# Patient Record
Sex: MALE | Race: WHITE | Employment: FULL TIME | URBAN - METROPOLITAN AREA
[De-identification: names, ages, dates, MRNs, and addresses within clinical notes are randomized per-mention and may not be internally consistent; named-entity substitution may affect disease eponyms.]

---

## 2018-03-14 ENCOUNTER — OFFICE VISIT (OUTPATIENT)
Dept: CARDIOLOGY CLINIC | Age: 58
End: 2018-03-14
Payer: COMMERCIAL

## 2018-03-14 VITALS
BODY MASS INDEX: 36.55 KG/M2 | HEIGHT: 71 IN | HEART RATE: 71 BPM | SYSTOLIC BLOOD PRESSURE: 110 MMHG | WEIGHT: 261.1 LBS | RESPIRATION RATE: 16 BRPM | DIASTOLIC BLOOD PRESSURE: 80 MMHG

## 2018-03-14 DIAGNOSIS — I25.10 CORONARY ARTERY DISEASE INVOLVING NATIVE CORONARY ARTERY OF NATIVE HEART WITHOUT ANGINA PECTORIS: Primary | ICD-10-CM

## 2018-03-14 PROCEDURE — 93000 ELECTROCARDIOGRAM COMPLETE: CPT | Performed by: INTERNAL MEDICINE

## 2018-03-14 PROCEDURE — 99213 OFFICE O/P EST LOW 20 MIN: CPT | Performed by: INTERNAL MEDICINE

## 2018-03-15 NOTE — PROGRESS NOTES
Albuterol Sulfate (PROAIR HFA IN) Inhale 1 puff into the lungs as needed        No current facility-administered medications for this visit. Allergies   Allergen Reactions    Zocor [Simvastatin - High Dose] Other (See Comments)     Causes Rhabdomyolysis       Chief Complaint:  Pat May is here today for follow up and management/recomendations for CAD, CP, abnormal Stress test, HTN, RBBB, ODOT clearance. History of Present Illness: Pat May states that He does house work, yard work, goes up the stairs & goes shopping & collects trash. He denies any CP. He denies any  orthopnea/PND, or lower extremity edema. He denies any palpitations or presyncopal symptoms. He does have COPD and has chronic dyspnea on exertion but feels that he is at his normal baseline. He states that he recently carried 4 doors up stairs to replace them in his house. He denies any chest discomfort or dyspnea on exertion with this activity as well. REVIEW OF SYSTEMS:  As above. Patient does not complain of any fever, chills, nausea, vomiting or diarrhea. No focal, motor or neurological deficits. No changes in his/her vision, hearing, bowel or bladder habits. He is not known to have a history of thyroid problems. No recent nose bleeds. PHYSICAL EXAM:  Vitals:    03/14/18 0824   BP: 110/80   Pulse: 71   Resp: 16   Weight: 261 lb 1.6 oz (118.4 kg)   Height: 5' 11\" (1.803 m)       GENERAL:  He is alert and oriented x 3, communicates well, in no distress. NECK:  No masses, trachea is mid position. Supple, full ROM, no JVD or bruits. No palpable thyromegaly or lymphadenopathy. HEART:  Regular rate and rhythm. Normal S1 and S2. There is an S4 gallop and a I/VI systolic murmur. LUNGS:  Poor air movement. Slight left sided wheezing which improved with coughing. No use of accessory muscles. symmetrical excursion. ABDOMEN:  Soft, non-tender. Normal bowel sounds. EXTREMITIES:  Full ROM x 4.   No bilateral

## 2019-03-07 ENCOUNTER — OFFICE VISIT (OUTPATIENT)
Dept: CARDIOLOGY CLINIC | Age: 59
End: 2019-03-07
Payer: COMMERCIAL

## 2019-03-07 VITALS
HEART RATE: 94 BPM | BODY MASS INDEX: 31.78 KG/M2 | RESPIRATION RATE: 14 BRPM | HEIGHT: 71 IN | DIASTOLIC BLOOD PRESSURE: 82 MMHG | WEIGHT: 227 LBS | SYSTOLIC BLOOD PRESSURE: 118 MMHG

## 2019-03-07 DIAGNOSIS — Z02.89 ENCOUNTER FOR EXAMINATION REQUIRED BY DEPARTMENT OF TRANSPORTATION (DOT): ICD-10-CM

## 2019-03-07 DIAGNOSIS — I48.0 PAF (PAROXYSMAL ATRIAL FIBRILLATION) (HCC): ICD-10-CM

## 2019-03-07 DIAGNOSIS — I25.10 CORONARY ARTERY DISEASE INVOLVING NATIVE HEART WITHOUT ANGINA PECTORIS, UNSPECIFIED VESSEL OR LESION TYPE: Primary | ICD-10-CM

## 2019-03-07 PROCEDURE — 93000 ELECTROCARDIOGRAM COMPLETE: CPT | Performed by: INTERNAL MEDICINE

## 2019-03-07 PROCEDURE — 99215 OFFICE O/P EST HI 40 MIN: CPT | Performed by: INTERNAL MEDICINE

## 2019-03-07 RX ORDER — METOPROLOL SUCCINATE 100 MG/1
150 TABLET, EXTENDED RELEASE ORAL DAILY
Qty: 145 TABLET | Refills: 3 | Status: SHIPPED | OUTPATIENT
Start: 2019-03-07 | End: 2019-04-29

## 2019-03-07 RX ORDER — LISINOPRIL 10 MG/1
10 TABLET ORAL DAILY
Qty: 90 TABLET | Refills: 3 | Status: SHIPPED
Start: 2019-03-07 | End: 2020-03-06

## 2019-03-09 ENCOUNTER — HOSPITAL ENCOUNTER (OUTPATIENT)
Age: 59
Discharge: HOME | End: 2019-03-09
Payer: COMMERCIAL

## 2019-03-09 DIAGNOSIS — I48.0 PAF (PAROXYSMAL ATRIAL FIBRILLATION) (HCC): ICD-10-CM

## 2019-03-09 LAB
ANION GAP SERPL CALCULATED.3IONS-SCNC: 11 MMOL/L (ref 7–16)
BUN BLDV-MCNC: 26 MG/DL (ref 6–20)
CALCIUM SERPL-MCNC: 9.6 MG/DL (ref 8.6–10.2)
CHLORIDE BLD-SCNC: 102 MMOL/L (ref 98–107)
CO2: 27 MMOL/L (ref 22–29)
CREAT SERPL-MCNC: 1 MG/DL (ref 0.7–1.2)
GFR AFRICAN AMERICAN: >60
GFR NON-AFRICAN AMERICAN: >60 ML/MIN/1.73
GLUCOSE BLD-MCNC: 120 MG/DL (ref 74–99)
MAGNESIUM: 1.9 MG/DL (ref 1.6–2.6)
POTASSIUM SERPL-SCNC: 4.8 MMOL/L (ref 3.5–5)
SODIUM BLD-SCNC: 140 MMOL/L (ref 132–146)
TSH SERPL DL<=0.05 MIU/L-ACNC: 0.94 UIU/ML (ref 0.27–4.2)

## 2019-03-09 PROCEDURE — 83735 ASSAY OF MAGNESIUM: CPT

## 2019-03-09 PROCEDURE — 84443 ASSAY THYROID STIM HORMONE: CPT

## 2019-03-09 PROCEDURE — 80048 BASIC METABOLIC PNL TOTAL CA: CPT

## 2019-03-09 PROCEDURE — 36415 COLL VENOUS BLD VENIPUNCTURE: CPT

## 2019-03-11 ENCOUNTER — ANESTHESIA EVENT (OUTPATIENT)
Dept: CARDIAC CATH/INVASIVE PROCEDURES | Age: 59
End: 2019-03-11

## 2019-03-12 ENCOUNTER — TELEPHONE (OUTPATIENT)
Dept: CARDIOLOGY CLINIC | Age: 59
End: 2019-03-12

## 2019-03-12 ENCOUNTER — HOSPITAL ENCOUNTER (OUTPATIENT)
Dept: CARDIAC CATH/INVASIVE PROCEDURES | Age: 59
Setting detail: OUTPATIENT SURGERY
Discharge: HOME | End: 2019-03-12
Payer: COMMERCIAL

## 2019-03-12 ENCOUNTER — ANESTHESIA (OUTPATIENT)
Dept: CARDIAC CATH/INVASIVE PROCEDURES | Age: 59
End: 2019-03-12

## 2019-03-12 VITALS
BODY MASS INDEX: 31.5 KG/M2 | WEIGHT: 225 LBS | TEMPERATURE: 98.1 F | HEART RATE: 92 BPM | DIASTOLIC BLOOD PRESSURE: 47 MMHG | SYSTOLIC BLOOD PRESSURE: 92 MMHG | RESPIRATION RATE: 18 BRPM | HEIGHT: 71 IN | OXYGEN SATURATION: 95 %

## 2019-03-12 VITALS
OXYGEN SATURATION: 95 % | DIASTOLIC BLOOD PRESSURE: 74 MMHG | RESPIRATION RATE: 16 BRPM | SYSTOLIC BLOOD PRESSURE: 94 MMHG

## 2019-03-12 LAB
ANION GAP SERPL CALCULATED.3IONS-SCNC: 13 MMOL/L (ref 7–16)
APTT: 36.5 SEC (ref 24.5–35.1)
BASOPHILS ABSOLUTE: 0.09 E9/L (ref 0–0.2)
BASOPHILS RELATIVE PERCENT: 0.8 % (ref 0–2)
BUN BLDV-MCNC: 22 MG/DL (ref 6–20)
CALCIUM SERPL-MCNC: 9.1 MG/DL (ref 8.6–10.2)
CHLORIDE BLD-SCNC: 102 MMOL/L (ref 98–107)
CO2: 24 MMOL/L (ref 22–29)
CREAT SERPL-MCNC: 0.9 MG/DL (ref 0.7–1.2)
EOSINOPHILS ABSOLUTE: 0.41 E9/L (ref 0.05–0.5)
EOSINOPHILS RELATIVE PERCENT: 3.8 % (ref 0–6)
GFR AFRICAN AMERICAN: >60
GFR NON-AFRICAN AMERICAN: >60 ML/MIN/1.73
GLUCOSE BLD-MCNC: 121 MG/DL (ref 74–99)
HCT VFR BLD CALC: 41.7 % (ref 37–54)
HEMOGLOBIN: 13.4 G/DL (ref 12.5–16.5)
IMMATURE GRANULOCYTES #: 0.04 E9/L
IMMATURE GRANULOCYTES %: 0.4 % (ref 0–5)
INR BLD: 1.3
LV EF: 60 %
LVEF MODALITY: NORMAL
LYMPHOCYTES ABSOLUTE: 2.94 E9/L (ref 1.5–4)
LYMPHOCYTES RELATIVE PERCENT: 27.3 % (ref 20–42)
MAGNESIUM: 1.7 MG/DL (ref 1.6–2.6)
MCH RBC QN AUTO: 29.9 PG (ref 26–35)
MCHC RBC AUTO-ENTMCNC: 32.1 % (ref 32–34.5)
MCV RBC AUTO: 93.1 FL (ref 80–99.9)
MONOCYTES ABSOLUTE: 0.96 E9/L (ref 0.1–0.95)
MONOCYTES RELATIVE PERCENT: 8.9 % (ref 2–12)
NEUTROPHILS ABSOLUTE: 6.31 E9/L (ref 1.8–7.3)
NEUTROPHILS RELATIVE PERCENT: 58.8 % (ref 43–80)
PDW BLD-RTO: 13.2 FL (ref 11.5–15)
PLATELET # BLD: 256 E9/L (ref 130–450)
PMV BLD AUTO: 10.3 FL (ref 7–12)
POTASSIUM SERPL-SCNC: 4.6 MMOL/L (ref 3.5–5)
PROTHROMBIN TIME: 15.3 SEC (ref 9.3–12.4)
RBC # BLD: 4.48 E12/L (ref 3.8–5.8)
SODIUM BLD-SCNC: 139 MMOL/L (ref 132–146)
WBC # BLD: 10.8 E9/L (ref 4.5–11.5)

## 2019-03-12 PROCEDURE — 36415 COLL VENOUS BLD VENIPUNCTURE: CPT

## 2019-03-12 PROCEDURE — 2709999900 HC NON-CHARGEABLE SUPPLY

## 2019-03-12 PROCEDURE — 2580000003 HC RX 258: Performed by: INTERNAL MEDICINE

## 2019-03-12 PROCEDURE — 93312 ECHO TRANSESOPHAGEAL: CPT

## 2019-03-12 PROCEDURE — 93325 DOPPLER ECHO COLOR FLOW MAPG: CPT

## 2019-03-12 PROCEDURE — 2580000003 HC RX 258: Performed by: NURSE ANESTHETIST, CERTIFIED REGISTERED

## 2019-03-12 PROCEDURE — 85610 PROTHROMBIN TIME: CPT

## 2019-03-12 PROCEDURE — 6360000002 HC RX W HCPCS: Performed by: NURSE ANESTHETIST, CERTIFIED REGISTERED

## 2019-03-12 PROCEDURE — 83735 ASSAY OF MAGNESIUM: CPT

## 2019-03-12 PROCEDURE — 93321 DOPPLER ECHO F-UP/LMTD STD: CPT

## 2019-03-12 PROCEDURE — 3700000001 HC ADD 15 MINUTES (ANESTHESIA)

## 2019-03-12 PROCEDURE — 85025 COMPLETE CBC W/AUTO DIFF WBC: CPT

## 2019-03-12 PROCEDURE — 80048 BASIC METABOLIC PNL TOTAL CA: CPT

## 2019-03-12 PROCEDURE — 3700000000 HC ANESTHESIA ATTENDED CARE

## 2019-03-12 PROCEDURE — 85730 THROMBOPLASTIN TIME PARTIAL: CPT

## 2019-03-12 RX ORDER — SODIUM CHLORIDE 9 MG/ML
INJECTION, SOLUTION INTRAVENOUS CONTINUOUS PRN
Status: DISCONTINUED | OUTPATIENT
Start: 2019-03-12 | End: 2019-03-12 | Stop reason: SDUPTHER

## 2019-03-12 RX ORDER — SODIUM CHLORIDE 9 MG/ML
INJECTION, SOLUTION INTRAVENOUS ONCE
Status: COMPLETED | OUTPATIENT
Start: 2019-03-12 | End: 2019-03-12

## 2019-03-12 RX ORDER — PROPOFOL 10 MG/ML
INJECTION, EMULSION INTRAVENOUS PRN
Status: DISCONTINUED | OUTPATIENT
Start: 2019-03-12 | End: 2019-03-12 | Stop reason: SDUPTHER

## 2019-03-12 RX ADMIN — SODIUM CHLORIDE: 9 INJECTION, SOLUTION INTRAVENOUS at 10:13

## 2019-03-12 RX ADMIN — SODIUM CHLORIDE: 9 INJECTION, SOLUTION INTRAVENOUS at 09:44

## 2019-03-12 RX ADMIN — PROPOFOL 430 MG: 10 INJECTION, EMULSION INTRAVENOUS at 10:17

## 2019-03-13 ENCOUNTER — TELEPHONE (OUTPATIENT)
Dept: CARDIOLOGY CLINIC | Age: 59
End: 2019-03-13

## 2019-03-13 PROCEDURE — 93325 DOPPLER ECHO COLOR FLOW MAPG: CPT | Performed by: INTERNAL MEDICINE

## 2019-03-13 PROCEDURE — 93312 ECHO TRANSESOPHAGEAL: CPT | Performed by: INTERNAL MEDICINE

## 2019-03-16 LAB
EKG ATRIAL RATE: 79 BPM
EKG Q-T INTERVAL: 392 MS
EKG QRS DURATION: 110 MS
EKG QTC CALCULATION (BAZETT): 471 MS
EKG R AXIS: 32 DEGREES
EKG T AXIS: 36 DEGREES
EKG VENTRICULAR RATE: 87 BPM

## 2019-03-21 ENCOUNTER — HOSPITAL ENCOUNTER (OUTPATIENT)
Dept: CARDIOLOGY | Age: 59
Discharge: HOME | End: 2019-03-21
Payer: COMMERCIAL

## 2019-03-21 VITALS
SYSTOLIC BLOOD PRESSURE: 116 MMHG | DIASTOLIC BLOOD PRESSURE: 72 MMHG | HEART RATE: 106 BPM | WEIGHT: 225 LBS | HEIGHT: 71 IN | BODY MASS INDEX: 31.5 KG/M2

## 2019-03-21 DIAGNOSIS — I48.0 PAF (PAROXYSMAL ATRIAL FIBRILLATION) (HCC): ICD-10-CM

## 2019-03-21 DIAGNOSIS — I25.10 CORONARY ARTERY DISEASE INVOLVING NATIVE HEART WITHOUT ANGINA PECTORIS, UNSPECIFIED VESSEL OR LESION TYPE: ICD-10-CM

## 2019-03-21 DIAGNOSIS — I25.10 CORONARY ARTERY DISEASE INVOLVING NATIVE CORONARY ARTERY OF NATIVE HEART WITHOUT ANGINA PECTORIS: Primary | ICD-10-CM

## 2019-03-21 DIAGNOSIS — Z02.89 ENCOUNTER FOR EXAMINATION REQUIRED BY DEPARTMENT OF TRANSPORTATION (DOT): ICD-10-CM

## 2019-03-21 PROCEDURE — 2580000003 HC RX 258: Performed by: INTERNAL MEDICINE

## 2019-03-21 PROCEDURE — 3430000000 HC RX DIAGNOSTIC RADIOPHARMACEUTICAL: Performed by: INTERNAL MEDICINE

## 2019-03-21 PROCEDURE — 93017 CV STRESS TEST TRACING ONLY: CPT

## 2019-03-21 PROCEDURE — 6360000002 HC RX W HCPCS: Performed by: INTERNAL MEDICINE

## 2019-03-21 PROCEDURE — A9500 TC99M SESTAMIBI: HCPCS | Performed by: INTERNAL MEDICINE

## 2019-03-21 PROCEDURE — 78452 HT MUSCLE IMAGE SPECT MULT: CPT

## 2019-03-21 RX ORDER — SODIUM CHLORIDE 0.9 % (FLUSH) 0.9 %
10 SYRINGE (ML) INJECTION PRN
Status: DISCONTINUED | OUTPATIENT
Start: 2019-03-21 | End: 2019-03-22 | Stop reason: HOSPADM

## 2019-03-21 RX ADMIN — Medication 34.2 MILLICURIE: at 08:44

## 2019-03-21 RX ADMIN — Medication 10 ML: at 08:45

## 2019-03-21 RX ADMIN — Medication 10 ML: at 08:44

## 2019-03-21 RX ADMIN — Medication 10.1 MILLICURIE: at 07:31

## 2019-03-21 RX ADMIN — REGADENOSON 0.4 MG: 0.08 INJECTION, SOLUTION INTRAVENOUS at 08:44

## 2019-03-21 RX ADMIN — Medication 10 ML: at 07:31

## 2019-03-22 ENCOUNTER — TELEPHONE (OUTPATIENT)
Dept: CARDIOLOGY CLINIC | Age: 59
End: 2019-03-22

## 2019-04-02 ENCOUNTER — TELEPHONE (OUTPATIENT)
Dept: CARDIOLOGY CLINIC | Age: 59
End: 2019-04-02

## 2019-04-02 NOTE — TELEPHONE ENCOUNTER
Patient called stating his work is asking for a release to return to work without restrictions. Please advise.

## 2019-04-29 ENCOUNTER — OFFICE VISIT (OUTPATIENT)
Dept: CARDIOLOGY CLINIC | Age: 59
End: 2019-04-29
Payer: COMMERCIAL

## 2019-04-29 VITALS
RESPIRATION RATE: 16 BRPM | HEIGHT: 71 IN | WEIGHT: 250.4 LBS | HEART RATE: 100 BPM | BODY MASS INDEX: 35.06 KG/M2 | SYSTOLIC BLOOD PRESSURE: 136 MMHG | DIASTOLIC BLOOD PRESSURE: 84 MMHG

## 2019-04-29 DIAGNOSIS — I10 HYPERTENSION, UNSPECIFIED TYPE: Primary | ICD-10-CM

## 2019-04-29 PROCEDURE — 93000 ELECTROCARDIOGRAM COMPLETE: CPT | Performed by: INTERNAL MEDICINE

## 2019-04-29 PROCEDURE — 99214 OFFICE O/P EST MOD 30 MIN: CPT | Performed by: INTERNAL MEDICINE

## 2019-04-29 RX ORDER — METOPROLOL SUCCINATE 100 MG/1
100 TABLET, EXTENDED RELEASE ORAL 2 TIMES DAILY
Qty: 180 TABLET | Refills: 3 | Status: SHIPPED
Start: 2019-04-29 | End: 2020-05-05

## 2019-05-13 ENCOUNTER — NURSE ONLY (OUTPATIENT)
Dept: CARDIOLOGY CLINIC | Age: 59
End: 2019-05-13

## 2019-05-13 ENCOUNTER — TELEPHONE (OUTPATIENT)
Dept: CARDIOLOGY CLINIC | Age: 59
End: 2019-05-13

## 2019-05-13 VITALS — SYSTOLIC BLOOD PRESSURE: 150 MMHG | DIASTOLIC BLOOD PRESSURE: 100 MMHG | HEART RATE: 88 BPM

## 2019-05-15 RX ORDER — DILTIAZEM HYDROCHLORIDE 120 MG/1
120 CAPSULE, COATED, EXTENDED RELEASE ORAL DAILY
Qty: 90 CAPSULE | Refills: 3 | Status: SHIPPED
Start: 2019-05-15 | End: 2020-05-05

## 2019-09-05 RX ORDER — APIXABAN 5 MG/1
TABLET, FILM COATED ORAL
Qty: 60 TABLET | Refills: 11 | Status: SHIPPED
Start: 2019-09-05 | End: 2020-06-11 | Stop reason: SDUPTHER

## 2019-09-27 ENCOUNTER — APPOINTMENT (OUTPATIENT)
Dept: GENERAL RADIOLOGY | Age: 59
End: 2019-09-27
Payer: COMMERCIAL

## 2019-09-27 ENCOUNTER — HOSPITAL ENCOUNTER (OUTPATIENT)
Age: 59
Setting detail: OBSERVATION
Discharge: HOME | End: 2019-09-28
Attending: EMERGENCY MEDICINE | Admitting: FAMILY MEDICINE
Payer: COMMERCIAL

## 2019-09-27 DIAGNOSIS — R07.9 ACUTE CHEST PAIN: Primary | ICD-10-CM

## 2019-09-27 DIAGNOSIS — J41.0 SIMPLE CHRONIC BRONCHITIS (HCC): ICD-10-CM

## 2019-09-27 DIAGNOSIS — R06.2 WHEEZING: ICD-10-CM

## 2019-09-27 LAB
ALBUMIN SERPL-MCNC: 4.5 G/DL (ref 3.5–5.2)
ALBUMIN SERPL-MCNC: 4.5 G/DL (ref 3.5–5.2)
ALP BLD-CCNC: 66 U/L (ref 40–129)
ALP BLD-CCNC: 69 U/L (ref 40–129)
ALT SERPL-CCNC: 12 U/L (ref 0–40)
ALT SERPL-CCNC: 13 U/L (ref 0–40)
ANION GAP SERPL CALCULATED.3IONS-SCNC: 8 MMOL/L (ref 7–16)
AST SERPL-CCNC: 15 U/L (ref 0–39)
AST SERPL-CCNC: 23 U/L (ref 0–39)
BASOPHILS ABSOLUTE: 0.1 E9/L (ref 0–0.2)
BASOPHILS RELATIVE PERCENT: 0.9 % (ref 0–2)
BILIRUB SERPL-MCNC: 0.4 MG/DL (ref 0–1.2)
BILIRUB SERPL-MCNC: 0.6 MG/DL (ref 0–1.2)
BILIRUBIN DIRECT: 0.2 MG/DL (ref 0–0.3)
BILIRUBIN, INDIRECT: 0.4 MG/DL (ref 0–1)
BUN BLDV-MCNC: 24 MG/DL (ref 6–20)
CALCIUM SERPL-MCNC: 9.5 MG/DL (ref 8.6–10.2)
CHLORIDE BLD-SCNC: 103 MMOL/L (ref 98–107)
CHOLESTEROL, TOTAL: 175 MG/DL (ref 0–199)
CO2: 30 MMOL/L (ref 22–29)
CREAT SERPL-MCNC: 1.1 MG/DL (ref 0.7–1.2)
EKG ATRIAL RATE: 84 BPM
EKG Q-T INTERVAL: 402 MS
EKG QRS DURATION: 122 MS
EKG QTC CALCULATION (BAZETT): 460 MS
EKG R AXIS: 13 DEGREES
EKG T AXIS: 17 DEGREES
EKG VENTRICULAR RATE: 79 BPM
EOSINOPHILS ABSOLUTE: 0.46 E9/L (ref 0.05–0.5)
EOSINOPHILS RELATIVE PERCENT: 4.1 % (ref 0–6)
GFR AFRICAN AMERICAN: >60
GFR NON-AFRICAN AMERICAN: >60 ML/MIN/1.73
GLUCOSE BLD-MCNC: 147 MG/DL (ref 74–99)
HCT VFR BLD CALC: 46.9 % (ref 37–54)
HDLC SERPL-MCNC: 75 MG/DL
HEMOGLOBIN: 14.9 G/DL (ref 12.5–16.5)
IMMATURE GRANULOCYTES #: 0.08 E9/L
IMMATURE GRANULOCYTES %: 0.7 % (ref 0–5)
LDL CHOLESTEROL CALCULATED: 86 MG/DL (ref 0–99)
LYMPHOCYTES ABSOLUTE: 3.77 E9/L (ref 1.5–4)
LYMPHOCYTES RELATIVE PERCENT: 33.3 % (ref 20–42)
MAGNESIUM: 1.9 MG/DL (ref 1.6–2.6)
MCH RBC QN AUTO: 30.4 PG (ref 26–35)
MCHC RBC AUTO-ENTMCNC: 31.8 % (ref 32–34.5)
MCV RBC AUTO: 95.7 FL (ref 80–99.9)
MONOCYTES ABSOLUTE: 1.02 E9/L (ref 0.1–0.95)
MONOCYTES RELATIVE PERCENT: 9 % (ref 2–12)
NEUTROPHILS ABSOLUTE: 5.88 E9/L (ref 1.8–7.3)
NEUTROPHILS RELATIVE PERCENT: 52 % (ref 43–80)
PDW BLD-RTO: 14.4 FL (ref 11.5–15)
PLATELET # BLD: 239 E9/L (ref 130–450)
PMV BLD AUTO: 10.4 FL (ref 7–12)
POTASSIUM SERPL-SCNC: 5.3 MMOL/L (ref 3.5–5)
PRO-BNP: 1675 PG/ML (ref 0–125)
RBC # BLD: 4.9 E12/L (ref 3.8–5.8)
REASON FOR REJECTION: NORMAL
REJECTED TEST: NORMAL
SODIUM BLD-SCNC: 141 MMOL/L (ref 132–146)
TOTAL PROTEIN: 7.1 G/DL (ref 6.4–8.3)
TOTAL PROTEIN: 7.6 G/DL (ref 6.4–8.3)
TRIGL SERPL-MCNC: 71 MG/DL (ref 0–149)
TROPONIN: <0.01 NG/ML (ref 0–0.03)
VLDLC SERPL CALC-MCNC: 14 MG/DL
WBC # BLD: 11.3 E9/L (ref 4.5–11.5)

## 2019-09-27 PROCEDURE — APPSS45 APP SPLIT SHARED TIME 31-45 MINUTES: Performed by: NURSE PRACTITIONER

## 2019-09-27 PROCEDURE — 84484 ASSAY OF TROPONIN QUANT: CPT

## 2019-09-27 PROCEDURE — 93005 ELECTROCARDIOGRAM TRACING: CPT | Performed by: STUDENT IN AN ORGANIZED HEALTH CARE EDUCATION/TRAINING PROGRAM

## 2019-09-27 PROCEDURE — 94761 N-INVAS EAR/PLS OXIMETRY MLT: CPT

## 2019-09-27 PROCEDURE — 6360000002 HC RX W HCPCS: Performed by: FAMILY MEDICINE

## 2019-09-27 PROCEDURE — G0378 HOSPITAL OBSERVATION PER HR: HCPCS

## 2019-09-27 PROCEDURE — 94640 AIRWAY INHALATION TREATMENT: CPT

## 2019-09-27 PROCEDURE — 71045 X-RAY EXAM CHEST 1 VIEW: CPT

## 2019-09-27 PROCEDURE — 6370000000 HC RX 637 (ALT 250 FOR IP): Performed by: FAMILY MEDICINE

## 2019-09-27 PROCEDURE — 83880 ASSAY OF NATRIURETIC PEPTIDE: CPT

## 2019-09-27 PROCEDURE — 99285 EMERGENCY DEPT VISIT HI MDM: CPT

## 2019-09-27 PROCEDURE — 85025 COMPLETE CBC W/AUTO DIFF WBC: CPT

## 2019-09-27 PROCEDURE — 80076 HEPATIC FUNCTION PANEL: CPT

## 2019-09-27 PROCEDURE — 83735 ASSAY OF MAGNESIUM: CPT

## 2019-09-27 PROCEDURE — 6370000000 HC RX 637 (ALT 250 FOR IP): Performed by: STUDENT IN AN ORGANIZED HEALTH CARE EDUCATION/TRAINING PROGRAM

## 2019-09-27 PROCEDURE — 36415 COLL VENOUS BLD VENIPUNCTURE: CPT

## 2019-09-27 PROCEDURE — 80061 LIPID PANEL: CPT

## 2019-09-27 PROCEDURE — 99244 OFF/OP CNSLTJ NEW/EST MOD 40: CPT | Performed by: INTERNAL MEDICINE

## 2019-09-27 PROCEDURE — 96374 THER/PROPH/DIAG INJ IV PUSH: CPT

## 2019-09-27 PROCEDURE — 80053 COMPREHEN METABOLIC PANEL: CPT

## 2019-09-27 PROCEDURE — 94664 DEMO&/EVAL PT USE INHALER: CPT

## 2019-09-27 RX ORDER — ALBUTEROL SULFATE 2.5 MG/3ML
2.5 SOLUTION RESPIRATORY (INHALATION) EVERY 4 HOURS PRN
Status: DISCONTINUED | OUTPATIENT
Start: 2019-09-27 | End: 2019-09-28 | Stop reason: HOSPADM

## 2019-09-27 RX ORDER — FUROSEMIDE 10 MG/ML
40 INJECTION INTRAMUSCULAR; INTRAVENOUS ONCE
Status: COMPLETED | OUTPATIENT
Start: 2019-09-27 | End: 2019-09-27

## 2019-09-27 RX ORDER — ASPIRIN 81 MG/1
81 TABLET ORAL DAILY
Status: DISCONTINUED | OUTPATIENT
Start: 2019-09-28 | End: 2019-09-28 | Stop reason: HOSPADM

## 2019-09-27 RX ORDER — NITROGLYCERIN 0.4 MG/1
0.4 TABLET SUBLINGUAL EVERY 5 MIN PRN
Status: DISCONTINUED | OUTPATIENT
Start: 2019-09-27 | End: 2019-09-28 | Stop reason: HOSPADM

## 2019-09-27 RX ORDER — DILTIAZEM HYDROCHLORIDE 120 MG/1
120 CAPSULE, COATED, EXTENDED RELEASE ORAL DAILY
Status: DISCONTINUED | OUTPATIENT
Start: 2019-09-28 | End: 2019-09-28 | Stop reason: HOSPADM

## 2019-09-27 RX ORDER — IPRATROPIUM BROMIDE AND ALBUTEROL SULFATE 2.5; .5 MG/3ML; MG/3ML
1 SOLUTION RESPIRATORY (INHALATION)
Status: DISCONTINUED | OUTPATIENT
Start: 2019-09-27 | End: 2019-09-28 | Stop reason: HOSPADM

## 2019-09-27 RX ORDER — LISINOPRIL 10 MG/1
10 TABLET ORAL DAILY
Status: DISCONTINUED | OUTPATIENT
Start: 2019-09-28 | End: 2019-09-28 | Stop reason: HOSPADM

## 2019-09-27 RX ORDER — IPRATROPIUM BROMIDE AND ALBUTEROL SULFATE 2.5; .5 MG/3ML; MG/3ML
1 SOLUTION RESPIRATORY (INHALATION)
Status: DISCONTINUED | OUTPATIENT
Start: 2019-09-27 | End: 2019-09-27

## 2019-09-27 RX ORDER — METOPROLOL SUCCINATE 100 MG/1
100 TABLET, EXTENDED RELEASE ORAL 2 TIMES DAILY
Status: DISCONTINUED | OUTPATIENT
Start: 2019-09-27 | End: 2019-09-28 | Stop reason: HOSPADM

## 2019-09-27 RX ORDER — ERGOCALCIFEROL 1.25 MG/1
50000 CAPSULE ORAL WEEKLY
Status: DISCONTINUED | OUTPATIENT
Start: 2019-09-30 | End: 2019-09-28 | Stop reason: HOSPADM

## 2019-09-27 RX ORDER — ROSUVASTATIN CALCIUM 20 MG/1
40 TABLET, COATED ORAL NIGHTLY
Status: DISCONTINUED | OUTPATIENT
Start: 2019-09-27 | End: 2019-09-28 | Stop reason: HOSPADM

## 2019-09-27 RX ADMIN — TIOTROPIUM BROMIDE 18 MCG: 18 CAPSULE ORAL; RESPIRATORY (INHALATION) at 20:48

## 2019-09-27 RX ADMIN — IPRATROPIUM BROMIDE AND ALBUTEROL SULFATE 1 AMPULE: .5; 3 SOLUTION RESPIRATORY (INHALATION) at 20:54

## 2019-09-27 RX ADMIN — IPRATROPIUM BROMIDE AND ALBUTEROL SULFATE 1 AMPULE: .5; 3 SOLUTION RESPIRATORY (INHALATION) at 09:18

## 2019-09-27 RX ADMIN — FUROSEMIDE 40 MG: 10 INJECTION, SOLUTION INTRAMUSCULAR; INTRAVENOUS at 16:18

## 2019-09-27 RX ADMIN — IPRATROPIUM BROMIDE AND ALBUTEROL SULFATE 1 AMPULE: .5; 3 SOLUTION RESPIRATORY (INHALATION) at 15:38

## 2019-09-27 RX ADMIN — IPRATROPIUM BROMIDE AND ALBUTEROL SULFATE 1 AMPULE: .5; 3 SOLUTION RESPIRATORY (INHALATION) at 13:15

## 2019-09-27 RX ADMIN — METOPROLOL SUCCINATE 100 MG: 100 TABLET, EXTENDED RELEASE ORAL at 20:47

## 2019-09-27 RX ADMIN — ROSUVASTATIN CALCIUM 40 MG: 20 TABLET, FILM COATED ORAL at 20:47

## 2019-09-27 ASSESSMENT — PAIN SCALES - GENERAL
PAINLEVEL_OUTOF10: 2
PAINLEVEL_OUTOF10: 0

## 2019-09-27 ASSESSMENT — PAIN DESCRIPTION - PAIN TYPE: TYPE: ACUTE PAIN

## 2019-09-27 ASSESSMENT — PAIN DESCRIPTION - LOCATION: LOCATION: CHEST

## 2019-09-27 ASSESSMENT — PAIN DESCRIPTION - DESCRIPTORS: DESCRIPTORS: HEAVINESS

## 2019-09-27 ASSESSMENT — PAIN DESCRIPTION - ORIENTATION: ORIENTATION: MID

## 2019-09-28 VITALS
HEIGHT: 71 IN | SYSTOLIC BLOOD PRESSURE: 137 MMHG | HEART RATE: 75 BPM | TEMPERATURE: 97.1 F | OXYGEN SATURATION: 97 % | WEIGHT: 265.5 LBS | BODY MASS INDEX: 37.17 KG/M2 | RESPIRATION RATE: 18 BRPM | DIASTOLIC BLOOD PRESSURE: 89 MMHG

## 2019-09-28 LAB — TSH SERPL DL<=0.05 MIU/L-ACNC: 1.6 UIU/ML (ref 0.27–4.2)

## 2019-09-28 PROCEDURE — 6360000002 HC RX W HCPCS: Performed by: INTERNAL MEDICINE

## 2019-09-28 PROCEDURE — 99212 OFFICE O/P EST SF 10 MIN: CPT | Performed by: INTERNAL MEDICINE

## 2019-09-28 PROCEDURE — 36415 COLL VENOUS BLD VENIPUNCTURE: CPT

## 2019-09-28 PROCEDURE — G0378 HOSPITAL OBSERVATION PER HR: HCPCS

## 2019-09-28 PROCEDURE — 84443 ASSAY THYROID STIM HORMONE: CPT

## 2019-09-28 PROCEDURE — 6370000000 HC RX 637 (ALT 250 FOR IP): Performed by: FAMILY MEDICINE

## 2019-09-28 PROCEDURE — 96375 TX/PRO/DX INJ NEW DRUG ADDON: CPT

## 2019-09-28 PROCEDURE — 94640 AIRWAY INHALATION TREATMENT: CPT

## 2019-09-28 RX ORDER — PREDNISONE 20 MG/1
TABLET ORAL
Qty: 18 TABLET | Refills: 0 | Status: SHIPPED | OUTPATIENT
Start: 2019-09-28 | End: 2019-11-06

## 2019-09-28 RX ORDER — METHYLPREDNISOLONE SODIUM SUCCINATE 40 MG/ML
40 INJECTION, POWDER, LYOPHILIZED, FOR SOLUTION INTRAMUSCULAR; INTRAVENOUS ONCE
Status: COMPLETED | OUTPATIENT
Start: 2019-09-28 | End: 2019-09-28

## 2019-09-28 RX ORDER — IPRATROPIUM BROMIDE AND ALBUTEROL SULFATE 2.5; .5 MG/3ML; MG/3ML
1 SOLUTION RESPIRATORY (INHALATION) EVERY 4 HOURS
Qty: 360 ML | Refills: 0 | Status: SHIPPED | OUTPATIENT
Start: 2019-09-28

## 2019-09-28 RX ADMIN — METHYLPREDNISOLONE SODIUM SUCCINATE 40 MG: 40 INJECTION, POWDER, FOR SOLUTION INTRAMUSCULAR; INTRAVENOUS at 11:25

## 2019-09-28 RX ADMIN — LISINOPRIL 10 MG: 10 TABLET ORAL at 08:14

## 2019-09-28 RX ADMIN — IPRATROPIUM BROMIDE AND ALBUTEROL SULFATE 1 AMPULE: .5; 3 SOLUTION RESPIRATORY (INHALATION) at 05:53

## 2019-09-28 RX ADMIN — DILTIAZEM HYDROCHLORIDE 120 MG: 120 CAPSULE, COATED, EXTENDED RELEASE ORAL at 09:54

## 2019-09-28 RX ADMIN — IPRATROPIUM BROMIDE AND ALBUTEROL SULFATE 1 AMPULE: .5; 3 SOLUTION RESPIRATORY (INHALATION) at 10:05

## 2019-09-28 RX ADMIN — ASPIRIN 81 MG: 81 TABLET, COATED ORAL at 08:14

## 2019-09-28 RX ADMIN — TIOTROPIUM BROMIDE 18 MCG: 18 CAPSULE ORAL; RESPIRATORY (INHALATION) at 08:14

## 2019-09-28 RX ADMIN — METOPROLOL SUCCINATE 100 MG: 100 TABLET, EXTENDED RELEASE ORAL at 08:14

## 2019-09-28 ASSESSMENT — PAIN SCALES - GENERAL: PAINLEVEL_OUTOF10: 0

## 2019-10-29 ENCOUNTER — OFFICE VISIT (OUTPATIENT)
Dept: CARDIOLOGY CLINIC | Age: 59
End: 2019-10-29
Payer: COMMERCIAL

## 2019-10-29 VITALS
BODY MASS INDEX: 36.96 KG/M2 | HEART RATE: 94 BPM | DIASTOLIC BLOOD PRESSURE: 74 MMHG | WEIGHT: 264 LBS | RESPIRATION RATE: 16 BRPM | SYSTOLIC BLOOD PRESSURE: 118 MMHG | HEIGHT: 71 IN

## 2019-10-29 DIAGNOSIS — I25.10 CORONARY ARTERY DISEASE INVOLVING NATIVE CORONARY ARTERY OF NATIVE HEART WITHOUT ANGINA PECTORIS: Primary | ICD-10-CM

## 2019-10-29 PROCEDURE — 99215 OFFICE O/P EST HI 40 MIN: CPT | Performed by: INTERNAL MEDICINE

## 2019-10-29 PROCEDURE — 93000 ELECTROCARDIOGRAM COMPLETE: CPT | Performed by: INTERNAL MEDICINE

## 2019-11-05 ENCOUNTER — TELEPHONE (OUTPATIENT)
Dept: NON INVASIVE DIAGNOSTICS | Age: 59
End: 2019-11-05

## 2019-11-06 ENCOUNTER — ANESTHESIA EVENT (OUTPATIENT)
Dept: CARDIAC CATH/INVASIVE PROCEDURES | Age: 59
End: 2019-11-06

## 2019-11-06 ENCOUNTER — HOSPITAL ENCOUNTER (OUTPATIENT)
Dept: CARDIAC CATH/INVASIVE PROCEDURES | Age: 59
Discharge: HOME | End: 2019-11-06
Payer: COMMERCIAL

## 2019-11-06 ENCOUNTER — ANESTHESIA (OUTPATIENT)
Dept: CARDIAC CATH/INVASIVE PROCEDURES | Age: 59
End: 2019-11-06

## 2019-11-06 VITALS
BODY MASS INDEX: 36.4 KG/M2 | DIASTOLIC BLOOD PRESSURE: 74 MMHG | WEIGHT: 260 LBS | TEMPERATURE: 99.7 F | SYSTOLIC BLOOD PRESSURE: 110 MMHG | HEIGHT: 71 IN | RESPIRATION RATE: 20 BRPM | HEART RATE: 78 BPM

## 2019-11-06 VITALS
SYSTOLIC BLOOD PRESSURE: 99 MMHG | DIASTOLIC BLOOD PRESSURE: 66 MMHG | RESPIRATION RATE: 18 BRPM | OXYGEN SATURATION: 94 %

## 2019-11-06 LAB
ANION GAP SERPL CALCULATED.3IONS-SCNC: 16 MMOL/L (ref 7–16)
APTT: 36.3 SEC (ref 24.5–35.1)
BASOPHILS ABSOLUTE: 0.11 E9/L (ref 0–0.2)
BASOPHILS RELATIVE PERCENT: 0.9 % (ref 0–2)
BUN BLDV-MCNC: 21 MG/DL (ref 6–20)
CALCIUM SERPL-MCNC: 9.6 MG/DL (ref 8.6–10.2)
CHLORIDE BLD-SCNC: 99 MMOL/L (ref 98–107)
CO2: 25 MMOL/L (ref 22–29)
CREAT SERPL-MCNC: 1.1 MG/DL (ref 0.7–1.2)
EOSINOPHILS ABSOLUTE: 0.77 E9/L (ref 0.05–0.5)
EOSINOPHILS RELATIVE PERCENT: 6.6 % (ref 0–6)
GFR AFRICAN AMERICAN: >60
GFR NON-AFRICAN AMERICAN: >60 ML/MIN/1.73
GLUCOSE BLD-MCNC: 138 MG/DL (ref 74–99)
HCT VFR BLD CALC: 46.5 % (ref 37–54)
HEMOGLOBIN: 14.9 G/DL (ref 12.5–16.5)
IMMATURE GRANULOCYTES #: 0.08 E9/L
IMMATURE GRANULOCYTES %: 0.7 % (ref 0–5)
INR BLD: 1.4
LYMPHOCYTES ABSOLUTE: 2.38 E9/L (ref 1.5–4)
LYMPHOCYTES RELATIVE PERCENT: 20.3 % (ref 20–42)
MCH RBC QN AUTO: 30.9 PG (ref 26–35)
MCHC RBC AUTO-ENTMCNC: 32 % (ref 32–34.5)
MCV RBC AUTO: 96.5 FL (ref 80–99.9)
MONOCYTES ABSOLUTE: 0.89 E9/L (ref 0.1–0.95)
MONOCYTES RELATIVE PERCENT: 7.6 % (ref 2–12)
NEUTROPHILS ABSOLUTE: 7.49 E9/L (ref 1.8–7.3)
NEUTROPHILS RELATIVE PERCENT: 63.9 % (ref 43–80)
PDW BLD-RTO: 13.2 FL (ref 11.5–15)
PLATELET # BLD: 231 E9/L (ref 130–450)
PMV BLD AUTO: 10.2 FL (ref 7–12)
POTASSIUM REFLEX MAGNESIUM: 4.3 MMOL/L (ref 3.5–5)
PROTHROMBIN TIME: 15.7 SEC (ref 9.3–12.4)
RBC # BLD: 4.82 E12/L (ref 3.8–5.8)
SODIUM BLD-SCNC: 140 MMOL/L (ref 132–146)
WBC # BLD: 11.7 E9/L (ref 4.5–11.5)

## 2019-11-06 PROCEDURE — 2580000003 HC RX 258: Performed by: NURSE ANESTHETIST, CERTIFIED REGISTERED

## 2019-11-06 PROCEDURE — 6360000002 HC RX W HCPCS: Performed by: NURSE ANESTHETIST, CERTIFIED REGISTERED

## 2019-11-06 PROCEDURE — 6370000000 HC RX 637 (ALT 250 FOR IP): Performed by: INTERNAL MEDICINE

## 2019-11-06 PROCEDURE — 3700000001 HC ADD 15 MINUTES (ANESTHESIA)

## 2019-11-06 PROCEDURE — 36415 COLL VENOUS BLD VENIPUNCTURE: CPT

## 2019-11-06 PROCEDURE — 3700000000 HC ANESTHESIA ATTENDED CARE

## 2019-11-06 PROCEDURE — 2500000003 HC RX 250 WO HCPCS: Performed by: NURSE ANESTHETIST, CERTIFIED REGISTERED

## 2019-11-06 PROCEDURE — 85025 COMPLETE CBC W/AUTO DIFF WBC: CPT

## 2019-11-06 PROCEDURE — 85730 THROMBOPLASTIN TIME PARTIAL: CPT

## 2019-11-06 PROCEDURE — 80048 BASIC METABOLIC PNL TOTAL CA: CPT

## 2019-11-06 PROCEDURE — 85610 PROTHROMBIN TIME: CPT

## 2019-11-06 RX ORDER — IPRATROPIUM BROMIDE AND ALBUTEROL SULFATE 2.5; .5 MG/3ML; MG/3ML
1 SOLUTION RESPIRATORY (INHALATION) ONCE
Status: COMPLETED | OUTPATIENT
Start: 2019-11-06 | End: 2019-11-06

## 2019-11-06 RX ORDER — LIDOCAINE HYDROCHLORIDE 20 MG/ML
INJECTION, SOLUTION EPIDURAL; INFILTRATION; INTRACAUDAL; PERINEURAL PRN
Status: DISCONTINUED | OUTPATIENT
Start: 2019-11-06 | End: 2019-11-06 | Stop reason: SDUPTHER

## 2019-11-06 RX ORDER — PROPOFOL 10 MG/ML
INJECTION, EMULSION INTRAVENOUS PRN
Status: DISCONTINUED | OUTPATIENT
Start: 2019-11-06 | End: 2019-11-06 | Stop reason: SDUPTHER

## 2019-11-06 RX ORDER — SODIUM CHLORIDE 9 MG/ML
INJECTION, SOLUTION INTRAVENOUS CONTINUOUS PRN
Status: DISCONTINUED | OUTPATIENT
Start: 2019-11-06 | End: 2019-11-06 | Stop reason: SDUPTHER

## 2019-11-06 RX ORDER — MIDAZOLAM HYDROCHLORIDE 1 MG/ML
INJECTION INTRAMUSCULAR; INTRAVENOUS PRN
Status: DISCONTINUED | OUTPATIENT
Start: 2019-11-06 | End: 2019-11-06 | Stop reason: SDUPTHER

## 2019-11-06 RX ADMIN — SODIUM CHLORIDE: 9 INJECTION, SOLUTION INTRAVENOUS at 11:18

## 2019-11-06 RX ADMIN — PROPOFOL 150 MG: 10 INJECTION, EMULSION INTRAVENOUS at 11:35

## 2019-11-06 RX ADMIN — IPRATROPIUM BROMIDE AND ALBUTEROL SULFATE 1 AMPULE: .5; 3 SOLUTION RESPIRATORY (INHALATION) at 11:46

## 2019-11-06 RX ADMIN — LIDOCAINE HYDROCHLORIDE 100 MG: 20 INJECTION, SOLUTION EPIDURAL; INFILTRATION; INTRACAUDAL; PERINEURAL at 12:00

## 2019-11-06 RX ADMIN — MIDAZOLAM 1 MG: 1 INJECTION INTRAMUSCULAR; INTRAVENOUS at 12:00

## 2019-11-06 RX ADMIN — PROPOFOL 100 MG: 10 INJECTION, EMULSION INTRAVENOUS at 12:00

## 2019-11-13 ENCOUNTER — TELEPHONE (OUTPATIENT)
Dept: CARDIOLOGY CLINIC | Age: 59
End: 2019-11-13

## 2020-02-19 ENCOUNTER — TELEPHONE (OUTPATIENT)
Dept: CARDIOLOGY CLINIC | Age: 60
End: 2020-02-19

## 2020-02-19 ENCOUNTER — OFFICE VISIT (OUTPATIENT)
Dept: CARDIOLOGY CLINIC | Age: 60
End: 2020-02-19
Payer: COMMERCIAL

## 2020-02-19 VITALS
BODY MASS INDEX: 38.08 KG/M2 | DIASTOLIC BLOOD PRESSURE: 78 MMHG | HEIGHT: 71 IN | HEART RATE: 78 BPM | WEIGHT: 272 LBS | RESPIRATION RATE: 16 BRPM | SYSTOLIC BLOOD PRESSURE: 132 MMHG

## 2020-02-19 PROCEDURE — 99215 OFFICE O/P EST HI 40 MIN: CPT | Performed by: INTERNAL MEDICINE

## 2020-02-19 PROCEDURE — 93000 ELECTROCARDIOGRAM COMPLETE: CPT | Performed by: INTERNAL MEDICINE

## 2020-02-19 NOTE — PROGRESS NOTES
Intimate partner violence:     Fear of current or ex partner: None     Emotionally abused: None     Physically abused: None     Forced sexual activity: None   Other Topics Concern    None   Social History Narrative    None       Current Outpatient Medications   Medication Sig Dispense Refill    ipratropium-albuterol (DUONEB) 0.5-2.5 (3) MG/3ML SOLN nebulizer solution Inhale 3 mLs into the lungs every 4 hours 360 mL 0    Respiratory Therapy Supplies (FULL KIT NEBULIZER SET) MISC Use as directed with nebulized medication. 1 each 0    ELIQUIS 5 MG TABS tablet TAKE ONE TABLET BY MOUTH TWO TIMES A DAY 60 tablet 11    diltiazem (CARDIZEM CD) 120 MG extended release capsule Take 1 capsule by mouth daily 90 capsule 3    metoprolol succinate (TOPROL XL) 100 MG extended release tablet Take 1 tablet by mouth 2 times daily 180 tablet 3    lisinopril (PRINIVIL;ZESTRIL) 10 MG tablet Take 1 tablet by mouth daily 90 tablet 3    nitroGLYCERIN (NITROSTAT) 0.4 MG SL tablet Place 1 tablet under the tongue every 5 minutes as needed for Chest pain 25 tablet 0    CRESTOR 40 MG tablet 40 mg daily       vitamin D (ERGOCALCIFEROL) 06434 UNITS CAPS capsule Take 50,000 Units by mouth once a week.  aspirin 81 MG EC tablet Take 81 mg by mouth daily.  Albuterol Sulfate (PROAIR HFA IN) Inhale 1 puff into the lungs as needed       Tiotropium Bromide Monohydrate (SPIRIVA HANDIHALER IN) Inhale 1 puff into the lungs daily        No current facility-administered medications for this visit. Allergies   Allergen Reactions    Zocor [Simvastatin - High Dose] Other (See Comments)     Causes Rhabdomyolysis       Chief Complaint:  Onedia Dickson is here today for follow up and management/recomendations for CAD, CP, abnormal Stress test, HTN, RBBB, ODOT clearance. History of Present Illness: Oneida Dickson states that He has been diagnosed with sleep apnea and COPD. He has started treatments for both.   He states that out of his medication as described above. He is unable to refill this due to cost.  3. Sleep apnea. He has started using CPAP and states that he does feel much better. 4. RBBB, stable  5. Hypertension, well controled at this time. 6. Hypercholesterolemia  7. Atrial fibrillation. His heart rate is well controlled. Recent MAZIN demonstrated left atrial appendage thrombus. He remains on his Eliquis therapy. Recommendations:  1. He is following the cholesterol with Dr. Trenton Velazco. 2. I instructed him that he needs to treat his COPD. He needs to call for a substitute medication that he can afford. 3. MAZIN and cardioversion. 4. I explained the risks & benefits of a MAZIN to the patient. These include but are not limited to sedation, allergy, aspiration, respiratory distress/arrest, esophageal damage/perforation, and death. They stated that they understand and agree to proceed. CARDIOVERSION:  I discussed with them the risks & benefits of a cardioversion including but not limited to sedation, skin burns, and malignant dysrhythmias. Patient states that he/she understands and agrees to proceed. Thank you for allowing me to participate in your patient's care.       [address], [address] [facility]  Interventional Cardiology

## 2020-02-19 NOTE — TELEPHONE ENCOUNTER
Per Dr. Jovana Stark, patient needs alternative to Spiriva for his COPD. It is too expensive for him. He needs MAZIN/DCC and needs to be on alternative for at least a week prior to this. Dipika Richter in Dr. Lauren Power office notified. She will discuss with Dr. Danie Kearney and call patient.

## 2020-02-27 ENCOUNTER — TELEPHONE (OUTPATIENT)
Dept: NON INVASIVE DIAGNOSTICS | Age: 60
End: 2020-02-27

## 2020-02-28 ENCOUNTER — HOSPITAL ENCOUNTER (OUTPATIENT)
Dept: CARDIAC CATH/INVASIVE PROCEDURES | Age: 60
Discharge: HOME | End: 2020-02-28
Payer: COMMERCIAL

## 2020-02-28 ENCOUNTER — ANESTHESIA (OUTPATIENT)
Dept: CARDIAC CATH/INVASIVE PROCEDURES | Age: 60
End: 2020-02-28

## 2020-02-28 ENCOUNTER — ANESTHESIA EVENT (OUTPATIENT)
Dept: CARDIAC CATH/INVASIVE PROCEDURES | Age: 60
End: 2020-02-28

## 2020-02-28 VITALS
SYSTOLIC BLOOD PRESSURE: 106 MMHG | OXYGEN SATURATION: 96 % | RESPIRATION RATE: 28 BRPM | DIASTOLIC BLOOD PRESSURE: 77 MMHG

## 2020-02-28 VITALS
OXYGEN SATURATION: 95 % | RESPIRATION RATE: 18 BRPM | BODY MASS INDEX: 37.1 KG/M2 | HEIGHT: 71 IN | DIASTOLIC BLOOD PRESSURE: 109 MMHG | HEART RATE: 86 BPM | TEMPERATURE: 97.9 F | WEIGHT: 265 LBS | SYSTOLIC BLOOD PRESSURE: 152 MMHG

## 2020-02-28 LAB
ANION GAP SERPL CALCULATED.3IONS-SCNC: 13 MMOL/L (ref 7–16)
BASOPHILS ABSOLUTE: 0.09 E9/L (ref 0–0.2)
BASOPHILS RELATIVE PERCENT: 0.9 % (ref 0–2)
BUN BLDV-MCNC: 19 MG/DL (ref 6–20)
CALCIUM SERPL-MCNC: 9.8 MG/DL (ref 8.6–10.2)
CHLORIDE BLD-SCNC: 100 MMOL/L (ref 98–107)
CO2: 25 MMOL/L (ref 22–29)
CREAT SERPL-MCNC: 1 MG/DL (ref 0.7–1.2)
EKG ATRIAL RATE: 85 BPM
EKG Q-T INTERVAL: 426 MS
EKG QRS DURATION: 126 MS
EKG QTC CALCULATION (BAZETT): 452 MS
EKG R AXIS: 25 DEGREES
EKG T AXIS: 27 DEGREES
EKG VENTRICULAR RATE: 68 BPM
EOSINOPHILS ABSOLUTE: 0.43 E9/L (ref 0.05–0.5)
EOSINOPHILS RELATIVE PERCENT: 4.5 % (ref 0–6)
GFR AFRICAN AMERICAN: >60
GFR NON-AFRICAN AMERICAN: >60 ML/MIN/1.73
GLUCOSE BLD-MCNC: 127 MG/DL (ref 74–99)
HCT VFR BLD CALC: 49.3 % (ref 37–54)
HEMOGLOBIN: 15.8 G/DL (ref 12.5–16.5)
IMMATURE GRANULOCYTES #: 0.04 E9/L
IMMATURE GRANULOCYTES %: 0.4 % (ref 0–5)
INR BLD: 1.2
LV EF: 58 %
LVEF MODALITY: NORMAL
LYMPHOCYTES ABSOLUTE: 2.54 E9/L (ref 1.5–4)
LYMPHOCYTES RELATIVE PERCENT: 26.7 % (ref 20–42)
MCH RBC QN AUTO: 30.4 PG (ref 26–35)
MCHC RBC AUTO-ENTMCNC: 32 % (ref 32–34.5)
MCV RBC AUTO: 95 FL (ref 80–99.9)
MONOCYTES ABSOLUTE: 1.03 E9/L (ref 0.1–0.95)
MONOCYTES RELATIVE PERCENT: 10.8 % (ref 2–12)
NEUTROPHILS ABSOLUTE: 5.4 E9/L (ref 1.8–7.3)
NEUTROPHILS RELATIVE PERCENT: 56.7 % (ref 43–80)
PDW BLD-RTO: 13 FL (ref 11.5–15)
PLATELET # BLD: 242 E9/L (ref 130–450)
PMV BLD AUTO: 10.4 FL (ref 7–12)
POTASSIUM SERPL-SCNC: 4.8 MMOL/L (ref 3.5–5)
PROTHROMBIN TIME: 14.1 SEC (ref 9.3–12.4)
RBC # BLD: 5.19 E12/L (ref 3.8–5.8)
SODIUM BLD-SCNC: 138 MMOL/L (ref 132–146)
WBC # BLD: 9.5 E9/L (ref 4.5–11.5)

## 2020-02-28 PROCEDURE — 3700000000 HC ANESTHESIA ATTENDED CARE

## 2020-02-28 PROCEDURE — 3700000001 HC ADD 15 MINUTES (ANESTHESIA)

## 2020-02-28 PROCEDURE — 36415 COLL VENOUS BLD VENIPUNCTURE: CPT

## 2020-02-28 PROCEDURE — 85610 PROTHROMBIN TIME: CPT

## 2020-02-28 PROCEDURE — 2709999900 HC NON-CHARGEABLE SUPPLY

## 2020-02-28 PROCEDURE — 93010 ELECTROCARDIOGRAM REPORT: CPT | Performed by: INTERNAL MEDICINE

## 2020-02-28 PROCEDURE — 2580000003 HC RX 258

## 2020-02-28 PROCEDURE — 93005 ELECTROCARDIOGRAM TRACING: CPT | Performed by: INTERNAL MEDICINE

## 2020-02-28 PROCEDURE — 6360000002 HC RX W HCPCS

## 2020-02-28 PROCEDURE — 80048 BASIC METABOLIC PNL TOTAL CA: CPT

## 2020-02-28 PROCEDURE — 93321 DOPPLER ECHO F-UP/LMTD STD: CPT

## 2020-02-28 PROCEDURE — 85025 COMPLETE CBC W/AUTO DIFF WBC: CPT

## 2020-02-28 PROCEDURE — 93325 DOPPLER ECHO COLOR FLOW MAPG: CPT

## 2020-02-28 PROCEDURE — 93312 ECHO TRANSESOPHAGEAL: CPT

## 2020-02-28 RX ORDER — SODIUM CHLORIDE 9 MG/ML
INJECTION, SOLUTION INTRAVENOUS CONTINUOUS PRN
Status: DISCONTINUED | OUTPATIENT
Start: 2020-02-28 | End: 2020-02-28 | Stop reason: SDUPTHER

## 2020-02-28 RX ORDER — PROPOFOL 10 MG/ML
INJECTION, EMULSION INTRAVENOUS PRN
Status: DISCONTINUED | OUTPATIENT
Start: 2020-02-28 | End: 2020-02-28 | Stop reason: SDUPTHER

## 2020-02-28 RX ORDER — MIDAZOLAM HYDROCHLORIDE 1 MG/ML
INJECTION INTRAMUSCULAR; INTRAVENOUS PRN
Status: DISCONTINUED | OUTPATIENT
Start: 2020-02-28 | End: 2020-02-28 | Stop reason: SDUPTHER

## 2020-02-28 RX ADMIN — PROPOFOL 70 MG: 10 INJECTION, EMULSION INTRAVENOUS at 09:56

## 2020-02-28 RX ADMIN — PROPOFOL 30 MG: 10 INJECTION, EMULSION INTRAVENOUS at 10:06

## 2020-02-28 RX ADMIN — MIDAZOLAM 2 MG: 1 INJECTION INTRAMUSCULAR; INTRAVENOUS at 09:56

## 2020-02-28 RX ADMIN — SODIUM CHLORIDE: 9 INJECTION, SOLUTION INTRAVENOUS at 09:55

## 2020-02-28 RX ADMIN — PROPOFOL 30 MG: 10 INJECTION, EMULSION INTRAVENOUS at 10:00

## 2020-02-28 ASSESSMENT — LIFESTYLE VARIABLES: SMOKING_STATUS: 0

## 2020-02-28 NOTE — ANESTHESIA PRE PROCEDURE
Department of Anesthesiology  Preprocedure Note       Name:  Andrew Juan   Age:  63 y.o.  :  1960                                          MRN:  [id number]         Date:  2020      Surgeon: MD Lida Willard  Procedure: SEY MAZIN CARDIOVERSION W/ ANES    Medications prior to admission:   Prior to Admission medications    Medication Sig Start Date End Date Taking? Authorizing Provider   ipratropium-albuterol (DUONEB) 0.5-2.5 (3) MG/3ML SOLN nebulizer solution Inhale 3 mLs into the lungs every 4 hours 19  Yes Sneha Hwang MD   ELIQUIS 5 MG TABS tablet TAKE ONE TABLET BY MOUTH TWO TIMES A DAY 19  Yes Marcelina Sam DO   diltiazem (CARDIZEM CD) 120 MG extended release capsule Take 1 capsule by mouth daily 5/15/19  Yes Marcelina Sam DO   metoprolol succinate (TOPROL XL) 100 MG extended release tablet Take 1 tablet by mouth 2 times daily 19  Yes Marcelina Sam DO   lisinopril (PRINIVIL;ZESTRIL) 10 MG tablet Take 1 tablet by mouth daily 3/7/19  Yes Marcelina Sam DO   CRESTOR 40 MG tablet 40 mg daily  2/7/15  Yes Historical Provider, MD   vitamin D (ERGOCALCIFEROL) 86577 UNITS CAPS capsule Take 50,000 Units by mouth once a week. 3/6/13  Yes Historical Provider, MD   aspirin 81 MG EC tablet Take 81 mg by mouth daily. Yes Historical Provider, MD   Tiotropium Bromide Monohydrate (SPIRIVA HANDIHALER IN) Inhale 1 puff into the lungs daily    Yes Historical Provider, MD   Albuterol Sulfate (PROAIR HFA IN) Inhale 1 puff into the lungs as needed    Yes Historical Provider, MD   Respiratory Therapy Supplies (FULL KIT NEBULIZER SET) MISC Use as directed with nebulized medication.  19   Sneha Hwang MD   nitroGLYCERIN (NITROSTAT) 0.4 MG SL tablet Place 1 tablet under the tongue every 5 minutes as needed for Chest pain 8/6/15   Isac Tarango MD       Current medications:    Current Outpatient Medications   Medication Sig Dispense Refill    ipratropium-albuterol (DUONEB) 0.5-2.5 (3)
[facility]

## 2020-03-02 ENCOUNTER — TELEPHONE (OUTPATIENT)
Dept: CARDIOLOGY CLINIC | Age: 60
End: 2020-03-02

## 2020-03-02 NOTE — TELEPHONE ENCOUNTER
----- Message from Marcelina Sam DO sent at 3/1/2020  5:57 PM EST -----  Please schedule a cardiac CAT scan at [facility] regarding rule out left atrial appendage thrombus.  ----- Message -----  From: Dioni Vidal Incoming Cardiology Results From [facility]  Sent: 2/28/2020   5:12 PM EST  To: Marcelina Sam DO

## 2020-03-04 ENCOUNTER — TELEPHONE (OUTPATIENT)
Dept: CARDIOLOGY CLINIC | Age: 60
End: 2020-03-04

## 2020-03-04 NOTE — TELEPHONE ENCOUNTER
Per Dr. Karly Larson, patient needs Cardiac CT with contrast with special attention to left atrial appendage, to be done at [facility]. Left message for patient to call the office.

## 2020-03-06 ENCOUNTER — TELEPHONE (OUTPATIENT)
Dept: CARDIOLOGY CLINIC | Age: 60
End: 2020-03-06

## 2020-03-06 RX ORDER — LISINOPRIL 10 MG/1
TABLET ORAL
Qty: 90 TABLET | Refills: 3 | Status: SHIPPED
Start: 2020-03-06 | End: 2020-06-11 | Stop reason: SDUPTHER

## 2020-03-06 NOTE — TELEPHONE ENCOUNTER
Spoke with Janki Fu in [facility]. Cardiac CT scheduled for 3/16/20 at 8:00 a.m. Ольга Ramirez Patient to be notified to arrive at 7:45 a.m.; NPO (4) hours prior; comfortable clothing; can take meds in a.m. with water; bring list of meds/photo ID/ins cards. [facility]  [address]   [facility] [address], [address]    [id number]  Basement Level  Q elevator to basement     Left message for patient to call the office.

## 2020-03-23 ENCOUNTER — TELEPHONE (OUTPATIENT)
Dept: CARDIOLOGY CLINIC | Age: 60
End: 2020-03-23

## 2020-03-23 NOTE — TELEPHONE ENCOUNTER
[facility] will be scheduled accordingly. EKG, stress and DOT letter mailed to patient. Note and CT results faxed to Dr. Rahel Guzman.

## 2020-04-09 ENCOUNTER — HOSPITAL ENCOUNTER (OUTPATIENT)
Dept: PET IMAGING | Age: 60
Discharge: HOME | End: 2020-04-11
Payer: COMMERCIAL

## 2020-04-09 LAB — METER GLUCOSE: 110 MG/DL (ref 74–99)

## 2020-04-09 PROCEDURE — A9552 F18 FDG: HCPCS | Performed by: RADIOLOGY

## 2020-04-09 PROCEDURE — 3430000000 HC RX DIAGNOSTIC RADIOPHARMACEUTICAL: Performed by: RADIOLOGY

## 2020-04-09 PROCEDURE — 82962 GLUCOSE BLOOD TEST: CPT

## 2020-04-09 PROCEDURE — 78815 PET IMAGE W/CT SKULL-THIGH: CPT

## 2020-04-09 RX ORDER — FLUDEOXYGLUCOSE F 18 200 MCI/ML
15 INJECTION, SOLUTION INTRAVENOUS
Status: COMPLETED | OUTPATIENT
Start: 2020-04-09 | End: 2020-04-09

## 2020-04-09 RX ADMIN — FLUDEOXYGLUCOSE F 18 15 MILLICURIE: 200 INJECTION, SOLUTION INTRAVENOUS at 12:57

## 2020-04-10 ENCOUNTER — ANESTHESIA EVENT (OUTPATIENT)
Dept: CARDIAC CATH/INVASIVE PROCEDURES | Age: 60
End: 2020-04-10

## 2020-04-10 ENCOUNTER — HOSPITAL ENCOUNTER (OUTPATIENT)
Dept: CARDIAC CATH/INVASIVE PROCEDURES | Age: 60
Discharge: HOME | End: 2020-04-10
Payer: COMMERCIAL

## 2020-04-10 ENCOUNTER — ANESTHESIA (OUTPATIENT)
Dept: CARDIAC CATH/INVASIVE PROCEDURES | Age: 60
End: 2020-04-10

## 2020-04-10 VITALS
RESPIRATION RATE: 17 BRPM | OXYGEN SATURATION: 99 % | DIASTOLIC BLOOD PRESSURE: 92 MMHG | SYSTOLIC BLOOD PRESSURE: 148 MMHG

## 2020-04-10 VITALS
HEART RATE: 80 BPM | RESPIRATION RATE: 20 BRPM | TEMPERATURE: 98.8 F | DIASTOLIC BLOOD PRESSURE: 93 MMHG | SYSTOLIC BLOOD PRESSURE: 139 MMHG | WEIGHT: 270 LBS | BODY MASS INDEX: 37.8 KG/M2 | HEIGHT: 71 IN

## 2020-04-10 LAB
ANION GAP SERPL CALCULATED.3IONS-SCNC: 13 MMOL/L (ref 7–16)
BASOPHILS ABSOLUTE: 0.06 E9/L (ref 0–0.2)
BASOPHILS RELATIVE PERCENT: 0.6 % (ref 0–2)
BUN BLDV-MCNC: 18 MG/DL (ref 6–20)
CALCIUM SERPL-MCNC: 9.5 MG/DL (ref 8.6–10.2)
CHLORIDE BLD-SCNC: 102 MMOL/L (ref 98–107)
CO2: 24 MMOL/L (ref 22–29)
CREAT SERPL-MCNC: 0.9 MG/DL (ref 0.7–1.2)
EKG ATRIAL RATE: 117 BPM
EKG Q-T INTERVAL: 398 MS
EKG QRS DURATION: 108 MS
EKG QTC CALCULATION (BAZETT): 464 MS
EKG R AXIS: 28 DEGREES
EKG T AXIS: 35 DEGREES
EKG VENTRICULAR RATE: 82 BPM
EOSINOPHILS ABSOLUTE: 0.38 E9/L (ref 0.05–0.5)
EOSINOPHILS RELATIVE PERCENT: 3.9 % (ref 0–6)
GFR AFRICAN AMERICAN: >60
GFR NON-AFRICAN AMERICAN: >60 ML/MIN/1.73
GLUCOSE BLD-MCNC: 138 MG/DL (ref 74–99)
HCT VFR BLD CALC: 44.8 % (ref 37–54)
HEMOGLOBIN: 14.7 G/DL (ref 12.5–16.5)
IMMATURE GRANULOCYTES #: 0.07 E9/L
IMMATURE GRANULOCYTES %: 0.7 % (ref 0–5)
INR BLD: 1.3
LYMPHOCYTES ABSOLUTE: 2.39 E9/L (ref 1.5–4)
LYMPHOCYTES RELATIVE PERCENT: 24.8 % (ref 20–42)
MCH RBC QN AUTO: 30.4 PG (ref 26–35)
MCHC RBC AUTO-ENTMCNC: 32.8 % (ref 32–34.5)
MCV RBC AUTO: 92.8 FL (ref 80–99.9)
MONOCYTES ABSOLUTE: 0.91 E9/L (ref 0.1–0.95)
MONOCYTES RELATIVE PERCENT: 9.4 % (ref 2–12)
NEUTROPHILS ABSOLUTE: 5.83 E9/L (ref 1.8–7.3)
NEUTROPHILS RELATIVE PERCENT: 60.6 % (ref 43–80)
PDW BLD-RTO: 12.6 FL (ref 11.5–15)
PLATELET # BLD: 227 E9/L (ref 130–450)
PMV BLD AUTO: 10.3 FL (ref 7–12)
POTASSIUM REFLEX MAGNESIUM: 4.2 MMOL/L (ref 3.5–5)
PROTHROMBIN TIME: 14.5 SEC (ref 9.3–12.4)
RBC # BLD: 4.83 E12/L (ref 3.8–5.8)
SODIUM BLD-SCNC: 139 MMOL/L (ref 132–146)
WBC # BLD: 9.6 E9/L (ref 4.5–11.5)

## 2020-04-10 PROCEDURE — 6370000000 HC RX 637 (ALT 250 FOR IP): Performed by: INTERNAL MEDICINE

## 2020-04-10 PROCEDURE — 93005 ELECTROCARDIOGRAM TRACING: CPT | Performed by: INTERNAL MEDICINE

## 2020-04-10 PROCEDURE — 3700000001 HC ADD 15 MINUTES (ANESTHESIA)

## 2020-04-10 PROCEDURE — 3700000000 HC ANESTHESIA ATTENDED CARE

## 2020-04-10 PROCEDURE — 93010 ELECTROCARDIOGRAM REPORT: CPT | Performed by: INTERNAL MEDICINE

## 2020-04-10 PROCEDURE — 85025 COMPLETE CBC W/AUTO DIFF WBC: CPT

## 2020-04-10 PROCEDURE — 85610 PROTHROMBIN TIME: CPT

## 2020-04-10 PROCEDURE — 6360000002 HC RX W HCPCS: Performed by: NURSE ANESTHETIST, CERTIFIED REGISTERED

## 2020-04-10 PROCEDURE — APPSS180 APP SPLIT SHARED TIME > 60 MINUTES: Performed by: CLINICAL NURSE SPECIALIST

## 2020-04-10 PROCEDURE — 80048 BASIC METABOLIC PNL TOTAL CA: CPT

## 2020-04-10 PROCEDURE — 92960 CARDIOVERSION ELECTRIC EXT: CPT | Performed by: INTERNAL MEDICINE

## 2020-04-10 PROCEDURE — 2709999900 HC NON-CHARGEABLE SUPPLY

## 2020-04-10 PROCEDURE — 2580000003 HC RX 258: Performed by: NURSE ANESTHETIST, CERTIFIED REGISTERED

## 2020-04-10 PROCEDURE — 36415 COLL VENOUS BLD VENIPUNCTURE: CPT

## 2020-04-10 RX ORDER — SODIUM CHLORIDE 9 MG/ML
INJECTION, SOLUTION INTRAVENOUS CONTINUOUS PRN
Status: DISCONTINUED | OUTPATIENT
Start: 2020-04-10 | End: 2020-04-10 | Stop reason: SDUPTHER

## 2020-04-10 RX ORDER — BUDESONIDE AND FORMOTEROL FUMARATE DIHYDRATE 160; 4.5 UG/1; UG/1
2 AEROSOL RESPIRATORY (INHALATION) 2 TIMES DAILY
COMMUNITY
End: 2021-03-02

## 2020-04-10 RX ORDER — PROPOFOL 10 MG/ML
INJECTION, EMULSION INTRAVENOUS PRN
Status: DISCONTINUED | OUTPATIENT
Start: 2020-04-10 | End: 2020-04-10 | Stop reason: SDUPTHER

## 2020-04-10 RX ADMIN — DRONEDARONE 400 MG: 400 TABLET, FILM COATED ORAL at 09:18

## 2020-04-10 RX ADMIN — PROPOFOL 140 MG: 10 INJECTION, EMULSION INTRAVENOUS at 08:34

## 2020-04-10 RX ADMIN — SODIUM CHLORIDE: 9 INJECTION, SOLUTION INTRAVENOUS at 08:14

## 2020-04-10 ASSESSMENT — LIFESTYLE VARIABLES: SMOKING_STATUS: 1

## 2020-04-10 NOTE — H&P
capsule by mouth daily, Disp: 90 capsule, Rfl: 3    metoprolol succinate (TOPROL XL) 100 MG extended release tablet, Take 1 tablet by mouth 2 times daily, Disp: 180 tablet, Rfl: 3    CRESTOR 40 MG tablet, 40 mg daily , Disp: , Rfl:     vitamin D (ERGOCALCIFEROL) 65665 UNITS CAPS capsule, Take 50,000 Units by mouth once a week., Disp: , Rfl:     aspirin 81 MG EC tablet, Take 81 mg by mouth daily. , Disp: , Rfl:     lisinopril (PRINIVIL;ZESTRIL) 10 MG tablet, TAKE ONE TABLET BY MOUTH DAILY, Disp: 90 tablet, Rfl: 3    ipratropium-albuterol (DUONEB) 0.5-2.5 (3) MG/3ML SOLN nebulizer solution, Inhale 3 mLs into the lungs every 4 hours, Disp: 360 mL, Rfl: 0    nitroGLYCERIN (NITROSTAT) 0.4 MG SL tablet, Place 1 tablet under the tongue every 5 minutes as needed for Chest pain, Disp: 25 tablet, Rfl: 0    Allergies:  Zocor [simvastatin - high dose]    Social History:    Social History     Socioeconomic History    Marital status:      Spouse name: Not on file    Number of children: Not on file    Years of education: Not on file    Highest education level: Not on file   Occupational History    Not on file   Social Needs    Financial resource strain: Not on file    Food insecurity     Worry: Not on file     Inability: Not on file    Transportation needs     Medical: Not on file     Non-medical: Not on file   Tobacco Use    Smoking status: Former Smoker     Packs/day: 0.25     Years: 25.00     Pack years: 6.25     Types: Cigarettes     Last attempt to quit: 10/29/2010     Years since quittin.4    Smokeless tobacco: Never Used   Substance and Sexual Activity    Alcohol use:  Yes     Alcohol/week: 0.0 standard drinks     Types: 6 - 12 Cans of beer per week    Drug use: No     Types: Marijuana     Comment: in the past, not recently    Sexual activity: Not on file   Lifestyle    Physical activity     Days per week: Not on file     Minutes per session: Not on file    Stress: Not on file

## 2020-04-10 NOTE — ANESTHESIA PRE PROCEDURE
Department of Anesthesiology  Preprocedure Note       Name:  Gabby Chan   Age:  63 y.o.  :  1960                                          MRN:  [id number]         Date:  4/10/2020      Surgeon: Dilcia Barillas    Procedure: CARDIOVERSION WITH ANESTHESIA    Medications prior to admission:   Prior to Admission medications    Medication Sig Start Date End Date Taking? Authorizing Provider   budesonide-formoterol (SYMBICORT) 160-4.5 MCG/ACT AERO Inhale 2 puffs into the lungs 2 times daily   Yes Historical Provider, MD   Respiratory Therapy Supplies (FULL KIT NEBULIZER SET) MISC Use as directed with nebulized medication. 19  Yes Hunter Rose MD   ELIQUIS 5 MG TABS tablet TAKE ONE TABLET BY MOUTH TWO TIMES A DAY 19  Yes Marella Rather, DO   diltiazem (CARDIZEM CD) 120 MG extended release capsule Take 1 capsule by mouth daily 5/15/19  Yes Marella Rather, DO   metoprolol succinate (TOPROL XL) 100 MG extended release tablet Take 1 tablet by mouth 2 times daily 19  Yes Marella Rather, DO   CRESTOR 40 MG tablet 40 mg daily  2/7/15  Yes Historical Provider, MD   vitamin D (ERGOCALCIFEROL) 05087 UNITS CAPS capsule Take 50,000 Units by mouth once a week. 3/6/13  Yes Historical Provider, MD   aspirin 81 MG EC tablet Take 81 mg by mouth daily.      Yes Historical Provider, MD   lisinopril (PRINIVIL;ZESTRIL) 10 MG tablet TAKE ONE TABLET BY MOUTH DAILY 3/6/20   Marella Rather, DO   ipratropium-albuterol (DUONEB) 0.5-2.5 (3) MG/3ML SOLN nebulizer solution Inhale 3 mLs into the lungs every 4 hours 19   Hunter Rose MD   nitroGLYCERIN (NITROSTAT) 0.4 MG SL tablet Place 1 tablet under the tongue every 5 minutes as needed for Chest pain 8/6/15   Morena Cid MD       Current medications:    Current Outpatient Medications   Medication Sig Dispense Refill    budesonide-formoterol (SYMBICORT) 160-4.5 MCG/ACT AERO Inhale 2 puffs into the lungs 2 times daily      Respiratory Therapy Supplies (FULL KIT  TRANSESOPHAGEAL ECHOCARDIOGRAM  2020    julio césar       Social History:    Social History     Tobacco Use    Smoking status: Former Smoker     Packs/day: 0.25     Years: 25.00     Pack years: 6.25     Types: Cigarettes     Last attempt to quit: 10/29/2010     Years since quittin.4    Smokeless tobacco: Never Used   Substance Use Topics    Alcohol use: Yes     Alcohol/week: 0.0 standard drinks     Types: 6 - 12 Cans of beer per week                                Counseling given: Not Answered      Vital Signs (Current):   Vitals:    04/10/20 0657   BP: (!) 139/93   Pulse: 80   Resp: 20   Temp: 37.1 °C (98.8 °F)   Weight: 270 lb (122.5 kg)   Height: 5' 11\" (1.803 m)                                              BP Readings from Last 3 Encounters:   04/10/20 (!) 139/93   20 (!) 152/109   20 106/77       NPO Status:  12 hrs                                                                               BMI:   Wt Readings from Last 3 Encounters:   04/10/20 270 lb (122.5 kg)   20 265 lb (120.2 kg)   20 272 lb (123.4 kg)     Body mass index is 37.66 kg/m². CBC:   Lab Results   Component Value Date    WBC 9.6 04/10/2020    RBC 4.83 04/10/2020    HGB 14.7 04/10/2020    HCT 44.8 04/10/2020    MCV 92.8 04/10/2020    RDW 12.6 04/10/2020     04/10/2020       CMP:   Lab Results   Component Value Date     04/10/2020    K 4.2 04/10/2020     04/10/2020    CO2 24 04/10/2020    BUN 18 04/10/2020    CREATININE 0.9 04/10/2020    GFRAA >60 04/10/2020    LABGLOM >60 04/10/2020    GLUCOSE 138 04/10/2020    GLUCOSE 129 2011    PROT 7.1 2019    CALCIUM 9.5 04/10/2020    BILITOT 0.6 2019    ALKPHOS 69 2019    AST 15 2019    ALT 13 2019       POC Tests: No results for input(s): POCGLU, POCNA, POCK, POCCL, POCBUN, POCHEMO, POCHCT in the last 72 hours.     Coags:   Lab Results   Component Value Date    PROTIME 14.5 04/10/2020    INR 1.3 04/10/2020 APTT 36.3 11/06/2019       HCG (If Applicable): No results found for: PREGTESTUR, PREGSERUM, HCG, HCGQUANT     ABGs: No results found for: PHART, PO2ART, GEL2AOX, QWQ9DTQ, BEART, J9VSGTEE     Type & Screen (If Applicable):  No results found for: LABABO, [address]    Anesthesia Evaluation  Patient summary reviewed and Nursing notes reviewed no history of anesthetic complications:   Airway: Mallampati: III  TM distance: >3 FB   Neck ROM: full  Mouth opening: > = 3 FB Dental:          Pulmonary:normal exam  breath sounds clear to auscultation  (+) COPD (daily inhaler use):  current smoker (none for a few days)                           Cardiovascular:  Exercise tolerance: good (>4 METS),   (+) hypertension:, past MI: > 6 months, CAD:, CABG/stent (x 3 vessels 2007):, dysrhythmias: atrial fibrillation,       ECG reviewed  Rhythm: irregular  Rate: normal                   PE comment: AF 70-80's   Neuro/Psych:   Negative Neuro/Psych ROS              GI/Hepatic/Renal:   (+) GERD (controlled with diet): well controlled,           Endo/Other:    (+) blood dyscrasia (states may have missed a dose or two over the last month): anticoagulation therapy:., .          Pt had no PAT visit       Abdominal:           Vascular: negative vascular ROS. 1. ECG during the infusion did not change. 2. The myocardial perfusion imaging was normal with attenuation   artifact. 3. Overall left ventricular systolic function was abnormal   without regional wall motion abnormalities. 4. Low risk general pharmacologic stress test.    [facility] Electronically signed    Summary   Normal left ventricle size and systolic function. left atrial appendage thrombus vs artifact   Mild mitral regurgitation. Mild to moderate aortic regurgitation. Anesthesia Plan      MAC     ASA 3       Induction: intravenous.       Anesthetic plan and risks discussed with patient. Use of blood products discussed with patient whom. Plan discussed with attending. SIENNA Rubalcava CRNA   4/10/2020        Pt seen, examined, chart reviewed, plan discussed.   Nia Chambers  4/10/2020  8:19 AM

## 2020-04-10 NOTE — ANESTHESIA POSTPROCEDURE EVALUATION
Department of Anesthesiology  Postprocedure Note    Patient: Clover Regalado  MRN: [id number]  YOB: 1960  Date of evaluation: 4/10/2020  Time:  9:58 AM     Procedure Summary     Date:  04/10/20 Room / Location:  [facility] CATH LAB    Anesthesia Start:  1098 Anesthesia Stop:      Procedure:  CARDIOVERSION WITH ANESTHESIA Diagnosis:      Scheduled Providers:   Responsible Provider:  Elie Snowden MD    Anesthesia Type:  MAC ASA Status:  3          Anesthesia Type: MAC    Rossana Phase I:      Rossana Phase II:      Last vitals: Reviewed and per EMR flowsheets.        Anesthesia Post Evaluation    Patient location during evaluation: PACU  Patient participation: complete - patient participated  Level of consciousness: awake  Pain score: 3  Airway patency: patent  Nausea & Vomiting: no nausea and no vomiting  Complications: no  Cardiovascular status: blood pressure returned to baseline  Respiratory status: acceptable  Hydration status: euvolemic

## 2020-04-13 ENCOUNTER — TELEPHONE (OUTPATIENT)
Dept: CARDIOLOGY CLINIC | Age: 60
End: 2020-04-13

## 2020-04-16 ENCOUNTER — NURSE ONLY (OUTPATIENT)
Dept: CARDIOLOGY CLINIC | Age: 60
End: 2020-04-16
Payer: COMMERCIAL

## 2020-04-16 ENCOUNTER — TELEPHONE (OUTPATIENT)
Dept: CARDIOLOGY CLINIC | Age: 60
End: 2020-04-16

## 2020-04-16 VITALS
SYSTOLIC BLOOD PRESSURE: 138 MMHG | DIASTOLIC BLOOD PRESSURE: 78 MMHG | HEIGHT: 71 IN | BODY MASS INDEX: 37.8 KG/M2 | HEART RATE: 67 BPM | WEIGHT: 270 LBS

## 2020-04-16 PROCEDURE — 93000 ELECTROCARDIOGRAM COMPLETE: CPT | Performed by: INTERNAL MEDICINE

## 2020-05-05 RX ORDER — DILTIAZEM HYDROCHLORIDE 120 MG/1
CAPSULE, COATED, EXTENDED RELEASE ORAL
Qty: 90 CAPSULE | Refills: 3 | Status: SHIPPED
Start: 2020-05-05 | End: 2020-06-11 | Stop reason: SDUPTHER

## 2020-05-05 RX ORDER — METOPROLOL SUCCINATE 100 MG/1
TABLET, EXTENDED RELEASE ORAL
Qty: 180 TABLET | Refills: 3 | Status: SHIPPED
Start: 2020-05-05 | End: 2020-06-11 | Stop reason: SDUPTHER

## 2020-05-06 ENCOUNTER — HOSPITAL ENCOUNTER (OUTPATIENT)
Dept: CT IMAGING | Age: 60
Discharge: HOME | End: 2020-05-08
Payer: COMMERCIAL

## 2020-05-06 PROCEDURE — 6360000004 HC RX CONTRAST MEDICATION: Performed by: RADIOLOGY

## 2020-05-06 PROCEDURE — 70450 CT HEAD/BRAIN W/O DYE: CPT

## 2020-05-06 PROCEDURE — 72131 CT LUMBAR SPINE W/O DYE: CPT

## 2020-05-06 PROCEDURE — 2580000003 HC RX 258: Performed by: RADIOLOGY

## 2020-05-06 PROCEDURE — 74177 CT ABD & PELVIS W/CONTRAST: CPT

## 2020-05-06 RX ORDER — SODIUM CHLORIDE 0.9 % (FLUSH) 0.9 %
10 SYRINGE (ML) INJECTION PRN
Status: DISCONTINUED | OUTPATIENT
Start: 2020-05-06 | End: 2020-05-09 | Stop reason: HOSPADM

## 2020-05-06 RX ADMIN — IOPAMIDOL 100 ML: 755 INJECTION, SOLUTION INTRAVENOUS at 13:22

## 2020-05-06 RX ADMIN — IOHEXOL 50 ML: 240 INJECTION, SOLUTION INTRATHECAL; INTRAVASCULAR; INTRAVENOUS; ORAL at 13:22

## 2020-05-06 RX ADMIN — Medication 10 ML: at 13:22

## 2020-05-20 ENCOUNTER — TELEPHONE (OUTPATIENT)
Dept: CARDIOLOGY CLINIC | Age: 60
End: 2020-05-20

## 2020-05-20 NOTE — TELEPHONE ENCOUNTER
Patient needs cardiac clearance for navigational bronchoscopy with EBUS by Dr. Patricia Mcknight. Patient on Eliquis. Had cardioversion 4/10/20. Left message for patient to call the office to assess for any symptoms since cardioversion.

## 2020-05-29 NOTE — PROGRESS NOTES
David 37 PRE-ADMISSION TESTING GENERAL INSTRUCTIONS- [facility]-phone number:[phone]    GENERAL INSTRUCTIONS  [x] Antibacterial Soap shower Night before and/or AM of Surgery  [] Denis wipe instruction sheet and wipes given. [x] Nothing by mouth after midnight, including gum, candy, mints, or water. [x] You may brush your teeth, gargle, but do NOT swallow water. []Hibiclens shower  the night before and the morning of surgery. Do not use             Hibiclens on your face or head. [x]No smoking, chewing tobacco, illegal drugs, or alcohol within 24 hours of your surgery. [x] Jewelry, valuables or body piercing's should not be brought to the hospital. All body and/or tongue piercing's must be removed prior to arriving to hospital.  ALL hair pins must be removed. [x] Do not wear makeup, lotions, powders, deodorant. Nail polish as directed by the nurse. [x] Arrange transportation with a responsible adult  to and from the hospital. If you do not have a responsible adult  to transport you, you will need to make arrangements with a medical transportation company (i.e. [employer]. A Uber/taxi/bus is not appropriate unless you are accompanied by a responsible adult ). Arrange for someone to be with you for the remainder of the day and for 24 hours after your procedure due to having had anesthesia. Who will be your  for transportation?___sister_______________   Who will be staying with you for 24 hrs after your procedure?___sister_______________  [x] Bring insurance card and photo ID.  [] Transfusion Bracelet: Please bring with you to hospital, day of surgery  [] Bring urine specimen day of surgery. Any small container is acceptable. [] Use inhalers the morning of surgery and bring with you to hospital.  [] Bring copy of living will or healthcare power of  papers to be placed in your electronic record.   [] CPAP/BI-PAP: Please bring your machine if you are to spend the night in the hospital.     PARKING INSTRUCTIONS:   [x] Arrival Time:_[address] entrance____________  · [] Parking lot '\"I\"  is located on [facility] (the corner of [facility] and [facility]). To enter, press the button and the gate will lift. A free token will be provided to exit the lot. One car per patient is allowed to park in this lot. All other cars are to park on [address] [place] [address] Street either in the parking garage or the handicap lot. [] To reach the [facility] lobby from [address] [place] [address], upon entering the hospital, take elevator B to the 3rd floor. EDUCATION INSTRUCTIONS:      [] Knee or hip replacement booklet & exercise pamphlets given. [] [address] placed in chart. [] Pre-admission Testing educational folder given  [] Incentive Spirometry,coughing & deep breathing exercises reviewed. []Medication information sheet(s)   []Fluoroscopy-Xray used in surgery reviewed with patient. Educational pamphlet placed in chart. []Pain: Post-op pain is normal and to be expected. You will be asked to rate your pain from 0-10(a zero is not acceptable-education is needed). Your post-op pain goal is:  [] Ask your nurse for your pain medication. [] Joint camp offered. [] Joint replacement booklets given. [] Other:___________________________    MEDICATION INSTRUCTIONS:   [x]Bring a complete list of your medications, please write the last time you took the medicine, give this list to the nurse. [x] Take the following medications the morning of surgery with 1-2 ounces of water: refer to med sheet  [] Stop herbal supplements and vitamins 5 days before your surgery. [] DO NOT take any diabetic medicine the morning of surgery. Follow instructions for insulin the day before surgery. [] If you are diabetic and your blood sugar is low or you feel symptomatic, you may drink 1-2 ounces of apple juice or take a glucose tablet.   The morning of your

## 2020-05-29 NOTE — PROGRESS NOTES
covid test scheduled   [address]   05/30/2020 Instructed to self Quarintine until after surgery date 06/04/2020

## 2020-05-30 ENCOUNTER — HOSPITAL ENCOUNTER (OUTPATIENT)
Age: 60
Discharge: HOME | End: 2020-06-01
Payer: COMMERCIAL

## 2020-05-30 PROCEDURE — U0003 INFECTIOUS AGENT DETECTION BY NUCLEIC ACID (DNA OR RNA); SEVERE ACUTE RESPIRATORY SYNDROME CORONAVIRUS 2 (SARS-COV-2) (CORONAVIRUS DISEASE [COVID-19]), AMPLIFIED PROBE TECHNIQUE, MAKING USE OF HIGH THROUGHPUT TECHNOLOGIES AS DESCRIBED BY CMS-2020-01-R: HCPCS

## 2020-06-02 ENCOUNTER — HOSPITAL ENCOUNTER (OUTPATIENT)
Dept: CT IMAGING | Age: 60
Discharge: HOME | End: 2020-06-04
Payer: COMMERCIAL

## 2020-06-02 LAB
SARS-COV-2: NOT DETECTED
SOURCE: NORMAL

## 2020-06-02 PROCEDURE — 71250 CT THORAX DX C-: CPT

## 2020-06-03 ENCOUNTER — ANESTHESIA EVENT (OUTPATIENT)
Dept: ENDOSCOPY | Age: 60
End: 2020-06-03
Payer: COMMERCIAL

## 2020-06-04 ENCOUNTER — APPOINTMENT (OUTPATIENT)
Dept: GENERAL RADIOLOGY | Age: 60
End: 2020-06-04
Attending: INTERNAL MEDICINE
Payer: COMMERCIAL

## 2020-06-04 ENCOUNTER — ANESTHESIA (OUTPATIENT)
Dept: ENDOSCOPY | Age: 60
End: 2020-06-04
Payer: COMMERCIAL

## 2020-06-04 ENCOUNTER — HOSPITAL ENCOUNTER (OUTPATIENT)
Age: 60
Setting detail: OUTPATIENT SURGERY
Discharge: HOME | End: 2020-06-04
Attending: INTERNAL MEDICINE | Admitting: INTERNAL MEDICINE
Payer: COMMERCIAL

## 2020-06-04 VITALS
BODY MASS INDEX: 37.8 KG/M2 | WEIGHT: 270 LBS | OXYGEN SATURATION: 93 % | SYSTOLIC BLOOD PRESSURE: 111 MMHG | RESPIRATION RATE: 18 BRPM | TEMPERATURE: 97.2 F | HEART RATE: 57 BPM | DIASTOLIC BLOOD PRESSURE: 68 MMHG | HEIGHT: 71 IN

## 2020-06-04 VITALS — OXYGEN SATURATION: 99 % | TEMPERATURE: 65.7 F | DIASTOLIC BLOOD PRESSURE: 67 MMHG | SYSTOLIC BLOOD PRESSURE: 108 MMHG

## 2020-06-04 LAB
ANION GAP SERPL CALCULATED.3IONS-SCNC: 13 MMOL/L (ref 7–16)
BASOPHILS ABSOLUTE: 0.08 E9/L (ref 0–0.2)
BASOPHILS RELATIVE PERCENT: 0.8 % (ref 0–2)
BUN BLDV-MCNC: 32 MG/DL (ref 6–20)
CALCIUM SERPL-MCNC: 9.5 MG/DL (ref 8.6–10.2)
CHLORIDE BLD-SCNC: 104 MMOL/L (ref 98–107)
CO2: 23 MMOL/L (ref 22–29)
CREAT SERPL-MCNC: 1.3 MG/DL (ref 0.7–1.2)
EOSINOPHILS ABSOLUTE: 0.49 E9/L (ref 0.05–0.5)
EOSINOPHILS RELATIVE PERCENT: 4.9 % (ref 0–6)
GFR AFRICAN AMERICAN: >60
GFR NON-AFRICAN AMERICAN: 57 ML/MIN/1.73
GLUCOSE BLD-MCNC: 128 MG/DL (ref 74–99)
HCT VFR BLD CALC: 44.8 % (ref 37–54)
HEMOGLOBIN: 14.4 G/DL (ref 12.5–16.5)
IMMATURE GRANULOCYTES #: 0.07 E9/L
IMMATURE GRANULOCYTES %: 0.7 % (ref 0–5)
INR BLD: 1
LYMPHOCYTES ABSOLUTE: 2.04 E9/L (ref 1.5–4)
LYMPHOCYTES RELATIVE PERCENT: 20.3 % (ref 20–42)
MCH RBC QN AUTO: 30.2 PG (ref 26–35)
MCHC RBC AUTO-ENTMCNC: 32.1 % (ref 32–34.5)
MCV RBC AUTO: 93.9 FL (ref 80–99.9)
MONOCYTES ABSOLUTE: 0.88 E9/L (ref 0.1–0.95)
MONOCYTES RELATIVE PERCENT: 8.8 % (ref 2–12)
NEUTROPHILS ABSOLUTE: 6.49 E9/L (ref 1.8–7.3)
NEUTROPHILS RELATIVE PERCENT: 64.5 % (ref 43–80)
PDW BLD-RTO: 13.8 FL (ref 11.5–15)
PLATELET # BLD: 225 E9/L (ref 130–450)
PMV BLD AUTO: 10.2 FL (ref 7–12)
POTASSIUM REFLEX MAGNESIUM: 4.9 MMOL/L (ref 3.5–5)
PROTHROMBIN TIME: 11.4 SEC (ref 9.3–12.4)
RBC # BLD: 4.77 E12/L (ref 3.8–5.8)
SODIUM BLD-SCNC: 140 MMOL/L (ref 132–146)
WBC # BLD: 10.1 E9/L (ref 4.5–11.5)

## 2020-06-04 PROCEDURE — 7100000011 HC PHASE II RECOVERY - ADDTL 15 MIN: Performed by: INTERNAL MEDICINE

## 2020-06-04 PROCEDURE — 3609020000 HC BRONCHOSCOPY W/EBUS FNA: Performed by: INTERNAL MEDICINE

## 2020-06-04 PROCEDURE — 3700000000 HC ANESTHESIA ATTENDED CARE: Performed by: INTERNAL MEDICINE

## 2020-06-04 PROCEDURE — 88305 TISSUE EXAM BY PATHOLOGIST: CPT

## 2020-06-04 PROCEDURE — 3603165200 HC BRNCHSC EBUS GUIDED SAMPL 1/2 NODE STATION/STRUX: Performed by: INTERNAL MEDICINE

## 2020-06-04 PROCEDURE — C1887 CATHETER, GUIDING: HCPCS | Performed by: INTERNAL MEDICINE

## 2020-06-04 PROCEDURE — 36415 COLL VENOUS BLD VENIPUNCTURE: CPT

## 2020-06-04 PROCEDURE — 71045 X-RAY EXAM CHEST 1 VIEW: CPT

## 2020-06-04 PROCEDURE — 88112 CYTOPATH CELL ENHANCE TECH: CPT

## 2020-06-04 PROCEDURE — 80048 BASIC METABOLIC PNL TOTAL CA: CPT

## 2020-06-04 PROCEDURE — 3609011100 HC BRONCHOSCOPY BRUSHINGS: Performed by: INTERNAL MEDICINE

## 2020-06-04 PROCEDURE — 3609011200 HC BRONCHOSCOPY W/TRANSBRONCL NDL ASPIR BX EA ADDL LOBE: Performed by: INTERNAL MEDICINE

## 2020-06-04 PROCEDURE — 3209999900 FLUORO FOR SURGICAL PROCEDURES

## 2020-06-04 PROCEDURE — 3609011300 HC BRONCHOSCOPY BRONCHIAL/ENDOBRNCL BX 1+ SITES: Performed by: INTERNAL MEDICINE

## 2020-06-04 PROCEDURE — 7100000000 HC PACU RECOVERY - FIRST 15 MIN: Performed by: INTERNAL MEDICINE

## 2020-06-04 PROCEDURE — 85610 PROTHROMBIN TIME: CPT

## 2020-06-04 PROCEDURE — 2500000003 HC RX 250 WO HCPCS

## 2020-06-04 PROCEDURE — 94664 DEMO&/EVAL PT USE INHALER: CPT

## 2020-06-04 PROCEDURE — 85025 COMPLETE CBC W/AUTO DIFF WBC: CPT

## 2020-06-04 PROCEDURE — 7100000010 HC PHASE II RECOVERY - FIRST 15 MIN: Performed by: INTERNAL MEDICINE

## 2020-06-04 PROCEDURE — 6370000000 HC RX 637 (ALT 250 FOR IP): Performed by: INTERNAL MEDICINE

## 2020-06-04 PROCEDURE — 3609010800 HC BRONCHOSCOPY ALVEOLAR LAVAGE: Performed by: INTERNAL MEDICINE

## 2020-06-04 PROCEDURE — 88173 CYTOPATH EVAL FNA REPORT: CPT

## 2020-06-04 PROCEDURE — 2720000010 HC SURG SUPPLY STERILE: Performed by: INTERNAL MEDICINE

## 2020-06-04 PROCEDURE — 2709999900 HC NON-CHARGEABLE SUPPLY: Performed by: INTERNAL MEDICINE

## 2020-06-04 PROCEDURE — 2500000003 HC RX 250 WO HCPCS: Performed by: NURSE ANESTHETIST, CERTIFIED REGISTERED

## 2020-06-04 PROCEDURE — 6360000002 HC RX W HCPCS

## 2020-06-04 PROCEDURE — 3700000001 HC ADD 15 MINUTES (ANESTHESIA): Performed by: INTERNAL MEDICINE

## 2020-06-04 PROCEDURE — 3609155400 HC ADD ON COMPUTER ASSISTED NAVIGATION: Performed by: INTERNAL MEDICINE

## 2020-06-04 PROCEDURE — 3609011900 HC BRONCHOSCOPY NEEDLE BX TRACHEA MAIN STEM&/BRON: Performed by: INTERNAL MEDICINE

## 2020-06-04 PROCEDURE — 2580000003 HC RX 258: Performed by: INTERNAL MEDICINE

## 2020-06-04 PROCEDURE — 7100000001 HC PACU RECOVERY - ADDTL 15 MIN: Performed by: INTERNAL MEDICINE

## 2020-06-04 RX ORDER — ROCURONIUM BROMIDE 10 MG/ML
INJECTION, SOLUTION INTRAVENOUS PRN
Status: DISCONTINUED | OUTPATIENT
Start: 2020-06-04 | End: 2020-06-04 | Stop reason: SDUPTHER

## 2020-06-04 RX ORDER — HYDROCODONE BITARTRATE AND ACETAMINOPHEN 5; 325 MG/1; MG/1
1 TABLET ORAL PRN
Status: DISCONTINUED | OUTPATIENT
Start: 2020-06-04 | End: 2020-06-04 | Stop reason: HOSPADM

## 2020-06-04 RX ORDER — DEXAMETHASONE SODIUM PHOSPHATE 10 MG/ML
INJECTION INTRAMUSCULAR; INTRAVENOUS PRN
Status: DISCONTINUED | OUTPATIENT
Start: 2020-06-04 | End: 2020-06-04 | Stop reason: SDUPTHER

## 2020-06-04 RX ORDER — MORPHINE SULFATE 2 MG/ML
2 INJECTION, SOLUTION INTRAMUSCULAR; INTRAVENOUS EVERY 5 MIN PRN
Status: DISCONTINUED | OUTPATIENT
Start: 2020-06-04 | End: 2020-06-04 | Stop reason: HOSPADM

## 2020-06-04 RX ORDER — HYDROCODONE BITARTRATE AND ACETAMINOPHEN 5; 325 MG/1; MG/1
2 TABLET ORAL PRN
Status: DISCONTINUED | OUTPATIENT
Start: 2020-06-04 | End: 2020-06-04 | Stop reason: HOSPADM

## 2020-06-04 RX ORDER — IPRATROPIUM BROMIDE AND ALBUTEROL SULFATE 2.5; .5 MG/3ML; MG/3ML
1 SOLUTION RESPIRATORY (INHALATION)
Status: COMPLETED | OUTPATIENT
Start: 2020-06-04 | End: 2020-06-04

## 2020-06-04 RX ORDER — MORPHINE SULFATE 2 MG/ML
1 INJECTION, SOLUTION INTRAMUSCULAR; INTRAVENOUS EVERY 5 MIN PRN
Status: DISCONTINUED | OUTPATIENT
Start: 2020-06-04 | End: 2020-06-04 | Stop reason: HOSPADM

## 2020-06-04 RX ORDER — SODIUM CHLORIDE 0.9 % (FLUSH) 0.9 %
10 SYRINGE (ML) INJECTION EVERY 12 HOURS SCHEDULED
Status: DISCONTINUED | OUTPATIENT
Start: 2020-06-04 | End: 2020-06-04 | Stop reason: HOSPADM

## 2020-06-04 RX ORDER — MEPERIDINE HYDROCHLORIDE 25 MG/ML
12.5 INJECTION INTRAMUSCULAR; INTRAVENOUS; SUBCUTANEOUS EVERY 5 MIN PRN
Status: DISCONTINUED | OUTPATIENT
Start: 2020-06-04 | End: 2020-06-04 | Stop reason: HOSPADM

## 2020-06-04 RX ORDER — PROMETHAZINE HYDROCHLORIDE 25 MG/ML
6.25 INJECTION, SOLUTION INTRAMUSCULAR; INTRAVENOUS EVERY 10 MIN PRN
Status: DISCONTINUED | OUTPATIENT
Start: 2020-06-04 | End: 2020-06-04 | Stop reason: HOSPADM

## 2020-06-04 RX ORDER — FENTANYL CITRATE 50 UG/ML
INJECTION, SOLUTION INTRAMUSCULAR; INTRAVENOUS PRN
Status: DISCONTINUED | OUTPATIENT
Start: 2020-06-04 | End: 2020-06-04 | Stop reason: SDUPTHER

## 2020-06-04 RX ORDER — LIDOCAINE HYDROCHLORIDE 20 MG/ML
INJECTION, SOLUTION INTRAVENOUS PRN
Status: DISCONTINUED | OUTPATIENT
Start: 2020-06-04 | End: 2020-06-04 | Stop reason: SDUPTHER

## 2020-06-04 RX ORDER — PROPOFOL 10 MG/ML
INJECTION, EMULSION INTRAVENOUS PRN
Status: DISCONTINUED | OUTPATIENT
Start: 2020-06-04 | End: 2020-06-04 | Stop reason: SDUPTHER

## 2020-06-04 RX ORDER — SODIUM CHLORIDE 0.9 % (FLUSH) 0.9 %
10 SYRINGE (ML) INJECTION PRN
Status: CANCELLED | OUTPATIENT
Start: 2020-06-04

## 2020-06-04 RX ORDER — PROPOFOL 10 MG/ML
INJECTION, EMULSION INTRAVENOUS CONTINUOUS PRN
Status: DISCONTINUED | OUTPATIENT
Start: 2020-06-04 | End: 2020-06-04 | Stop reason: SDUPTHER

## 2020-06-04 RX ORDER — ONDANSETRON 2 MG/ML
INJECTION INTRAMUSCULAR; INTRAVENOUS PRN
Status: DISCONTINUED | OUTPATIENT
Start: 2020-06-04 | End: 2020-06-04 | Stop reason: SDUPTHER

## 2020-06-04 RX ORDER — MIDAZOLAM HYDROCHLORIDE 1 MG/ML
INJECTION INTRAMUSCULAR; INTRAVENOUS PRN
Status: DISCONTINUED | OUTPATIENT
Start: 2020-06-04 | End: 2020-06-04 | Stop reason: SDUPTHER

## 2020-06-04 RX ORDER — SODIUM CHLORIDE 0.9 % (FLUSH) 0.9 %
10 SYRINGE (ML) INJECTION EVERY 12 HOURS SCHEDULED
Status: CANCELLED | OUTPATIENT
Start: 2020-06-04

## 2020-06-04 RX ORDER — SODIUM CHLORIDE 0.9 % (FLUSH) 0.9 %
10 SYRINGE (ML) INJECTION PRN
Status: DISCONTINUED | OUTPATIENT
Start: 2020-06-04 | End: 2020-06-04 | Stop reason: HOSPADM

## 2020-06-04 RX ORDER — SODIUM CHLORIDE 9 MG/ML
INJECTION, SOLUTION INTRAVENOUS CONTINUOUS
Status: DISCONTINUED | OUTPATIENT
Start: 2020-06-04 | End: 2020-06-04 | Stop reason: HOSPADM

## 2020-06-04 RX ADMIN — Medication 40 MG: at 13:44

## 2020-06-04 RX ADMIN — SUGAMMADEX 700 MG: 100 INJECTION, SOLUTION INTRAVENOUS at 14:20

## 2020-06-04 RX ADMIN — Medication 50 MG: at 13:15

## 2020-06-04 RX ADMIN — PROPOFOL 200 MG: 10 INJECTION, EMULSION INTRAVENOUS at 13:15

## 2020-06-04 RX ADMIN — SODIUM CHLORIDE: 9 INJECTION, SOLUTION INTRAVENOUS at 12:01

## 2020-06-04 RX ADMIN — PROPOFOL 100 MCG/KG/MIN: 10 INJECTION, EMULSION INTRAVENOUS at 13:22

## 2020-06-04 RX ADMIN — ONDANSETRON HYDROCHLORIDE 4 MG: 2 INJECTION, SOLUTION INTRAMUSCULAR; INTRAVENOUS at 13:56

## 2020-06-04 RX ADMIN — MIDAZOLAM 2 MG: 1 INJECTION INTRAMUSCULAR; INTRAVENOUS at 13:13

## 2020-06-04 RX ADMIN — FENTANYL CITRATE 100 MCG: 50 INJECTION, SOLUTION INTRAMUSCULAR; INTRAVENOUS at 13:15

## 2020-06-04 RX ADMIN — LIDOCAINE HYDROCHLORIDE 100 MG: 20 INJECTION, SOLUTION INTRAVENOUS at 13:15

## 2020-06-04 RX ADMIN — IPRATROPIUM BROMIDE AND ALBUTEROL SULFATE 1 AMPULE: .5; 3 SOLUTION RESPIRATORY (INHALATION) at 15:08

## 2020-06-04 RX ADMIN — DEXAMETHASONE SODIUM PHOSPHATE 10 MG: 10 INJECTION INTRAMUSCULAR; INTRAVENOUS at 13:28

## 2020-06-04 ASSESSMENT — PULMONARY FUNCTION TESTS
PIF_VALUE: 1
PIF_VALUE: 32
PIF_VALUE: 38
PIF_VALUE: 23
PIF_VALUE: 29
PIF_VALUE: 35
PIF_VALUE: 0
PIF_VALUE: 38
PIF_VALUE: 2
PIF_VALUE: 31
PIF_VALUE: 0
PIF_VALUE: 32
PIF_VALUE: 1
PIF_VALUE: 1
PIF_VALUE: 34
PIF_VALUE: 32
PIF_VALUE: 32
PIF_VALUE: 27
PIF_VALUE: 36
PIF_VALUE: 38
PIF_VALUE: 1
PIF_VALUE: 32
PIF_VALUE: 27
PIF_VALUE: 2
PIF_VALUE: 32
PIF_VALUE: 29
PIF_VALUE: 0
PIF_VALUE: 38
PIF_VALUE: 38
PIF_VALUE: 29
PIF_VALUE: 30
PIF_VALUE: 0
PIF_VALUE: 33
PIF_VALUE: 32
PIF_VALUE: 0
PIF_VALUE: 19
PIF_VALUE: 41
PIF_VALUE: 25
PIF_VALUE: 29
PIF_VALUE: 22
PIF_VALUE: 2
PIF_VALUE: 32
PIF_VALUE: 28
PIF_VALUE: 29
PIF_VALUE: 0
PIF_VALUE: 1
PIF_VALUE: 32
PIF_VALUE: 0
PIF_VALUE: 38
PIF_VALUE: 25
PIF_VALUE: 27
PIF_VALUE: 33
PIF_VALUE: 32
PIF_VALUE: 25
PIF_VALUE: 27
PIF_VALUE: 29
PIF_VALUE: 31
PIF_VALUE: 0
PIF_VALUE: 32
PIF_VALUE: 27
PIF_VALUE: 0
PIF_VALUE: 25
PIF_VALUE: 31
PIF_VALUE: 32
PIF_VALUE: 32
PIF_VALUE: 29
PIF_VALUE: 1
PIF_VALUE: 33
PIF_VALUE: 32
PIF_VALUE: 32
PIF_VALUE: 38
PIF_VALUE: 1
PIF_VALUE: 32
PIF_VALUE: 38
PIF_VALUE: 38
PIF_VALUE: 32
PIF_VALUE: 38
PIF_VALUE: 31
PIF_VALUE: 1
PIF_VALUE: 32
PIF_VALUE: 32
PIF_VALUE: 4
PIF_VALUE: 1
PIF_VALUE: 27
PIF_VALUE: 1
PIF_VALUE: 37
PIF_VALUE: 32
PIF_VALUE: 38

## 2020-06-04 ASSESSMENT — ENCOUNTER SYMPTOMS
DYSPNEA ACTIVITY LEVEL: AFTER AMBULATING 1 FLIGHT OF STAIRS
SHORTNESS OF BREATH: 1
SHORTNESS OF BREATH: 1
VOMITING: 0
ABDOMINAL PAIN: 0
NAUSEA: 0
EYES NEGATIVE: 1
COUGH: 1
COLOR CHANGE: 0

## 2020-06-04 ASSESSMENT — PAIN SCALES - GENERAL
PAINLEVEL_OUTOF10: 0

## 2020-06-04 ASSESSMENT — PAIN - FUNCTIONAL ASSESSMENT: PAIN_FUNCTIONAL_ASSESSMENT: 0-10

## 2020-06-04 ASSESSMENT — LIFESTYLE VARIABLES: SMOKING_STATUS: 1

## 2020-06-04 NOTE — H&P
Non-medical: Not on file   Tobacco Use    Smoking status: Former Smoker     Packs/day: 0.25     Years: 40.00     Pack years: 10.00     Types: Cigarettes     Start date:      Last attempt to quit: 10/29/2010     Years since quittin.6    Smokeless tobacco: Never Used   Substance and Sexual Activity    Alcohol use: Yes     Alcohol/week: 0.0 standard drinks     Types: 6 - 12 Cans of beer per week    Drug use: No     Types: Marijuana     Comment: in the past, not recently    Sexual activity: Not on file   Lifestyle    Physical activity     Days per week: Not on file     Minutes per session: Not on file    Stress: Not on file   Relationships    Social connections     Talks on phone: Not on file     Gets together: Not on file     Attends [employer] service: Not on file     Active member of club or organization: Not on file     Attends meetings of clubs or organizations: Not on file     Relationship status: Not on file    Intimate partner violence     Fear of current or ex partner: Not on file     Emotionally abused: Not on file     Physically abused: Not on file     Forced sexual activity: Not on file   Other Topics Concern    Not on file   Social History Narrative    Not on file         Vaccines: There is no immunization history on file for this patient.      Home Meds: Medications Prior to Admission: dilTIAZem (CARDIZEM CD) 120 MG extended release capsule, TAKE ONE CAPSULE BY MOUTH DAILY  metoprolol succinate (TOPROL XL) 100 MG extended release tablet, TAKE ONE TABLET BY MOUTH TWO TIMES A DAY  budesonide-formoterol (SYMBICORT) 160-4.5 MCG/ACT AERO, Inhale 2 puffs into the lungs 2 times daily  dronedarone hcl (MULTAQ) 400 MG TABS, Take 1 tablet by mouth 2 times daily (with meals)  lisinopril (PRINIVIL;ZESTRIL) 10 MG tablet, TAKE ONE TABLET BY MOUTH DAILY  ipratropium-albuterol (DUONEB) 0.5-2.5 (3) MG/3ML SOLN nebulizer solution, Inhale 3 mLs into the lungs every 4 hours  aspirin 81 MG EC tablet, Take 81 mg by mouth daily. ELIQUIS 5 MG TABS tablet, TAKE ONE TABLET BY MOUTH TWO TIMES A DAY  nitroGLYCERIN (NITROSTAT) 0.4 MG SL tablet, Place 1 tablet under the tongue every 5 minutes as needed for Chest pain  CRESTOR 40 MG tablet, 40 mg daily   vitamin D (ERGOCALCIFEROL) 31505 UNITS CAPS capsule, Take 50,000 Units by mouth once a week. CURRENT MEDS :  Scheduled Meds:   sodium chloride flush  10 mL Intravenous 2 times per day       Continuous Infusions:   sodium chloride 50 mL/hr at 06/04/20 1201       Allergies   Allergen Reactions    Zocor [Simvastatin - High Dose] Other (See Comments)     Causes Rhabdomyolysis       REVIEW OF SYSTEMS:  REVIEW OF SYMPTOMS:  Review of Systems   Constitutional: Negative for chills, fatigue and fever. HENT: Negative. Eyes: Negative. Respiratory: Positive for cough and shortness of breath. Cardiovascular: Negative for chest pain and leg swelling. Gastrointestinal: Negative for abdominal pain, nausea and vomiting. Endocrine: Negative for cold intolerance and heat intolerance. Genitourinary: Negative for difficulty urinating and dysuria. Musculoskeletal: Negative. Skin: Negative for color change and rash. Allergic/Immunologic: Negative for environmental allergies and immunocompromised state. Neurological: Negative for dizziness and weakness. Psychiatric/Behavioral: Negative for agitation and confusion. OBJECTIVE:   BP (!) 156/68   Pulse 67   Temp 97.7 °F (36.5 °C) (Temporal)   Resp 18   Ht 5' 11\" (1.803 m)   Wt 270 lb (122.5 kg)   SpO2 93%   BMI 37.66 kg/m²   SpO2 Readings from Last 1 Encounters:   06/04/20 93%        I/O:  No intake or output data in the 24 hours ending 06/04/20 1252  Vent Information  SpO2: 93 %                PHYSICAL EXAM:  Physical Exam  Constitutional:       General: He is not in acute distress. HENT:      Head: Normocephalic and atraumatic. Mouth/Throat:      Pharynx: No oropharyngeal exudate.    Eyes:

## 2020-06-04 NOTE — PROCEDURES
Bronchoscopy Procedure Note  Procedure Date: 6/4/20   Procedure:  Flexible bronchoscopy with electromagnetic bronchoscopy and EBUS  Location:  [facility]  Attending: Dr. Rocío Moreland  Anesthesia: general see record    Indications: lung mass    Findings:  1. No endobronchial lesions, normal appearing mucosa  2. Enlarged station #7 lymph node    Procedures Performed:  1. Video bronchoscopy airway survey with electromagnetic vide bronchoscopy and EBUS survey  2. Transbronchial bx, TBNA, and cytology brush were done under fluoro of lesion in question  3. BAL with RLL  4. TBNA of station #7 lymph node via EBUS      Consent:  The risks, benefits, indications, potential complications and alternatives were explained to the patient and informed consent obtained on 6/4/20     Description of Procedure:    Patient had bronchoscopy performed for lung nodule concerning for malignancy. Patient intubated with 8.5 endotracheal tube and general anesthesia was given. See anesthesia record. All present agreed this was the proper patient and procedure. Endotracheal tube cut with Portex adapter attached. Patient was monitored continuously throughout in the usual fashion. Airway survey was performed and there were no endobronchial lesions, normal-appearing mucosa. Superdimention protocol CT chest obtained and preoperative planning was performed creating pathways to the right lower lobe lesion. Extended working channel [facility]) inserted through the bronchoscope and locatable guide (LG) was inserted through the [facility]. Automatic registration was performed. The LG was navigated following the created pathway to the lesion. The [facility] was then locked. Under fluoroscopy patient had transbronchial biopsies with ARC point needle, biopsy forceps and cytology brush. A mini BAL was also performed and sent for cytology. Standard bronchoscope was exchanged for EBUS scope and survey was performed.   There was only an enlarged station #7 lymph node that was normal in appearance. Transbronchial needle aspiration was performed of station #7 lymph node under EBUS. Final survey revealed no active bleeding. Bronchoscope removed from tracheobronchial tree and patient tolerated procedure well.           Fluoro time: 5 minutes  Complications: None    Plan:  Pending tissue assessment

## 2020-06-04 NOTE — ANESTHESIA PRE PROCEDURE
Department of Anesthesiology  Preprocedure Note       Name:  Cristiano Shields   Age:  63 y.o.  :  1960                                          MRN:  [id number]         Date:  2020      Surgeon: Cynthia Ferrara):  Amey Favre, DO    Procedure: Procedure(s):  BRONCHOSCOPY  NAVIGATIONAL    Medications prior to admission:   Prior to Admission medications    Medication Sig Start Date End Date Taking? Authorizing Provider   dilTIAZem (CARDIZEM CD) 120 MG extended release capsule TAKE ONE CAPSULE BY MOUTH DAILY 20  Yes Roque Lujan DO   metoprolol succinate (TOPROL XL) 100 MG extended release tablet TAKE ONE TABLET BY MOUTH TWO TIMES A DAY 20  Yes Roque Lujan DO   budesonide-formoterol (SYMBICORT) 160-4.5 MCG/ACT AERO Inhale 2 puffs into the lungs 2 times daily   Yes Historical Provider, MD   dronedarone hcl (MULTAQ) 400 MG TABS Take 1 tablet by mouth 2 times daily (with meals) 4/10/20  Yes Roque Lujan DO   lisinopril (PRINIVIL;ZESTRIL) 10 MG tablet TAKE ONE TABLET BY MOUTH DAILY 3/6/20  Yes Roque Lujan DO   ipratropium-albuterol (DUONEB) 0.5-2.5 (3) MG/3ML SOLN nebulizer solution Inhale 3 mLs into the lungs every 4 hours 19  Yes Carmelo Richardson MD   aspirin 81 MG EC tablet Take 81 mg by mouth daily. Yes Historical Provider, MD   ELIQUIS 5 MG TABS tablet TAKE ONE TABLET BY MOUTH TWO TIMES A DAY 19   Roque Lujan DO   nitroGLYCERIN (NITROSTAT) 0.4 MG SL tablet Place 1 tablet under the tongue every 5 minutes as needed for Chest pain 8/6/15   Roverto Donald MD   CRESTOR 40 MG tablet 40 mg daily  2/7/15   Historical Provider, MD   vitamin D (ERGOCALCIFEROL) 12416 UNITS CAPS capsule Take 50,000 Units by mouth once a week.  3/6/13   Historical Provider, MD       Current medications:    Current Facility-Administered Medications   Medication Dose Route Frequency Provider Last Rate Last Dose    sodium chloride flush 0.9 % injection 10 mL  10 mL Intravenous 2 times per day Temp:  36.5 °C (97.7 °F)   TempSrc:  Temporal   SpO2:  93%   Weight: 270 lb (122.5 kg) 270 lb (122.5 kg)   Height: 5' 11\" (1.803 m) 5' 11\" (1.803 m)                                              BP Readings from Last 3 Encounters:   06/04/20 (!) 156/68   04/16/20 138/78   04/10/20 (!) 139/93       NPO Status: Time of last liquid consumption: 0800                        Time of last solid consumption: 0800                        Date of last liquid consumption: 06/03/20                        Date of last solid food consumption: 06/03/20    BMI:   Wt Readings from Last 3 Encounters:   06/04/20 270 lb (122.5 kg)   05/15/20 270 lb (122.5 kg)   04/16/20 270 lb (122.5 kg)     Body mass index is 37.66 kg/m². CBC:   Lab Results   Component Value Date    WBC 10.1 06/04/2020    RBC 4.77 06/04/2020    HGB 14.4 06/04/2020    HCT 44.8 06/04/2020    MCV 93.9 06/04/2020    RDW 13.8 06/04/2020     06/04/2020       CMP:   Lab Results   Component Value Date     04/10/2020    K 4.2 04/10/2020     04/10/2020    CO2 24 04/10/2020    BUN 18 04/10/2020    CREATININE 0.9 04/10/2020    GFRAA >60 04/10/2020    LABGLOM >60 04/10/2020    GLUCOSE 138 04/10/2020    GLUCOSE 129 06/03/2011    PROT 7.1 09/27/2019    CALCIUM 9.5 04/10/2020    BILITOT 0.6 09/27/2019    ALKPHOS 69 09/27/2019    AST 15 09/27/2019    ALT 13 09/27/2019       POC Tests: No results for input(s): POCGLU, POCNA, POCK, POCCL, POCBUN, POCHEMO, POCHCT in the last 72 hours.     Coags:   Lab Results   Component Value Date    PROTIME 14.5 04/10/2020    INR 1.3 04/10/2020    APTT 36.3 11/06/2019       HCG (If Applicable): No results found for: PREGTESTUR, PREGSERUM, HCG, HCGQUANT     ABGs: No results found for: PHART, PO2ART, TYA1CPU, IKZ9YQZ, BEART, Y3PFYNFK     Type & Screen (If Applicable):  No results found for: LABABO, LABRH    Drug/Infectious Status (If Applicable):  No results found for: HIV, HEPCAB    COVID-19 Screening (If Applicable):   Lab Results

## 2020-06-09 ENCOUNTER — OFFICE VISIT (OUTPATIENT)
Dept: CARDIOLOGY CLINIC | Age: 60
End: 2020-06-09
Payer: COMMERCIAL

## 2020-06-09 VITALS
WEIGHT: 271.6 LBS | TEMPERATURE: 97.3 F | DIASTOLIC BLOOD PRESSURE: 78 MMHG | HEART RATE: 70 BPM | BODY MASS INDEX: 38.02 KG/M2 | HEIGHT: 71 IN | SYSTOLIC BLOOD PRESSURE: 132 MMHG | RESPIRATION RATE: 16 BRPM

## 2020-06-09 PROCEDURE — 99214 OFFICE O/P EST MOD 30 MIN: CPT | Performed by: INTERNAL MEDICINE

## 2020-06-09 PROCEDURE — 93000 ELECTROCARDIOGRAM COMPLETE: CPT | Performed by: INTERNAL MEDICINE

## 2020-06-09 NOTE — PROGRESS NOTES
Charissa Cohen  1960  Date of Service: 2020    Patient Active Problem List    Diagnosis Date Noted    [facility]ox[facility] fibrillation) ([facility] [address]     Chest pain 2019    CAD (coronary artery disease)      Overview Note:     A. Acute inferior MI (07). B. Cardiac catheterization (07):   LAD - proximal 85% bifurcating with first diagonal  D1 - ostial 50%  Cx - prox to mid 40%  OM1 - ostial 90%  RCA - prox diffuse 99% with heavy thrombus  C. Successful thrombectomy. D. Coronary artery bypass surgery by Dr. Mihir Guzmán (07)  LIMA to LAD  SVG to D1  SVG to PDA    Cath 3-14  70% Cx - 3.5 x 18 mm Resolute ELENA      Hypertension     GERD (gastroesophageal reflux disease)     [facility]        Social History     Socioeconomic History    Marital status:      Spouse name: None    Number of children: None    Years of education: None    Highest education level: None   Occupational History    None   Social Needs    Financial resource strain: None    Food insecurity     Worry: None     Inability: None    Transportation needs     Medical: None     Non-medical: None   Tobacco Use    Smoking status: Former Smoker     Packs/day: 0.25     Years: 40.00     Pack years: 10.00     Types: Cigarettes     Start date:      Last attempt to quit: 10/29/2010     Years since quittin.6    Smokeless tobacco: Never Used   Substance and Sexual Activity    Alcohol use:  Yes     Alcohol/week: 0.0 standard drinks     Types: 6 - 12 Cans of beer per week    Drug use: No     Types: Marijuana     Comment: in the past, not recently    Sexual activity: None   Lifestyle    Physical activity     Days per week: None     Minutes per session: None    Stress: None   Relationships    Social connections     Talks on phone: None     Gets together: None     Attends [employer] service: None     Active member of club or organization: None     Attends meetings of

## 2020-06-11 RX ORDER — METOPROLOL SUCCINATE 100 MG/1
TABLET, EXTENDED RELEASE ORAL
Qty: 180 TABLET | Refills: 3 | Status: SHIPPED
Start: 2020-06-11 | End: 2021-05-04

## 2020-06-11 RX ORDER — LISINOPRIL 10 MG/1
TABLET ORAL
Qty: 90 TABLET | Refills: 3 | Status: SHIPPED
Start: 2020-06-11 | End: 2021-03-26 | Stop reason: DRUGHIGH

## 2020-06-11 RX ORDER — DILTIAZEM HYDROCHLORIDE 120 MG/1
CAPSULE, COATED, EXTENDED RELEASE ORAL
Qty: 90 CAPSULE | Refills: 3 | Status: SHIPPED
Start: 2020-06-11 | End: 2021-05-04

## 2020-06-11 RX ORDER — ASPIRIN 81 MG/1
81 TABLET ORAL DAILY
Qty: 90 TABLET | Refills: 3 | Status: SHIPPED
Start: 2020-06-11 | End: 2021-05-04

## 2020-07-01 ENCOUNTER — HOSPITAL ENCOUNTER (OUTPATIENT)
Dept: PULMONOLOGY | Age: 60
Discharge: HOME | End: 2020-07-01
Payer: COMMERCIAL

## 2020-07-01 PROCEDURE — 94729 DIFFUSING CAPACITY: CPT

## 2020-07-01 PROCEDURE — 94060 EVALUATION OF WHEEZING: CPT

## 2020-07-01 PROCEDURE — 94726 PLETHYSMOGRAPHY LUNG VOLUMES: CPT

## 2020-07-07 ENCOUNTER — INITIAL CONSULT (OUTPATIENT)
Dept: CARDIOTHORACIC SURGERY | Age: 60
End: 2020-07-07
Payer: COMMERCIAL

## 2020-07-07 VITALS
HEIGHT: 71 IN | SYSTOLIC BLOOD PRESSURE: 152 MMHG | DIASTOLIC BLOOD PRESSURE: 78 MMHG | WEIGHT: 275 LBS | BODY MASS INDEX: 38.5 KG/M2 | HEART RATE: 58 BPM

## 2020-07-07 PROCEDURE — 99205 OFFICE O/P NEW HI 60 MIN: CPT | Performed by: THORACIC SURGERY (CARDIOTHORACIC VASCULAR SURGERY)

## 2020-07-07 ASSESSMENT — ENCOUNTER SYMPTOMS
ABDOMINAL PAIN: 0
COUGH: 1
CONSTIPATION: 0
SHORTNESS OF BREATH: 1

## 2020-07-07 NOTE — PATIENT INSTRUCTIONS
Patient Education        Patient Education        Patient Education        Learning About Benefits From Quitting Smoking  How does quitting smoking make you healthier? If you're thinking about quitting smoking, you may have a few reasons to be smoke-free. Your health may be one of them. · When you quit smoking, you lower your risks for cancer, lung disease, heart attack, stroke, blood vessel disease, and blindness from macular degeneration. · When you're smoke-free, you get sick less often, and you heal faster. You are less likely to get colds, flu, bronchitis, and pneumonia. · As a nonsmoker, you may find that your mood is better and you are less stressed. When and how will you feel healthier? Quitting has real health benefits that start from day 1 of being smoke-free. And the longer you stay smoke-free, the healthier you get and the better you feel. The first hours  · After just 20 minutes, your blood pressure and heart rate go down. That means there's less stress on your heart and blood vessels. · Within 12 hours, the level of carbon monoxide in your blood drops back to normal. That makes room for more oxygen. With more oxygen in your body, you may notice that you have more energy than when you smoked. After 2 weeks  · Your lungs start to work better. · Your risk of heart attack starts to drop. After 1 month  · When your lungs are clear, you cough less and breathe deeper, so it's easier to be active. · Your sense of taste and smell return. That means you can enjoy food more than you have since you started smoking. Over the years  · Over the years, your risks of heart disease, heart attack, and stroke are lower. · After 10 years, your risk of dying from lung cancer is cut by about half. And your risk for many other types of cancer is lower too. How would quitting help others in your life? When you quit smoking, you improve the health of everyone who now breathes in your smoke.   · Their heart, lung, and cancer risks drop, much like yours. · They are sick less. For babies and small children, living smoke-free means they're less likely to have ear infections, pneumonia, and bronchitis. · If you're a woman who is or will be pregnant someday, quitting smoking means a healthier . · Children who are close to you are less likely to become adult smokers. Where can you learn more? Go to https://uAfricaperikeb.Goodpatch. org and sign in to your [employer] account. Enter [phone] in the [employer] box to learn more about \"Learning About Benefits From Quitting Smoking. \"     If you do not have an account, please click on the \"Sign Up Now\" link. Current as of: 2020               Content Version: 12.5  © 1558-1915 Healthwise, Incorporated. Care instructions adapted under license by [facility] ([facility]). If you have questions about a medical condition or this instruction, always ask your healthcare professional. [place] [address] any warranty or liability for your use of this information.

## 2020-07-07 NOTE — PROGRESS NOTES
Subjective:      Patient ID: Raz Carpenter is a 63 y.o. male. Chief Complaint   Patient presents with   Shelbie Pardo Consultation     referral Dr Socorro Allison lung ca     He presents to discuss surgery. This is a 62 yo M referred by Dr Socorro Allison. He was found to have a RLL suspicious nodule on chest CT. PET was done and showed the aforementioned mass to be positive. EBUS and BAL done and revealed SCC. Unfortunately he has very poor lung function on PFTs. He is a current smoker. HPI    Review of Systems   Constitutional: Negative for chills and fever. Respiratory: Positive for cough and shortness of breath. Cardiovascular: Negative for chest pain and palpitations. Gastrointestinal: Negative for abdominal pain and constipation. Neurological: Negative for syncope and light-headedness. Objective:   Physical Exam  Cardiovascular:      Rate and Rhythm: Normal rate and regular rhythm. Pulmonary:      Effort: Pulmonary effort is normal. No respiratory distress. Abdominal:      Tenderness: There is no abdominal tenderness. There is no guarding. Skin:     General: Skin is warm and dry. Neurological:      Mental Status: He is alert and oriented to person, place, and time. Psychiatric:         Mood and Affect: Mood normal.         Behavior: Behavior normal.         Assessment:      RLL Lung CA      Plan:      FEV1 is 30% predicted (1.1L), but curiously the DLCO is 16 which is 49% predicted. Maybe more important the patient states he feels mostly ok, and he is able to climb 2-3 flights of stairs without a problem. Still, I would like to do a room air ABG and quantitative perfusion scan to further convince myself it is appropriate to offer him an operation. I will see him back after above.

## 2020-07-07 NOTE — LETTER
[address] [facility] [address]  [address] [address] [facility] [address] [facility] [address]  Phone: [phone]  Fax: [phone]    Ace Randall DO        July 7, 2020       Patient: Lester Swanson   MR Number: [id number]   YOB: 1960   Date of Visit: 7/7/2020       Dear Dr. Hiram Vazquez:     Thank you for the request for consultation for Herman Ramos     Past Medical History:   Diagnosis Date    A-fib [facility])     Blood transfusion reaction     CAD (coronary artery disease)     COPD (chronic obstructive pulmonary disease) ([facility] [address] [facility]     GERD (gastroesophageal reflux disease)     History of cardioversion 04/10/2020    Dr Anastasia Martinez    Hyperlipidemia     Hypertension     Sleep apnea        Past Surgical History:   Procedure Laterality Date    BRONCHOSCOPY N/A 6/4/2020    BRONCHOSCOPY  NAVIGATIONAL performed by Tiffanie Harrison DO at [address] [place] [address]  6/4/2020    BRONCHOSCOPY/TRANSBRONCHIAL NEEDLE BIOPSY performed by Tiffanie Harrison DO at [address] [place] [address]  6/4/2020    [address] [facility] performed by Tiffanie Harrison DO at [address] [place] [address]  6/4/2020    BRONCHOSCOPY BRUSHINGS performed by Tiffanie Harrison DO at [address] [place] [address]  6/4/2020    BRONCHOSCOPY W/EBUS FNA performed by Tiffanie Harrison DO at [address] [place] [address]  6/4/2020    [address] performed by Tiffanie Harrison DO at [place] [address]  3/20/14    3.5/18 Resolute to Mid-Cx    CORONARY ARTERY BYPASS GRAFT  4/18/07    DIAGNOSTIC CARDIAC CATH LAB PROCEDURE  4/17/07    ECHO COMPL W DOP COLOR FLOW  3/22/2012         TRANSESOPHAGEAL ECHOCARDIOGRAM  02/28/2020    angela       Family History   Problem Relation Age of Onset    Hypertension Mother     Hypertension Father     No Known Problems Sister     No Known Problems Sister     No Known Problems Sister        Allergies   Allergen Reactions  Zocor [Simvastatin - High Dose] Other (See Comments)     Causes Rhabdomyolysis         Current Outpatient Medications:     aspirin 81 MG EC tablet, Take 1 tablet by mouth daily, Disp: 90 tablet, Rfl: 3    dilTIAZem (CARDIZEM CD) 120 MG extended release capsule, TAKE ONE CAPSULE BY MOUTH DAILY, Disp: 90 capsule, Rfl: 3    lisinopril (PRINIVIL;ZESTRIL) 10 MG tablet, TAKE ONE TABLET BY MOUTH DAILY, Disp: 90 tablet, Rfl: 3    metoprolol succinate (TOPROL XL) 100 MG extended release tablet, TAKE ONE TABLET BY MOUTH TWO TIMES A DAY, Disp: 180 tablet, Rfl: 3    apixaban (ELIQUIS) 5 MG TABS tablet, TAKE ONE TABLET BY MOUTH TWO TIMES A DAY, Disp: 180 tablet, Rfl: 3    PROAIR  (90 Base) MCG/ACT inhaler, INHALE TWO PUFFS BY MOUTH EVERY 4 TO 6 HOURS AS NEEDED, Disp: , Rfl:     dronedarone hcl (MULTAQ) 400 MG TABS, Take 1 tablet by mouth 2 times daily (with meals), Disp: 14 tablet, Rfl: 0    budesonide-formoterol (SYMBICORT) 160-4.5 MCG/ACT AERO, Inhale 2 puffs into the lungs 2 times daily, Disp: , Rfl:     ipratropium-albuterol (DUONEB) 0.5-2.5 (3) MG/3ML SOLN nebulizer solution, Inhale 3 mLs into the lungs every 4 hours, Disp: 360 mL, Rfl: 0    nitroGLYCERIN (NITROSTAT) 0.4 MG SL tablet, Place 1 tablet under the tongue every 5 minutes as needed for Chest pain, Disp: 25 tablet, Rfl: 0    CRESTOR 40 MG tablet, 40 mg daily , Disp: , Rfl:     vitamin D (ERGOCALCIFEROL) 70590 UNITS CAPS capsule, Take 50,000 Units by mouth once a week., Disp: , Rfl:     Social History     Tobacco Use    Smoking status: Current Every Day Smoker     Packs/day: 0.25     Years: 40.00     Pack years: 10.00     Types: Cigarettes     Start date: 5    Smokeless tobacco: Never Used    Tobacco comment: 3-4 cigs a day   Substance Use Topics    Alcohol use:  Yes     Alcohol/week: 0.0 standard drinks     Types: 6 - 12 Cans of beer per week       Vitals:    07/07/20 1101 07/07/20 1105   BP: (!) 156/80 (!) 152/78 Site: Left Upper Arm    Position: Sitting    Pulse: 58    Weight: 275 lb (124.7 kg)    Height: 5' 11\" (1.803 m)        Subjective:      Patient ID: Krista Alba is a 63 y.o. male. Chief Complaint   Patient presents with   Jen Gong Consultation     referral Dr Chuck Solomon lung ca     He presents to discuss surgery. This is a 64 yo M referred by Dr Chuck Solomon. He was found to have a RLL suspicious nodule on chest CT. PET was done and showed the aforementioned mass to be positive. EBUS and BAL done and revealed SCC. Unfortunately he has very poor lung function on PFTs. He is a current smoker. HPI    Review of Systems   Constitutional: Negative for chills and fever. Respiratory: Positive for cough and shortness of breath. Cardiovascular: Negative for chest pain and palpitations. Gastrointestinal: Negative for abdominal pain and constipation. Neurological: Negative for syncope and light-headedness. Objective:   Physical Exam  Cardiovascular:      Rate and Rhythm: Normal rate and regular rhythm. Pulmonary:      Effort: Pulmonary effort is normal. No respiratory distress. Abdominal:      Tenderness: There is no abdominal tenderness. There is no guarding. Skin:     General: Skin is warm and dry. Neurological:      Mental Status: He is alert and oriented to person, place, and time. Psychiatric:         Mood and Affect: Mood normal.         Behavior: Behavior normal.         Assessment:      RLL Lung CA      Plan:      FEV1 is 30% predicted (1.1L), but curiously the DLCO is 16 which is 49% predicted. Maybe more important the patient states he feels mostly ok, and he is able to climb 2-3 flights of stairs without a problem. Still, I would like to do a room air ABG and quantitative perfusion scan to further convince myself it is appropriate to offer him an operation. I will see him back after above. If you have questions, please do not hesitate to call me.  I look forward to following Doc Judge along with you.     Sincerely,        Yanci Shultz MD    CC providers:  Dayana Jorge,   [address] [facility] [place] [place] [address] [facility] [address] [facility] [address]  VIA In Basket

## 2020-07-08 ENCOUNTER — TELEPHONE (OUTPATIENT)
Dept: CARDIOTHORACIC SURGERY | Age: 60
End: 2020-07-08

## 2020-07-08 NOTE — TELEPHONE ENCOUNTER
Pt informed of lung perfusion testing @ main 7/14 arrive 9am. Bring list of meds. Stop @ lab for bld wk.   Ov 7/21 @ 10:45

## 2020-07-09 NOTE — PROCEDURES
[address]                  [address] [facility],  [facility]                               PULMONARY FUNCTION    PATIENT NAME: Angelique Bateman                  :        1960  MED REC NO:   [id number]                            ROOM:  ACCOUNT NO:   [de-identified]                           ADMIT DATE: 2020  PROVIDER:     Mannie Hendricks DO    DATE OF PROCEDURE:  2020    Pulmonary function test completed, patient with good effort and  cooperation. He was given aerosol 0.5 mL albuterol for bronchodilator. SPIROMETRY:  Forced vital capacity is 2.12 liters, which is 45% of  predicted. FEV1 is 1.10 liters, which is 30% of predicted. FEV1/FVC is  52. FEF 25-75% is 0.36 liters, which is 11%. Expiratory flow rates are  decreased consistent with severe obstruction. Following aerosol  bronchodilator, there is no significant response in FVC or FEV1. LUNG VOLUMES:  Total lung capacity is 5.42 liters and 77% and is mildly  decreased. The residual volume/TLC is increased at 51% indicating air  trapping. There is a decreased ERV at 35%. Decreased lung volumes may  in part be due to body habitus. DIFFUSION:  DLCO uncorrected is severely increased at 49, when corrected  for ventilation is 77. Flow volume loop consistent with obstructive lung defect. PFT INTERPRETATION:  1. Severe obstructive lung defect without significant response to  aerosol bronchodilator. 2.  Mildly decreased lung volumes and air trapping may be in part due to  body habitus. 3.  Moderately decreased diffusion at 49%. CONCLUSION:  Severe obstructive lung physiology with moderately  decreased diffusion consistent with emphysema. Clinical correlation is  advised.         Paulina Sharma DO    D: 2020 15:03:10       T: 2020 18:11:09     RT/[email or web address]  Job#: [id number]     Doc#: [id number]

## 2020-07-14 ENCOUNTER — HOSPITAL ENCOUNTER (OUTPATIENT)
Age: 60
Discharge: HOME | End: 2020-07-14
Payer: COMMERCIAL

## 2020-07-14 ENCOUNTER — HOSPITAL ENCOUNTER (OUTPATIENT)
Dept: NUCLEAR MEDICINE | Age: 60
Discharge: HOME | End: 2020-07-16
Payer: COMMERCIAL

## 2020-07-14 LAB
B.E.: -0.2 MMOL/L (ref -3–3)
COHB: 2.7 % (ref 0–1.5)
CRITICAL: ABNORMAL
DATE ANALYZED: ABNORMAL
DATE OF COLLECTION: ABNORMAL
HCO3: 24.8 MMOL/L (ref 22–26)
HHB: 5.5 % (ref 0–5)
LAB: ABNORMAL
Lab: ABNORMAL
METHB: 0.1 % (ref 0–1.5)
MODE: ABNORMAL
O2 CONTENT: 18.2 ML/DL
O2 SATURATION: 94.3 % (ref 92–98.5)
O2HB: 91.7 % (ref 94–97)
OPERATOR ID: 467
PATIENT TEMP: 37 C
PCO2: 41.7 MMHG (ref 35–45)
PH BLOOD GAS: 7.39 (ref 7.35–7.45)
PO2: 74 MMHG (ref 75–100)
SOURCE, BLOOD GAS: ABNORMAL
THB: 14.1 G/DL (ref 11.5–16.5)
TIME ANALYZED: 1118

## 2020-07-14 PROCEDURE — A9540 TC99M MAA: HCPCS | Performed by: RADIOLOGY

## 2020-07-14 PROCEDURE — 78597 LUNG PERFUSION DIFFERENTIAL: CPT

## 2020-07-14 PROCEDURE — 82805 BLOOD GASES W/O2 SATURATION: CPT

## 2020-07-14 PROCEDURE — 36600 WITHDRAWAL OF ARTERIAL BLOOD: CPT

## 2020-07-14 PROCEDURE — 3430000000 HC RX DIAGNOSTIC RADIOPHARMACEUTICAL: Performed by: RADIOLOGY

## 2020-07-14 RX ADMIN — Medication 5 MILLICURIE: at 09:54

## 2020-08-04 ENCOUNTER — OFFICE VISIT (OUTPATIENT)
Dept: CARDIOTHORACIC SURGERY | Age: 60
End: 2020-08-04
Payer: COMMERCIAL

## 2020-08-04 VITALS
DIASTOLIC BLOOD PRESSURE: 82 MMHG | WEIGHT: 275 LBS | HEIGHT: 71 IN | HEART RATE: 94 BPM | BODY MASS INDEX: 38.5 KG/M2 | SYSTOLIC BLOOD PRESSURE: 120 MMHG

## 2020-08-04 PROCEDURE — 99214 OFFICE O/P EST MOD 30 MIN: CPT | Performed by: THORACIC SURGERY (CARDIOTHORACIC VASCULAR SURGERY)

## 2020-08-04 ASSESSMENT — ENCOUNTER SYMPTOMS
CHEST TIGHTNESS: 0
SHORTNESS OF BREATH: 1

## 2020-08-07 ENCOUNTER — TELEPHONE (OUTPATIENT)
Dept: RADIATION ONCOLOGY | Age: 60
End: 2020-08-07

## 2020-08-10 ENCOUNTER — HOSPITAL ENCOUNTER (OUTPATIENT)
Dept: RADIATION ONCOLOGY | Age: 60
Discharge: HOME | End: 2020-08-10
Payer: COMMERCIAL

## 2020-08-10 PROCEDURE — 99205 OFFICE O/P NEW HI 60 MIN: CPT

## 2020-08-10 PROCEDURE — 99205 OFFICE O/P NEW HI 60 MIN: CPT | Performed by: RADIOLOGY

## 2020-08-10 NOTE — PROGRESS NOTES
Radiation Oncology      [facility]. Ollie Sherman  [address]. Tamika Byrd   [phone]               Referring Physician: Dr. Elaine Phillips. Krishna Ortega MD   Primary Oncologist: Dr. Jovon Falcon      Diagnosis: cT1c cNo cMo RLL NSCLC      Service:  Radiation Oncology consultation performed on 8/10/20        HPI:        Joseluis Rodriguez is a pleasant 63 year ld with medically unresectable RLL lung cancer. The patient presents today to discuss fractionated external beam radiation therapy as a component of multidisciplinary, definitive/SBRT management (see PULM records for PFTs). We reviewed the available medical records including the complete medical history of this pt today prior to consultation. Epic -CE and available scanned documents per the Epic Media tab were reviewed PRN. A complete ROS was also performed today and is noted below. During consultation today I personally discussed the pts workup to date; including but not limited to applicable imaging studies, Pathology reports, and interventions. The NCCN guidelines, as pertaining to the above diagnosis were also recapped for the pt today in brief. Today, Bart Allen  notes Sx that include SOB and cough.    KPS 80-9.0.      -----  Pathology reviewed:    PATH 6/4/20:  DIAGNOSIS   POSITIVE FOR MALIGNANT CELLS     Rare dysplastic keratinizing squamous cells present, suggestive of   squamous cell carcinoma.     -----    Past Medical History:   Diagnosis Date    A-fib ([facility] [address]     Blood transfusion reaction     CAD (coronary artery disease)     COPD (chronic obstructive pulmonary disease) (HCC)     GERD (gastroesophageal reflux disease)     History of cardioversion 04/10/2020    Dr Analia Rich    Hyperlipidemia     Hypertension     Sleep apnea        Past Surgical History:   Procedure Laterality Date    BRONCHOSCOPY N/A 6/4/2020    BRONCHOSCOPY  NAVIGATIONAL performed by Sourav Eric DO at [address]  6/4/2020    BRONCHOSCOPY/TRANSBRONCHIAL NEEDLE BIOPSY performed by Sourav Eric DO at [address]  6/4/2020    BRONCHOSCOPY BIOPSY BRONCHUS performed by Sourav Eric DO at [address]  6/4/2020    BRONCHOSCOPY BRUSHINGS performed by Sourav Eric DO at [address]  6/4/2020    BRONCHOSCOPY W/EBUS FNA performed by Sourav Eric DO at [address]  6/4/2020    BRONCHOSCOPY ALVEOLAR LAVAGE performed by Sourav Eric DO at [facility]  3/20/14    3.5/18 Resolute to Mid-Cx    CORONARY ARTERY BYPASS GRAFT  4/18/07    DIAGNOSTIC CARDIAC CATH LAB PROCEDURE  4/17/07    ECHO COMPL W DOP COLOR FLOW  3/22/2012         TRANSESOPHAGEAL ECHOCARDIOGRAM  02/28/2020    angela       Family History   Problem Relation Age of Onset    Hypertension Mother     Hypertension Father     No Known Problems Sister     No Known Problems Sister     No Known Problems Sister     Cancer Maternal Aunt         cancer       Current Outpatient Medications   Medication Sig Dispense Refill    aspirin 81 MG EC tablet Take 1 tablet by mouth daily 90 tablet 3    dilTIAZem (CARDIZEM CD) 120 MG extended release capsule TAKE ONE CAPSULE BY MOUTH DAILY 90 capsule 3    lisinopril (PRINIVIL;ZESTRIL) 10 MG tablet TAKE ONE TABLET BY MOUTH DAILY 90 tablet 3    metoprolol succinate (TOPROL XL) 100 MG extended release tablet TAKE ONE TABLET BY MOUTH TWO TIMES A  tablet 3    apixaban (ELIQUIS) 5 MG TABS tablet TAKE ONE TABLET BY MOUTH TWO TIMES A  tablet 3    PROAIR  (90 Base) MCG/ACT inhaler INHALE TWO PUFFS BY MOUTH EVERY 4 TO 6 HOURS AS NEEDED      dronedarone hcl (MULTAQ) 400 MG TABS Take 1 tablet by mouth 2 times daily (with meals) 14 tablet 0    budesonide-formoterol (SYMBICORT) 160-4.5 MCG/ACT AERO Inhale 2 puffs into the lungs 2 times daily      ipratropium-albuterol (DUONEB) 0.5-2.5 (3) MG/3ML SOLN nebulizer solution Inhale 3 mLs into the lungs every 4 hours 360 mL 0    nitroGLYCERIN (NITROSTAT) 0.4 MG SL tablet Place 1 tablet under the tongue every 5 minutes as needed for Chest pain (Patient not taking: Reported on 2020) 25 tablet 0    CRESTOR 40 MG tablet 40 mg daily       vitamin D (ERGOCALCIFEROL) 89671 UNITS CAPS capsule Take 50,000 Units by mouth once a week. No current facility-administered medications for this encounter. Allergies   Allergen Reactions    Zocor [Simvastatin - High Dose] Other (See Comments)     Causes Rhabdomyolysis         Social History     Socioeconomic History    Marital status:      Spouse name: None    Number of children: None    Years of education: None    Highest education level: None   Occupational History    Occupation:    Social Needs    Financial resource strain: None    Food insecurity     Worry: None     Inability: None    Transportation needs     Medical: None     Non-medical: None   Tobacco Use    Smoking status: Former Smoker     Packs/day: 0.25     Years: 40.00     Pack years: 10.00     Types: Cigarettes     Start date:      Last attempt to quit: 2020     Years since quittin.1    Smokeless tobacco: Never Used    Tobacco comment: 3-4 cigs a day   Substance and Sexual Activity    Alcohol use:  Yes     Alcohol/week: 0.0 standard drinks     Types: 6 - 12 Cans of beer per week     Comment: \"weekend use now & then\"    Drug use: No     Types: Marijuana     Comment: in the past, not recently    Sexual activity: None   Lifestyle    Physical activity     Days per week: None     Minutes per session: None    Stress: None   Relationships    Social connections     Talks on phone: None     Gets together: None     Attends Restorationism service: None     Active member of club or organization: None     Attends meetings of clubs or organizations: None     Relationship status: None    Intimate partner violence     Fear of current or ex partner: None     Emotionally abused: None     Physically abused: None     Forced sexual activity: None   Other Topics Concern    None   Social History Narrative    None         Review of Systems - General ROS: negative  Psychological ROS: negative  Ophthalmic ROS: negative  ENT ROS: negative  Allergy and Immunology ROS: negative  Hematological and Lymphatic ROS: negative  Endocrine ROS: negative  Breast ROS: negative for breast lumps  Respiratory ROS: + SOB and periodic cough  Cardiovascular ROS: no chest pain or dyspnea on exertion  Gastrointestinal ROS: no abdominal pain, change in bowel habits, or black or bloody stools  Genito-Urinary ROS: no dysuria, trouble voiding, or hematuria  Musculoskeletal ROS: negative  Neurological ROS: no TIA or stroke symptoms  Dermatological ROS: negative      Physical Exam  HENT:      Head: Normocephalic and atraumatic. Right Ear: External ear normal.      Left Ear: External ear normal.      Nose: Nose normal.      Mouth/Throat:      Mouth: Mucous membranes are moist.   Eyes:      Pupils: Pupils are equal, round, and reactive to light. Neck:      Musculoskeletal: Normal range of motion. Cardiovascular:      Rate and Rhythm: Normal rate and regular rhythm. Pulses: Normal pulses. Pulmonary:      Effort: Pulmonary effort is normal.   Abdominal:      General: Abdomen is flat. Palpations: Abdomen is soft. Musculoskeletal: Normal range of motion. Skin:     General: Skin is warm and dry. Neurological:      General: No focal deficit present. Mental Status: He is alert and oriented to person, place, and time. Psychiatric:         Mood and Affect: Mood normal.         Behavior: Behavior normal.         Thought Content: Thought content normal.         Judgment: Judgment normal.           Imaging reviewed:      CT chest 6/2/20:  Impression         1.  Re demonstration of suspicious nodule located in the medial right    lower lobe which has increased in size slightly compared to prior    PET/CT from April 9, 2020.         2. No new pulmonary nodule or mass.         3. Re demonstration of a few prominent mediastinal lymph nodes notable    in right paratracheal location which measure up to 20 x 10 mm.         4. Ascending thoracic aortic aneurysm measures up to 4.3 cm. Continued    follow-up recommended as clinically indicated. Radiation Safety and Treatment Support:  -previous Radiation history: Yes  -history of connective tissue disease: No  -history of autoimmune disease: No  -pregnant: not applicable  -fertility conservation and /or contraception discussed: no  -nutrition consult prior to [address]: Yes  -PEG: No  -Dental evaluation prior to treatment:Yes  -Social Work requested: Yes  -Oncology Nurse Navigator requested: Yes  -pre + post treatment PT / Rehab / PM+R evaluation considered: Yes  -ICD: Yes   -ICD brand: -  -[facility] patient navigator: Keron Cleveland  -Nurse Practitioners for Radiation Oncology:    ---Rosey Rivera, MSN, RN, FNP-C   ---Debi Garibay, CARLOS, RN, FNP-BC        Assessment and Plan: Jaclyn Gao is a pleasant and cooperative 63year old with a recent diagnosis of AJCC stage group IA3 SCC of the RLL. We recommend definitive intent fractionated external beam radiation therapy with a 3-5 fraction SBRT approach [NCCN NSCLC 4.2019 NSCLC-C 7/10, 3/10 ].   The risks, benefits, alternatives, process and logistics of stereotactic body radiation therapy (SBRT / SABR) were reviewed and specifically discussed in great detail - (risks discussed today include but are not limited to radiation pneumonitis, worsening PULM function, hemoptysis, lack of response, rib fracture, chest wall pain, oxygen dependence, esophagitis, esophageal structure, perforation, radiation induced neuropathies, vascular injury, bleeding and death, paralysis, second malignancy). SBRT is typically considered safe in terms of acute and chronic pulmonary toxicity, even for patients with severe PULM comobidities [Marnie et al. Diana Ryan. 2012 Mar; 7(3): 373-783 / Int J Radiat Oncol Biol Phys. 2018 Feb 1;100(2):462-469 ]. A second opinion was offered to this pt and was declined today at consult. Guidelines applicable to this pt reviewed and applied to RAZA and medical decision making as available and applicable [Pract Radiat Oncol. 2017 Sep - Oct;7(5):295-301]. We answered all of the patient's questions to the best of our ability. The patient verbalized understanding and seemed satisfied, desiring definitive intent SBRT. Radiation planning will commence within 7-14 days; the next step in management being the simulation scan, with external beam radiation to commence in a timely fashion thereafter. Jose Elias Mendoza declines OBS and a/another biopsy attempt [Lung Cancer. 2014 Sep;85(3):390-4] although the standard of care was discussed as noted above per NCCN and PRO. Please reference PFTs per the Media tab / PULM documentation. It was a pleasure meeting Jose Elias Mendoza today and we appreciate the referral and opportunity to be involved in his care. We had an extensive discussion today regarding the course to date (including a focused review of theapplicable radiographic and laboratory information), multidisciplinary approach to cancer care, and indications for external beam radiation therapy as a component therein. A literature review and multidisciplinary discussion was performed after seeing this patient due to the complexity of the medical decision making in this case. I personally spent greater than 70 minutes with this patient and performed the complete history and physical as above at today's visit, at least 45 minutes was in direct discussion and  regarding disease management. -SBRT      Shaka Perez.  Elva Cervantes MD [address]  Radiation Oncology  Cell: [phone]:  [address]: [phone]  [facility]:  [phone]   FAX:    [phone]  [address] [place] [address] Road:  [phone]   FAX:  [phone]        NOTE: This report was transcribed using voice recognition software. Every effort was made to ensure accuracy; however, inadvertent computerized transcription errors may be present.

## 2020-08-10 NOTE — PROGRESS NOTES
Vira Castrorashid  1960 63 y.o. Referring Physician: Dr Vernetta Angelucci    PCP: Romero Payton MD     There were no vitals filed for this visit. Wt Readings from Last 3 Encounters:   08/04/20 275 lb (124.7 kg)   07/07/20 275 lb (124.7 kg)   06/11/20 271 lb (122.9 kg)        There is no height or weight on file to calculate BMI. Chief Complaint: No chief complaint on file. Cancer Staging  No matching staging information was found for the patient. Prior Radiation Therapy? NO    Concurrent Chemo/radiation? NO    Prior Chemotherapy? NO    Prior Hormonal Therapy? NO    Head and Neck Cancer? No, patient does NOT have HN cancer.               Current Outpatient Medications   Medication Sig Dispense Refill    aspirin 81 MG EC tablet Take 1 tablet by mouth daily 90 tablet 3    dilTIAZem (CARDIZEM CD) 120 MG extended release capsule TAKE ONE CAPSULE BY MOUTH DAILY 90 capsule 3    lisinopril (PRINIVIL;ZESTRIL) 10 MG tablet TAKE ONE TABLET BY MOUTH DAILY 90 tablet 3    metoprolol succinate (TOPROL XL) 100 MG extended release tablet TAKE ONE TABLET BY MOUTH TWO TIMES A  tablet 3    apixaban (ELIQUIS) 5 MG TABS tablet TAKE ONE TABLET BY MOUTH TWO TIMES A  tablet 3    PROAIR  (90 Base) MCG/ACT inhaler INHALE TWO PUFFS BY MOUTH EVERY 4 TO 6 HOURS AS NEEDED      dronedarone hcl (MULTAQ) 400 MG TABS Take 1 tablet by mouth 2 times daily (with meals) 14 tablet 0    budesonide-formoterol (SYMBICORT) 160-4.5 MCG/ACT AERO Inhale 2 puffs into the lungs 2 times daily      ipratropium-albuterol (DUONEB) 0.5-2.5 (3) MG/3ML SOLN nebulizer solution Inhale 3 mLs into the lungs every 4 hours 360 mL 0    nitroGLYCERIN (NITROSTAT) 0.4 MG SL tablet Place 1 tablet under the tongue every 5 minutes as needed for Chest pain (Patient not taking: Reported on 8/4/2020) 25 tablet 0    CRESTOR 40 MG tablet 40 mg daily       vitamin D (ERGOCALCIFEROL) 58172 UNITS CAPS capsule Take 50,000 Units by mouth once a week. No current facility-administered medications for this encounter.         Past Medical History:   Diagnosis Date    [facility]-fib [facility])     Blood transfusion reaction     CAD (coronary artery disease)     COPD (chronic obstructive pulmonary disease) (HCC)     GERD (gastroesophageal reflux disease)     History of cardioversion 04/10/2020    Dr Theresa Laughlin    Hyperlipidemia     Hypertension     Sleep apnea        Past Surgical History:   Procedure Laterality Date    BRONCHOSCOPY N/A 6/4/2020    BRONCHOSCOPY  NAVIGATIONAL performed by Shelly Huitron DO at [address] [place] [address]  6/4/2020    BRONCHOSCOPY/TRANSBRONCHIAL NEEDLE BIOPSY performed by Shelly Huitron DO at [address] [place] [address]  6/4/2020    BRONCHOSCOPY BIOPSY BRONCHUS performed by Shelly Huitron DO at [address] [place] [address]  6/4/2020    BRONCHOSCOPY BRUSHINGS performed by Shelly Huitron DO at [address] [place] [address]  6/4/2020    BRONCHOSCOPY W/EBUS FNA performed by Shelly Huitron DO at [address] [place] [address]  6/4/2020    BRONCHOSCOPY ALVEOLAR LAVAGE performed by Shelly Huitron DO at [facility]  3/20/14    3.5/18 Resolute to Mid-Cx    CORONARY ARTERY BYPASS GRAFT  4/18/07    DIAGNOSTIC CARDIAC CATH LAB PROCEDURE  4/17/07    ECHO COMPL W DOP COLOR FLOW  3/22/2012         TRANSESOPHAGEAL ECHOCARDIOGRAM  02/28/2020    angela       Family History   Problem Relation Age of Onset    Hypertension Mother     Hypertension Father     No Known Problems Sister     No Known Problems Sister     No Known Problems Sister     Cancer Maternal Aunt         cancer       Social History     Socioeconomic History    Marital status:      Spouse name: Not on file    Number of children: Not on file    Years of education: Not on file    Highest education level: Not on file   Occupational History    Occupation:    Social Needs    Financial resource strain: Not on file    Food insecurity     Worry: Not on file     Inability: Not on file    Transportation needs     Medical: Not on file     Non-medical: Not on file   Tobacco Use    Smoking status: Former Smoker     Packs/day: 0.25     Years: 40.00     Pack years: 10.00     Types: Cigarettes     Start date:      Last attempt to quit: 2020     Years since quittin.1    Smokeless tobacco: Never Used    Tobacco comment: 3-4 cigs a day   Substance and Sexual Activity    Alcohol use: Yes     Alcohol/week: 0.0 standard drinks     Types: 6 - 12 Cans of beer per week     Comment: \"weekend use now & then\"    Drug use: No     Types: Marijuana     Comment: in the past, not recently    Sexual activity: Not on file   Lifestyle    Physical activity     Days per week: Not on file     Minutes per session: Not on file    Stress: Not on file   Relationships    Social connections     Talks on phone: Not on file     Gets together: Not on file     Attends Bahai service: Not on file     Active member of club or organization: Not on file     Attends meetings of clubs or organizations: Not on file     Relationship status: Not on file    Intimate partner violence     Fear of current or ex partner: Not on file     Emotionally abused: Not on file     Physically abused: Not on file     Forced sexual activity: Not on file   Other Topics Concern    Not on file   Social History Narrative    Not on file           Occupation: garbe   Retired:  NO        REVIEW OF SYSTEMS: <<For Level 5, 10 or more systems>> Approximately 20 minutes was spent with patient, utilizing slides and handouts, related to receiving radiation therapy to the right lower lobe keratinizing Squamous Cell Carcinoma as noted from biopsy on 2020. Patient underwent CT of the Chest on 2020 that showed a slight increase to the right lower lobe nodule that was noted on Pet Scan completed on 2020. prevention  6.  Reassess in 12 weeks or with any noted change in patient condition which places them at a risk for a fall   4-6 Moderate Risk 1. Provide assistance as indicated for ambulation activities  2. Reorient confused/cognitively impaired patient  3. Call-light/bell within patient's reach  4. Chair/bed in low position, stretcher/bed with siderails up except when performing patient care activities  5. Educate patient/family/caregiver on falls prevention  6. Falls risk precaution (Yellow sticker Level II) placed on patient chart   7 or   Higher High Risk 1. Place patient in easily observable treatment room  2. Patient attended at all times by family member or staff  3. Provide assistance as indicated for ambulation activities  4. Reorient confused/cognitively impaired patient  5. Call-light/bell within patient's reach  6. Chair/bed in low position, stretcher/bed with siderails up except when performing patient care activities  7. Educate patient/family/caregiver on falls prevention  8. Falls risk precaution (Yellow sticker Level III) placed on patient chart           MALNUTRITION RISK SCREENING ASSESSMENT    Instructions:  Assess the patient and enter the appropriate indicators that are present for nutrition risk identification. Total the numbers entered and assign a risk score. Follow the appropriate action for total score listed below. Assessment   Date  8/10/2020     1. Have you lost weight without trying? 0- No     2. Have you been eating poorly because of a decreased appetite? 0- No   3. Do you have a diagnosis of head and neck cancer?       0- No                                                                                    TOTAL 0          Score of 0-1: No action  Score 2 or greater:  · For Non-Diabetic Patient: Recommend adding Ensure Complete 2 x daily and provide patient with Ensure wellness bag with coupons  · For Diabetic Patient: Recommend adding Glucerna Shake 2 x on SBRT to RLL. The patient expresses understanding and acceptance of instructions.  Shani Jorge 8/10/2020 12:41 PM           Shani Jorge

## 2020-08-10 NOTE — PATIENT INSTRUCTIONS
SALLY Galan. Lester Allen MD [place] [address] Oncology  Cell: [phone]    [address]:  [phone]   FAX: [phone]  [facility]:  [address] [place] [address] Avenue:    [phone]  [address] [place] [address] Road:  [phone]   FAX:  [phone]  Email: [email or web address]

## 2020-08-17 ENCOUNTER — APPOINTMENT (OUTPATIENT)
Dept: RADIATION ONCOLOGY | Age: 60
End: 2020-08-17
Attending: RADIOLOGY
Payer: COMMERCIAL

## 2020-08-19 ENCOUNTER — HOSPITAL ENCOUNTER (OUTPATIENT)
Dept: RADIATION ONCOLOGY | Age: 60
Discharge: HOME | End: 2020-08-19
Attending: RADIOLOGY
Payer: COMMERCIAL

## 2020-08-19 PROCEDURE — 77263 THER RADIOLOGY TX PLNG CPLX: CPT | Performed by: RADIOLOGY

## 2020-08-19 PROCEDURE — 77334 RADIATION TREATMENT AID(S): CPT | Performed by: RADIOLOGY

## 2020-09-23 ENCOUNTER — HOSPITAL ENCOUNTER (OUTPATIENT)
Dept: RADIATION ONCOLOGY | Age: 60
Discharge: HOME | End: 2020-09-23
Attending: RADIOLOGY
Payer: COMMERCIAL

## 2020-09-23 PROCEDURE — 77301 RADIOTHERAPY DOSE PLAN IMRT: CPT | Performed by: RADIOLOGY

## 2020-09-23 PROCEDURE — 77300 RADIATION THERAPY DOSE PLAN: CPT | Performed by: RADIOLOGY

## 2020-09-23 PROCEDURE — 77338 DESIGN MLC DEVICE FOR IMRT: CPT | Performed by: RADIOLOGY

## 2020-09-28 ENCOUNTER — HOSPITAL ENCOUNTER (OUTPATIENT)
Dept: RADIATION ONCOLOGY | Age: 60
End: 2020-09-28
Attending: RADIOLOGY
Payer: COMMERCIAL

## 2020-09-29 ENCOUNTER — HOSPITAL ENCOUNTER (OUTPATIENT)
Dept: RADIATION ONCOLOGY | Age: 60
Discharge: HOME | End: 2020-09-29
Attending: RADIOLOGY
Payer: COMMERCIAL

## 2020-09-29 PROCEDURE — 77386 HC NTSTY MODUL RAD TX DLVR CPLX: CPT | Performed by: RADIOLOGY

## 2020-09-29 PROCEDURE — 77014 PR CT GUIDANCE PLACEMENT RAD THERAPY FIELDS: CPT | Performed by: RADIOLOGY

## 2020-09-30 ENCOUNTER — HOSPITAL ENCOUNTER (OUTPATIENT)
Dept: RADIATION ONCOLOGY | Age: 60
Discharge: HOME | End: 2020-09-30
Attending: RADIOLOGY
Payer: COMMERCIAL

## 2020-09-30 VITALS — WEIGHT: 286.4 LBS | BODY MASS INDEX: 40.51 KG/M2

## 2020-09-30 PROCEDURE — 77386 HC NTSTY MODUL RAD TX DLVR CPLX: CPT | Performed by: RADIOLOGY

## 2020-09-30 PROCEDURE — 77014 PR CT GUIDANCE PLACEMENT RAD THERAPY FIELDS: CPT | Performed by: RADIOLOGY

## 2020-09-30 NOTE — PROGRESS NOTES
Sherryle Mako  9/30/2020  Wt Readings from Last 3 Encounters:   09/30/20 286 lb 6.4 oz (129.9 kg)   08/04/20 275 lb (124.7 kg)   07/07/20 275 lb (124.7 kg)     Body mass index is 40.51 kg/m². Treatment Area: ITV RLL SBRT     Patient was seen today for weekly visit. Comfort Alteration  KPS: 100%  Fatigue: None      Nutritional Alteration  Anorexia: No   Nausea: No   Vomiting: No       Elimination Alterations  Constipation: no  Diarrhea:  no    Skin Alteration   Sensation:NA    Radiation Dermatitis:  NA     Emotional  Coping: effective    Sexuality Alteration  NA    Injury, potential bleeding or infection: NA       Lab Results   Component Value Date    WBC 10.1 06/04/2020     06/04/2020       Wt 286 lb 6.4 oz (129.9 kg)   BMI 40.51 kg/m²   BP within normal range? yes      Assessment/Plan: Pt has completed 2/10 fractions; tolerating very well.        [facility]

## 2020-10-01 ENCOUNTER — HOSPITAL ENCOUNTER (OUTPATIENT)
Dept: RADIATION ONCOLOGY | Age: 60
Discharge: HOME | End: 2020-10-01
Attending: RADIOLOGY
Payer: COMMERCIAL

## 2020-10-01 PROCEDURE — 77386 HC NTSTY MODUL RAD TX DLVR CPLX: CPT | Performed by: RADIOLOGY

## 2020-10-01 PROCEDURE — 77014 PR CT GUIDANCE PLACEMENT RAD THERAPY FIELDS: CPT | Performed by: RADIOLOGY

## 2020-10-02 ENCOUNTER — HOSPITAL ENCOUNTER (OUTPATIENT)
Dept: RADIATION ONCOLOGY | Age: 60
Discharge: HOME | End: 2020-10-02
Attending: RADIOLOGY
Payer: COMMERCIAL

## 2020-10-02 PROCEDURE — 77427 RADIATION TX MANAGEMENT X5: CPT | Performed by: RADIOLOGY

## 2020-10-02 PROCEDURE — 77014 PR CT GUIDANCE PLACEMENT RAD THERAPY FIELDS: CPT | Performed by: RADIOLOGY

## 2020-10-02 PROCEDURE — 77386 HC NTSTY MODUL RAD TX DLVR CPLX: CPT | Performed by: RADIOLOGY

## 2020-10-05 ENCOUNTER — HOSPITAL ENCOUNTER (OUTPATIENT)
Dept: RADIATION ONCOLOGY | Age: 60
Discharge: HOME | End: 2020-10-05
Attending: RADIOLOGY
Payer: COMMERCIAL

## 2020-10-05 PROCEDURE — 77014 PR CT GUIDANCE PLACEMENT RAD THERAPY FIELDS: CPT | Performed by: RADIOLOGY

## 2020-10-05 PROCEDURE — 77386 HC NTSTY MODUL RAD TX DLVR CPLX: CPT | Performed by: RADIOLOGY

## 2020-10-05 PROCEDURE — 77336 RADIATION PHYSICS CONSULT: CPT | Performed by: RADIOLOGY

## 2020-10-06 ENCOUNTER — HOSPITAL ENCOUNTER (OUTPATIENT)
Dept: RADIATION ONCOLOGY | Age: 60
Discharge: HOME | End: 2020-10-06
Attending: RADIOLOGY
Payer: COMMERCIAL

## 2020-10-06 PROCEDURE — 77386 HC NTSTY MODUL RAD TX DLVR CPLX: CPT | Performed by: RADIOLOGY

## 2020-10-06 PROCEDURE — 77014 PR CT GUIDANCE PLACEMENT RAD THERAPY FIELDS: CPT | Performed by: RADIOLOGY

## 2020-10-07 ENCOUNTER — HOSPITAL ENCOUNTER (OUTPATIENT)
Dept: RADIATION ONCOLOGY | Age: 60
Discharge: HOME | End: 2020-10-07
Attending: RADIOLOGY
Payer: COMMERCIAL

## 2020-10-07 VITALS
WEIGHT: 287.13 LBS | TEMPERATURE: 97.8 F | SYSTOLIC BLOOD PRESSURE: 130 MMHG | RESPIRATION RATE: 18 BRPM | BODY MASS INDEX: 40.62 KG/M2 | OXYGEN SATURATION: 92 % | HEART RATE: 76 BPM | DIASTOLIC BLOOD PRESSURE: 76 MMHG

## 2020-10-07 PROCEDURE — 77386 HC NTSTY MODUL RAD TX DLVR CPLX: CPT | Performed by: RADIOLOGY

## 2020-10-07 PROCEDURE — 99999 PR OFFICE/OUTPT VISIT,PROCEDURE ONLY: CPT | Performed by: RADIOLOGY

## 2020-10-07 PROCEDURE — 77014 PR CT GUIDANCE PLACEMENT RAD THERAPY FIELDS: CPT | Performed by: RADIOLOGY

## 2020-10-07 NOTE — PROGRESS NOTES
Kristine Bumpers  10/7/2020  Wt Readings from Last 3 Encounters:   10/07/20 287 lb 2 oz (130.2 kg)   09/30/20 286 lb 6.4 oz (129.9 kg)   08/04/20 275 lb (124.7 kg)     Body mass index is 40.62 kg/m². Treatment Area:[facility]    Patient was seen today for weekly visit. Comfort Alteration  KPS:90%  Fatigue: Mild    Ventilation Alterations  Cough: No  Hemoptysis: No  Mucus Color: na  Dyspnea: Yes/ every now and then  O2 Sat: 92%    Nutritional Alteration  Anorexia: No  Nausea: No   Vomiting: No     Skin Alteration   Sensation:na    Radiation Dermatitis:  Na,instructed to moisturize skin at  least daily    Mucous Membrane Alteration  Voice Changes/ Stridor/Larynx: no  Pharynx & Esophagus: na    Elimination Alterations  Constipation: no  Diarrhea:  no      Emotional  Coping: effective      Injury, potential bleeding or infection: na    Other:na    Lab Results   Component Value Date    WBC 10.1 06/04/2020     06/04/2020         /76   Pulse 76   Temp 97.8 °F (36.6 °C) (Temporal)   Resp 18   Wt 287 lb 2 oz (130.2 kg)   SpO2 92%   BMI 40.62 kg/m²   BP within normal range? yes     Assessment/Plan:  Completed 7/10 fractions, 3500/5000 cGy. No other complaints.     Aleah Figueroa

## 2020-10-07 NOTE — PROGRESS NOTES
DEPARTMENT OF RADIATION ONCOLOGY ON TREATMENT VISIT         9/30/20      NAME:  Azul Patel    YOB: 1960    Diagnosis: lung ca    SUBJECTIVE:   Azul Patel has now received fractionated external beam radiation therapy - ongoing. Past medical, surgical, social and family histories reviewed and updated as indicated. Pain: controlled    ALLERGIES:  Zocor [simvastatin - high dose]         Current Outpatient Medications   Medication Sig Dispense Refill    aspirin 81 MG EC tablet Take 1 tablet by mouth daily 90 tablet 3    dilTIAZem (CARDIZEM CD) 120 MG extended release capsule TAKE ONE CAPSULE BY MOUTH DAILY 90 capsule 3    lisinopril (PRINIVIL;ZESTRIL) 10 MG tablet TAKE ONE TABLET BY MOUTH DAILY 90 tablet 3    metoprolol succinate (TOPROL XL) 100 MG extended release tablet TAKE ONE TABLET BY MOUTH TWO TIMES A  tablet 3    apixaban (ELIQUIS) 5 MG TABS tablet TAKE ONE TABLET BY MOUTH TWO TIMES A  tablet 3    PROAIR  (90 Base) MCG/ACT inhaler INHALE TWO PUFFS BY MOUTH EVERY 4 TO 6 HOURS AS NEEDED      dronedarone hcl (MULTAQ) 400 MG TABS Take 1 tablet by mouth 2 times daily (with meals) 14 tablet 0    budesonide-formoterol (SYMBICORT) 160-4.5 MCG/ACT AERO Inhale 2 puffs into the lungs 2 times daily      ipratropium-albuterol (DUONEB) 0.5-2.5 (3) MG/3ML SOLN nebulizer solution Inhale 3 mLs into the lungs every 4 hours 360 mL 0    nitroGLYCERIN (NITROSTAT) 0.4 MG SL tablet Place 1 tablet under the tongue every 5 minutes as needed for Chest pain (Patient not taking: Reported on 8/4/2020) 25 tablet 0    CRESTOR 40 MG tablet 40 mg daily       vitamin D (ERGOCALCIFEROL) 61884 UNITS CAPS capsule Take 50,000 Units by mouth once a week. No current facility-administered medications for this encounter. OBJECTIVE:  Alert and fully ambulatory. Pleasant and conversant.       Physical Examination: General appearance - alert, well appearing, and in no distress. Wt Readings from Last 3 Encounters:   09/30/20 286 lb 6.4 oz (129.9 kg)   08/04/20 275 lb (124.7 kg)   07/07/20 275 lb (124.7 kg)         ASSESSMENT/PLAN:     Patient is tolerating treatments well with expected toxicities. RBA were reviewed prior to first fraction and PRN. Current and planned dose reviewed. Goals of treatment and potential side effects were reviewed with the patient PRN. Treatment imaging has been personally reviewed for accuracy and precision. Questions answered to apparent satisfaction. Treatments will continue as planned. Mario Thompson.  Radha Dumont MD MS DABR  Radiation Oncologist        [facility]): [phone] /// FAX: [phone]  [facility]): [phone] /// FAX: [phone]  [address] [place] [address]): [phone] /// FAX: [phone]

## 2020-10-08 ENCOUNTER — HOSPITAL ENCOUNTER (OUTPATIENT)
Dept: RADIATION ONCOLOGY | Age: 60
Discharge: HOME | End: 2020-10-08
Attending: RADIOLOGY
Payer: COMMERCIAL

## 2020-10-08 PROCEDURE — 77386 HC NTSTY MODUL RAD TX DLVR CPLX: CPT | Performed by: RADIOLOGY

## 2020-10-08 PROCEDURE — 77014 PR CT GUIDANCE PLACEMENT RAD THERAPY FIELDS: CPT | Performed by: RADIOLOGY

## 2020-10-09 ENCOUNTER — HOSPITAL ENCOUNTER (OUTPATIENT)
Dept: RADIATION ONCOLOGY | Age: 60
Discharge: HOME | End: 2020-10-09
Attending: RADIOLOGY
Payer: COMMERCIAL

## 2020-10-09 PROCEDURE — 77386 HC NTSTY MODUL RAD TX DLVR CPLX: CPT | Performed by: RADIOLOGY

## 2020-10-09 PROCEDURE — 77014 PR CT GUIDANCE PLACEMENT RAD THERAPY FIELDS: CPT | Performed by: RADIOLOGY

## 2020-10-09 NOTE — PROGRESS NOTES
DEPARTMENT OF RADIATION ONCOLOGY ON TREATMENT VISIT         10/9/2020      NAME:  Celina Gill    YOB: 1960    Diagnosis: lung cancer    SUBJECTIVE:   Celina Gill has now received fractionated external beam radiation therapy - ongoing. Past medical, surgical, social and family histories reviewed and updated as indicated. Pain: controlled    ALLERGIES:  Zocor [simvastatin - high dose]         Current Outpatient Medications   Medication Sig Dispense Refill    aspirin 81 MG EC tablet Take 1 tablet by mouth daily 90 tablet 3    dilTIAZem (CARDIZEM CD) 120 MG extended release capsule TAKE ONE CAPSULE BY MOUTH DAILY 90 capsule 3    lisinopril (PRINIVIL;ZESTRIL) 10 MG tablet TAKE ONE TABLET BY MOUTH DAILY 90 tablet 3    metoprolol succinate (TOPROL XL) 100 MG extended release tablet TAKE ONE TABLET BY MOUTH TWO TIMES A  tablet 3    apixaban (ELIQUIS) 5 MG TABS tablet TAKE ONE TABLET BY MOUTH TWO TIMES A  tablet 3    PROAIR  (90 Base) MCG/ACT inhaler INHALE TWO PUFFS BY MOUTH EVERY 4 TO 6 HOURS AS NEEDED      dronedarone hcl (MULTAQ) 400 MG TABS Take 1 tablet by mouth 2 times daily (with meals) 14 tablet 0    budesonide-formoterol (SYMBICORT) 160-4.5 MCG/ACT AERO Inhale 2 puffs into the lungs 2 times daily      ipratropium-albuterol (DUONEB) 0.5-2.5 (3) MG/3ML SOLN nebulizer solution Inhale 3 mLs into the lungs every 4 hours 360 mL 0    nitroGLYCERIN (NITROSTAT) 0.4 MG SL tablet Place 1 tablet under the tongue every 5 minutes as needed for Chest pain (Patient not taking: Reported on 8/4/2020) 25 tablet 0    CRESTOR 40 MG tablet 40 mg daily       vitamin D (ERGOCALCIFEROL) 73514 UNITS CAPS capsule Take 50,000 Units by mouth once a week. No current facility-administered medications for this encounter. OBJECTIVE:  Alert and fully ambulatory. Pleasant and conversant.       Physical Examination: General appearance - alert, well appearing, and in no distress. Wt Readings from Last 3 Encounters:   10/07/20 287 lb 2 oz (130.2 kg)   09/30/20 286 lb 6.4 oz (129.9 kg)   08/04/20 275 lb (124.7 kg)         ASSESSMENT/PLAN:     Patient is tolerating treatments well with expected toxicities. RBA were reviewed prior to first fraction and PRN. Current and planned dose reviewed. Goals of treatment and potential side effects were reviewed with the patient PRN. Treatment imaging has been personally reviewed for accuracy and precision. Questions answered to apparent satisfaction. Treatments will continue as planned. Griffin Uribe.  Iris Montes MD MS DABR  Radiation Oncologist        [facility] ([address] [place] [address]): [phone] /// FAX: [phone]  [facility]): [phone] /// FAX: [phone]  [facility]): [phone] /// FAX: [phone]

## 2020-10-12 ENCOUNTER — HOSPITAL ENCOUNTER (OUTPATIENT)
Dept: RADIATION ONCOLOGY | Age: 60
Discharge: HOME | End: 2020-10-12
Attending: RADIOLOGY
Payer: COMMERCIAL

## 2020-10-12 PROCEDURE — 77386 HC NTSTY MODUL RAD TX DLVR CPLX: CPT | Performed by: RADIOLOGY

## 2020-10-12 PROCEDURE — 77336 RADIATION PHYSICS CONSULT: CPT | Performed by: RADIOLOGY

## 2020-10-12 PROCEDURE — 77427 RADIATION TX MANAGEMENT X5: CPT | Performed by: RADIOLOGY

## 2020-10-12 PROCEDURE — 77014 PR CT GUIDANCE PLACEMENT RAD THERAPY FIELDS: CPT | Performed by: RADIOLOGY

## 2020-10-12 NOTE — PROGRESS NOTES
Cameron Bejarano  10/12/2020  6:44 AM          Current Outpatient Medications   Medication Sig Dispense Refill    aspirin 81 MG EC tablet Take 1 tablet by mouth daily 90 tablet 3    dilTIAZem (CARDIZEM CD) 120 MG extended release capsule TAKE ONE CAPSULE BY MOUTH DAILY 90 capsule 3    lisinopril (PRINIVIL;ZESTRIL) 10 MG tablet TAKE ONE TABLET BY MOUTH DAILY 90 tablet 3    metoprolol succinate (TOPROL XL) 100 MG extended release tablet TAKE ONE TABLET BY MOUTH TWO TIMES A  tablet 3    apixaban (ELIQUIS) 5 MG TABS tablet TAKE ONE TABLET BY MOUTH TWO TIMES A  tablet 3    PROAIR  (90 Base) MCG/ACT inhaler INHALE TWO PUFFS BY MOUTH EVERY 4 TO 6 HOURS AS NEEDED      dronedarone hcl (MULTAQ) 400 MG TABS Take 1 tablet by mouth 2 times daily (with meals) 14 tablet 0    budesonide-formoterol (SYMBICORT) 160-4.5 MCG/ACT AERO Inhale 2 puffs into the lungs 2 times daily      ipratropium-albuterol (DUONEB) 0.5-2.5 (3) MG/3ML SOLN nebulizer solution Inhale 3 mLs into the lungs every 4 hours 360 mL 0    nitroGLYCERIN (NITROSTAT) 0.4 MG SL tablet Place 1 tablet under the tongue every 5 minutes as needed for Chest pain (Patient not taking: Reported on 8/4/2020) 25 tablet 0    CRESTOR 40 MG tablet 40 mg daily       vitamin D (ERGOCALCIFEROL) 20308 UNITS CAPS capsule Take 50,000 Units by mouth once a week. No current facility-administered medications for this encounter. This is an up-to-date medication list.    Please take this list to your next care provider, and discard any previous medication lists.

## 2020-10-22 ENCOUNTER — TELEPHONE (OUTPATIENT)
Dept: RADIATION ONCOLOGY | Age: 60
End: 2020-10-22

## 2020-10-26 ENCOUNTER — APPOINTMENT (OUTPATIENT)
Dept: RADIATION ONCOLOGY | Age: 60
End: 2020-10-26
Attending: RADIOLOGY
Payer: COMMERCIAL

## 2020-10-26 ENCOUNTER — HOSPITAL ENCOUNTER (OUTPATIENT)
Dept: RADIATION ONCOLOGY | Age: 60
Discharge: HOME | End: 2020-10-26
Attending: RADIOLOGY
Payer: COMMERCIAL

## 2020-10-26 PROCEDURE — 99999 PR OFFICE/OUTPT VISIT,PROCEDURE ONLY: CPT | Performed by: NURSE PRACTITIONER

## 2020-10-26 NOTE — PROGRESS NOTES
RADIATION ONCOLOGY  2 week follow up         10/27/2020      NAME:  Ashley Headings OF BIRTH:  1960    CC: Pt returns today for re-assessment of radiation treatment area. Diagnosis:  NSCLC    Subjective: Romy Guthrie completed SBRT to RLL lung, on 10/12/2020. Total dose 5000 cGy/ 10 fractions. Patient seen today in 2 week post SBRT completion symptom management visit. Patient denies skin complaints or pain complaints to treatment site. Patient reports dyspnea with moderate exertion as ongoing, unchanged. Recovers with rest. No pleuritic pain, productive coughing or hemoptysis. No fevers, chills, nausea/ emesis or decreased appetite. No esophageal reflux or dysphagia. Patient is following with:    Medical Oncology- Dr. Kam Guido. Patient reports scheduled for CT chest December 2020 and follow-up OV with Dr. ALEX HURD & ANAHI ROMERO [facility] beginning or January 2021. Pulmonary- Dr. Rosio Castillo. Pain: Denies pain complaints. Past medical, surgical, social and family histories reviewed and updated as indicated. Social History update:   Tobacco cigarette smoker 1.5 PPD x 40 years (60 pack years). Quit 07/01/2020. Presently drinking 4-6 cans of beer 1 day every weekend. Prior history of moderately-heavy alcohol consumption. Denies any current or any history of illicit drug use/ abuse.      Patient continue to work full time as , starts work in early morning 2 AM.       ALLERGIES:  Zocor [simvastatin - high dose]         Current Outpatient Medications   Medication Sig Dispense Refill    aspirin 81 MG EC tablet Take 1 tablet by mouth daily 90 tablet 3    dilTIAZem (CARDIZEM CD) 120 MG extended release capsule TAKE ONE CAPSULE BY MOUTH DAILY 90 capsule 3    lisinopril (PRINIVIL;ZESTRIL) 10 MG tablet TAKE ONE TABLET BY MOUTH DAILY 90 tablet 3    metoprolol succinate (TOPROL XL) 100 MG extended release tablet TAKE ONE TABLET BY MOUTH TWO TIMES A  tablet 3    apixaban (ELIQUIS) 5 MG TABS tablet TAKE ONE TABLET BY MOUTH TWO TIMES A  tablet 3    PROAIR  (90 Base) MCG/ACT inhaler INHALE TWO PUFFS BY MOUTH EVERY 4 TO 6 HOURS AS NEEDED      dronedarone hcl (MULTAQ) 400 MG TABS Take 1 tablet by mouth 2 times daily (with meals) 14 tablet 0    budesonide-formoterol (SYMBICORT) 160-4.5 MCG/ACT AERO Inhale 2 puffs into the lungs 2 times daily      ipratropium-albuterol (DUONEB) 0.5-2.5 (3) MG/3ML SOLN nebulizer solution Inhale 3 mLs into the lungs every 4 hours 360 mL 0    nitroGLYCERIN (NITROSTAT) 0.4 MG SL tablet Place 1 tablet under the tongue every 5 minutes as needed for Chest pain 25 tablet 0    CRESTOR 40 MG tablet 40 mg daily       vitamin D (ERGOCALCIFEROL) 76492 UNITS CAPS capsule Take 50,000 Units by mouth once a week. No current facility-administered medications for this encounter. Physical Examination:   Vitals:    10/26/20 1430   Pulse: 68   Resp: 20   Temp: 97.6 °F (36.4 °C)       Wt Readings from Last 3 Encounters:   10/26/20 293 lb 3 oz (133 kg)   10/07/20 287 lb 2 oz (130.2 kg)   09/30/20 286 lb 6.4 oz (129.9 kg)       Alert and fully ambulatory. Pleasant and conversant. Irradiated skin shows no erythema. Few scattered wheezes noted to bilateral anterior lung fields. Heart regular rate and rhythm. Abdomen, soft, rounded and obese. BS x 4. BLE with no edema noted. ASSESSMENT/PLAN:     T1b, N0, M0 RLL NSCLC. Post SBRT completion, 10/12/2020. Skin care discussed. Surveillance imaging/ CT chest already ordered by Medical Oncology    I discussed follow up plans with Cameron Bejarano. Radiation Oncology follow-up visit 3 months and as needed symptom management checks. Cameron Bejarano is to follow up with other physicians/ APPs involved in his care as directed (including but not limited to Medical Oncology, Primary Care, Pulmonary, and Surgery).      The patient was given our contact number in the event that if at any time they change their mind and would like to return to the clinic to see either myself or one of the Radiation Oncologists, they can simply call us and we would be happy to see them sooner. Thank you for involving us in the management of this extremely pleasant patient. More than 15 min was in direct contact with pt coordinating/giving care. >50% of the visit was spent in counseling the pt on the following: Follow up care    Questions answered to apparent satisfaction. Today visit included in Radiation Oncology global charge period.         Abbey Mar, MSN, APRN-CNP  Certified Nurse Practitioner for [address] [place] [address]   Phone: [phone]/ Fax: [phone]

## 2020-10-27 VITALS
RESPIRATION RATE: 20 BRPM | TEMPERATURE: 97.6 F | WEIGHT: 293.19 LBS | HEIGHT: 71 IN | BODY MASS INDEX: 41.05 KG/M2 | HEART RATE: 68 BPM

## 2020-12-14 ENCOUNTER — HOSPITAL ENCOUNTER (OUTPATIENT)
Dept: CT IMAGING | Age: 60
Discharge: HOME | End: 2020-12-16
Payer: COMMERCIAL

## 2020-12-14 ENCOUNTER — HOSPITAL ENCOUNTER (OUTPATIENT)
Age: 60
Discharge: HOME | End: 2020-12-14
Payer: COMMERCIAL

## 2020-12-14 LAB
BUN BLDV-MCNC: 16 MG/DL (ref 6–20)
CREAT SERPL-MCNC: 1.3 MG/DL (ref 0.7–1.2)
GFR AFRICAN AMERICAN: >60
GFR NON-AFRICAN AMERICAN: 56 ML/MIN/1.73

## 2020-12-14 PROCEDURE — 84520 ASSAY OF UREA NITROGEN: CPT

## 2020-12-14 PROCEDURE — 82565 ASSAY OF CREATININE: CPT

## 2020-12-14 PROCEDURE — 36415 COLL VENOUS BLD VENIPUNCTURE: CPT

## 2020-12-14 PROCEDURE — 71260 CT THORAX DX C+: CPT

## 2020-12-14 PROCEDURE — 6360000004 HC RX CONTRAST MEDICATION: Performed by: RADIOLOGY

## 2020-12-14 RX ORDER — SODIUM CHLORIDE 0.9 % (FLUSH) 0.9 %
10 SYRINGE (ML) INJECTION PRN
Status: DISCONTINUED | OUTPATIENT
Start: 2020-12-14 | End: 2020-12-17 | Stop reason: HOSPADM

## 2020-12-14 RX ADMIN — IOPAMIDOL 75 ML: 755 INJECTION, SOLUTION INTRAVENOUS at 15:43

## 2021-01-15 ENCOUNTER — HOSPITAL ENCOUNTER (INPATIENT)
Age: 61
LOS: 5 days | Discharge: HOME | DRG: 291 | End: 2021-01-20
Attending: EMERGENCY MEDICINE | Admitting: INTERNAL MEDICINE
Payer: COMMERCIAL

## 2021-01-15 ENCOUNTER — APPOINTMENT (OUTPATIENT)
Dept: GENERAL RADIOLOGY | Age: 61
DRG: 291 | End: 2021-01-15
Payer: COMMERCIAL

## 2021-01-15 DIAGNOSIS — J90 PLEURAL EFFUSION: ICD-10-CM

## 2021-01-15 DIAGNOSIS — J96.01 ACUTE RESPIRATORY FAILURE WITH HYPOXIA (HCC): Primary | ICD-10-CM

## 2021-01-15 DIAGNOSIS — J18.9 PNEUMONIA DUE TO ORGANISM: ICD-10-CM

## 2021-01-15 LAB
ALBUMIN SERPL-MCNC: 4.2 G/DL (ref 3.5–5.2)
ALP BLD-CCNC: 77 U/L (ref 40–129)
ALT SERPL-CCNC: 10 U/L (ref 0–40)
ANION GAP SERPL CALCULATED.3IONS-SCNC: 11 MMOL/L (ref 7–16)
AST SERPL-CCNC: 15 U/L (ref 0–39)
BASOPHILS ABSOLUTE: 0.05 E9/L (ref 0–0.2)
BASOPHILS RELATIVE PERCENT: 0.5 % (ref 0–2)
BILIRUB SERPL-MCNC: 0.7 MG/DL (ref 0–1.2)
BUN BLDV-MCNC: 16 MG/DL (ref 6–20)
CALCIUM SERPL-MCNC: 9.1 MG/DL (ref 8.6–10.2)
CHLORIDE BLD-SCNC: 103 MMOL/L (ref 98–107)
CO2: 32 MMOL/L (ref 22–29)
CREAT SERPL-MCNC: 1.1 MG/DL (ref 0.7–1.2)
EKG ATRIAL RATE: 39 BPM
EKG Q-T INTERVAL: 462 MS
EKG QRS DURATION: 128 MS
EKG QTC CALCULATION (BAZETT): 505 MS
EKG R AXIS: 64 DEGREES
EKG T AXIS: 52 DEGREES
EKG VENTRICULAR RATE: 72 BPM
EOSINOPHILS ABSOLUTE: 0.08 E9/L (ref 0.05–0.5)
EOSINOPHILS RELATIVE PERCENT: 0.9 % (ref 0–6)
GFR AFRICAN AMERICAN: >60
GFR NON-AFRICAN AMERICAN: >60 ML/MIN/1.73
GLUCOSE BLD-MCNC: 122 MG/DL (ref 74–99)
HCT VFR BLD CALC: 48.4 % (ref 37–54)
HEMOGLOBIN: 15.3 G/DL (ref 12.5–16.5)
IMMATURE GRANULOCYTES #: 0.03 E9/L
IMMATURE GRANULOCYTES %: 0.3 % (ref 0–5)
LACTIC ACID: 1.3 MMOL/L (ref 0.5–2.2)
LYMPHOCYTES ABSOLUTE: 1.32 E9/L (ref 1.5–4)
LYMPHOCYTES RELATIVE PERCENT: 14 % (ref 20–42)
MCH RBC QN AUTO: 29.7 PG (ref 26–35)
MCHC RBC AUTO-ENTMCNC: 31.6 % (ref 32–34.5)
MCV RBC AUTO: 94 FL (ref 80–99.9)
MONOCYTES ABSOLUTE: 0.82 E9/L (ref 0.1–0.95)
MONOCYTES RELATIVE PERCENT: 8.7 % (ref 2–12)
NEUTROPHILS ABSOLUTE: 7.11 E9/L (ref 1.8–7.3)
NEUTROPHILS RELATIVE PERCENT: 75.6 % (ref 43–80)
PDW BLD-RTO: 13.2 FL (ref 11.5–15)
PLATELET # BLD: 247 E9/L (ref 130–450)
PMV BLD AUTO: 10.7 FL (ref 7–12)
POTASSIUM REFLEX MAGNESIUM: 3.7 MMOL/L (ref 3.5–5)
PRO-BNP: 3589 PG/ML (ref 0–125)
PROCALCITONIN: 0.05 NG/ML (ref 0–0.08)
RBC # BLD: 5.15 E12/L (ref 3.8–5.8)
SARS-COV-2, NAAT: NOT DETECTED
SODIUM BLD-SCNC: 146 MMOL/L (ref 132–146)
TOTAL PROTEIN: 6.9 G/DL (ref 6.4–8.3)
TROPONIN: <0.01 NG/ML (ref 0–0.03)
TSH SERPL DL<=0.05 MIU/L-ACNC: 1.29 UIU/ML (ref 0.27–4.2)
WBC # BLD: 9.4 E9/L (ref 4.5–11.5)

## 2021-01-15 PROCEDURE — 2500000003 HC RX 250 WO HCPCS: Performed by: EMERGENCY MEDICINE

## 2021-01-15 PROCEDURE — 6360000002 HC RX W HCPCS: Performed by: INTERNAL MEDICINE

## 2021-01-15 PROCEDURE — 2580000003 HC RX 258: Performed by: INTERNAL MEDICINE

## 2021-01-15 PROCEDURE — 96365 THER/PROPH/DIAG IV INF INIT: CPT

## 2021-01-15 PROCEDURE — 94640 AIRWAY INHALATION TREATMENT: CPT

## 2021-01-15 PROCEDURE — 84443 ASSAY THYROID STIM HORMONE: CPT

## 2021-01-15 PROCEDURE — 2700000000 HC OXYGEN THERAPY PER DAY

## 2021-01-15 PROCEDURE — 99284 EMERGENCY DEPT VISIT MOD MDM: CPT

## 2021-01-15 PROCEDURE — 85025 COMPLETE CBC W/AUTO DIFF WBC: CPT

## 2021-01-15 PROCEDURE — 93005 ELECTROCARDIOGRAM TRACING: CPT | Performed by: PHYSICIAN ASSISTANT

## 2021-01-15 PROCEDURE — 83880 ASSAY OF NATRIURETIC PEPTIDE: CPT

## 2021-01-15 PROCEDURE — 80053 COMPREHEN METABOLIC PANEL: CPT

## 2021-01-15 PROCEDURE — 2140000000 HC CCU INTERMEDIATE R&B

## 2021-01-15 PROCEDURE — 94664 DEMO&/EVAL PT USE INHALER: CPT

## 2021-01-15 PROCEDURE — 84484 ASSAY OF TROPONIN QUANT: CPT

## 2021-01-15 PROCEDURE — 83605 ASSAY OF LACTIC ACID: CPT

## 2021-01-15 PROCEDURE — 6370000000 HC RX 637 (ALT 250 FOR IP): Performed by: INTERNAL MEDICINE

## 2021-01-15 PROCEDURE — 84145 PROCALCITONIN (PCT): CPT

## 2021-01-15 PROCEDURE — 71045 X-RAY EXAM CHEST 1 VIEW: CPT

## 2021-01-15 PROCEDURE — U0002 COVID-19 LAB TEST NON-CDC: HCPCS

## 2021-01-15 PROCEDURE — 2580000003 HC RX 258: Performed by: EMERGENCY MEDICINE

## 2021-01-15 PROCEDURE — 6360000002 HC RX W HCPCS: Performed by: EMERGENCY MEDICINE

## 2021-01-15 PROCEDURE — 36415 COLL VENOUS BLD VENIPUNCTURE: CPT

## 2021-01-15 PROCEDURE — 96375 TX/PRO/DX INJ NEW DRUG ADDON: CPT

## 2021-01-15 RX ORDER — BUDESONIDE AND FORMOTEROL FUMARATE DIHYDRATE 160; 4.5 UG/1; UG/1
2 AEROSOL RESPIRATORY (INHALATION) 2 TIMES DAILY
Status: DISCONTINUED | OUTPATIENT
Start: 2021-01-15 | End: 2021-01-15 | Stop reason: CLARIF

## 2021-01-15 RX ORDER — LISINOPRIL 10 MG/1
10 TABLET ORAL DAILY
Status: DISCONTINUED | OUTPATIENT
Start: 2021-01-15 | End: 2021-01-20 | Stop reason: HOSPADM

## 2021-01-15 RX ORDER — SODIUM CHLORIDE 0.9 % (FLUSH) 0.9 %
10 SYRINGE (ML) INJECTION EVERY 12 HOURS SCHEDULED
Status: DISCONTINUED | OUTPATIENT
Start: 2021-01-15 | End: 2021-01-20 | Stop reason: HOSPADM

## 2021-01-15 RX ORDER — SODIUM CHLORIDE 0.9 % (FLUSH) 0.9 %
10 SYRINGE (ML) INJECTION PRN
Status: DISCONTINUED | OUTPATIENT
Start: 2021-01-15 | End: 2021-01-20 | Stop reason: HOSPADM

## 2021-01-15 RX ORDER — ERGOCALCIFEROL 1.25 MG/1
50000 CAPSULE ORAL
Status: DISCONTINUED | OUTPATIENT
Start: 2021-01-18 | End: 2021-01-20 | Stop reason: HOSPADM

## 2021-01-15 RX ORDER — ASPIRIN 81 MG/1
81 TABLET ORAL DAILY
Status: DISCONTINUED | OUTPATIENT
Start: 2021-01-15 | End: 2021-01-20 | Stop reason: HOSPADM

## 2021-01-15 RX ORDER — BUDESONIDE 0.5 MG/2ML
0.5 INHALANT ORAL 2 TIMES DAILY
Status: DISCONTINUED | OUTPATIENT
Start: 2021-01-15 | End: 2021-01-15 | Stop reason: CLARIF

## 2021-01-15 RX ORDER — DILTIAZEM HYDROCHLORIDE 120 MG/1
120 CAPSULE, COATED, EXTENDED RELEASE ORAL DAILY
Status: DISCONTINUED | OUTPATIENT
Start: 2021-01-15 | End: 2021-01-20 | Stop reason: HOSPADM

## 2021-01-15 RX ORDER — ARFORMOTEROL TARTRATE 15 UG/2ML
15 SOLUTION RESPIRATORY (INHALATION) 2 TIMES DAILY
Status: DISCONTINUED | OUTPATIENT
Start: 2021-01-15 | End: 2021-01-20 | Stop reason: HOSPADM

## 2021-01-15 RX ORDER — ROSUVASTATIN CALCIUM 20 MG/1
40 TABLET, COATED ORAL DAILY
Status: DISCONTINUED | OUTPATIENT
Start: 2021-01-15 | End: 2021-01-20 | Stop reason: HOSPADM

## 2021-01-15 RX ORDER — ACETAMINOPHEN 325 MG/1
650 TABLET ORAL EVERY 6 HOURS PRN
Status: DISCONTINUED | OUTPATIENT
Start: 2021-01-15 | End: 2021-01-20 | Stop reason: HOSPADM

## 2021-01-15 RX ORDER — FUROSEMIDE 10 MG/ML
20 INJECTION INTRAMUSCULAR; INTRAVENOUS 2 TIMES DAILY
Status: DISCONTINUED | OUTPATIENT
Start: 2021-01-15 | End: 2021-01-20 | Stop reason: HOSPADM

## 2021-01-15 RX ORDER — METOPROLOL SUCCINATE 100 MG/1
100 TABLET, EXTENDED RELEASE ORAL 2 TIMES DAILY
Status: DISCONTINUED | OUTPATIENT
Start: 2021-01-15 | End: 2021-01-20 | Stop reason: HOSPADM

## 2021-01-15 RX ORDER — ACETAMINOPHEN 650 MG/1
650 SUPPOSITORY RECTAL EVERY 6 HOURS PRN
Status: DISCONTINUED | OUTPATIENT
Start: 2021-01-15 | End: 2021-01-20 | Stop reason: HOSPADM

## 2021-01-15 RX ORDER — BUDESONIDE 0.5 MG/2ML
0.5 INHALANT ORAL 2 TIMES DAILY
Status: DISCONTINUED | OUTPATIENT
Start: 2021-01-15 | End: 2021-01-20 | Stop reason: HOSPADM

## 2021-01-15 RX ORDER — IPRATROPIUM BROMIDE AND ALBUTEROL SULFATE 2.5; .5 MG/3ML; MG/3ML
1 SOLUTION RESPIRATORY (INHALATION) EVERY 4 HOURS
Status: DISCONTINUED | OUTPATIENT
Start: 2021-01-15 | End: 2021-01-20 | Stop reason: HOSPADM

## 2021-01-15 RX ORDER — PROCHLORPERAZINE EDISYLATE 5 MG/ML
5 INJECTION INTRAMUSCULAR; INTRAVENOUS EVERY 4 HOURS PRN
Status: DISCONTINUED | OUTPATIENT
Start: 2021-01-15 | End: 2021-01-20 | Stop reason: HOSPADM

## 2021-01-15 RX ORDER — FUROSEMIDE 10 MG/ML
20 INJECTION INTRAMUSCULAR; INTRAVENOUS ONCE
Status: COMPLETED | OUTPATIENT
Start: 2021-01-15 | End: 2021-01-15

## 2021-01-15 RX ADMIN — FUROSEMIDE 20 MG: 10 INJECTION, SOLUTION INTRAMUSCULAR; INTRAVENOUS at 17:33

## 2021-01-15 RX ADMIN — METOPROLOL SUCCINATE 100 MG: 100 TABLET, EXTENDED RELEASE ORAL at 19:58

## 2021-01-15 RX ADMIN — Medication 10 ML: at 21:34

## 2021-01-15 RX ADMIN — APIXABAN 5 MG: 5 TABLET, FILM COATED ORAL at 19:54

## 2021-01-15 RX ADMIN — ARFORMOTEROL TARTRATE 15 MCG: 15 SOLUTION RESPIRATORY (INHALATION) at 21:40

## 2021-01-15 RX ADMIN — DOXYCYCLINE 100 MG: 100 INJECTION, POWDER, LYOPHILIZED, FOR SOLUTION INTRAVENOUS at 16:43

## 2021-01-15 RX ADMIN — IPRATROPIUM BROMIDE AND ALBUTEROL SULFATE 3 ML: .5; 3 SOLUTION RESPIRATORY (INHALATION) at 21:40

## 2021-01-15 RX ADMIN — BUDESONIDE 500 MCG: 0.5 SUSPENSION RESPIRATORY (INHALATION) at 21:40

## 2021-01-15 RX ADMIN — CEFTRIAXONE SODIUM 1 G: 1 INJECTION, POWDER, FOR SOLUTION INTRAMUSCULAR; INTRAVENOUS at 15:52

## 2021-01-15 RX ADMIN — DRONEDARONE 400 MG: 400 TABLET, FILM COATED ORAL at 21:34

## 2021-01-15 ASSESSMENT — ENCOUNTER SYMPTOMS
BACK PAIN: 0
SORE THROAT: 0
SHORTNESS OF BREATH: 1
COUGH: 1
WHEEZING: 0
VOMITING: 0
ABDOMINAL PAIN: 0
DIARRHEA: 0
CHEST TIGHTNESS: 1
NAUSEA: 0

## 2021-01-15 ASSESSMENT — PAIN SCALES - GENERAL
PAINLEVEL_OUTOF10: 0
PAINLEVEL_OUTOF10: 0

## 2021-01-15 NOTE — LETTER
[address]  Phone: [phone]    No name on file. January 20, 2021     Patient: Analilia Screen   YOB: 1960   Date of Visit: 1/15/2021       To Whom It May Concern: It is my medical opinion that Lissett Felton may return to full duty immediately with no restrictions on 1/25/21. If you have any questions or concerns, please don't hesitate to call. Sincerely,    [facility]  [phone]      No name on file.

## 2021-01-15 NOTE — ED PROVIDER NOTES
Patient is a 52-year-old male with past medical history of CAD, hypertension, GERD, COPD, atrial fibrillation, lung cancer presenting to emergency room for shortness of breath. Symptoms moderate to severe in severity and constant since onset. Patient states he has been short of breath for the last few weeks but worsening over the last week. He states is worse on exertion and better when he is at rest.  He does not wear oxygen at home. He went and followed up with his oncologist on Wednesday and he was told that he had \"fluid around his lungs\". He is post to get an appointment with his pulmonologist today but they instructed him to come to the ER for evaluation. Patient finished all of his chemo and radiation treatments in October. He has not anything since then. He does have history of pulmonary embolism and states he is currently on Eliquis. He denies missing any doses of Eliquis. He denies any recent sick contacts. Denies any fevers, chills, abdominal pain, diarrhea, constipation. He has no nausea or vomiting. He does state that he has bilateral chest tightness throughout his chest.'s been ongoing for approximately 1 week. It is worse when he coughs. He does have a nonproductive cough. Tadeo any new leg pain or leg swelling. Review of Systems   Constitutional: Negative for chills and fever. HENT: Negative for congestion and sore throat. Respiratory: Positive for cough, chest tightness and shortness of breath. Negative for wheezing. Cardiovascular: Negative for chest pain and leg swelling. Gastrointestinal: Negative for abdominal pain, diarrhea, nausea and vomiting. Genitourinary: Negative for dysuria and frequency. Musculoskeletal: Negative for arthralgias and back pain. Skin: Negative for rash and wound. Neurological: Negative for weakness and headaches. All other systems reviewed and are negative. Physical Exam  Vitals signs and nursing note reviewed. Constitutional:       General: He is not in acute distress. Appearance: He is well-developed. He is ill-appearing. HENT:      Head: Normocephalic and atraumatic. Eyes:      Pupils: Pupils are equal, round, and reactive to light. Neck:      Musculoskeletal: Normal range of motion and neck supple. Cardiovascular:      Rate and Rhythm: Normal rate and regular rhythm. Heart sounds: Normal heart sounds. No murmur. Pulmonary:      Effort: Pulmonary effort is normal. No respiratory distress. Breath sounds: Decreased breath sounds and wheezing present. No rales. Abdominal:      Palpations: Abdomen is soft. Tenderness: There is no abdominal tenderness. There is no guarding or rebound. Musculoskeletal:      Right lower leg: Edema present. Left lower leg: Edema present. Comments: Mild +1 bilateral pitting edema   Skin:     General: Skin is warm and dry. Capillary Refill: Capillary refill takes less than 2 seconds. Neurological:      General: No focal deficit present. Mental Status: He is alert and oriented to person, place, and time. Cranial Nerves: No cranial nerve deficit. Coordination: Coordination normal.          Procedures     MDM  Number of Diagnoses or Management Options     Amount and/or Complexity of Data Reviewed  Clinical lab tests: reviewed  Tests in the radiology section of CPT®: reviewed  Tests in the medicine section of CPT®: reviewed    Patient presents emergency room for shortness of breath. On initial evaluation he is hypoxic, but in no obvious respiratory distress. Cardiac and pulmonary work-up initiated. Lab work demonstrating a mild BNP elevation at 3500. This is up from baseline. EKG with no acute ischemic changes and troponin is less than 0.01. History and physical exam less concerning for ACS. Chest x-ray demonstrating interstitial edema or pneumonia as well as pleural effusions.   Patient with no overt evidence of fluid overload clinically on exam, only mild bilateral leg edema. patient was treated for CAP. Rapid Covid test performed negative. With patient's current hypoxia and oxygen requirement, he would benefit from inpatient evaluation and care. Consult placed on-call hospitalist accepted patient for admission. Educated patient about symptoms, diagnosis and need to stay in the hospital for further evaluation and care. He verbalized understanding is agreeable plan. Patient was admitted. ED Course as of Benedict 15 1943   Fri Benedict 15, 2021   1714 Spoke with Dr. Aggie Osei, will admit    []      ED Course User Index  [] Mikal Carolina, DO        ----------------------------------------------- PAST HISTORY --------------------------------------------  Past Medical History:  has a past medical history of A-fib ([facility] [address] Blood transfusion reaction, CAD (coronary artery disease), COPD (chronic obstructive pulmonary disease) ([facility] [address] GERD (gastroesophageal reflux disease), History of cardioversion, Hyperlipidemia, Hypertension, Malignant neoplasm of lung ([facility] [address] and Sleep apnea. Past Surgical History:  has a past surgical history that includes Diagnostic Cardiac Cath Lab Procedure (4/17/07); Coronary artery bypass graft (4/18/07); ECHO Compl W Dop Color Flow (3/22/2012); Coronary angioplasty with stent (3/20/14); transesophageal echocardiogram (02/28/2020); bronchoscopy (N/A, 6/4/2020); bronchoscopy (6/4/2020); bronchoscopy (6/4/2020); bronchoscopy (6/4/2020); bronchoscopy (6/4/2020); and bronchoscopy (6/4/2020). Social History:  reports that he quit smoking about 6 months ago. His smoking use included cigarettes. He started smoking about 51 years ago. He has a 60.00 pack-year smoking history. He has never used smokeless tobacco. He reports current alcohol use of about 4.0 - 6.0 standard drinks of alcohol per week. He reports that he does not use drugs. Family History: family history includes Cancer in his maternal aunt;  Hypertension in his father and mother; No Known Problems in his sister, sister, and sister. The patients home medications have been reviewed.     Allergies: Zocor [simvastatin - high dose]    ------------------------------------------------ RESULTS ---------------------------------------------------    LABS:  Results for orders placed or performed during the hospital encounter of 01/15/21   CBC auto differential   Result Value Ref Range    WBC 9.4 4.5 - 11.5 E9/L    RBC 5.15 3.80 - 5.80 E12/L    Hemoglobin 15.3 12.5 - 16.5 g/dL    Hematocrit 48.4 37.0 - 54.0 %    MCV 94.0 80.0 - 99.9 fL    MCH 29.7 26.0 - 35.0 pg    MCHC 31.6 (L) 32.0 - 34.5 %    RDW 13.2 11.5 - 15.0 fL    Platelets 679 952 - 400 E9/L    MPV 10.7 7.0 - 12.0 fL    Neutrophils % 75.6 43.0 - 80.0 %    Immature Granulocytes % 0.3 0.0 - 5.0 %    Lymphocytes % 14.0 (L) 20.0 - 42.0 %    Monocytes % 8.7 2.0 - 12.0 %    Eosinophils % 0.9 0.0 - 6.0 %    Basophils % 0.5 0.0 - 2.0 %    Neutrophils Absolute 7.11 1.80 - 7.30 E9/L    Immature Granulocytes # 0.03 E9/L    Lymphocytes Absolute 1.32 (L) 1.50 - 4.00 E9/L    Monocytes Absolute 0.82 0.10 - 0.95 E9/L    Eosinophils Absolute 0.08 0.05 - 0.50 E9/L    Basophils Absolute 0.05 0.00 - 0.20 E9/L   Comprehensive Metabolic Panel w/ Reflex to MG   Result Value Ref Range    Sodium 146 132 - 146 mmol/L    Potassium reflex Magnesium 3.7 3.5 - 5.0 mmol/L    Chloride 103 98 - 107 mmol/L    CO2 32 (H) 22 - 29 mmol/L    Anion Gap 11 7 - 16 mmol/L    Glucose 122 (H) 74 - 99 mg/dL    BUN 16 6 - 20 mg/dL    CREATININE 1.1 0.7 - 1.2 mg/dL    GFR Non-African American >60 >=60 mL/min/1.73    GFR African American >60     Calcium 9.1 8.6 - 10.2 mg/dL    Total Protein 6.9 6.4 - 8.3 g/dL    Alb 4.2 3.5 - 5.2 g/dL    Total Bilirubin 0.7 0.0 - 1.2 mg/dL    Alkaline Phosphatase 77 40 - 129 U/L    ALT 10 0 - 40 U/L    AST 15 0 - 39 U/L   Lactic Acid, Plasma   Result Value Ref Range    Lactic Acid 1.3 0.5 - 2.2 mmol/L   Brain Natriuretic Peptide Result Value Ref Range    Pro-BNP 3,589 (H) 0 - 125 pg/mL   Troponin   Result Value Ref Range    Troponin <0.01 0.00 - 0.03 ng/mL   COVID-19   Result Value Ref Range    SARS-CoV-2, NAAT Not Detected Not Detected   EKG 12 Lead   Result Value Ref Range    Ventricular Rate 72 BPM    Atrial Rate 39 BPM    QRS Duration 128 ms    Q-T Interval 462 ms    QTc Calculation (Bazett) 505 ms    R Axis 64 degrees    T Axis 52 degrees       RADIOLOGY:    All Radiology results interpreted by Radiologist unless otherwise noted. XR CHEST 1 VIEW   Final Result   Interstitial pulmonary edema or pneumonia notable in lung bases with probable   bilateral pleural effusions. EKG: This EKG is signed and interpreted by ED Physician. Time:  1328   Rate: 72  Rhythm: Atrial fibrillation. Interpretation: atrial fibrillation (chronic). Hypermetabolic. QTc 505. No acute ST segment changes. Q waves in lead III. Comparison: stable as compared to patient's most recent EKG.    ---------------------------- NURSING NOTES AND VITALS REVIEWED -------------------------   The nursing notes within the ED encounter and vital signs as below have been reviewed.    BP (!) 177/91   Pulse 76   Temp 98.9 °F (37.2 °C) (Temporal)   Resp 26   Ht 5' 11\" (1.803 m)   Wt 299 lb 6.4 oz (135.8 kg)   SpO2 94%   BMI 41.76 kg/m²   Oxygen Saturation Interpretation: Improved after treatment      ------------------------------------------PROGRESS NOTES -------------------------------------------    ED COURSE MEDICATIONS:                Medications   apixaban (ELIQUIS) tablet 5 mg (has no administration in time range)   aspirin EC tablet 81 mg (81 mg Oral Not Given 1/15/21 1933)   rosuvastatin (CRESTOR) tablet 40 mg (40 mg Oral Not Given 1/15/21 1934)   dilTIAZem (CARDIZEM CD) extended release capsule 120 mg (120 mg Oral Not Given 1/15/21 1934)   dronedarone hcl (MULTAQ) tablet 400 mg (has no administration in time range)   ipratropium-albuterol (DUONEB) nebulizer solution 3 mL (has no administration in time range)   lisinopril (PRINIVIL;ZESTRIL) tablet 10 mg (10 mg Oral Not Given 1/15/21 1934)   metoprolol succinate (TOPROL XL) extended release tablet 100 mg (has no administration in time range)   vitamin D (ERGOCALCIFEROL) capsule 50,000 Units (has no administration in time range)   sodium chloride flush 0.9 % injection 10 mL (has no administration in time range)   sodium chloride flush 0.9 % injection 10 mL (has no administration in time range)   magnesium hydroxide (MILK OF MAGNESIA) 400 MG/5ML suspension 30 mL (has no administration in time range)   acetaminophen (TYLENOL) tablet 650 mg (has no administration in time range)     Or   acetaminophen (TYLENOL) suppository 650 mg (has no administration in time range)   perflutren lipid microspheres (DEFINITY) injection 1.65 mg (has no administration in time range)   furosemide (LASIX) injection 20 mg (20 mg Intravenous Not Given 1/15/21 1933)   prochlorperazine (COMPAZINE) injection 5 mg (has no administration in time range)   budesonide (PULMICORT) nebulizer suspension 500 mcg (has no administration in time range)     And   Arformoterol Tartrate (BROVANA) nebulizer solution 15 mcg (has no administration in time range)   cefTRIAXone (ROCEPHIN) 1 g in sterile water 10 mL IV syringe (0 g Intravenous Stopped 1/15/21 1557)   doxycycline (VIBRAMYCIN) 100 mg in dextrose 5 % 100 mL IVPB (0 mg Intravenous Stopped 1/15/21 1751)   furosemide (LASIX) injection 20 mg (20 mg Intravenous Given 1/15/21 1733)       CONSULTATIONS:            Consultation:  I Spoke with Dr. Aggie Osei (Medicine). Discussed case. They will admit this patient. Marty Olson PROCEDURES:            none.       COUNSELING:   I have spoken with the patient and discussed todays results, in addition to providing specific details for the plan of care and counseling regarding the diagnosis and prognosis.     --------------------------------------- IMPRESSION & DISPOSITION --------------------------------     IMPRESSION(s):  1. Acute respiratory failure with hypoxia ([address]    2. Pneumonia due to organism    3. Pleural effusion        This patient's ED course included: a personal history and physicial examination, re-evaluation prior to disposition, cardiac monitoring and continuous pulse oximetry    This patient has been closely monitored during their ED course. DISPOSITION:  Disposition: Admit to telemetry. Patient condition is serious. END OF PROVIDER NOTE.           Corinne Pippins, DO  Resident  01/15/21 1943

## 2021-01-16 LAB
ANION GAP SERPL CALCULATED.3IONS-SCNC: 12 MMOL/L (ref 7–16)
BUN BLDV-MCNC: 14 MG/DL (ref 6–20)
CALCIUM SERPL-MCNC: 8.7 MG/DL (ref 8.6–10.2)
CHLORIDE BLD-SCNC: 103 MMOL/L (ref 98–107)
CHOLESTEROL, TOTAL: 101 MG/DL (ref 0–199)
CO2: 31 MMOL/L (ref 22–29)
CREAT SERPL-MCNC: 1.1 MG/DL (ref 0.7–1.2)
GFR AFRICAN AMERICAN: >60
GFR NON-AFRICAN AMERICAN: >60 ML/MIN/1.73
GLUCOSE BLD-MCNC: 100 MG/DL (ref 74–99)
HDLC SERPL-MCNC: 29 MG/DL
LDL CHOLESTEROL CALCULATED: 59 MG/DL (ref 0–99)
LV EF: 60 %
LVEF MODALITY: NORMAL
MAGNESIUM: 1.7 MG/DL (ref 1.6–2.6)
POTASSIUM SERPL-SCNC: 3.4 MMOL/L (ref 3.5–5)
PROCALCITONIN: 0.05 NG/ML (ref 0–0.08)
SODIUM BLD-SCNC: 146 MMOL/L (ref 132–146)
TRIGL SERPL-MCNC: 67 MG/DL (ref 0–149)
VLDLC SERPL CALC-MCNC: 13 MG/DL

## 2021-01-16 PROCEDURE — 6360000002 HC RX W HCPCS: Performed by: INTERNAL MEDICINE

## 2021-01-16 PROCEDURE — 80061 LIPID PANEL: CPT

## 2021-01-16 PROCEDURE — 6370000000 HC RX 637 (ALT 250 FOR IP): Performed by: INTERNAL MEDICINE

## 2021-01-16 PROCEDURE — 36415 COLL VENOUS BLD VENIPUNCTURE: CPT

## 2021-01-16 PROCEDURE — 84145 PROCALCITONIN (PCT): CPT

## 2021-01-16 PROCEDURE — 83735 ASSAY OF MAGNESIUM: CPT

## 2021-01-16 PROCEDURE — 2580000003 HC RX 258: Performed by: INTERNAL MEDICINE

## 2021-01-16 PROCEDURE — 2140000000 HC CCU INTERMEDIATE R&B

## 2021-01-16 PROCEDURE — 6360000004 HC RX CONTRAST MEDICATION: Performed by: INTERNAL MEDICINE

## 2021-01-16 PROCEDURE — 2700000000 HC OXYGEN THERAPY PER DAY

## 2021-01-16 PROCEDURE — 94640 AIRWAY INHALATION TREATMENT: CPT

## 2021-01-16 PROCEDURE — 80048 BASIC METABOLIC PNL TOTAL CA: CPT

## 2021-01-16 PROCEDURE — 93306 TTE W/DOPPLER COMPLETE: CPT

## 2021-01-16 RX ORDER — METHYLPREDNISOLONE SODIUM SUCCINATE 125 MG/2ML
40 INJECTION, POWDER, LYOPHILIZED, FOR SOLUTION INTRAMUSCULAR; INTRAVENOUS EVERY 8 HOURS
Status: DISCONTINUED | OUTPATIENT
Start: 2021-01-16 | End: 2021-01-17

## 2021-01-16 RX ORDER — GUAIFENESIN 400 MG/1
400 TABLET ORAL 3 TIMES DAILY
Status: DISCONTINUED | OUTPATIENT
Start: 2021-01-16 | End: 2021-01-20 | Stop reason: HOSPADM

## 2021-01-16 RX ADMIN — GUAIFENESIN 400 MG: 400 TABLET ORAL at 14:55

## 2021-01-16 RX ADMIN — SODIUM CHLORIDE, PRESERVATIVE FREE 10 ML: 5 INJECTION INTRAVENOUS at 18:44

## 2021-01-16 RX ADMIN — ARFORMOTEROL TARTRATE 15 MCG: 15 SOLUTION RESPIRATORY (INHALATION) at 21:46

## 2021-01-16 RX ADMIN — METHYLPREDNISOLONE SODIUM SUCCINATE 40 MG: 125 INJECTION, POWDER, FOR SOLUTION INTRAMUSCULAR; INTRAVENOUS at 20:31

## 2021-01-16 RX ADMIN — ROSUVASTATIN 40 MG: 20 TABLET, FILM COATED ORAL at 08:05

## 2021-01-16 RX ADMIN — DILTIAZEM HYDROCHLORIDE 120 MG: 120 CAPSULE, COATED, EXTENDED RELEASE ORAL at 08:05

## 2021-01-16 RX ADMIN — Medication 10 ML: at 08:06

## 2021-01-16 RX ADMIN — ASPIRIN 81 MG: 81 TABLET, COATED ORAL at 08:05

## 2021-01-16 RX ADMIN — IPRATROPIUM BROMIDE AND ALBUTEROL SULFATE 3 ML: .5; 3 SOLUTION RESPIRATORY (INHALATION) at 10:10

## 2021-01-16 RX ADMIN — BUDESONIDE 500 MCG: 0.5 SUSPENSION RESPIRATORY (INHALATION) at 21:46

## 2021-01-16 RX ADMIN — PERFLUTREN 1.65 MG: 6.52 INJECTION, SUSPENSION INTRAVENOUS at 10:39

## 2021-01-16 RX ADMIN — IPRATROPIUM BROMIDE AND ALBUTEROL SULFATE 3 ML: .5; 3 SOLUTION RESPIRATORY (INHALATION) at 01:05

## 2021-01-16 RX ADMIN — METOPROLOL SUCCINATE 100 MG: 100 TABLET, EXTENDED RELEASE ORAL at 20:32

## 2021-01-16 RX ADMIN — GUAIFENESIN 400 MG: 400 TABLET ORAL at 20:35

## 2021-01-16 RX ADMIN — FUROSEMIDE 20 MG: 10 INJECTION, SOLUTION INTRAMUSCULAR; INTRAVENOUS at 18:44

## 2021-01-16 RX ADMIN — FUROSEMIDE 20 MG: 10 INJECTION, SOLUTION INTRAMUSCULAR; INTRAVENOUS at 08:05

## 2021-01-16 RX ADMIN — IPRATROPIUM BROMIDE AND ALBUTEROL SULFATE 3 ML: .5; 3 SOLUTION RESPIRATORY (INHALATION) at 05:47

## 2021-01-16 RX ADMIN — BUDESONIDE 500 MCG: 0.5 SUSPENSION RESPIRATORY (INHALATION) at 10:10

## 2021-01-16 RX ADMIN — LISINOPRIL 10 MG: 10 TABLET ORAL at 08:05

## 2021-01-16 RX ADMIN — IPRATROPIUM BROMIDE AND ALBUTEROL SULFATE 3 ML: .5; 3 SOLUTION RESPIRATORY (INHALATION) at 17:36

## 2021-01-16 RX ADMIN — IPRATROPIUM BROMIDE AND ALBUTEROL SULFATE 3 ML: .5; 3 SOLUTION RESPIRATORY (INHALATION) at 13:32

## 2021-01-16 RX ADMIN — DRONEDARONE 400 MG: 400 TABLET, FILM COATED ORAL at 17:01

## 2021-01-16 RX ADMIN — IPRATROPIUM BROMIDE AND ALBUTEROL SULFATE 3 ML: .5; 3 SOLUTION RESPIRATORY (INHALATION) at 21:45

## 2021-01-16 RX ADMIN — ARFORMOTEROL TARTRATE 15 MCG: 15 SOLUTION RESPIRATORY (INHALATION) at 10:10

## 2021-01-16 RX ADMIN — DRONEDARONE 400 MG: 400 TABLET, FILM COATED ORAL at 08:06

## 2021-01-16 RX ADMIN — Medication 10 ML: at 20:32

## 2021-01-16 RX ADMIN — METHYLPREDNISOLONE SODIUM SUCCINATE 40 MG: 125 INJECTION, POWDER, FOR SOLUTION INTRAMUSCULAR; INTRAVENOUS at 14:26

## 2021-01-16 RX ADMIN — METOPROLOL SUCCINATE 100 MG: 100 TABLET, EXTENDED RELEASE ORAL at 08:06

## 2021-01-16 ASSESSMENT — ENCOUNTER SYMPTOMS
EYES NEGATIVE: 1
SHORTNESS OF BREATH: 1
ABDOMINAL PAIN: 0
COLOR CHANGE: 0
WHEEZING: 1
VOMITING: 0
NAUSEA: 0

## 2021-01-16 ASSESSMENT — PAIN SCALES - GENERAL
PAINLEVEL_OUTOF10: 0
PAINLEVEL_OUTOF10: 0

## 2021-01-16 NOTE — PATIENT CARE CONFERENCE
P Quality Flow/Interdisciplinary Rounds Progress Note        Quality Flow Rounds held on January 16, 2021    Disciplines Attending:  Bedside Nurse and Nursing Unit Leadership    Isaac Desir was admitted on 1/15/2021  1:20 PM    Anticipated Discharge Date:  Expected Discharge Date: 01/18/21    Disposition:    Michael Score:  Michael Scale Score: 21    Readmission Risk              Risk of Unplanned Readmission:        14           Discussed patient goal for the day, patient clinical progression, and barriers to discharge.   The following Goal(s) of the Day/Commitment(s) have been identified:  have no active  bleeding      Court Hurtado  January 16, 2021

## 2021-01-16 NOTE — CONSULTS
Anaid Anderson M.D.,[facility]  Silvio Romero D.O., F.A.C.O.I., [place] M.D. Madalyn Lipscomb M.D., Pearson Favre, M.D. Didier Sherman D.O. Patient:  Sohan Vieyra 62 y.o. male MRN: [id number]     Date of Service: 1/16/2021      PULMONARY CONSULTATION    Reason for Consultation: Shortness of breath, pleural effusions  Referring Physician: John Costa MD    Chief Complaint   Patient presents with    Shortness of Breath     recent DX of lung CA, finished chemo treatments in OCT. 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 fib on Eliquis, GERD that presents with worsening dyspnea. Patient known to me from office. Saw Dr. Kristine Luna for regular follow-up. Had CT of his chest done 12/14/2020 which showed bilateral pleural effusions right greater than left. Given his significant progressive dyspnea recommendation was for thoracentesis. Office try to arrange outpatient thoracentesis, however due to Covid and timing to get him in would have taken several days. Patient's breathing could not wait as he felt very dyspneic so he came in to the emergency department. Patient reports progressive dyspnea over the last several days. Has also noticed some chest tightness and wheezing. Denies any cough or congestion. Has noticed that he has gained significant weight since the pandemic of over 30 pounds. Reports not being nearly as active. He is also noticed lower extremity swelling. Since being admitted to the emergency department he has been on 5 L nasal cannula. Has been given Lasix for diuretic and already feeling better.         Past Medical History:   Diagnosis Date    A-fib [facility])     Blood transfusion reaction     CAD (coronary artery disease)     COPD (chronic obstructive pulmonary disease) (HCC)     GERD (gastroesophageal reflux disease)     History of cardioversion 04/10/2020    Dr Johnathan Staples    Hyperlipidemia     Hypertension     Malignant neoplasm of lung ([facility] [address]     Sleep apnea      Past Surgical History:   Procedure Laterality Date    BRONCHOSCOPY N/A 6/4/2020    BRONCHOSCOPY  NAVIGATIONAL performed by Ebony Cano, DO at [address]  6/4/2020    BRONCHOSCOPY/TRANSBRONCHIAL NEEDLE BIOPSY performed by Lonne Nap, DO at [address]  6/4/2020    BRONCHOSCOPY BIOPSY BRONCHUS performed by Lonne Nap, DO at [address]  6/4/2020    BRONCHOSCOPY BRUSHINGS performed by Lonne Nap, DO at [address]  6/4/2020    BRONCHOSCOPY W/EBUS FNA performed by Lonne Nap, DO at [address]  6/4/2020    BRONCHOSCOPY ALVEOLAR LAVAGE performed by Ebony Cano, DO at [place] [id number]  3/20/14    3.5/18 [facility]    CORONARY ARTERY BYPASS GRAFT  4/18/07    DIAGNOSTIC CARDIAC CATH LAB PROCEDURE  4/17/07    ECHO COMPL W DOP COLOR FLOW  3/22/2012         TRANSESOPHAGEAL ECHOCARDIOGRAM  02/28/2020    julio césar     Family History   Problem Relation Age of Onset    Hypertension Mother     Hypertension Father     No Known Problems Sister     No Known Problems Sister     No Known Problems Sister     Cancer Maternal Aunt         cancer         Social History:   Social History     Socioeconomic History    Marital status:      Spouse name: Not on file    Number of children: Not on file    Years of education: Not on file    Highest education level: Not on file   Occupational History    Occupation:    Social Needs    Financial resource strain: Not on file    Food insecurity     Worry: Not on file     Inability: Not on file   [place] needs Medical: Not on file     Non-medical: Not on file   Tobacco Use    Smoking status: Former Smoker     Packs/day: 1.50     Years: 40.00     Pack years: 60.00     Types: Cigarettes     Start date: 5     Quit date: 2020     Years since quittin.5    Smokeless tobacco: Never Used   Substance and Sexual Activity    Alcohol use: Yes     Alcohol/week: 4.0 - 6.0 standard drinks     Types: 4 - 6 Cans of beer per week     Comment: on weekend    Drug use: No    Sexual activity: Not Currently   Lifestyle    Physical activity     Days per week: Not on file     Minutes per session: Not on file    Stress: Not on file   Relationships    Social connections     Talks on phone: Not on file     Gets together: Not on file     Attends Alevism service: Not on file     Active member of club or organization: Not on file     Attends meetings of clubs or organizations: Not on file     Relationship status: Not on file    Intimate partner violence     Fear of current or ex partner: Not on file     Emotionally abused: Not on file     Physically abused: Not on file     Forced sexual activity: Not on file   Other Topics Concern    Not on file   Social History Narrative    Not on file       Social Hx: Works in [employer]. Vaccines: There is no immunization history on file for this patient.      Home Meds: Medications Prior to Admission: aspirin 81 MG EC tablet, Take 1 tablet by mouth daily  dilTIAZem (CARDIZEM CD) 120 MG extended release capsule, TAKE ONE CAPSULE BY MOUTH DAILY  lisinopril (PRINIVIL;ZESTRIL) 10 MG tablet, TAKE ONE TABLET BY MOUTH DAILY  metoprolol succinate (TOPROL XL) 100 MG extended release tablet, TAKE ONE TABLET BY MOUTH TWO TIMES A DAY  apixaban (ELIQUIS) 5 MG TABS tablet, TAKE ONE TABLET BY MOUTH TWO TIMES A DAY  PROAIR  (90 Base) MCG/ACT inhaler, INHALE TWO PUFFS BY MOUTH EVERY 4 TO 6 HOURS AS NEEDED  dronedarone hcl (MULTAQ) 400 MG TABS, Take 1 tablet by mouth 2 times daily (with meals)  budesonide-formoterol (SYMBICORT) 160-4.5 MCG/ACT AERO, Inhale 2 puffs into the lungs 2 times daily  ipratropium-albuterol (DUONEB) 0.5-2.5 (3) MG/3ML SOLN nebulizer solution, Inhale 3 mLs into the lungs every 4 hours  nitroGLYCERIN (NITROSTAT) 0.4 MG SL tablet, Place 1 tablet under the tongue every 5 minutes as needed for Chest pain  CRESTOR 40 MG tablet, 40 mg daily   vitamin D (ERGOCALCIFEROL) 03187 UNITS CAPS capsule, Take 50,000 Units by mouth once a week. CURRENT MEDS :  Scheduled Meds:   [Held by provider] apixaban  5 mg Oral BID    aspirin  81 mg Oral Daily    rosuvastatin  40 mg Oral Daily    dilTIAZem  120 mg Oral Daily    dronedarone hcl  400 mg Oral BID WC    ipratropium-albuterol  1 vial Inhalation Q4H    lisinopril  10 mg Oral Daily    metoprolol succinate  100 mg Oral BID    [START ON 1/18/2021] vitamin D  50,000 Units Oral Once per day on Mon    sodium chloride flush  10 mL Intravenous 2 times per day    furosemide  20 mg Intravenous BID    budesonide  0.5 mg Nebulization BID    And    Arformoterol Tartrate  15 mcg Nebulization BID       Continuous Infusions: Allergies   Allergen Reactions    Zocor [Simvastatin - High Dose] Other (See Comments)     Causes Rhabdomyolysis       REVIEW OF SYSTEMS:  REVIEW OF SYMPTOMS:  Review of Systems   Constitutional: Negative for chills, fatigue and fever. +weight gain of 30 lbs   HENT: Negative. Eyes: Negative. Respiratory: Positive for shortness of breath and wheezing. Cardiovascular: Positive for leg swelling. Negative for chest pain. Gastrointestinal: Negative for abdominal pain, nausea and vomiting. Endocrine: Negative for cold intolerance and heat intolerance. Genitourinary: Negative for difficulty urinating and dysuria. Musculoskeletal: Negative. Skin: Negative for color change and rash. Allergic/Immunologic: Negative for environmental allergies and immunocompromised state.    Neurological: Negative for dizziness and weakness. Psychiatric/Behavioral: Negative for agitation and confusion. OBJECTIVE:   /74   Pulse 83   Temp 98.1 °F (36.7 °C) (Temporal)   Resp 20   Ht 5' 11\" (1.803 m)   Wt 299 lb 6.4 oz (135.8 kg)   SpO2 94%   BMI 41.76 kg/m²   SpO2 Readings from Last 1 Encounters:   01/16/21 94%        I/O:    Intake/Output Summary (Last 24 hours) at 1/16/2021 1236  Last data filed at 1/16/2021 0917  Gross per 24 hour   Intake 240 ml   Output 1450 ml   Net -1210 ml     Vent Information  SpO2: 94 %                PHYSICAL EXAM:  Physical Exam  Constitutional:       Appearance: He is obese. HENT:      Head: Normocephalic and atraumatic. Eyes:      Conjunctiva/sclera: Conjunctivae normal.   Neck:      Musculoskeletal: Neck supple. Trachea: No tracheal deviation. Cardiovascular:      Rate and Rhythm: Normal rate. Rhythm irregular. Heart sounds: Normal heart sounds. Pulmonary:      Breath sounds: Examination of the right-lower field reveals rales. Examination of the left-lower field reveals rales. Decreased breath sounds, wheezing and rales present. Abdominal:      General: Bowel sounds are normal.      Palpations: Abdomen is soft. Tenderness: There is no abdominal tenderness. Musculoskeletal:         General: Swelling present. Lymphadenopathy:      Cervical: No cervical adenopathy. Skin:     General: Skin is warm and dry. Findings: No rash. Neurological:      General: No focal deficit present. Mental Status: He is alert. Psychiatric:         Behavior: Behavior normal.         Pulmonary Function Testing personally reviewed and interpreted. PERTINENT LAB RESULTS: Labs reviewed. DIAGNOSTICS: Pertinent imaging reviewed. ASSESSMENT:     1. Acute hypoxic respiratory failure secondary to pulmonary edema  2. Acute decompensated CHF, EF to be determined  3. Bilateral pleural effusions right greater than left  4.   Acute exacerbation of COPD, patient with severe COPD  5. NSCLC -SCC of medial right lower lobe DDx 6/2020, not surgical candidate s/p SBRT  6. ANTONIO on CPAP  7. Morbid obesity, BMI 41  8. A. fib on Eliquis  9. GERD      PLAN:      Wean FiO2 for SPO2 88-92%   2D echo completed -report pending   Continue IV diuresis   Plan for diagnostic and therapeutic thoracentesis given history of malignancy. We will plan for 1/17. Continue to hold Eliquis   CPAP nightly   IV steroids 40 mg every 8, taper as tolerated   Aerosol bronchodilators Pulmicort/Perforomist, DuoNeb every 4 while awake   EZPAP, mucinex, IS   GI/DVT        Thank you for allowing me to participate in the care of Ivonne Mayes. Please feel free to call with questions.        Electronically signed by Marion José DO on 1/16/2021 at 12:36 PM

## 2021-01-16 NOTE — H&P
Emily Calderon M.D. History and Physical      CHIEF COMPLAINT: Shortness of breath    Reason for Admission: Volume overload/pleural effusion,     History Obtained From: Patient/EMR    HISTORY OF PRESENT ILLNESS:      The patient is a 62 y.o. male of Antolin Ray MD with significant past medical history of fibrillation on oral anticoagulation with Eliquis, recent diagnosis of lung cancer status post chemotherapy which has been completed, currently undergoing radiation therapy who presents with worsening shortness of breath. He went to follow-up with his oncologist yesterday who determined he had a pleural effusion. Patient was scheduled for thoracentesis on 1/18 as an outpatient. However secondary to worsening shortness of breath patient was advised to come into the ER for evaluation and treatment as inpatient. On my evaluation he is on 5 L of oxygen (does not wear oxygen at home) resting comfortably feels well and improved since admission.   BP (!) 167/82   Pulse 77   Temp 97.7 °F (36.5 °C) (Temporal)   Resp 20   Ht 5' 11\" (1.803 m)   Wt 299 lb 6.4 oz (135.8 kg)   SpO2 95%   BMI 41.76 kg/m²     All labs personally reviewed-unremarkable, potassium 3.4, BNP 3600  All imaging personally reviewed-interstitial pulmonary edema with bilateral pleural effusions on chest x-ray    Past Medical History:        Diagnosis Date    [facility]fib ([facility] [address]     Blood transfusion reaction     CAD (coronary artery disease)     COPD (chronic obstructive pulmonary disease) (HCC)     GERD (gastroesophageal reflux disease)     History of cardioversion 04/10/2020    Dr Vince Rodriguez    Hyperlipidemia     Hypertension     Malignant neoplasm of lung ([facility] [address]     Sleep apnea      Past Surgical History:        Procedure Laterality Date    BRONCHOSCOPY N/A 6/4/2020    BRONCHOSCOPY  NAVIGATIONAL performed by Yanelis Rojas DO at [address]  6/4/2020    BRONCHOSCOPY/TRANSBRONCHIAL NEEDLE BIOPSY performed by Alejandra Medina DO at [address] [facility]  6/4/2020    BRONCHOSCOPY BIOPSY BRONCHUS performed by Alejandra Medina DO at [id number] [place] [address]  6/4/2020    BRONCHOSCOPY BRUSHINGS performed by Alejandra Medina DO at [id number] [place] [address]  6/4/2020    BRONCHOSCOPY W/EBUS FNA performed by Alejandra Medina DO at [address] [place] [address]  6/4/2020    BRONCHOSCOPY ALVEOLAR LAVAGE performed by Alejandra Medina DO at [place] [address]  3/20/14    3.5/18 [facility]    CORONARY ARTERY BYPASS GRAFT  4/18/07    DIAGNOSTIC CARDIAC CATH LAB PROCEDURE  4/17/07    ECHO COMPL W DOP COLOR FLOW  3/22/2012         TRANSESOPHAGEAL ECHOCARDIOGRAM  02/28/2020    angela         Medications Prior to Admission:    Medications Prior to Admission: aspirin 81 MG EC tablet, Take 1 tablet by mouth daily  dilTIAZem (CARDIZEM CD) 120 MG extended release capsule, TAKE ONE CAPSULE BY MOUTH DAILY  lisinopril (PRINIVIL;ZESTRIL) 10 MG tablet, TAKE ONE TABLET BY MOUTH DAILY  metoprolol succinate (TOPROL XL) 100 MG extended release tablet, TAKE ONE TABLET BY MOUTH TWO TIMES A DAY  apixaban (ELIQUIS) 5 MG TABS tablet, TAKE ONE TABLET BY MOUTH TWO TIMES A DAY  PROAIR  (90 Base) MCG/ACT inhaler, INHALE TWO PUFFS BY MOUTH EVERY 4 TO 6 HOURS AS NEEDED  dronedarone hcl (MULTAQ) 400 MG TABS, Take 1 tablet by mouth 2 times daily (with meals)  budesonide-formoterol (SYMBICORT) 160-4.5 MCG/ACT AERO, Inhale 2 puffs into the lungs 2 times daily  ipratropium-albuterol (DUONEB) 0.5-2.5 (3) MG/3ML SOLN nebulizer solution, Inhale 3 mLs into the lungs every 4 hours  nitroGLYCERIN (NITROSTAT) 0.4 MG SL tablet, Place 1 tablet under the tongue every 5 minutes as needed for Chest pain  CRESTOR 40 MG tablet, 40 mg daily   vitamin D (ERGOCALCIFEROL) 10970 UNITS CAPS capsule, Take 50,000 Units by mouth once a week.     Allergies:  Zocor [simvastatin - high dose]    Social History:   TOBACCO:   reports that he quit smoking about 6 months ago. His smoking use included cigarettes. He started smoking about 51 years ago. He has a 60.00 pack-year smoking history. He has never used smokeless tobacco.  ETOH:   reports current alcohol use of about 4.0 - 6.0 standard drinks of alcohol per week. MARITAL STATUS:    OCCUPATION:      Family History:       Problem Relation Age of Onset    Hypertension Mother     Hypertension Father     No Known Problems Sister     No Known Problems Sister     No Known Problems Sister     Cancer Maternal Aunt         cancer       REVIEW OF SYSTEMS:    General ROS: Progressive shortness of breath  Hematological and Lymphatic ROS: negative  Endocrine ROS: negative  Respiratory ROS: no cough, shortness of breath, or wheezing  Cardiovascular ROS: no chest pain or dyspnea on exertion  Gastrointestinal ROS: no abdominal pain, change in bowel habits, or black or bloody stools  Genito-Urinary ROS: no dysuria, trouble voiding, or hematuria  Neurological ROS: no TIA or stroke symptoms  negative    Vitals:  BP (!) 167/82   Pulse 77   Temp 97.7 °F (36.5 °C) (Temporal)   Resp 20   Ht 5' 11\" (1.803 m)   Wt 299 lb 6.4 oz (135.8 kg)   SpO2 95%   BMI 41.76 kg/m²     PHYSICAL EXAM:  General:  Awake, alert, oriented X 3. Well developed, well nourished, well groomed. No apparent distress. HEENT:  Normocephalic, atraumatic. Pupils equal, round, reactive to light. No scleral icterus. No conjunctival injection. Normal lips, teeth, and gums. No nasal discharge. Neck:  Supple, FROM  Heart:  RRR, no murmurs, gallops, rubs, carotid upstroke normal, no carotid bruits  Lungs: Rales noted bilateral lung fields scattered   Abdomen:   Bowel sounds present, soft, nontender, no masses, no organomegaly, no peritoneal signs  Extremities:  No clubbing, cyanosis, or edema  Skin:  Warm and dry, no open lesions or rash  Neuro:  Cranial nerves 2-12 intact, no focal deficits  Vascular: Radial and pedal Pulses 2+  Breast: deferred  Rectal: deferred  Genitalia:  deferred      DATA:     Recent Labs     01/15/21  1337   WBC 9.4   HGB 15.3        Recent Labs     01/15/21  1337 01/16/21  0543    146   K 3.7 3.4*   BUN 16 14   CREATININE 1.1 1.1     Recent Labs     01/15/21  1337   PROT 6.9     Recent Labs     01/15/21  1337   AST 15   ALT 10   ALKPHOS 77   BILITOT 0.7     No results for input(s): BNP in the last 72 hours. Recent Labs     01/15/21  1337   TROPONINI <0.01       ASSESSMENT:      Principal Problem:    Acute respiratory failure with hypoxia (HCC)  Active Problems:    CAD (coronary artery disease)    Hypertension    COPD (chronic obstructive pulmonary disease) (HCC)    PAF (paroxysmal atrial fibrillation) (HCC)    Malignant neoplasm of lung (HCC)  Resolved Problems:    * No resolved hospital problems.  *        PLAN:    Admit to telemetry for evaluation of shortness of breath and patient with known history of lung cancer status post completion of chemotherapy, currently undergoing radiation  IV diuresis, wean down oxygen-currently on 5 L-improved shortness of breath  Repeat chest x-ray after diuresing, if persistent pleural effusions, good idea to tap him while he is here  Pulmonology evaluation pending  Resume Eliquis if thoracentesis not planned  Resume home medication including Multaq and diltiazem for rhythm/rate        DVT prophylaxis  PT OT  Discharge plan-once diuresed adequately    Nadira Dillon MD  1/16/2021  11:12 AM

## 2021-01-17 ENCOUNTER — APPOINTMENT (OUTPATIENT)
Dept: ULTRASOUND IMAGING | Age: 61
DRG: 291 | End: 2021-01-17
Payer: COMMERCIAL

## 2021-01-17 ENCOUNTER — APPOINTMENT (OUTPATIENT)
Dept: GENERAL RADIOLOGY | Age: 61
DRG: 291 | End: 2021-01-17
Payer: COMMERCIAL

## 2021-01-17 LAB
ANION GAP SERPL CALCULATED.3IONS-SCNC: 14 MMOL/L (ref 7–16)
APPEARANCE FLUID: NORMAL
BUN BLDV-MCNC: 16 MG/DL (ref 6–20)
CALCIUM SERPL-MCNC: 8.9 MG/DL (ref 8.6–10.2)
CELL COUNT FLUID TYPE: NORMAL
CHLORIDE BLD-SCNC: 96 MMOL/L (ref 98–107)
CO2: 30 MMOL/L (ref 22–29)
COLOR FLUID: NORMAL
CREAT SERPL-MCNC: 1.1 MG/DL (ref 0.7–1.2)
CRITICAL: NORMAL
DATE ANALYZED: NORMAL
DATE OF COLLECTION: NORMAL
FLUID TYPE: NORMAL
FLUID TYPE: NORMAL
GFR AFRICAN AMERICAN: >60
GFR NON-AFRICAN AMERICAN: >60 ML/MIN/1.73
GLUCOSE BLD-MCNC: 197 MG/DL (ref 74–99)
GLUCOSE, FLUID: 271 MG/DL
HEMATOCRIT FLUID: <2 %
LAB: NORMAL
LD, FLUID: 90 U/L
Lab: NORMAL
MAGNESIUM: 1.7 MG/DL (ref 1.6–2.6)
MONOCYTE, FLUID: 95 %
NEUTROPHIL, FLUID: 5 %
NUCLEATED CELLS FLUID: 1261 /UL
OPERATOR ID: 1868
PH FLUID: 7.48
POTASSIUM SERPL-SCNC: 3.9 MMOL/L (ref 3.5–5)
PRO-BNP: 1877 PG/ML (ref 0–125)
PROTEIN FLUID: 2.6 G/DL
RBC FLUID: NORMAL /UL
SODIUM BLD-SCNC: 140 MMOL/L (ref 132–146)
SOURCE, BLOOD GAS: NORMAL
TIME ANALYZED: 1335
TRIGLYCERIDES FLUID: 16 MG/DL

## 2021-01-17 PROCEDURE — 2700000000 HC OXYGEN THERAPY PER DAY

## 2021-01-17 PROCEDURE — 83880 ASSAY OF NATRIURETIC PEPTIDE: CPT

## 2021-01-17 PROCEDURE — 36415 COLL VENOUS BLD VENIPUNCTURE: CPT

## 2021-01-17 PROCEDURE — 0W993ZZ DRAINAGE OF RIGHT PLEURAL CAVITY, PERCUTANEOUS APPROACH: ICD-10-PCS | Performed by: RADIOLOGY

## 2021-01-17 PROCEDURE — 83735 ASSAY OF MAGNESIUM: CPT

## 2021-01-17 PROCEDURE — 88112 CYTOPATH CELL ENHANCE TECH: CPT

## 2021-01-17 PROCEDURE — 71045 X-RAY EXAM CHEST 1 VIEW: CPT

## 2021-01-17 PROCEDURE — 94640 AIRWAY INHALATION TREATMENT: CPT

## 2021-01-17 PROCEDURE — 83615 LACTATE (LD) (LDH) ENZYME: CPT

## 2021-01-17 PROCEDURE — 2140000000 HC CCU INTERMEDIATE R&B

## 2021-01-17 PROCEDURE — 85013 SPUN MICROHEMATOCRIT: CPT

## 2021-01-17 PROCEDURE — 32555 ASPIRATE PLEURA W/ IMAGING: CPT

## 2021-01-17 PROCEDURE — 82947 ASSAY GLUCOSE BLOOD QUANT: CPT

## 2021-01-17 PROCEDURE — 6360000002 HC RX W HCPCS: Performed by: INTERNAL MEDICINE

## 2021-01-17 PROCEDURE — 6370000000 HC RX 637 (ALT 250 FOR IP): Performed by: INTERNAL MEDICINE

## 2021-01-17 PROCEDURE — 89051 BODY FLUID CELL COUNT: CPT

## 2021-01-17 PROCEDURE — 80048 BASIC METABOLIC PNL TOTAL CA: CPT

## 2021-01-17 PROCEDURE — 87205 SMEAR GRAM STAIN: CPT

## 2021-01-17 PROCEDURE — 87070 CULTURE OTHR SPECIMN AEROBIC: CPT

## 2021-01-17 PROCEDURE — 88305 TISSUE EXAM BY PATHOLOGIST: CPT

## 2021-01-17 PROCEDURE — 94660 CPAP INITIATION&MGMT: CPT

## 2021-01-17 PROCEDURE — 83986 ASSAY PH BODY FLUID NOS: CPT

## 2021-01-17 PROCEDURE — 84478 ASSAY OF TRIGLYCERIDES: CPT

## 2021-01-17 PROCEDURE — 2580000003 HC RX 258: Performed by: INTERNAL MEDICINE

## 2021-01-17 PROCEDURE — 84157 ASSAY OF PROTEIN OTHER: CPT

## 2021-01-17 RX ORDER — METHYLPREDNISOLONE SODIUM SUCCINATE 40 MG/ML
40 INJECTION, POWDER, LYOPHILIZED, FOR SOLUTION INTRAMUSCULAR; INTRAVENOUS EVERY 12 HOURS
Status: COMPLETED | OUTPATIENT
Start: 2021-01-18 | End: 2021-01-19

## 2021-01-17 RX ADMIN — IPRATROPIUM BROMIDE AND ALBUTEROL SULFATE 3 ML: .5; 3 SOLUTION RESPIRATORY (INHALATION) at 13:30

## 2021-01-17 RX ADMIN — IPRATROPIUM BROMIDE AND ALBUTEROL SULFATE 3 ML: .5; 3 SOLUTION RESPIRATORY (INHALATION) at 21:22

## 2021-01-17 RX ADMIN — BUDESONIDE 500 MCG: 0.5 SUSPENSION RESPIRATORY (INHALATION) at 09:59

## 2021-01-17 RX ADMIN — IPRATROPIUM BROMIDE AND ALBUTEROL SULFATE 3 ML: .5; 3 SOLUTION RESPIRATORY (INHALATION) at 06:00

## 2021-01-17 RX ADMIN — ROSUVASTATIN 40 MG: 20 TABLET, FILM COATED ORAL at 08:17

## 2021-01-17 RX ADMIN — METHYLPREDNISOLONE SODIUM SUCCINATE 40 MG: 125 INJECTION, POWDER, FOR SOLUTION INTRAMUSCULAR; INTRAVENOUS at 12:32

## 2021-01-17 RX ADMIN — GUAIFENESIN 400 MG: 400 TABLET ORAL at 13:45

## 2021-01-17 RX ADMIN — ARFORMOTEROL TARTRATE 15 MCG: 15 SOLUTION RESPIRATORY (INHALATION) at 09:59

## 2021-01-17 RX ADMIN — LISINOPRIL 10 MG: 10 TABLET ORAL at 08:18

## 2021-01-17 RX ADMIN — Medication 10 ML: at 21:12

## 2021-01-17 RX ADMIN — DRONEDARONE 400 MG: 400 TABLET, FILM COATED ORAL at 08:17

## 2021-01-17 RX ADMIN — METHYLPREDNISOLONE SODIUM SUCCINATE 40 MG: 125 INJECTION, POWDER, FOR SOLUTION INTRAMUSCULAR; INTRAVENOUS at 04:59

## 2021-01-17 RX ADMIN — ARFORMOTEROL TARTRATE 15 MCG: 15 SOLUTION RESPIRATORY (INHALATION) at 21:22

## 2021-01-17 RX ADMIN — GUAIFENESIN 400 MG: 400 TABLET ORAL at 08:18

## 2021-01-17 RX ADMIN — IPRATROPIUM BROMIDE AND ALBUTEROL SULFATE 3 ML: .5; 3 SOLUTION RESPIRATORY (INHALATION) at 09:59

## 2021-01-17 RX ADMIN — DRONEDARONE 400 MG: 400 TABLET, FILM COATED ORAL at 16:55

## 2021-01-17 RX ADMIN — ACETAMINOPHEN 650 MG: 325 TABLET ORAL at 05:01

## 2021-01-17 RX ADMIN — FUROSEMIDE 20 MG: 10 INJECTION, SOLUTION INTRAMUSCULAR; INTRAVENOUS at 18:12

## 2021-01-17 RX ADMIN — IPRATROPIUM BROMIDE AND ALBUTEROL SULFATE 3 ML: .5; 3 SOLUTION RESPIRATORY (INHALATION) at 16:44

## 2021-01-17 RX ADMIN — BUDESONIDE 500 MCG: 0.5 SUSPENSION RESPIRATORY (INHALATION) at 21:21

## 2021-01-17 RX ADMIN — SODIUM CHLORIDE, PRESERVATIVE FREE 10 ML: 5 INJECTION INTRAVENOUS at 12:32

## 2021-01-17 RX ADMIN — ASPIRIN 81 MG: 81 TABLET, COATED ORAL at 08:18

## 2021-01-17 RX ADMIN — METOPROLOL SUCCINATE 100 MG: 100 TABLET, EXTENDED RELEASE ORAL at 21:12

## 2021-01-17 RX ADMIN — METOPROLOL SUCCINATE 100 MG: 100 TABLET, EXTENDED RELEASE ORAL at 08:17

## 2021-01-17 RX ADMIN — GUAIFENESIN 400 MG: 400 TABLET ORAL at 21:12

## 2021-01-17 RX ADMIN — Medication 10 ML: at 08:18

## 2021-01-17 RX ADMIN — SODIUM CHLORIDE, PRESERVATIVE FREE 10 ML: 5 INJECTION INTRAVENOUS at 18:12

## 2021-01-17 RX ADMIN — DILTIAZEM HYDROCHLORIDE 120 MG: 120 CAPSULE, COATED, EXTENDED RELEASE ORAL at 08:17

## 2021-01-17 RX ADMIN — SODIUM CHLORIDE, PRESERVATIVE FREE 10 ML: 5 INJECTION INTRAVENOUS at 04:59

## 2021-01-17 RX ADMIN — FUROSEMIDE 20 MG: 10 INJECTION, SOLUTION INTRAMUSCULAR; INTRAVENOUS at 08:18

## 2021-01-17 ASSESSMENT — PAIN SCALES - GENERAL
PAINLEVEL_OUTOF10: 0

## 2021-01-17 ASSESSMENT — PAIN DESCRIPTION - PAIN TYPE: TYPE: ACUTE PAIN

## 2021-01-17 ASSESSMENT — PAIN DESCRIPTION - PROGRESSION: CLINICAL_PROGRESSION: NOT CHANGED

## 2021-01-17 NOTE — PROCEDURES
THORACENTESIS PROCEDURE NOTE    Date: 1/17/2021    Procedure:  diagnostic and therapeutic thoracentesis    Attending:  Dory Smith    Indication: pleural effusion    Findings:  1. Moderate free-flowing right effusion, small on left  2. 850 cc ofred-tinged fluid removed    Site: right    Consent: Obtained    Medications: 5 cc of 1% lidocaine    Description:  The procedure, including risks and benefits, was discussed. Timeout performed. Ultrasound used: Yes. Using ultrasound, largest pocket of pleural fluid identified. Area prepped and draped in sterile fashion. Anesthesia with 5 ml 1% lidocaine. An 18 gauge needle with 8 Citizen of [place] catheter was advanced into the pleural space, catheter advanced over the needle and needle withdrawn. 850 mL of fluid was collected for diagnostic and therapeutic purposes. Catheter removed. Dressing placed over the procedure site.     Complications: None    Est blood loss: minimal    ELECTRONICALLY SIGNED:  Dory Smith DO  1/17/2021  1:26 PM

## 2021-01-17 NOTE — PROGRESS NOTES
Subjective: The patient is awake and alert. Feeling much better  Still on 5 L of oxygen but indicates he did not let nursing decrease oxygen although he is saturating at about 96%    Objective:    /75   Pulse 74   Temp 97.5 °F (36.4 °C) (Temporal)   Resp 19   Ht 5' 11\" (1.803 m)   Wt 298 lb 12.8 oz (135.5 kg)   SpO2 96%   BMI 41.67 kg/m²     In: 1080 [P.O.:1080]  Out: 3050   In: 1080   Out: 3050 [Urine:3050]    General appearance: NAD, conversant  HEENT: AT/NC, MMM  Neck: FROM, supple  Lungs: Clear to auscultation  CV: RRR, no MRGs  Vasc: Radial pulses 2+  Abdomen: Soft, non-tender; no masses or HSM  Extremities: No peripheral edema or digital cyanosis  Skin: no rash, lesions or ulcers  Psych: Alert and oriented to person, place and time  Neuro: Alert and interactive     Recent Labs     01/15/21  1337   WBC 9.4   HGB 15.3   HCT 48.4          Recent Labs     01/15/21  1337 01/16/21  0543    146   K 3.7 3.4*    103   CO2 32* 31*   BUN 16 14   CREATININE 1.1 1.1   CALCIUM 9.1 8.7       Assessment:    Principal Problem:    Acute respiratory failure with hypoxia (HCC)  Active Problems:    CAD (coronary artery disease)    Hypertension    COPD (chronic obstructive pulmonary disease) (HCC)    PAF (paroxysmal atrial fibrillation) (HCC)    Malignant neoplasm of lung (HCC)  Resolved Problems:    * No resolved hospital problems. *      Plan:  Admit to telemetry for evaluation of shortness of breath and patient with known history of lung cancer status post completion of chemotherapy, currently undergoing radiation  IV diuresis- lasix 20 bid , wean down oxygen-currently on 5 L-improved shortness of breath, sats 94 to 96%  Repeat chest x-ray after diuresing, if persistent pleural effusions, good idea to tap him while he is here  Pulmonology evaluation appreciated - on steroids , nbs   Echo w ef 60%  ?   Thoracentesis today-if not, resume Eliquis  Resume home medication including Multaq and diltiazem for rhythm/rate        DVT Prophylaxis   PT/OT  Discharge planning           Trish Holstein, MD  6:27 AM  1/17/2021

## 2021-01-17 NOTE — PATIENT CARE CONFERENCE
P Quality Flow/Interdisciplinary Rounds Progress Note        Quality Flow Rounds held on January 17, 2021    Disciplines Attending:  Bedside Nurse and Nursing Unit Leadership    Ruthann Harmon was admitted on 1/15/2021  1:20 PM    Anticipated Discharge Date:  Expected Discharge Date: 01/18/21    Disposition:    Michael Score:  Michael Scale Score: 23    Readmission Risk              Risk of Unplanned Readmission:        15           Discussed patient goal for the day, patient clinical progression, and barriers to discharge. The following Goal(s) of the Day/Commitment(s) have been identified:   To keep saturation greater than 92%      Lex Galaviz  January 17, 2021

## 2021-01-18 LAB
ANION GAP SERPL CALCULATED.3IONS-SCNC: 9 MMOL/L (ref 7–16)
BUN BLDV-MCNC: 19 MG/DL (ref 6–20)
CALCIUM SERPL-MCNC: 9.3 MG/DL (ref 8.6–10.2)
CHLORIDE BLD-SCNC: 96 MMOL/L (ref 98–107)
CO2: 33 MMOL/L (ref 22–29)
CREAT SERPL-MCNC: 1 MG/DL (ref 0.7–1.2)
GFR AFRICAN AMERICAN: >60
GFR NON-AFRICAN AMERICAN: >60 ML/MIN/1.73
GLUCOSE BLD-MCNC: 256 MG/DL (ref 74–99)
GRAM STAIN ORDERABLE: NORMAL
MAGNESIUM: 2.1 MG/DL (ref 1.6–2.6)
POTASSIUM SERPL-SCNC: 4.2 MMOL/L (ref 3.5–5)
SODIUM BLD-SCNC: 138 MMOL/L (ref 132–146)

## 2021-01-18 PROCEDURE — 6370000000 HC RX 637 (ALT 250 FOR IP): Performed by: INTERNAL MEDICINE

## 2021-01-18 PROCEDURE — 83735 ASSAY OF MAGNESIUM: CPT

## 2021-01-18 PROCEDURE — 6360000002 HC RX W HCPCS: Performed by: INTERNAL MEDICINE

## 2021-01-18 PROCEDURE — 2140000000 HC CCU INTERMEDIATE R&B

## 2021-01-18 PROCEDURE — 80048 BASIC METABOLIC PNL TOTAL CA: CPT

## 2021-01-18 PROCEDURE — 94640 AIRWAY INHALATION TREATMENT: CPT

## 2021-01-18 PROCEDURE — 2580000003 HC RX 258: Performed by: INTERNAL MEDICINE

## 2021-01-18 PROCEDURE — 36415 COLL VENOUS BLD VENIPUNCTURE: CPT

## 2021-01-18 PROCEDURE — 2700000000 HC OXYGEN THERAPY PER DAY

## 2021-01-18 RX ORDER — PREDNISONE 20 MG/1
40 TABLET ORAL DAILY
Status: DISCONTINUED | OUTPATIENT
Start: 2021-01-19 | End: 2021-01-20 | Stop reason: HOSPADM

## 2021-01-18 RX ADMIN — GUAIFENESIN 400 MG: 400 TABLET ORAL at 08:01

## 2021-01-18 RX ADMIN — IPRATROPIUM BROMIDE AND ALBUTEROL SULFATE 3 ML: .5; 3 SOLUTION RESPIRATORY (INHALATION) at 16:04

## 2021-01-18 RX ADMIN — Medication 10 ML: at 08:02

## 2021-01-18 RX ADMIN — ERGOCALCIFEROL 50000 UNITS: 1.25 CAPSULE ORAL at 08:01

## 2021-01-18 RX ADMIN — IPRATROPIUM BROMIDE AND ALBUTEROL SULFATE 3 ML: .5; 3 SOLUTION RESPIRATORY (INHALATION) at 05:16

## 2021-01-18 RX ADMIN — DILTIAZEM HYDROCHLORIDE 120 MG: 120 CAPSULE, COATED, EXTENDED RELEASE ORAL at 08:01

## 2021-01-18 RX ADMIN — BUDESONIDE 500 MCG: 0.5 SUSPENSION RESPIRATORY (INHALATION) at 08:47

## 2021-01-18 RX ADMIN — Medication 10 ML: at 21:05

## 2021-01-18 RX ADMIN — ARFORMOTEROL TARTRATE 15 MCG: 15 SOLUTION RESPIRATORY (INHALATION) at 08:47

## 2021-01-18 RX ADMIN — IPRATROPIUM BROMIDE AND ALBUTEROL SULFATE 3 ML: .5; 3 SOLUTION RESPIRATORY (INHALATION) at 08:47

## 2021-01-18 RX ADMIN — IPRATROPIUM BROMIDE AND ALBUTEROL SULFATE 3 ML: .5; 3 SOLUTION RESPIRATORY (INHALATION) at 20:47

## 2021-01-18 RX ADMIN — METHYLPREDNISOLONE SODIUM SUCCINATE 40 MG: 40 INJECTION, POWDER, FOR SOLUTION INTRAMUSCULAR; INTRAVENOUS at 02:30

## 2021-01-18 RX ADMIN — ASPIRIN 81 MG: 81 TABLET, COATED ORAL at 08:01

## 2021-01-18 RX ADMIN — FUROSEMIDE 20 MG: 10 INJECTION, SOLUTION INTRAMUSCULAR; INTRAVENOUS at 08:01

## 2021-01-18 RX ADMIN — METOPROLOL SUCCINATE 100 MG: 100 TABLET, EXTENDED RELEASE ORAL at 08:01

## 2021-01-18 RX ADMIN — IPRATROPIUM BROMIDE AND ALBUTEROL SULFATE 3 ML: .5; 3 SOLUTION RESPIRATORY (INHALATION) at 12:30

## 2021-01-18 RX ADMIN — METOPROLOL SUCCINATE 100 MG: 100 TABLET, EXTENDED RELEASE ORAL at 21:05

## 2021-01-18 RX ADMIN — LISINOPRIL 10 MG: 10 TABLET ORAL at 08:01

## 2021-01-18 RX ADMIN — DRONEDARONE 400 MG: 400 TABLET, FILM COATED ORAL at 08:01

## 2021-01-18 RX ADMIN — ROSUVASTATIN 40 MG: 20 TABLET, FILM COATED ORAL at 08:01

## 2021-01-18 RX ADMIN — BUDESONIDE 500 MCG: 0.5 SUSPENSION RESPIRATORY (INHALATION) at 20:46

## 2021-01-18 RX ADMIN — GUAIFENESIN 400 MG: 400 TABLET ORAL at 13:08

## 2021-01-18 RX ADMIN — ACETAMINOPHEN 650 MG: 325 TABLET ORAL at 00:49

## 2021-01-18 RX ADMIN — GUAIFENESIN 400 MG: 400 TABLET ORAL at 21:05

## 2021-01-18 RX ADMIN — METHYLPREDNISOLONE SODIUM SUCCINATE 40 MG: 40 INJECTION, POWDER, FOR SOLUTION INTRAMUSCULAR; INTRAVENOUS at 13:08

## 2021-01-18 RX ADMIN — ARFORMOTEROL TARTRATE 15 MCG: 15 SOLUTION RESPIRATORY (INHALATION) at 20:46

## 2021-01-18 RX ADMIN — DRONEDARONE 400 MG: 400 TABLET, FILM COATED ORAL at 16:49

## 2021-01-18 RX ADMIN — APIXABAN 5 MG: 5 TABLET, FILM COATED ORAL at 21:05

## 2021-01-18 RX ADMIN — FUROSEMIDE 20 MG: 10 INJECTION, SOLUTION INTRAMUSCULAR; INTRAVENOUS at 16:49

## 2021-01-18 RX ADMIN — APIXABAN 5 MG: 5 TABLET, FILM COATED ORAL at 08:01

## 2021-01-18 ASSESSMENT — PAIN SCALES - GENERAL
PAINLEVEL_OUTOF10: 0
PAINLEVEL_OUTOF10: 4

## 2021-01-18 ASSESSMENT — PAIN DESCRIPTION - ORIENTATION: ORIENTATION: MID

## 2021-01-18 ASSESSMENT — PAIN DESCRIPTION - DESCRIPTORS: DESCRIPTORS: DISCOMFORT;THROBBING

## 2021-01-18 NOTE — PROGRESS NOTES
PULSE OX ON ROOM AIR SITTING 92%   PULSE OX ON ROOM AIR AMBULATING 85%  PULSE OX ON 3 LITERS AMBULATING RECOVERY 90%   PULSE OX ON 3 LITERS SITTING RECOVERY 96%       Vicenta Lombardi RN

## 2021-01-18 NOTE — PATIENT CARE CONFERENCE
P Quality Flow/Interdisciplinary Rounds Progress Note        Quality Flow Rounds held on January 18, 2021    Disciplines Attending:  Bedside Nurse,  and Nursing Unit Leadership    Angelica Campbell was admitted on 1/15/2021  1:20 PM    Anticipated Discharge Date:  Expected Discharge Date: 01/18/21    Disposition:    Michael Score:  Michael Scale Score: 20    Readmission Risk              Risk of Unplanned Readmission:        15           Discussed patient goal for the day, patient clinical progression, and barriers to discharge.   The following Goal(s) of the Day/Commitment(s) have been identified:  attempt to wean oxygen      Veto Juniper  January 18, 2021

## 2021-01-18 NOTE — CARE COORDINATION
Transition of care: Met with pt in room. Pt lives alone in a 2 story home. His bedroom is on the 2nd floor. Independent with ADLs and drives. He is a , but does not use [facility] services. DME- nebulizer and  CPAP. Pt does not have home o2. Discharged plan is to return home when medically ready and voiced no needs for home at present. His sister will provide transportation at [place]. PCP is Dr. Dixon Leo and pharmacy is [facility] on [facility] [address].  Sw/cm will follow

## 2021-01-18 NOTE — PROGRESS NOTES
Ambulating pulse ox messaged to Pulm NP. Also notified that patient is  and that he is unable to work when on oxygen. Concern of being off for prolonged period of time expressed.  Will relay to Dr. Kwame Oliver as well      Zan Hannon RN

## 2021-01-18 NOTE — PROGRESS NOTES
Danya Jarquin M.D.,[facility]  Edwin Zelaya D.O., F.A.C.O.I., Víctor Gibson M.D. Teena Trinidad M.D., Trinh Culver M.D. Nohemi Montero D.O. Daily Pulmonary Progress Note    Patient:  Rob Mcfarland 62 y.o. male MRN: [id number]     Date of Service: 1/18/2021        Subjective      Patient was seen and examined. Reports breathing easier since being on diuretic. Oxygen weaned to 3 L nasal cannula. Able to ambulate better. Thoracentesis on right side tolerated well yesterday with 850 cc of blood-tinged fluid removed. Fluid appears transudate. Not tolerating hospital CPAP. Tolerating diuresis with IV Lasix 1.9 L urine output in the last 24 hours. Oxygen testing  PULSE OX ON ROOM AIR SITTING 92%   PULSE OX ON ROOM AIR AMBULATING 85%  PULSE OX ON 3 LITERS AMBULATING RECOVERY 90%   PULSE OX ON 3 LITERS SITTING RECOVERY 96%       Patient was evaluated today for the diagnosis of COPD CHF. I entered a DME order for home oxygen because the diagnosis and testing requires the patient to have supplemental oxygen. Condition will improve or be benefited by oxygen use. The patient is not able to perform good mobility in a home setting and therefore does require the use of a portable oxygen system. The need for this equipment was discussed with the patient and he understands and is in agreement.       Objective   Vitals: BP (!) 147/78   Pulse 77   Temp 97.3 °F (36.3 °C) (Temporal)   Resp 16   Ht 5' 11\" (1.803 m)   Wt 296 lb 9.6 oz (134.5 kg)   SpO2 98%   BMI 41.37 kg/m²     I/O:    Intake/Output Summary (Last 24 hours) at 1/18/2021 1409  Last data filed at 1/18/2021 0538  Gross per 24 hour   Intake --   Output 1150 ml   Net -1150 ml       CURRENT MEDS :  Scheduled Meds:   [START ON 1/19/2021] predniSONE  40 mg Oral Daily    apixaban  5 mg Oral BID    methylPREDNISolone  40 mg Intravenous Q12H    guaiFENesin  400 mg Oral TID    aspirin  81 mg Oral Daily    rosuvastatin 40 mg Oral Daily    dilTIAZem  120 mg Oral Daily    dronedarone hcl  400 mg Oral BID WC    ipratropium-albuterol  1 vial Inhalation Q4H    lisinopril  10 mg Oral Daily    metoprolol succinate  100 mg Oral BID    vitamin D  50,000 Units Oral Once per day on Mon    sodium chloride flush  10 mL Intravenous 2 times per day    furosemide  20 mg Intravenous BID    budesonide  0.5 mg Nebulization BID    And    Arformoterol Tartrate  15 mcg Nebulization BID       Continuous Infusions:  None    PRN Meds:  sodium chloride flush, magnesium hydroxide, acetaminophen **OR** acetaminophen, prochlorperazine      Physical Exam:  Physical Exam  Constitutional:       Appearance: He is obese. HENT:      Head: Normocephalic and atraumatic. Eyes:      Conjunctiva/sclera: Conjunctivae normal.   Neck:      Musculoskeletal: Neck supple. Trachea: No tracheal deviation. Cardiovascular:      Rate and Rhythm: Normal rate. Rhythm irregular. Heart sounds: Normal heart sounds. Pulmonary:      Breath sounds: No decreased breath sounds, wheezing or rales. Abdominal:      General: Bowel sounds are normal.      Palpations: Abdomen is soft. Tenderness: There is no abdominal tenderness. Musculoskeletal:         General: Swelling present. Lymphadenopathy:      Cervical: No cervical adenopathy. Skin:     General: Skin is warm and dry. Findings: No rash. Neurological:      General: No focal deficit present. Mental Status: He is alert. Psychiatric:         Behavior: Behavior normal.         Pertinent/ New Labs and Imaging Studies     Pulmonary Function Testing personally reviewed and interpreted. PERTINENT LAB RESULTS: Labs reviewed. DIAGNOSTICS: Pertinent imaging reviewed. Repeat chest x-ray post thoracentesis 1/17/2021  Impression   1.  Redemonstration of interstitial pulmonary edema or pneumonia with   bilateral pleural effusions.    2.  Slight improved aeration in perihilar locations and convert to oral prednisone in a.m. · Aerosol bronchodilators Pulmicort/Perforomist, DuoNeb every 4 while awake via EZPAP  · mucinex TID, encourage use of IS  · GI/DVT prophylaxis      This plan of care was reviewed in collaboration with Dr Justice Ferreira. Electronically signed by SIENNA Long CNP on 1/18/2021 at 2:09 PM     I personally saw, examined, and cared for the patient. Labs, medications, radiographs reviewed. I agree with history exam and plans detailed in NP note.   Edna Steve MD

## 2021-01-19 LAB
ANION GAP SERPL CALCULATED.3IONS-SCNC: 10 MMOL/L (ref 7–16)
BUN BLDV-MCNC: 18 MG/DL (ref 6–20)
CALCIUM SERPL-MCNC: 9.2 MG/DL (ref 8.6–10.2)
CHLORIDE BLD-SCNC: 94 MMOL/L (ref 98–107)
CO2: 35 MMOL/L (ref 22–29)
CREAT SERPL-MCNC: 1 MG/DL (ref 0.7–1.2)
GFR AFRICAN AMERICAN: >60
GFR NON-AFRICAN AMERICAN: >60 ML/MIN/1.73
GLUCOSE BLD-MCNC: 197 MG/DL (ref 74–99)
MAGNESIUM: 2.2 MG/DL (ref 1.6–2.6)
POTASSIUM SERPL-SCNC: 4.1 MMOL/L (ref 3.5–5)
PRO-BNP: 1756 PG/ML (ref 0–125)
SODIUM BLD-SCNC: 139 MMOL/L (ref 132–146)

## 2021-01-19 PROCEDURE — 6370000000 HC RX 637 (ALT 250 FOR IP): Performed by: INTERNAL MEDICINE

## 2021-01-19 PROCEDURE — 94640 AIRWAY INHALATION TREATMENT: CPT

## 2021-01-19 PROCEDURE — 94660 CPAP INITIATION&MGMT: CPT

## 2021-01-19 PROCEDURE — 83880 ASSAY OF NATRIURETIC PEPTIDE: CPT

## 2021-01-19 PROCEDURE — 6360000002 HC RX W HCPCS: Performed by: INTERNAL MEDICINE

## 2021-01-19 PROCEDURE — 2580000003 HC RX 258: Performed by: INTERNAL MEDICINE

## 2021-01-19 PROCEDURE — 83735 ASSAY OF MAGNESIUM: CPT

## 2021-01-19 PROCEDURE — 36415 COLL VENOUS BLD VENIPUNCTURE: CPT

## 2021-01-19 PROCEDURE — 2140000000 HC CCU INTERMEDIATE R&B

## 2021-01-19 PROCEDURE — 80048 BASIC METABOLIC PNL TOTAL CA: CPT

## 2021-01-19 PROCEDURE — 6360000002 HC RX W HCPCS: Performed by: NURSE PRACTITIONER

## 2021-01-19 PROCEDURE — 6370000000 HC RX 637 (ALT 250 FOR IP): Performed by: NURSE PRACTITIONER

## 2021-01-19 PROCEDURE — 2700000000 HC OXYGEN THERAPY PER DAY

## 2021-01-19 RX ADMIN — METHYLPREDNISOLONE SODIUM SUCCINATE 40 MG: 40 INJECTION, POWDER, FOR SOLUTION INTRAMUSCULAR; INTRAVENOUS at 01:48

## 2021-01-19 RX ADMIN — ROSUVASTATIN 40 MG: 20 TABLET, FILM COATED ORAL at 10:41

## 2021-01-19 RX ADMIN — PREDNISONE 40 MG: 20 TABLET ORAL at 10:41

## 2021-01-19 RX ADMIN — FUROSEMIDE 20 MG: 10 INJECTION, SOLUTION INTRAMUSCULAR; INTRAVENOUS at 16:35

## 2021-01-19 RX ADMIN — GUAIFENESIN 400 MG: 400 TABLET ORAL at 14:35

## 2021-01-19 RX ADMIN — IPRATROPIUM BROMIDE AND ALBUTEROL SULFATE 3 ML: .5; 3 SOLUTION RESPIRATORY (INHALATION) at 05:40

## 2021-01-19 RX ADMIN — ARFORMOTEROL TARTRATE 15 MCG: 15 SOLUTION RESPIRATORY (INHALATION) at 19:28

## 2021-01-19 RX ADMIN — Medication 10 ML: at 21:21

## 2021-01-19 RX ADMIN — IPRATROPIUM BROMIDE AND ALBUTEROL SULFATE 3 ML: .5; 3 SOLUTION RESPIRATORY (INHALATION) at 12:54

## 2021-01-19 RX ADMIN — DRONEDARONE 400 MG: 400 TABLET, FILM COATED ORAL at 10:40

## 2021-01-19 RX ADMIN — DILTIAZEM HYDROCHLORIDE 120 MG: 120 CAPSULE, COATED, EXTENDED RELEASE ORAL at 13:08

## 2021-01-19 RX ADMIN — GUAIFENESIN 400 MG: 400 TABLET ORAL at 10:39

## 2021-01-19 RX ADMIN — IPRATROPIUM BROMIDE AND ALBUTEROL SULFATE 3 ML: .5; 3 SOLUTION RESPIRATORY (INHALATION) at 16:12

## 2021-01-19 RX ADMIN — DRONEDARONE 400 MG: 400 TABLET, FILM COATED ORAL at 16:35

## 2021-01-19 RX ADMIN — BUDESONIDE 500 MCG: 0.5 SUSPENSION RESPIRATORY (INHALATION) at 19:28

## 2021-01-19 RX ADMIN — ASPIRIN 81 MG: 81 TABLET, COATED ORAL at 10:41

## 2021-01-19 RX ADMIN — LISINOPRIL 10 MG: 10 TABLET ORAL at 10:41

## 2021-01-19 RX ADMIN — BUDESONIDE 500 MCG: 0.5 SUSPENSION RESPIRATORY (INHALATION) at 09:37

## 2021-01-19 RX ADMIN — METOPROLOL SUCCINATE 100 MG: 100 TABLET, EXTENDED RELEASE ORAL at 10:40

## 2021-01-19 RX ADMIN — Medication 10 ML: at 10:49

## 2021-01-19 RX ADMIN — APIXABAN 5 MG: 5 TABLET, FILM COATED ORAL at 21:21

## 2021-01-19 RX ADMIN — APIXABAN 5 MG: 5 TABLET, FILM COATED ORAL at 10:39

## 2021-01-19 RX ADMIN — ARFORMOTEROL TARTRATE 15 MCG: 15 SOLUTION RESPIRATORY (INHALATION) at 09:37

## 2021-01-19 RX ADMIN — IPRATROPIUM BROMIDE AND ALBUTEROL SULFATE 3 ML: .5; 3 SOLUTION RESPIRATORY (INHALATION) at 19:28

## 2021-01-19 RX ADMIN — GUAIFENESIN 400 MG: 400 TABLET ORAL at 21:21

## 2021-01-19 RX ADMIN — FUROSEMIDE 20 MG: 10 INJECTION, SOLUTION INTRAMUSCULAR; INTRAVENOUS at 10:29

## 2021-01-19 RX ADMIN — IPRATROPIUM BROMIDE AND ALBUTEROL SULFATE 3 ML: .5; 3 SOLUTION RESPIRATORY (INHALATION) at 09:38

## 2021-01-19 RX ADMIN — METOPROLOL SUCCINATE 100 MG: 100 TABLET, EXTENDED RELEASE ORAL at 21:21

## 2021-01-19 ASSESSMENT — PAIN DESCRIPTION - PROGRESSION: CLINICAL_PROGRESSION: NOT CHANGED

## 2021-01-19 ASSESSMENT — PAIN SCALES - GENERAL
PAINLEVEL_OUTOF10: 0
PAINLEVEL_OUTOF10: 0

## 2021-01-19 NOTE — PROGRESS NOTES
Subjective: The patient is awake and alert. Feels great , no acute complaints       Objective:    BP (!) 150/86 Comment: manual  Pulse 81   Temp 98 °F (36.7 °C) (Temporal)   Resp 16   Ht 5' 11\" (1.803 m)   Wt 294 lb (133.4 kg)   SpO2 90%   BMI 41.00 kg/m²     In: 1180 [P.O.:1180]  Out: 2825   In: 1180   Out: 2825 [Urine:2825]    General appearance: NAD, conversant  HEENT: AT/NC, MMM  Neck: FROM, supple  Lungs:wheeze resolved   CV: RRR, no MRGs  Vasc: Radial pulses 2+  Abdomen: Soft, non-tender; no masses or HSM  Extremities: No peripheral edema or digital cyanosis  Skin: no rash, lesions or ulcers  Psych: Alert and oriented to person, place and time  Neuro: Alert and interactive     No results for input(s): WBC, HGB, HCT, PLT in the last 72 hours. Recent Labs     01/17/21  0546 01/18/21  0527 01/19/21  0448    138 139   K 3.9 4.2 4.1   CL 96* 96* 94*   CO2 30* 33* 35*   BUN 16 19 18   CREATININE 1.1 1.0 1.0   CALCIUM 8.9 9.3 9.2       Assessment:    Principal Problem:    Acute respiratory failure with hypoxia (HCC)  Active Problems:    CAD (coronary artery disease)    Hypertension    COPD (chronic obstructive pulmonary disease) (HCC)    PAF (paroxysmal atrial fibrillation) (HCC)    Malignant neoplasm of lung (HCC)  Resolved Problems:    * No resolved hospital problems.  *      Plan:  Admit to telemetry for evaluation of shortness of breath and patient with known history of lung cancer status post completion of chemotherapy, currently undergoing radiation  IV diuresis- lasix 20 bid , wean down oxygen-currently on 3 L  -improved shortness of breath, but ambulating sats in 80's to lo 90's  Cytology from pl fluid neg for malignant cells   S/p 800 cc removed via thoracentesis on right   Pulmonology evaluation appreciated - on po steroids taper  , nebs   Echo w ef 60%    Resume home medication including Multaq and diltiazem for rhythm/rate   dc plan once o2 sats better  - ? 24 hrs      DVT Prophylaxis PT/OT  Discharge planning           Nadira Dillon MD  6:25 PM  1/19/2021

## 2021-01-19 NOTE — PROGRESS NOTES
Anaid Anderson M.D.,[facility]  Silvio Romero D.O., F.A.C.O.I., Romana Anders M.D. Madalyn Lipscomb M.D., Pearson Favre, M.D. Didier Sherman D.O. Daily Pulmonary Progress Note    Patient:  Sohan Vieyra 62 y.o. male MRN: [id number]     Date of Service: 1/19/2021        Subjective      Patient was seen and examined. Reports breathing easier since being on diuretic. Oxygen weaned to 3 L nasal cannula. Able to ambulate better. Thoracentesis on right side tolerated well 1/17 with 850 cc of blood-tinged fluid removed. Fluid appears transudate. Cytology negative for malignant cells. Not tolerating hospital CPAP. Tolerating diuresis with IV Lasix  2.7 L urine output in the last 24 hours. Oxygen testing  PULSE OX ON ROOM AIR SITTING 92%   PULSE OX ON ROOM AIR AMBULATING 85%  PULSE OX ON 3 LITERS AMBULATING RECOVERY 90%   PULSE OX ON 3 LITERS SITTING RECOVERY 96%       Patient was evaluated today for the diagnosis of COPD CHF. I entered a DME order for home oxygen because the diagnosis and testing requires the patient to have supplemental oxygen. Condition will improve or be benefited by oxygen use. The patient is not able to perform good mobility in a home setting and therefore does require the use of a portable oxygen system. The need for this equipment was discussed with the patient and he understands and is in agreement.       Objective   Vitals: BP (!) 150/86 Comment: manual  Pulse 81   Temp 98 °F (36.7 °C) (Temporal)   Resp 16   Ht 5' 11\" (1.803 m)   Wt 294 lb (133.4 kg)   SpO2 93%   BMI 41.00 kg/m²     I/O:    Intake/Output Summary (Last 24 hours) at 1/19/2021 1446  Last data filed at 1/19/2021 1444  Gross per 24 hour   Intake 1540 ml   Output 2700 ml   Net -1160 ml       CURRENT MEDS :  Scheduled Meds:   predniSONE  40 mg Oral Daily    apixaban  5 mg Oral BID    guaiFENesin  400 mg Oral TID    aspirin  81 mg Oral Daily    rosuvastatin  40 mg Oral Daily    dilTIAZem  120 mg Oral Daily    dronedarone hcl  400 mg Oral BID WC    ipratropium-albuterol  1 vial Inhalation Q4H    lisinopril  10 mg Oral Daily    metoprolol succinate  100 mg Oral BID    vitamin D  50,000 Units Oral Once per day on Mon    sodium chloride flush  10 mL Intravenous 2 times per day    furosemide  20 mg Intravenous BID    budesonide  0.5 mg Nebulization BID    And    Arformoterol Tartrate  15 mcg Nebulization BID       Continuous Infusions:  None    PRN Meds:  sodium chloride flush, magnesium hydroxide, acetaminophen **OR** acetaminophen, prochlorperazine      Physical Exam:  Physical Exam  Constitutional:       Appearance: He is obese. HENT:      Head: Normocephalic and atraumatic. Eyes:      Conjunctiva/sclera: Conjunctivae normal.   Neck:      Musculoskeletal: Neck supple. Trachea: No tracheal deviation. Cardiovascular:      Rate and Rhythm: Normal rate. Rhythm irregular. Heart sounds: Normal heart sounds. Pulmonary:      Breath sounds: No decreased breath sounds, wheezing or rales. Abdominal:      General: Bowel sounds are normal.      Palpations: Abdomen is soft. Tenderness: There is no abdominal tenderness. Musculoskeletal:         General: Swelling present. Lymphadenopathy:      Cervical: No cervical adenopathy. Skin:     General: Skin is warm and dry. Findings: No rash. Neurological:      General: No focal deficit present. Mental Status: He is alert. Psychiatric:         Behavior: Behavior normal.         Pertinent/ New Labs and Imaging Studies     Pulmonary Function Testing personally reviewed and interpreted. PERTINENT LAB RESULTS: Labs reviewed. DIAGNOSTICS: Pertinent imaging reviewed. Repeat chest x-ray post thoracentesis 1/17/2021  Impression   1.  Redemonstration of interstitial pulmonary edema or pneumonia with   bilateral pleural effusions. 2.  Slight improved aeration in perihilar locations and lung bases. 1/16/2020 one 2D echo   Summary   Technically suboptimal and limited study. Left ventricular internal dimensions were normal in diastole and systole. Mild left ventricular concentric hypertrophy noted. Normal left ventricular ejection fraction. Septal motion consistent with post cardiac surgery. The left atrium is borderline dilated. Right ventricle global systolic function is reduced . Mildly enlarged right atrium size. Mild thickening of the mitral valve leaflets. Moderate mitral annular calcification potentially consistent with repair. Mild to moderate mitral regurgitation is present. The aortic valve appears mildly sclerotic. Mild aortic regurgitation is noted. Mild tricuspid regurgitation. Mildly dilated aortic root. Dilation of the ascending aorta. There is a trivial circumferential pericardial effusion noted. Pleural fluid studies 1/17/2021  LDH 90  pH 7.47  Protein 2.6  Glucose 271  Monocyte 95  Neutrophil 5  Triglycerides 16       Assessment:      1. Acute hypoxic respiratory failure secondary to pulmonary edema  2. Acute decompensated CHF, EF to be determined  3. Bilateral pleural effusions right greater than left -status post right thoracentesis 1/17 with 850 mL of blood-tinged fluid removed  4. Acute exacerbation of COPD, patient with severe COPD  5. NSCLC -SCC of medial right lower lobe DDx 6/2020, not surgical candidate s/p SBRT  6. ANTONIO on CPAP  7. Morbid obesity, BMI 41  8. A. fib on Eliquis  9. GERD      Plan:     · Wean FiO2 for SPO2 88-92% Currently on 3 liters. · Recheck ambulatory pulse ox prior to DC  · 2D echo completed -EF 60%, indeterminate diastolic function, right ventricle systolic function reduced TAPSE 1.4 cm, mild to moderate MR  · Continue IV diuresis Lasix 20 IV twice daily. Strict intake output recorded. · Pleural fluid studies-transudate, cytology negative. Cultures no growth so far.   · Resume Eliquis 1/18  · CPAP nightly  · Oral prednisone today  · Aerosol bronchodilators Pulmicort/Perforomist, DuoNeb every 4 while awake via EZPAP  · mucinex TID, encourage use of IS  · GI/DVT prophylaxis      This plan of care was reviewed in collaboration with Dr Justice Ferreira. Electronically signed by SIENNA Long CNP on 1/19/2021 at 2:46 PM      Addendum:    Pleural fluid cytology negative for malignancy. I personally saw, examined, and cared for the patient. Labs, medications, radiographs reviewed. I agree with history exam and plans detailed in NP note.   Edna Steve MD

## 2021-01-19 NOTE — PATIENT CARE CONFERENCE
P Quality Flow/Interdisciplinary Rounds Progress Note        Quality Flow Rounds held on January 19, 2021    Disciplines Attending:  Bedside Nurse, ,  and Nursing Unit [facility] Chauncey Hsu was admitted on 1/15/2021  1:20 PM    Anticipated Discharge Date:  Expected Discharge Date: 01/21/21    Disposition:    Michael Score:  Michael Scale Score: 19    Readmission Risk              Risk of Unplanned Readmission:        16           Discussed patient goal for the day, patient clinical progression, and barriers to discharge. The following Goal(s) of the Day/Commitment(s) have been identified: Replace K, start fluid.        [address] [facility]  January 19, 2021

## 2021-01-20 VITALS
DIASTOLIC BLOOD PRESSURE: 84 MMHG | WEIGHT: 290.6 LBS | RESPIRATION RATE: 12 BRPM | HEART RATE: 76 BPM | SYSTOLIC BLOOD PRESSURE: 133 MMHG | TEMPERATURE: 97.2 F | BODY MASS INDEX: 40.68 KG/M2 | HEIGHT: 71 IN | OXYGEN SATURATION: 98 %

## 2021-01-20 LAB
ANION GAP SERPL CALCULATED.3IONS-SCNC: 8 MMOL/L (ref 7–16)
BODY FLUID CULTURE, STERILE: NORMAL
BUN BLDV-MCNC: 22 MG/DL (ref 6–20)
CALCIUM SERPL-MCNC: 9 MG/DL (ref 8.6–10.2)
CHLORIDE BLD-SCNC: 94 MMOL/L (ref 98–107)
CO2: 36 MMOL/L (ref 22–29)
CREAT SERPL-MCNC: 1.1 MG/DL (ref 0.7–1.2)
GFR AFRICAN AMERICAN: >60
GFR NON-AFRICAN AMERICAN: >60 ML/MIN/1.73
GLUCOSE BLD-MCNC: 232 MG/DL (ref 74–99)
GRAM STAIN RESULT: NORMAL
MAGNESIUM: 2.4 MG/DL (ref 1.6–2.6)
POTASSIUM SERPL-SCNC: 3.8 MMOL/L (ref 3.5–5)
REPORT: NORMAL
SODIUM BLD-SCNC: 138 MMOL/L (ref 132–146)
THIS TEST SENT TO: NORMAL

## 2021-01-20 PROCEDURE — 6370000000 HC RX 637 (ALT 250 FOR IP): Performed by: INTERNAL MEDICINE

## 2021-01-20 PROCEDURE — 83735 ASSAY OF MAGNESIUM: CPT

## 2021-01-20 PROCEDURE — 94640 AIRWAY INHALATION TREATMENT: CPT

## 2021-01-20 PROCEDURE — 36415 COLL VENOUS BLD VENIPUNCTURE: CPT

## 2021-01-20 PROCEDURE — 6370000000 HC RX 637 (ALT 250 FOR IP): Performed by: NURSE PRACTITIONER

## 2021-01-20 PROCEDURE — 6360000002 HC RX W HCPCS: Performed by: INTERNAL MEDICINE

## 2021-01-20 PROCEDURE — 80048 BASIC METABOLIC PNL TOTAL CA: CPT

## 2021-01-20 PROCEDURE — 2580000003 HC RX 258: Performed by: INTERNAL MEDICINE

## 2021-01-20 RX ORDER — PREDNISONE 20 MG/1
40 TABLET ORAL DAILY
Qty: 10 TABLET | Refills: 0 | Status: SHIPPED | OUTPATIENT
Start: 2021-01-21 | End: 2021-01-26

## 2021-01-20 RX ORDER — GUAIFENESIN 400 MG/1
400 TABLET ORAL 3 TIMES DAILY
Qty: 56 TABLET | Refills: 0 | Status: SHIPPED | OUTPATIENT
Start: 2021-01-20 | End: 2021-03-02

## 2021-01-20 RX ORDER — FUROSEMIDE 20 MG/1
20 TABLET ORAL 2 TIMES DAILY
Qty: 60 TABLET | Refills: 3 | Status: SHIPPED | OUTPATIENT
Start: 2021-01-20 | End: 2021-03-02

## 2021-01-20 RX ADMIN — METOPROLOL SUCCINATE 100 MG: 100 TABLET, EXTENDED RELEASE ORAL at 07:33

## 2021-01-20 RX ADMIN — BUDESONIDE 500 MCG: 0.5 SUSPENSION RESPIRATORY (INHALATION) at 09:27

## 2021-01-20 RX ADMIN — ARFORMOTEROL TARTRATE 15 MCG: 15 SOLUTION RESPIRATORY (INHALATION) at 09:27

## 2021-01-20 RX ADMIN — FUROSEMIDE 20 MG: 10 INJECTION, SOLUTION INTRAMUSCULAR; INTRAVENOUS at 07:36

## 2021-01-20 RX ADMIN — ROSUVASTATIN 40 MG: 20 TABLET, FILM COATED ORAL at 07:36

## 2021-01-20 RX ADMIN — LISINOPRIL 10 MG: 10 TABLET ORAL at 10:41

## 2021-01-20 RX ADMIN — DILTIAZEM HYDROCHLORIDE 120 MG: 120 CAPSULE, COATED, EXTENDED RELEASE ORAL at 10:41

## 2021-01-20 RX ADMIN — Medication 10 ML: at 10:43

## 2021-01-20 RX ADMIN — DRONEDARONE 400 MG: 400 TABLET, FILM COATED ORAL at 07:36

## 2021-01-20 RX ADMIN — GUAIFENESIN 400 MG: 400 TABLET ORAL at 07:32

## 2021-01-20 RX ADMIN — IPRATROPIUM BROMIDE AND ALBUTEROL SULFATE 3 ML: .5; 3 SOLUTION RESPIRATORY (INHALATION) at 13:09

## 2021-01-20 RX ADMIN — APIXABAN 5 MG: 5 TABLET, FILM COATED ORAL at 07:33

## 2021-01-20 RX ADMIN — IPRATROPIUM BROMIDE AND ALBUTEROL SULFATE 3 ML: .5; 3 SOLUTION RESPIRATORY (INHALATION) at 04:26

## 2021-01-20 RX ADMIN — PREDNISONE 40 MG: 20 TABLET ORAL at 07:33

## 2021-01-20 RX ADMIN — ASPIRIN 81 MG: 81 TABLET, COATED ORAL at 07:33

## 2021-01-20 RX ADMIN — IPRATROPIUM BROMIDE AND ALBUTEROL SULFATE 3 ML: .5; 3 SOLUTION RESPIRATORY (INHALATION) at 09:26

## 2021-01-20 ASSESSMENT — PAIN SCALES - GENERAL: PAINLEVEL_OUTOF10: 0

## 2021-01-20 NOTE — CARE COORDINATION
SOCIAL WORK/CASEMANAGEMENT TRANSITION OF CARE [id number] Madina Agee, [address], Cristina Mishra, [phone]): pt up and about in the room independently. rn checking for home o2 needs for discharge today. No other needs anticipated. Sw/cm to follow.  Tiara Harrison  1/20/2021

## 2021-01-20 NOTE — PROGRESS NOTES
Crystal Bush M.D.,[facility]  Richard Dickson D.O., F.JAZMIN.C.O.I., Leonard Kaye M.D. Sabas Mcdonough M.D., Ale Medrano M.D. Enrico No D.O. Daily Pulmonary Progress Note    Patient:  Nathalia Perez 62 y.o. male MRN: [id number]     Date of Service: 1/20/2021        Subjective      Patient was seen and examined. Reports breathing easier since being on diuretic. Oxygen weaned off. Thoracentesis on right side tolerated well 1/17 with 850 cc of blood-tinged fluid removed. Fluid appears transudate. Cytology negative for malignant cells. Not tolerating hospital CPAP. Tolerating diuresis  4.3 L urine output in the last 24 hours. He states he feels much improved, wants to go home. Original Oxygen testing 1/18  PULSE OX ON ROOM AIR SITTING 92%   PULSE OX ON ROOM AIR AMBULATING 85%  PULSE OX ON 3 LITERS AMBULATING RECOVERY 90%   PULSE OX ON 3 LITERS SITTING RECOVERY 96%        Oxygen test repeated today 1/20 no need for O2 upon dc  PULSE OX ON ROOM AIR SITTING 92%. PULSE OX ON ROOM AIR AMBULATING 500ft 92%.     Objective   Vitals: /84   Pulse 76   Temp 97.2 °F (36.2 °C) (Temporal)   Resp 12   Ht 5' 11\" (1.803 m)   Wt 290 lb 9.6 oz (131.8 kg)   SpO2 98%   BMI 40.53 kg/m²     I/O:    Intake/Output Summary (Last 24 hours) at 1/20/2021 1220  Last data filed at 1/20/2021 1041  Gross per 24 hour   Intake 1180 ml   Output 3525 ml   Net -2345 ml       CURRENT MEDS :  Scheduled Meds:   predniSONE  40 mg Oral Daily    apixaban  5 mg Oral BID    guaiFENesin  400 mg Oral TID    aspirin  81 mg Oral Daily    rosuvastatin  40 mg Oral Daily    dilTIAZem  120 mg Oral Daily    dronedarone hcl  400 mg Oral BID     ipratropium-albuterol  1 vial Inhalation Q4H    lisinopril  10 mg Oral Daily    metoprolol succinate  100 mg Oral BID    vitamin D  50,000 Units Oral Once per day on Mon    sodium chloride flush  10 mL Intravenous 2 times per day    furosemide  20 mg APRN - CNP on 1/20/2021 at 12:20 PM      I personally saw, examined, and cared for the patient. Labs, medications, radiographs reviewed. I agree with history exam and plans detailed in NP note.   Alla Thurston MD

## 2021-01-20 NOTE — PROGRESS NOTES
Pulmonary notified patient does not meet criteria for home O2. Also notified Dr. James Banerjee would like pulmonary to write for steroids/nebulizers/inhalers scripts for discharge.   Sharon Hudson RN

## 2021-01-20 NOTE — PROGRESS NOTES
CLINICAL PHARMACY NOTE: MEDS TO [address] [facility] Patient?: No  Total # of Prescriptions Filled: 3   The following medications were delivered to the patient:  · Lasix 20mg  · guaifenesin 400mg  · Prednisone 20mg  Total # of Interventions Completed: 2  Time Spent (min): 30    Additional Documentation:    Delivered to patient

## 2021-01-21 NOTE — ADT AUTH CERT
Heart Failure - Care Day 5 (1/19/2021) by Alesia Cruz RN       Review Status Review Entered   Completed 1/20/2021 16:41      Criteria Review      Care Day: 5 Care Date: 1/19/2021 Level of Care:    Guideline Day 3    Clinical Status    (X) * Hemodynamic stability    1/20/2021 4:41 PM EST by Cecilio Veliz      stable    (X) * Tachypnea absent    1/20/2021 4:41 PM EST by Cecilio Veliz      16    ( ) * Oxygenation at baseline or acceptable for next level of care    ( ) * Dyspnea at baseline or acceptable for next level of care    (X) * Cardiac rate and rhythm acceptable    1/20/2021 4:41 PM EST by Cecilio Veliz      CV: RRR, no MRGs    ( ) * Pulmonary edema absent or acceptable for next level of care    ( ) * Peripheral or sacral edema absent or acceptable for next level of care    (X) * Mental status at baseline    1/20/2021 4:41 PM EST by Cecilio Veliz      Alert and interactive    ( ) * Volume status acceptable on oral medication    ( ) * Renal function at baseline or acceptable for next level of care    ( ) * Electrolyte abnormalities absent or acceptable for next level of care    ( ) * Precipitating factors absent or controlled    ( ) * Discharge plans and education understood    Activity    (X) * Ambulatory or acceptable for next level of care    1/20/2021 4:41 PM EST by Cecilio Veliz      Able to ambulate better    Routes    ( ) * Oral hydration, medications, and diet    Interventions    ( ) * Oxygen absent    * Milestone   Additional Notes   1/19        The patient is awake and alert.     Feels great , no acute complaints         BP (!) 150/86 Comment: manual  Pulse 81   Temp 98 °F (36.7 °C) (Temporal)   Resp 16         General appearance: NAD, conversant   HEENT: AT/NC, MMM   Neck: FROM, supple   Lungs:wheeze resolved    CV: RRR, no MRGs   Vasc: Radial pulses 2+   Abdomen: Soft, non-tender; no masses or HSM   Extremities: No peripheral edema or digital cyanosis   Skin: no rash, lesions or ulcers   Psych: Alert and oriented to person, place and time   Neuro: Alert and interactive       Scheduled Medications   · predniSONE 40 mg Oral Daily   · apixaban 5 mg Oral BID   · guaiFENesin 400 mg Oral TID   · aspirin 81 mg Oral Daily   · rosuvastatin 40 mg Oral Daily   · dilTIAZem 120 mg Oral Daily   · dronedarone hcl 400 mg Oral BID WC   · ipratropium-albuterol 1 vial Inhalation Q4H   · lisinopril 10 mg Oral Daily   · metoprolol succinate 100 mg Oral BID   · vitamin D 50,000 Units Oral Once per day on Mon   · sodium chloride flush 10 mL Intravenous 2 times per day   · furosemide 20 mg Intravenous BID   · budesonide 0.5 mg Nebulization BID     And   · Arformoterol Tartrate 15 mcg Nebulization BID      Plan:   Admit to telemetry for evaluation of shortness of breath and patient with known history of lung cancer status post completion of chemotherapy, currently undergoing radiation   IV diuresis- lasix 20 bid , wean down oxygen-currently on 3 L   -improved shortness of breath, but ambulating sats in 80's to lo 90's   Cytology from pl fluid neg for malignant cells    S/p 800 cc removed via thoracentesis on right    Pulmonology evaluation appreciated - on po steroids taper  , nebs    Echo w ef 60%       Resume home medication including Multaq and diltiazem for rhythm/rate    dc plan once o2 sats better  - ? 24 hrs       Pulmonology   Reports breathing easier since being on diuretic.  Oxygen weaned to 3 L nasal cannula.  Able to ambulate better.  Thoracentesis on right side tolerated well 1/17 with 850 cc of blood-tinged fluid removed.  Fluid appears transudate. Cytology negative for malignant cells. Not tolerating hospital CPAP.  Tolerating diuresis with IV Lasix  2.7 L urine output in the last 24 hours.       Plan:       · Wean FiO2 for SPO2 88-92% Currently on 3 liters.     · Recheck ambulatory pulse ox prior to DC   · 2D echo completed -EF 60%, indeterminate diastolic function, right ventricle systolic function reduced TAPSE 1.4 cm, mild to moderate MR   · Continue IV diuresis Lasix 20 IV twice daily.  Strict intake output recorded. · Pleural fluid studies-transudate, cytology negative. Cultures no growth so far.    · Resume Eliquis 1/18   · CPAP nightly   · Oral prednisone today   · Aerosol bronchodilators Pulmicort/Perforomist, DuoNeb every 4 while awake via EZPAP   · mucinex TID, encourage use of IS   · GI/DVT prophylaxis         Pro-BNP 1,756 pg/mL       Sodium 139 mmol/L      Potassium 4.1 mmol/L      Chloride 94 mmol/L      CO2 35 mmol/L      Anion Gap 10 mmol/L      Glucose 197 mg/dL      BUN 18 mg/dL      CREATININE 1.0 mg/dL      GFR Non-African American >60 mL/min/1.73      GFR African American >60     Calcium 9.2 mg/dL                Heart Failure - Care Day 3 (1/17/2021) by Genaro Kapoor RN       Review Status Review Entered   Completed 1/20/2021 16:41      Criteria Review      Care Day: 3 Care Date: 1/17/2021 Level of Care:    Guideline Day 3    Clinical Status    ( ) * Hemodynamic stability    ( ) * Tachypnea absent    ( ) * Oxygenation at baseline or acceptable for next level of care    ( ) * Dyspnea at baseline or acceptable for next level of care    (X) * Cardiac rate and rhythm acceptable    1/20/2021 4:33 PM EST by Myrna Hoff      CV: RRR, no MRGs    ( ) * Pulmonary edema absent or acceptable for next level of care    ( ) * Peripheral or sacral edema absent or acceptable for next level of care    (X) * Mental status at baseline    1/20/2021 4:33 PM EST by Myrna Hoff      Alert and interactive    ( ) * Volume status acceptable on oral medication    ( ) * Renal function at baseline or acceptable for next level of care    ( ) * Electrolyte abnormalities absent or acceptable for next level of care    ( ) * Precipitating factors absent or controlled    ( ) * Discharge plans and education understood    Activity    ( ) * Ambulatory or acceptable for next level of care    Routes    ( ) * Oral resume Eliquis   Resume home medication including Multaq and diltiazem for rhythm/rate       Pulmonology    Assessment:        1.  Acute hypoxic respiratory failure secondary to pulmonary edema   2.  Acute decompensated CHF, EF to be determined   3.  Bilateral pleural effusions right greater than left -status post right thoracentesis 1/17 with 850 mL of blood-tinged fluid removed   4.  Acute exacerbation of COPD, patient with severe COPD   5. NSCLC -SCC of medial right lower lobe DDx 6/2020, not surgical candidate s/p SBRT   6.  ANTONIO on CPAP   7.  Morbid obesity, BMI 41   8.  A. fib on Eliquis   9.  GERD           Plan:       · Wean FiO2 for SPO2 88-92%, check ambulatory pulse ox prior to DC   · 2D echo completed -report pending   · Continue IV diuresis   · Await pleural fluid studies, cytology   · Resume Eliquis tomorrow   · CPAP nightly   · Decrease IV steroids 40 mg every 12 hours, taper as tolerated   · Aerosol bronchodilators Pulmicort/Perforomist, DuoNeb every 4 while awake   · EZPAP, mucinex, IS   · GI/DVT       Date: 1/17/2021       Procedure:  diagnostic and therapeutic thoracentesis       Attending: Lisa Jay       Indication: pleural effusion       Findings:   1.  Moderate free-flowing right effusion, small on left   2. 850 cc ofred-tinged fluid removed

## 2021-01-27 ENCOUNTER — HOSPITAL ENCOUNTER (OUTPATIENT)
Dept: RADIATION ONCOLOGY | Age: 61
Discharge: HOME | End: 2021-01-27
Attending: RADIOLOGY
Payer: COMMERCIAL

## 2021-01-27 VITALS
DIASTOLIC BLOOD PRESSURE: 80 MMHG | BODY MASS INDEX: 40.98 KG/M2 | TEMPERATURE: 98.1 F | RESPIRATION RATE: 18 BRPM | HEART RATE: 75 BPM | SYSTOLIC BLOOD PRESSURE: 138 MMHG | WEIGHT: 293.8 LBS | OXYGEN SATURATION: 90 %

## 2021-01-27 DIAGNOSIS — C34.31 MALIGNANT NEOPLASM OF LOWER LOBE OF RIGHT LUNG (HCC): Primary | ICD-10-CM

## 2021-01-27 DIAGNOSIS — Z92.3 S/P RADIATION THERAPY > 12 WKS AGO: ICD-10-CM

## 2021-01-27 PROCEDURE — 99212 OFFICE O/P EST SF 10 MIN: CPT

## 2021-01-27 PROCEDURE — 99213 OFFICE O/P EST LOW 20 MIN: CPT | Performed by: NURSE PRACTITIONER

## 2021-01-27 NOTE — PROGRESS NOTES
Radiation Oncology   3 Month Follow Up Note    01/27/2021    Mar Moody  Vitals:    01/27/21 1438   BP: 138/80   Pulse: 75   Resp: 18   Temp: 98.1 °F (36.7 °C)   SpO2: 90%     Wt Readings from Last 1 Encounters:   01/27/21 293 lb 12.8 oz (133.3 kg)     Amber Adam MD,      CC: Patient is here for 3 month follow up for post-radiation completion. HPI:  Mar Moody is a pleasant 62 y.o. 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 Intraoperative RLL bronchoscopy completed 06/04/2020, path: confirmed keratinizing squamous carcinoma. Medically unresectable and referred to Radiation Oncology for SBRT. The patient completed SBRT to RLL on 10/12/2020, dose 5000 cGy/ 10 fx. Post treatment completion CT chest revealed bilateral pleural effusions R>L. RLL with ground glass opacity, no hilar or mediastinal LAD. Patient seen in Medical Oncology office visit on 01/13/2021, referred back to pulmonary for diagnostic or therapeutic thoracentesis. Patient called per Pulmonary office to schedule noted to be \"very shortness of breath\" (SOB) on telephone call, patient there referred to ER for evaluation, treatment and admission for ongoing management. Patient presented to ER, diagnosed acute hypoxia respiratory failure and admitted to telemetry unit. The patient received IV diuretic Lasix, oxygen therapy (N/C), nebulizer, steroids and also underwent right thoracentesis 01/17/2021 (850 ml fluid removed). The patient was discharged to home on 01/20/2021 with new prescriptions for Lasix, Guaifenesin and Prednisone. Today the patient presents to clinic for a 3 month Radiation Oncology follow-up visit. The patient denies skin complaints or pain complaints. Reports breathing at ease since recent thoracentesis. No sputum productive coughing, no dry nagging cough, no hemoptysis, no wheezing, no pleuritic pain.  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 He reports no activity intolerance or dyspnea on exertion when working the labor portion of his job. No fevers or chills. No change in taste or smell. No esophogeal refluxing, odynophagia or dysphagia. Patient is following with    Medical Oncology- Dr. Giuliana Wilson. Last OV 01/13/2021. To be scheduled for 3 month follow-up visit. Pulmonary- Dr. Vivek Mcfarland. To be scheduled for 2 week post hospitalization follow-up visit. Primary care- Dr Jose Hurst. Appt later today.        Past Medical History:   Diagnosis Date    A-fib [facility])     Blood transfusion reaction     CAD (coronary artery disease)     COPD (chronic obstructive pulmonary disease) (HCC)     GERD (gastroesophageal reflux disease)     History of cardioversion 04/10/2020    Dr Annamaria Moody    Hyperlipidemia     Hypertension     Malignant neoplasm of lung ([address]     Sleep apnea      Past Surgical History:   Procedure Laterality Date    BRONCHOSCOPY N/A 6/4/2020    BRONCHOSCOPY  NAVIGATIONAL performed by Zonia Feliciano DO at [address] [place] [address]  6/4/2020    BRONCHOSCOPY/TRANSBRONCHIAL NEEDLE BIOPSY performed by Zonia Feliciano DO at [address] [place] [address]  6/4/2020    BRONCHOSCOPY BIOPSY BRONCHUS performed by Zonia Feliciano DO at [address] [place] [address]  6/4/2020    BRONCHOSCOPY BRUSHINGS performed by Zonia Feliciano DO at [address] [place] [address]  6/4/2020    BRONCHOSCOPY W/EBUS FNA performed by Zonia Feliciano DO at [address] [place] [address]  6/4/2020    [address] performed by Zonia Feliciano DO at [facility]  3/20/14    3.5/18 [facility]    CORONARY ARTERY BYPASS GRAFT  4/18/07    DIAGNOSTIC CARDIAC CATH LAB PROCEDURE  4/17/07    ECHO COMPL W DOP COLOR FLOW  3/22/2012         TRANSESOPHAGEAL ECHOCARDIOGRAM  02/28/2020    angela         Social History Socioeconomic History    Marital status:      Spouse name: Not on file    Number of children: Not on file    Years of education: Not on file    Highest education level: Not on file   Occupational History    Occupation:    Social Needs    Financial resource strain: Not on file    Food insecurity     Worry: Not on file     Inability: Not on file   [employer] needs     Medical: Not on file     Non-medical: Not on file   Tobacco Use    Smoking status: Former Smoker     Packs/day: 1.50     Years: 40.00     Pack years: 60.00     Types: Cigarettes     Start date:      Quit date: 2020     Years since quittin.5    Smokeless tobacco: Never Used   Substance and Sexual Activity    Alcohol use: Yes     Alcohol/week: 4.0 - 6.0 standard drinks     Types: 4 - 6 Cans of beer per week     Comment: on weekend    Drug use: No    Sexual activity: Not Currently   Lifestyle    Physical activity     Days per week: Not on file     Minutes per session: Not on file    Stress: Not on file   Relationships    Social connections     Talks on phone: Not on file     Gets together: Not on file     Attends Protestant service: Not on file     Active member of club or organization: Not on file     Attends meetings of clubs or organizations: Not on file     Relationship status: Not on file    Intimate partner violence     Fear of current or ex partner: Not on file     Emotionally abused: Not on file     Physically abused: Not on file     Forced sexual activity: Not on file   Other Topics Concern    Not on file   Social History Narrative    Social History Narrative: Tobacco cigarette smoker 1.5 PPD x 40 years (60 pack years). Quit 2020. Drinks 4-6 cans of beer 1 day every weekend. Prior history of moderately-heavy alcohol consumption.  Denies any current or any history of illicit drug use/ abuse.          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               Family History   Problem Relation Age of Onset    Hypertension Mother     Hypertension Father     No Known Problems Sister     No Known Problems Sister     No Known Problems Sister     Cancer Maternal Aunt         cancer       Allergies:   Zocor [simvastatin - high dose]      Current Outpatient Medications   Medication Sig Dispense Refill    guaiFENesin 400 MG tablet Take 1 tablet by mouth 3 times daily 56 tablet 0    furosemide (LASIX) 20 MG tablet Take 1 tablet by mouth 2 times daily 60 tablet 3    aspirin 81 MG EC tablet Take 1 tablet by mouth daily 90 tablet 3    dilTIAZem (CARDIZEM CD) 120 MG extended release capsule TAKE ONE CAPSULE BY MOUTH DAILY 90 capsule 3    lisinopril (PRINIVIL;ZESTRIL) 10 MG tablet TAKE ONE TABLET BY MOUTH DAILY 90 tablet 3    metoprolol succinate (TOPROL XL) 100 MG extended release tablet TAKE ONE TABLET BY MOUTH TWO TIMES A  tablet 3    apixaban (ELIQUIS) 5 MG TABS tablet TAKE ONE TABLET BY MOUTH TWO TIMES A  tablet 3    PROAIR  (90 Base) MCG/ACT inhaler INHALE TWO PUFFS BY MOUTH EVERY 4 TO 6 HOURS AS NEEDED      dronedarone hcl (MULTAQ) 400 MG TABS Take 1 tablet by mouth 2 times daily (with meals) 14 tablet 0    budesonide-formoterol (SYMBICORT) 160-4.5 MCG/ACT AERO Inhale 2 puffs into the lungs 2 times daily      ipratropium-albuterol (DUONEB) 0.5-2.5 (3) MG/3ML SOLN nebulizer solution Inhale 3 mLs into the lungs every 4 hours 360 mL 0    nitroGLYCERIN (NITROSTAT) 0.4 MG SL tablet Place 1 tablet under the tongue every 5 minutes as needed for Chest pain 25 tablet 0    CRESTOR 40 MG tablet 40 mg daily       vitamin D (ERGOCALCIFEROL) 33830 UNITS CAPS capsule Take 50,000 Units by mouth once a week. No current facility-administered medications for this encounter. REVIEW OF SYSTEMS:    Constitutional:  No fever chills or rigors.    Eyes: No changes in vision. No discharge or pain  ENT: No headaches, hearing loss or vertigo. No mouth sores or sore throat. No change in taste or smell. Cardiovascular: No chest discomfort, dyspnea on exertion, palpitations or loss of consciousness. Respiratory: No productive cough or dry nagging cough. No hemoptysis. No wheezing or pleuritic pain. Gastrointestinal: No abdominal pain, appetite loss, blood in stools. No change in bowel habits. No hematemesis   Genitourinary: No dysuria, trouble voiding, or hematuria. No increased frequency. Musculoskeletal: No gait disturbance, weakness or joint complaints. Integumentary: No rash or pruritis. Neurological: No headache, diplopia, change in muscle strength, numbness or tingling. No change in gait, balance, coordination, mood, affect, memory, mentation, behavior. Psychiatric: No anxiety, or depression. Endocrine: No temperature intolerance. No excessive thirst, fluid intake, or urination. No tremor. Hematologic/Lymphatic: No abnormal bruising or bleeding, blood clots or swollen lymph nodes. Allergic/Immunologic: No nasal congestion or hives. PHYSICAL EXAMINATION:   Vitals:    01/27/21 1438   BP: 138/80   Site: Right Upper Arm   Position: Sitting   Cuff Size: Medium Adult   Pulse: 75   Resp: 18   Temp: 98.1 °F (36.7 °C)   TempSrc: Temporal   SpO2: 90%   Weight: 293 lb 12.8 oz (133.3 kg)       Body mass index is 40.98 kg/m². Appearance: Well-developed, well-nourished 62year old male. Skin: Irradiated skin intact, no erythema. Head: Normocephalic, atraumatic  Eyes: EOMI, no conjunctival erythema  ENMT: No pharyngeal erythema, MMM, no rhinorrhea. Neck: Supple, no elevated JVP  Lungs: Clear to auscultation bilaterally. No wheezes, rales or rhonchi. Cardiac: Regular rate and rhythm, +S1S2, no murmurs apparent  Abdomen: Soft, round and non-tender. Bowel sounds present x 4.   Extremities: Moves all extremities x 4, no lower extremity edema  Neurologic: Alert and oriented x 3. No focal motor deficits apparent         IMAGING:    Old report/ ordering physician Dr. Bucio Goodness:    12/14/2020 CT chest w contrast:   Impression:   Bilateral pleural effusion, greater on the right. Ground-glass opacity in the posterior and medial segments of right lower lobe. Left thyroid nodule.  Further evaluation with dedicated thyroid sonogram   recommended. ASSESSMENT/PLAN:    SCC of the lower lobe of the right lung. Post SBRT completion, 10/12/2020. Surveillance imaging per Medical Oncology. I discussed follow up plans with Glendy Hernandez. Radiation Oncology follow-up visit 3 months. Instructed to follow up with other physicians involved in his care as directed (including but not limited to Medical Oncology, Primary Care, Pulmonary, and Surgery). The patient was given our contact number in the event that if at any time they change their mind and would like to return to the clinic to see either myself or one of the Radiation Oncologists, they can simply call us and we would be happy to see them. Thank you for involving us in the management of this extremely pleasant patient. More than 25 min was in direct contact with pt coordinating/giving care. >50% of the visit was spent in counseling the pt on the following: Follow up care    The nurses notes were reviewed and incorporated into this assessment and plan.       Treasure Martin, MSN, APRN-CNP  Certified Nurse Practitioner for [address] [place] [address]   Phone: [phone]/ Fax: [phone]

## 2021-01-27 NOTE — PROGRESS NOTES
Myron Senthil  1/27/2021  2:42 PM      Vitals:    01/27/21 1438   BP: 138/80   Pulse: 75   Resp: 18   SpO2: 90%    :     Wt Readings from Last 3 Encounters:   01/27/21 293 lb 12.8 oz (133.3 kg)   01/20/21 290 lb 9.6 oz (131.8 kg)   10/26/20 293 lb 3 oz (133 kg)                Current Outpatient Medications:     guaiFENesin 400 MG tablet, Take 1 tablet by mouth 3 times daily, Disp: 56 tablet, Rfl: 0    furosemide (LASIX) 20 MG tablet, Take 1 tablet by mouth 2 times daily, Disp: 60 tablet, Rfl: 3    aspirin 81 MG EC tablet, Take 1 tablet by mouth daily, Disp: 90 tablet, Rfl: 3    dilTIAZem (CARDIZEM CD) 120 MG extended release capsule, TAKE ONE CAPSULE BY MOUTH DAILY, Disp: 90 capsule, Rfl: 3    lisinopril (PRINIVIL;ZESTRIL) 10 MG tablet, TAKE ONE TABLET BY MOUTH DAILY, Disp: 90 tablet, Rfl: 3    metoprolol succinate (TOPROL XL) 100 MG extended release tablet, TAKE ONE TABLET BY MOUTH TWO TIMES A DAY, Disp: 180 tablet, Rfl: 3    apixaban (ELIQUIS) 5 MG TABS tablet, TAKE ONE TABLET BY MOUTH TWO TIMES A DAY, Disp: 180 tablet, Rfl: 3    PROAIR  (90 Base) MCG/ACT inhaler, INHALE TWO PUFFS BY MOUTH EVERY 4 TO 6 HOURS AS NEEDED, Disp: , Rfl:     dronedarone hcl (MULTAQ) 400 MG TABS, Take 1 tablet by mouth 2 times daily (with meals), Disp: 14 tablet, Rfl: 0    budesonide-formoterol (SYMBICORT) 160-4.5 MCG/ACT AERO, Inhale 2 puffs into the lungs 2 times daily, Disp: , Rfl:     ipratropium-albuterol (DUONEB) 0.5-2.5 (3) MG/3ML SOLN nebulizer solution, Inhale 3 mLs into the lungs every 4 hours, Disp: 360 mL, Rfl: 0    nitroGLYCERIN (NITROSTAT) 0.4 MG SL tablet, Place 1 tablet under the tongue every 5 minutes as needed for Chest pain, Disp: 25 tablet, Rfl: 0    CRESTOR 40 MG tablet, 40 mg daily , Disp: , Rfl:     vitamin D (ERGOCALCIFEROL) 03516 UNITS CAPS capsule, Take 50,000 Units by mouth once a week., Disp: , Rfl:       Patient is seen today in follow up, 3 month follow up, having completed RLL SBRT, 5000 cGy in 5 fractions from 9/29/2020 through 10/12/2020. FALLS RISK SCREENING ASSESSMENT    Instructions:  Assess the patient and enter the appropriate indicators that are present for fall risk identification. Total the numbers entered and assign a fall risk score from Table 2.  Reassess patient at a minimum every 12 weeks or with status change. Assessment   Date  1/27/2021     1. Mental Ability: confusion/cognitively impaired No - 0       2. Elimination Issues: incontinence, frequency No - 0       3. Ambulatory: use of assistive devices (walker, cane, off-loading devices), attached to equipment (IV pole, oxygen) No - 0     4. Sensory Limitations: dizziness, vertigo, impaired vision No - 0       5. Age Less than 65 years - 0       6. Medication: diuretics, strong analgesics, hypnotics, sedatives, antihypertensive agents   Yes - 3   7. Falls:  recent history of falls within the last 3 months (not to include slipping or tripping)   No - 0   TOTAL 3    If score of 4 or greater was education given? No       TABLE 2   Risk Score Risk Level Plan of Care   0-3 Little or  No Risk 1. Provide assistance as indicated for ambulation activities  2. Reorient confused/cognitively impaired patient  3. Call-light/bell within patient's reach  4. Chair/bed in low position, stretcher/bed with siderails up except when performing patient care activities  5. Educate patient/family/caregiver on falls prevention  6.  Reassess in 12 weeks or with any noted change in patient condition which places them at a risk for a fall   4-6 Moderate Risk 1. Provide assistance as indicated for ambulation activities  2. Reorient confused/cognitively impaired patient  3. Call-light/bell within patient's reach  4. Chair/bed in low position, stretcher/bed with siderails up except when performing patient care activities  5. Educate patient/family/caregiver on falls prevention  6.   Falls risk precaution (Yellow sticker Level II) placed on patient chart   7 or   Higher High Risk 1. Place patient in easily observable treatment room  2. Patient attended at all times by family member or staff  3. Provide assistance as indicated for ambulation activities  4. Reorient confused/cognitively impaired patient  5. Call-light/bell within patient's reach  6. Chair/bed in low position, stretcher/bed with siderails up except when performing patient care activities  7. Educate patient/family/caregiver on falls prevention  8. Falls risk precaution (Yellow sticker Level III) placed on patient chart           MALNUTRITION RISK SCREENING ASSESSMENT    1/27/2021   Patient:  Glendy Hernandez  Sex:  male    Instructions:  Assess the patient and enter the appropriate indicators that are present for nutrition risk identification. Total the numbers entered and assign a risk score. Follow the appropriate action for total score listed below. Assessment   Date  1/27/2021     1. Have you lost weight without trying? 0- No     2. Have you been eating poorly because of a decreased appetite? 0- No   3. Do you have a diagnosis of head and neck cancer?       0- No                                                                                    TOTAL 0          Score of 0-1: No action  Score 2 or greater:  · For Non-Diabetic Patient: Recommend adding Ensure Complete 2 x daily and provide patient with Ensure wellness bag with coupons  · For Diabetic Patient: Recommend adding Glucerna Shake 2 x daily and provide patient with Glucerna Wellness bag with coupons  · Route to the dietitian via Mel[address]

## 2021-01-31 NOTE — DISCHARGE SUMMARY
Physician Discharge Summary     Patient ID:  Liz Desai  [id number]  62 y.o.  1960    Admit date: 1/15/2021    Discharge date and time: 1/20   Admission Diagnoses:   Patient Active Problem List   Diagnosis    CAD (coronary artery disease)    Hypertension    GERD (gastroesophageal reflux disease)    COPD (chronic obstructive pulmonary disease) (HCC)    Chest pain    [facility]    [facility]    Acute respiratory failure with hypoxia ([facility]       Discharge Diagnoses: chf / copd exacerbation post lung ca tx , acute resp failure acute on chronic   Consults: pulm     Procedures:none     Hospital Course: Admit to telemetry for evaluation of shortness of breath and patient with known history of lung cancer status post completion of chemotherapy, currently undergoing radiation  IV diuresis- lasix 20 bid , wean down oxygen-currently on 3 L  -improved shortness of breath, but ambulating sats in 80's to lo 90's  Cytology from pl fluid neg for malignant cells   S/p 800 cc removed via thoracentesis on right   Pulmonology evaluation appreciated - on po steroids taper  , nebs   Echo w ef 60%     Resume home medication including Multaq and diltiazem for rhythm/rate   dc plan once o2 sats better  -okay for discharge today from medicine     No results for input(s): WBC, HGB, HCT, PLT in the last 72 hours. No results for input(s): NA, K, CL, CO2, BUN, CREATININE, CALCIUM in the last 72 hours. Invalid input(s): GLU    No results found. Discharge Exam:    HEENT: NCAT,  PERRLA, No JVD  Heart:  RRR, no murmurs, gallops, or rubs.   Lungs:  CTA bilaterally, no wheeze, rales or rhonchi  Abd: bowel sounds present, nontender, nondistended, no masses  Extrem:  No clubbing, cyanosis, or edema    Disposition: home     Patient Condition at Discharge: stable     Patient Instructions:      Medication List      START taking these medications    furosemide 20 MG tablet  Commonly known as: Lasix  Take 1 tablet by mouth 2 times daily     guaiFENesin 400 MG tablet  Take 1 tablet by mouth 3 times daily        CONTINUE taking these medications    apixaban 5 MG Tabs tablet  Commonly known as: Eliquis  TAKE ONE TABLET BY MOUTH TWO TIMES A DAY     aspirin 81 MG EC tablet  Take 1 tablet by mouth daily     Crestor 40 MG tablet  Generic drug: rosuvastatin     dilTIAZem 120 MG extended release capsule  Commonly known as: CARDIZEM CD  TAKE ONE CAPSULE BY MOUTH DAILY     dronedarone hcl 400 MG Tabs  Commonly known as: MULTAQ  Take 1 tablet by mouth 2 times daily (with meals)     ipratropium-albuterol 0.5-2.5 (3) MG/3ML Soln nebulizer solution  Commonly known as: DUONEB  Inhale 3 mLs into the lungs every 4 hours     lisinopril 10 MG tablet  Commonly known as: PRINIVIL;ZESTRIL  TAKE ONE TABLET BY MOUTH DAILY     metoprolol succinate 100 MG extended release tablet  Commonly known as: TOPROL XL  TAKE ONE TABLET BY MOUTH TWO TIMES A DAY     nitroGLYCERIN 0.4 MG SL tablet  Commonly known as: Nitrostat  Place 1 tablet under the tongue every 5 minutes as needed for Chest pain     ProAir  (90 Base) MCG/ACT inhaler  Generic drug: albuterol sulfate HFA     Symbicort 160-4.5 MCG/ACT Aero  Generic drug: budesonide-formoterol     vitamin D 1.25 MG (21830 UT) Caps capsule  Commonly known as: ERGOCALCIFEROL        ASK your doctor about these medications    predniSONE 20 MG tablet  Commonly known as: DELTASONE  Take 2 tablets by mouth daily for 5 days  Ask about: Should I take this medication?            Where to Get Your Medications      These medications were sent to [facility], [address] [address]    Phone: [phone]   · furosemide 20 MG tablet  · guaiFENesin 400 MG tablet  · predniSONE 20 MG tablet       Activity: activity as tolerated  Diet: regular diet    Pt has been advised to:    Follow-up with Pauly Spence MD in 1 week.   Follow-up with consultants as recommended by them    Note that over 30 minutes was spent in preparing discharge papers, discussing discharge with patient, medication review, etc.    Signed:  Sky Hagen MD  1/31/2021  12:01 PM

## 2021-02-08 ENCOUNTER — HOSPITAL ENCOUNTER (OUTPATIENT)
Age: 61
Discharge: HOME | End: 2021-02-10
Payer: COMMERCIAL

## 2021-02-08 ENCOUNTER — HOSPITAL ENCOUNTER (OUTPATIENT)
Dept: GENERAL RADIOLOGY | Age: 61
Discharge: HOME | End: 2021-02-10
Payer: COMMERCIAL

## 2021-02-08 DIAGNOSIS — J90 PLEURAL EFFUSION: ICD-10-CM

## 2021-02-08 PROCEDURE — 71046 X-RAY EXAM CHEST 2 VIEWS: CPT

## 2021-02-12 PROBLEM — I48.91 UNSPECIFIED ATRIAL FIBRILLATION (HCC): Status: ACTIVE | Noted: 2020-07-07

## 2021-02-12 PROBLEM — K21.9 GASTRO-ESOPHAGEAL REFLUX DISEASE WITHOUT ESOPHAGITIS: Status: ACTIVE | Noted: 2020-07-07

## 2021-02-12 PROBLEM — E78.5 HYPERLIPIDEMIA, UNSPECIFIED: Status: ACTIVE | Noted: 2020-07-07

## 2021-02-12 PROBLEM — I10 ESSENTIAL (PRIMARY) HYPERTENSION: Status: ACTIVE | Noted: 2020-07-07

## 2021-03-02 ENCOUNTER — OFFICE VISIT (OUTPATIENT)
Dept: CARDIOLOGY CLINIC | Age: 61
End: 2021-03-02
Payer: COMMERCIAL

## 2021-03-02 VITALS
BODY MASS INDEX: 40.31 KG/M2 | SYSTOLIC BLOOD PRESSURE: 120 MMHG | WEIGHT: 287.9 LBS | DIASTOLIC BLOOD PRESSURE: 78 MMHG | HEIGHT: 71 IN | HEART RATE: 76 BPM | RESPIRATION RATE: 18 BRPM

## 2021-03-02 DIAGNOSIS — Z02.89 ENCOUNTER FOR EXAMINATION REQUIRED BY DEPARTMENT OF TRANSPORTATION (DOT): ICD-10-CM

## 2021-03-02 DIAGNOSIS — I48.0 PAF (PAROXYSMAL ATRIAL FIBRILLATION) (HCC): ICD-10-CM

## 2021-03-02 DIAGNOSIS — I25.10 CORONARY ARTERY DISEASE INVOLVING NATIVE CORONARY ARTERY OF NATIVE HEART WITHOUT ANGINA PECTORIS: Primary | ICD-10-CM

## 2021-03-02 PROCEDURE — 99215 OFFICE O/P EST HI 40 MIN: CPT | Performed by: INTERNAL MEDICINE

## 2021-03-02 PROCEDURE — 93000 ELECTROCARDIOGRAM COMPLETE: CPT | Performed by: INTERNAL MEDICINE

## 2021-03-02 NOTE — PROGRESS NOTES
Yaakov Rodríguez  1960  Date of Service: 3/2/2021    Patient Active Problem List    Diagnosis Date Noted    Acute respiratory failure with hypoxia ([facility] 01/15/2021    Malignant neoplasm of lung ([facility]     Essential (primary) hypertension 2020    Gastro-esophageal reflux disease without esophagitis 2020    Unspecified atrial fibrillation ([facility] 2020    Hyperlipidemia, unspecified 2020    Chest pain 2019    CAD (coronary artery disease)      Overview Note:     A. Acute inferior MI (07). B. Cardiac catheterization (07):   LAD - proximal 85% bifurcating with first diagonal  D1 - ostial 50%  Cx - prox to mid 40%  OM1  ostial 90%  RCA - prox diffuse 99% with heavy thrombus  C. Successful thrombectomy. D. Coronary artery bypass surgery by Dr. Sina Abdi (07)  LIMA to LAD  SVG to D1  SVG to PDA    Cath 3-14  70% Cx - 3.5 x 18 mm Resolute ELENA      COPD (chronic obstructive pulmonary disease) ([facility]        Social History     Socioeconomic History    Marital status:      Spouse name: None    Number of children: None    Years of education: None    Highest education level: None   Occupational History    Occupation:    Social Needs    Financial resource strain: None    Food insecurity     Worry: None     Inability: None    Transportation needs     Medical: None     Non-medical: None   Tobacco Use    Smoking status: Former Smoker     Packs/day: 1.50     Years: 40.00     Pack years: 60.00     Types: Cigarettes     Start date:      Quit date: 2020     Years since quittin.6    Smokeless tobacco: Never Used   Substance and Sexual Activity    Alcohol use:  Yes     Alcohol/week: 4.0 - 6.0 standard drinks     Types: 4 - 6 Cans of beer per week     Comment: on weekend    Drug use: No    Sexual activity: Not Currently   Lifestyle    Physical activity     Days per week: None     Minutes per session: None    Stress: None Relationships    Social connections     Talks on phone: None     Gets together: None     Attends Zoroastrian service: None     Active member of club or organization: None     Attends meetings of clubs or organizations: None     Relationship status: None    Intimate partner violence     Fear of current or ex partner: None     Emotionally abused: None     Physically abused: None     Forced sexual activity: None   Other Topics Concern    None   Social History Narrative    Social History Narrative: Tobacco cigarette smoker 1.5 PPD x 40 years (60 pack years). Quit 07/01/2020. Drinks 4-6 cans of beer 1 day every weekend. Prior history of moderately-heavy alcohol consumption. Denies any current or any history of illicit drug use/ abuse.          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             Current Outpatient Medications   Medication Sig Dispense Refill    fluticasone (FLONASE) 50 MCG/ACT nasal spray 50 sprays by Each Nostril route daily      albuterol (PROVENTIL) (2.5 MG/3ML) 0.083% nebulizer solution Take 0.083 mLs by nebulization 4 times daily as needed      fluticasone-umeclidin-vilant (TRELEGY ELLIPTA) 100-62.5-25 MCG/INH AEPB Inhale 1 puff into the lungs daily 1 each 5    aspirin 81 MG EC tablet Take 1 tablet by mouth daily 90 tablet 3    dilTIAZem (CARDIZEM CD) 120 MG extended release capsule TAKE ONE CAPSULE BY MOUTH DAILY 90 capsule 3    lisinopril (PRINIVIL;ZESTRIL) 10 MG tablet TAKE ONE TABLET BY MOUTH DAILY 90 tablet 3    metoprolol succinate (TOPROL XL) 100 MG extended release tablet TAKE ONE TABLET BY MOUTH TWO TIMES A  tablet 3    apixaban (ELIQUIS) 5 MG TABS tablet TAKE ONE TABLET BY MOUTH TWO TIMES A  tablet 3    PROAIR  (90 Base) MCG/ACT inhaler INHALE TWO PUFFS BY MOUTH EVERY 4 TO 6 HOURS AS NEEDED      ipratropium-albuterol (DUONEB) 0.5-2.5 (3) MG/3ML SOLN nebulizer solution Inhale 3 mLs into the lungs every 4 hours 360 mL 0    nitroGLYCERIN (NITROSTAT) 0.4 MG SL tablet Place 1 tablet under the tongue every 5 minutes as needed for Chest pain 25 tablet 0    CRESTOR 40 MG tablet 40 mg daily       vitamin D (ERGOCALCIFEROL) 25876 UNITS CAPS capsule Take 50,000 Units by mouth once a week. No current facility-administered medications for this visit. 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 History of Present Illness: Shari Swartz states that He underwent radiation therapy for his right lung cancer. This resulted in a right greater than left pleural effusion requiring thoracentesis. This took place in January. Since then he states that he has baseline dyspnea with exertion from his COPD. However, he states that his breathing is as good or better than it has ever been. He denies any orthopnea/PND or lower extremity edema. He denies any chest discomfort. He denies any palpitations or presyncopal symptoms. REVIEW OF SYSTEMS:  As above. Patient does not complain of any fever, chills, nausea, vomiting or diarrhea. No focal, motor or neurological deficits. No changes in his/her vision, hearing, bowel or bladder habits. He is not known to have a history of thyroid problems. No recent nose bleeds. PHYSICAL EXAM:  Vitals:    03/02/21 1438   BP: 120/78   Pulse: 76   Resp: 18   Weight: 287 lb 14.4 oz (130.6 kg)   Height: 5' 11\" (1.803 m)       GENERAL:  He is alert and oriented x 3, communicates well, in no distress. NECK:  No masses, trachea is mid position. Supple, full ROM, no JVD or bruits. No palpable thyromegaly or lymphadenopathy. HEART: Distant to auscultation. Irregular rate and rhythm. Normal S1 and S2. There is an S4 gallop and a I/VI systolic murmur. LUNGS:  Poor air movement. Diffuse bilateral diffuse wheezing. No use of accessory muscles. symmetrical excursion. ABDOMEN:  Soft, non-tender. Normal bowel sounds. Very obese. EXTREMITIES:  Full ROM x 4. No bilateral lower extremity edema on exam.  Good distal pulses. EYES:  Extraocular muscles intact. PERRL. Normal lids & conjunctiva. ENT:  Nares are clear & not bleeding. Moist mucosa. Normal lips formation. No external masses   NEURO: no tremors, full ROM x 4, EOMI. SKIN:  Warm, dry and intact. Normal turgor. EKG: Atrial fibrillation. 76 bpm, nl axis, nonspecific ST - T wave changes, RBBB. Assessment:   1. Coronary artery disease as outlined above. No recurring ischemic symptoms at this time. 2. Severe COPD. 3. Sleep apnea. 4. RBBB, stable  5. Hypertension, well controled at this time. 6. Hypercholesterolemia  7. Paroxysmal atrial fibrillation. Back in atrial fibrillation. 8. Mild aortic regurgitation  9. Mild to moderate mitral regurgitation. Recommendations:  1. He is following the cholesterol with Dr. Kassie Sadler. 2. Continue the Eliquis  3. Continue the Multaq  4. Cardioversion. He states that he has not missed any doses of Eliquis for over a month since his thoracentesis in January. Thank you for allowing me to participate in your patient's care.       [address], [facility]  Interventional Cardiology

## 2021-03-03 ENCOUNTER — TELEPHONE (OUTPATIENT)
Dept: CARDIOLOGY CLINIC | Age: 61
End: 2021-03-03

## 2021-03-05 ENCOUNTER — HOSPITAL ENCOUNTER (OUTPATIENT)
Dept: CARDIOLOGY | Age: 61
Discharge: HOME | End: 2021-03-05
Payer: COMMERCIAL

## 2021-03-05 ENCOUNTER — TELEPHONE (OUTPATIENT)
Dept: CARDIOLOGY CLINIC | Age: 61
End: 2021-03-05

## 2021-03-05 VITALS
HEIGHT: 71 IN | DIASTOLIC BLOOD PRESSURE: 74 MMHG | BODY MASS INDEX: 40.18 KG/M2 | HEART RATE: 68 BPM | RESPIRATION RATE: 16 BRPM | SYSTOLIC BLOOD PRESSURE: 122 MMHG | WEIGHT: 287 LBS | TEMPERATURE: 97.5 F

## 2021-03-05 DIAGNOSIS — Z02.89 ENCOUNTER FOR EXAMINATION REQUIRED BY DEPARTMENT OF TRANSPORTATION (DOT): ICD-10-CM

## 2021-03-05 DIAGNOSIS — I25.10 CORONARY ARTERY DISEASE INVOLVING NATIVE CORONARY ARTERY OF NATIVE HEART WITHOUT ANGINA PECTORIS: ICD-10-CM

## 2021-03-05 DIAGNOSIS — I25.10 CORONARY ARTERY DISEASE INVOLVING NATIVE CORONARY ARTERY OF NATIVE HEART WITHOUT ANGINA PECTORIS: Primary | ICD-10-CM

## 2021-03-05 DIAGNOSIS — I48.0 PAF (PAROXYSMAL ATRIAL FIBRILLATION) (HCC): ICD-10-CM

## 2021-03-05 PROCEDURE — 93017 CV STRESS TEST TRACING ONLY: CPT

## 2021-03-05 PROCEDURE — 6360000002 HC RX W HCPCS: Performed by: INTERNAL MEDICINE

## 2021-03-05 PROCEDURE — A9500 TC99M SESTAMIBI: HCPCS | Performed by: INTERNAL MEDICINE

## 2021-03-05 PROCEDURE — 78452 HT MUSCLE IMAGE SPECT MULT: CPT

## 2021-03-05 PROCEDURE — 3430000000 HC RX DIAGNOSTIC RADIOPHARMACEUTICAL: Performed by: INTERNAL MEDICINE

## 2021-03-05 PROCEDURE — 2580000003 HC RX 258: Performed by: INTERNAL MEDICINE

## 2021-03-05 RX ORDER — SODIUM CHLORIDE 0.9 % (FLUSH) 0.9 %
10 SYRINGE (ML) INJECTION PRN
Status: DISCONTINUED | OUTPATIENT
Start: 2021-03-05 | End: 2021-03-06 | Stop reason: HOSPADM

## 2021-03-05 RX ADMIN — Medication 9.8 MILLICURIE: at 08:10

## 2021-03-05 RX ADMIN — SODIUM CHLORIDE, PRESERVATIVE FREE 10 ML: 5 INJECTION INTRAVENOUS at 10:57

## 2021-03-05 RX ADMIN — REGADENOSON 0.4 MG: 0.08 INJECTION, SOLUTION INTRAVENOUS at 10:56

## 2021-03-05 RX ADMIN — SODIUM CHLORIDE, PRESERVATIVE FREE 10 ML: 5 INJECTION INTRAVENOUS at 08:10

## 2021-03-05 RX ADMIN — Medication 28.4 MILLICURIE: at 10:56

## 2021-03-05 RX ADMIN — SODIUM CHLORIDE, PRESERVATIVE FREE 10 ML: 5 INJECTION INTRAVENOUS at 10:56

## 2021-03-05 NOTE — PROCEDURES
[address] [facility] [address] [place] [address]  [phone]                Pharmacologic Stress Nuclear Gated SPECT Study    Name: Sandrita Dailey Account Number: [de-identified]    :  1960          Sex: male         Date of Study:  3/5/2021    Height: 5' 11\" (180.3 cm)         Weight: 287 lb (130.2 kg)     Ordering Provider: Jhoan Clements DO          PCP: Mary Workman MD      [facility],             Interpreting DO Rafi   _________________________________________________________________________________    Indication:   General Risk Stratification    Clinical History:   Patient has prior history of coronary artery disease. Resting ECG:    Atrial fibrillation, 72 bpm, normal axis. right bundle branch block. Nonspecific ST-T waves. Procedure:   Pharmacologic stress testing was performed with regadenoson 0.4 mg for 15 seconds. The heart rate was 72 at baseline and shikha to 80 beats during the infusion. This corresponds to 50% of maximum predicted heart rate. The blood pressure at baseline was 122/74 and blood pressure at the end of infusion was 128/70. Blood pressure response was normal during the stress procedure. The patient experienced mild shortness of breath and chest tightness during the infusion. ECG during the infusion demonstrated no changes. IMAGING: Myocardial perfusion imaging was performed at rest 30-35 minutes following the intravenous injection of 9.8 mCi of (Tc-Sestamibi) followed by 10 ml of Normal Saline. As per infusion protocol, the patient was injected intravenously with 28.4 mCi of (Tc-Sestamibi) followed by 10 ml of Normal Saline. Gated post-stress tomographic imaging was performed 20-25 minutes after stress. FINDINGS: The overall quality of the study was good.      Left ventricular cavity size was noted to be normal.    Rotational analog analysis demonstrated soft tissue diaphragmatic attenuation. The gated SPECT stress imaging in the short, vertical long, and horizontal long axis demonstrated    A moderate defect was present in the basal inferior, mid inferior, apical inferior and apex wall(s) that was  large sized by quantification. The resting images show no change. Gated SPECT left ventricular ejection fraction was calculated to be 54%, with normal myocardial thickening and wall motion. Impression:    1. ECG during the infusion did not change. 2. The myocardial perfusion imaging was normal with attenuation artifact. 3. Overall left ventricular systolic function was normal without regional wall motion abnormalities. 4. Low risk general pharmacologic stress test.    Thank you for sending your patient to this [employer] [facility].      Electronically signed by Cornell Herrera DO on 3/5/21 at 2:16 PM EST

## 2021-03-08 ENCOUNTER — TELEPHONE (OUTPATIENT)
Dept: CARDIOLOGY CLINIC | Age: 61
End: 2021-03-08

## 2021-03-08 DIAGNOSIS — Z01.818 PREOPERATIVE TESTING: Primary | ICD-10-CM

## 2021-03-18 DIAGNOSIS — Z01.818 PREOPERATIVE TESTING: ICD-10-CM

## 2021-03-18 DIAGNOSIS — U07.1 COVID-19: ICD-10-CM

## 2021-03-19 LAB
SARS-COV-2: NOT DETECTED
SOURCE: NORMAL

## 2021-03-23 ENCOUNTER — ANESTHESIA EVENT (OUTPATIENT)
Dept: CARDIAC CATH/INVASIVE PROCEDURES | Age: 61
End: 2021-03-23

## 2021-03-23 ENCOUNTER — HOSPITAL ENCOUNTER (OUTPATIENT)
Dept: CARDIAC CATH/INVASIVE PROCEDURES | Age: 61
Discharge: HOME | End: 2021-03-23
Payer: COMMERCIAL

## 2021-03-23 ENCOUNTER — ANESTHESIA (OUTPATIENT)
Dept: CARDIAC CATH/INVASIVE PROCEDURES | Age: 61
End: 2021-03-23

## 2021-03-23 VITALS
SYSTOLIC BLOOD PRESSURE: 129 MMHG | OXYGEN SATURATION: 95 % | DIASTOLIC BLOOD PRESSURE: 78 MMHG | RESPIRATION RATE: 16 BRPM

## 2021-03-23 VITALS
RESPIRATION RATE: 18 BRPM | DIASTOLIC BLOOD PRESSURE: 75 MMHG | WEIGHT: 290 LBS | TEMPERATURE: 98.2 F | SYSTOLIC BLOOD PRESSURE: 119 MMHG | HEIGHT: 71 IN | OXYGEN SATURATION: 95 % | HEART RATE: 49 BPM | BODY MASS INDEX: 40.6 KG/M2

## 2021-03-23 LAB
ANION GAP SERPL CALCULATED.3IONS-SCNC: 9 MMOL/L (ref 7–16)
APTT: 43.5 SEC (ref 24.5–35.1)
BASOPHILS ABSOLUTE: 0.05 E9/L (ref 0–0.2)
BASOPHILS RELATIVE PERCENT: 0.7 % (ref 0–2)
BUN BLDV-MCNC: 28 MG/DL (ref 6–20)
CALCIUM SERPL-MCNC: 9.9 MG/DL (ref 8.6–10.2)
CHLORIDE BLD-SCNC: 104 MMOL/L (ref 98–107)
CO2: 28 MMOL/L (ref 22–29)
CREAT SERPL-MCNC: 1.4 MG/DL (ref 0.7–1.2)
EOSINOPHILS ABSOLUTE: 0.23 E9/L (ref 0.05–0.5)
EOSINOPHILS RELATIVE PERCENT: 3.2 % (ref 0–6)
GFR AFRICAN AMERICAN: >60
GFR NON-AFRICAN AMERICAN: 52 ML/MIN/1.73
GLUCOSE BLD-MCNC: 118 MG/DL (ref 74–99)
HCT VFR BLD CALC: 42.9 % (ref 37–54)
HEMOGLOBIN: 13.7 G/DL (ref 12.5–16.5)
IMMATURE GRANULOCYTES #: 0.04 E9/L
IMMATURE GRANULOCYTES %: 0.6 % (ref 0–5)
INR BLD: 1.7
LYMPHOCYTES ABSOLUTE: 1.16 E9/L (ref 1.5–4)
LYMPHOCYTES RELATIVE PERCENT: 16.1 % (ref 20–42)
MAGNESIUM: 2 MG/DL (ref 1.6–2.6)
MCH RBC QN AUTO: 28.6 PG (ref 26–35)
MCHC RBC AUTO-ENTMCNC: 31.9 % (ref 32–34.5)
MCV RBC AUTO: 89.6 FL (ref 80–99.9)
MONOCYTES ABSOLUTE: 0.86 E9/L (ref 0.1–0.95)
MONOCYTES RELATIVE PERCENT: 11.9 % (ref 2–12)
NEUTROPHILS ABSOLUTE: 4.86 E9/L (ref 1.8–7.3)
NEUTROPHILS RELATIVE PERCENT: 67.5 % (ref 43–80)
PDW BLD-RTO: 14.3 FL (ref 11.5–15)
PLATELET # BLD: 231 E9/L (ref 130–450)
PMV BLD AUTO: 9.8 FL (ref 7–12)
POTASSIUM SERPL-SCNC: 5.2 MMOL/L (ref 3.5–5)
PROTHROMBIN TIME: 19.1 SEC (ref 9.3–12.4)
RBC # BLD: 4.79 E12/L (ref 3.8–5.8)
SODIUM BLD-SCNC: 141 MMOL/L (ref 132–146)
WBC # BLD: 7.2 E9/L (ref 4.5–11.5)

## 2021-03-23 PROCEDURE — 93005 ELECTROCARDIOGRAM TRACING: CPT | Performed by: INTERNAL MEDICINE

## 2021-03-23 PROCEDURE — 80048 BASIC METABOLIC PNL TOTAL CA: CPT

## 2021-03-23 PROCEDURE — 83735 ASSAY OF MAGNESIUM: CPT

## 2021-03-23 PROCEDURE — 92960 CARDIOVERSION ELECTRIC EXT: CPT | Performed by: INTERNAL MEDICINE

## 2021-03-23 PROCEDURE — 3700000000 HC ANESTHESIA ATTENDED CARE

## 2021-03-23 PROCEDURE — 2709999900 HC NON-CHARGEABLE SUPPLY

## 2021-03-23 PROCEDURE — 85730 THROMBOPLASTIN TIME PARTIAL: CPT

## 2021-03-23 PROCEDURE — 2580000003 HC RX 258: Performed by: INTERNAL MEDICINE

## 2021-03-23 PROCEDURE — 36415 COLL VENOUS BLD VENIPUNCTURE: CPT

## 2021-03-23 PROCEDURE — 85025 COMPLETE CBC W/AUTO DIFF WBC: CPT

## 2021-03-23 PROCEDURE — 85610 PROTHROMBIN TIME: CPT

## 2021-03-23 PROCEDURE — 6360000002 HC RX W HCPCS: Performed by: NURSE ANESTHETIST, CERTIFIED REGISTERED

## 2021-03-23 RX ORDER — SODIUM CHLORIDE 9 MG/ML
INJECTION, SOLUTION INTRAVENOUS CONTINUOUS
Status: DISCONTINUED | OUTPATIENT
Start: 2021-03-23 | End: 2021-03-24 | Stop reason: HOSPADM

## 2021-03-23 RX ORDER — PROPOFOL 10 MG/ML
INJECTION, EMULSION INTRAVENOUS PRN
Status: DISCONTINUED | OUTPATIENT
Start: 2021-03-23 | End: 2021-03-23 | Stop reason: SDUPTHER

## 2021-03-23 RX ADMIN — PROPOFOL 110 MG: 10 INJECTION, EMULSION INTRAVENOUS at 12:13

## 2021-03-23 RX ADMIN — SODIUM CHLORIDE: 9 INJECTION, SOLUTION INTRAVENOUS at 12:12

## 2021-03-23 NOTE — ANESTHESIA PRE PROCEDURE
Department of Anesthesiology  Preprocedure Note       Name:  Camille Holm   Age:  62 y.o.  :  1960                                          MRN:  [id number]         Date:  3/23/2021      Surgeon: * Surgery not found *    Procedure:     Medications prior to admission:   Prior to Admission medications    Medication Sig Start Date End Date Taking?  Authorizing Provider   dronedarone hcl (MULTAQ) 400 MG TABS Take 400 mg by mouth 2 times daily (with meals)    Historical Provider, MD   fluticasone (FLONASE) 50 MCG/ACT nasal spray 50 sprays by Each Nostril route daily 20   Historical Provider, MD   albuterol (PROVENTIL) (2.5 MG/3ML) 0.083% nebulizer solution Take 0.083 mLs by nebulization 4 times daily as needed 21   Historical Provider, MD   fluticasone-umeclidin-vilant (TRELEGY ELLIPTA) 739-68.8-38 MCG/INH AEPB Inhale 1 puff into the lungs daily 21   SIENNA Rojas - CNP   aspirin 81 MG EC tablet Take 1 tablet by mouth daily 20   Arigo Geraldo, DO   dilTIAZem (CARDIZEM CD) 120 MG extended release capsule TAKE ONE CAPSULE BY MOUTH DAILY 20   YAMAPy Geraldo, DO   lisinopril (PRINIVIL;ZESTRIL) 10 MG tablet TAKE ONE TABLET BY MOUTH DAILY 20   YAMAPy Geraldo, DO   metoprolol succinate (TOPROL XL) 100 MG extended release tablet TAKE ONE TABLET BY MOUTH TWO TIMES A DAY 20   YAMAPy Ruffin, DO   apixaban (ELIQUIS) 5 MG TABS tablet TAKE ONE TABLET BY MOUTH TWO TIMES A DAY 20   YAMAPy Geraldo, DO   PROAIR  (90 Base) MCG/ACT inhaler INHALE TWO PUFFS BY MOUTH EVERY 4 TO 6 HOURS AS NEEDED 20   Historical Provider, MD   ipratropium-albuterol (DUONEB) 0.5-2.5 (3) MG/3ML SOLN nebulizer solution Inhale 3 mLs into the lungs every 4 hours 19   Navi Oliver MD   nitroGLYCERIN (NITROSTAT) 0.4 MG SL tablet Place 1 tablet under the tongue every 5 minutes as needed for Chest pain 8/6/15   Angy Thomas MD   CRESTOR 40 MG tablet 40 mg daily  2/7/15 Historical Provider, MD   vitamin D (ERGOCALCIFEROL) 12670 UNITS CAPS capsule Take 50,000 Units by mouth once a week. 3/6/13   Historical Provider, MD       Current medications:    Current Outpatient Medications   Medication Sig Dispense Refill    dronedarone hcl (MULTAQ) 400 MG TABS Take 400 mg by mouth 2 times daily (with meals)      fluticasone (FLONASE) 50 MCG/ACT nasal spray 50 sprays by Each Nostril route daily      albuterol (PROVENTIL) (2.5 MG/3ML) 0.083% nebulizer solution Take 0.083 mLs by nebulization 4 times daily as needed      fluticasone-umeclidin-vilant (TRELEGY ELLIPTA) 100-62.5-25 MCG/INH AEPB Inhale 1 puff into the lungs daily 1 each 5    aspirin 81 MG EC tablet Take 1 tablet by mouth daily 90 tablet 3    dilTIAZem (CARDIZEM CD) 120 MG extended release capsule TAKE ONE CAPSULE BY MOUTH DAILY 90 capsule 3    lisinopril (PRINIVIL;ZESTRIL) 10 MG tablet TAKE ONE TABLET BY MOUTH DAILY 90 tablet 3    metoprolol succinate (TOPROL XL) 100 MG extended release tablet TAKE ONE TABLET BY MOUTH TWO TIMES A  tablet 3    apixaban (ELIQUIS) 5 MG TABS tablet TAKE ONE TABLET BY MOUTH TWO TIMES A  tablet 3    PROAIR  (90 Base) MCG/ACT inhaler INHALE TWO PUFFS BY MOUTH EVERY 4 TO 6 HOURS AS NEEDED      ipratropium-albuterol (DUONEB) 0.5-2.5 (3) MG/3ML SOLN nebulizer solution Inhale 3 mLs into the lungs every 4 hours 360 mL 0    nitroGLYCERIN (NITROSTAT) 0.4 MG SL tablet Place 1 tablet under the tongue every 5 minutes as needed for Chest pain 25 tablet 0    CRESTOR 40 MG tablet 40 mg daily       vitamin D (ERGOCALCIFEROL) 80192 UNITS CAPS capsule Take 50,000 Units by mouth once a week. Current Facility-Administered Medications   Medication Dose Route Frequency Provider Last Rate Last Admin    0.9 % sodium chloride infusion   Intravenous Continuous Huntington Bread, DO           Allergies:     Allergies   Allergen Reactions    Zocor [Simvastatin - High Dose] Other (See Comments)     Causes Rhabdomyolysis       Problem List:    Patient Active Problem List   Diagnosis Code    CAD (coronary artery disease) I25.10    Essential (primary) hypertension I10    Gastro-esophageal reflux disease without esophagitis K21.9    COPD (chronic obstructive pulmonary disease) (HCC) J44.9    Chest pain R07.9    Unspecified atrial fibrillation (HCC) I48.91    Malignant neoplasm of lung (HCC) C34.90    Acute respiratory failure with hypoxia (HCC) J96.01    Hyperlipidemia, unspecified E78.5       Past Medical History:        Diagnosis Date    [facility]     Blood transfusion reaction     CAD (coronary artery disease)     COPD (chronic obstructive pulmonary disease) ([facility]     GERD (gastroesophageal reflux disease)     History of cardioversion 04/10/2020    Dr Katlyn Andrade    Hyperlipidemia     Hypertension     Malignant neoplasm of lung ([facility]     Sleep apnea        Past Surgical History:        Procedure Laterality Date    BRONCHOSCOPY N/A 6/4/2020    BRONCHOSCOPY  NAVIGATIONAL performed by Kimberly Santana DO at [facility]    BRONCHOSCOPY  6/4/2020    BRONCHOSCOPY/TRANSBRONCHIAL NEEDLE BIOPSY performed by Kimberly Santana DO at [address]  6/4/2020    BRONCHOSCOPY BIOPSY BRONCHUS performed by Kimberly Santana DO at [address]  6/4/2020    BRONCHOSCOPY BRUSHINGS performed by Kimberly Santana DO at [address]  6/4/2020    BRONCHOSCOPY W/EBUS FNA performed by Kimberly Santana DO at [address]  6/4/2020    [address] performed by Kimberly Santana DO at [address] [facility]  3/20/14    3.5/18 Resolute to Mid-Cx    CORONARY ARTERY BYPASS GRAFT  4/18/07    DIAGNOSTIC CARDIAC CATH LAB PROCEDURE  4/17/07    ECHO COMPL W DOP COLOR FLOW  3/22/2012         TRANSESOPHAGEAL ECHOCARDIOGRAM  02/28/2020    angela       Social History:    Social History Tobacco Use    Smoking status: Former Smoker     Packs/day: 1.50     Years: 40.00     Pack years: 60.00     Types: Cigarettes     Start date: 5     Quit date: 2020     Years since quittin.7    Smokeless tobacco: Never Used   Substance Use Topics    Alcohol use: Yes     Alcohol/week: 4.0 - 6.0 standard drinks     Types: 4 - 6 Cans of beer per week     Comment: on weekend                                Counseling given: Not Answered      Vital Signs (Current): There were no vitals filed for this visit. BP Readings from Last 3 Encounters:   21 122/74   21 120/78   21 126/75       NPO Status:                                                                                 BMI:   Wt Readings from Last 3 Encounters:   21 287 lb (130.2 kg)   21 287 lb 14.4 oz (130.6 kg)   21 287 lb 3.2 oz (130.3 kg)     There is no height or weight on file to calculate BMI.    CBC:   Lab Results   Component Value Date    WBC 9.4 01/15/2021    RBC 5.15 01/15/2021    HGB 15.3 01/15/2021    HCT 48.4 01/15/2021    MCV 94.0 01/15/2021    RDW 13.2 01/15/2021     01/15/2021       CMP:   Lab Results   Component Value Date     2021    K 3.8 2021    K 3.7 01/15/2021    CL 94 2021    CO2 36 2021    BUN 22 2021    CREATININE 1.1 2021    GFRAA >60 2021    LABGLOM >60 2021    GLUCOSE 232 2021    GLUCOSE 129 2011    PROT 6.9 01/15/2021    CALCIUM 9.0 2021    BILITOT 0.7 01/15/2021    ALKPHOS 77 01/15/2021    AST 15 01/15/2021    ALT 10 01/15/2021       POC Tests: No results for input(s): POCGLU, POCNA, POCK, POCCL, POCBUN, POCHEMO, POCHCT in the last 72 hours.     Coags:   Lab Results   Component Value Date    PROTIME 11.4 2020    INR 1.0 2020    APTT 36.3 2019       HCG (If Applicable): No results found for: PREGTESTUR, PREGSERUM, HCG, HCGQUANT     ABGs: No results found for: PHART, PO2ART, IQW3IYM, HEA3PHD, BEART, U6VGWZHX     Type & Screen (If Applicable):  No results found for: LABABO, LABRH    Drug/Infectious Status (If Applicable):  No results found for: HIV, HEPCAB    COVID-19 Screening (If Applicable):   Lab Results   Component Value Date    COVID19 Not Detected 03/18/2021           Anesthesia Evaluation    Airway: Mallampati: III  TM distance: >3 FB   Neck ROM: full  Mouth opening: > = 3 FB Dental: normal exam         Pulmonary: breath sounds clear to auscultation  (+) COPD:  sleep apnea:                             Cardiovascular:    (+) hypertension:, CAD:,         Rhythm: irregular                      Neuro/Psych:               GI/Hepatic/Renal:   (+) GERD:,           Endo/Other:                     Abdominal:           Vascular:                                        Anesthesia Plan      MAC     ASA 3       Induction: intravenous. Anesthetic plan and risks discussed with patient. Plan discussed with CRNA.                   Marshall Murray MD   3/23/2021

## 2021-03-23 NOTE — ANESTHESIA POSTPROCEDURE EVALUATION
Department of Anesthesiology  Postprocedure Note    Patient: Tere Score  MRN: [id number]  YOB: 1960  Date of evaluation: 3/23/2021  Time:  12:49 PM     Procedure Summary     Date: 03/23/21 Room / Location: [facility] CATH LAB    Anesthesia Start: 1088 Anesthesia Stop: 6760    Procedure: CARDIOVERSION WITH ANESTHESIA Diagnosis: Atherosclerotic heart disease of native coronary artery without angina pectoris    Scheduled Providers:  Responsible Provider: Buzzy Meckel, MD    Anesthesia Type: MAC ASA Status: 3          Anesthesia Type: MAC    Rossana Phase I:      Rossana Phase II:      Last vitals: Reviewed and per EMR flowsheets.        Anesthesia Post Evaluation    Patient location during evaluation: PACU  Patient participation: complete - patient participated  Level of consciousness: awake  Pain score: 0  Airway patency: patent  Nausea & Vomiting: no nausea  Complications: no  Cardiovascular status: hemodynamically stable  Respiratory status: acceptable  Hydration status: stable

## 2021-03-24 ENCOUNTER — TELEPHONE (OUTPATIENT)
Dept: CARDIOLOGY CLINIC | Age: 61
End: 2021-03-24

## 2021-03-24 LAB
EKG ATRIAL RATE: 50 BPM
EKG ATRIAL RATE: 72 BPM
EKG P AXIS: 50 DEGREES
EKG P-R INTERVAL: 244 MS
EKG Q-T INTERVAL: 468 MS
EKG Q-T INTERVAL: 524 MS
EKG QRS DURATION: 124 MS
EKG QRS DURATION: 126 MS
EKG QTC CALCULATION (BAZETT): 477 MS
EKG QTC CALCULATION (BAZETT): 482 MS
EKG R AXIS: 23 DEGREES
EKG R AXIS: 26 DEGREES
EKG T AXIS: 36 DEGREES
EKG T AXIS: 69 DEGREES
EKG VENTRICULAR RATE: 50 BPM
EKG VENTRICULAR RATE: 64 BPM

## 2021-03-24 PROCEDURE — 93010 ELECTROCARDIOGRAM REPORT: CPT | Performed by: INTERNAL MEDICINE

## 2021-03-24 NOTE — TELEPHONE ENCOUNTER
Patient needs DOT clearance. Also will need a copy of his most recent stress test. S/P [facility]. Please advise.

## 2021-03-25 NOTE — TELEPHONE ENCOUNTER
(-) stress 3-5-21. 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 Ok to give him his stress report.

## 2021-03-26 ENCOUNTER — TELEPHONE (OUTPATIENT)
Dept: CARDIOLOGY CLINIC | Age: 61
End: 2021-03-26

## 2021-03-26 DIAGNOSIS — K21.9 GASTRO-ESOPHAGEAL REFLUX DISEASE WITHOUT ESOPHAGITIS: Primary | ICD-10-CM

## 2021-03-26 DIAGNOSIS — I10 ESSENTIAL (PRIMARY) HYPERTENSION: ICD-10-CM

## 2021-03-26 RX ORDER — LISINOPRIL 5 MG/1
5 TABLET ORAL DAILY
COMMUNITY
End: 2022-04-04

## 2021-03-26 NOTE — TELEPHONE ENCOUNTER
Patient notified of lab results and Dr. Rodriguez Blunt recommendation. Chart amended to reflect dose adjustment. BP/P/BMP scheduled for 4/9/21 at 1:00 p.m.

## 2021-03-26 NOTE — TELEPHONE ENCOUNTER
----- Message from Claudy Nicolas DO sent at 3/25/2021  7:00 PM EDT -----  Decrease the lisinopril to 5 mg daily. Repeat BMP and blood pressure/heart rate check in 2 weeks.

## 2021-04-09 ENCOUNTER — TELEPHONE (OUTPATIENT)
Dept: CARDIOLOGY CLINIC | Age: 61
End: 2021-04-09

## 2021-04-09 ENCOUNTER — NURSE ONLY (OUTPATIENT)
Dept: CARDIOLOGY CLINIC | Age: 61
End: 2021-04-09
Payer: COMMERCIAL

## 2021-04-09 DIAGNOSIS — I48.91 ATRIAL FIBRILLATION, UNSPECIFIED TYPE (HCC): Primary | ICD-10-CM

## 2021-04-09 DIAGNOSIS — I48.0 PAF (PAROXYSMAL ATRIAL FIBRILLATION) (HCC): Primary | ICD-10-CM

## 2021-04-09 DIAGNOSIS — I10 ESSENTIAL (PRIMARY) HYPERTENSION: ICD-10-CM

## 2021-04-09 LAB
ANION GAP SERPL CALCULATED.3IONS-SCNC: 11 MMOL/L (ref 7–16)
BUN BLDV-MCNC: 29 MG/DL (ref 6–20)
CALCIUM SERPL-MCNC: 10.1 MG/DL (ref 8.6–10.2)
CHLORIDE BLD-SCNC: 102 MMOL/L (ref 98–107)
CO2: 24 MMOL/L (ref 22–29)
CREAT SERPL-MCNC: 1.4 MG/DL (ref 0.7–1.2)
GFR AFRICAN AMERICAN: >60
GFR NON-AFRICAN AMERICAN: 52 ML/MIN/1.73
GLUCOSE BLD-MCNC: 105 MG/DL (ref 74–99)
POTASSIUM SERPL-SCNC: 4.8 MMOL/L (ref 3.5–5)
SODIUM BLD-SCNC: 137 MMOL/L (ref 132–146)

## 2021-04-09 PROCEDURE — 36415 COLL VENOUS BLD VENIPUNCTURE: CPT | Performed by: INTERNAL MEDICINE

## 2021-04-09 PROCEDURE — 93000 ELECTROCARDIOGRAM COMPLETE: CPT | Performed by: INTERNAL MEDICINE

## 2021-04-09 NOTE — TELEPHONE ENCOUNTER
DO Poonam Perez             He is back in atrial fibrillation again.  His heart rate is controlled.  That he see electrophysiology for other options to try to maintain normal rhythm.

## 2021-04-09 NOTE — PROGRESS NOTES
Patient was seen in office today for an EKG and BP check per . Standing BP: 104/76  Standing P: 76  Sitting BP:112/62  Sitting P:71  Patient was seen in office today for a lab draw per . [facility]. Patient tolerated it well. Lori Euceda MA.

## 2021-04-09 NOTE — TELEPHONE ENCOUNTER
Patient was seen in office today for an EKG and BP check per . Standing BP: 104/76  Standing P: 76  Sitting BP:112/62  Sitting P:71  Patient was seen in office today for a lab draw per . [facility]. Patient tolerated it well. Patsy Mckinley MA. Lisinopril was decreased to 5 mg daily on 3/26/21. Please review and advise.

## 2021-04-20 ENCOUNTER — OFFICE VISIT (OUTPATIENT)
Dept: NON INVASIVE DIAGNOSTICS | Age: 61
End: 2021-04-20
Payer: COMMERCIAL

## 2021-04-20 VITALS
SYSTOLIC BLOOD PRESSURE: 132 MMHG | HEART RATE: 65 BPM | DIASTOLIC BLOOD PRESSURE: 86 MMHG | RESPIRATION RATE: 16 BRPM | BODY MASS INDEX: 41.1 KG/M2 | OXYGEN SATURATION: 97 % | WEIGHT: 293.6 LBS | HEIGHT: 71 IN

## 2021-04-20 DIAGNOSIS — C34.31 MALIGNANT NEOPLASM OF LOWER LOBE OF RIGHT LUNG (HCC): ICD-10-CM

## 2021-04-20 DIAGNOSIS — J96.01 ACUTE RESPIRATORY FAILURE WITH HYPOXIA (HCC): ICD-10-CM

## 2021-04-20 DIAGNOSIS — I48.19 PERSISTENT ATRIAL FIBRILLATION (HCC): Primary | ICD-10-CM

## 2021-04-20 DIAGNOSIS — I10 ESSENTIAL (PRIMARY) HYPERTENSION: ICD-10-CM

## 2021-04-20 DIAGNOSIS — Z95.1 HX OF CABG: ICD-10-CM

## 2021-04-20 PROCEDURE — 99204 OFFICE O/P NEW MOD 45 MIN: CPT | Performed by: INTERNAL MEDICINE

## 2021-04-20 PROCEDURE — 93000 ELECTROCARDIOGRAM COMPLETE: CPT | Performed by: INTERNAL MEDICINE

## 2021-04-20 ASSESSMENT — ENCOUNTER SYMPTOMS
ABDOMINAL PAIN: 0
CHEST TIGHTNESS: 0
NAUSEA: 0
SHORTNESS OF BREATH: 1
WHEEZING: 0
COUGH: 0
EYE REDNESS: 0
ABDOMINAL DISTENTION: 0
COLOR CHANGE: 0
DIARRHEA: 0
SINUS PRESSURE: 0

## 2021-04-20 NOTE — PROGRESS NOTES
Cardiac Electrophysiology Outpatient Progress Note    Bell Bear  1960  Date of Service: 4/20/2021  Referring Provider/PCP: Valdez Bernabe MD  Chief Complaint:   Chief Complaint   Patient presents with    Atrial Fibrillation     New Patient with AF, SOB,          HISTORY OF PRESENT ILLNESS    Bell Bear presents to the office today for the management of these Electrophysiology conditions: Persistent atrial fibrillation    62year old male with history of acute hypoxic respiratory failure secondary to pulmonary, acute decompensated CHF, Morbid obesity BMI 40, NSCLC -SCC of medial right lower lobe DDx 6/2020, ANTONIO on CPAP, bilateral pleural effusions right greater than left -status post right thoracentesis s/p 1/17 with 850 mL of blood-tinged fluid removed, Acute inferior MI (4/17/07) with CABG 2007 -LIMA to LAD, SVG to D1, SVG to PDA    He has persistent atrial fibrillation with left atrial appendage thrombus on MAZIN in 2019 and is s/p DCCV 4/2020, 2/23/21, EKG 4/9/21 showed he was back in Afib. He has tried [place] and is currently still on it.    4/20/2021: Mr. Emery Lobo reports when in AF he complains of SOB/SOBOE, which he is also related to lung CA. Her reports feeling improved when he was in NSR after his cardioversion on 4/9/2021. We discussed about repeat cardioversion with he addition of AAD therapy. We discussed about the need for hospitalization when starting either Tikosyn or Sotalol. Today he presents in AF CVR. The patient denies any chest pain, dyspnea, palpitations, dizziness, syncope, orthopnea or paroxysmal nocturnal dyspnea. He lives alone. Stopped smoking after cancer diagnosis.  Still drinks on weekeneds- few beers each day   Patient Active Problem List    Diagnosis Date Noted    Acute respiratory failure with hypoxia ([address] 01/15/2021    Malignant neoplasm of lung ([address]     Essential (primary) hypertension 07/07/2020    Gastro-esophageal reflux disease without esophagitis 2020    Unspecified atrial fibrillation [facility] 2020    Hyperlipidemia, unspecified 2020    Chest pain 2019    CAD (coronary artery disease)      Overview Note:     A. Acute inferior MI (07). B. Cardiac catheterization (07):   LAD - proximal 85% bifurcating with first diagonal  D1 - ostial 50%  Cx - prox to mid 40%  OM1  ostial 90%  RCA - prox diffuse 99% with heavy thrombus  C. Successful thrombectomy. D. Coronary artery bypass surgery by Dr. Carin Grissom (07)  LIMA to LAD  SVG to D1  SVG to PDA    Cath 3-14  70% Cx - 3.5 x 18 mm Resolute ELENA      COPD (chronic obstructive pulmonary disease) ([email or web address]        Family History   Problem Relation Age of Onset    Hypertension Mother     Hypertension Father     No Known Problems Sister     No Known Problems Sister     No Known Problems Sister     Cancer Maternal Aunt         cancer       SOCIAL HISTORY   Social History     Socioeconomic History    Marital status:      Spouse name: Not on file    Number of children: Not on file    Years of education: Not on file    Highest education level: Not on file   Occupational History    Occupation:    Social Needs    Financial resource strain: Not on file    Food insecurity     Worry: Not on file     Inability: Not on file   WorkProducts needs     Medical: Not on file     Non-medical: Not on file   Tobacco Use    Smoking status: Former Smoker     Packs/day: 1.50     Years: 40.00     Pack years: 60.00     Types: Cigarettes     Start date:      Quit date: 2020     Years since quittin.8    Smokeless tobacco: Never Used   Substance and Sexual Activity    Alcohol use:  Yes     Alcohol/week: 4.0 - 6.0 standard drinks     Types: 4 - 6 Cans of beer per week     Comment: on weekend    Drug use: No    Sexual activity: Not Currently   Lifestyle    Physical activity     Days per week: Not on file     Minutes per session: Not on file     TRANSESOPHAGEAL ECHOCARDIOGRAM  02/28/2020    julio césar       Current Outpatient Medications   Medication Sig Dispense Refill    lisinopril (PRINIVIL;ZESTRIL) 5 MG tablet Take 5 mg by mouth daily      albuterol (PROVENTIL) (2.5 MG/3ML) 0.083% nebulizer solution Take 0.083 mLs by nebulization 4 times daily as needed      fluticasone-umeclidin-vilant (TRELEGY ELLIPTA) 100-62.5-25 MCG/INH AEPB Inhale 1 puff into the lungs daily 1 each 5    aspirin 81 MG EC tablet Take 1 tablet by mouth daily 90 tablet 3    dilTIAZem (CARDIZEM CD) 120 MG extended release capsule TAKE ONE CAPSULE BY MOUTH DAILY 90 capsule 3    metoprolol succinate (TOPROL XL) 100 MG extended release tablet TAKE ONE TABLET BY MOUTH TWO TIMES A  tablet 3    apixaban (ELIQUIS) 5 MG TABS tablet TAKE ONE TABLET BY MOUTH TWO TIMES A  tablet 3    PROAIR  (90 Base) MCG/ACT inhaler INHALE TWO PUFFS BY MOUTH EVERY 4 TO 6 HOURS AS NEEDED      ipratropium-albuterol (DUONEB) 0.5-2.5 (3) MG/3ML SOLN nebulizer solution Inhale 3 mLs into the lungs every 4 hours 360 mL 0    nitroGLYCERIN (NITROSTAT) 0.4 MG SL tablet Place 1 tablet under the tongue every 5 minutes as needed for Chest pain 25 tablet 0    CRESTOR 40 MG tablet 40 mg daily       vitamin D (ERGOCALCIFEROL) 81720 UNITS CAPS capsule Take 50,000 Units by mouth once a week. No current facility-administered medications for this visit. Allergies   Allergen Reactions    Zocor [Simvastatin - High Dose] Other (See Comments)     Causes Rhabdomyolysis           ROS:   Review of Systems   Constitutional: Negative for fatigue and fever. HENT: Negative for congestion, nosebleeds and sinus pressure. Eyes: Negative for redness and visual disturbance. Respiratory: Positive for shortness of breath. Negative for cough, chest tightness and wheezing. Cardiovascular: Positive for palpitations. Negative for chest pain and leg swelling.    Gastrointestinal: Negative for abdominal distention, abdominal pain, diarrhea and nausea. Endocrine: Negative for cold intolerance, heat intolerance, polydipsia and polyphagia. Genitourinary: Negative for difficulty urinating, frequency and urgency. Musculoskeletal: Negative for arthralgias, joint swelling and myalgias. Skin: Negative for color change and wound. Neurological: Negative for dizziness, syncope, weakness and numbness. Psychiatric/Behavioral: Negative for agitation, behavioral problems, confusion, decreased concentration, hallucinations and suicidal ideas. The patient is not nervous/anxious. PHYSICAL EXAM:  Vitals:    04/20/21 1410   BP: 132/86   Site: Left Upper Arm   Position: Sitting   Cuff Size: Medium Adult   Pulse: 65   Resp: 16   SpO2: 97%   Weight: 293 lb 9.6 oz (133.2 kg)   Height: 5' 11\" (1.803 m)     Physical Exam  Vitals signs reviewed. Constitutional:       Appearance: Normal appearance. HENT:      Head: Normocephalic. Mouth/Throat:      Mouth: Mucous membranes are moist.      Pharynx: Oropharynx is clear. Eyes:      Conjunctiva/sclera: Conjunctivae normal.   Neck:      Musculoskeletal: Normal range of motion and neck supple. Vascular: No carotid bruit. Cardiovascular:      Rate and Rhythm: Normal rate. Rhythm irregular. Pulses: Normal pulses. Heart sounds: Normal heart sounds. Pulmonary:      Effort: Pulmonary effort is normal.      Breath sounds: Normal breath sounds. No rales. Chest:      Chest wall: No tenderness. Abdominal:      General: Bowel sounds are normal.      Palpations: Abdomen is soft. Musculoskeletal: Normal range of motion. Skin:     General: Skin is warm. Neurological:      General: No focal deficit present. Mental Status: He is alert and oriented to person, place, and time. Psychiatric:         Mood and Affect: Mood normal.         Behavior: Behavior normal.         Thought Content:  Thought content normal.          Pertinent Labs: CBC:   WBC (E9/L)   Date Value   03/23/2021 7.2   01/15/2021 9.4   06/04/2020 10.1     Hemoglobin (g/dL)   Date Value   03/23/2021 13.7   01/15/2021 15.3   06/04/2020 14.4     Hematocrit (%)   Date Value   03/23/2021 42.9   01/15/2021 48.4   06/04/2020 44.8     Platelets (V7/Q)   Date Value   03/23/2021 231   01/15/2021 247   06/04/2020 225      BMP:   Sodium (mmol/L)   Date Value   04/09/2021 137   03/23/2021 141   01/20/2021 138     Potassium (mmol/L)   Date Value   04/09/2021 4.8   03/23/2021 5.2 (H)   01/20/2021 3.8     Potassium reflex Magnesium (mmol/L)   Date Value   01/15/2021 3.7   06/04/2020 4.9   04/10/2020 4.2     Magnesium (mg/dL)   Date Value   03/23/2021 2.0   01/20/2021 2.4   01/19/2021 2.2     Chloride (mmol/L)   Date Value   04/09/2021 102   03/23/2021 104   01/20/2021 94 (L)     CO2 (mmol/L)   Date Value   04/09/2021 24   03/23/2021 28   01/20/2021 36 (H)     BUN (mg/dL)   Date Value   04/09/2021 29 (H)   03/23/2021 28 (H)   01/20/2021 22 (H)     CREATININE (mg/dL)   Date Value   04/09/2021 1.4 (H)   03/23/2021 1.4 (H)   01/20/2021 1.1     Glucose (mg/dL)   Date Value   04/09/2021 105 (H)   03/23/2021 118 (H)   01/20/2021 232 (H)   06/03/2011 129 (H)     Calcium (mg/dL)   Date Value   04/09/2021 10.1   03/23/2021 9.9   01/20/2021 9.0      INR:   INR (no units)   Date Value   03/23/2021 1.7   06/04/2020 1.0   04/10/2020 1.3      BNP: No results found for: BNP   TSH:   TSH (uIU/mL)   Date Value   01/15/2021 1.290   09/28/2019 1.600   03/09/2019 0.940      Cardiac Injury Profile:    Total CK (U/L)   Date Value   08/06/2015 77     CK-MB (ng/mL)   Date Value   08/06/2015 1.3     Troponin (ng/mL)   Date Value   01/15/2021 <0.01     Lipid Profile:   Triglycerides (mg/dL)   Date Value   01/16/2021 67     HDL (mg/dL)   Date Value   01/16/2021 29     LDL Calculated (mg/dL)   Date Value   01/16/2021 59     Cholesterol, Total (mg/dL)   Date Value   01/16/2021 101      Hemoglobin A1C: No results found for: LABA1C        Pertinent Cardiac Testing:   3/5/21: Gated SPECT left ventricular ejection fraction was calculated to be 54%, with normal myocardial thickening and wall motion.     Impression:    1. ECG during the infusion did not change. 2. The myocardial perfusion imaging was normal with attenuation artifact.       3. Overall left ventricular systolic function was normal without regional wall motion abnormalities. 4. Low risk general pharmacologic stress test.     1/15/21 TTE   Summary   Technically suboptimal and limited study. Left ventricular internal dimensions were normal in diastole and systole. Mild left ventricular concentric hypertrophy noted. Normal left ventricular ejection fraction. Septal motion consistent with post cardiac surgery. The left atrium is borderline dilated. Right ventricle global systolic function is reduced . Mildly enlarged right atrium size. Mild thickening of the mitral valve leaflets. Moderate mitral annular calcification potentially consistent with repair. Mild to moderate mitral regurgitation is present. The aortic valve appears mildly sclerotic. Mild aortic regurgitation is noted. Mild tricuspid regurgitation. Mildly dilated aortic root. Dilation of the ascending aorta. There is a trivial circumferential pericardial effusion noted. ECG 4/20/2021: atrial fibrillation, rate RBBB, rate 65 bpm    I have independently reviewed all of the ECGs and rhythm strips per above    I have personally reviewed the laboratory, cardiac diagnostic and radiographic testing as outlined above: We have requested previous records. 1. Persistent atrial fibrillation ([address]    2. Hx of CABG    3. Essential (primary) hypertension    4. Malignant neoplasm of lower lobe of right lung ([address]    5. Acute respiratory failure with hypoxia (HCC)         ASSESSMENT & PLAN    1.  Persistent Atrial Fibrillation  - He has persistent atrial fibrillation with left atrial appendage thrombus on MAZIN in 2019   - S/p DCCV 4/2020, 2/23/21, EKG 4/9/21 showed he was back in Afib. He has tried multaq and is currently still on it. - Recommend stopping Multaq as it is not working and he also had recent CHF which is a contraindication to its use. - We discussed trying Class III AAD such as Tikosyn, sotalol and he is agreeable. I will check pricing for these medications  - He will need 3 day hospital stay arranged for initiation    2. CAD  - Acute inferior MI (4/17/07)   - CABG 2007 -LIMA to LAD, SVG to D1, SVG to PDA    3. NSCLC -SCC of medial right lower lobe  -  DDx 6/2020  - S/p Radiation   -  850 mL of blood-tinged fluid removed by thoracentesis    4. Acute CHF  - Resolved  - LVEF preserved    5. RBBB  - Chronic and complete since 2020    6. Alcohol Use  - cessation recommended        Plan  1. Will stop the Multaq.   2. Will check the cost of Tikosyn and Sotalol. 3. Will plan for a hospital admission for AAD initiation and possible cardioversion. 4. Urged ETOH and tobacco cessation. 5. Follow up after AAD initiation. Encouraged the patient to call the office for any questions or concerns. Thank you for allowing me to participate in your patient's care.     Judah Ferrara MD  Cardiac Electrophysiology  [address] [place] [address]

## 2021-04-26 ENCOUNTER — TELEPHONE (OUTPATIENT)
Dept: NON INVASIVE DIAGNOSTICS | Age: 61
End: 2021-04-26

## 2021-04-26 NOTE — TELEPHONE ENCOUNTER
I spoke to patient and he will discuss with his employer about taking time off for 5/5/21. He will call back with his decision.

## 2021-04-26 NOTE — TELEPHONE ENCOUNTER
----- Message from Hans Covarrubias sent at 4/23/2021  4:08 PM EDT -----  Regarding: Pricing for Medication  [facility] [phone]    Dofetilide (not covered with the patient insurance)    Sotalol   30 day supply $8.40  90 day $23.78    (patient can only get (2) fills of 30 day supply at local pharmacy, then the patient will have to use 90 day mail order or 90 day smart pharmacy which is a [facility] pharmacy)    ----- Message -----  From: Sasha Oquendo  Sent: 4/23/2021   3:47 PM EDT  To: Hans Covarrubias      ----- Message -----  From: Nohemy Hernandez MD  Sent: 4/20/2021   2:35 PM EDT  To: Sasha Oquendo    Please check the cost of Tikosyn and Sotalol. Will plan for a hospital admission for AAD initiation and possible cardioversion. Thanks.

## 2021-04-30 DIAGNOSIS — Z01.818 PRE-OP TESTING: Primary | ICD-10-CM

## 2021-05-04 RX ORDER — LISINOPRIL 5 MG/1
5 TABLET ORAL DAILY
Qty: 90 TABLET | Refills: 3 | Status: ON HOLD
Start: 2021-05-04 | End: 2021-05-15 | Stop reason: HOSPADM

## 2021-05-04 RX ORDER — METOPROLOL SUCCINATE 100 MG/1
TABLET, EXTENDED RELEASE ORAL
Qty: 180 TABLET | Refills: 3 | Status: ON HOLD
Start: 2021-05-04 | End: 2021-05-15 | Stop reason: SDUPTHER

## 2021-05-04 RX ORDER — DILTIAZEM HYDROCHLORIDE 120 MG/1
CAPSULE, COATED, EXTENDED RELEASE ORAL
Qty: 90 CAPSULE | Refills: 3 | Status: ON HOLD
Start: 2021-05-04 | End: 2021-05-15 | Stop reason: HOSPADM

## 2021-05-04 RX ORDER — ASPIRIN 81 MG/1
TABLET, COATED ORAL
Qty: 90 TABLET | Refills: 3 | Status: SHIPPED
Start: 2021-05-04 | End: 2022-04-29

## 2021-05-06 ENCOUNTER — HOSPITAL ENCOUNTER (OUTPATIENT)
Age: 61
Discharge: HOME | End: 2021-05-08
Payer: COMMERCIAL

## 2021-05-06 DIAGNOSIS — Z01.818 PRE-OP TESTING: ICD-10-CM

## 2021-05-06 PROCEDURE — U0005 INFEC AGEN DETEC AMPLI PROBE: HCPCS

## 2021-05-06 PROCEDURE — U0003 INFECTIOUS AGENT DETECTION BY NUCLEIC ACID (DNA OR RNA); SEVERE ACUTE RESPIRATORY SYNDROME CORONAVIRUS 2 (SARS-COV-2) (CORONAVIRUS DISEASE [COVID-19]), AMPLIFIED PROBE TECHNIQUE, MAKING USE OF HIGH THROUGHPUT TECHNOLOGIES AS DESCRIBED BY CMS-2020-01-R: HCPCS

## 2021-05-08 LAB
SARS-COV-2: NOT DETECTED
SOURCE: NORMAL

## 2021-05-12 ENCOUNTER — HOSPITAL ENCOUNTER (INPATIENT)
Age: 61
LOS: 3 days | Discharge: HOME | DRG: 309 | End: 2021-05-15
Attending: STUDENT IN AN ORGANIZED HEALTH CARE EDUCATION/TRAINING PROGRAM | Admitting: STUDENT IN AN ORGANIZED HEALTH CARE EDUCATION/TRAINING PROGRAM
Payer: COMMERCIAL

## 2021-05-12 PROBLEM — I48.91 AF (ATRIAL FIBRILLATION) (HCC): Status: ACTIVE | Noted: 2021-05-12

## 2021-05-12 LAB
ALBUMIN SERPL-MCNC: 4.1 G/DL (ref 3.5–5.2)
ALP BLD-CCNC: 80 U/L (ref 40–129)
ALT SERPL-CCNC: 11 U/L (ref 0–40)
ANION GAP SERPL CALCULATED.3IONS-SCNC: 12 MMOL/L (ref 7–16)
AST SERPL-CCNC: 15 U/L (ref 0–39)
BILIRUB SERPL-MCNC: 0.3 MG/DL (ref 0–1.2)
BUN BLDV-MCNC: 17 MG/DL (ref 6–20)
CALCIUM SERPL-MCNC: 9.3 MG/DL (ref 8.6–10.2)
CHLORIDE BLD-SCNC: 103 MMOL/L (ref 98–107)
CO2: 25 MMOL/L (ref 22–29)
CREAT SERPL-MCNC: 0.9 MG/DL (ref 0.7–1.2)
GFR AFRICAN AMERICAN: >60
GFR NON-AFRICAN AMERICAN: >60 ML/MIN/1.73
GLUCOSE BLD-MCNC: 97 MG/DL (ref 74–99)
HCT VFR BLD CALC: 40.9 % (ref 37–54)
HEMOGLOBIN: 13.1 G/DL (ref 12.5–16.5)
MCH RBC QN AUTO: 28.7 PG (ref 26–35)
MCHC RBC AUTO-ENTMCNC: 32 % (ref 32–34.5)
MCV RBC AUTO: 89.7 FL (ref 80–99.9)
PDW BLD-RTO: 14.2 FL (ref 11.5–15)
PLATELET # BLD: 243 E9/L (ref 130–450)
PMV BLD AUTO: 10 FL (ref 7–12)
POTASSIUM REFLEX MAGNESIUM: 4.5 MMOL/L (ref 3.5–5)
RBC # BLD: 4.56 E12/L (ref 3.8–5.8)
SODIUM BLD-SCNC: 140 MMOL/L (ref 132–146)
TOTAL PROTEIN: 7.1 G/DL (ref 6.4–8.3)
WBC # BLD: 7.5 E9/L (ref 4.5–11.5)

## 2021-05-12 PROCEDURE — 80053 COMPREHEN METABOLIC PANEL: CPT

## 2021-05-12 PROCEDURE — 85027 COMPLETE CBC AUTOMATED: CPT

## 2021-05-12 PROCEDURE — 2580000003 HC RX 258: Performed by: STUDENT IN AN ORGANIZED HEALTH CARE EDUCATION/TRAINING PROGRAM

## 2021-05-12 PROCEDURE — 36415 COLL VENOUS BLD VENIPUNCTURE: CPT

## 2021-05-12 PROCEDURE — 2140000000 HC CCU INTERMEDIATE R&B

## 2021-05-12 PROCEDURE — 93005 ELECTROCARDIOGRAM TRACING: CPT | Performed by: STUDENT IN AN ORGANIZED HEALTH CARE EDUCATION/TRAINING PROGRAM

## 2021-05-12 PROCEDURE — 6370000000 HC RX 637 (ALT 250 FOR IP): Performed by: STUDENT IN AN ORGANIZED HEALTH CARE EDUCATION/TRAINING PROGRAM

## 2021-05-12 RX ORDER — ALBUTEROL SULFATE 90 UG/1
2 AEROSOL, METERED RESPIRATORY (INHALATION) EVERY 6 HOURS PRN
Status: DISCONTINUED | OUTPATIENT
Start: 2021-05-12 | End: 2021-05-15 | Stop reason: HOSPADM

## 2021-05-12 RX ORDER — SODIUM CHLORIDE 0.9 % (FLUSH) 0.9 %
5-40 SYRINGE (ML) INJECTION PRN
Status: DISCONTINUED | OUTPATIENT
Start: 2021-05-12 | End: 2021-05-15 | Stop reason: HOSPADM

## 2021-05-12 RX ORDER — POLYETHYLENE GLYCOL 3350 17 G/17G
17 POWDER, FOR SOLUTION ORAL DAILY PRN
Status: DISCONTINUED | OUTPATIENT
Start: 2021-05-12 | End: 2021-05-15 | Stop reason: HOSPADM

## 2021-05-12 RX ORDER — SODIUM CHLORIDE 9 MG/ML
25 INJECTION, SOLUTION INTRAVENOUS PRN
Status: DISCONTINUED | OUTPATIENT
Start: 2021-05-12 | End: 2021-05-15 | Stop reason: HOSPADM

## 2021-05-12 RX ORDER — IPRATROPIUM BROMIDE AND ALBUTEROL SULFATE 2.5; .5 MG/3ML; MG/3ML
1 SOLUTION RESPIRATORY (INHALATION)
Status: DISCONTINUED | OUTPATIENT
Start: 2021-05-12 | End: 2021-05-15 | Stop reason: HOSPADM

## 2021-05-12 RX ORDER — LISINOPRIL 10 MG/1
5 TABLET ORAL DAILY
Status: DISCONTINUED | OUTPATIENT
Start: 2021-05-12 | End: 2021-05-15 | Stop reason: HOSPADM

## 2021-05-12 RX ORDER — BUDESONIDE AND FORMOTEROL FUMARATE DIHYDRATE 80; 4.5 UG/1; UG/1
2 AEROSOL RESPIRATORY (INHALATION) 2 TIMES DAILY
Status: DISCONTINUED | OUTPATIENT
Start: 2021-05-12 | End: 2021-05-12 | Stop reason: ALTCHOICE

## 2021-05-12 RX ORDER — ACETAMINOPHEN 650 MG/1
650 SUPPOSITORY RECTAL EVERY 6 HOURS PRN
Status: DISCONTINUED | OUTPATIENT
Start: 2021-05-12 | End: 2021-05-15 | Stop reason: HOSPADM

## 2021-05-12 RX ORDER — ROSUVASTATIN CALCIUM 5 MG/1
5 TABLET, COATED ORAL NIGHTLY
Status: DISCONTINUED | OUTPATIENT
Start: 2021-05-12 | End: 2021-05-15 | Stop reason: HOSPADM

## 2021-05-12 RX ORDER — NITROGLYCERIN 0.4 MG/1
0.4 TABLET SUBLINGUAL EVERY 5 MIN PRN
Status: DISCONTINUED | OUTPATIENT
Start: 2021-05-12 | End: 2021-05-15 | Stop reason: HOSPADM

## 2021-05-12 RX ORDER — ALBUTEROL SULFATE 2.5 MG/3ML
2.5 SOLUTION RESPIRATORY (INHALATION) EVERY 6 HOURS PRN
Status: DISCONTINUED | OUTPATIENT
Start: 2021-05-12 | End: 2021-05-15 | Stop reason: HOSPADM

## 2021-05-12 RX ORDER — ASPIRIN 81 MG/1
81 TABLET, CHEWABLE ORAL DAILY
Status: DISCONTINUED | OUTPATIENT
Start: 2021-05-12 | End: 2021-05-15 | Stop reason: HOSPADM

## 2021-05-12 RX ORDER — SOTALOL HYDROCHLORIDE 80 MG/1
80 TABLET ORAL 2 TIMES DAILY
Status: DISCONTINUED | OUTPATIENT
Start: 2021-05-12 | End: 2021-05-15 | Stop reason: HOSPADM

## 2021-05-12 RX ORDER — SODIUM CHLORIDE 0.9 % (FLUSH) 0.9 %
5-40 SYRINGE (ML) INJECTION EVERY 12 HOURS SCHEDULED
Status: DISCONTINUED | OUTPATIENT
Start: 2021-05-12 | End: 2021-05-15 | Stop reason: HOSPADM

## 2021-05-12 RX ORDER — ACETAMINOPHEN 325 MG/1
650 TABLET ORAL EVERY 6 HOURS PRN
Status: DISCONTINUED | OUTPATIENT
Start: 2021-05-12 | End: 2021-05-15 | Stop reason: HOSPADM

## 2021-05-12 RX ORDER — METOPROLOL SUCCINATE 100 MG/1
100 TABLET, EXTENDED RELEASE ORAL DAILY
Status: DISCONTINUED | OUTPATIENT
Start: 2021-05-12 | End: 2021-05-13

## 2021-05-12 RX ORDER — DILTIAZEM HYDROCHLORIDE 120 MG/1
120 CAPSULE, COATED, EXTENDED RELEASE ORAL DAILY
Status: DISCONTINUED | OUTPATIENT
Start: 2021-05-12 | End: 2021-05-14

## 2021-05-12 RX ADMIN — Medication 10 ML: at 22:11

## 2021-05-12 RX ADMIN — SOTALOL HYDROCHLORIDE 80 MG: 80 TABLET ORAL at 21:03

## 2021-05-12 ASSESSMENT — PAIN SCALES - GENERAL: PAINLEVEL_OUTOF10: 0

## 2021-05-12 NOTE — PLAN OF CARE
Problem: Cardiac:  Goal: Ability to maintain an adequate cardiac output will improve  Description: Ability to maintain an adequate cardiac output will improve  Outcome: Met This Shift

## 2021-05-13 LAB
ANION GAP SERPL CALCULATED.3IONS-SCNC: 11 MMOL/L (ref 7–16)
BUN BLDV-MCNC: 17 MG/DL (ref 6–20)
CALCIUM SERPL-MCNC: 8.8 MG/DL (ref 8.6–10.2)
CHLORIDE BLD-SCNC: 103 MMOL/L (ref 98–107)
CO2: 25 MMOL/L (ref 22–29)
CREAT SERPL-MCNC: 1 MG/DL (ref 0.7–1.2)
EKG ATRIAL RATE: 76 BPM
EKG Q-T INTERVAL: 442 MS
EKG QRS DURATION: 124 MS
EKG QTC CALCULATION (BAZETT): 500 MS
EKG R AXIS: -4 DEGREES
EKG T AXIS: 52 DEGREES
EKG VENTRICULAR RATE: 77 BPM
GFR AFRICAN AMERICAN: >60
GFR NON-AFRICAN AMERICAN: >60 ML/MIN/1.73
GLUCOSE BLD-MCNC: 110 MG/DL (ref 74–99)
INR BLD: 1.3
MAGNESIUM: 1.9 MG/DL (ref 1.6–2.6)
POTASSIUM SERPL-SCNC: 4.5 MMOL/L (ref 3.5–5)
PROTHROMBIN TIME: 14.1 SEC (ref 9.3–12.4)
SODIUM BLD-SCNC: 139 MMOL/L (ref 132–146)

## 2021-05-13 PROCEDURE — 6370000000 HC RX 637 (ALT 250 FOR IP): Performed by: STUDENT IN AN ORGANIZED HEALTH CARE EDUCATION/TRAINING PROGRAM

## 2021-05-13 PROCEDURE — 83735 ASSAY OF MAGNESIUM: CPT

## 2021-05-13 PROCEDURE — 85610 PROTHROMBIN TIME: CPT

## 2021-05-13 PROCEDURE — 93005 ELECTROCARDIOGRAM TRACING: CPT | Performed by: STUDENT IN AN ORGANIZED HEALTH CARE EDUCATION/TRAINING PROGRAM

## 2021-05-13 PROCEDURE — 94640 AIRWAY INHALATION TREATMENT: CPT

## 2021-05-13 PROCEDURE — 36415 COLL VENOUS BLD VENIPUNCTURE: CPT

## 2021-05-13 PROCEDURE — 2140000000 HC CCU INTERMEDIATE R&B

## 2021-05-13 PROCEDURE — 2580000003 HC RX 258: Performed by: STUDENT IN AN ORGANIZED HEALTH CARE EDUCATION/TRAINING PROGRAM

## 2021-05-13 PROCEDURE — 99222 1ST HOSP IP/OBS MODERATE 55: CPT | Performed by: STUDENT IN AN ORGANIZED HEALTH CARE EDUCATION/TRAINING PROGRAM

## 2021-05-13 PROCEDURE — 80048 BASIC METABOLIC PNL TOTAL CA: CPT

## 2021-05-13 PROCEDURE — 93010 ELECTROCARDIOGRAM REPORT: CPT | Performed by: INTERNAL MEDICINE

## 2021-05-13 RX ORDER — METOPROLOL SUCCINATE 100 MG/1
100 TABLET, EXTENDED RELEASE ORAL 2 TIMES DAILY
Status: DISCONTINUED | OUTPATIENT
Start: 2021-05-13 | End: 2021-05-15 | Stop reason: HOSPADM

## 2021-05-13 RX ADMIN — Medication 10 ML: at 08:52

## 2021-05-13 RX ADMIN — IPRATROPIUM BROMIDE AND ALBUTEROL SULFATE 1 AMPULE: .5; 3 SOLUTION RESPIRATORY (INHALATION) at 12:45

## 2021-05-13 RX ADMIN — IPRATROPIUM BROMIDE AND ALBUTEROL SULFATE 1 AMPULE: .5; 3 SOLUTION RESPIRATORY (INHALATION) at 20:34

## 2021-05-13 RX ADMIN — SOTALOL HYDROCHLORIDE 80 MG: 80 TABLET ORAL at 21:29

## 2021-05-13 RX ADMIN — SOTALOL HYDROCHLORIDE 80 MG: 80 TABLET ORAL at 08:51

## 2021-05-13 RX ADMIN — DILTIAZEM HYDROCHLORIDE 120 MG: 120 CAPSULE, COATED, EXTENDED RELEASE ORAL at 08:51

## 2021-05-13 RX ADMIN — IPRATROPIUM BROMIDE AND ALBUTEROL SULFATE 1 AMPULE: .5; 3 SOLUTION RESPIRATORY (INHALATION) at 09:16

## 2021-05-13 RX ADMIN — METOPROLOL SUCCINATE 100 MG: 100 TABLET, EXTENDED RELEASE ORAL at 22:04

## 2021-05-13 RX ADMIN — APIXABAN 5 MG: 5 TABLET, FILM COATED ORAL at 08:52

## 2021-05-13 RX ADMIN — ASPIRIN 81 MG CHEWABLE TABLET 81 MG: 81 TABLET CHEWABLE at 08:52

## 2021-05-13 RX ADMIN — LISINOPRIL 5 MG: 10 TABLET ORAL at 08:51

## 2021-05-13 RX ADMIN — METOPROLOL SUCCINATE 100 MG: 100 TABLET, EXTENDED RELEASE ORAL at 08:52

## 2021-05-13 RX ADMIN — Medication 10 ML: at 21:56

## 2021-05-13 RX ADMIN — APIXABAN 5 MG: 5 TABLET, FILM COATED ORAL at 21:55

## 2021-05-13 RX ADMIN — IPRATROPIUM BROMIDE AND ALBUTEROL SULFATE 1 AMPULE: .5; 3 SOLUTION RESPIRATORY (INHALATION) at 16:03

## 2021-05-13 ASSESSMENT — PAIN SCALES - GENERAL
PAINLEVEL_OUTOF10: 0

## 2021-05-13 ASSESSMENT — ENCOUNTER SYMPTOMS
COLOR CHANGE: 0
NAUSEA: 0
SINUS PRESSURE: 0
DIARRHEA: 0
ABDOMINAL DISTENTION: 0
SHORTNESS OF BREATH: 1
EYE REDNESS: 0
ABDOMINAL PAIN: 0
WHEEZING: 0
COUGH: 0
CHEST TIGHTNESS: 0

## 2021-05-13 NOTE — PLAN OF CARE
Problem: Cardiac:  Goal: Ability to maintain an adequate cardiac output will improve  Description: Ability to maintain an adequate cardiac output will improve  5/13/2021 0045 by Harsh Huynh RN  Outcome: Ongoing  Problem: Fluid Volume:  Goal: Ability to achieve and maintain adequate urine output will improve  Description: Ability to achieve and maintain adequate urine output will improve  Outcome: Met This Shift     Goal: Hemodynamic stability will improve  Description: Hemodynamic stability will improve  Outcome: Ongoing

## 2021-05-13 NOTE — PATIENT CARE CONFERENCE
P Quality Flow/Interdisciplinary Rounds Progress Note        Quality Flow Rounds held on May 13, 2021    Disciplines Attending:  Bedside Nurse, ,  and Nursing Unit Leadership    Amy Elliott was admitted on 5/12/2021  6:33 PM    Anticipated Discharge Date:  Expected Discharge Date: 05/15/21    Disposition:    Michael Score:  Michael Scale Score: 22    Readmission Risk              Risk of Unplanned Readmission:        13           Discussed patient goal for the day, patient clinical progression, and barriers to discharge.   The following Goal(s) of the Day/Commitment(s) have been identified:  Labs - Report Results      Neal Guerrero  May 13, 2021

## 2021-05-13 NOTE — CARE COORDINATION
Care Coordination -   the patient was admitted due to having afib. He is on apixaban 5 mg po q12. The patient was started on sotolol 80 mg bid. Plan to dc in am after his 4th dose. The patient is npo after after mn for an Electrical Cardioversion. The patient lives in a 2 story home alone. He has no dme at home. His PCP is Dr Rahel Cunningham and his pharmacy is [facility], his plan is home once he is medically stable and his sister [facility] will be the one to pick him up. I will follow.

## 2021-05-13 NOTE — H&P
Cardiac Electrophysiology   H&P    Toribio Eller  1960  Date of Service: 5/13/2021  Referring Provider/PCP: Micaela Rose MD  Established Electrophysiologist: Nicole Ravi MD  Attending Electrophysiologist: Agapito Best DO  Chief Complaint: atrial fibrillation    HPI: Patient is a 62 y.o. male with a history of acute hypoxic respiratory failure secondary to pulmonary, acute decompensated CHF, Morbid obesity BMI 36, NSCLC -SCC of medial right lower lobe DDx 6/2020, ANTONIO on CPAP, bilateral pleural effusions right greater than left -status post right thoracentesis s/p 1/17 with 850 mL of blood-tinged fluid removed, Acute inferior MI (4/17/07) with CABG 2007 -LIMA to LAD, SVG to D1, SVG to PDA. He has persistent atrial fibrillation with left atrial appendage thrombus on MAZIN in 2019 and is s/p DCCV 4/2020, 2/23/21, EKG 4/9/21 showed he was back in Afib. AF was refractory to multaq, so discontinued and presents today for sotalol load. He reports AF symptoms are dyspnea. He denies any other complaints at this time. Patient Active Problem List    Diagnosis Date Noted    AF (atrial fibrillation) ([facility] 05/12/2021    Acute respiratory failure with hypoxia ([facility] 01/15/2021    Malignant neoplasm of lung ([facility]     Essential (primary) hypertension 07/07/2020    Gastro-esophageal reflux disease without esophagitis 07/07/2020    Unspecified atrial fibrillation ([address] 07/07/2020    Hyperlipidemia, unspecified 07/07/2020    Chest pain 09/27/2019    CAD (coronary artery disease)      Overview Note:     A. Acute inferior MI (4/17/07). B. Cardiac catheterization (4/17/07):   LAD - proximal 85% bifurcating with first diagonal  D1 - ostial 50%  Cx - prox to mid 40%  OM1 - ostial 90%  RCA - prox diffuse 99% with heavy thrombus  C. Successful thrombectomy.   D. Coronary artery bypass surgery by Dr. Samantha Costa (4/18/07)  LIMA to LAD  SVG to D1  SVG to PDA    Cath 3-14  70% Cx - 3.5 x 18 mm Resolute ELENA  COPD (chronic obstructive pulmonary disease) ([facility])        Family History   Problem Relation Age of Onset    Hypertension Mother     Hypertension Father     No Known Problems Sister     No Known Problems Sister     No Known Problems Sister     Cancer Maternal Aunt         cancer       SOCIAL HISTORY   Social History     Socioeconomic History    Marital status:      Spouse name: Not on file    Number of children: Not on file    Years of education: Not on file    Highest education level: Not on file   Occupational History    Occupation:    Social Needs    Financial resource strain: Not on file    Food insecurity     Worry: Not on file     Inability: Not on file   [employer] needs     Medical: Not on file     Non-medical: Not on file   Tobacco Use    Smoking status: Former Smoker     Packs/day: 1.50     Years: 40.00     Pack years: 60.00     Types: Cigarettes     Start date:      Quit date: 2020     Years since quittin.8    Smokeless tobacco: Never Used   Substance and Sexual Activity    Alcohol use:  Yes     Alcohol/week: 4.0 - 6.0 standard drinks     Types: 4 - 6 Cans of beer per week     Comment: on weekend    Drug use: No    Sexual activity: Not Currently   Lifestyle    Physical activity     Days per week: Not on file     Minutes per session: Not on file    Stress: Not on file   Relationships    Social connections     Talks on phone: Not on file     Gets together: Not on file     Attends Sikhism service: Not on file     Active member of club or organization: Not on file     Attends meetings of clubs or organizations: Not on file     Relationship status: Not on file    Intimate partner violence     Fear of current or ex partner: Not on file     Emotionally abused: Not on file     Physically abused: Not on file     Forced sexual activity: Not on file   Other Topics Concern    Not on file   Social History Narrative    Social History Narrative: Tobacco cigarette smoker 1.5 PPD x 40 years (60 pack years). Quit 07/01/2020. Drinks 4-6 cans of beer 1 day every weekend. Prior history of moderately-heavy alcohol consumption. Denies any current or any history of illicit drug use/ abuse.          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               Past Surgical History:   Procedure Laterality Date    BRONCHOSCOPY N/A 6/4/2020    BRONCHOSCOPY  NAVIGATIONAL performed by Issa Dupont, DO at [address] [place] [address]  6/4/2020    BRONCHOSCOPY/TRANSBRONCHIAL NEEDLE BIOPSY performed by Issa Dupont, DO at [address] [place] [address]  6/4/2020    BRONCHOSCOPY BIOPSY BRONCHUS performed by Issa Dupont, DO at [address] [place] [address]  6/4/2020    BRONCHOSCOPY BRUSHINGS performed by Issa Dupont, DO at [address] [place] [address]  6/4/2020    BRONCHOSCOPY W/EBUS FNA performed by Issa Dupont, DO at [address] [place] [address]  6/4/2020    BRONCHOSCOPY ALVEOLAR LAVAGE performed by Issa Dupont DO at [place] [address]  3/20/14    3.5/18 Resolute to Mid-Cx    CORONARY ARTERY BYPASS GRAFT  4/18/07    DIAGNOSTIC CARDIAC CATH LAB PROCEDURE  4/17/07    ECHO COMPL W DOP COLOR FLOW  3/22/2012         TRANSESOPHAGEAL ECHOCARDIOGRAM  02/28/2020    julio césar       Current Facility-Administered Medications   Medication Dose Route Frequency Provider Last Rate Last Admin    sodium chloride flush 0.9 % injection 5-40 mL  5-40 mL Intravenous 2 times per day Cheli Vila DO   10 mL at 05/13/21 0852    sodium chloride flush 0.9 % injection 5-40 mL  5-40 mL Intravenous PRN Cheli Vila DO        0.9 % sodium chloride infusion  25 mL Intravenous PRN Cheli Vila DO        polyethylene glycol (GLYCOLAX) packet 17 g  17 g Oral Daily PRN Cheli Vila DO        acetaminophen (TYLENOL) tablet 650 mg  650 mg Oral Q6H PRN Agapito Finland, DO        Or    acetaminophen (TYLENOL) suppository 650 mg  650 mg Rectal Q6H PRN Agapito Finland, DO        apixaban (ELIQUIS) tablet 5 mg  5 mg Oral BID Johnie Finland, DO   5 mg at 05/13/21 0852    albuterol (PROVENTIL) nebulizer solution 2.5 mg  2.5 mg Nebulization Q6H PRN Agapito Finland, DO        aspirin chewable tablet 81 mg  81 mg Oral Daily Agapito Finland, DO   81 mg at 05/13/21 2958    rosuvastatin (CRESTOR) tablet 5 mg  5 mg Oral Nightly Johnie Marlin, DO        dilTIAZem (CARDIZEM CD) extended release capsule 120 mg  120 mg Oral Daily Johnie Marlin, DO   120 mg at 05/13/21 0851    ipratropium-albuterol (DUONEB) nebulizer solution 1 ampule  1 ampule Inhalation Q4H WA Johnie Marlin, DO   1 ampule at 05/13/21 1245    lisinopril (PRINIVIL;ZESTRIL) tablet 5 mg  5 mg Oral Daily Agapito Finland, DO   5 mg at 05/13/21 0851    metoprolol succinate (TOPROL XL) extended release tablet 100 mg  100 mg Oral Daily Johnie Marlin, DO   100 mg at 05/13/21 0852    nitroGLYCERIN (NITROSTAT) SL tablet 0.4 mg  0.4 mg Sublingual Q5 Min PRN Agapito Finland, DO        albuterol sulfate  (90 Base) MCG/ACT inhaler 2 puff  2 puff Inhalation Q6H PRN Agapito Finland, DO        sotalol (BETAPACE) tablet 80 mg  80 mg Oral BID Agapito Finland, DO   80 mg at 05/13/21 1278    fluticasone-umeclidin-vilant (TRELEGY ELLIPTA) 100-62.5-25 MCG/INH inhaler 1 puff  1 puff Inhalation Daily Agapito Finland, DO   1 puff at 05/13/21 0641        Allergies   Allergen Reactions    Zocor [Simvastatin - High Dose] Other (See Comments)     Causes Rhabdomyolysis           ROS:   Review of Systems   Constitutional: Negative for fatigue and fever. HENT: Negative for congestion, nosebleeds and sinus pressure. Eyes: Negative for redness and visual disturbance. Respiratory: Positive for shortness of breath.  Negative for cough, chest tightness and wheezing. Cardiovascular: Positive for palpitations. Negative for chest pain and leg swelling. Gastrointestinal: Negative for abdominal distention, abdominal pain, diarrhea and nausea. Endocrine: Negative for cold intolerance, heat intolerance, polydipsia and polyphagia. Genitourinary: Negative for difficulty urinating, frequency and urgency. Musculoskeletal: Negative for arthralgias, joint swelling and myalgias. Skin: Negative for color change and wound. Neurological: Negative for dizziness, syncope, weakness and numbness. Psychiatric/Behavioral: Negative for agitation, behavioral problems, confusion, decreased concentration, hallucinations and suicidal ideas. The patient is not nervous/anxious. PHYSICAL EXAM:  Vitals:    05/12/21 2318 05/13/21 0347 05/13/21 0640 05/13/21 1130   BP: 119/72 113/72 129/81 (!) 159/87   Pulse: 80 68 78 81   Resp: 18 17 17 18   Temp: 98.1 °F (36.7 °C) 96.9 °F (36.1 °C) 97.6 °F (36.4 °C) 98.8 °F (37.1 °C)   TempSrc: Axillary Temporal Oral Oral   SpO2: 92% 91%     Weight:       Height:       Limited exam due to COVID-19 pandemic. Constitutional: NAD, obese  Head: Normocephalic and atraumatic.    Neck: supple and no JVD present  Cardiovascular: IRIR  Pulmonary/Chest:  No respiratory distress, no audible wheeze  Abdominal: nondistended   Musculoskeletal: Normal range of motion of all extremities  Neurological: Alert and oriented x 3, grossly intact   Skin: Skin is warm and dry, CIED incision C/D/I without erythema, edema, or drainage  Extremity: no edema   Psychiatric: Normal mood and affect, A&O x3     Pertinent Labs:  CBC:   WBC (E9/L)   Date Value   05/12/2021 7.5   03/23/2021 7.2   01/15/2021 9.4     Hemoglobin (g/dL)   Date Value   05/12/2021 13.1   03/23/2021 13.7   01/15/2021 15.3     Hematocrit (%)   Date Value   05/12/2021 40.9   03/23/2021 42.9   01/15/2021 48.4     Platelets (U0/X)   Date Value   05/12/2021 243   03/23/2021 231   01/15/2021 247      BMP: Sodium (mmol/L)   Date Value   05/13/2021 139   05/12/2021 140   04/09/2021 137     Potassium (mmol/L)   Date Value   05/13/2021 4.5   04/09/2021 4.8   03/23/2021 5.2 (H)     Potassium reflex Magnesium (mmol/L)   Date Value   05/12/2021 4.5   01/15/2021 3.7   06/04/2020 4.9     Magnesium (mg/dL)   Date Value   05/13/2021 1.9   03/23/2021 2.0   01/20/2021 2.4     Chloride (mmol/L)   Date Value   05/13/2021 103   05/12/2021 103   04/09/2021 102     CO2 (mmol/L)   Date Value   05/13/2021 25   05/12/2021 25   04/09/2021 24     BUN (mg/dL)   Date Value   05/13/2021 17   05/12/2021 17   04/09/2021 29 (H)     CREATININE (mg/dL)   Date Value   05/13/2021 1.0   05/12/2021 0.9   04/09/2021 1.4 (H)     Glucose (mg/dL)   Date Value   05/13/2021 110 (H)   05/12/2021 97   04/09/2021 105 (H)   06/03/2011 129 (H)     Calcium (mg/dL)   Date Value   05/13/2021 8.8   05/12/2021 9.3   04/09/2021 10.1      INR:   INR (no units)   Date Value   05/13/2021 1.3   03/23/2021 1.7   06/04/2020 1.0      BNP: No results found for: BNP   TSH:   TSH (uIU/mL)   Date Value   01/15/2021 1.290   09/28/2019 1.600   03/09/2019 0.940      Cardiac Injury Profile: Total CK (U/L)   Date Value   08/06/2015 77     CK-MB (ng/mL)   Date Value   08/06/2015 1.3     Troponin (ng/mL)   Date Value   01/15/2021 <0.01     Lipid Profile:   Triglycerides (mg/dL)   Date Value   01/16/2021 67     HDL (mg/dL)   Date Value   01/16/2021 29     LDL Calculated (mg/dL)   Date Value   01/16/2021 59     Cholesterol, Total (mg/dL)   Date Value   01/16/2021 101      Hemoglobin A1C: No results found for: LABA1C        Pertinent Cardiac Testing:   3/5/21: Gated SPECT left ventricular ejection fraction was calculated to be 54%, with normal myocardial thickening and wall motion.     Impression:    1. ECG during the infusion did not change. 2. The myocardial perfusion imaging was normal with attenuation artifact.       3.  Overall left ventricular systolic function was normal without regional wall motion abnormalities. 4. Low risk general pharmacologic stress test.     1/15/21 TTE   Summary   Technically suboptimal and limited study. Left ventricular internal dimensions were normal in diastole and systole. Mild left ventricular concentric hypertrophy noted. Normal left ventricular ejection fraction. Septal motion consistent with post cardiac surgery. The left atrium is borderline dilated. Right ventricle global systolic function is reduced . Mildly enlarged right atrium size. Mild thickening of the mitral valve leaflets. Moderate mitral annular calcification potentially consistent with repair. Mild to moderate mitral regurgitation is present. The aortic valve appears mildly sclerotic. Mild aortic regurgitation is noted. Mild tricuspid regurgitation. Mildly dilated aortic root. Dilation of the ascending aorta. There is a trivial circumferential pericardial effusion noted. ECG 5/13/2021: atrial fibrillation, rate RBBB, rate 65 bpm    I have independently reviewed all of the ECGs and rhythm strips per above    I have personally reviewed the laboratory, cardiac diagnostic and radiographic testing as outlined above: We have requested previous records. No diagnosis found. ASSESSMENT & PLAN  1. Nonvalvular Persistent Atrial Fibrillation  -CHADSVASC = 2 (HF, CAD). Continue apixaban 5 mg every 12 hours. - He has persistent atrial fibrillation with left atrial appendage thrombus on MAZIN in 2019   - S/p DCCV 4/2020, 2/23/21, EKG 4/9/21 showed he was back in Afib. - AF refractory to multaq, so admitted for change to sotalol. Start sotalol 80 mg every 12 hours with continuous telemetry monitoring and ECG 2-3 hours after each dose. DCCV tomorrow after 4th dose. NPO at midnight. Anticipate discharge 5/15/21. Thank you for allowing me to participate in your patient's care.     Silvia Padron, [id number] [facility] [address]

## 2021-05-14 ENCOUNTER — ANESTHESIA EVENT (OUTPATIENT)
Dept: CARDIAC CATH/INVASIVE PROCEDURES | Age: 61
DRG: 309 | End: 2021-05-14
Payer: COMMERCIAL

## 2021-05-14 ENCOUNTER — ANESTHESIA (OUTPATIENT)
Dept: CARDIAC CATH/INVASIVE PROCEDURES | Age: 61
DRG: 309 | End: 2021-05-14
Payer: COMMERCIAL

## 2021-05-14 VITALS
SYSTOLIC BLOOD PRESSURE: 118 MMHG | RESPIRATION RATE: 26 BRPM | DIASTOLIC BLOOD PRESSURE: 72 MMHG | OXYGEN SATURATION: 95 %

## 2021-05-14 LAB
ANION GAP SERPL CALCULATED.3IONS-SCNC: 13 MMOL/L (ref 7–16)
BUN BLDV-MCNC: 17 MG/DL (ref 6–20)
CALCIUM SERPL-MCNC: 9.2 MG/DL (ref 8.6–10.2)
CHLORIDE BLD-SCNC: 98 MMOL/L (ref 98–107)
CO2: 25 MMOL/L (ref 22–29)
CREAT SERPL-MCNC: 1.1 MG/DL (ref 0.7–1.2)
EKG ATRIAL RATE: 375 BPM
EKG ATRIAL RATE: 69 BPM
EKG ATRIAL RATE: 85 BPM
EKG ATRIAL RATE: 89 BPM
EKG Q-T INTERVAL: 436 MS
EKG Q-T INTERVAL: 442 MS
EKG Q-T INTERVAL: 452 MS
EKG Q-T INTERVAL: 476 MS
EKG QRS DURATION: 124 MS
EKG QRS DURATION: 126 MS
EKG QRS DURATION: 130 MS
EKG QRS DURATION: 134 MS
EKG QTC CALCULATION (BAZETT): 477 MS
EKG QTC CALCULATION (BAZETT): 483 MS
EKG QTC CALCULATION (BAZETT): 495 MS
EKG QTC CALCULATION (BAZETT): 504 MS
EKG R AXIS: -12 DEGREES
EKG R AXIS: -31 DEGREES
EKG R AXIS: 7 DEGREES
EKG T AXIS: 32 DEGREES
EKG T AXIS: 40 DEGREES
EKG T AXIS: 42 DEGREES
EKG T AXIS: 44 DEGREES
EKG VENTRICULAR RATE: 65 BPM
EKG VENTRICULAR RATE: 70 BPM
EKG VENTRICULAR RATE: 74 BPM
EKG VENTRICULAR RATE: 75 BPM
GFR AFRICAN AMERICAN: >60
GFR NON-AFRICAN AMERICAN: >60 ML/MIN/1.73
GLUCOSE BLD-MCNC: 117 MG/DL (ref 74–99)
MAGNESIUM: 2.1 MG/DL (ref 1.6–2.6)
POTASSIUM SERPL-SCNC: 4.4 MMOL/L (ref 3.5–5)
SODIUM BLD-SCNC: 136 MMOL/L (ref 132–146)

## 2021-05-14 PROCEDURE — 2580000003 HC RX 258: Performed by: NURSE ANESTHETIST, CERTIFIED REGISTERED

## 2021-05-14 PROCEDURE — 2709999900 HC NON-CHARGEABLE SUPPLY

## 2021-05-14 PROCEDURE — 2580000003 HC RX 258: Performed by: STUDENT IN AN ORGANIZED HEALTH CARE EDUCATION/TRAINING PROGRAM

## 2021-05-14 PROCEDURE — 36415 COLL VENOUS BLD VENIPUNCTURE: CPT

## 2021-05-14 PROCEDURE — 6370000000 HC RX 637 (ALT 250 FOR IP): Performed by: INTERNAL MEDICINE

## 2021-05-14 PROCEDURE — 80048 BASIC METABOLIC PNL TOTAL CA: CPT

## 2021-05-14 PROCEDURE — 93010 ELECTROCARDIOGRAM REPORT: CPT | Performed by: INTERNAL MEDICINE

## 2021-05-14 PROCEDURE — 6370000000 HC RX 637 (ALT 250 FOR IP): Performed by: STUDENT IN AN ORGANIZED HEALTH CARE EDUCATION/TRAINING PROGRAM

## 2021-05-14 PROCEDURE — 83735 ASSAY OF MAGNESIUM: CPT

## 2021-05-14 PROCEDURE — 99233 SBSQ HOSP IP/OBS HIGH 50: CPT | Performed by: NURSE PRACTITIONER

## 2021-05-14 PROCEDURE — 2140000000 HC CCU INTERMEDIATE R&B

## 2021-05-14 PROCEDURE — 6360000002 HC RX W HCPCS: Performed by: NURSE ANESTHETIST, CERTIFIED REGISTERED

## 2021-05-14 PROCEDURE — 2580000003 HC RX 258: Performed by: INTERNAL MEDICINE

## 2021-05-14 PROCEDURE — 92960 CARDIOVERSION ELECTRIC EXT: CPT | Performed by: INTERNAL MEDICINE

## 2021-05-14 PROCEDURE — 93005 ELECTROCARDIOGRAM TRACING: CPT | Performed by: INTERNAL MEDICINE

## 2021-05-14 PROCEDURE — 93005 ELECTROCARDIOGRAM TRACING: CPT | Performed by: STUDENT IN AN ORGANIZED HEALTH CARE EDUCATION/TRAINING PROGRAM

## 2021-05-14 PROCEDURE — 5A2204Z RESTORATION OF CARDIAC RHYTHM, SINGLE: ICD-10-PCS | Performed by: INTERNAL MEDICINE

## 2021-05-14 PROCEDURE — 94640 AIRWAY INHALATION TREATMENT: CPT

## 2021-05-14 RX ORDER — SODIUM CHLORIDE 9 MG/ML
INJECTION, SOLUTION INTRAVENOUS CONTINUOUS PRN
Status: DISCONTINUED | OUTPATIENT
Start: 2021-05-14 | End: 2021-05-14 | Stop reason: SDUPTHER

## 2021-05-14 RX ORDER — PROPOFOL 10 MG/ML
INJECTION, EMULSION INTRAVENOUS PRN
Status: DISCONTINUED | OUTPATIENT
Start: 2021-05-14 | End: 2021-05-14 | Stop reason: SDUPTHER

## 2021-05-14 RX ORDER — SODIUM CHLORIDE 0.9 % (FLUSH) 0.9 %
5-40 SYRINGE (ML) INJECTION PRN
Status: DISCONTINUED | OUTPATIENT
Start: 2021-05-14 | End: 2021-05-15 | Stop reason: HOSPADM

## 2021-05-14 RX ORDER — SODIUM CHLORIDE 9 MG/ML
25 INJECTION, SOLUTION INTRAVENOUS PRN
Status: DISCONTINUED | OUTPATIENT
Start: 2021-05-14 | End: 2021-05-15 | Stop reason: HOSPADM

## 2021-05-14 RX ORDER — SODIUM CHLORIDE 0.9 % (FLUSH) 0.9 %
5-40 SYRINGE (ML) INJECTION EVERY 12 HOURS SCHEDULED
Status: DISCONTINUED | OUTPATIENT
Start: 2021-05-14 | End: 2021-05-15 | Stop reason: HOSPADM

## 2021-05-14 RX ADMIN — ROSUVASTATIN CALCIUM 5 MG: 5 TABLET, FILM COATED ORAL at 21:02

## 2021-05-14 RX ADMIN — SODIUM CHLORIDE, PRESERVATIVE FREE 10 ML: 5 INJECTION INTRAVENOUS at 21:02

## 2021-05-14 RX ADMIN — PROPOFOL 30 MG: 10 INJECTION, EMULSION INTRAVENOUS at 16:33

## 2021-05-14 RX ADMIN — METOPROLOL SUCCINATE 100 MG: 100 TABLET, EXTENDED RELEASE ORAL at 09:22

## 2021-05-14 RX ADMIN — APIXABAN 5 MG: 5 TABLET, FILM COATED ORAL at 09:19

## 2021-05-14 RX ADMIN — DILTIAZEM HYDROCHLORIDE 120 MG: 120 CAPSULE, COATED, EXTENDED RELEASE ORAL at 09:19

## 2021-05-14 RX ADMIN — LISINOPRIL 5 MG: 10 TABLET ORAL at 09:19

## 2021-05-14 RX ADMIN — IPRATROPIUM BROMIDE AND ALBUTEROL SULFATE 1 AMPULE: .5; 3 SOLUTION RESPIRATORY (INHALATION) at 08:16

## 2021-05-14 RX ADMIN — SOTALOL HYDROCHLORIDE 80 MG: 80 TABLET ORAL at 21:02

## 2021-05-14 RX ADMIN — Medication 10 ML: at 09:21

## 2021-05-14 RX ADMIN — METOPROLOL SUCCINATE 100 MG: 100 TABLET, EXTENDED RELEASE ORAL at 22:04

## 2021-05-14 RX ADMIN — PROPOFOL 70 MG: 10 INJECTION, EMULSION INTRAVENOUS at 16:32

## 2021-05-14 RX ADMIN — APIXABAN 5 MG: 5 TABLET, FILM COATED ORAL at 21:02

## 2021-05-14 RX ADMIN — IPRATROPIUM BROMIDE AND ALBUTEROL SULFATE 1 AMPULE: .5; 3 SOLUTION RESPIRATORY (INHALATION) at 13:03

## 2021-05-14 RX ADMIN — IPRATROPIUM BROMIDE AND ALBUTEROL SULFATE 1 AMPULE: .5; 3 SOLUTION RESPIRATORY (INHALATION) at 20:00

## 2021-05-14 RX ADMIN — SODIUM CHLORIDE: 9 INJECTION, SOLUTION INTRAVENOUS at 16:25

## 2021-05-14 RX ADMIN — SOTALOL HYDROCHLORIDE 80 MG: 80 TABLET ORAL at 09:19

## 2021-05-14 RX ADMIN — ASPIRIN 81 MG CHEWABLE TABLET 81 MG: 81 TABLET CHEWABLE at 09:19

## 2021-05-14 ASSESSMENT — PAIN SCALES - GENERAL
PAINLEVEL_OUTOF10: 0

## 2021-05-14 ASSESSMENT — ENCOUNTER SYMPTOMS: RESPIRATORY NEGATIVE: 1

## 2021-05-14 NOTE — CARE COORDINATION
Care Coordination-   the patient is post op cardioversion today , the plan is for the patient to be discharged home in the am. May need to decrease his beta blocker once back on sotolol. his plan is home once he is medically stable and his sister Deneise Essex will be the one to pick him up.  I will follow

## 2021-05-14 NOTE — PLAN OF CARE
Problem: Cardiac:  Goal: Ability to maintain an adequate cardiac output will improve  Description: Ability to maintain an adequate cardiac output will improve  Outcome: Met This Shift     Problem: Cardiac:  Goal: Hemodynamic stability will improve  Description: Hemodynamic stability will improve  Outcome: Met This Shift     Problem: Fluid Volume:  Goal: Ability to achieve and maintain adequate urine output will improve  Description: Ability to achieve and maintain adequate urine output will improve  5/14/2021 1006 by Glenna Guzman RN  Outcome: Met This Shift     Problem: Respiratory:  Goal: Respiratory status will improve  Description: Respiratory status will improve  5/14/2021 1006 by Glenna Guzman RN  Outcome: Met This Shift

## 2021-05-14 NOTE — PATIENT CARE CONFERENCE
P Quality Flow/Interdisciplinary Rounds Progress Note        Quality Flow Rounds held on May 14, 2021    Disciplines Attending:  Bedside Nurse, ,  and Nursing Unit Leadership    Bell Bear was admitted on 5/12/2021  6:33 PM    Anticipated Discharge Date:  Expected Discharge Date: 05/15/21    Disposition:    Michael Score:  Michael Scale Score: 23    Readmission Risk              Risk of Unplanned Readmission:        13           Discussed patient goal for the day, patient clinical progression, and barriers to discharge.   The following Goal(s) of the Day/Commitment(s) have been identified:  Discharge - [address]  May 14, 2021

## 2021-05-14 NOTE — PLAN OF CARE
Problem: Cardiac:  Goal: Ability to maintain an adequate cardiac output will improve  Description: Ability to maintain an adequate cardiac output will improve  Outcome: Met This Shift     Problem: Cardiac:  Goal: Hemodynamic stability will improve  Description: Hemodynamic stability will improve  5/14/2021 1828 by Ortiz Liu RN  Outcome: Met This Shift     Problem: Respiratory:  Goal: Respiratory status will improve  Description: Respiratory status will improve  5/14/2021 1828 by Ortiz Liu RN  Outcome: Met This Shift     Problem: Fluid Volume:  Goal: Ability to achieve and maintain adequate urine output will improve  Description: Ability to achieve and maintain adequate urine output will improve  5/14/2021 1828 by Ortiz Liu RN  Outcome: Met This Shift     Problem: Respiratory:  Goal: Respiratory status will improve  Description: Respiratory status will improve  5/14/2021 1828 by Ortiz Liu RN  Outcome: Met This Shift

## 2021-05-14 NOTE — PLAN OF CARE
Problem: Fluid Volume:  Goal: Ability to achieve and maintain adequate urine output will improve  Description: Ability to achieve and maintain adequate urine output will improve  Outcome: Met This Shift     Problem: Respiratory:  Goal: Respiratory status will improve  Description: Respiratory status will improve  5/14/2021 0203 by Aung Drew RN  Outcome: Met This Shift  5/13/2021 1611 by Aram Ricketts RN  Outcome: Met This Shift

## 2021-05-14 NOTE — PROCEDURES
[address] [place] [facility]  Procedure Report  PATIENT: Joshua Arthur  MEDICAL RECORD NUMBER: [id number]  DATE OF PROCEDURE:  5/14/2021  ATTENDING ELECTROPHYSIOLOGIST:  Karis Phipps MD  REFERRING PHYSICIAN: Dr. Johanne Laboy:    1. Direct Current Electrical Cardioversion    INDICATION: Persistent atrial fibrillation    PROCEDURE PERFORMED By: Karis Phipps MD    PROCEDURE TIME: 15 minutes    COMPICATIONS: None immediately apparent    ANESTHESIA: LMAC    DESCRIPTION OF PROCEDURE: The risks, benefits, and alternatives to the procedure were discussed with the patient, and informed consent was obtained. The patient was brought to the cardiovascular lab and sedated under the guidance of anesthesia. Once anesthesia was deemed adequate, a 200 J biphasic synchronous shock was applied and successfully restored normal sinus rhythm. The patient was then allowed to wake in the usual fashion. Comment: The patient was therapeutically anticoagulated for a minimum of 3 consecutive weeks prior to cardioversion. SUMMARY:  1. Successful cardioversion of persistent atrial fibrillation to sinus rhythm. RECOMMENDATIONS:  1. Continue Sotalol 80 mg every 12 hours. 2. Continue Toprol  mg bid and consider reduce the dose if develops bradycardia. 3. Discontinue Cardizem. 4. Continue Eliquis 5 mg bid.     Karis Phipps MD  Cardiac Electrophysiology  [facility]  [facility] [place]: [facility]  4:41 PM  5/14/2021

## 2021-05-14 NOTE — PROGRESS NOTES
Cardiac Electrophysiology Inpatient Progress Note    Will Comer  1960  Date of Service: 5/14/2021  Referring Provider/PCP: Antonio Contreras MD  Established Electrophysiologist: Juana Iniguez MD  Attending Electrophysiologist: Angelina Pinon DO  Chief Complaint: atrial fibrillation    HPI: Patient is a 62 y.o. male with a history of acute hypoxic respiratory failure secondary to pulmonary, acute decompensated CHF, Morbid obesity BMI 36, NSCLC -SCC of medial right lower lobe DDx 6/2020, ANTONIO on CPAP, bilateral pleural effusions right greater than left -status post right thoracentesis s/p 1/17 with 850 mL of blood-tinged fluid removed, Acute inferior MI (4/17/07) with CABG 2007 -LIMA to LAD, SVG to D1, SVG to PDA. He has persistent atrial fibrillation with left atrial appendage thrombus on MAZIN in 2019 and is s/p DCCV 4/2020, 2/23/21, EKG 4/9/21 showed he was back in Afib. AF was refractory to multaq, so discontinued and presents today for sotalol load. He reports AF symptoms are dyspnea. He denies any other complaints at this time. 05/14/2021 Will Comer is seen in hospital follow-up. He remains in atrial fibrillation with planned CV today. He has received his 4th Sotalol 80 mg dose this am. Lab work and ECG have been stable. AM ECG pending. He offers no complaints this am. He currently denies angina, dyspnea, syncope, orthopnea, and PND. Patient Active Problem List    Diagnosis Date Noted    AF (atrial fibrillation) ([address] 05/12/2021    Acute respiratory failure with hypoxia ([address] 01/15/2021    Malignant neoplasm of lung ([address]     Essential (primary) hypertension 07/07/2020    Gastro-esophageal reflux disease without esophagitis 07/07/2020    Unspecified atrial fibrillation ([address] 07/07/2020    Hyperlipidemia, unspecified 07/07/2020    Chest pain 09/27/2019    CAD (coronary artery disease)      Overview Note:     A. Acute inferior MI (4/17/07).   B. Cardiac catheterization (07):   LAD - proximal 85% bifurcating with first diagonal  D1 - ostial 50%  Cx - prox to mid 40%  OM1 - ostial 90%  RCA - prox diffuse 99% with heavy thrombus  C. Successful thrombectomy. D. Coronary artery bypass surgery by Dr. De Jesus Servant (07)  LIMA to LAD  SVG to D1  SVG to PDA    Cath 3-14  70% Cx - 3.5 x 18 mm Resolute ELENA      COPD (chronic obstructive pulmonary disease) ([facility]        Family History   Problem Relation Age of Onset    Hypertension Mother     Hypertension Father     No Known Problems Sister     No Known Problems Sister     No Known Problems Sister     Cancer Maternal Aunt         cancer       SOCIAL HISTORY   Social History     Socioeconomic History    Marital status:      Spouse name: Not on file    Number of children: Not on file    Years of education: Not on file    Highest education level: Not on file   Occupational History    Occupation:    Social Needs    Financial resource strain: Not on file    Food insecurity     Worry: Not on file     Inability: Not on file   Romansh PPDai needs     Medical: Not on file     Non-medical: Not on file   Tobacco Use    Smoking status: Former Smoker     Packs/day: 1.50     Years: 40.00     Pack years: 60.00     Types: Cigarettes     Start date:      Quit date: 2020     Years since quittin.8    Smokeless tobacco: Never Used   Substance and Sexual Activity    Alcohol use:  Yes     Alcohol/week: 4.0 - 6.0 standard drinks     Types: 4 - 6 Cans of beer per week     Comment: on weekend    Drug use: No    Sexual activity: Not Currently   Lifestyle    Physical activity     Days per week: Not on file     Minutes per session: Not on file    Stress: Not on file   Relationships    Social connections     Talks on phone: Not on file     Gets together: Not on file     Attends Gnosticist service: Not on file     Active member of club or organization: Not on file     Attends meetings of clubs or organizations: Not on file     Relationship status: Not on file    Intimate partner violence     Fear of current or ex partner: Not on file     Emotionally abused: Not on file     Physically abused: Not on file     Forced sexual activity: Not on file   Other Topics Concern    Not on file   Social History Narrative    Social History Narrative: Tobacco cigarette smoker 1.5 PPD x 40 years (60 pack years). Quit 07/01/2020. Drinks 4-6 cans of beer 1 day every weekend. Prior history of moderately-heavy alcohol consumption. Denies any current or any history of illicit drug use/ abuse.          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               Past Surgical History:   Procedure Laterality Date    BRONCHOSCOPY N/A 6/4/2020    BRONCHOSCOPY  NAVIGATIONAL performed by Kenny Weller DO at [address] [facility]  6/4/2020    BRONCHOSCOPY/TRANSBRONCHIAL NEEDLE BIOPSY performed by Kenny Weller DO at [facility]  6/4/2020    BRONCHOSCOPY BIOPSY BRONCHUS performed by Kenny Weller DO at [address]  6/4/2020    BRONCHOSCOPY BRUSHINGS performed by Kenny Weller DO at [facility]  6/4/2020    BRONCHOSCOPY W/EBUS FNA performed by Kenny Weller DO at [facility]  6/4/2020    BRONCHOSCOPY ALVEOLAR LAVAGE performed by Kenny Weller DO at [facility]  3/20/14    3.5/18 Resolute to Mid-Cx    CORONARY ARTERY BYPASS GRAFT  4/18/07    DIAGNOSTIC CARDIAC CATH LAB PROCEDURE  4/17/07    ECHO COMPL W DOP COLOR FLOW  3/22/2012         TRANSESOPHAGEAL ECHOCARDIOGRAM  02/28/2020    julio césar       Current Facility-Administered Medications   Medication Dose Route Frequency Provider Last Rate Last Admin    metoprolol succinate (TOPROL XL) extended release tablet 100 mg  100 mg 05/14/2021 4.4   05/13/2021 4.5   04/09/2021 4.8     Potassium reflex Magnesium (mmol/L)   Date Value   05/12/2021 4.5   01/15/2021 3.7   06/04/2020 4.9     Magnesium (mg/dL)   Date Value   05/14/2021 2.1   05/13/2021 1.9   03/23/2021 2.0     Chloride (mmol/L)   Date Value   05/14/2021 98   05/13/2021 103   05/12/2021 103     CO2 (mmol/L)   Date Value   05/14/2021 25   05/13/2021 25   05/12/2021 25     BUN (mg/dL)   Date Value   05/14/2021 17   05/13/2021 17   05/12/2021 17     CREATININE (mg/dL)   Date Value   05/14/2021 1.1   05/13/2021 1.0   05/12/2021 0.9     Glucose (mg/dL)   Date Value   05/14/2021 117 (H)   05/13/2021 110 (H)   05/12/2021 97   06/03/2011 129 (H)     Calcium (mg/dL)   Date Value   05/14/2021 9.2   05/13/2021 8.8   05/12/2021 9.3      INR:   INR (no units)   Date Value   05/13/2021 1.3   03/23/2021 1.7   06/04/2020 1.0      BNP: No results found for: BNP   TSH:   TSH (uIU/mL)   Date Value   01/15/2021 1.290   09/28/2019 1.600   03/09/2019 0.940      Cardiac Injury Profile: Total CK (U/L)   Date Value   08/06/2015 77     CK-MB (ng/mL)   Date Value   08/06/2015 1.3     Troponin (ng/mL)   Date Value   01/15/2021 <0.01     Lipid Profile:   Triglycerides (mg/dL)   Date Value   01/16/2021 67     HDL (mg/dL)   Date Value   01/16/2021 29     LDL Calculated (mg/dL)   Date Value   01/16/2021 59     Cholesterol, Total (mg/dL)   Date Value   01/16/2021 101      Hemoglobin A1C: No results found for: LABA1C        Pertinent Cardiac Testing:   3/5/21: Gated SPECT left ventricular ejection fraction was calculated to be 54%, with normal myocardial thickening and wall motion.     Impression:    1. ECG during the infusion did not change. 2. The myocardial perfusion imaging was normal with attenuation artifact.       3. Overall left ventricular systolic function was normal without regional wall motion abnormalities. 4.  Low risk general pharmacologic stress test.     1/15/21 TTE   Summary   Technically suboptimal and limited study. Left ventricular internal dimensions were normal in diastole and systole. Mild left ventricular concentric hypertrophy noted. Normal left ventricular ejection fraction. Septal motion consistent with post cardiac surgery. The left atrium is borderline dilated. Right ventricle global systolic function is reduced . Mildly enlarged right atrium size. Mild thickening of the mitral valve leaflets. Moderate mitral annular calcification potentially consistent with repair. Mild to moderate mitral regurgitation is present. The aortic valve appears mildly sclerotic. Mild aortic regurgitation is noted. Mild tricuspid regurgitation. Mildly dilated aortic root. Dilation of the ascending aorta. There is a trivial circumferential pericardial effusion noted. ECG 5/13/2021: atrial fibrillation, rate RBBB, rate 65 bpm  5/14/2021 ECG: pending    I have independently reviewed all of the ECGs and rhythm strips per above    I have personally reviewed the laboratory, cardiac diagnostic and radiographic testing as outlined above: We have requested previous records. No diagnosis found. ASSESSMENT & PLAN  1. Nonvalvular Persistent Atrial Fibrillation  -CHADSVASC = 2 (HF, CAD). Continue apixaban 5 mg every 12 hours. - He has persistent atrial fibrillation with left atrial appendage thrombus on MAZIN in 2019   - S/p DCCV 4/2020, 2/23/21, EKG 4/9/21 showed he was back in Afib. - AF refractory to multaq, so admitted for change to sotalol. Start sotalol 80 mg every 12 hours with continuous telemetry monitoring and ECG 2-3 hours after each dose. DCCV tomorrow after 4th dose. NPO at midnight. Anticipate discharge 5/15/21.  -Plan for CV today. Maintain NPO,  -Plan for discharge tomorrow after 6th dose/ ECG if he remains clinically stable  -May have to decrease BB dose once back in SR on Sotalol. Thank you for allowing me to participate in your patient's care.       Magan Jamison

## 2021-05-14 NOTE — ANESTHESIA PRE PROCEDURE
Department of Anesthesiology  Preprocedure Note       Name:  Lina Garsia   Age:  62 y.o.  :  1960                                          MRN:  [id number]         Date:  2021      Surgeon: Ganga Luevano    Procedure: DCCV    Medications prior to admission:   Prior to Admission medications    Medication Sig Start Date End Date Taking?  Authorizing Provider   metoprolol succinate (TOPROL XL) 100 MG extended release tablet TAKE 1 TABLET TWICE A DAY 21  Yes Matthieu Christie, DO   lisinopril (PRINIVIL;ZESTRIL) 5 MG tablet Take 1 tablet by mouth daily 21  Yes Matthieu Christie, DO   apixaban (ELIQUIS) 5 MG TABS tablet TAKE 1 TABLET TWICE A DAY 21  Yes Matthieu Christie, DO   ASPIRIN LOW DOSE 81 MG EC tablet TAKE 1 TABLET DAILY 21  Yes Matthieu Christie, DO   dilTIAZem (CARDIZEM CD) 120 MG extended release capsule TAKE 1 CAPSULE DAILY 21  Yes Matthieu Christie, DO   fluticasone-umeclidin-vilant (TRELEGY ELLIPTA) 100-62.5-25 MCG/INH AEPB Inhale 1 puff into the lungs daily 21  Yes SIENNA Keita - CNP   CRESTOR 40 MG tablet 40 mg daily  2/7/15  Yes Historical Provider, MD   vitamin D (ERGOCALCIFEROL) 03223 UNITS CAPS capsule Take 50,000 Units by mouth once a week mondays 3/6/13  Yes Historical Provider, MD   lisinopril (PRINIVIL;ZESTRIL) 5 MG tablet Take 5 mg by mouth daily    Historical Provider, MD   albuterol (PROVENTIL) (2.5 MG/3ML) 0.083% nebulizer solution Take 0.083 mLs by nebulization 4 times daily as needed 21   Historical Provider, MD   PROAIR  (90 Base) MCG/ACT inhaler INHALE TWO PUFFS BY MOUTH EVERY 4 TO 6 HOURS AS NEEDED 20   Historical Provider, MD   ipratropium-albuterol (DUONEB) 0.5-2.5 (3) MG/3ML SOLN nebulizer solution Inhale 3 mLs into the lungs every 4 hours 19   Guillermo Bradshaw MD   nitroGLYCERIN (NITROSTAT) 0.4 MG SL tablet Place 1 tablet under the tongue every 5 minutes as needed for Chest pain 8/6/15   Alistair Pathak MD       Current medications:    Current Facility-Administered Medications   Medication Dose Route Frequency Provider Last Rate Last Admin    metoprolol succinate (TOPROL XL) extended release tablet 100 mg  100 mg Oral BID Giselle Peeling, DO   100 mg at 05/14/21 0088    sodium chloride flush 0.9 % injection 5-40 mL  5-40 mL Intravenous 2 times per day Giselle Peeling, DO   10 mL at 05/14/21 4691    sodium chloride flush 0.9 % injection 5-40 mL  5-40 mL Intravenous PRN Giselle Peeling, DO        0.9 % sodium chloride infusion  25 mL Intravenous PRN Giselle Peeling, DO        polyethylene glycol (GLYCOLAX) packet 17 g  17 g Oral Daily PRN Giselle Peeling, DO        acetaminophen (TYLENOL) tablet 650 mg  650 mg Oral Q6H PRN Giselle Peeling, DO        Or    acetaminophen (TYLENOL) suppository 650 mg  650 mg Rectal Q6H PRN Giselle Peeling, DO        apixaban (ELIQUIS) tablet 5 mg  5 mg Oral BID Giselle Peeling, DO   5 mg at 05/14/21 0919    albuterol (PROVENTIL) nebulizer solution 2.5 mg  2.5 mg Nebulization Q6H PRN Giselle Peeling, DO        aspirin chewable tablet 81 mg  81 mg Oral Daily Giselle Peeling, DO   81 mg at 05/14/21 0919    rosuvastatin (CRESTOR) tablet 5 mg  5 mg Oral Nightly Giselle Peeling, DO        dilTIAZem (CARDIZEM CD) extended release capsule 120 mg  120 mg Oral Daily Giselle Peeling, DO   120 mg at 05/14/21 0919    ipratropium-albuterol (DUONEB) nebulizer solution 1 ampule  1 ampule Inhalation Q4H WA Giselle Peeling, DO   1 ampule at 05/14/21 1303    lisinopril (PRINIVIL;ZESTRIL) tablet 5 mg  5 mg Oral Daily Giselle Peeling, DO   5 mg at 05/14/21 0919    nitroGLYCERIN (NITROSTAT) SL tablet 0.4 mg  0.4 mg Sublingual Q5 Min PRN Giselle Peeling, DO        albuterol sulfate  (90 Base) MCG/ACT inhaler 2 puff  2 puff Inhalation Q6H PRN Giselle Peeling, DO        sotalol (BETAPACE) tablet 80 mg  80 mg Oral BID Giselle Peeling, DO   80 mg at 05/14/21 0919    CORONARY ANGIOPLASTY WITH STENT PLACEMENT  3/20/14    3.5/18 Resolute to Mid-Cx    CORONARY ARTERY BYPASS GRAFT  07    DIAGNOSTIC CARDIAC CATH LAB PROCEDURE  07    ECHO COMPL W DOP COLOR FLOW  3/22/2012         TRANSESOPHAGEAL ECHOCARDIOGRAM  2020    angela       Social History:    Social History     Tobacco Use    Smoking status: Former Smoker     Packs/day: 1.50     Years: 40.00     Pack years: 60.00     Types: Cigarettes     Start date:      Quit date: 2020     Years since quittin.8    Smokeless tobacco: Never Used   Substance Use Topics    Alcohol use: Yes     Alcohol/week: 4.0 - 6.0 standard drinks     Types: 4 - 6 Cans of beer per week     Comment: on weekend                                Counseling given: Not Answered      Vital Signs (Current):   Vitals:    21 0701 21 0817 21 0919 21 1304   BP: 94/64  125/65    Pulse: 63      Resp: 16      Temp: 36.7 °C (98 °F)      TempSrc: Temporal      SpO2: 95% 98%  97%   Weight:       Height:                                                  BP Readings from Last 3 Encounters:   21 125/65   21 132/86   21 119/75       NPO Status:                                                                                 BMI:   Wt Readings from Last 3 Encounters:   21 295 lb (133.8 kg)   21 293 lb 9.6 oz (133.2 kg)   21 290 lb (131.5 kg)     Body mass index is 41.14 kg/m².     CBC:   Lab Results   Component Value Date    WBC 7.5 2021    RBC 4.56 2021    HGB 13.1 2021    HCT 40.9 2021    MCV 89.7 2021    RDW 14.2 2021     2021       CMP:   Lab Results   Component Value Date     2021    K 4.4 2021    K 4.5 2021    CL 98 2021    CO2 25 2021    BUN 17 2021    CREATININE 1.1 2021    GFRAA >60 2021    LABGLOM >60 2021    GLUCOSE 117 2021    GLUCOSE 129 2011    PROT 7.1 05/12/2021    CALCIUM 9.2 05/14/2021    BILITOT 0.3 05/12/2021    ALKPHOS 80 05/12/2021    AST 15 05/12/2021    ALT 11 05/12/2021       POC Tests: No results for input(s): POCGLU, POCNA, POCK, POCCL, POCBUN, POCHEMO, POCHCT in the last 72 hours. Coags:   Lab Results   Component Value Date    PROTIME 14.1 05/13/2021    INR 1.3 05/13/2021    APTT 43.5 03/23/2021       HCG (If Applicable): No results found for: PREGTESTUR, PREGSERUM, HCG, HCGQUANT     ABGs: No results found for: PHART, PO2ART, STO0PKF, WBS6TGT, BEART, R8JHFEYA     Type & Screen (If Applicable):  No results found for: LABABO, LABRH    Drug/Infectious Status (If Applicable):  No results found for: HIV, HEPCAB    COVID-19 Screening (If Applicable):   Lab Results   Component Value Date    COVID19 Not Detected 05/06/2021     ECG 5/14/21:  Atrial fibrillation  Right bundle branch block  Inferior infarct (cited on or before 12-MAY-2021)      Echo 1/16/21:   Left Ventricle   Left ventricular internal dimensions were normal in diastole and systole. Mild left ventricular concentric hypertrophy noted. Micro-bubble contrast injected to enhance left ventricular visualization. Septal motion consistent with post cardiac surgery. Normal left ventricular ejection fraction. Ejection fraction is visually estimated at 60%. Indeterminate diastolic function. Right Ventricle   Normal right ventricular size. Right ventricle global systolic function is reduced(TAPSE 1.4cm) . Left Atrium   The left atrium is borderline dilated. Interatrial septum appears intact. Right Atrium   Mildly enlarged right atrium size. Mitral Valve   Mild thickening of the mitral valve leaflets. Moderate mitral annular calcification potentially consistent with repair. Mild to moderate mitral regurgitation is present. Tricuspid Valve   The tricuspid valve was not well visualized. Mild tricuspid regurgitation.       Aortic Valve   The aortic valve leaflets were not well visualized. The aortic valve appears mildly sclerotic. Mild aortic regurgitation is noted. Pulmonic Valve   The pulmonic valve was not well visualized. Pericardial Effusion   There is a trivial circumferential pericardial effusion noted. Aorta   Mildly dilated aortic root(3.9cm). Dilation of the ascending aorta(3.9cm). Summary   Technically suboptimal and limited study. Left ventricular internal dimensions were normal in diastole and systole. Mild left ventricular concentric hypertrophy noted. Normal left ventricular ejection fraction. Septal motion consistent with post cardiac surgery. The left atrium is borderline dilated. Right ventricle global systolic function is reduced . Mildly enlarged right atrium size. Mild thickening of the mitral valve leaflets. Moderate mitral annular calcification potentially consistent with repair. Mild to moderate mitral regurgitation is present. The aortic valve appears mildly sclerotic. Mild aortic regurgitation is noted. Mild tricuspid regurgitation. Mildly dilated aortic root. Dilation of the ascending aorta. There is a trivial circumferential pericardial effusion noted. CT 12/14/2020:  Bilateral pleural effusion, greater on the right. Ground-glass opacity in the posterior and medial segments of right lower lobe. Left thyroid nodule.  Further evaluation with dedicated thyroid sonogram   recommended.             Anesthesia Evaluation  Patient summary reviewed and Nursing notes reviewed no history of anesthetic complications:   Airway: Mallampati: III  TM distance: >3 FB   Neck ROM: full  Mouth opening: > = 3 FB Dental: normal exam         Pulmonary: breath sounds clear to auscultation  (+) COPD:  sleep apnea:                            ROS comment: plerural effusions  Lung CA   Cardiovascular:    (+) hypertension:, CAD:, dysrhythmias: atrial fibrillation,       ECG reviewed  Rhythm: irregular  Rate: normal  Echocardiogram reviewed                  Neuro/Psych:               GI/Hepatic/Renal:   (+) GERD:, morbid obesity          Endo/Other:                      ROS comment: Thyroid nodule Abdominal:           Vascular:                                      Anesthesia Plan      MAC     ASA 4       Induction: intravenous. Anesthetic plan and risks discussed with patient. Plan discussed with attending and CRNA. SIENNA Fajardo - CRNA   5/14/2021      Patient seen and examined, chart reviewed, agree with above findings. Anesthetic plan, risks, benefits, alternatives, and personnel involved discussed with patient. Patient verbalized an understanding and agreed to proceed. NPO status confirmed. Anesthetic plan discussed with care team members and agreed upon.     Denisha Loaiza DO   5/14/2021  4:30 PM

## 2021-05-15 VITALS
WEIGHT: 295 LBS | HEART RATE: 53 BPM | SYSTOLIC BLOOD PRESSURE: 120 MMHG | DIASTOLIC BLOOD PRESSURE: 58 MMHG | TEMPERATURE: 98.8 F | OXYGEN SATURATION: 98 % | HEIGHT: 71 IN | RESPIRATION RATE: 18 BRPM | BODY MASS INDEX: 41.3 KG/M2

## 2021-05-15 LAB
ANION GAP SERPL CALCULATED.3IONS-SCNC: 10 MMOL/L (ref 7–16)
BUN BLDV-MCNC: 21 MG/DL (ref 6–20)
CALCIUM SERPL-MCNC: 9.1 MG/DL (ref 8.6–10.2)
CHLORIDE BLD-SCNC: 99 MMOL/L (ref 98–107)
CO2: 25 MMOL/L (ref 22–29)
CREAT SERPL-MCNC: 1.1 MG/DL (ref 0.7–1.2)
EKG ATRIAL RATE: 54 BPM
EKG ATRIAL RATE: 55 BPM
EKG ATRIAL RATE: 59 BPM
EKG P AXIS: 17 DEGREES
EKG P AXIS: 60 DEGREES
EKG P AXIS: 89 DEGREES
EKG P-R INTERVAL: 214 MS
EKG P-R INTERVAL: 238 MS
EKG P-R INTERVAL: 248 MS
EKG Q-T INTERVAL: 482 MS
EKG Q-T INTERVAL: 514 MS
EKG Q-T INTERVAL: 520 MS
EKG QRS DURATION: 122 MS
EKG QRS DURATION: 126 MS
EKG QRS DURATION: 128 MS
EKG QTC CALCULATION (BAZETT): 461 MS
EKG QTC CALCULATION (BAZETT): 493 MS
EKG QTC CALCULATION (BAZETT): 508 MS
EKG R AXIS: 5 DEGREES
EKG R AXIS: 9 DEGREES
EKG T AXIS: 40 DEGREES
EKG T AXIS: 42 DEGREES
EKG T AXIS: 8 DEGREES
EKG VENTRICULAR RATE: 54 BPM
EKG VENTRICULAR RATE: 55 BPM
EKG VENTRICULAR RATE: 59 BPM
GFR AFRICAN AMERICAN: >60
GFR NON-AFRICAN AMERICAN: >60 ML/MIN/1.73
GLUCOSE BLD-MCNC: 122 MG/DL (ref 74–99)
MAGNESIUM: 2.2 MG/DL (ref 1.6–2.6)
POTASSIUM SERPL-SCNC: 4.8 MMOL/L (ref 3.5–5)
SODIUM BLD-SCNC: 134 MMOL/L (ref 132–146)

## 2021-05-15 PROCEDURE — 2580000003 HC RX 258: Performed by: INTERNAL MEDICINE

## 2021-05-15 PROCEDURE — 6370000000 HC RX 637 (ALT 250 FOR IP): Performed by: INTERNAL MEDICINE

## 2021-05-15 PROCEDURE — 93010 ELECTROCARDIOGRAM REPORT: CPT | Performed by: INTERNAL MEDICINE

## 2021-05-15 PROCEDURE — 36415 COLL VENOUS BLD VENIPUNCTURE: CPT

## 2021-05-15 PROCEDURE — 94640 AIRWAY INHALATION TREATMENT: CPT

## 2021-05-15 PROCEDURE — 83735 ASSAY OF MAGNESIUM: CPT

## 2021-05-15 PROCEDURE — 80048 BASIC METABOLIC PNL TOTAL CA: CPT

## 2021-05-15 PROCEDURE — 93005 ELECTROCARDIOGRAM TRACING: CPT | Performed by: INTERNAL MEDICINE

## 2021-05-15 RX ORDER — METOPROLOL SUCCINATE 100 MG/1
TABLET, EXTENDED RELEASE ORAL
Qty: 180 TABLET | Refills: 3 | Status: SHIPPED | OUTPATIENT
Start: 2021-05-15 | End: 2021-12-20 | Stop reason: SDUPTHER

## 2021-05-15 RX ORDER — SOTALOL HYDROCHLORIDE 80 MG/1
80 TABLET ORAL 2 TIMES DAILY
Qty: 60 TABLET | Refills: 3 | Status: SHIPPED | OUTPATIENT
Start: 2021-05-15 | End: 2021-08-30

## 2021-05-15 RX ADMIN — SOTALOL HYDROCHLORIDE 80 MG: 80 TABLET ORAL at 09:01

## 2021-05-15 RX ADMIN — ASPIRIN 81 MG CHEWABLE TABLET 81 MG: 81 TABLET CHEWABLE at 09:01

## 2021-05-15 RX ADMIN — APIXABAN 5 MG: 5 TABLET, FILM COATED ORAL at 09:01

## 2021-05-15 RX ADMIN — IPRATROPIUM BROMIDE AND ALBUTEROL SULFATE 1 AMPULE: .5; 3 SOLUTION RESPIRATORY (INHALATION) at 08:48

## 2021-05-15 RX ADMIN — Medication 10 ML: at 09:09

## 2021-05-15 RX ADMIN — IPRATROPIUM BROMIDE AND ALBUTEROL SULFATE 1 AMPULE: .5; 3 SOLUTION RESPIRATORY (INHALATION) at 13:25

## 2021-05-15 RX ADMIN — SODIUM CHLORIDE, PRESERVATIVE FREE 10 ML: 5 INJECTION INTRAVENOUS at 09:02

## 2021-05-15 RX ADMIN — LISINOPRIL 5 MG: 10 TABLET ORAL at 09:01

## 2021-05-15 ASSESSMENT — PAIN SCALES - GENERAL: PAINLEVEL_OUTOF10: 0

## 2021-05-15 NOTE — PLAN OF CARE
Problem: Cardiac:  Goal: Ability to maintain an adequate cardiac output will improve  Description: Ability to maintain an adequate cardiac output will improve  Outcome: Met This Shift  Goal: Hemodynamic stability will improve  Description: Hemodynamic stability will improve  5/15/2021 0432 by Masha Hernandez RN  Outcome: Met This Shift  5/14/2021 1828 by Glenna Guzman RN  Outcome: Met This Shift     Problem: Fluid Volume:  Goal: Ability to achieve and maintain adequate urine output will improve  Description: Ability to achieve and maintain adequate urine output will improve  5/15/2021 0432 by Masha Hernandez RN  Outcome: Met This Shift  5/14/2021 1828 by Glenna Guzman RN  Outcome: Met This Shift     Problem: Respiratory:  Goal: Respiratory status will improve  Description: Respiratory status will improve  5/15/2021 0432 by Masha Hernandez RN  Outcome: Met This Shift  5/14/2021 1828 by Glenna Guzman RN  Outcome: Met This Shift

## 2021-05-15 NOTE — DISCHARGE SUMMARY
HOSPITAL DISCHARGE SUMMARY      Patient: Lynette Jerome  Medical Record Number: [id number]  Operating Electrophysiologist: Jackelin English MD  Admission Date:5/12/2021        Discharge Date:       SUBJECTIVE No complaint. OBJECTIVE  Physical examination: OK  Relevant laboratory data: OK  Relevant imaging data: NA  Relevant telemetry/EKG data:   Telemetry: sinus rhythm with acceptable rate since cardioversion. EKG: sinus bradycardia, RBBB, QTc 460.        [facility] Medication Instructions CFE:[id number]    Printed on:05/15/21 1205     Medication Information                        albuterol (PROVENTIL) (2.5 MG/3ML) 0.083% nebulizer solution  Take 0.083 mLs by nebulization 4 times daily as needed             apixaban (ELIQUIS) 5 MG TABS tablet  TAKE 1 TABLET TWICE A DAY             ASPIRIN LOW DOSE 81 MG EC tablet  TAKE 1 TABLET DAILY             CRESTOR 40 MG tablet  40 mg daily              dilTIAZem (CARDIZEM CD) 120 MG extended release capsule  TAKE 1 CAPSULE DAILY             fluticasone-umeclidin-vilant (TRELEGY ELLIPTA) 100-62.5-25 MCG/INH AEPB  Inhale 1 puff into the lungs daily             ipratropium-albuterol (DUONEB) 0.5-2.5 (3) MG/3ML SOLN nebulizer solution  Inhale 3 mLs into the lungs every 4 hours             lisinopril (PRINIVIL;ZESTRIL) 5 MG tablet  Take 5 mg by mouth daily             lisinopril (PRINIVIL;ZESTRIL) 5 MG tablet  Take 1 tablet by mouth daily             metoprolol succinate (TOPROL XL) 100 MG extended release tablet  TAKE 1 TABLET TWICE A DAY             nitroGLYCERIN (NITROSTAT) 0.4 MG SL tablet  Place 1 tablet under the tongue every 5 minutes as needed for Chest pain             PROAIR  (90 Base) MCG/ACT inhaler  INHALE TWO PUFFS BY MOUTH EVERY 4 TO 6 HOURS AS NEEDED             vitamin D (ERGOCALCIFEROL) 49863 UNITS CAPS capsule  Take 50,000 Units by mouth once a week mondays NOTES ON PROCEDURES  Cardioversion 5/14. NOTES ON HOSPITAL COURSE  Admitted 5/13 electively for introduction of sotalol therapy. Cardioversion 5/14. Sinus sustained since cardioversion. Symptomatically tolerant of sotalol addition. QT interval acceptable. CONDITION AT DISCHARGE  Stable    DISPOSITION  1. Home on sotalol 80 BID, in addition to previous medicines with the exception of diltiazem, which I have discontinued as to prevent problematic bradycardia in the setting of sotalol and beta blocker. 2. Will arrange appropriate office FU.      Kaylene Alexandra MD, [facility]  Cardiac Electrophysiology  12:08 PM  5/15/2021

## 2021-05-15 NOTE — PROGRESS NOTES
Messaged EP if wanting metoprolol given this morning HR 50-60. Waiting for response.      Advised to hold this morning

## 2021-05-15 NOTE — PLAN OF CARE
Problem: Cardiac:  Goal: Ability to maintain an adequate cardiac output will improve  Description: Ability to maintain an adequate cardiac output will improve  5/15/2021 0808 by Renetta Hernandez RN  Outcome: Met This Shift     Problem: Cardiac:  Goal: Hemodynamic stability will improve  Description: Hemodynamic stability will improve  5/15/2021 0808 by Renetta Hernandez RN  Outcome: Met This Shift     Problem: Fluid Volume:  Goal: Ability to achieve and maintain adequate urine output will improve  Description: Ability to achieve and maintain adequate urine output will improve  5/15/2021 0808 by Renetta Hernandez RN  Outcome: Met This Shift     Problem: Respiratory:  Goal: Respiratory status will improve  Description: Respiratory status will improve  5/15/2021 0808 by Renetta Hernandez RN  Outcome: Met This Shift

## 2021-05-16 NOTE — ANESTHESIA POSTPROCEDURE EVALUATION
Department of Anesthesiology  Postprocedure Note    Patient: Dante Ibarra  MRN: [id number]  YOB: 1960  Date of evaluation: 5/16/2021  Time:  7:53 PM     Procedure Summary     Date: 05/14/21 [place] [facility] [place]    Anesthesia Start: 4801 Anesthesia Stop: 1638    Procedure: CARDIOVERSION WITH ANESTHESIA Diagnosis:     Scheduled Providers:  Responsible Provider: Wilner Vines,     Anesthesia Type: MAC ASA Status: 4          Anesthesia Type: MAC    Rossana Phase I:      Rossana Phase II:      Last vitals: Reviewed and per EMR flowsheets.        Anesthesia Post Evaluation    Patient location during evaluation: PACU  Patient participation: complete - patient participated  Level of consciousness: awake and alert  Pain score: 1  Airway patency: patent  Nausea & Vomiting: no nausea and no vomiting  Complications: no  Cardiovascular status: hemodynamically stable  Respiratory status: acceptable  Hydration status: euvolemic

## 2021-05-21 ENCOUNTER — NURSE ONLY (OUTPATIENT)
Dept: NON INVASIVE DIAGNOSTICS | Age: 61
End: 2021-05-21
Payer: COMMERCIAL

## 2021-05-21 DIAGNOSIS — Z79.899 ENCOUNTER FOR MONITORING SOTALOL THERAPY: Primary | ICD-10-CM

## 2021-05-21 DIAGNOSIS — I48.19 PERSISTENT ATRIAL FIBRILLATION (HCC): ICD-10-CM

## 2021-05-21 DIAGNOSIS — Z51.81 ENCOUNTER FOR MONITORING SOTALOL THERAPY: Primary | ICD-10-CM

## 2021-05-21 PROCEDURE — 93000 ELECTROCARDIOGRAM COMPLETE: CPT | Performed by: INTERNAL MEDICINE

## 2021-06-15 ENCOUNTER — OFFICE VISIT (OUTPATIENT)
Dept: NON INVASIVE DIAGNOSTICS | Age: 61
End: 2021-06-15
Payer: COMMERCIAL

## 2021-06-15 VITALS
WEIGHT: 291 LBS | HEIGHT: 71 IN | RESPIRATION RATE: 16 BRPM | SYSTOLIC BLOOD PRESSURE: 112 MMHG | HEART RATE: 92 BPM | DIASTOLIC BLOOD PRESSURE: 78 MMHG | BODY MASS INDEX: 40.74 KG/M2

## 2021-06-15 DIAGNOSIS — R94.31 ABNORMAL EKG: ICD-10-CM

## 2021-06-15 DIAGNOSIS — I45.10 RBBB: ICD-10-CM

## 2021-06-15 DIAGNOSIS — I48.19 PERSISTENT ATRIAL FIBRILLATION (HCC): Primary | ICD-10-CM

## 2021-06-15 DIAGNOSIS — E66.01 MORBID OBESITY (HCC): ICD-10-CM

## 2021-06-15 DIAGNOSIS — R00.2 PALPITATIONS: ICD-10-CM

## 2021-06-15 DIAGNOSIS — I10 ESSENTIAL (PRIMARY) HYPERTENSION: ICD-10-CM

## 2021-06-15 PROCEDURE — 99215 OFFICE O/P EST HI 40 MIN: CPT | Performed by: INTERNAL MEDICINE

## 2021-06-15 PROCEDURE — 93000 ELECTROCARDIOGRAM COMPLETE: CPT | Performed by: INTERNAL MEDICINE

## 2021-06-15 ASSESSMENT — ENCOUNTER SYMPTOMS
CHEST TIGHTNESS: 0
WHEEZING: 0
COUGH: 0
SHORTNESS OF BREATH: 0
SINUS PRESSURE: 0
COLOR CHANGE: 0
ABDOMINAL DISTENTION: 0
NAUSEA: 0
EYE REDNESS: 0
ABDOMINAL PAIN: 0
DIARRHEA: 0

## 2021-06-15 NOTE — PROGRESS NOTES
that he is out of rhythm      Patient Active Problem List    Diagnosis Date Noted    AF (atrial fibrillation) ([facility] 2021    Acute respiratory failure with hypoxia ([facility] 01/15/2021    Malignant neoplasm of lung ([facility]     Essential (primary) hypertension 2020    Gastro-esophageal reflux disease without esophagitis 2020    Unspecified atrial fibrillation ([facility] 2020    Hyperlipidemia, unspecified 2020    Chest pain 2019    CAD (coronary artery disease)      Overview Note:     A. Acute inferior MI (07). B. Cardiac catheterization (07):   LAD - proximal 85% bifurcating with first diagonal  D1 - ostial 50%  Cx - prox to mid 40%  OM1  ostial 90%  RCA - prox diffuse 99% with heavy thrombus  C. Successful thrombectomy. D. Coronary artery bypass surgery by Dr. Deandra Iraheta (07)  LIMA to LAD  SVG to D1  SVG to PDA    Cath 3-14  70% Cx - 3.5 x 18 mm Resolute ELENA      COPD (chronic obstructive pulmonary disease) ([facility]        Family History   Problem Relation Age of Onset    Hypertension Mother     Hypertension Father     No Known Problems Sister     No Known Problems Sister     No Known Problems Sister     Cancer Maternal Aunt         cancer       SOCIAL HISTORY   Social History     Socioeconomic History    Marital status:      Spouse name: Not on file    Number of children: Not on file    Years of education: Not on file    Highest education level: Not on file   Occupational History    Occupation:    Tobacco Use    Smoking status: Former Smoker     Packs/day: 1.50     Years: 40.00     Pack years: 60.00     Types: Cigarettes     Start date:      Quit date: 2020     Years since quittin.9    Smokeless tobacco: Never Used   Vaping Use    Vaping Use: Never used   Substance and Sexual Activity    Alcohol use:  Yes     Alcohol/week: 4.0 - 6.0 standard drinks     Types: 4 - 6 Cans of beer per week     Comment: on weekend   Brijesh Hernandez Drug use: No    Sexual activity: Not Currently   Other Topics Concern    Not on file   Social History Narrative    Social History Narrative: Tobacco cigarette smoker 1.5 PPD x 40 years (60 pack years). Quit 07/01/2020. Drinks 4-6 cans of beer 1 day every weekend. Prior history of moderately-heavy alcohol consumption. Denies any current or any history of illicit drug use/ abuse.          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           Social Determinants of Health     Financial Resource Strain:     Difficulty of Paying Living Expenses:    Food Insecurity:     Worried About Running Out of Food in the Last Year:     [address] in the Last Year:    Transportation Needs:     Lack of Transportation (Medical):      Lack of Transportation (Non-Medical):    Physical Activity:     Days of Exercise per Week:     Minutes of Exercise per Session:    Stress:     Feeling of Stress :    Social Connections:     Frequency of Communication with Friends and Family:     Frequency of Social Gatherings with Friends and Family:     Attends Confucianist Services:     Active Member of Clubs or Organizations:     Attends Club or Organization Meetings:     Marital Status:    Intimate Partner Violence:     Fear of Current or Ex-Partner:     Emotionally Abused:     Physically Abused:     Sexually Abused:          Past Surgical History:   Procedure Laterality Date    BRONCHOSCOPY N/A 06/04/2020    BRONCHOSCOPY  NAVIGATIONAL performed by [employer] DO at [address] [place] [address]  06/04/2020    BRONCHOSCOPY/TRANSBRONCHIAL NEEDLE BIOPSY performed by [employer] DO at [address] [place] [address]  06/04/2020    BRONCHOSCOPY BIOPSY BRONCHUS performed by [employer] DO at [address] [place] [address]  06/04/2020    BRONCHOSCOPY BRUSHINGS performed by [employer] DO at [address] [place] [address]  BRONCHOSCOPY  06/04/2020    BRONCHOSCOPY W/EBUS FNA performed by Rosalin Siemens, DO at [address] [facility]  06/04/2020    BRONCHOSCOPY ALVEOLAR LAVAGE performed by Rosalin Siemens, DO at [facility]  05/14/2021    successful   Dr Danie Grijalva  03/20/2014    3.5/18 Resolute to Mid-Cx    CORONARY ARTERY BYPASS GRAFT  04/18/2007    DIAGNOSTIC CARDIAC CATH LAB PROCEDURE  04/17/2007    ECHO COMPL W DOP COLOR FLOW  03/22/2012         TRANSESOPHAGEAL ECHOCARDIOGRAM  02/28/2020    julio césar       Current Outpatient Medications   Medication Sig Dispense Refill    metoprolol succinate (TOPROL XL) 100 MG extended release tablet TAKE 1 Daily 180 tablet 3    sotalol (BETAPACE) 80 MG tablet Take 1 tablet by mouth 2 times daily 60 tablet 3    apixaban (ELIQUIS) 5 MG TABS tablet TAKE 1 TABLET TWICE A  tablet 3    ASPIRIN LOW DOSE 81 MG EC tablet TAKE 1 TABLET DAILY 90 tablet 3    lisinopril (PRINIVIL;ZESTRIL) 5 MG tablet Take 5 mg by mouth daily      albuterol (PROVENTIL) (2.5 MG/3ML) 0.083% nebulizer solution Take 0.083 mLs by nebulization 4 times daily as needed      fluticasone-umeclidin-vilant (TRELEGY ELLIPTA) 100-62.5-25 MCG/INH AEPB Inhale 1 puff into the lungs daily 1 each 5    PROAIR  (90 Base) MCG/ACT inhaler INHALE TWO PUFFS BY MOUTH EVERY 4 TO 6 HOURS AS NEEDED      ipratropium-albuterol (DUONEB) 0.5-2.5 (3) MG/3ML SOLN nebulizer solution Inhale 3 mLs into the lungs every 4 hours 360 mL 0    nitroGLYCERIN (NITROSTAT) 0.4 MG SL tablet Place 1 tablet under the tongue every 5 minutes as needed for Chest pain 25 tablet 0    CRESTOR 40 MG tablet 40 mg daily       vitamin D (ERGOCALCIFEROL) 66926 UNITS CAPS capsule Take 50,000 Units by mouth once a week mondays       No current facility-administered medications for this visit.         Allergies   Allergen Reactions    Zocor [Simvastatin - High Dose] Other (See Comments) Causes Rhabdomyolysis           ROS:   Review of Systems   Constitutional: Negative for fatigue and fever. HENT: Negative for congestion, nosebleeds and sinus pressure. Eyes: Negative for redness and visual disturbance. Respiratory: Negative for cough, chest tightness, shortness of breath and wheezing. Cardiovascular: Negative for chest pain, palpitations and leg swelling. Gastrointestinal: Negative for abdominal distention, abdominal pain, diarrhea and nausea. Endocrine: Negative for cold intolerance, heat intolerance, polydipsia and polyphagia. Genitourinary: Negative for difficulty urinating, frequency and urgency. Musculoskeletal: Negative for arthralgias, joint swelling and myalgias. Skin: Negative for color change and wound. Neurological: Negative for dizziness, syncope, weakness and numbness. Psychiatric/Behavioral: Negative for agitation, behavioral problems, confusion, decreased concentration, hallucinations and suicidal ideas. The patient is not nervous/anxious. PHYSICAL EXAM:  Vitals:    06/15/21 1354   BP: 112/78   Pulse: 92   Resp: 16   Weight: 291 lb (132 kg)   Height: 5' 11\" (1.803 m)     Physical Exam  Vitals reviewed. Constitutional:       Appearance: Normal appearance. HENT:      Head: Normocephalic. Mouth/Throat:      Mouth: Mucous membranes are moist.      Pharynx: Oropharynx is clear. Eyes:      Conjunctiva/sclera: Conjunctivae normal.   Neck:      Vascular: No carotid bruit. Cardiovascular:      Rate and Rhythm: Normal rate. Rhythm irregular. Pulses: Normal pulses. Heart sounds: Normal heart sounds. Pulmonary:      Effort: Pulmonary effort is normal.      Breath sounds: Normal breath sounds. No rales. Chest:      Chest wall: No tenderness. Abdominal:      General: Bowel sounds are normal.      Palpations: Abdomen is soft. Musculoskeletal:         General: Normal range of motion.       Cervical back: Normal range of motion and neck supple. Skin:     General: Skin is warm. Neurological:      General: No focal deficit present. Mental Status: He is alert and oriented to person, place, and time. Psychiatric:         Mood and Affect: Mood normal.         Behavior: Behavior normal.         Thought Content: Thought content normal.          Pertinent Labs:  CBC:   WBC (E9/L)   Date Value   05/12/2021 7.5   03/23/2021 7.2   01/15/2021 9.4     Hemoglobin (g/dL)   Date Value   05/12/2021 13.1   03/23/2021 13.7   01/15/2021 15.3     Hematocrit (%)   Date Value   05/12/2021 40.9   03/23/2021 42.9   01/15/2021 48.4     Platelets (S6/F)   Date Value   05/12/2021 243   03/23/2021 231   01/15/2021 247      BMP:   Sodium (mmol/L)   Date Value   05/15/2021 134   05/14/2021 136   05/13/2021 139     Potassium (mmol/L)   Date Value   05/15/2021 4.8   05/14/2021 4.4   05/13/2021 4.5     Potassium reflex Magnesium (mmol/L)   Date Value   05/12/2021 4.5   01/15/2021 3.7   06/04/2020 4.9     Magnesium (mg/dL)   Date Value   05/15/2021 2.2   05/14/2021 2.1   05/13/2021 1.9     Chloride (mmol/L)   Date Value   05/15/2021 99   05/14/2021 98   05/13/2021 103     CO2 (mmol/L)   Date Value   05/15/2021 25   05/14/2021 25   05/13/2021 25     BUN (mg/dL)   Date Value   05/15/2021 21 (H)   05/14/2021 17   05/13/2021 17     CREATININE (mg/dL)   Date Value   05/15/2021 1.1   05/14/2021 1.1   05/13/2021 1.0     Glucose (mg/dL)   Date Value   05/15/2021 122 (H)   05/14/2021 117 (H)   05/13/2021 110 (H)   06/03/2011 129 (H)     Calcium (mg/dL)   Date Value   05/15/2021 9.1   05/14/2021 9.2   05/13/2021 8.8      INR:   INR (no units)   Date Value   05/13/2021 1.3   03/23/2021 1.7   06/04/2020 1.0      BNP: No results found for: BNP   TSH:   TSH (uIU/mL)   Date Value   01/15/2021 1.290   09/28/2019 1.600   03/09/2019 0.940      Cardiac Injury Profile:    Total CK (U/L)   Date Value   08/06/2015 77     CK-MB (ng/mL)   Date Value   08/06/2015 1.3     Troponin (ng/mL) Date Value   01/15/2021 <0.01     Lipid Profile:   Triglycerides (mg/dL)   Date Value   01/16/2021 67     HDL (mg/dL)   Date Value   01/16/2021 29     LDL Calculated (mg/dL)   Date Value   01/16/2021 59     Cholesterol, Total (mg/dL)   Date Value   01/16/2021 101      Hemoglobin A1C: No results found for: LABA1C        Pertinent Cardiac Testing:   3/5/21: Gated SPECT left ventricular ejection fraction was calculated to be 54%, with normal myocardial thickening and wall motion.     Impression:    1. ECG during the infusion did not change. 2. The myocardial perfusion imaging was normal with attenuation artifact.       3. Overall left ventricular systolic function was normal without regional wall motion abnormalities. 4. Low risk general pharmacologic stress test.     1/15/21 TTE   Summary   Technically suboptimal and limited study. Left ventricular internal dimensions were normal in diastole and systole. Mild left ventricular concentric hypertrophy noted. Normal left ventricular ejection fraction. Septal motion consistent with post cardiac surgery. The left atrium is borderline dilated. Right ventricle global systolic function is reduced . Mildly enlarged right atrium size. Mild thickening of the mitral valve leaflets. Moderate mitral annular calcification potentially consistent with repair. Mild to moderate mitral regurgitation is present. The aortic valve appears mildly sclerotic. Mild aortic regurgitation is noted. Mild tricuspid regurgitation. Mildly dilated aortic root. Dilation of the ascending aorta. There is a trivial circumferential pericardial effusion noted. ECG 6/15/2021: atrial fibrillation, rate RBBB, rate 92 bpm    I have independently reviewed all of the ECGs and rhythm strips per above    I have personally reviewed the laboratory, cardiac diagnostic and radiographic testing as outlined above: We have requested previous records.       1. Persistent atrial Electrophysiology  [address] [place] [address]

## 2021-07-02 ENCOUNTER — HOSPITAL ENCOUNTER (OUTPATIENT)
Dept: CT IMAGING | Age: 61
Discharge: HOME | End: 2021-07-04
Payer: COMMERCIAL

## 2021-07-02 DIAGNOSIS — C34.31 CANCER OF LOWER LOBE OF RIGHT LUNG (HCC): ICD-10-CM

## 2021-07-02 PROCEDURE — 71260 CT THORAX DX C+: CPT

## 2021-07-02 PROCEDURE — 6360000004 HC RX CONTRAST MEDICATION: Performed by: RADIOLOGY

## 2021-07-02 RX ADMIN — IOPAMIDOL 75 ML: 755 INJECTION, SOLUTION INTRAVENOUS at 15:22

## 2021-07-12 ENCOUNTER — HOSPITAL ENCOUNTER (OUTPATIENT)
Dept: MRI IMAGING | Age: 61
Discharge: HOME | End: 2021-07-14
Payer: COMMERCIAL

## 2021-07-12 ENCOUNTER — HOSPITAL ENCOUNTER (OUTPATIENT)
Dept: NUCLEAR MEDICINE | Age: 61
Discharge: HOME | End: 2021-07-12
Payer: COMMERCIAL

## 2021-07-12 DIAGNOSIS — C34.31 CANCER OF LOWER LOBE OF RIGHT LUNG (HCC): ICD-10-CM

## 2021-07-12 PROCEDURE — 78815 PET IMAGE W/CT SKULL-THIGH: CPT

## 2021-07-12 PROCEDURE — A9552 F18 FDG: HCPCS | Performed by: RADIOLOGY

## 2021-07-12 PROCEDURE — 3430000000 HC RX DIAGNOSTIC RADIOPHARMACEUTICAL: Performed by: RADIOLOGY

## 2021-07-12 PROCEDURE — A9577 INJ MULTIHANCE: HCPCS | Performed by: RADIOLOGY

## 2021-07-12 PROCEDURE — 6360000004 HC RX CONTRAST MEDICATION: Performed by: RADIOLOGY

## 2021-07-12 PROCEDURE — 70553 MRI BRAIN STEM W/O & W/DYE: CPT

## 2021-07-12 RX ORDER — FLUDEOXYGLUCOSE F 18 200 MCI/ML
15 INJECTION, SOLUTION INTRAVENOUS
Status: COMPLETED | OUTPATIENT
Start: 2021-07-12 | End: 2021-07-12

## 2021-07-12 RX ADMIN — FLUDEOXYGLUCOSE F 18 15 MILLICURIE: 200 INJECTION, SOLUTION INTRAVENOUS at 14:33

## 2021-07-12 RX ADMIN — GADOBENATE DIMEGLUMINE 20 ML: 529 INJECTION, SOLUTION INTRAVENOUS at 14:55

## 2021-07-26 ENCOUNTER — TELEPHONE (OUTPATIENT)
Dept: NON INVASIVE DIAGNOSTICS | Age: 61
End: 2021-07-26

## 2021-07-26 DIAGNOSIS — R00.2 PALPITATIONS: ICD-10-CM

## 2021-07-26 DIAGNOSIS — I10 ESSENTIAL (PRIMARY) HYPERTENSION: ICD-10-CM

## 2021-07-26 DIAGNOSIS — I48.19 PERSISTENT ATRIAL FIBRILLATION (HCC): ICD-10-CM

## 2021-07-26 DIAGNOSIS — I45.10 RBBB: ICD-10-CM

## 2021-07-26 DIAGNOSIS — E66.01 MORBID OBESITY (HCC): ICD-10-CM

## 2021-07-26 DIAGNOSIS — R94.31 ABNORMAL EKG: ICD-10-CM

## 2021-07-26 NOTE — TELEPHONE ENCOUNTER
----- Message from Anabel Sheridan sent at 7/26/2021 11:00 AM EDT -----    ----- Message -----  From: Beni Snider MD  Sent: 7/26/2021  10:42 AM EDT  To: Anabel Sheridan    He was in AF most of the time.  Pls arrange for follow up to discuss options

## 2021-07-26 NOTE — TELEPHONE ENCOUNTER
Spoke to the patient and informed him of his monitor results. Scheduled the patient 08/05/2021 @ 2:15 PM with the provider to discuss AF options.

## 2021-08-11 ENCOUNTER — HOSPITAL ENCOUNTER (INPATIENT)
Age: 61
LOS: 4 days | Discharge: HOME | DRG: 189 | End: 2021-08-15
Attending: EMERGENCY MEDICINE | Admitting: INTERNAL MEDICINE
Payer: COMMERCIAL

## 2021-08-11 ENCOUNTER — APPOINTMENT (OUTPATIENT)
Dept: GENERAL RADIOLOGY | Age: 61
DRG: 189 | End: 2021-08-11
Payer: COMMERCIAL

## 2021-08-11 DIAGNOSIS — J96.01 ACUTE RESPIRATORY FAILURE WITH HYPOXIA (HCC): Primary | ICD-10-CM

## 2021-08-11 DIAGNOSIS — J90 RECURRENT RIGHT PLEURAL EFFUSION: ICD-10-CM

## 2021-08-11 PROBLEM — Z87.891 FORMER SMOKER: Status: ACTIVE | Noted: 2021-08-11

## 2021-08-11 PROBLEM — R77.8 ELEVATED TROPONIN: Status: ACTIVE | Noted: 2021-08-11

## 2021-08-11 PROBLEM — G47.33 OSA (OBSTRUCTIVE SLEEP APNEA): Status: ACTIVE | Noted: 2021-08-11

## 2021-08-11 PROBLEM — R79.89 ELEVATED TROPONIN: Status: ACTIVE | Noted: 2021-08-11

## 2021-08-11 LAB
ANION GAP SERPL CALCULATED.3IONS-SCNC: 11 MMOL/L (ref 7–16)
BASOPHILS ABSOLUTE: 0.05 E9/L (ref 0–0.2)
BASOPHILS RELATIVE PERCENT: 0.6 % (ref 0–2)
BUN BLDV-MCNC: 18 MG/DL (ref 6–20)
CALCIUM SERPL-MCNC: 8.8 MG/DL (ref 8.6–10.2)
CHLORIDE BLD-SCNC: 101 MMOL/L (ref 98–107)
CO2: 29 MMOL/L (ref 22–29)
CREAT SERPL-MCNC: 1 MG/DL (ref 0.7–1.2)
EKG ATRIAL RATE: 120 BPM
EKG Q-T INTERVAL: 308 MS
EKG QRS DURATION: 50 MS
EKG QTC CALCULATION (BAZETT): 447 MS
EKG R AXIS: -12 DEGREES
EKG T AXIS: 56 DEGREES
EKG VENTRICULAR RATE: 127 BPM
EOSINOPHILS ABSOLUTE: 0.45 E9/L (ref 0.05–0.5)
EOSINOPHILS RELATIVE PERCENT: 5.4 % (ref 0–6)
GFR AFRICAN AMERICAN: >60
GFR NON-AFRICAN AMERICAN: >60 ML/MIN/1.73
GLUCOSE BLD-MCNC: 120 MG/DL (ref 74–99)
HCT VFR BLD CALC: 41.3 % (ref 37–54)
HEMOGLOBIN: 13.3 G/DL (ref 12.5–16.5)
IMMATURE GRANULOCYTES #: 0.21 E9/L
IMMATURE GRANULOCYTES %: 2.5 % (ref 0–5)
LACTIC ACID, SEPSIS: 1 MMOL/L (ref 0.5–1.9)
LYMPHOCYTES ABSOLUTE: 0.74 E9/L (ref 1.5–4)
LYMPHOCYTES RELATIVE PERCENT: 8.9 % (ref 20–42)
MCH RBC QN AUTO: 28.7 PG (ref 26–35)
MCHC RBC AUTO-ENTMCNC: 32.2 % (ref 32–34.5)
MCV RBC AUTO: 89.2 FL (ref 80–99.9)
MONOCYTES ABSOLUTE: 0.82 E9/L (ref 0.1–0.95)
MONOCYTES RELATIVE PERCENT: 9.8 % (ref 2–12)
NEUTROPHILS ABSOLUTE: 6.06 E9/L (ref 1.8–7.3)
NEUTROPHILS RELATIVE PERCENT: 72.8 % (ref 43–80)
PDW BLD-RTO: 13.6 FL (ref 11.5–15)
PLATELET # BLD: 250 E9/L (ref 130–450)
PMV BLD AUTO: 10.8 FL (ref 7–12)
POTASSIUM REFLEX MAGNESIUM: 4.1 MMOL/L (ref 3.5–5)
PRO-BNP: 2688 PG/ML (ref 0–125)
RBC # BLD: 4.63 E12/L (ref 3.8–5.8)
SARS-COV-2, NAAT: NOT DETECTED
SODIUM BLD-SCNC: 141 MMOL/L (ref 132–146)
TROPONIN, HIGH SENSITIVITY: 18 NG/L (ref 0–11)
WBC # BLD: 8.3 E9/L (ref 4.5–11.5)

## 2021-08-11 PROCEDURE — 93010 ELECTROCARDIOGRAM REPORT: CPT | Performed by: INTERNAL MEDICINE

## 2021-08-11 PROCEDURE — 99284 EMERGENCY DEPT VISIT MOD MDM: CPT

## 2021-08-11 PROCEDURE — 87040 BLOOD CULTURE FOR BACTERIA: CPT

## 2021-08-11 PROCEDURE — 93005 ELECTROCARDIOGRAM TRACING: CPT | Performed by: STUDENT IN AN ORGANIZED HEALTH CARE EDUCATION/TRAINING PROGRAM

## 2021-08-11 PROCEDURE — 94640 AIRWAY INHALATION TREATMENT: CPT

## 2021-08-11 PROCEDURE — 71045 X-RAY EXAM CHEST 1 VIEW: CPT

## 2021-08-11 PROCEDURE — 87635 SARS-COV-2 COVID-19 AMP PRB: CPT

## 2021-08-11 PROCEDURE — 6370000000 HC RX 637 (ALT 250 FOR IP): Performed by: NURSE PRACTITIONER

## 2021-08-11 PROCEDURE — 84484 ASSAY OF TROPONIN QUANT: CPT

## 2021-08-11 PROCEDURE — 94660 CPAP INITIATION&MGMT: CPT

## 2021-08-11 PROCEDURE — 6370000000 HC RX 637 (ALT 250 FOR IP): Performed by: STUDENT IN AN ORGANIZED HEALTH CARE EDUCATION/TRAINING PROGRAM

## 2021-08-11 PROCEDURE — 2060000000 HC ICU INTERMEDIATE R&B

## 2021-08-11 PROCEDURE — 80048 BASIC METABOLIC PNL TOTAL CA: CPT

## 2021-08-11 PROCEDURE — 85025 COMPLETE CBC W/AUTO DIFF WBC: CPT

## 2021-08-11 PROCEDURE — 83605 ASSAY OF LACTIC ACID: CPT

## 2021-08-11 PROCEDURE — 83880 ASSAY OF NATRIURETIC PEPTIDE: CPT

## 2021-08-11 PROCEDURE — 94664 DEMO&/EVAL PT USE INHALER: CPT

## 2021-08-11 RX ORDER — SODIUM CHLORIDE 9 MG/ML
25 INJECTION, SOLUTION INTRAVENOUS PRN
Status: DISCONTINUED | OUTPATIENT
Start: 2021-08-11 | End: 2021-08-15 | Stop reason: HOSPADM

## 2021-08-11 RX ORDER — ONDANSETRON 2 MG/ML
4 INJECTION INTRAMUSCULAR; INTRAVENOUS EVERY 6 HOURS PRN
Status: CANCELLED | OUTPATIENT
Start: 2021-08-11

## 2021-08-11 RX ORDER — ALBUTEROL SULFATE 90 UG/1
2 AEROSOL, METERED RESPIRATORY (INHALATION) EVERY 4 HOURS PRN
Status: DISCONTINUED | OUTPATIENT
Start: 2021-08-11 | End: 2021-08-11 | Stop reason: CLARIF

## 2021-08-11 RX ORDER — ONDANSETRON 4 MG/1
4 TABLET, ORALLY DISINTEGRATING ORAL EVERY 8 HOURS PRN
Status: CANCELLED | OUTPATIENT
Start: 2021-08-11

## 2021-08-11 RX ORDER — BUDESONIDE 0.5 MG/2ML
0.5 INHALANT ORAL 2 TIMES DAILY
Status: DISCONTINUED | OUTPATIENT
Start: 2021-08-11 | End: 2021-08-15 | Stop reason: HOSPADM

## 2021-08-11 RX ORDER — SODIUM CHLORIDE 0.9 % (FLUSH) 0.9 %
5-40 SYRINGE (ML) INJECTION PRN
Status: DISCONTINUED | OUTPATIENT
Start: 2021-08-11 | End: 2021-08-15 | Stop reason: HOSPADM

## 2021-08-11 RX ORDER — METOPROLOL SUCCINATE 100 MG/1
100 TABLET, EXTENDED RELEASE ORAL DAILY
Status: DISCONTINUED | OUTPATIENT
Start: 2021-08-12 | End: 2021-08-15 | Stop reason: HOSPADM

## 2021-08-11 RX ORDER — ASPIRIN 81 MG/1
81 TABLET ORAL DAILY
Status: DISCONTINUED | OUTPATIENT
Start: 2021-08-12 | End: 2021-08-15 | Stop reason: HOSPADM

## 2021-08-11 RX ORDER — IPRATROPIUM BROMIDE AND ALBUTEROL SULFATE 2.5; .5 MG/3ML; MG/3ML
2 SOLUTION RESPIRATORY (INHALATION) ONCE
Status: COMPLETED | OUTPATIENT
Start: 2021-08-11 | End: 2021-08-11

## 2021-08-11 RX ORDER — SODIUM CHLORIDE 0.9 % (FLUSH) 0.9 %
5-40 SYRINGE (ML) INJECTION EVERY 12 HOURS SCHEDULED
Status: DISCONTINUED | OUTPATIENT
Start: 2021-08-11 | End: 2021-08-15 | Stop reason: HOSPADM

## 2021-08-11 RX ORDER — IPRATROPIUM BROMIDE AND ALBUTEROL SULFATE 2.5; .5 MG/3ML; MG/3ML
1 SOLUTION RESPIRATORY (INHALATION) EVERY 4 HOURS
Status: DISCONTINUED | OUTPATIENT
Start: 2021-08-11 | End: 2021-08-15 | Stop reason: HOSPADM

## 2021-08-11 RX ORDER — ARFORMOTEROL TARTRATE 15 UG/2ML
15 SOLUTION RESPIRATORY (INHALATION) 2 TIMES DAILY
Status: DISCONTINUED | OUTPATIENT
Start: 2021-08-11 | End: 2021-08-15 | Stop reason: HOSPADM

## 2021-08-11 RX ORDER — LISINOPRIL 5 MG/1
5 TABLET ORAL DAILY
Status: DISCONTINUED | OUTPATIENT
Start: 2021-08-12 | End: 2021-08-15 | Stop reason: HOSPADM

## 2021-08-11 RX ORDER — POLYETHYLENE GLYCOL 3350 17 G/17G
17 POWDER, FOR SOLUTION ORAL DAILY PRN
Status: DISCONTINUED | OUTPATIENT
Start: 2021-08-11 | End: 2021-08-15 | Stop reason: HOSPADM

## 2021-08-11 RX ORDER — ROSUVASTATIN CALCIUM 20 MG/1
40 TABLET, COATED ORAL NIGHTLY
Status: DISCONTINUED | OUTPATIENT
Start: 2021-08-11 | End: 2021-08-15 | Stop reason: HOSPADM

## 2021-08-11 RX ORDER — ACETAMINOPHEN 650 MG/1
650 SUPPOSITORY RECTAL EVERY 6 HOURS PRN
Status: DISCONTINUED | OUTPATIENT
Start: 2021-08-11 | End: 2021-08-15 | Stop reason: HOSPADM

## 2021-08-11 RX ORDER — ALBUTEROL SULFATE 2.5 MG/3ML
2.5 SOLUTION RESPIRATORY (INHALATION) EVERY 4 HOURS PRN
Status: DISCONTINUED | OUTPATIENT
Start: 2021-08-11 | End: 2021-08-15 | Stop reason: HOSPADM

## 2021-08-11 RX ORDER — NITROGLYCERIN 0.4 MG/1
0.4 TABLET SUBLINGUAL EVERY 5 MIN PRN
Status: DISCONTINUED | OUTPATIENT
Start: 2021-08-11 | End: 2021-08-15 | Stop reason: HOSPADM

## 2021-08-11 RX ORDER — IPRATROPIUM BROMIDE AND ALBUTEROL SULFATE 2.5; .5 MG/3ML; MG/3ML
1 SOLUTION RESPIRATORY (INHALATION)
Status: CANCELLED | OUTPATIENT
Start: 2021-08-11

## 2021-08-11 RX ORDER — SOTALOL HYDROCHLORIDE 80 MG/1
80 TABLET ORAL 2 TIMES DAILY
Status: DISCONTINUED | OUTPATIENT
Start: 2021-08-11 | End: 2021-08-15 | Stop reason: HOSPADM

## 2021-08-11 RX ORDER — ACETAMINOPHEN 325 MG/1
650 TABLET ORAL EVERY 6 HOURS PRN
Status: DISCONTINUED | OUTPATIENT
Start: 2021-08-11 | End: 2021-08-15 | Stop reason: HOSPADM

## 2021-08-11 RX ORDER — ERGOCALCIFEROL 1.25 MG/1
50000 CAPSULE ORAL WEEKLY
Status: DISCONTINUED | OUTPATIENT
Start: 2021-08-16 | End: 2021-08-15 | Stop reason: HOSPADM

## 2021-08-11 RX ADMIN — APIXABAN 5 MG: 5 TABLET, FILM COATED ORAL at 23:04

## 2021-08-11 RX ADMIN — IPRATROPIUM BROMIDE AND ALBUTEROL SULFATE 3 ML: .5; 3 SOLUTION RESPIRATORY (INHALATION) at 23:45

## 2021-08-11 RX ADMIN — ROSUVASTATIN CALCIUM 40 MG: 20 TABLET, FILM COATED ORAL at 23:05

## 2021-08-11 RX ADMIN — IPRATROPIUM BROMIDE AND ALBUTEROL SULFATE 2 AMPULE: .5; 3 SOLUTION RESPIRATORY (INHALATION) at 16:35

## 2021-08-11 ASSESSMENT — ENCOUNTER SYMPTOMS
EYE PAIN: 0
SINUS PRESSURE: 0
BACK PAIN: 0
COUGH: 1
ABDOMINAL PAIN: 0
NAUSEA: 0
WHEEZING: 0
EYE REDNESS: 0
VOMITING: 0
SHORTNESS OF BREATH: 1
DIARRHEA: 0
EYE DISCHARGE: 0
SORE THROAT: 0

## 2021-08-11 NOTE — ED PROVIDER NOTES
59-year-old male past medical history of lung cancer, on current chemotherapy as well as COPD presents emerged part complaint shortness of breath. States he has been having worsening shortness of breath since yesterday. Got specifically worse after receiving his antibiotic treatment today and came into the ER was saturating 74% on room air. He is not on any oxygen at home. States it is worse with walking better with rest.  Does have a history of pleural effusion approximately 4 months ago which was drained for an amount of 800 mL. He otherwise denies any chest pain abdominal pain change in bowel bladder habits, fevers chills. Do not see any other complaints. The history is provided by the patient and medical records. The patient presents with sob that has been going on for 1.5 days. These symptoms are moderate in severity. Symptoms are made better by rest. Symptoms are made worse by exertion. Associated symptoms include cough. Review of Systems   Constitutional: Negative for chills and fever. HENT: Negative for ear pain, sinus pressure and sore throat. Eyes: Negative for pain, discharge and redness. Respiratory: Positive for cough and shortness of breath. Negative for wheezing. Cardiovascular: Negative for chest pain. Gastrointestinal: Negative for abdominal pain, diarrhea, nausea and vomiting. Genitourinary: Negative for dysuria and frequency. Musculoskeletal: Negative for arthralgias and back pain. Skin: Negative for rash and wound. Neurological: Negative for weakness and headaches. Hematological: Negative for adenopathy. All other systems reviewed and are negative. Physical Exam  Vitals and nursing note reviewed. Constitutional:       Appearance: He is well-developed. HENT:      Head: Normocephalic and atraumatic. Eyes:      Conjunctiva/sclera: Conjunctivae normal.   Cardiovascular:      Rate and Rhythm: Normal rate and regular rhythm.       Heart sounds: breathing with DuoNeb treatments and is only requiring 6 L oxygen at this time. --------------------------------------------- PAST HISTORY ---------------------------------------------  Past Medical History:  has a past medical history of A-fib ([facility] Blood transfusion reaction, CAD (coronary artery disease), COPD (chronic obstructive pulmonary disease) ([facility] GERD (gastroesophageal reflux disease), History of blood transfusion, History of cardioversion, Hyperlipidemia, Hypertension, Malignant neoplasm of lung ([facility] and Sleep apnea. Past Surgical History:  has a past surgical history that includes Diagnostic Cardiac Cath Lab Procedure (04/17/2007); Coronary artery bypass graft (04/18/2007); ECHO Compl W Dop Color Flow (03/22/2012); Coronary angioplasty with stent (03/20/2014); transesophageal echocardiogram (02/28/2020); bronchoscopy (N/A, 06/04/2020); bronchoscopy (06/04/2020); bronchoscopy (06/04/2020); bronchoscopy (06/04/2020); bronchoscopy (06/04/2020); bronchoscopy (06/04/2020); and Cardioversion (05/14/2021). Social History:  reports that he quit smoking about 13 months ago. His smoking use included cigarettes. He started smoking about 51 years ago. He has a 60.00 pack-year smoking history. He has never used smokeless tobacco. He reports current alcohol use of about 4.0 - 6.0 standard drinks of alcohol per week. He reports that he does not use drugs. Family History: family history includes Cancer in his maternal aunt; Hypertension in his father and mother; No Known Problems in his sister, sister, and sister. The patients home medications have been reviewed.     Allergies: Zocor [simvastatin - high dose]    -------------------------------------------------- RESULTS -------------------------------------------------    LABS:  Results for orders placed or performed during the hospital encounter of 08/11/21   COVID-19, Rapid    Specimen: Nasopharyngeal Swab   Result Value Ref Range SARS-CoV-2, NAAT Not Detected Not Detected   CBC Auto Differential   Result Value Ref Range    WBC 8.3 4.5 - 11.5 E9/L    RBC 4.63 3.80 - 5.80 E12/L    Hemoglobin 13.3 12.5 - 16.5 g/dL    Hematocrit 41.3 37.0 - 54.0 %    MCV 89.2 80.0 - 99.9 fL    MCH 28.7 26.0 - 35.0 pg    MCHC 32.2 32.0 - 34.5 %    RDW 13.6 11.5 - 15.0 fL    Platelets 846 306 - 752 E9/L    MPV 10.8 7.0 - 12.0 fL    Neutrophils % 72.8 43.0 - 80.0 %    Immature Granulocytes % 2.5 0.0 - 5.0 %    Lymphocytes % 8.9 (L) 20.0 - 42.0 %    Monocytes % 9.8 2.0 - 12.0 %    Eosinophils % 5.4 0.0 - 6.0 %    Basophils % 0.6 0.0 - 2.0 %    Neutrophils Absolute 6.06 1.80 - 7.30 E9/L    Immature Granulocytes # 0.21 E9/L    Lymphocytes Absolute 0.74 (L) 1.50 - 4.00 E9/L    Monocytes Absolute 0.82 0.10 - 0.95 E9/L    Eosinophils Absolute 0.45 0.05 - 0.50 E9/L    Basophils Absolute 0.05 0.00 - 0.20 B4/R   Basic Metabolic Panel w/ Reflex to MG   Result Value Ref Range    Sodium 141 132 - 146 mmol/L    Potassium reflex Magnesium 4.1 3.5 - 5.0 mmol/L    Chloride 101 98 - 107 mmol/L    CO2 29 22 - 29 mmol/L    Anion Gap 11 7 - 16 mmol/L    Glucose 120 (H) 74 - 99 mg/dL    BUN 18 6 - 20 mg/dL    CREATININE 1.0 0.7 - 1.2 mg/dL    GFR Non-African American >60 >=60 mL/min/1.73    GFR African American >60     Calcium 8.8 8.6 - 10.2 mg/dL   Troponin   Result Value Ref Range    Troponin, High Sensitivity 18 (H) 0 - 11 ng/L   Brain Natriuretic Peptide   Result Value Ref Range    Pro-BNP 2,688 (H) 0 - 125 pg/mL   Lactate, Sepsis   Result Value Ref Range    Lactic Acid, Sepsis 1.0 0.5 - 1.9 mmol/L   EKG 12 Lead   Result Value Ref Range    Ventricular Rate 127 BPM    Atrial Rate 120 BPM    QRS Duration 50 ms    Q-T Interval 308 ms    QTc Calculation (Bazett) 447 ms    R Axis -12 degrees    T Axis 56 degrees       RADIOLOGY:  XR CHEST PORTABLE   Final Result   1.   Development of a moderately large right pleural effusion and significant   compressive atelectasis at the right lung Elizabeth Leone MD  Resident  08/11/21 4526

## 2021-08-11 NOTE — ED NOTES
Bed: 07  Expected date: 8/11/21  Expected time:   Means of arrival:   Comments:  Triage       Óscar Valerio, MARCELLUS  08/11/21 5326

## 2021-08-12 LAB
ANION GAP SERPL CALCULATED.3IONS-SCNC: 7 MMOL/L (ref 7–16)
BUN BLDV-MCNC: 17 MG/DL (ref 6–23)
CALCIUM SERPL-MCNC: 9 MG/DL (ref 8.6–10.2)
CHLORIDE BLD-SCNC: 99 MMOL/L (ref 98–107)
CO2: 34 MMOL/L (ref 22–29)
CREAT SERPL-MCNC: 1 MG/DL (ref 0.7–1.2)
GFR AFRICAN AMERICAN: >60
GFR NON-AFRICAN AMERICAN: >60 ML/MIN/1.73
GLUCOSE BLD-MCNC: 144 MG/DL (ref 74–99)
HCT VFR BLD CALC: 44 % (ref 37–54)
HEMOGLOBIN: 13.4 G/DL (ref 12.5–16.5)
MCH RBC QN AUTO: 28.2 PG (ref 26–35)
MCHC RBC AUTO-ENTMCNC: 30.5 % (ref 32–34.5)
MCV RBC AUTO: 92.6 FL (ref 80–99.9)
PDW BLD-RTO: 13.6 FL (ref 11.5–15)
PLATELET # BLD: 230 E9/L (ref 130–450)
PMV BLD AUTO: 11.1 FL (ref 7–12)
POTASSIUM REFLEX MAGNESIUM: 4.5 MMOL/L (ref 3.5–5)
RBC # BLD: 4.75 E12/L (ref 3.8–5.8)
SODIUM BLD-SCNC: 140 MMOL/L (ref 132–146)
TROPONIN, HIGH SENSITIVITY: 22 NG/L (ref 0–11)
WBC # BLD: 6.3 E9/L (ref 4.5–11.5)

## 2021-08-12 PROCEDURE — 94640 AIRWAY INHALATION TREATMENT: CPT

## 2021-08-12 PROCEDURE — 6360000002 HC RX W HCPCS: Performed by: NURSE PRACTITIONER

## 2021-08-12 PROCEDURE — 36415 COLL VENOUS BLD VENIPUNCTURE: CPT

## 2021-08-12 PROCEDURE — 2580000003 HC RX 258: Performed by: NURSE PRACTITIONER

## 2021-08-12 PROCEDURE — 2700000000 HC OXYGEN THERAPY PER DAY

## 2021-08-12 PROCEDURE — 94660 CPAP INITIATION&MGMT: CPT

## 2021-08-12 PROCEDURE — 6370000000 HC RX 637 (ALT 250 FOR IP): Performed by: NURSE PRACTITIONER

## 2021-08-12 PROCEDURE — 85027 COMPLETE CBC AUTOMATED: CPT

## 2021-08-12 PROCEDURE — 84484 ASSAY OF TROPONIN QUANT: CPT

## 2021-08-12 PROCEDURE — 2060000000 HC ICU INTERMEDIATE R&B

## 2021-08-12 PROCEDURE — 80048 BASIC METABOLIC PNL TOTAL CA: CPT

## 2021-08-12 RX ORDER — METHYLPREDNISOLONE SODIUM SUCCINATE 40 MG/ML
40 INJECTION, POWDER, LYOPHILIZED, FOR SOLUTION INTRAMUSCULAR; INTRAVENOUS EVERY 12 HOURS
Status: DISCONTINUED | OUTPATIENT
Start: 2021-08-12 | End: 2021-08-14

## 2021-08-12 RX ADMIN — SOTALOL HYDROCHLORIDE 80 MG: 80 TABLET ORAL at 08:42

## 2021-08-12 RX ADMIN — BUDESONIDE 500 MCG: 0.5 SUSPENSION RESPIRATORY (INHALATION) at 20:34

## 2021-08-12 RX ADMIN — BUDESONIDE 500 MCG: 0.5 SUSPENSION RESPIRATORY (INHALATION) at 08:49

## 2021-08-12 RX ADMIN — Medication 10 ML: at 08:42

## 2021-08-12 RX ADMIN — APIXABAN 5 MG: 5 TABLET, FILM COATED ORAL at 08:41

## 2021-08-12 RX ADMIN — IPRATROPIUM BROMIDE AND ALBUTEROL SULFATE 3 ML: .5; 3 SOLUTION RESPIRATORY (INHALATION) at 20:35

## 2021-08-12 RX ADMIN — METHYLPREDNISOLONE SODIUM SUCCINATE 40 MG: 40 INJECTION, POWDER, LYOPHILIZED, FOR SOLUTION INTRAMUSCULAR; INTRAVENOUS at 17:23

## 2021-08-12 RX ADMIN — METOPROLOL SUCCINATE 100 MG: 100 TABLET, EXTENDED RELEASE ORAL at 08:42

## 2021-08-12 RX ADMIN — IPRATROPIUM BROMIDE 0.5 MG: 0.5 SOLUTION RESPIRATORY (INHALATION) at 16:54

## 2021-08-12 RX ADMIN — ASPIRIN 81 MG: 81 TABLET, COATED ORAL at 08:42

## 2021-08-12 RX ADMIN — ARFORMOTEROL TARTRATE 15 MCG: 15 SOLUTION RESPIRATORY (INHALATION) at 08:49

## 2021-08-12 RX ADMIN — SOTALOL HYDROCHLORIDE 80 MG: 80 TABLET ORAL at 19:59

## 2021-08-12 RX ADMIN — IPRATROPIUM BROMIDE AND ALBUTEROL SULFATE 3 ML: .5; 3 SOLUTION RESPIRATORY (INHALATION) at 16:54

## 2021-08-12 RX ADMIN — IPRATROPIUM BROMIDE AND ALBUTEROL SULFATE 3 ML: .5; 3 SOLUTION RESPIRATORY (INHALATION) at 08:49

## 2021-08-12 RX ADMIN — IPRATROPIUM BROMIDE AND ALBUTEROL SULFATE 3 ML: .5; 3 SOLUTION RESPIRATORY (INHALATION) at 12:14

## 2021-08-12 RX ADMIN — Medication 10 ML: at 04:28

## 2021-08-12 RX ADMIN — ROSUVASTATIN CALCIUM 40 MG: 20 TABLET, FILM COATED ORAL at 19:59

## 2021-08-12 RX ADMIN — LISINOPRIL 5 MG: 5 TABLET ORAL at 08:42

## 2021-08-12 RX ADMIN — IPRATROPIUM BROMIDE AND ALBUTEROL SULFATE 3 ML: .5; 3 SOLUTION RESPIRATORY (INHALATION) at 03:55

## 2021-08-12 RX ADMIN — ARFORMOTEROL TARTRATE 15 MCG: 15 SOLUTION RESPIRATORY (INHALATION) at 20:34

## 2021-08-12 ASSESSMENT — PAIN SCALES - GENERAL
PAINLEVEL_OUTOF10: 0
PAINLEVEL_OUTOF10: 0

## 2021-08-12 NOTE — ED NOTES
[facility] faxed, confirmation received. Called [facility] and confirmed with Alistair Garcia was received.      Bhavna Sifuentes RN  08/11/21 7256

## 2021-08-12 NOTE — PROGRESS NOTES
Physical Therapy    Facility/Department: [facility] INTERNAL MEDICINE 2      NAME: Azul Patel  : 1960  MRN: [id number]    Date of Service: 2021    Attempted to see pt for PT evaluation. Pt reports he has been up independently in room. Pt would like to work with PT to improve endurance and walk further since he is independent and works, however he is too short of breath at this time.    Daphnie PT [phone]

## 2021-08-12 NOTE — CARE COORDINATION
8/12/2021  Social Work Discharge Planning: This worker met with Pt to discuss  role and transition of care/discharge planning. Pt resides alone in a 2 story home. Bed is on the second floor. Pt states independence at home with no DME. Pt has been active with chemo treatments [facility]. Pt is on 6l o2 here and uses none at home. Wean o2 as tolerated. Pt has no DME preference if o2 is needed at discharge. Pt does have a nebulizer. Pt declines any need for HHC. Pharmacy is [facility] and PCP is Dr.G. Javan Tineo.  Electronically signed by CHRISTIE Mak on 8/12/2021 at 11:58 AM

## 2021-08-12 NOTE — PROGRESS NOTES
Date: 8/12/2021    Time: 0000    Patient Placed On BIPAP/CPAP/ Non-Invasive Ventilation? Yes    If no must comment. Facial area red/color change? No           If YES are Blister/Lesion present? No   If yes must notify nursing staff  BIPAP/CPAP skin barrier?   Yes    Skin barrier type:mepilexlite       Comments:  Red singh Andre RCP

## 2021-08-12 NOTE — CONSULTS
Raisa Mendosa M.D.,[facility]  Nadine Menard D.O., F.A.C.O.I., Kimberly Bowen M.D. Rachel Romero M.D. Ruth Conde D.O. Patient:  Kala Ramirez 62 y.o. male MRN: [id number]     Date of Service: 8/12/2021      PULMONARY CONSULTATION    Reason for Consultation: lung cancer, pleural effusion, acute respiratory failure with hypoxia    Referring Physician: SIENNA Vanegas    Communication with the referring physician will be sent via the electronic medical record. Chief Complaint: shortness of breath    CODE STATUS: full code    SUBJECTIVE:  HPI:  Kala Ramirez is a 62 y.o. with a past medical history listed below. He is known in our practice to Dr. Terry Andrews for stage 3 COPD and ANTONIO treated with bipap. He was last seen in office this past February after a hospital stay where we saw him for a pleural effusion. He is currently undergoing chemo at the [facility] and also uses immunotherapy. He presented to the ED yesterday with complaint of shortness of breath at the [facility]. He was found to be hypoxic on presentation at 72%. CT chest showed a moderated sized right pleural effusion. He does not use oxygen at home, but required 15L in ED. Today, he was seen and examined on 6L oxygen and appeared to be short of breath on exertion. He has an occasional cough.      Past Medical History:   Diagnosis Date    A-fib [facility])     Blood transfusion reaction     CAD (coronary artery disease)     COPD (chronic obstructive pulmonary disease) (HCC)     GERD (gastroesophageal reflux disease)     History of blood transfusion     History of cardioversion 04/10/2020    Dr Alysha Solorio    Hyperlipidemia     Hypertension     Malignant neoplasm of lung ([facility] [address]     Sleep apnea        Past Surgical History:   Procedure Laterality Date    BRONCHOSCOPY N/A 06/04/2020    BRONCHOSCOPY  NAVIGATIONAL performed by Shellie Lima DO at [address] [place] [address]  06/04/2020 BRONCHOSCOPY/TRANSBRONCHIAL NEEDLE BIOPSY performed by Rima Wylie DO at [id number] [place] [address]  2020    BRONCHOSCOPY BIOPSY BRONCHUS performed by Rima Wylie DO at [address] [place] [address]  2020    BRONCHOSCOPY BRUSHINGS performed by Rima Wylie DO at [address] [place] [address]  2020    BRONCHOSCOPY W/EBUS FNA performed by Rima Wylie DO at [address] [place] [address]  2020    BRONCHOSCOPY ALVEOLAR LAVAGE performed by Rima Wylie DO at [facility] [address]  2021    successful   Dr Lakeisha Engle  2014    3.5/18 Resolute to Mid-Cx    CORONARY ARTERY BYPASS GRAFT  2007    DIAGNOSTIC CARDIAC CATH LAB PROCEDURE  2007    ECHO COMPL W DOP COLOR FLOW  2012         TRANSESOPHAGEAL ECHOCARDIOGRAM  2020    angela       Family History   Problem Relation Age of Onset    Hypertension Mother     Hypertension Father     No Known Problems Sister     No Known Problems Sister     No Known Problems Sister     Cancer Maternal Aunt         cancer       Social History:   Social History     Socioeconomic History    Marital status:      Spouse name: Not on file    Number of children: Not on file    Years of education: Not on file    Highest education level: Not on file   Occupational History    Occupation:    Tobacco Use    Smoking status: Former Smoker     Packs/day: 1.50     Years: 40.00     Pack years: 60.00     Types: Cigarettes     Start date:      Quit date: 2020     Years since quittin.1    Smokeless tobacco: Never Used   Vaping Use    Vaping Use: Never used   Substance and Sexual Activity    Alcohol use:  Yes     Alcohol/week: 4.0 - 6.0 standard drinks     Types: 4 - 6 Cans of beer per week     Comment: on weekend    Drug use: No    Sexual activity: Not Currently   Other Topics Concern    Not on file   Social History Narrative    Social History Narrative: Tobacco cigarette smoker 1.5 PPD x 40 years (60 pack years). Quit 07/01/2020. Drinks 4-6 cans of beer 1 day every weekend. Prior history of moderately-heavy alcohol consumption. Denies any current or any history of illicit drug use/ abuse.          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           Social Determinants of Health     Financial Resource Strain:     Difficulty of Paying Living Expenses:    Food Insecurity:     Worried About Running Out of Food in the Last Year:     [address] in the Last Year:    Transportation Needs:     Lack of Transportation (Medical):  Lack of Transportation (Non-Medical):    Physical Activity:     Days of Exercise per Week:     Minutes of Exercise per Session:    Stress:     Feeling of Stress :    Social Connections:     Frequency of Communication with Friends and Family:     Frequency of Social Gatherings with Friends and Family:     Attends Gnosticism Services:     Active Member of Clubs or Organizations:     Attends Club or Organization Meetings:     Marital Status:    Intimate Partner Violence:     Fear of Current or Ex-Partner:     Emotionally Abused:     Physically Abused:     Sexually Abused:      Smoking history: The patient is a Past smoker of 1.5 packs per day for 40 years. Pack year equal 60. ETOH:   reports current alcohol use of about 4.0 - 6.0 standard drinks of alcohol per week.       Vaccines:    Influenza:  Not indicated  Pneumococcal Polysaccharide:  Up-to-date; approximate date: 2015  Immunization History   Administered Date(s) Administered    COVID-19, Moderna, PF, 100mcg/0.5mL 03/10/2021, 04/07/2021    Pneumococcal Polysaccharide ([id number]) 12/20/2006, 05/13/2015    Tdap (Boostrix, Adacel) 07/31/2012        Home Meds: Medications Prior to Admission: metoprolol sore throat, and hoarseness. Cardiovascular: Denies palpitations, chest pain, and chest pressure. Respiratory: Denies productive cough, +dyspnea at rest, -hemoptysis, apnea, and choking. Gastrointestinal: Denies nausea, vomiting, poor appetite, diarrhea, heartburn or reflux  Genitourinary: Denies dysuria, frequency, urgency or hematuria  Musculoskeletal: Denies myalgias, muscle weakness, and bone pain  Neurological: Denies dizziness, vertigo, headache, and focal weakness  Psychological: Denies anxiety and depression  Endocrine: Denies heat intolerance and cold intolerance  Hematopoietic/Lymphatic: Denies bleeding problems and blood transfusions    OBJECTIVE:   /89   Pulse 89   Temp 97.8 °F (36.6 °C) (Infrared)   Resp 22   Ht 5' 11\" (1.803 m)   Wt 293 lb (132.9 kg)   SpO2 97%   BMI 40.87 kg/m²   SpO2 Readings from Last 1 Encounters:   08/12/21 97%        I/O:  No intake or output data in the 24 hours ending 08/12/21 1049  Vent Information  FiO2 : 40 %  SpO2: 97 %  SpO2/FiO2 ratio: 235  Mask Type: Full face mask  Mask Size: Large       IPAP: 12 cmH20  CPAP/EPAP: 6 cmH2O     Physical Exam:  General: The patient is sitting on the bed comfortably without any distress. Breathing is not labored  HEENT: Pupils are equal round and reactive to light, there are no oral lesions and no post-nasal drip   Neck: supple without adenopathy  Cardiovascular: irregularly irregular rate and rhythm without murmur or gallop  Respiratory: wheezing.   Air entry is symmetric  Abdomen: soft, non-tender, non-distended, normal bowel sounds  Extremities: warm, no edema, no clubbing  Skin: no rash or lesion  Neurologic: CN II-XII grossly intact, no focal deficits    Pulmonary Function Testing personally reviewed and interpreted  None on file    Imaging personally reviewed:  8/11/2021 cxr  1.  Development of a moderately large right pleural effusion and significant   compressive atelectasis at the right lung base.       2. Jasmina Paget lung cancer medial right lung base obscured by the effusion.       3.  Right paratracheal adenopathy.       4.  Old CABG. Echo:  1/6/2021    Labs:  Lab Results   Component Value Date    WBC 6.3 08/12/2021    HGB 13.4 08/12/2021    HCT 44.0 08/12/2021    MCV 92.6 08/12/2021    MCH 28.2 08/12/2021    MCHC 30.5 08/12/2021    RDW 13.6 08/12/2021     08/12/2021    MPV 11.1 08/12/2021     Lab Results   Component Value Date     08/12/2021    K 4.5 08/12/2021    CL 99 08/12/2021    CO2 34 08/12/2021    BUN 17 08/12/2021    CREATININE 1.0 08/12/2021    LABALBU 4.1 05/12/2021    LABALBU 4.7 06/03/2011    CALCIUM 9.0 08/12/2021    GFRAA >60 08/12/2021    LABGLOM >60 08/12/2021     Lab Results   Component Value Date    PROTIME 14.1 05/13/2021    INR 1.3 05/13/2021     Recent Labs     08/11/21  1655   PROBNP 2,688*     Assessment:  1. Acute respiratory failure with hypoxia  2. COPD with exacerbation  3. ANTONIO treated with BiPAP  4. Permanent atrial fibrillation  5. Pleural effusion on right  6. Coronary artery disease  7. Essential hypertension  8. Malignant neoplasm of lung  9. Hyperlipidemia  10. Former smoker    Plan:  1. Continue oxygen therapy as needed, wean as tolerated  2. BiPAP therapy at night and as needed  3. Check respiratory culture, bacterial antigens  4. Solu-Medrol 40 mg every 12 hours  5. Brovana/budesonide via nebulizer every 12 hours, may resume Trelegy on discharge  6. Albuterol as needed  7. Eliquis for anticoagulation, hold for thoracentesis      Thank you for allowing me to participate in the care of Rich Ramos. Please feel free to call with questions. This plan of care was reviewed in collaboration with Dr. Alicia Quintana  Electronically signed by Roxane Officer, APRN - CNP on 8/12/2021 at 10:49 AM      Note: This report was completed utilizing computer voice recognition software.  Every effort has been made to ensure accuracy, however; inadvertent computerized transcription errors may

## 2021-08-12 NOTE — H&P
[address] [facility]  History and Physical      CHIEF COMPLAINT:    Chief Complaint   Patient presents with    Shortness of Breath     72% in triage on room air, [facility]        Patient of Adilia Pham MD presents with:  Acute respiratory failure with hypoxia ([facility] [address]    History of Present Illness:   Patient is a 62year old male with a past medical history of afib,  CAD, COPD, HTN, HLD and Lung CA active chemo. Patient presents to the ED for shortness of breath following his chemo infusion. Patient was 74% on room air when he first arrived to the ER. Patient states this has not happened before when he initially began chemo. Patient admits to having pleural effusions approximately 4 months ago. Patient is currently on 6 ;iters. Patient is alert and oriented sitting in a chair at bedside without any complaints. Patient is comfortable on the 6 liters of O2. Patient denies chest pain, fever, chills, headache, and abdominal pain. ER work up reveals pleural effusion. Patient is admitted for further testing and treatment. REVIEW OF SYSTEMS:  Pertinent negatives are above in HPI. 10 point ROS otherwise negative.       Past Medical History:   Diagnosis Date    A-fib [facility])     Blood transfusion reaction     CAD (coronary artery disease)     COPD (chronic obstructive pulmonary disease) (HCC)     GERD (gastroesophageal reflux disease)     History of blood transfusion     History of cardioversion 04/10/2020    Dr Webster Overall    Hyperlipidemia     Hypertension     Malignant neoplasm of lung ([facility] [address]     Sleep apnea          Past Surgical History:   Procedure Laterality Date    BRONCHOSCOPY N/A 06/04/2020    BRONCHOSCOPY  NAVIGATIONAL performed by Adry Peguero DO at [address] [place] [address]  06/04/2020    BRONCHOSCOPY/TRANSBRONCHIAL NEEDLE BIOPSY performed by Adry Peguero DO at [address] [place] [address]  06/04/2020    BRONCHOSCOPY BIOPSY BRONCHUS performed by Jaime Falcon DO at [address] [place] [address]  06/04/2020    BRONCHOSCOPY BRUSHINGS performed by Jaime Falcon DO at [address] [place] [address]  06/04/2020    BRONCHOSCOPY W/EBUS FNA performed by Jaime Falcon DO at [address] [place] [address]  06/04/2020    BRONCHOSCOPY ALVEOLAR LAVAGE performed by Jaime Falcon DO at [facility] [address]  05/14/2021    successful   Dr Suzanna Rooney  03/20/2014    3.5/18 Resolute to Mid-Cx    CORONARY ARTERY BYPASS GRAFT  04/18/2007    DIAGNOSTIC CARDIAC CATH LAB PROCEDURE  04/17/2007    ECHO COMPL W DOP COLOR FLOW  03/22/2012         TRANSESOPHAGEAL ECHOCARDIOGRAM  02/28/2020    angela       Medications Prior to Admission:    Medications Prior to Admission: metoprolol succinate (TOPROL XL) 100 MG extended release tablet, TAKE 1 Daily  sotalol (BETAPACE) 80 MG tablet, Take 1 tablet by mouth 2 times daily  apixaban (ELIQUIS) 5 MG TABS tablet, TAKE 1 TABLET TWICE A DAY  ASPIRIN LOW DOSE 81 MG EC tablet, TAKE 1 TABLET DAILY  lisinopril (PRINIVIL;ZESTRIL) 5 MG tablet, Take 5 mg by mouth daily  albuterol (PROVENTIL) (2.5 MG/3ML) 0.083% nebulizer solution, Take 0.083 mLs by nebulization 4 times daily as needed  fluticasone-umeclidin-vilant (TRELEGY ELLIPTA) 100-62.5-25 MCG/INH AEPB, Inhale 1 puff into the lungs daily  PROAIR  (90 Base) MCG/ACT inhaler, INHALE TWO PUFFS BY MOUTH EVERY 4 TO 6 HOURS AS NEEDED  ipratropium-albuterol (DUONEB) 0.5-2.5 (3) MG/3ML SOLN nebulizer solution, Inhale 3 mLs into the lungs every 4 hours  nitroGLYCERIN (NITROSTAT) 0.4 MG SL tablet, Place 1 tablet under the tongue every 5 minutes as needed for Chest pain  CRESTOR 40 MG tablet, 40 mg daily   vitamin D (ERGOCALCIFEROL) 60879 UNITS CAPS capsule, Take 50,000 Units by mouth once a week mondays    Note that the patient's home medications were reviewed and the above list is accurate to the best of my knowledge at the time of the exam.    Allergies:    Zocor [simvastatin - high dose]    Social History:    reports that he quit smoking about 13 months ago. His smoking use included cigarettes. He started smoking about 51 years ago. He has a 60.00 pack-year smoking history. He has never used smokeless tobacco. He reports current alcohol use of about 4.0 - 6.0 standard drinks of alcohol per week. He reports that he does not use drugs. Family History:   family history includes Cancer in his maternal aunt; Hypertension in his father and mother; No Known Problems in his sister, sister, and sister. PHYSICAL EXAM:    Vitals:  /62   Pulse 81   Temp 98.1 °F (36.7 °C) (Oral)   Resp 22   Ht 5' 11\" (1.803 m)   Wt 293 lb (132.9 kg)   SpO2 93%   BMI 40.87 kg/m²       General appearance: NAD, conversant  Eyes: Sclerae anicteric, PERRLA  HEENT: AT/NC, MMM  Neck: FROM, supple, no thyromegaly  Lymph: No cervical / supraclavicular lymphadenopathy  Lungs: Clear to auscultation, WOB normal  CV: RRR, no MRGs, no lower extremity edema  Abdomen: Soft, non-tender; no masses or HSM, +BS  Extremities: FROM without synovitis. No clubbing or cyanosis of the hands. Skin: no rash, induration, lesions, or ulcers  Psych: Calm and cooperative. Normal judgement and insight. Normal mood and affect. Neuro: Alert and interactive, face symmetric, speech fluent. LABS:  All labs reviewed.   Of note:  CBC with Differential:    Lab Results   Component Value Date    WBC 6.3 08/12/2021    RBC 4.75 08/12/2021    HGB 13.4 08/12/2021    HCT 44.0 08/12/2021     08/12/2021    MCV 92.6 08/12/2021    MCH 28.2 08/12/2021    MCHC 30.5 08/12/2021    RDW 13.6 08/12/2021    SEGSPCT 70 03/19/2014    LYMPHOPCT 8.9 08/11/2021    MONOPCT 9.8 08/11/2021    BASOPCT 0.6 08/11/2021    MONOSABS 0.82 08/11/2021    LYMPHSABS 0.74 08/11/2021    EOSABS 0.45 08/11/2021    BASOSABS 0.05 08/11/2021     CMP:    Lab Results   Component Value Date    NA 140 08/12/2021    K 4.5 08/12/2021    CL 99 08/12/2021    CO2 34 08/12/2021    BUN 17 08/12/2021    CREATININE 1.0 08/12/2021    GFRAA >60 08/12/2021    LABGLOM >60 08/12/2021    GLUCOSE 144 08/12/2021    GLUCOSE 129 06/03/2011    PROT 7.1 05/12/2021    LABALBU 4.1 05/12/2021    LABALBU 4.7 06/03/2011    CALCIUM 9.0 08/12/2021    BILITOT 0.3 05/12/2021    ALKPHOS 80 05/12/2021    AST 15 05/12/2021    ALT 11 05/12/2021       Imaging:  CXR: Development of a moderately large right pleural effusion and significant compressive atelectasis at the right lung base. None lung cancer medial right lung base obscured by the effusion. Right paratracheal adenopathy. EKG:  I've personally reviewed the patient's EKG:  A. fib RVR    Telemetry:  I've personally reviewed the patient's telemetry:      ASSESSMENT/PLAN:  Principal Problem:    Acute respiratory failure with hypoxia (HCC)  Active Problems:    CAD (coronary artery disease)    Essential (primary) hypertension    COPD (chronic obstructive pulmonary disease) (HCC)    Malignant neoplasm of lung (HCC)    Hyperlipidemia, unspecified    AF (atrial fibrillation) (HCC)    Pleural effusion on right    Elevated troponin    ANTONIO (obstructive sleep apnea)    Former smoker  Resolved Problems:    * No resolved hospital problems. *    71-year-old male with a recent history of lung CA admitted to telemetry unit with    Acute respiratory failure with hypoxia  -Supplement O2 demands keeping oxygen saturation greater than 92%. -Pulmonary following. Patient to have thoracentesis on Saturday hold Eliquis  -Monitor labs hemoglobin drops below seven transfuse as never necessary. Monitor electrolytes place as necessary    A. fib RVR  -Rate control  -Telemetry monitoring  -Eliquis on hold for procedure. Medication for other comorbidities continue as appropriate dose adjustment as necessary.     DVT prophylaxis  PT OT  Discharge planning  Case discussed with attending and agreed upon plan of care.    Code status: Full  Requires inpatient level of care  Alexei MartinezSIENNA - CNP    7:28 AM  8/12/2021     Feels well  Await lung tap - likely malignant effusion   Hold oac   Rate adequately controlled   Wean o2 as able - not on home o2       I personally saw, examined and provided care for the patient. Radiographs, labs and medication list were reviewed by me independently. The case was discussed in detail and plans for care were established. Review of [address] [place] [address], documentation was conducted and revisions were made as appropriate directly by me. I agree with the above documented exam, problem list, and plan of care.      Camille Gomes MD  7:54 PM  8/12/2021

## 2021-08-12 NOTE — PROGRESS NOTES
Occupational Therapy  Date:8/12/2021  Patient Name: Louise Navarro  Room: 2371/4940-B     Occupational Therapy (OT) order received, patient's medical record reviewed, and OT evaluation attempted this afternoon; patient requested that OT evaluation be re-attempted at later date. OT evaluation to be re-attempted at later date, as able/appropriate. Sarah Mcwilliams, OTR/L  License Number: [id number]

## 2021-08-12 NOTE — PROGRESS NOTES
Date: 8/12/2021    Time: 12:17 PM    Patient Placed On BIPAP/CPAP/ Non-Invasive Ventilation? Yes    If no must comment. Facial area red/color change? No           If YES are Blister/Lesion present? No   If yes must notify nursing staff  BIPAP/CPAP skin barrier?   Yes    Skin barrier type:mepilexlite       Comments:        Mary Fuentes RCP

## 2021-08-13 LAB
ANION GAP SERPL CALCULATED.3IONS-SCNC: 2 MMOL/L (ref 7–16)
BUN BLDV-MCNC: 18 MG/DL (ref 6–23)
CALCIUM SERPL-MCNC: 9.3 MG/DL (ref 8.6–10.2)
CHLORIDE BLD-SCNC: 99 MMOL/L (ref 98–107)
CO2: 35 MMOL/L (ref 22–29)
CREAT SERPL-MCNC: 0.9 MG/DL (ref 0.7–1.2)
GFR AFRICAN AMERICAN: >60
GFR NON-AFRICAN AMERICAN: >60 ML/MIN/1.73
GLUCOSE BLD-MCNC: 200 MG/DL (ref 74–99)
HCT VFR BLD CALC: 42.2 % (ref 37–54)
HEMOGLOBIN: 12.9 G/DL (ref 12.5–16.5)
MCH RBC QN AUTO: 28.1 PG (ref 26–35)
MCHC RBC AUTO-ENTMCNC: 30.6 % (ref 32–34.5)
MCV RBC AUTO: 91.9 FL (ref 80–99.9)
PDW BLD-RTO: 13 FL (ref 11.5–15)
PLATELET # BLD: 241 E9/L (ref 130–450)
PMV BLD AUTO: 10.7 FL (ref 7–12)
POTASSIUM SERPL-SCNC: 5.1 MMOL/L (ref 3.5–5)
RBC # BLD: 4.59 E12/L (ref 3.8–5.8)
SODIUM BLD-SCNC: 136 MMOL/L (ref 132–146)
WBC # BLD: 6.6 E9/L (ref 4.5–11.5)

## 2021-08-13 PROCEDURE — 6360000002 HC RX W HCPCS: Performed by: NURSE PRACTITIONER

## 2021-08-13 PROCEDURE — 94640 AIRWAY INHALATION TREATMENT: CPT

## 2021-08-13 PROCEDURE — 6370000000 HC RX 637 (ALT 250 FOR IP): Performed by: NURSE PRACTITIONER

## 2021-08-13 PROCEDURE — 36415 COLL VENOUS BLD VENIPUNCTURE: CPT

## 2021-08-13 PROCEDURE — 87449 NOS EACH ORGANISM AG IA: CPT

## 2021-08-13 PROCEDURE — 89051 BODY FLUID CELL COUNT: CPT

## 2021-08-13 PROCEDURE — 85027 COMPLETE CBC AUTOMATED: CPT

## 2021-08-13 PROCEDURE — 87206 SMEAR FLUORESCENT/ACID STAI: CPT

## 2021-08-13 PROCEDURE — 2580000003 HC RX 258: Performed by: NURSE PRACTITIONER

## 2021-08-13 PROCEDURE — 2700000000 HC OXYGEN THERAPY PER DAY

## 2021-08-13 PROCEDURE — 94660 CPAP INITIATION&MGMT: CPT

## 2021-08-13 PROCEDURE — 2060000000 HC ICU INTERMEDIATE R&B

## 2021-08-13 PROCEDURE — 87070 CULTURE OTHR SPECIMN AEROBIC: CPT

## 2021-08-13 PROCEDURE — 80048 BASIC METABOLIC PNL TOTAL CA: CPT

## 2021-08-13 RX ADMIN — ARFORMOTEROL TARTRATE 15 MCG: 15 SOLUTION RESPIRATORY (INHALATION) at 08:05

## 2021-08-13 RX ADMIN — METHYLPREDNISOLONE SODIUM SUCCINATE 40 MG: 40 INJECTION, POWDER, LYOPHILIZED, FOR SOLUTION INTRAMUSCULAR; INTRAVENOUS at 16:48

## 2021-08-13 RX ADMIN — IPRATROPIUM BROMIDE AND ALBUTEROL SULFATE 3 ML: .5; 3 SOLUTION RESPIRATORY (INHALATION) at 17:16

## 2021-08-13 RX ADMIN — METOPROLOL SUCCINATE 100 MG: 100 TABLET, EXTENDED RELEASE ORAL at 09:50

## 2021-08-13 RX ADMIN — IPRATROPIUM BROMIDE AND ALBUTEROL SULFATE 3 ML: .5; 3 SOLUTION RESPIRATORY (INHALATION) at 04:27

## 2021-08-13 RX ADMIN — SOTALOL HYDROCHLORIDE 80 MG: 80 TABLET ORAL at 09:50

## 2021-08-13 RX ADMIN — ASPIRIN 81 MG: 81 TABLET, COATED ORAL at 09:50

## 2021-08-13 RX ADMIN — ROSUVASTATIN CALCIUM 40 MG: 20 TABLET, FILM COATED ORAL at 21:01

## 2021-08-13 RX ADMIN — METHYLPREDNISOLONE SODIUM SUCCINATE 40 MG: 40 INJECTION, POWDER, LYOPHILIZED, FOR SOLUTION INTRAMUSCULAR; INTRAVENOUS at 04:45

## 2021-08-13 RX ADMIN — BUDESONIDE 500 MCG: 0.5 SUSPENSION RESPIRATORY (INHALATION) at 08:05

## 2021-08-13 RX ADMIN — SOTALOL HYDROCHLORIDE 80 MG: 80 TABLET ORAL at 21:01

## 2021-08-13 RX ADMIN — IPRATROPIUM BROMIDE AND ALBUTEROL SULFATE 3 ML: .5; 3 SOLUTION RESPIRATORY (INHALATION) at 12:19

## 2021-08-13 RX ADMIN — IPRATROPIUM BROMIDE AND ALBUTEROL SULFATE 3 ML: .5; 3 SOLUTION RESPIRATORY (INHALATION) at 08:05

## 2021-08-13 RX ADMIN — Medication 10 ML: at 21:02

## 2021-08-13 RX ADMIN — IPRATROPIUM BROMIDE AND ALBUTEROL SULFATE 3 ML: .5; 3 SOLUTION RESPIRATORY (INHALATION) at 19:58

## 2021-08-13 RX ADMIN — LISINOPRIL 5 MG: 5 TABLET ORAL at 09:50

## 2021-08-13 RX ADMIN — IPRATROPIUM BROMIDE AND ALBUTEROL SULFATE 3 ML: .5; 3 SOLUTION RESPIRATORY (INHALATION) at 00:24

## 2021-08-13 RX ADMIN — SODIUM CHLORIDE, PRESERVATIVE FREE 10 ML: 5 INJECTION INTRAVENOUS at 16:48

## 2021-08-13 RX ADMIN — ARFORMOTEROL TARTRATE 15 MCG: 15 SOLUTION RESPIRATORY (INHALATION) at 19:58

## 2021-08-13 RX ADMIN — BUDESONIDE 500 MCG: 0.5 SUSPENSION RESPIRATORY (INHALATION) at 19:58

## 2021-08-13 RX ADMIN — Medication 5 ML: at 09:27

## 2021-08-13 ASSESSMENT — PAIN SCALES - GENERAL
PAINLEVEL_OUTOF10: 0
PAINLEVEL_OUTOF10: 0

## 2021-08-13 NOTE — PROGRESS NOTES
Patient was only achieving a tidal volume between 250-290ml on the settings 12/6.  Settings were adjusted to 14/6 and tidal volumes increased into the 400's while sleeping

## 2021-08-13 NOTE — CARE COORDINATION
8/13/2021  Social Work Discharge Planning:Pt has Lung CA. [facility]. Pt is on 6l o2 here and uses none at home. Wean o2 as tolerated. . SW made a tentative referral to  [facility]. Pt has a nebulizer. Declining HHC.  Awaiting Pt eval. Electronically signed by CHRISTIE Banks on 8/13/2021 at 9:22 AM

## 2021-08-13 NOTE — PLAN OF CARE
Problem: Falls - Risk of:  Goal: Will remain free from falls  Description: Will remain free from falls  Outcome: Ongoing  Goal: Absence of physical injury  Description: Absence of physical injury  Outcome: Ongoing     Problem: Gas Exchange - Impaired:  Goal: Levels of oxygenation will improve  Description: Levels of oxygenation will improve  Outcome: Ongoing

## 2021-08-13 NOTE — PROGRESS NOTES
Subjective: The patient is awake and alert. Sitting in chair at bedside  No acute events overnight. Denies chest pain, angina, SOB continues 6 liters O2    Objective:    BP (!) 141/89   Pulse 91   Temp 98.7 °F (37.1 °C) (Oral)   Resp 20   Ht 5' 11\" (1.803 m)   Wt 293 lb (132.9 kg)   SpO2 97%   BMI 40.87 kg/m²     No intake/output data recorded. No intake/output data recorded. General appearance: NAD, conversant  HEENT: AT/NC, MMM  Neck: FROM, supple  Lungs: Diminished throughout  CV: irregular, no MRGs  Vasc: Radial pulses 2+  Abdomen: Soft, non-tender; no masses or HSM  Extremities: No peripheral edema or digital cyanosis  Skin: no rash, lesions or ulcers  Psych: Alert and oriented to person, place and time  Neuro: Alert and interactive     Recent Labs     08/11/21  1655 08/12/21  0419 08/13/21  0410   WBC 8.3 6.3 6.6   HGB 13.3 13.4 12.9   HCT 41.3 44.0 42.2    230 241       Recent Labs     08/11/21  1655 08/12/21  0419 08/13/21  0410    140 136   K 4.1 4.5 5.1*    99 99   CO2 29 34* 35*   BUN 18 17 18   CREATININE 1.0 1.0 0.9   CALCIUM 8.8 9.0 9.3       Assessment:    Principal Problem:    Acute respiratory failure with hypoxia (HCC)  Active Problems:    CAD (coronary artery disease)    Essential (primary) hypertension    COPD (chronic obstructive pulmonary disease) (HCC)    Malignant neoplasm of lung (HCC)    Hyperlipidemia, unspecified    AF (atrial fibrillation) (HCC)    Pleural effusion on right    Elevated troponin    ANTONIO (obstructive sleep apnea)    Former smoker  Resolved Problems:    * No resolved hospital problems. *      Plan:  71-year-old male with a recent history of lung CA admitted to telemetry unit with     Acute respiratory failure with hypoxia  -Supplement O2 demands keeping oxygen saturation greater than 92%. -Pulmonary following. Patient to have thoracentesis on Saturday hold Eliquis  -Monitor labs hemoglobin drops below seven transfuse as never necessary. Monitor electrolytes place as necessary     A. fib RVR  -Rate control  -Telemetry monitoring  -Eliquis on hold for procedure. Resume once OK with pulm.     Medication for other comorbidities continue as appropriate dose adjustment as necessary. DVT Prophylaxis   PT/OT  Discharge planning     Pily Ambrocioellen Vasquez, APRN - CNP  12:30 PM  8/13/2021     Awaiting thoracentesis tomorrow  No acute issues otherwise  Resume Eliquis hopefully tomorrow after thoracentesis complete  Monitor rhythm/rate on telemetry  A.m. labs  Insulin sliding scale    I personally saw, examined and provided care for the patient. Radiographs, labs and medication list were reviewed by me independently. The case was discussed in detail and plans for care were established. Review of [address] [place] [address], documentation was conducted and revisions were made as appropriate directly by me. I agree with the above documented exam, problem list, and plan of care.      Melvina Fam MD  8:30 PM  8/13/2021

## 2021-08-13 NOTE — PROGRESS NOTES
Physical Therapy    Facility/Department: [address] INTERNAL MEDICINE 2      NAME: Romy Guthrie  : 1960  MRN: [id number]    Date of Service: 2021    Attempted to see pt for PT evaluation. Pt declined secondary to shortness of breath.    Rae Howard PT [id number]

## 2021-08-13 NOTE — PLAN OF CARE
Problem: Gas Exchange - Impaired:  Goal: Levels of oxygenation will improve  Description: Levels of oxygenation will improve  8/13/2021 1213 by Kelvin Mosher RN  Outcome: Ongoing  8/12/2021 2317 by Mello Harrison RN  Outcome: Ongoing

## 2021-08-13 NOTE — PROGRESS NOTES
Juliana Edge M.D.,[facility] DANA Gerardo., F.SONIAOJAMAR., Joanie Farias M.D. Ansuh Mendoza M.D. Samuel Pérez D.O. Daily Pulmonary Progress Note    Patient:  Shawn Median 62 y.o. male MRN: [id number]     Date of Service: 8/13/2021      Synopsis     We are following patient for  lung cancer, pleural effusion, acute respiratory failure with hypoxia    \"CC\" shortness of breath    Code status: Full      Subjective      Patient was seen and examined. Lying in bed in no acute distress. Remains on oxygen 6 L nasal cannula. No chest pain. Occasional cough with scant mucus production. Mild chest tightness and expiratory wheezing. Using BiPAP overnight and as needed during daytime with naps setting 14/6. Chest rash resolving currently on corticosteroids. Review of Systems:  Constitutional: Denies fever, weight loss, night sweats, and fatigue  Skin: Denies pigmentation, dark lesions chest rash improving  HEENT: Denies hearing loss, tinnitus, ear drainage, epistaxis, sore throat, and hoarseness. Cardiovascular: Denies palpitations, chest pain, and chest pressure.   Respiratory: Mild dyspnea occasional cough scant mucus, mild chest tightness and wheeze  Gastrointestinal: Denies nausea, vomiting, poor appetite, diarrhea, heartburn or reflux  Genitourinary: Denies dysuria, frequency, urgency or hematuria  Musculoskeletal: Denies myalgias, muscle weakness, and bone pain  Neurological: Denies dizziness, vertigo, headache, and focal weakness  Psychological: Denies anxiety and depression  Endocrine: Denies heat intolerance and cold intolerance  Hematopoietic/Lymphatic: Denies bleeding problems and blood transfusions    24-hour events:  None    Objective   Vitals: /75   Pulse 85   Temp 97.5 °F (36.4 °C) (Oral)   Resp 18   Ht 5' 11\" (1.803 m)   Wt 293 lb (132.9 kg)   SpO2 97%   BMI 40.87 kg/m²     I/O:  No intake or output data in the 24 hours ending 08/13/21 1541    Vent Information  Skin Assessment: Clean, dry, & intact  FiO2 : 40 %  SpO2: 97 %  SpO2/FiO2 ratio: 245  I Time/ I Time %: 0.8 s  Mask Type: Full face mask  Mask Size: Large       IPAP: (S) 14 cmH20  CPAP/EPAP: 6 cmH2O     CURRENT MEDS :  Scheduled Meds:   methylPREDNISolone  40 mg Intravenous Q12H    sodium chloride flush  5-40 mL Intravenous 2 times per day    [Held by provider] apixaban  5 mg Oral BID    aspirin  81 mg Oral Daily    rosuvastatin  40 mg Oral Nightly    ipratropium-albuterol  1 vial Inhalation Q4H    lisinopril  5 mg Oral Daily    metoprolol succinate  100 mg Oral Daily    [START ON 8/16/2021] vitamin D  50,000 Units Oral Weekly    sotalol  80 mg Oral BID    budesonide  0.5 mg Nebulization BID    ipratropium  0.5 mg Nebulization 4x daily    Arformoterol Tartrate  15 mcg Nebulization BID       Physical Exam:  General Appearance: appears comfortable in no acute distress. HEENT: Normocephalic atraumatic without obvious abnormality   Neck: Lips, mucosa, and tongue normal.  Supple, symmetrical, trachea midline, no adenopathy;thyroid:  no enlargement/tenderness/nodules or JVD. Lung: Breath sounds bilateral expiratory wheeze. Respirations   unlabored. Symmetrical expansion. Heart: RRR, normal S1, S2. No MRG  Abdomen: Soft, NT, ND. BS present x 4 quadrants. No bruit or organomegaly. Extremities: Pedal pulses 2+ symmetric b/l. Extremities normal, no cyanosis, clubbing, or edema. Musculokeletal: No joint swelling, no muscle tenderness. ROM normal in all joints of extremities. Neurologic: Mental status: Alert and Oriented X3 . Pertinent/ New Labs and Imaging Studies     Imaging Personally Reviewed:    Imaging personally reviewed:  8/11/2021 cxr  1.  Development of a moderately large right pleural effusion and significant   compressive atelectasis at the right lung base.       2.  Known lung cancer medial right lung base obscured by the effusion.       3.  Right paratracheal adenopathy. records. Micro:  No results for input(s): CULTRESP in the last 72 hours. No results for input(s): LABGRAM in the last 72 hours. No results for input(s): LEGUR in the last 72 hours. No results for input(s): STREPNEUMAGU in the last 72 hours. No results for input(s): LP1UAG in the last 72 hours. Pathology 6/4/2020  Diagnosis:   Right lower lobe mass, transbronchial biopsy: Keratinizing squamous   carcinoma. Comment:    Intradepartmental consultation is obtained. Constantino Blue is   submitted for PDL 1 testing per institutional protocol, which will be   separately reported. Assessment:    Acute on chronic respiratory failure with hypoxia  COPD with acute exacerbation  Obstructive sleep apnea on home BiPAP  Recurrent right pleural effusion history of thoracentesis February 2021 cytology negative  Squamous cell cancer right lower lung. Status post SBRT. PET scan 7/12/2021 with progression of disease within the chest increased size of nodule in the medial right lower lobe and scattered lymphadenopathy. Poor surgical candidate. 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 Fib  Hyperlipidemia  Morbid obesity BMI 40.8      Plan:   Oxygen therapy 6 L nasal cannula wean to keep greater than 92%  BiPAP 14/6 at at bedtime and as needed daytime with naps  Await final cultures respiratory culture specimen sent, bacterial urine antigens-pending  Scheduled bronchodilators-Brovana and Pulmicort twice daily, duo nebs every 4 hours  Solu-Medrol 40 mg every 12 hours  Eliquis on hold 48 hrs prior to right thoracentesis planned 8/14/21. send pleural fluid for analysis culture and cytology DVT, GI prophylaxis    This plan of care was reviewed in collaboration with Dr. Juan A Maldonado  Electronically signed by SIENNA Person CNP on 8/13/2021 at 3:41 PM        I personally saw, examined, and cared for the patient. Labs, medications, radiographs reviewed. I agree with history exam and plans detailed in NP note. Plan for thoracentesis in a.juaquin.     Bia Frank, DO

## 2021-08-14 ENCOUNTER — APPOINTMENT (OUTPATIENT)
Dept: ULTRASOUND IMAGING | Age: 61
DRG: 189 | End: 2021-08-14
Payer: COMMERCIAL

## 2021-08-14 ENCOUNTER — APPOINTMENT (OUTPATIENT)
Dept: GENERAL RADIOLOGY | Age: 61
DRG: 189 | End: 2021-08-14
Payer: COMMERCIAL

## 2021-08-14 LAB
AMYLASE FLUID: 71 U/L
ANION GAP SERPL CALCULATED.3IONS-SCNC: 3 MMOL/L (ref 7–16)
BASOPHILS ABSOLUTE: 0.02 E9/L (ref 0–0.2)
BASOPHILS RELATIVE PERCENT: 0.2 % (ref 0–2)
BUN BLDV-MCNC: 16 MG/DL (ref 6–23)
CALCIUM SERPL-MCNC: 9.2 MG/DL (ref 8.6–10.2)
CEA,FLUID: 1.7 NG/ML
CHLORIDE BLD-SCNC: 99 MMOL/L (ref 98–107)
CHOLESTEROL FLUID: 61 MG/DL
CO2: 34 MMOL/L (ref 22–29)
CREAT SERPL-MCNC: 0.8 MG/DL (ref 0.7–1.2)
EOSINOPHILS ABSOLUTE: 0.01 E9/L (ref 0.05–0.5)
EOSINOPHILS RELATIVE PERCENT: 0.1 % (ref 0–6)
FLUID TYPE: NORMAL
FLUID TYPE: NORMAL
GFR AFRICAN AMERICAN: >60
GFR NON-AFRICAN AMERICAN: >60 ML/MIN/1.73
GLUCOSE BLD-MCNC: 302 MG/DL (ref 74–99)
GLUCOSE, FLUID: 265 MG/DL
HCT VFR BLD CALC: 38 % (ref 37–54)
HEMOGLOBIN: 11.8 G/DL (ref 12.5–16.5)
IMMATURE GRANULOCYTES #: 0.06 E9/L
IMMATURE GRANULOCYTES %: 0.5 % (ref 0–5)
L. PNEUMOPHILA SEROGP 1 UR AG: NORMAL
LD, FLUID: 336 U/L
LYMPHOCYTES ABSOLUTE: 0.83 E9/L (ref 1.5–4)
LYMPHOCYTES RELATIVE PERCENT: 7.3 % (ref 20–42)
MCH RBC QN AUTO: 28.2 PG (ref 26–35)
MCHC RBC AUTO-ENTMCNC: 31.1 % (ref 32–34.5)
MCV RBC AUTO: 90.7 FL (ref 80–99.9)
MONOCYTES ABSOLUTE: 0.66 E9/L (ref 0.1–0.95)
MONOCYTES RELATIVE PERCENT: 5.8 % (ref 2–12)
NEUTROPHILS ABSOLUTE: 9.77 E9/L (ref 1.8–7.3)
NEUTROPHILS RELATIVE PERCENT: 86.1 % (ref 43–80)
PDW BLD-RTO: 13.1 FL (ref 11.5–15)
PLATELET # BLD: 222 E9/L (ref 130–450)
PMV BLD AUTO: 11 FL (ref 7–12)
POTASSIUM SERPL-SCNC: 4.8 MMOL/L (ref 3.5–5)
PROTEIN FLUID: 3.6 G/DL
RBC # BLD: 4.19 E12/L (ref 3.8–5.8)
SODIUM BLD-SCNC: 136 MMOL/L (ref 132–146)
STREP PNEUMONIAE ANTIGEN, URINE: NORMAL
WBC # BLD: 11.4 E9/L (ref 4.5–11.5)

## 2021-08-14 PROCEDURE — 6370000000 HC RX 637 (ALT 250 FOR IP): Performed by: INTERNAL MEDICINE

## 2021-08-14 PROCEDURE — 82150 ASSAY OF AMYLASE: CPT

## 2021-08-14 PROCEDURE — 2060000000 HC ICU INTERMEDIATE R&B

## 2021-08-14 PROCEDURE — 6360000002 HC RX W HCPCS: Performed by: NURSE PRACTITIONER

## 2021-08-14 PROCEDURE — 0W993ZZ DRAINAGE OF RIGHT PLEURAL CAVITY, PERCUTANEOUS APPROACH: ICD-10-PCS | Performed by: INTERNAL MEDICINE

## 2021-08-14 PROCEDURE — 6370000000 HC RX 637 (ALT 250 FOR IP): Performed by: NURSE PRACTITIONER

## 2021-08-14 PROCEDURE — 80048 BASIC METABOLIC PNL TOTAL CA: CPT

## 2021-08-14 PROCEDURE — 83986 ASSAY PH BODY FLUID NOS: CPT

## 2021-08-14 PROCEDURE — 36415 COLL VENOUS BLD VENIPUNCTURE: CPT

## 2021-08-14 PROCEDURE — 94640 AIRWAY INHALATION TREATMENT: CPT

## 2021-08-14 PROCEDURE — 32555 ASPIRATE PLEURA W/ IMAGING: CPT

## 2021-08-14 PROCEDURE — 84999 UNLISTED CHEMISTRY PROCEDURE: CPT

## 2021-08-14 PROCEDURE — 2580000003 HC RX 258: Performed by: NURSE PRACTITIONER

## 2021-08-14 PROCEDURE — 88112 CYTOPATH CELL ENHANCE TECH: CPT

## 2021-08-14 PROCEDURE — 85025 COMPLETE CBC W/AUTO DIFF WBC: CPT

## 2021-08-14 PROCEDURE — 94660 CPAP INITIATION&MGMT: CPT

## 2021-08-14 PROCEDURE — 88305 TISSUE EXAM BY PATHOLOGIST: CPT

## 2021-08-14 PROCEDURE — 71045 X-RAY EXAM CHEST 1 VIEW: CPT

## 2021-08-14 PROCEDURE — 84157 ASSAY OF PROTEIN OTHER: CPT

## 2021-08-14 PROCEDURE — 2700000000 HC OXYGEN THERAPY PER DAY

## 2021-08-14 PROCEDURE — 97165 OT EVAL LOW COMPLEX 30 MIN: CPT

## 2021-08-14 PROCEDURE — 87070 CULTURE OTHR SPECIMN AEROBIC: CPT

## 2021-08-14 PROCEDURE — 82378 CARCINOEMBRYONIC ANTIGEN: CPT

## 2021-08-14 PROCEDURE — 82947 ASSAY GLUCOSE BLOOD QUANT: CPT

## 2021-08-14 PROCEDURE — 87205 SMEAR GRAM STAIN: CPT

## 2021-08-14 PROCEDURE — 83615 LACTATE (LD) (LDH) ENZYME: CPT

## 2021-08-14 RX ORDER — PREDNISONE 20 MG/1
40 TABLET ORAL DAILY
Status: DISCONTINUED | OUTPATIENT
Start: 2021-08-14 | End: 2021-08-15 | Stop reason: HOSPADM

## 2021-08-14 RX ADMIN — ACETAMINOPHEN 650 MG: 325 TABLET ORAL at 20:27

## 2021-08-14 RX ADMIN — PREDNISONE 40 MG: 20 TABLET ORAL at 10:18

## 2021-08-14 RX ADMIN — SOTALOL HYDROCHLORIDE 80 MG: 80 TABLET ORAL at 10:18

## 2021-08-14 RX ADMIN — IPRATROPIUM BROMIDE AND ALBUTEROL SULFATE 3 ML: .5; 3 SOLUTION RESPIRATORY (INHALATION) at 16:13

## 2021-08-14 RX ADMIN — IPRATROPIUM BROMIDE AND ALBUTEROL SULFATE 3 ML: .5; 3 SOLUTION RESPIRATORY (INHALATION) at 13:01

## 2021-08-14 RX ADMIN — ROSUVASTATIN CALCIUM 40 MG: 20 TABLET, FILM COATED ORAL at 20:27

## 2021-08-14 RX ADMIN — SOTALOL HYDROCHLORIDE 80 MG: 80 TABLET ORAL at 20:27

## 2021-08-14 RX ADMIN — ASPIRIN 81 MG: 81 TABLET, COATED ORAL at 10:18

## 2021-08-14 RX ADMIN — IPRATROPIUM BROMIDE AND ALBUTEROL SULFATE 3 ML: .5; 3 SOLUTION RESPIRATORY (INHALATION) at 09:57

## 2021-08-14 RX ADMIN — METOPROLOL SUCCINATE 100 MG: 100 TABLET, EXTENDED RELEASE ORAL at 10:18

## 2021-08-14 RX ADMIN — Medication 5 ML: at 10:19

## 2021-08-14 RX ADMIN — BUDESONIDE 500 MCG: 0.5 SUSPENSION RESPIRATORY (INHALATION) at 09:57

## 2021-08-14 RX ADMIN — ARFORMOTEROL TARTRATE 15 MCG: 15 SOLUTION RESPIRATORY (INHALATION) at 19:55

## 2021-08-14 RX ADMIN — IPRATROPIUM BROMIDE AND ALBUTEROL SULFATE 3 ML: .5; 3 SOLUTION RESPIRATORY (INHALATION) at 23:27

## 2021-08-14 RX ADMIN — LISINOPRIL 5 MG: 5 TABLET ORAL at 10:18

## 2021-08-14 RX ADMIN — METHYLPREDNISOLONE SODIUM SUCCINATE 40 MG: 40 INJECTION, POWDER, LYOPHILIZED, FOR SOLUTION INTRAMUSCULAR; INTRAVENOUS at 05:18

## 2021-08-14 RX ADMIN — IPRATROPIUM BROMIDE AND ALBUTEROL SULFATE 3 ML: .5; 3 SOLUTION RESPIRATORY (INHALATION) at 03:56

## 2021-08-14 RX ADMIN — ARFORMOTEROL TARTRATE 15 MCG: 15 SOLUTION RESPIRATORY (INHALATION) at 09:57

## 2021-08-14 RX ADMIN — IPRATROPIUM BROMIDE AND ALBUTEROL SULFATE 3 ML: .5; 3 SOLUTION RESPIRATORY (INHALATION) at 19:55

## 2021-08-14 RX ADMIN — IPRATROPIUM BROMIDE AND ALBUTEROL SULFATE 3 ML: .5; 3 SOLUTION RESPIRATORY (INHALATION) at 00:31

## 2021-08-14 RX ADMIN — BUDESONIDE 500 MCG: 0.5 SUSPENSION RESPIRATORY (INHALATION) at 19:55

## 2021-08-14 RX ADMIN — Medication 10 ML: at 20:31

## 2021-08-14 ASSESSMENT — PAIN SCALES - GENERAL
PAINLEVEL_OUTOF10: 0
PAINLEVEL_OUTOF10: 4

## 2021-08-14 ASSESSMENT — PAIN DESCRIPTION - PAIN TYPE: TYPE: ACUTE PAIN

## 2021-08-14 ASSESSMENT — PAIN DESCRIPTION - LOCATION: LOCATION: HEAD

## 2021-08-14 NOTE — PROGRESS NOTES
Dr Maurizio Hu in and performed thoracentesis on pt's right side removing 1500 ml dark red fluid. Pt tolerated well. No complaints.  Band-aid placed on site is dry and intact

## 2021-08-14 NOTE — PROGRESS NOTES
Vision: [facility]   Sensation:  No c/o numbness or tingling                             Pt educated on purpose of OT services with verbalized understanding. Pt reports no need for further OT interventions at this time. OT evaluation only no indicated need for further skilled OT services. Please reconsult if any new need arises. Eval Complexity: Low    Upon arrival, patient seated EOB. At end of session, patient seated EOB with call light and phone within reach, all lines and tubes intact. Low Evaluation     Time In: 1115  Time Out: 1130    Evaluation time includes thorough review of current medical information, gathering information on past medical history/social history and prior level of function, completion of standardized testing/informal observation of tasks and assessment of data.     Lawyer Reyes OTR/L  [id number]

## 2021-08-14 NOTE — PROCEDURES
THORACENTESIS PROCEDURE NOTE    Date: 8/14/2021    Procedure:  diagnostic and therapeutic thoracentesis    Attending:  Dougie Carrasco DO    Indication: pleural effusion    Findings:  1. Moderate to large right pleural effusion  2. 1500 cc of bloody fluid removed    Site: right    Consent: Obtained    Medications: 10 cc of 1% lidocaine    Description:  The procedure, including risks and benefits, was discussed. Timeout performed. Ultrasound used: Yes. Using ultrasound, largest pocket of pleural fluid identified. Area prepped and draped in sterile fashion. Anesthesia with 10 ml 1% lidocaine. An 18 gauge needle with 8 Togolese catheter was advanced into the pleural space, catheter advanced over the needle and needle withdrawn. 1500 mL of fluid was collected for diagnostic and therapeutic purposes. Catheter removed. Dressing placed over the procedure site.     CXR ordered for post-thoracentesis    Est blood loss: minimal    ELECTRONICALLY SIGNED:  Dougie Carrasco DO  8/14/2021  9:02 AM

## 2021-08-14 NOTE — PROGRESS NOTES
Nicki Jasso M.D.,[facility]  Antonio Vargas D.O., F.A.C.O.I., Alicia Bullock M.D. Amanda Lal M.D. Rosio Castillo D.O. Daily Pulmonary Progress Note    Patient:  Romy Guthrie 62 y.o. male MRN: [id number]     Date of Service: 8/14/2021      Synopsis     We are following patient for  lung cancer, pleural effusion, acute respiratory failure with hypoxia    \"CC\" shortness of breath    Code status: Full      Subjective      Patient was seen and examined. Right thoracentesis done today with 1500 cc of bloody fluid removed. Review of Systems:  Constitutional: Denies fever, weight loss, night sweats, and fatigue  Skin: Denies pigmentation, dark lesions chest rash improving  HEENT: Denies hearing loss, tinnitus, ear drainage, epistaxis, sore throat, and hoarseness. Cardiovascular: Denies palpitations, chest pain, and chest pressure.   Respiratory: Mild dyspnea occasional cough scant mucus, mild chest tightness and wheeze  Gastrointestinal: Denies nausea, vomiting, poor appetite, diarrhea, heartburn or reflux  Genitourinary: Denies dysuria, frequency, urgency or hematuria  Musculoskeletal: Denies myalgias, muscle weakness, and bone pain  Neurological: Denies dizziness, vertigo, headache, and focal weakness  Psychological: Denies anxiety and depression  Endocrine: Denies heat intolerance and cold intolerance  Hematopoietic/Lymphatic: Denies bleeding problems and blood transfusions    24-hour events:  None    Objective   Vitals: /76   Pulse 99   Temp 97.8 °F (36.6 °C) (Oral)   Resp 20   Ht 5' 11\" (1.803 m)   Wt 293 lb (132.9 kg)   SpO2 96%   BMI 40.87 kg/m²     I/O:  No intake or output data in the 24 hours ending 08/14/21 0904    Vent Information  Skin Assessment: Clean, dry, & intact  FiO2 : 40 %  SpO2: 96 %  SpO2/FiO2 ratio: 242.5  I Time/ I Time %: 0.8 s  Mask Type: Full face mask  Mask Size: Large       IPAP: 14 cmH20  CPAP/EPAP: 6 cmH2O     CURRENT MEDS :  Scheduled Meds:   methylPREDNISolone  40 mg Intravenous Q12H    sodium chloride flush  5-40 mL Intravenous 2 times per day    [Held by provider] apixaban  5 mg Oral BID    aspirin  81 mg Oral Daily    rosuvastatin  40 mg Oral Nightly    ipratropium-albuterol  1 vial Inhalation Q4H    lisinopril  5 mg Oral Daily    metoprolol succinate  100 mg Oral Daily    [START ON 8/16/2021] vitamin D  50,000 Units Oral Weekly    sotalol  80 mg Oral BID    budesonide  0.5 mg Nebulization BID    Arformoterol Tartrate  15 mcg Nebulization BID       Physical Exam:  General Appearance: appears comfortable in no acute distress. HEENT: Normocephalic atraumatic without obvious abnormality   Neck: Lips, mucosa, and tongue normal.  Supple, symmetrical, trachea midline, no adenopathy;thyroid:  no enlargement/tenderness/nodules or JVD. Lung: Breath sounds bilateral expiratory wheeze. Respirations   unlabored. Symmetrical expansion. Heart: RRR, normal S1, S2. No MRG  Abdomen: Soft, NT, ND. BS present x 4 quadrants. No bruit or organomegaly. Extremities: Pedal pulses 2+ symmetric b/l. Extremities normal, no cyanosis, clubbing, or edema. Musculokeletal: No joint swelling, no muscle tenderness. ROM normal in all joints of extremities. Neurologic: Mental status: Alert and Oriented X3 . Pertinent/ New Labs and Imaging Studies     Imaging Personally Reviewed:    Imaging personally reviewed:  8/11/2021 cxr  1.  Development of a moderately large right pleural effusion and significant   compressive atelectasis at the right lung base.       2.  Known lung cancer medial right lung base obscured by the effusion.       3.  Right paratracheal adenopathy.       4.  Old CABG.    Echo:  1/16/2021      Summary   Technically suboptimal and limited study. Left ventricular internal dimensions were normal in diastole and systole. Mild left ventricular concentric hypertrophy noted. Normal left ventricular ejection fraction.    Septal motion consistent with post cardiac surgery. The left atrium is borderline dilated. Right ventricle global systolic function is reduced . Mildly enlarged right atrium size. Mild thickening of the mitral valve leaflets. Moderate mitral annular calcification potentially consistent with repair. Mild to moderate mitral regurgitation is present. The aortic valve appears mildly sclerotic. Mild aortic regurgitation is noted. Mild tricuspid regurgitation. Mildly dilated aortic root. Dilation of the ascending aorta. There is a trivial circumferential pericardial effusion noted. PET scan 7/12/2021  Impression   1. Progression of disease within the chest with increased size of the nodule   in the medial right lower lobe and scattered lymphadenopathy. 2. No evidence of distant metastatic disease.                 Labs:  Lab Results   Component Value Date    WBC 11.4 08/14/2021    HGB 11.8 08/14/2021    HCT 38.0 08/14/2021    MCV 90.7 08/14/2021    MCH 28.2 08/14/2021    MCHC 31.1 08/14/2021    RDW 13.1 08/14/2021     08/14/2021    MPV 11.0 08/14/2021     Lab Results   Component Value Date     08/14/2021    K 4.8 08/14/2021    K 4.5 08/12/2021    CL 99 08/14/2021    CO2 34 08/14/2021    BUN 16 08/14/2021    CREATININE 0.8 08/14/2021    LABALBU 4.1 05/12/2021    LABALBU 4.7 06/03/2011    CALCIUM 9.2 08/14/2021    GFRAA >60 08/14/2021    LABGLOM >60 08/14/2021     Lab Results   Component Value Date    PROTIME 14.1 05/13/2021    INR 1.3 05/13/2021     Recent Labs     08/11/21  1655   PROBNP 2,688*     No results for input(s): PROCAL in the last 72 hours. This SmartLink has not been configured with any valid records. Micro:  No results for input(s): CULTRESP in the last 72 hours. No results for input(s): LABGRAM in the last 72 hours. No results for input(s): LEGUR in the last 72 hours. No results for input(s): STREPNEUMAGU in the last 72 hours.   No results for input(s): LP1UAG in the last 72 hours. Pathology 6/4/2020  Diagnosis:   Right lower lobe mass, transbronchial biopsy: Keratinizing squamous   carcinoma. Comment:    Intradepartmental consultation is obtained. Therese Green is   submitted for PDL 1 testing per institutional protocol, which will be   separately reported. Assessment:    Acute on chronic respiratory failure with hypoxia  COPD with acute exacerbation  Obstructive sleep apnea on home BiPAP  Recurrent right pleural effusion history of thoracentesis February 2021 cytology negative. Thoracentesis 8/14 with 1500 cc of bloody fluid removed -path pending  Squamous cell cancer right lower lung. Status post SBRT. PET scan 7/12/2021 with progression of disease within the chest increased size of nodule in the medial right lower lobe and scattered lymphadenopathy. Poor surgical candidate. 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 Fib  Hyperlipidemia  Morbid obesity BMI 40.8      Plan:   Oxygen therapy via nasal cannula wean to keep greater than 92%, check amatory pulse ox prior to discharge  BiPAP 14/6 at at bedtime and as needed daytime with naps  Await final cultures respiratory culture specimen sent, bacterial urine antigens-pending  Pleural fluid studies sent  Scheduled bronchodilators-Brovana and Pulmicort twice daily, duo nebs every 4 hours  Transition steroids to prednisone 40 mg  Continue to hold Eliquis for another day    Patient can likely be discharged in next 1-2 days.     Albina Salazar DO

## 2021-08-15 VITALS
RESPIRATION RATE: 20 BRPM | OXYGEN SATURATION: 92 % | BODY MASS INDEX: 41.09 KG/M2 | WEIGHT: 293.5 LBS | TEMPERATURE: 98 F | DIASTOLIC BLOOD PRESSURE: 76 MMHG | HEART RATE: 96 BPM | SYSTOLIC BLOOD PRESSURE: 140 MMHG | HEIGHT: 71 IN

## 2021-08-15 LAB
CULTURE, RESPIRATORY: NORMAL
SMEAR, RESPIRATORY: NORMAL

## 2021-08-15 PROCEDURE — 6360000002 HC RX W HCPCS: Performed by: NURSE PRACTITIONER

## 2021-08-15 PROCEDURE — 6370000000 HC RX 637 (ALT 250 FOR IP): Performed by: INTERNAL MEDICINE

## 2021-08-15 PROCEDURE — 94660 CPAP INITIATION&MGMT: CPT

## 2021-08-15 PROCEDURE — 94640 AIRWAY INHALATION TREATMENT: CPT

## 2021-08-15 PROCEDURE — 2580000003 HC RX 258: Performed by: NURSE PRACTITIONER

## 2021-08-15 PROCEDURE — 97161 PT EVAL LOW COMPLEX 20 MIN: CPT

## 2021-08-15 PROCEDURE — 6370000000 HC RX 637 (ALT 250 FOR IP): Performed by: NURSE PRACTITIONER

## 2021-08-15 RX ADMIN — IPRATROPIUM BROMIDE AND ALBUTEROL SULFATE 3 ML: .5; 3 SOLUTION RESPIRATORY (INHALATION) at 07:48

## 2021-08-15 RX ADMIN — SOTALOL HYDROCHLORIDE 80 MG: 80 TABLET ORAL at 09:44

## 2021-08-15 RX ADMIN — BUDESONIDE 500 MCG: 0.5 SUSPENSION RESPIRATORY (INHALATION) at 07:48

## 2021-08-15 RX ADMIN — PREDNISONE 40 MG: 20 TABLET ORAL at 09:44

## 2021-08-15 RX ADMIN — LISINOPRIL 5 MG: 5 TABLET ORAL at 09:44

## 2021-08-15 RX ADMIN — ARFORMOTEROL TARTRATE 15 MCG: 15 SOLUTION RESPIRATORY (INHALATION) at 07:48

## 2021-08-15 RX ADMIN — IPRATROPIUM BROMIDE AND ALBUTEROL SULFATE 3 ML: .5; 3 SOLUTION RESPIRATORY (INHALATION) at 03:58

## 2021-08-15 RX ADMIN — APIXABAN 5 MG: 5 TABLET, FILM COATED ORAL at 09:44

## 2021-08-15 RX ADMIN — IPRATROPIUM BROMIDE AND ALBUTEROL SULFATE 3 ML: .5; 3 SOLUTION RESPIRATORY (INHALATION) at 12:09

## 2021-08-15 RX ADMIN — ASPIRIN 81 MG: 81 TABLET, COATED ORAL at 09:44

## 2021-08-15 RX ADMIN — Medication 10 ML: at 09:45

## 2021-08-15 RX ADMIN — METOPROLOL SUCCINATE 100 MG: 100 TABLET, EXTENDED RELEASE ORAL at 09:44

## 2021-08-15 ASSESSMENT — PAIN SCALES - GENERAL: PAINLEVEL_OUTOF10: 0

## 2021-08-15 NOTE — PROGRESS NOTES
Message left for Dr. Bertha Gale regarding okay for discharge from pulmonary standpoint.        Cameron Drummond RN

## 2021-08-15 NOTE — PROGRESS NOTES
PULSE OX ON ROOM AIR SITTING 94%   PULSE OX ON ROOM AIR AMBULATING 91%   Patient not placed on any oxygen, he did not drop below 90% while ambulating.          Sophie Melgar RN

## 2021-08-15 NOTE — PROGRESS NOTES
Date: 8/14/2021    Time: 10:25 PM    Patient Placed On BIPAP/CPAP/ Non-Invasive Ventilation? No     If no must comment. Facial area red/color change? No           If YES are Blister/Lesion present? No   If yes must notify nursing staff  BIPAP/CPAP skin barrier?   Yes    Skin barrier type:mepilexlite       Comments: Patient already placed on bipap        Harley Gold RCP

## 2021-08-15 NOTE — PROGRESS NOTES
Subjective: The patient is awake and alert. Had thoracentesis - feels well   No acute events overnight. Denies chest pain, angina, no further sob     Objective:    BP (!) 147/83   Pulse 92   Temp 98.2 °F (36.8 °C) (Oral)   Resp 18   Ht 5' 11\" (1.803 m)   Wt 293 lb (132.9 kg)   SpO2 92%   BMI 40.87 kg/m²     In: 240 [P.O.:240]  Out: -   In: 240   Out: -     General appearance: NAD, conversant  HEENT: AT/NC, MMM  Neck: FROM, supple  Lungs: improved breath sounds   CV: irregular, no MRGs  Vasc: Radial pulses 2+  Abdomen: Soft, non-tender; no masses or HSM  Extremities: No peripheral edema or digital cyanosis  Skin: no rash, lesions or ulcers  Psych: Alert and oriented to person, place and time  Neuro: Alert and interactive     Recent Labs     08/12/21  0419 08/13/21  0410 08/14/21  0301   WBC 6.3 6.6 11.4   HGB 13.4 12.9 11.8*   HCT 44.0 42.2 38.0    241 222       Recent Labs     08/12/21  0419 08/13/21  0410 08/14/21  0301    136 136   K 4.5 5.1* 4.8   CL 99 99 99   CO2 34* 35* 34*   BUN 17 18 16   CREATININE 1.0 0.9 0.8   CALCIUM 9.0 9.3 9.2       Assessment:    Principal Problem:    Acute respiratory failure with hypoxia (HCC)  Active Problems:    CAD (coronary artery disease)    Essential (primary) hypertension    COPD (chronic obstructive pulmonary disease) (HCC)    Malignant neoplasm of lung (HCC)    Hyperlipidemia, unspecified    AF (atrial fibrillation) (HCC)    Pleural effusion on right    Elevated troponin    ANTONIO (obstructive sleep apnea)    Former smoker  Resolved Problems:    * No resolved hospital problems. *      Plan:  81-year-old male with a recent history of lung CA admitted to telemetry unit with     Acute respiratory failure with hypoxia  -Supplement O2 demands keeping oxygen saturation greater than 92%.   -Pulmonary following.    - thoracentesis - about 1.6 l removed - 8/14- clinically feels mch better   -Monitor labs hemoglobin drops below seven transfuse as never necessary.   Monitor electrolytes place as necessary     A. fib RVR  -Rate control- controlled   Resume eliquis in am   -Telemetry monitoring  -Eliquis on hold for procedure.     Ambulate   iss   Dc plan in am       Dinora Britton MD  9:54 PM  8/14/2021

## 2021-08-15 NOTE — DISCHARGE SUMMARY
HFA     * albuterol (2.5 MG/3ML) 0.083% nebulizer solution  Commonly known as: PROVENTIL     sotalol 80 MG tablet  Commonly known as: BETAPACE  Take 1 tablet by mouth 2 times daily     Trelegy Ellipta 100-62.5-25 MCG/INH Aepb  Generic drug: fluticasone-umeclidin-vilant  Inhale 1 puff into the lungs daily     vitamin D 1.25 MG (18147 UT) Caps capsule  Commonly known as: ERGOCALCIFEROL         * This list has 2 medication(s) that are the same as other medications prescribed for you. Read the directions carefully, and ask your doctor or other care provider to review them with you. Activity: activity as tolerated  Diet: diabetic diet    Pt has been advised to: Follow-up with Abhijit López MD in 1 week.   Follow-up with consultants as recommended by them    Note that over 30 minutes was spent in preparing discharge papers, discussing discharge with patient, medication review, etc.    Signed:  Marc Vergara MD  8/15/2021  11:52 AM

## 2021-08-15 NOTE — PROGRESS NOTES
Physical Therapy    Facility/Department: [address] INTERNAL MEDICINE 2  Initial Assessment    NAME: Angela Urrutia  : 1960  MRN: [id number]    Date of Service: 8/15/2021      Attending Provider:  Jordan Guillaume MD    Evaluating PT:  Hallie Velasquez. Javi Madsen P.T. Room #:  7633/9403-T  Diagnosis:  Acute respiratory failure with hypoxia (HCC) [J96.01]  Recurrent right pleural effusion [J90]   Procedure: Thoracentesis 21  Precautions:  O2 sat drops with activity    SUBJECTIVE:    Pt lives alone in a split level home with no stairs to enter. There are 5 steps and 1 rail to 2nd floor bed and bath. Pt ambulated with no AD PTA. OBJECTIVE:   Initial Evaluation  Date: 21   Was pt agreeable to Eval/treatment? yes   Does pt have pain? No c/o pain   Bed Mobility  NA   Transfers Sit to stand: Independent  Stand to sit: Independent  Stand pivot: Independent   Ambulation   250 feet with no AD Independent    Stair negotiation: ascended and descended 4 steps with 1 rail Independent    AM-PAC 6 Clicks      BLE ROM is WFL. BLE strength is grossly WFL. Sensation:  Pt denies numbness and tingling to extremities  Edema:  None noted  Balance: sitting is Independent and standing with no AD is Independent  Endurance: fair+    Vitals:  Pt was on RA and after standing up from chair O2 sat on RA prior to amb was 93%. He walked in the dubose and performed stairs safely and had no LOB and only had mild SOB. After amb O2 sat was 83% and after 3 min came up to 90%. Patient education  Pt educated on amb in dubose 3-5x a day    Patient response to education:   Pt verbalized understanding Pt demonstrated skill Pt requires further education in this area   yes TBA yes     ASSESSMENT:    Comments:  Pt had drop in O2 sat, but is Independent with functional mobility at this time and has no skilled PT needs. I educated pt on amb in the dubose 3-5x a day and he verbalized understanding.      Pt was left sitting up in chair as found with call light left by patient. Pt's/ family goals   1. To go home. Patient and or family understand(s) diagnosis, prognosis, and plan of care. PHYSICAL THERAPY PLAN OF CARE:     Referring provider/PT Order:  PT eval and treat. Diagnosis:  Acute respiratory failure with hypoxia (HCC) [J96.01]  Recurrent right pleural effusion [J90]    Current Treatment Recommendations:   PT will discharge pt from our service at this time. Time in  07:55  Time out  08:10    Evaluation Time includes thorough review of current medical information, gathering information on past medical history/social history and prior level of function, completion of standardized testing/informal observation of tasks, assessment of data and education on plan of care and goals. CPT codes:  [x] Low Complexity PT evaluation 05771  [] Moderate Complexity PT evaluation 13094  [] High Complexity PT evaluation 37198  [] PT Re-evaluation 94142  [] Gait training 60280 ** minutes  [] Manual therapy 73182 ** minutes  [] Therapeutic activities 53856 ** minutes  [] Therapeutic exercises 64395 ** minutes  [] Neuromuscular reeducation 36889 ** minutes     Kemal Melendez., P.T.   License Number: [id number]

## 2021-08-16 LAB
APPEARANCE FLUID: NORMAL
BLOOD CULTURE, ROUTINE: NORMAL
BODY FLUID CULTURE, STERILE: NORMAL
CELL COUNT FLUID TYPE: NORMAL
COLOR FLUID: NORMAL
CULTURE, BLOOD 2: NORMAL
GRAM STAIN RESULT: NORMAL
MONOCYTE, FLUID: 67 %
NEUTROPHIL, FLUID: 33 %
NUCLEATED CELLS FLUID: 1980 /UL
RBC FLUID: NORMAL /UL

## 2021-08-17 LAB
REPORT: NORMAL
THIS TEST SENT TO: NORMAL

## 2021-08-19 NOTE — ADT AUTH CERT
Patient Demographics    Name Patient ID SSN Gender Identity Birth Date   Shelley Schwarz [id number]  Male 62 (61 yrs)   Address Phone Email Employer    [address] [employer] [place] [id number] (Y)   [phone] Skye Beltran) -- [place] Occupation Emp Status    Edilberto James (non-) -- Full Time    Reg Status PCP Date Last Verified Next Review Date    Verified Vern Jauregui MD  [phone] 08/11/21 09/10/21    Admission Date Discharge Date Admitting Provider     08/11/21 08/15/21 Shelia Davis MD     Marital Status [employer]oration[employer]       [address]      Emergency Contact 1   [facility] [phone] Ryan [place] [address]   [facility]   [phone] Margarita Pinzon)   [id number] [place] [address] 759 Account [de-identified]  CV[employer] Subscriber Name/Sex/Relation Subscriber  Subscriber Address/Phone Subscriber Emp/Emp Phone   1. BCBS   [id number] YSABEL POWELL A - Male   (Self) 1960 [address], [place]  [address] [phone](D) OTHER    Utilization Reviews       [facility] - Care Day 4 (2021) by Kerrie Thorne RN       Review Status Review Entered   Completed 2021 12:20      Criteria Review      Care Day: 4 Care Date: 2021 Level of Care: Intermediate Care    Guideline Day 2    Clinical Status    (X) * Hemodynamic stability    2021 12:20 PM EDT by Noah Villegas      BP (!) 147/83   Pulse 92    (X) * Respiratory distress absent    2021 12:20 PM EDT by oNah Villegas      no further sob   O2 Sats 92% on room air    Activity    (X) Activity as tolerated    Routes    (X) Oral hydration    (X) Oral medications    (X) Usual diet    Interventions    (X) Drainage or ablation procedure    2021 12:20 PM EDT by Noah Villegas      Therapeutic thoracentesis  1. Moderate to large right pleural effusion  2.1500 cc of bloody fluid removed    * Milestone   Additional Notes    - CD 4      IM   The patient is awake and alert. Had thoracentesis - feels well    No acute events overnight.     Denies chest pain, angina, no further sob        Objective:       BP (!) 147/83   Pulse 92   Temp 98.2 °F (36.8 °C) (Oral)   Resp 18   Ht 5' 11\" (1.803 m)   Wt 293 lb (132.9 kg)   SpO2 92%   BMI 40.87 kg/m²        General appearance: NAD, conversant   HEENT: AT/NC, MMM   Neck: FROM, supple   Lungs: improved breath sounds    CV: irregular, no MRGs   Vasc: Radial pulses 2+   Abdomen: Soft, non-tender; no masses or HSM   Extremities: No peripheral edema or digital cyanosis   Skin: no rash, lesions or ulcers   Psych: Alert and oriented to person, place and time   Neuro: Alert and interactive         Plan:   71-year-old male with a recent history of lung CA admitted to telemetry unit with       Acute respiratory failure with hypoxia   -Supplement O2 demands keeping oxygen saturation greater than 92%. -Pulmonary following.     - thoracentesis - about 1.6 l removed - 8/14- clinically feels mch better    -Monitor labs hemoglobin drops below seven transfuse as never necessary.  Monitor electrolytes place as necessary       A. fib RVR   -Rate control- controlled    Resume eliquis in am    -Telemetry monitoring   -Eliquis on hold for procedure.       Ambulate    iss    Dc plan in am                Pulmonology    Assessment:    1. Acute on chronic respiratory failure with hypoxia   2. COPD with acute exacerbation   3. Obstructive sleep apnea on home BiPAP   4. Recurrent right pleural effusion history of thoracentesis February 2021 cytology negative.  Thoracentesis 8/14 with 1500 cc of bloody fluid removed -path pending   5. Squamous cell cancer right lower lung.  Status post SBRT.  PET scan 7/12/2021 with progression of disease within the chest increased size of nodule in the medial right lower lobe and scattered lymphadenopathy.  Poor surgical candidate.  Started on Opdivo/Yervoy treatment   6. CAD   7. Hypertension   8.  Mild to moderate MR, preserved EF   9. 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 Permanent A. Fib   11. Hyperlipidemia   12. Morbid obesity BMI 40.8           Plan:   1. Oxygen therapy via nasal cannula wean to keep greater than 92%, check amatory pulse ox prior to discharge   2. BiPAP 14/6 at at bedtime and as needed daytime with naps   3. Await final cultures respiratory culture specimen sent, bacterial urine antigens-pending   4. Pleural fluid studies sent   5. Scheduled bronchodilators-Brovana and Pulmicort twice daily, duo nebs every 4 hours   6. Transition steroids to prednisone 40 mg   7. Continue to hold Eliquis for another day               Labs   Results for Canelo Smith (MRN [id number]) as of 8/19/2021 12:14      8/14/2021 03:01   Sodium: 136   Potassium: 4.8   Chloride: 99   CO2: 34 (H)   BUN: 16   Creatinine: 0.8   Anion Gap: 3 (L)   GFR Non-: >60   GFR African American: >60   Glucose: 302 (H)   Calcium: 9.2   WBC: 11.4   RBC: 4.19   Hemoglobin Quant: 11.8 (L)   Hematocrit: 38.0   MCV: 90.7   MCH: 28.2   MCHC: 31.1 (L)   MPV: 11.0   RDW: 13.1   Platelet Count: 107   Neutrophils %: 86.1 (H)   Immature Granulocytes %: 0.5   Lymphocyte %: 7.3 (L)   Monocytes %: 5.8   Eosinophils %: 0.1   Basophils %: 0.2   Neutrophils Absolute: 9.77 (H)   Immature Granulocytes #: 0.06   Lymphocytes Absolute: 0.83 (L)   Monocytes Absolute: 0.66   Eosinophils Absolute: 0.01 (L)   Basophils Absolute: 0.02      8/14/2021 08:50   CULTURE, BODY FLUID: Rpt   Gram Stain Result: Gram stain perfor. ..    Body Fluid Culture, Sterile: Growth not present   Source + CELL CT/DIFF-BF: Thoracentesis   Appearance, Fluid: Hazy   Color, Fluid: Red   Fluid Type: Thoracentesis   Glucose, Fluid: 265   LD, Fluid: 336   RBC, Fluid: 49,000   Monocyte Count, Fluid: 67   Neutrophil Count, Fluid: 33   Nucl Cell, Fluid: 1,980   Protein, Fluid: 3.6   Amylase, Fluid: 71   Cholesterol, Fluid: 61               Scheduled Medications   · methylPREDNISolone 40 mg Intravenous Q12H   · [Held by provider] apixaban 5 mg Oral BID   · aspirin 81 mg Oral Daily   · rosuvastatin 40 mg Oral Nightly   · ipratropium-albuterol 1 vial Inhalation Q4H   · lisinopril 5 mg Oral Daily   · metoprolol succinate 100 mg Oral Daily   · [START ON 8/16/2021] vitamin D 50,000 Units Oral Weekly   · sotalol 80 mg Oral BID   · budesonide 0.5 mg Nebulization BID   · Arformoterol Tartrate 15 mcg Nebulization BID      PRN   acetaminophen (TYLENOL) tablet 650 mg    Dose: 650 mg   Freq: EVERY 6 HOURS PRN Route: PO   PRN Reasons: Pain Mild (1-3),Fever   PRN Comment: For temp greater than 100.4 F (38 C)   X1                  OT   AM-PAC Daily Activity Raw Score: 24/24       Recommended Adaptive Equipment: none         Diagnosis: acute respiratory failure with hypoxia. Pt presents to ED from home with SOB. Procedure: Thoracentesis 8/14/21    Pertinent Medical History: CAD, COPD, HTN, malignant neoplasm of lung, afib    Precautions:  O2       Home Living: Pt lives alone in a 2 story home with B/B 2nd floor, no 1/2 bath 1st.    Bathroom setup: walk in shower with grab bar, standard commode        Prior Level of Function: Independent with ADLs, Independent with IADLs; completed functional mobility with no AD. No home O2. Driving:  Yes          Ple[facility] - Care Day 3 (8/13/2021) by Praveen Ryan RN       Review Status Review Entered   Completed 8/19/2021 12:06      Criteria Review      Care Day: 3 Care Date: 8/13/2021 Level of Care: Intermediate Care    Guideline Day 2    Clinical Status    (X) * Hemodynamic stability    8/19/2021 12:06 PM EDT by Jerry Carr      BP (!) 141/89   Pulse 91    ( ) * Respiratory distress absent    8/19/2021 12:06 PM EDT by Jerry Carr      SOB continues 6 liters O2    Activity    (X) Activity as tolerated    Routes    (X) Oral hydration    (X) Oral medications    (X) Usual diet    Interventions    (X) Drainage or ablation procedure    8/19/2021 12:06 PM EDT by Andrea De León Mitch Ivy      Patient to have thoracentesis on Saturday hold Eliquis    * Milestone   Additional Notes   8/13 - CD 3      The patient is awake and alert.  Sitting in chair at bedside   No acute events overnight.     Denies chest pain, angina, SOB continues 6 liters O2       Objective:       BP (!) 141/89   Pulse 91   Temp 98.7 °F (37.1 °C) (Oral)   Resp 20   Ht 5' 11\" (1.803 m)   Wt 293 lb (132.9 kg)   SpO2 97%   BMI 40.87 kg/m²       General appearance: NAD, conversant   HEENT: AT/NC, MMM   Neck: FROM, supple   Lungs: Diminished throughout   CV: irregular, no MRGs   Vasc: Radial pulses 2+   Abdomen: Soft, non-tender; no masses or HSM   Extremities: No peripheral edema or digital cyanosis   Skin: no rash, lesions or ulcers   Psych: Alert and oriented to person, place and time   Neuro: Alert and interactive          A/P   Plan:   81-year-old male with a recent history of lung CA admitted to telemetry unit with       Acute respiratory failure with hypoxia   -Supplement O2 demands keeping oxygen saturation greater than 92%. -Pulmonary following.  Patient to have thoracentesis on Saturday hold Eliquis   -Monitor labs hemoglobin drops below seven transfuse as never necessary.  Monitor electrolytes place as necessary       A. fib RVR   -Rate control   -Telemetry monitoring   -Eliquis on hold for procedure. Resume once OK with pulm.       DVT Prophylaxis    PT/OT            Results for Brand Mixer (MRN [id number]) as of 8/19/2021 12:14      8/13/2021 04:10   Sodium: 136   Potassium: 5.1 (H)   Chloride: 99   CO2: 35 (H)   BUN: 18   Creatinine: 0.9   Anion Gap: 2 (L)   GFR Non-: >60   GFR African American: >60   Glucose: 200 (H)   Calcium: 9.3   WBC: 6.6   RBC: 4.59   Hemoglobin Quant: 12.9   Hematocrit: 42.2   MCV: 91.9   MCH: 28.1   MCHC: 30.6 (L)   MPV: 10.7   RDW: 13.0   Platelet Count: 826            Scheduled Medications   · methylPREDNISolone 40 mg Intravenous Q12H   · [Held by provider] apixaban 5 mg Oral BID   · aspirin 81 mg Oral Daily   · rosuvastatin 40 mg Oral Nightly   · ipratropium-albuterol 1 vial Inhalation Q4H   · lisinopril 5 mg Oral Daily   · metoprolol succinate 100 mg Oral Daily   · [START ON 8/16/2021] vitamin D 50,000 Units Oral Weekly   · sotalol 80 mg Oral BID   · budesonide 0.5 mg Nebulization BID   · ipratropium 0.5 mg Nebulization 4x daily   · Arformoterol Tartrate 15 mcg Nebulization BID

## 2021-08-30 RX ORDER — SOTALOL HYDROCHLORIDE 80 MG/1
80 TABLET ORAL 2 TIMES DAILY
Qty: 180 TABLET | Refills: 1 | Status: SHIPPED
Start: 2021-08-30 | End: 2021-09-16

## 2021-08-30 NOTE — TELEPHONE ENCOUNTER
Requesting Sotalol refill - CrCl calculated off the following information:    Sotalol dosage: 80 mg bid    Age: 61  Ht: 1.803 m  Wt: 133/1 kg  Cr: 0.8 mg/dl (based off labs on 8/14/21)  CrCl: 104.53 mL/min    Last QTc: 447 msec (based on last EKG on 8/2021)  Ok to refill

## 2021-09-10 PROBLEM — R79.89 ELEVATED TROPONIN: Status: RESOLVED | Noted: 2021-08-11 | Resolved: 2021-09-10

## 2021-09-10 PROBLEM — R77.8 ELEVATED TROPONIN: Status: RESOLVED | Noted: 2021-08-11 | Resolved: 2021-09-10

## 2021-09-16 ENCOUNTER — OFFICE VISIT (OUTPATIENT)
Dept: NON INVASIVE DIAGNOSTICS | Age: 61
End: 2021-09-16
Payer: COMMERCIAL

## 2021-09-16 VITALS
OXYGEN SATURATION: 92 % | BODY MASS INDEX: 38.86 KG/M2 | SYSTOLIC BLOOD PRESSURE: 122 MMHG | HEART RATE: 90 BPM | RESPIRATION RATE: 16 BRPM | WEIGHT: 277.6 LBS | HEIGHT: 71 IN | DIASTOLIC BLOOD PRESSURE: 86 MMHG

## 2021-09-16 DIAGNOSIS — J90 PLEURAL EFFUSION ON RIGHT: ICD-10-CM

## 2021-09-16 DIAGNOSIS — I48.19 PERSISTENT ATRIAL FIBRILLATION (HCC): Primary | ICD-10-CM

## 2021-09-16 DIAGNOSIS — R94.31 ABNORMAL EKG: ICD-10-CM

## 2021-09-16 DIAGNOSIS — C34.31 MALIGNANT NEOPLASM OF LOWER LOBE OF RIGHT LUNG (HCC): ICD-10-CM

## 2021-09-16 DIAGNOSIS — Z87.891 FORMER SMOKER: ICD-10-CM

## 2021-09-16 DIAGNOSIS — I10 ESSENTIAL (PRIMARY) HYPERTENSION: ICD-10-CM

## 2021-09-16 DIAGNOSIS — Z51.81 THERAPEUTIC DRUG MONITORING: ICD-10-CM

## 2021-09-16 DIAGNOSIS — G47.33 OSA (OBSTRUCTIVE SLEEP APNEA): ICD-10-CM

## 2021-09-16 PROCEDURE — 93000 ELECTROCARDIOGRAM COMPLETE: CPT | Performed by: INTERNAL MEDICINE

## 2021-09-16 PROCEDURE — 99214 OFFICE O/P EST MOD 30 MIN: CPT | Performed by: INTERNAL MEDICINE

## 2021-09-16 ASSESSMENT — ENCOUNTER SYMPTOMS
ABDOMINAL PAIN: 0
DIARRHEA: 0
ABDOMINAL DISTENTION: 0
EYE REDNESS: 0
COLOR CHANGE: 0
NAUSEA: 0
SINUS PRESSURE: 0
WHEEZING: 0
SHORTNESS OF BREATH: 0
CHEST TIGHTNESS: 0
COUGH: 0

## 2021-09-16 NOTE — PROGRESS NOTES
Cardiac Electrophysiology Outpatient Progress Note    Judy Tapia  1960  Date of Service: 9/16/2021  Referring Provider/PCP: Paulino Barajas MD  Chief Complaint:   Chief Complaint   Patient presents with    Atrial Fibrillation     discuss AF options, SOB,          HISTORY OF PRESENT ILLNESS    Judy Tapia presents to the office today for the management of these Electrophysiology conditions: Persistent atrial fibrillation    62 y.o. male with history of acute hypoxic respiratory failure secondary to pulmonary, acute decompensated CHF, Morbid obesity BMI 40, NSCLC -SCC of medial right lower lobe DDx 6/2020, ANTONIO on CPAP, bilateral pleural effusions right greater than left -status post right thoracentesis s/p 1/17 with 850 mL of blood-tinged fluid removed, Acute inferior MI (4/17/07) with CABG 2007 -LIMA to LAD, SVG to D1, SVG to PDA    He has persistent atrial fibrillation with left atrial appendage thrombus on MAZIN in 2019 and is s/p DCCV 4/2020, 2/23/21, EKG 4/9/21 showed he was back in Afib. He has tried [place] and is currently still on it.    4/20/2021: Mr. Priyanka Hess reports when in AF he complains of SOB/SOBOE, which he is also related to lung CA. Her reports feeling improved when he was in NSR after his cardioversion on 4/9/2021. We discussed about repeat cardioversion with he addition of AAD therapy. We discussed about the need for hospitalization when starting either Tikosyn or Sotalol. Today he presents in AF CVR. The patient denies any chest pain, dyspnea, palpitations, dizziness, syncope, orthopnea or paroxysmal nocturnal dyspnea. He lives alone. Stopped smoking after cancer diagnosis. Still drinks on weekeneds- few beers each day     5/13/21 : Here for Sotalol load. DCCV done 5/14/21. On Metoprolol 100 mg bid XL, Sotalol 80 mg bid. Cardizem stopped. 6/15/21 : Today, he presents in Atrial fibrillation. He feels better, he is able to do more and his breathing is better.  He is on file    Number of children: Not on file    Years of education: Not on file    Highest education level: Not on file   Occupational History    Occupation:    Tobacco Use    Smoking status: Former Smoker     Packs/day: 1.50     Years: 40.00     Pack years: 60.00     Types: Cigarettes     Start date:      Quit date: 2020     Years since quittin.2    Smokeless tobacco: Never Used   Vaping Use    Vaping Use: Never used   Substance and Sexual Activity    Alcohol use: Yes     Alcohol/week: 4.0 - 6.0 standard drinks     Types: 4 - 6 Cans of beer per week     Comment: on weekend    Drug use: No    Sexual activity: Not Currently   Other Topics Concern    Not on file   Social History Narrative    Social History Narrative: Tobacco cigarette smoker 1.5 PPD x 40 years (60 pack years). Quit 2020. Drinks 4-6 cans of beer 1 day every weekend. Prior history of moderately-heavy alcohol consumption. Denies any current or any history of illicit drug use/ abuse.          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           Social Determinants of Health     Financial Resource Strain:     Difficulty of Paying Living Expenses:    Food Insecurity:     Worried About Running Out of Food in the Last Year:     [address] in the Last Year:    Transportation Needs:     Lack of Transportation (Medical):      Lack of Transportation (Non-Medical):    Physical Activity:     Days of Exercise per Week:     Minutes of Exercise per Session:    Stress:     Feeling of Stress :    Social Connections:     Frequency of Communication with Friends and Family:     Frequency of Social Gatherings with Friends and Family:     Attends [employer] Services:     Active Member of Clubs or Organizations:     Attends Club or Organization Meetings:     Marital Status:    Intimate Partner Violence:     Fear of Current or Ex-Partner:     Emotionally Abused:     Physically Abused:     Sexually Abused:          Past Surgical History:   Procedure Laterality Date    BRONCHOSCOPY N/A 06/04/2020    BRONCHOSCOPY  NAVIGATIONAL performed by Ronda Goodwin DO at [address] [place] [address]  06/04/2020    BRONCHOSCOPY/TRANSBRONCHIAL NEEDLE BIOPSY performed by Ronda Goodwin DO at [address] [place] [address]  06/04/2020    BRONCHOSCOPY BIOPSY BRONCHUS performed by Ronda Goodwin DO at [address] [place] [address]  06/04/2020    BRONCHOSCOPY BRUSHINGS performed by Ronda Goodwin DO at [address] [place] [address]  06/04/2020    BRONCHOSCOPY W/EBUS FNA performed by Ronda Goodwin DO at [address] [place] [address]  06/04/2020    BRONCHOSCOPY ALVEOLAR LAVAGE performed by Ronda Goodwin DO at [facility]  05/14/2021    successful   Dr Jj Prescott  03/20/2014    3.5/18 Resolute to Mid-Cx    CORONARY ARTERY BYPASS GRAFT  04/18/2007    DIAGNOSTIC CARDIAC CATH LAB PROCEDURE  04/17/2007    ECHO COMPL W DOP COLOR FLOW  03/22/2012         TRANSESOPHAGEAL ECHOCARDIOGRAM  02/28/2020    julio césar       Current Outpatient Medications   Medication Sig Dispense Refill    metoprolol succinate (TOPROL XL) 100 MG extended release tablet TAKE 1 Daily 180 tablet 3    apixaban (ELIQUIS) 5 MG TABS tablet TAKE 1 TABLET TWICE A  tablet 3    ASPIRIN LOW DOSE 81 MG EC tablet TAKE 1 TABLET DAILY 90 tablet 3    lisinopril (PRINIVIL;ZESTRIL) 5 MG tablet Take 5 mg by mouth daily      albuterol (PROVENTIL) (2.5 MG/3ML) 0.083% nebulizer solution Take 0.083 mLs by nebulization 4 times daily as needed      fluticasone-umeclidin-vilant (TRELEGY ELLIPTA) 100-62.5-25 MCG/INH AEPB Inhale 1 puff into the lungs daily 1 each 5    PROAIR  (90 Base) MCG/ACT inhaler INHALE TWO PUFFS BY MOUTH EVERY 4 TO 6 HOURS AS NEEDED      ipratropium-albuterol (DUONEB) 0.5-2.5 (3) MG/3ML SOLN nebulizer solution Inhale 3 mLs into the lungs every 4 hours 360 mL 0    nitroGLYCERIN (NITROSTAT) 0.4 MG SL tablet Place 1 tablet under the tongue every 5 minutes as needed for Chest pain 25 tablet 0    CRESTOR 40 MG tablet 40 mg daily       vitamin D (ERGOCALCIFEROL) 52035 UNITS CAPS capsule Take 50,000 Units by mouth once a week mondays       No current facility-administered medications for this visit. Allergies   Allergen Reactions    Zocor [Simvastatin - High Dose] Other (See Comments)     Causes Rhabdomyolysis           ROS:   Review of Systems   Constitutional: Negative for fatigue and fever. HENT: Negative for congestion, nosebleeds and sinus pressure. Eyes: Negative for redness and visual disturbance. Respiratory: Negative for cough, chest tightness, shortness of breath and wheezing. Cardiovascular: Negative for chest pain, palpitations and leg swelling. Gastrointestinal: Negative for abdominal distention, abdominal pain, diarrhea and nausea. Endocrine: Negative for cold intolerance, heat intolerance, polydipsia and polyphagia. Genitourinary: Negative for difficulty urinating, frequency and urgency. Musculoskeletal: Negative for arthralgias, joint swelling and myalgias. Skin: Negative for color change and wound. Neurological: Negative for dizziness, syncope, weakness and numbness. Psychiatric/Behavioral: Negative for agitation, behavioral problems, confusion, decreased concentration, hallucinations and suicidal ideas. The patient is not nervous/anxious. PHYSICAL EXAM:  Vitals:    09/16/21 1348   BP: 122/86   Site: Left Upper Arm   Position: Sitting   Cuff Size: Medium Adult   Pulse: 90   Resp: 16   SpO2: 92%   Weight: 277 lb 9.6 oz (125.9 kg)   Height: 5' 11\" (1.803 m)     Physical Exam  Vitals reviewed. Constitutional:       Appearance: Normal appearance. HENT:      Head: Normocephalic. Mouth/Throat:      Mouth: Mucous membranes are moist.      Pharynx: Oropharynx is clear. Eyes:      Conjunctiva/sclera: Conjunctivae normal.   Neck:      Vascular: No carotid bruit. Cardiovascular:      Rate and Rhythm: Normal rate. Rhythm irregular. Pulses: Normal pulses. Heart sounds: Normal heart sounds. Pulmonary:      Effort: Pulmonary effort is normal.      Breath sounds: Normal breath sounds. No rales. Chest:      Chest wall: No tenderness. Abdominal:      General: Bowel sounds are normal.      Palpations: Abdomen is soft. Musculoskeletal:         General: Normal range of motion. Cervical back: Normal range of motion and neck supple. Skin:     General: Skin is warm. Neurological:      General: No focal deficit present. Mental Status: He is alert and oriented to person, place, and time. Psychiatric:         Mood and Affect: Mood normal.         Behavior: Behavior normal.         Thought Content:  Thought content normal.          Pertinent Labs:  CBC:   WBC (E9/L)   Date Value   08/14/2021 11.4   08/13/2021 6.6   08/12/2021 6.3     Hemoglobin (g/dL)   Date Value   08/14/2021 11.8 (L)   08/13/2021 12.9   08/12/2021 13.4     Hematocrit (%)   Date Value   08/14/2021 38.0   08/13/2021 42.2   08/12/2021 44.0     Platelets (S4/T)   Date Value   08/14/2021 222   08/13/2021 241   08/12/2021 230      BMP:   Sodium (mmol/L)   Date Value   08/14/2021 136   08/13/2021 136   08/12/2021 140     Potassium (mmol/L)   Date Value   08/14/2021 4.8   08/13/2021 5.1 (H)   05/15/2021 4.8     Potassium reflex Magnesium (mmol/L)   Date Value   08/12/2021 4.5   08/11/2021 4.1   05/12/2021 4.5     Magnesium (mg/dL)   Date Value   05/15/2021 2.2   05/14/2021 2.1   05/13/2021 1.9     Chloride (mmol/L)   Date Value   08/14/2021 99   08/13/2021 99   08/12/2021 99     CO2 (mmol/L)   Date Value   08/14/2021 34 (H)   08/13/2021 35 (H)   08/12/2021 34 (H)     BUN (mg/dL)   Date Value   08/14/2021 16 08/13/2021 18   08/12/2021 17     CREATININE (mg/dL)   Date Value   08/14/2021 0.8   08/13/2021 0.9   08/12/2021 1.0     Glucose (mg/dL)   Date Value   08/14/2021 302 (H)   08/13/2021 200 (H)   08/12/2021 144 (H)   06/03/2011 129 (H)     Calcium (mg/dL)   Date Value   08/14/2021 9.2   08/13/2021 9.3   08/12/2021 9.0      INR:   INR (no units)   Date Value   05/13/2021 1.3   03/23/2021 1.7   06/04/2020 1.0      BNP: No results found for: BNP   TSH:   TSH (uIU/mL)   Date Value   01/15/2021 1.290   09/28/2019 1.600   03/09/2019 0.940      Cardiac Injury Profile: Total CK (U/L)   Date Value   08/06/2015 77     CK-MB (ng/mL)   Date Value   08/06/2015 1.3     Troponin (ng/mL)   Date Value   01/15/2021 <0.01     Lipid Profile:   Triglycerides (mg/dL)   Date Value   01/16/2021 67     HDL (mg/dL)   Date Value   01/16/2021 29     LDL Calculated (mg/dL)   Date Value   01/16/2021 59     Cholesterol, Total (mg/dL)   Date Value   01/16/2021 101      Hemoglobin A1C: No results found for: LABA1C        Pertinent Cardiac Testing:   3/5/21: Gated SPECT left ventricular ejection fraction was calculated to be 54%, with normal myocardial thickening and wall motion.     Impression:    1. ECG during the infusion did not change. 2. The myocardial perfusion imaging was normal with attenuation artifact.       3. Overall left ventricular systolic function was normal without regional wall motion abnormalities. 4. Low risk general pharmacologic stress test.     1/15/21 TTE   Summary   Technically suboptimal and limited study. Left ventricular internal dimensions were normal in diastole and systole. Mild left ventricular concentric hypertrophy noted. Normal left ventricular ejection fraction. Septal motion consistent with post cardiac surgery. The left atrium is borderline dilated. Right ventricle global systolic function is reduced . Mildly enlarged right atrium size. Mild thickening of the mitral valve leaflets. Moderate mitral annular calcification potentially consistent with repair. Mild to moderate mitral regurgitation is present. The aortic valve appears mildly sclerotic. Mild aortic regurgitation is noted. Mild tricuspid regurgitation. Mildly dilated aortic root. Dilation of the ascending aorta. There is a trivial circumferential pericardial effusion noted. ECG 9/16/2021: atrial fibrillation, rate RBBB, rate 90bpm    I have independently reviewed all of the ECGs and rhythm strips per above    I have personally reviewed the laboratory, cardiac diagnostic and radiographic testing as outlined above: We have requested previous records. 1. Persistent atrial fibrillation ([address]    2. Therapeutic drug monitoring    3. Abnormal EKG    4. Malignant neoplasm of lower lobe of right lung ([address]    5. Pleural effusion on right    6. ANTONIO (obstructive sleep apnea)    7. Former smoker    8. Essential (primary) hypertension         ASSESSMENT & PLAN    1. Persistent Atrial Fibrillation  - He has persistent atrial fibrillation with left atrial appendage thrombus on MAZIN in 2019   - S/p DCCV 4/2020, 2/23/21, EKG 4/9/21 showed he was back in Afib. - Recommend stopping Multaq as it is not working and he also had recent CHF which is a contraindication to its use. - We discussed trying Class III AAD such as Tikosyn, sotalol and he is agreeable. -DCCV done 5/14/21. On Metoprolol 100 mg bid XL, Sotalol 80 mg bid initiated. Cardizem stopped  -  6/15/21 in AF with CVR  -  Prior visit, discussed consideration of repeat DCCV with escalation of sotalol dose VS repeating DCCV VS considering AF ablation.   - Presents 9/16/2021: AF CVR  - long discussion and mutual decision reached to continue rate control and stop pursuing NSR as he is asymptomatic and has multiple other competing comorbidities  - Stop Sotalol   - 24 hr holter next week to see if rate control needs to be optimized    2.  CAD  - Acute inferior MI (4/17/07) - CABG 2007 -LIMA to LAD, SVG to D1, SVG to PDA    3. NSCLC -SCC of medial right lower lobe  -  DDx 6/2020  - S/p Radiation   -  850 mL of blood-tinged fluid removed by thoracentesis  - poor surgical candidate  - PET scan 7/12/2021 with progression of disease within the chest increased size of nodule in the medial right lower lobe and scattered lymphadenopathy    4. Acute CHF  - Resolved  - LVEF preserved    5. RBBB  - Chronic and complete since 2020    6. Alcohol Use  - cessation recommended        Plan  1. Stop Sotalol. 2. Will pursue rate control strategy. 3. In one week will obtain a 24 hour HM to see if rate control needs to be optmized  4. Follow up in 6 months. Encouraged the patient to call the office for any questions or concerns. Thank you for allowing me to participate in your patient's care. I have spent a total of 25 minutes with the patient and his/her family reviewing the above stated recommendations. A total of >50% of that time involved face-to-face time providing counseling and or coordination of care with the other providers.     Sherif Agosto MD  Cardiac Electrophysiology  [address] [place] [address]

## 2021-09-24 ENCOUNTER — NURSE ONLY (OUTPATIENT)
Dept: NON INVASIVE DIAGNOSTICS | Age: 61
End: 2021-09-24

## 2021-09-24 DIAGNOSIS — I48.19 PERSISTENT ATRIAL FIBRILLATION (HCC): Primary | ICD-10-CM

## 2021-09-24 DIAGNOSIS — Z51.81 THERAPEUTIC DRUG MONITORING: ICD-10-CM

## 2021-09-24 NOTE — PROGRESS NOTES
Patient was seen in Office today to have 24 hour holter monitor put on per Dr. Bryan Bruce. Pt tolerated placement and understood use and return of device number [id number].     Electronically signed by Juan Sandoval MA on 9/24/2021 at 1:41 PM

## 2021-09-29 DIAGNOSIS — I48.19 PERSISTENT ATRIAL FIBRILLATION (HCC): ICD-10-CM

## 2021-09-29 DIAGNOSIS — Z51.81 THERAPEUTIC DRUG MONITORING: ICD-10-CM

## 2021-09-29 NOTE — ADT AUTH CERT
Subscriber Details  Hospital Account [de-identified]  CVG Subscriber Name/Sex/Relation Subscriber  Subscriber Address/Phone Subscriber Emp/Emp Phone   1. [facility]   [id number] YSABEL POWELL - Male   (Self) 1960 [address] [place], [place]  [address]   [phone](E) OTHER    Utilization Reviews       Pleural Effusion - Care Day 4 (2021) by Kassidy Swartz RN       Review Entered Review Status   2021 12:20 Completed      Criteria Review      Care Day: 4 Care Date: 2021 Level of Care: Intermediate Care    Guideline Day 2    Clinical Status    (X) * Hemodynamic stability    2021 12:20 PM EDT by Barber Elena      BP (!) 147/83   Pulse 92    (X) * Respiratory distress absent    2021 12:20 PM EDT by Barber Elena      no further sob   O2 Sats 92% on room air    Activity    (X) Activity as tolerated    Routes    (X) Oral hydration    (X) Oral medications    (X) Usual diet    Interventions    (X) Drainage or ablation procedure    2021 12:20 PM EDT by Barber Elena      Therapeutic thoracentesis  1. Moderate to large right pleural effusion  2.1500 cc of bloody fluid removed    * Milestone   Additional Notes    - CD 4      IM   The patient is awake and alert.     Had thoracentesis - feels well    No acute events overnight.     Denies chest pain, angina, no further sob        Objective:       BP (!) 147/83   Pulse 92   Temp 98.2 °F (36.8 °C) (Oral)   Resp 18   Ht 5' 11\" (1.803 m)   Wt 293 lb (132.9 kg)   SpO2 92%   BMI 40.87 kg/m²        General appearance: NAD, conversant   HEENT: AT/NC, MMM   Neck: FROM, supple   Lungs: improved breath sounds    CV: irregular, no MRGs   Vasc: Radial pulses 2+   Abdomen: Soft, non-tender; no masses or HSM   Extremities: No peripheral edema or digital cyanosis   Skin: no rash, lesions or ulcers   Psych: Alert and oriented to person, place and time   Neuro: Alert and interactive         Plan:   71-year-old male with a recent history of lung CA admitted to telemetry unit with       Acute respiratory failure with hypoxia   -Supplement O2 demands keeping oxygen saturation greater than 92%. -Pulmonary following.     - thoracentesis - about 1.6 l removed - 8/14- clinically feels mch better    -Monitor labs hemoglobin drops below seven transfuse as never necessary.  Monitor electrolytes place as necessary       A. fib RVR   -Rate control- controlled    Resume eliquis in am    -Telemetry monitoring   -Eliquis on hold for procedure.       Ambulate    iss    Dc plan in am                Pulmonology    Assessment:    1. Acute on chronic respiratory failure with hypoxia   2. COPD with acute exacerbation   3. Obstructive sleep apnea on home BiPAP   4. Recurrent right pleural effusion history of thoracentesis February 2021 cytology negative.  Thoracentesis 8/14 with 1500 cc of bloody fluid removed -path pending   5. Squamous cell cancer right lower lung.  Status post SBRT.  PET scan 7/12/2021 with progression of disease within the chest increased size of nodule in the medial right lower lobe and scattered lymphadenopathy.  Poor surgical candidate.  Started on Opdivo/Yervoy treatment   6. CAD   7. Hypertension   8. Mild to moderate MR, preserved EF   9. 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 Permanent A. Fib   11. Hyperlipidemia   12. Morbid obesity BMI 40.8           Plan:   1. Oxygen therapy via nasal cannula wean to keep greater than 92%, check amatory pulse ox prior to discharge   2. BiPAP 14/6 at at bedtime and as needed daytime with naps   3. Await final cultures respiratory culture specimen sent, bacterial urine antigens-pending   4. Pleural fluid studies sent   5. Scheduled bronchodilators-Brovana and Pulmicort twice daily, duo nebs every 4 hours   6. Transition steroids to prednisone 40 mg   7.  Continue to hold Eliquis for another day               Labs   Results for Keo Joyak (MRN [id number]) as of 8/19/2021 12:14      8/14/2021 03:01 Sodium: 136   Potassium: 4.8   Chloride: 99   CO2: 34 (H)   BUN: 16   Creatinine: 0.8   Anion Gap: 3 (L)   GFR Non-: >60   GFR African American: >60   Glucose: 302 (H)   Calcium: 9.2   WBC: 11.4   RBC: 4.19   Hemoglobin Quant: 11.8 (L)   Hematocrit: 38.0   MCV: 90.7   MCH: 28.2   MCHC: 31.1 (L)   MPV: 11.0   RDW: 13.1   Platelet Count: 883   Neutrophils %: 86.1 (H)   Immature Granulocytes %: 0.5   Lymphocyte %: 7.3 (L)   Monocytes %: 5.8   Eosinophils %: 0.1   Basophils %: 0.2   Neutrophils Absolute: 9.77 (H)   Immature Granulocytes #: 0.06   Lymphocytes Absolute: 0.83 (L)   Monocytes Absolute: 0.66   Eosinophils Absolute: 0.01 (L)   Basophils Absolute: 0.02      8/14/2021 08:50   CULTURE, BODY FLUID: Rpt   Gram Stain Result: Gram stain perfor. .. Body Fluid Culture, Sterile: Growth not present   Source + CELL CT/DIFF-BF: Thoracentesis   Appearance, Fluid: Hazy   Color, Fluid: Red   Fluid Type: Thoracentesis   Glucose, Fluid: 265   LD, Fluid: 336   RBC, Fluid: 49,000   Monocyte Count, Fluid: 67   Neutrophil Count, Fluid: 33   Nucl Cell, Fluid: 1,980   Protein, Fluid: 3.6   Amylase, Fluid: 71   Cholesterol, Fluid: 61               Scheduled Medications   · methylPREDNISolone 40 mg Intravenous Q12H   · [Held by provider] apixaban 5 mg Oral BID   · aspirin 81 mg Oral Daily   · rosuvastatin 40 mg Oral Nightly   · ipratropium-albuterol 1 vial Inhalation Q4H   · lisinopril 5 mg Oral Daily   · metoprolol succinate 100 mg Oral Daily   · [START ON 8/16/2021] vitamin D 50,000 Units Oral Weekly   · sotalol 80 mg Oral BID   · budesonide 0.5 mg Nebulization BID   · Arformoterol Tartrate 15 mcg Nebulization BID      PRN   acetaminophen (TYLENOL) tablet 650 mg    Dose: 650 mg   Freq: EVERY 6 HOURS PRN Route: PO   PRN Reasons: Pain Mild (1-3),Fever   PRN Comment:  For temp greater than 100.4 F (38 C)   X1                  OT   AM-PAC Daily Activity Raw Score: 24/24       Recommended Adaptive Equipment: none       Diagnosis: acute respiratory failure with hypoxia. Pt presents to ED from home with SOB. Procedure: Thoracentesis 8/14/21    Pertinent Medical History: CAD, COPD, HTN, malignant neoplasm of lung, afib    Precautions:  O2       Home Living: Pt lives alone in a 2 story home with B/B 2nd floor, no 1/2 bath 1st.    Bathroom setup: walk in shower with grab bar, standard commode        Prior Level of Function: Independent with ADLs, Independent with IADLs; completed functional mobility with no AD. No home O2. Driving:  Yes          Pleural Effusion - Care Day 3 (8/13/2021) by Vinny Gordon RN       Review Entered Review Status   8/19/2021 12:06 Completed      Criteria Review      Care Day: 3 Care Date: 8/13/2021 Level of Care: Intermediate Care    Guideline Day 2    Clinical Status    (X) * Hemodynamic stability    8/19/2021 12:06 PM EDT by Fabi Bradshaw      BP (!) 141/89   Pulse 91    ( ) * Respiratory distress absent    8/19/2021 12:06 PM EDT by Fabi Bradshaw      SOB continues 6 liters O2    Activity    (X) Activity as tolerated    Routes    (X) Oral hydration    (X) Oral medications    (X) Usual diet    Interventions    (X) Drainage or ablation procedure    8/19/2021 12:06 PM EDT by Fabi Bradshaw      Patient to have thoracentesis on Saturday hold Eliquis    * Milestone   Additional Notes   8/13 - CD 3      The patient is awake and alert.  Sitting in chair at bedside   No acute events overnight.     Denies chest pain, angina, SOB continues 6 liters O2       Objective:       BP (!) 141/89   Pulse 91   Temp 98.7 °F (37.1 °C) (Oral)   Resp 20   Ht 5' 11\" (1.803 m)   Wt 293 lb (132.9 kg)   SpO2 97%   BMI 40.87 kg/m²       General appearance: NAD, conversant   HEENT: AT/NC, MMM   Neck: FROM, supple   Lungs: Diminished throughout   CV: irregular, no MRGs   Vasc: Radial pulses 2+   Abdomen: Soft, non-tender; no masses or HSM   Extremities: No peripheral edema or digital cyanosis   Skin: no rash, lesions or ulcers   Psych: Alert and oriented to person, place and time   Neuro: Alert and interactive          A/P   Plan:   71-year-old male with a recent history of lung CA admitted to telemetry unit with       Acute respiratory failure with hypoxia   -Supplement O2 demands keeping oxygen saturation greater than 92%. -Pulmonary following.  Patient to have thoracentesis on Saturday hold Eliquis   -Monitor labs hemoglobin drops below seven transfuse as never necessary.  Monitor electrolytes place as necessary       A. fib RVR   -Rate control   -Telemetry monitoring   -Eliquis on hold for procedure. Resume once OK with pulm.       DVT Prophylaxis    PT/OT            Results for Brittany Hinojosa (MRN [id number]) as of 8/19/2021 12:14      8/13/2021 04:10   Sodium: 136   Potassium: 5.1 (H)   Chloride: 99   CO2: 35 (H)   BUN: 18   Creatinine: 0.9   Anion Gap: 2 (L)   GFR Non-: >60   GFR African American: >60   Glucose: 200 (H)   Calcium: 9.3   WBC: 6.6   RBC: 4.59   Hemoglobin Quant: 12.9   Hematocrit: 42.2   MCV: 91.9   MCH: 28.1   MCHC: 30.6 (L)   MPV: 10.7   RDW: 13.0   Platelet Count: 216            Scheduled Medications   · methylPREDNISolone 40 mg Intravenous Q12H   · [Held by provider] apixaban 5 mg Oral BID   · aspirin 81 mg Oral Daily   · rosuvastatin 40 mg Oral Nightly   · ipratropium-albuterol 1 vial Inhalation Q4H   · lisinopril 5 mg Oral Daily   · metoprolol succinate 100 mg Oral Daily   · [START ON 8/16/2021] vitamin D 50,000 Units Oral Weekly   · sotalol 80 mg Oral BID   · budesonide 0.5 mg Nebulization BID   · ipratropium 0.5 mg Nebulization 4x daily   · Arformoterol Tartrate 15 mcg Nebulization BID

## 2021-10-07 NOTE — ADT AUTH CERT
Patient Demographics    Name Patient ID SSN Gender Identity Birth Date   Mireya Joseph [id number]  Male 62 (61 yrs)   Address Phone Email Employer    [address] [employer] [phone] (G) [phone] Adrien Neal) -- [employer] Occupation Emp Status    Edilberto James (non-) -- Full Time    Reg Status PCP Date Last Verified Next Review Date    Verified Princess Wolf MD  [phone] 09/16/21 10/16/21    Admission Date Discharge Date Admitting Provider     08/11/21 08/15/21 Mimi Centeno MD     Marital Status Muslim       [address]      Emergency Contact 1   [facility] [phone] Edna Tirado   [facility]    [facility]   [phone] Alyssa Nava)   [address] [place] [address] 75 Account [de-identified]  CVG Subscriber Name/Sex/Relation Subscriber  Subscriber Address/Phone Subscriber Emp/Emp Phone   1. [facility]   [id number] YSABEL POWELL A - Male   (Self) 1960 [address] [place], [place]  [address] [phone](D) OTHER    Utilization Reviews       Pleural Effusion - Care Day 4 (2021) by Angela Warner RN       Review Status Review Entered   Completed 2021 12:20      Criteria Review      Care Day: 4 Care Date: 2021 Level of Care: Intermediate Care    Guideline Day 2    Clinical Status    (X) * Hemodynamic stability    2021 12:20 PM EDT by Marilou Avalos      BP (!) 147/83   Pulse 92    (X) * Respiratory distress absent    2021 12:20 PM EDT by Marilou Avalos      no further sob   O2 Sats 92% on room air    Activity    (X) Activity as tolerated    Routes    (X) Oral hydration    (X) Oral medications    (X) Usual diet    Interventions    (X) Drainage or ablation procedure    2021 12:20 PM EDT by Marilou Avalos      Therapeutic thoracentesis  1. Moderate to large right pleural effusion  2.1500 cc of bloody fluid removed    * Milestone   Additional Notes   8/14 - CD 4      IM   The patient is awake and alert. Had thoracentesis - feels well    No acute events overnight.     Denies chest pain, angina, no further sob        Objective:       BP (!) 147/83   Pulse 92   Temp 98.2 °F (36.8 °C) (Oral)   Resp 18   Ht 5' 11\" (1.803 m)   Wt 293 lb (132.9 kg)   SpO2 92%   BMI 40.87 kg/m²        General appearance: NAD, conversant   HEENT: AT/NC, MMM   Neck: FROM, supple   Lungs: improved breath sounds    CV: irregular, no MRGs   Vasc: Radial pulses 2+   Abdomen: Soft, non-tender; no masses or HSM   Extremities: No peripheral edema or digital cyanosis   Skin: no rash, lesions or ulcers   Psych: Alert and oriented to person, place and time   Neuro: Alert and interactive         Plan:   81-year-old male with a recent history of lung CA admitted to telemetry unit with       Acute respiratory failure with hypoxia   -Supplement O2 demands keeping oxygen saturation greater than 92%. -Pulmonary following.     - thoracentesis - about 1.6 l removed - 8/14- clinically feels mch better    -Monitor labs hemoglobin drops below seven transfuse as never necessary.  Monitor electrolytes place as necessary       A. fib RVR   -Rate control- controlled    Resume eliquis in am    -Telemetry monitoring   -Eliquis on hold for procedure.       Ambulate    iss    Dc plan in am                Pulmonology    Assessment:    1. Acute on chronic respiratory failure with hypoxia   2. COPD with acute exacerbation   3. Obstructive sleep apnea on home BiPAP   4. Recurrent right pleural effusion history of thoracentesis February 2021 cytology negative.  Thoracentesis 8/14 with 1500 cc of bloody fluid removed -path pending   5. Squamous cell cancer right lower lung.  Status post SBRT.  PET scan 7/12/2021 with progression of disease within the chest increased size of nodule in the medial right lower lobe and scattered lymphadenopathy.  Poor surgical candidate.  Started on Opdivo/Yervoy treatment   6. CAD   7. Hypertension   8.  Mild to moderate MR, preserved EF   9. 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 Permanent A. Fib   11. Hyperlipidemia   12. Morbid obesity BMI 40.8           Plan:   1. Oxygen therapy via nasal cannula wean to keep greater than 92%, check amatory pulse ox prior to discharge   2. BiPAP 14/6 at at bedtime and as needed daytime with naps   3. Await final cultures respiratory culture specimen sent, bacterial urine antigens-pending   4. Pleural fluid studies sent   5. Scheduled bronchodilators-Brovana and Pulmicort twice daily, duo nebs every 4 hours   6. Transition steroids to prednisone 40 mg   7. Continue to hold Eliquis for another day               Labs   Results for Aranza Wagner (MRN [id number]) as of 8/19/2021 12:14      8/14/2021 03:01   Sodium: 136   Potassium: 4.8   Chloride: 99   CO2: 34 (H)   BUN: 16   Creatinine: 0.8   Anion Gap: 3 (L)   GFR Non-: >60   GFR African American: >60   Glucose: 302 (H)   Calcium: 9.2   WBC: 11.4   RBC: 4.19   Hemoglobin Quant: 11.8 (L)   Hematocrit: 38.0   MCV: 90.7   MCH: 28.2   MCHC: 31.1 (L)   MPV: 11.0   RDW: 13.1   Platelet Count: 085   Neutrophils %: 86.1 (H)   Immature Granulocytes %: 0.5   Lymphocyte %: 7.3 (L)   Monocytes %: 5.8   Eosinophils %: 0.1   Basophils %: 0.2   Neutrophils Absolute: 9.77 (H)   Immature Granulocytes #: 0.06   Lymphocytes Absolute: 0.83 (L)   Monocytes Absolute: 0.66   Eosinophils Absolute: 0.01 (L)   Basophils Absolute: 0.02      8/14/2021 08:50   CULTURE, BODY FLUID: Rpt   Gram Stain Result: Gram stain perfor. ..    Body Fluid Culture, Sterile: Growth not present   Source + CELL CT/DIFF-BF: Thoracentesis   Appearance, Fluid: Hazy   Color, Fluid: Red   Fluid Type: Thoracentesis   Glucose, Fluid: 265   LD, Fluid: 336   RBC, Fluid: 49,000   Monocyte Count, Fluid: 67   Neutrophil Count, Fluid: 33   Nucl Cell, Fluid: 1,980   Protein, Fluid: 3.6   Amylase, Fluid: 71   Cholesterol, Fluid: 61               Scheduled Medications   · methylPREDNISolone 40 mg Intravenous Q12H   · [Held by provider] apixaban 5 mg Oral BID   · aspirin 81 mg Oral Daily   · rosuvastatin 40 mg Oral Nightly   · ipratropium-albuterol 1 vial Inhalation Q4H   · lisinopril 5 mg Oral Daily   · metoprolol succinate 100 mg Oral Daily   · [START ON 8/16/2021] vitamin D 50,000 Units Oral Weekly   · sotalol 80 mg Oral BID   · budesonide 0.5 mg Nebulization BID   · Arformoterol Tartrate 15 mcg Nebulization BID      PRN   acetaminophen (TYLENOL) tablet 650 mg    Dose: 650 mg   Freq: EVERY 6 HOURS PRN Route: PO   PRN Reasons: Pain Mild (1-3),Fever   PRN Comment: For temp greater than 100.4 F (38 C)   X1                  OT   AM-PAC Daily Activity Raw Score: 24/24       Recommended Adaptive Equipment: none         Diagnosis: acute respiratory failure with hypoxia. Pt presents to ED from home with SOB. Procedure: Thoracentesis 8/14/21    Pertinent Medical History: CAD, COPD, HTN, malignant neoplasm of lung, afib    Precautions:  O2       Home Living: Pt lives alone in a 2 story home with B/B 2nd floor, no 1/2 bath 1st.    Bathroom setup: walk in shower with grab bar, standard commode        Prior Level of Function: Independent with ADLs, Independent with IADLs; completed functional mobility with no AD. No home O2. Driving:  Yes          Pleural Effusion - Care Day 3 (8/13/2021) by Mar Gar RN       Review Status Review Entered   Completed 8/19/2021 12:06      Criteria Review      Care Day: 3 Care Date: 8/13/2021 Level of Care: Intermediate Care    Guideline Day 2    Clinical Status    (X) * Hemodynamic stability    8/19/2021 12:06 PM EDT by Xenia Christensen      BP (!) 141/89   Pulse 91    ( ) * Respiratory distress absent    8/19/2021 12:06 PM EDT by Xenia Christensen      SOB continues 6 liters O2    Activity    (X) Activity as tolerated    Routes    (X) Oral hydration    (X) Oral medications    (X) Usual diet    Interventions    (X) Drainage or ablation procedure    8/19/2021 12:06 PM EDT by Rani Murray [place]      Patient to have thoracentesis on Saturday hold Eliquis    * Milestone   Additional Notes   8/13 - CD 3      The patient is awake and alert.  Sitting in chair at bedside   No acute events overnight.     Denies chest pain, angina, SOB continues 6 liters O2       Objective:       BP (!) 141/89   Pulse 91   Temp 98.7 °F (37.1 °C) (Oral)   Resp 20   Ht 5' 11\" (1.803 m)   Wt 293 lb (132.9 kg)   SpO2 97%   BMI 40.87 kg/m²       General appearance: NAD, conversant   HEENT: AT/NC, MMM   Neck: FROM, supple   Lungs: Diminished throughout   CV: irregular, no MRGs   Vasc: Radial pulses 2+   Abdomen: Soft, non-tender; no masses or HSM   Extremities: No peripheral edema or digital cyanosis   Skin: no rash, lesions or ulcers   Psych: Alert and oriented to person, place and time   Neuro: Alert and interactive          A/P   Plan:   41-year-old male with a recent history of lung CA admitted to telemetry unit with       Acute respiratory failure with hypoxia   -Supplement O2 demands keeping oxygen saturation greater than 92%. -Pulmonary following.  Patient to have thoracentesis on Saturday hold Eliquis   -Monitor labs hemoglobin drops below seven transfuse as never necessary.  Monitor electrolytes place as necessary       A. fib RVR   -Rate control   -Telemetry monitoring   -Eliquis on hold for procedure. Resume once OK with pulm.       DVT Prophylaxis    PT/OT            Results for Bronson Ahmadi (MRN [id number]) as of 8/19/2021 12:14      8/13/2021 04:10   Sodium: 136   Potassium: 5.1 (H)   Chloride: 99   CO2: 35 (H)   BUN: 18   Creatinine: 0.9   Anion Gap: 2 (L)   GFR Non-: >60   GFR African American: >60   Glucose: 200 (H)   Calcium: 9.3   WBC: 6.6   RBC: 4.59   Hemoglobin Quant: 12.9   Hematocrit: 42.2   MCV: 91.9   MCH: 28.1   MCHC: 30.6 (L)   MPV: 10.7   RDW: 13.0   Platelet Count: 677            Scheduled Medications   · methylPREDNISolone 40 mg Intravenous Q12H   · [Held by provider] apixaban 5 mg Oral BID   · aspirin 81 mg Oral Daily   · rosuvastatin 40 mg Oral Nightly   · ipratropium-albuterol 1 vial Inhalation Q4H   · lisinopril 5 mg Oral Daily   · metoprolol succinate 100 mg Oral Daily   · [START ON 8/16/2021] vitamin D 50,000 Units Oral Weekly   · sotalol 80 mg Oral BID   · budesonide 0.5 mg Nebulization BID   · ipratropium 0.5 mg Nebulization 4x daily   · Arformoterol Tartrate 15 mcg Nebulization BID

## 2021-10-22 ENCOUNTER — OFFICE VISIT (OUTPATIENT)
Dept: CARDIOLOGY CLINIC | Age: 61
End: 2021-10-22
Payer: COMMERCIAL

## 2021-10-22 VITALS
RESPIRATION RATE: 18 BRPM | BODY MASS INDEX: 39.81 KG/M2 | WEIGHT: 284.4 LBS | DIASTOLIC BLOOD PRESSURE: 80 MMHG | HEIGHT: 71 IN | SYSTOLIC BLOOD PRESSURE: 134 MMHG | HEART RATE: 104 BPM

## 2021-10-22 DIAGNOSIS — I25.10 CORONARY ARTERY DISEASE INVOLVING NATIVE CORONARY ARTERY OF NATIVE HEART WITHOUT ANGINA PECTORIS: Primary | ICD-10-CM

## 2021-10-22 PROCEDURE — 93000 ELECTROCARDIOGRAM COMPLETE: CPT | Performed by: INTERNAL MEDICINE

## 2021-10-22 PROCEDURE — 99214 OFFICE O/P EST MOD 30 MIN: CPT | Performed by: INTERNAL MEDICINE

## 2021-10-22 RX ORDER — METOPROLOL SUCCINATE 25 MG/1
25 TABLET, EXTENDED RELEASE ORAL DAILY
Qty: 90 TABLET | Refills: 3 | Status: SHIPPED
Start: 2021-10-22 | End: 2021-12-20 | Stop reason: DRUGHIGH

## 2021-10-22 NOTE — PROGRESS NOTES
Durward Libman  1960  Date of Service: 10/22/2021    Patient Active Problem List    Diagnosis Date Noted    Pleural effusion on right 2021    ANTONIO (obstructive sleep apnea) 2021    Former smoker 2021    AF (atrial fibrillation) ([facility] 2021    Acute [facility] failure with hypoxia ([facility] 01/15/2021    Malignant neoplasm of lung ([facility]     Essential (primary) hypertension 2020    Gastro-esophageal reflux disease without esophagitis 2020    Unspecified atrial fibrillation ([facility] 2020    Hyperlipidemia, unspecified 2020    Chest pain 2019    CAD (coronary artery disease)      Overview Note:     A. Acute inferior MI (07). B. Cardiac catheterization (07):   LAD - proximal 85% bifurcating with first diagonal  D1 - ostial 50%  Cx - prox to mid 40%  OM1  ostial 90%  RCA - prox diffuse 99% with heavy thrombus  C. Successful thrombectomy. D. Coronary artery bypass surgery by Dr. Pradip Tony (07)  LIMA to LAD  SVG to D1  SVG to PDA    Cath 3-14  70% Cx - 3.5 x 18 mm Resolute ELENA      COPD (chronic obstructive pulmonary disease) ([facility]        Social History     Socioeconomic History    Marital status:      Spouse name: None    Number of children: None    Years of education: None    Highest education level: None   Occupational History    Occupation:    Tobacco Use    Smoking status: Former Smoker     Packs/day: 1.50     Years: 40.00     Pack years: 60.00     Types: Cigarettes     Start date:      Quit date: 2020     Years since quittin.3    Smokeless tobacco: Never Used   Vaping Use    Vaping Use: Never used   Substance and Sexual Activity    Alcohol use:  Yes     Alcohol/week: 4.0 - 6.0 standard drinks     Types: 4 - 6 Cans of beer per week     Comment: on weekend    Drug use: No    Sexual activity: Not Currently   Other Topics Concern    None   Social History Narrative    Social History Narrative: Tobacco cigarette smoker 1.5 PPD x 40 years (60 pack years). Quit 07/01/2020. Drinks 4-6 cans of beer 1 day every weekend. Prior history of moderately-heavy alcohol consumption. Denies any current or any history of illicit drug use/ abuse.          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           Social Determinants of Health     Financial Resource Strain:     Difficulty of Paying Living Expenses:    Food Insecurity:     Worried About Running Out of Food in the Last Year:     [address] in the Last Year:    Transportation Needs:     Lack of Transportation (Medical):      Lack of Transportation (Non-Medical):    Physical Activity:     Days of Exercise per Week:     Minutes of Exercise per Session:    Stress:     Feeling of Stress :    Social Connections:     Frequency of Communication with Friends and Family:     Frequency of Social Gatherings with Friends and Family:     Attends [employer] Services:     Active Member of Clubs or Organizations:     Attends Club or Organization Meetings:     Marital Status:    Intimate Partner Violence:     Fear of Current or Ex-Partner:     Emotionally Abused:     Physically Abused:     Sexually Abused:        Current Outpatient Medications   Medication Sig Dispense Refill    metoprolol succinate (TOPROL XL) 100 MG extended release tablet TAKE 1 Daily 180 tablet 3    apixaban (ELIQUIS) 5 MG TABS tablet TAKE 1 TABLET TWICE A  tablet 3    ASPIRIN LOW DOSE 81 MG EC tablet TAKE 1 TABLET DAILY 90 tablet 3    lisinopril (PRINIVIL;ZESTRIL) 5 MG tablet Take 5 mg by mouth daily      albuterol (PROVENTIL) (2.5 MG/3ML) 0.083% nebulizer solution Take 0.083 mLs by nebulization 4 times daily as needed      fluticasone-umeclidin-vilant (TRELEGY ELLIPTA) 100-62.5-25 MCG/INH AEPB Inhale 1 puff into the lungs daily 1 each 5    PROAIR HFA 108 (90 Base) MCG/ACT inhaler INHALE TWO PUFFS BY MOUTH EVERY 4 TO 6 HOURS AS NEEDED      ipratropium-albuterol (DUONEB) 0.5-2.5 (3) MG/3ML SOLN nebulizer solution Inhale 3 mLs into the lungs every 4 hours 360 mL 0    nitroGLYCERIN (NITROSTAT) 0.4 MG SL tablet Place 1 tablet under the tongue every 5 minutes as needed for Chest pain 25 tablet 0    CRESTOR 40 MG tablet 40 mg daily       vitamin D (ERGOCALCIFEROL) 94899 UNITS CAPS capsule Take 50,000 Units by mouth once a week mondays       No current facility-administered medications for this visit. Allergies   Allergen Reactions    Zocor [Simvastatin - High Dose] Other (See Comments)     Causes Rhabdomyolysis       Chief Complaint:  Timoteo Ocampo is here today for follow up and management/recomendations for CAD, CP, abnormal Stress test, HTN, RBBB, ODOT clearance. History of Present Illness: Timoteo Ocampo states that he has baseline dyspnea with exertion from his COPD. He denies any orthopnea/PND or lower extremity edema. He denies any chest discomfort. He denies any palpitations or presyncopal symptoms. REVIEW OF SYSTEMS:  As above. Patient does not complain of any fever, chills, nausea, vomiting or diarrhea. No focal, motor or neurological deficits. No changes in his/her vision, hearing, bowel or bladder habits. He is not known to have a history of thyroid problems. No recent nose bleeds. PHYSICAL EXAM:  Vitals:    10/22/21 1423   BP: 134/80   Pulse: 104   Resp: 18   Weight: 284 lb 6.4 oz (129 kg)   Height: 5' 11\" (1.803 m)       GENERAL:  He is alert and oriented x 3, communicates well, in no distress. NECK:  No masses, trachea is mid position. Supple, full ROM, no JVD or bruits. No palpable thyromegaly or lymphadenopathy. HEART: Distant to auscultation. Irregular mildly tachycardic rhythm. Normal S1 and S2. There is an S4 gallop and a I/VI systolic murmur. LUNGS:  Poor air movement.   Mild diffuse bilateral diffuse rhonchi and wheezing. No use of accessory muscles. symmetrical excursion. ABDOMEN: Morbidly obese. Soft, non-tender. Normal bowel sounds. EXTREMITIES:  Full ROM x 4. No bilateral lower extremity edema on exam.  Good distal pulses. EYES:  Extraocular muscles intact. PERRL. Normal lids & conjunctiva. ENT:  Nares are clear & not bleeding. Moist mucosa. Normal lips formation. No external masses   NEURO: no tremors, full ROM x 4, EOMI. SKIN:  Warm, dry and intact. Normal turgor. EKG: Atrial fibrillation. 104 bpm, nl axis, nonspecific ST - T wave changes, RBBB. Assessment:   1. Coronary artery disease as outlined above. No recurring ischemic symptoms at this time. 2. Severe COPD. 3. Sleep apnea. 4. RBBB, stable  5. Hypertension, well controled at this time. 6. Hypercholesterolemia  7. Paroxysmal atrial fibrillation. Following with electrophysiology. Heart rate controlled today. 8. Mild aortic regurgitation  9. Mild to moderate mitral regurgitation. 10. Lung cancer. Recommendations:  1. He is following the cholesterol with Dr. Mark Childress. 2. Increase Toprol to 125 mg daily. Thank you for allowing me to participate in your patient's care.       [address], [address]  Interventional Cardiology

## 2021-11-02 ENCOUNTER — HOSPITAL ENCOUNTER (OUTPATIENT)
Dept: CT IMAGING | Age: 61
Discharge: HOME | End: 2021-11-04
Payer: COMMERCIAL

## 2021-11-02 DIAGNOSIS — C34.31 SQUAMOUS CELL CARCINOMA OF BRONCHUS IN RIGHT LOWER LOBE (HCC): ICD-10-CM

## 2021-11-02 DIAGNOSIS — R11.0 NAUSEA: ICD-10-CM

## 2021-11-02 PROCEDURE — 71260 CT THORAX DX C+: CPT

## 2021-11-02 PROCEDURE — 6360000004 HC RX CONTRAST MEDICATION: Performed by: RADIOLOGY

## 2021-11-02 PROCEDURE — 74177 CT ABD & PELVIS W/CONTRAST: CPT

## 2021-11-02 RX ADMIN — IOPAMIDOL 75 ML: 755 INJECTION, SOLUTION INTRAVENOUS at 16:18

## 2021-11-02 RX ADMIN — IOHEXOL 50 ML: 240 INJECTION, SOLUTION INTRATHECAL; INTRAVASCULAR; INTRAVENOUS; ORAL at 16:18

## 2021-11-22 ENCOUNTER — APPOINTMENT (OUTPATIENT)
Dept: CT IMAGING | Age: 61
DRG: 291 | End: 2021-11-22
Payer: COMMERCIAL

## 2021-11-22 ENCOUNTER — HOSPITAL ENCOUNTER (INPATIENT)
Age: 61
LOS: 2 days | Discharge: HOME | DRG: 291 | End: 2021-11-24
Attending: STUDENT IN AN ORGANIZED HEALTH CARE EDUCATION/TRAINING PROGRAM | Admitting: INTERNAL MEDICINE
Payer: COMMERCIAL

## 2021-11-22 ENCOUNTER — APPOINTMENT (OUTPATIENT)
Dept: GENERAL RADIOLOGY | Age: 61
DRG: 291 | End: 2021-11-22
Payer: COMMERCIAL

## 2021-11-22 DIAGNOSIS — I48.91 ATRIAL FIBRILLATION, UNSPECIFIED TYPE (HCC): ICD-10-CM

## 2021-11-22 DIAGNOSIS — I50.9 CONGESTIVE HEART FAILURE, UNSPECIFIED HF CHRONICITY, UNSPECIFIED HEART FAILURE TYPE (HCC): Primary | ICD-10-CM

## 2021-11-22 DIAGNOSIS — R09.02 HYPOXIA: ICD-10-CM

## 2021-11-22 DIAGNOSIS — C34.90 SQUAMOUS CELL CARCINOMA OF LUNG, UNSPECIFIED LATERALITY (HCC): ICD-10-CM

## 2021-11-22 PROBLEM — I50.33 ACUTE ON CHRONIC DIASTOLIC (CONGESTIVE) HEART FAILURE (HCC): Status: ACTIVE | Noted: 2021-11-22

## 2021-11-22 LAB
ALBUMIN SERPL-MCNC: 3.2 G/DL (ref 3.5–5.2)
ALP BLD-CCNC: 82 U/L (ref 40–129)
ALT SERPL-CCNC: 11 U/L (ref 0–40)
ANION GAP SERPL CALCULATED.3IONS-SCNC: 10 MMOL/L (ref 7–16)
AST SERPL-CCNC: 16 U/L (ref 0–39)
BASOPHILS ABSOLUTE: 0.06 E9/L (ref 0–0.2)
BASOPHILS RELATIVE PERCENT: 0.9 % (ref 0–2)
BILIRUB SERPL-MCNC: 0.3 MG/DL (ref 0–1.2)
BUN BLDV-MCNC: 15 MG/DL (ref 6–23)
CALCIUM SERPL-MCNC: 9.3 MG/DL (ref 8.6–10.2)
CHLORIDE BLD-SCNC: 102 MMOL/L (ref 98–107)
CO2: 27 MMOL/L (ref 22–29)
CREAT SERPL-MCNC: 1 MG/DL (ref 0.7–1.2)
EOSINOPHILS ABSOLUTE: 0.34 E9/L (ref 0.05–0.5)
EOSINOPHILS RELATIVE PERCENT: 5 % (ref 0–6)
GFR AFRICAN AMERICAN: >60
GFR NON-AFRICAN AMERICAN: >60 ML/MIN/1.73
GLUCOSE BLD-MCNC: 118 MG/DL (ref 74–99)
HCT VFR BLD CALC: 46.2 % (ref 37–54)
HEMOGLOBIN: 14.4 G/DL (ref 12.5–16.5)
IMMATURE GRANULOCYTES #: 0.02 E9/L
IMMATURE GRANULOCYTES %: 0.3 % (ref 0–5)
LACTIC ACID: 1.7 MMOL/L (ref 0.5–2.2)
LYMPHOCYTES ABSOLUTE: 1.29 E9/L (ref 1.5–4)
LYMPHOCYTES RELATIVE PERCENT: 19.1 % (ref 20–42)
MCH RBC QN AUTO: 27.1 PG (ref 26–35)
MCHC RBC AUTO-ENTMCNC: 31.2 % (ref 32–34.5)
MCV RBC AUTO: 87 FL (ref 80–99.9)
MONOCYTES ABSOLUTE: 0.79 E9/L (ref 0.1–0.95)
MONOCYTES RELATIVE PERCENT: 11.7 % (ref 2–12)
NEUTROPHILS ABSOLUTE: 4.24 E9/L (ref 1.8–7.3)
NEUTROPHILS RELATIVE PERCENT: 63 % (ref 43–80)
PDW BLD-RTO: 15 FL (ref 11.5–15)
PLATELET # BLD: 209 E9/L (ref 130–450)
PMV BLD AUTO: 10.2 FL (ref 7–12)
POTASSIUM SERPL-SCNC: 3.7 MMOL/L (ref 3.5–5)
PRO-BNP: 6085 PG/ML (ref 0–125)
RBC # BLD: 5.31 E12/L (ref 3.8–5.8)
SARS-COV-2, NAAT: NOT DETECTED
SODIUM BLD-SCNC: 139 MMOL/L (ref 132–146)
TOTAL PROTEIN: 6.2 G/DL (ref 6.4–8.3)
TROPONIN, HIGH SENSITIVITY: 20 NG/L (ref 0–11)
TROPONIN, HIGH SENSITIVITY: 21 NG/L (ref 0–11)
WBC # BLD: 6.7 E9/L (ref 4.5–11.5)

## 2021-11-22 PROCEDURE — 99285 EMERGENCY DEPT VISIT HI MDM: CPT

## 2021-11-22 PROCEDURE — 2580000003 HC RX 258: Performed by: INTERNAL MEDICINE

## 2021-11-22 PROCEDURE — 6370000000 HC RX 637 (ALT 250 FOR IP): Performed by: STUDENT IN AN ORGANIZED HEALTH CARE EDUCATION/TRAINING PROGRAM

## 2021-11-22 PROCEDURE — 94664 DEMO&/EVAL PT USE INHALER: CPT

## 2021-11-22 PROCEDURE — 83880 ASSAY OF NATRIURETIC PEPTIDE: CPT

## 2021-11-22 PROCEDURE — 1200000000 HC SEMI PRIVATE

## 2021-11-22 PROCEDURE — 6360000002 HC RX W HCPCS: Performed by: STUDENT IN AN ORGANIZED HEALTH CARE EDUCATION/TRAINING PROGRAM

## 2021-11-22 PROCEDURE — 2700000000 HC OXYGEN THERAPY PER DAY

## 2021-11-22 PROCEDURE — 80053 COMPREHEN METABOLIC PANEL: CPT

## 2021-11-22 PROCEDURE — 85025 COMPLETE CBC W/AUTO DIFF WBC: CPT

## 2021-11-22 PROCEDURE — 6360000002 HC RX W HCPCS: Performed by: INTERNAL MEDICINE

## 2021-11-22 PROCEDURE — 71046 X-RAY EXAM CHEST 2 VIEWS: CPT

## 2021-11-22 PROCEDURE — 96374 THER/PROPH/DIAG INJ IV PUSH: CPT

## 2021-11-22 PROCEDURE — 83605 ASSAY OF LACTIC ACID: CPT

## 2021-11-22 PROCEDURE — 87635 SARS-COV-2 COVID-19 AMP PRB: CPT

## 2021-11-22 PROCEDURE — 6360000004 HC RX CONTRAST MEDICATION: Performed by: RADIOLOGY

## 2021-11-22 PROCEDURE — 94640 AIRWAY INHALATION TREATMENT: CPT

## 2021-11-22 PROCEDURE — 84484 ASSAY OF TROPONIN QUANT: CPT

## 2021-11-22 PROCEDURE — 93005 ELECTROCARDIOGRAM TRACING: CPT

## 2021-11-22 PROCEDURE — 2580000003 HC RX 258: Performed by: RADIOLOGY

## 2021-11-22 PROCEDURE — 71260 CT THORAX DX C+: CPT

## 2021-11-22 PROCEDURE — 6370000000 HC RX 637 (ALT 250 FOR IP): Performed by: INTERNAL MEDICINE

## 2021-11-22 RX ORDER — FUROSEMIDE 10 MG/ML
40 INJECTION INTRAMUSCULAR; INTRAVENOUS
Status: DISCONTINUED | OUTPATIENT
Start: 2021-11-23 | End: 2021-11-24 | Stop reason: HOSPADM

## 2021-11-22 RX ORDER — SODIUM CHLORIDE 0.9 % (FLUSH) 0.9 %
10 SYRINGE (ML) INJECTION PRN
Status: DISCONTINUED | OUTPATIENT
Start: 2021-11-22 | End: 2021-11-24 | Stop reason: HOSPADM

## 2021-11-22 RX ORDER — LISINOPRIL 5 MG/1
5 TABLET ORAL DAILY
Status: DISCONTINUED | OUTPATIENT
Start: 2021-11-22 | End: 2021-11-24 | Stop reason: HOSPADM

## 2021-11-22 RX ORDER — FUROSEMIDE 10 MG/ML
40 INJECTION INTRAMUSCULAR; INTRAVENOUS ONCE
Status: COMPLETED | OUTPATIENT
Start: 2021-11-22 | End: 2021-11-22

## 2021-11-22 RX ORDER — ARFORMOTEROL TARTRATE 15 UG/2ML
15 SOLUTION RESPIRATORY (INHALATION) 2 TIMES DAILY
Status: DISCONTINUED | OUTPATIENT
Start: 2021-11-22 | End: 2021-11-24 | Stop reason: HOSPADM

## 2021-11-22 RX ORDER — ROSUVASTATIN CALCIUM 20 MG/1
40 TABLET, COATED ORAL DAILY
Status: DISCONTINUED | OUTPATIENT
Start: 2021-11-22 | End: 2021-11-24 | Stop reason: HOSPADM

## 2021-11-22 RX ORDER — ASPIRIN 81 MG/1
81 TABLET ORAL DAILY
Status: DISCONTINUED | OUTPATIENT
Start: 2021-11-22 | End: 2021-11-24 | Stop reason: HOSPADM

## 2021-11-22 RX ORDER — IPRATROPIUM BROMIDE AND ALBUTEROL SULFATE 2.5; .5 MG/3ML; MG/3ML
1 SOLUTION RESPIRATORY (INHALATION) ONCE
Status: COMPLETED | OUTPATIENT
Start: 2021-11-22 | End: 2021-11-22

## 2021-11-22 RX ORDER — SODIUM CHLORIDE 0.9 % (FLUSH) 0.9 %
5-40 SYRINGE (ML) INJECTION PRN
Status: DISCONTINUED | OUTPATIENT
Start: 2021-11-22 | End: 2021-11-24 | Stop reason: HOSPADM

## 2021-11-22 RX ORDER — ACETAMINOPHEN 650 MG/1
650 SUPPOSITORY RECTAL EVERY 6 HOURS PRN
Status: DISCONTINUED | OUTPATIENT
Start: 2021-11-22 | End: 2021-11-24 | Stop reason: HOSPADM

## 2021-11-22 RX ORDER — BUDESONIDE 0.25 MG/2ML
250 INHALANT ORAL 2 TIMES DAILY
Status: DISCONTINUED | OUTPATIENT
Start: 2021-11-22 | End: 2021-11-24 | Stop reason: HOSPADM

## 2021-11-22 RX ORDER — POLYETHYLENE GLYCOL 3350 17 G/17G
17 POWDER, FOR SOLUTION ORAL DAILY PRN
Status: DISCONTINUED | OUTPATIENT
Start: 2021-11-22 | End: 2021-11-24 | Stop reason: HOSPADM

## 2021-11-22 RX ORDER — SODIUM CHLORIDE 9 MG/ML
25 INJECTION, SOLUTION INTRAVENOUS PRN
Status: DISCONTINUED | OUTPATIENT
Start: 2021-11-22 | End: 2021-11-24 | Stop reason: HOSPADM

## 2021-11-22 RX ORDER — METOPROLOL SUCCINATE 25 MG/1
25 TABLET, EXTENDED RELEASE ORAL DAILY
Status: DISCONTINUED | OUTPATIENT
Start: 2021-11-22 | End: 2021-11-22

## 2021-11-22 RX ORDER — SODIUM CHLORIDE 0.9 % (FLUSH) 0.9 %
5-40 SYRINGE (ML) INJECTION EVERY 12 HOURS SCHEDULED
Status: DISCONTINUED | OUTPATIENT
Start: 2021-11-22 | End: 2021-11-24 | Stop reason: HOSPADM

## 2021-11-22 RX ORDER — ACETAMINOPHEN 325 MG/1
650 TABLET ORAL EVERY 6 HOURS PRN
Status: DISCONTINUED | OUTPATIENT
Start: 2021-11-22 | End: 2021-11-24 | Stop reason: HOSPADM

## 2021-11-22 RX ORDER — IPRATROPIUM BROMIDE AND ALBUTEROL SULFATE 2.5; .5 MG/3ML; MG/3ML
1 SOLUTION RESPIRATORY (INHALATION) EVERY 4 HOURS PRN
Status: DISCONTINUED | OUTPATIENT
Start: 2021-11-22 | End: 2021-11-24 | Stop reason: HOSPADM

## 2021-11-22 RX ADMIN — BUDESONIDE 250 MCG: 0.25 SUSPENSION RESPIRATORY (INHALATION) at 19:29

## 2021-11-22 RX ADMIN — ASPIRIN 81 MG: 81 TABLET, COATED ORAL at 17:18

## 2021-11-22 RX ADMIN — SODIUM CHLORIDE, PRESERVATIVE FREE 10 ML: 5 INJECTION INTRAVENOUS at 10:19

## 2021-11-22 RX ADMIN — FUROSEMIDE 40 MG: 10 INJECTION, SOLUTION INTRAVENOUS at 11:35

## 2021-11-22 RX ADMIN — IPRATROPIUM BROMIDE 0.5 MG: 0.5 SOLUTION RESPIRATORY (INHALATION) at 19:29

## 2021-11-22 RX ADMIN — SODIUM CHLORIDE, PRESERVATIVE FREE 10 ML: 5 INJECTION INTRAVENOUS at 23:36

## 2021-11-22 RX ADMIN — IOPAMIDOL 75 ML: 755 INJECTION, SOLUTION INTRAVENOUS at 10:18

## 2021-11-22 RX ADMIN — METOPROLOL SUCCINATE 25 MG: 25 TABLET, EXTENDED RELEASE ORAL at 17:18

## 2021-11-22 RX ADMIN — ARFORMOTEROL TARTRATE 15 MCG: 15 SOLUTION RESPIRATORY (INHALATION) at 19:30

## 2021-11-22 RX ADMIN — ROSUVASTATIN CALCIUM 40 MG: 20 TABLET, FILM COATED ORAL at 17:18

## 2021-11-22 RX ADMIN — LISINOPRIL 5 MG: 5 TABLET ORAL at 17:18

## 2021-11-22 RX ADMIN — IPRATROPIUM BROMIDE AND ALBUTEROL SULFATE 1 AMPULE: .5; 3 SOLUTION RESPIRATORY (INHALATION) at 12:52

## 2021-11-22 RX ADMIN — APIXABAN 5 MG: 5 TABLET, FILM COATED ORAL at 23:35

## 2021-11-22 ASSESSMENT — ENCOUNTER SYMPTOMS
EYE PAIN: 0
SINUS PAIN: 0
SORE THROAT: 0
BACK PAIN: 0
RHINORRHEA: 0
CHEST TIGHTNESS: 0
VOMITING: 0
DIARRHEA: 0
SHORTNESS OF BREATH: 1
COUGH: 0
NAUSEA: 0
ABDOMINAL PAIN: 0

## 2021-11-22 ASSESSMENT — PAIN SCALES - GENERAL: PAINLEVEL_OUTOF10: 0

## 2021-11-22 NOTE — ED NOTES
J[facility]ed to floor. [facility] not received. Pt ready.       Kelly Mercer RN  11/22/21 1600

## 2021-11-22 NOTE — ED PROVIDER NOTES
required thoracentesis in the past.    ROS  A complete review of systems was performed and pertinent positives and negatives are stated within HPI, all other systems reviewed and are negative.    --------------------------------------------- PAST HISTORY ---------------------------------------------  Past Medical History:  has a past medical history of A-fib ([facility] Acute on chronic diastolic (congestive) heart failure ([facility] Blood transfusion reaction, CAD (coronary artery disease), COPD (chronic obstructive pulmonary disease) ([facility] GERD (gastroesophageal reflux disease), History of blood transfusion, History of cardioversion, Hyperlipidemia, Hypertension, Malignant neoplasm of lung ([facility] and Sleep apnea. Past Surgical History:  has a past surgical history that includes Diagnostic Cardiac Cath Lab Procedure (04/17/2007); Coronary artery bypass graft (04/18/2007); ECHO Compl W Dop Color Flow (03/22/2012); Coronary angioplasty with stent (03/20/2014); transesophageal echocardiogram (02/28/2020); bronchoscopy (N/A, 06/04/2020); bronchoscopy (06/04/2020); bronchoscopy (06/04/2020); bronchoscopy (06/04/2020); bronchoscopy (06/04/2020); bronchoscopy (06/04/2020); and Cardioversion (05/14/2021). Social History:  reports that he quit smoking about 16 months ago. His smoking use included cigarettes. He started smoking about 51 years ago. He has a 60.00 pack-year smoking history. He has never used smokeless tobacco. He reports current alcohol use of about 4.0 - 6.0 standard drinks of alcohol per week. He reports that he does not use drugs. Family History: family history includes Cancer in his maternal aunt; Hypertension in his father and mother; No Known Problems in his sister, sister, and sister. Unless otherwise noted, family history is non contributory    The patients home medications have been reviewed.     Allergies: Zocor [simvastatin - high dose]    Physical Exam  General: The patient is conversational and lying comfortably in bed. Head: Normocephalic and atraumatic. Eyes: Sclera non-icteric and no conjunctival injection  ENT: Nasal and oral membranes moist  Neck: Trachea midline. No JVD  Respiratory: The patient has diffuse intermittent wheezing occasionally. No prolonged expiratory phase. Patient speaking in full sentences. He is on nasal cannula and in no respiratory distress  Cardiovascular: Irregularly irregular. No pedal edema. Gastrointestinal:  Abdomen is soft and nondistended. Bowel sounds present. There is no tenderness. There is no guarding or rebound. Musculoskeletal: No deformity  Skin: Skin warm and dry. No rashes. Neurologic: No gross motor deficits. No aphasia. Psychiatric: Normal affect. MDM  This is a 62 y.o. male presenting to the ED for shortness of breath. On initial presentation the patient is found to be hypertensive, afebrile, tachycardic and hypoxic to 89 percent on room air. He is placed on 1 to 2 L nasal cannula. Based on history and physical exam, we have a broad differential.  We are concerned for ACS, arrhythmia, pneumonia, heart failure exacerbation, and Covid. With the patient's cancer history we cannot exclude PE with his tachycardia. Will obtain chest x-ray, EKG, CT scan and laboratory studies. As the patient has intermittent wheezing he will be given a breathing treatment. He does not feel this typically helps. On examination he is not in acute exacerbation. A 12-lead EKG was performed and interpreted by myself. The rate is 89 with irregularly irregular and normal axis. The QRS interval is 142, and QTC interval is 523. The patient has Q waves in lead III which is unchanged from prior. There are T wave inversions in V1 and V2 which are unchanged from prior. Patient does have biphasic T waves in V3 and V4. Right bundle branch block noted. This is atrial fibrillation with right bundle branch block and nonspecific abnormality.     Laboratory studies show electrolytes within normal limits. Lactic acid is not elevated at 1.7. Total protein is decreased. BNP significantly elevated at 6085. Patient's troponin is 20. Albumin low. LFTs otherwise within normal limits. No leukocytosis or anemia. Chest x-ray shows improved aeration of the right base with persistent bilateral pleural effusions. This is interpreted by radiology. Covid negative. CT of the chest shows minimal congestive heart failure. Squamous cell carcinoma not significantly changed. Postobstructive atelectasis. Unchanged mediastinal lymphadenopathy. No PE noted. This is interpreted by radiology. As the patient has evidence of heart failure and is requiring oxygen he will be started on 40 mg of IV Lasix. He continues to rest comfortably but is requiring 3 L nasal cannula to maintain saturations. He will require admission. Resident physician contacted admitting team who is agreeable. Patient verbalized understanding and also agreeable to the plan. Upon my evaluation, this patient had a high probability of imminent or life-threatening deterioration due to hypoxia, which required my direct attention, intervention, and personal management. I have personally provided 30 minutes of critical care time excluding time spent on separately billable procedures. Time includes review of laboratory data, radiology results, discussion with consultants, and monitoring for potential decompensation. Interventions were performed as documented above. I directly supervised any procedures performed by the resident and was present for the procedure including all critical portions of the procedure    The cardiac monitor revealed atrial fibrillation with a heart rate in the 80s as interpreted by me. The cardiac monitor was ordered secondary to the patient's shortness of breath and to monitor the patient for dysrhythmia.    CPT [id number]    I, Dr. Mina Escobar, am the primary provider of record    Impression  1. Congestive heart failure, unspecified HF chronicity, unspecified heart failure type ([facility]    2. Hypoxia    3. Squamous cell carcinoma of lung, unspecified laterality ([facility]    4.  Atrial fibrillation, unspecified type [facility])              Cayetano Gutierrez MD  11/22/21 2000

## 2021-11-22 NOTE — ED NOTES
FIRST PROVIDER CONTACT ASSESSMENT NOTE           Department of Emergency Medicine                 First Provider Note            21  8:49 AM EST    Date of Encounter: No admission date for patient encounter. Patient Name: Luke Erazo  : 1960  MRN: [id number]    Chief Complaint: Shortness of Breath (lung ca 89 %)      History of Present Illness:   Luke Erazo is a 62 y.o. male who presents to the ED for shortness of breath. Patient has lung CA for which he started a new treatment on this past weekend. Patient states he was not feeling right having more short of breath therefore called the doctor and they asked him to stop it. The shortness of breath has persisted. Patient is 89 to 90% on room air no oxygen. 2 L added. Patient will increase. Patient also has a history of atrial fibrillation. Patient is on Eliquis 5 mg. Patient states he has a history of his \"lungs feeling full of fluid. \"    Focused Physical Exam:  VS:    ED Triage Vitals [21 0824]   BP Temp Temp src Pulse Resp SpO2 Height Weight   -- -- -- 110 16 (!) 89 % 5' 11\" (1.803 m) 284 lb (128.8 kg)        Physical Ex: Constitutional: Alert and non-toxic. Medical History:  has a past medical history of A-fib ([address] Blood transfusion reaction, CAD (coronary artery disease), COPD (chronic obstructive pulmonary disease) ([address] GERD (gastroesophageal reflux disease), History of blood transfusion, History of cardioversion, Hyperlipidemia, Hypertension, Malignant neoplasm of lung ([address] and Sleep apnea. Surgical History:  has a past surgical history that includes Diagnostic Cardiac Cath Lab Procedure (2007); Coronary artery bypass graft (2007); ECHO Compl W Dop Color Flow (2012);  Coronary angioplasty with stent (2014); transesophageal echocardiogram (2020); bronchoscopy (N/A, 2020); bronchoscopy (2020); bronchoscopy (2020); bronchoscopy (2020); bronchoscopy (06/04/2020); bronchoscopy (06/04/2020); and Cardioversion (05/14/2021). Social History:  reports that he quit smoking about 16 months ago. His smoking use included cigarettes. He started smoking about 51 years ago. He has a 60.00 pack-year smoking history. He has never used smokeless tobacco. He reports current alcohol use of about 4.0 - 6.0 standard drinks of alcohol per week. He reports that he does not use drugs. Family History: family history includes Cancer in his maternal aunt; Hypertension in his father and mother; No Known Problems in his sister, sister, and sister.     Allergies: Zocor [simvastatin - high dose]       LUNGS: Patient has diminished at bilateral bases with slight wheezing noted     initial Plan of Care: Initiate Treatment-Testing, Proceed toTreatment Area When Bed Available for ED Attending/MLP to Continue Care      ---END OF FIRST PROVIDER CONTACT ASSESSMENT NOTE---  Electronically signed by Skyler Regan PA-C   DD: 11/22/21       Skyler Regan PA-C  11/22/21 [id number] [facility]  11/22/21 [id number]

## 2021-11-22 NOTE — ED PROVIDER NOTES
71-year-old male patient with past medical history of lung cancer presents today to the emergency department with complaints of shortness of breath onset Friday after starting a new medication which he does not recall the name of for treatment of his lung case. He states that he completed chemotherapy infusion treatment and was recently started on this pill. His symptoms are moderate in severity and intermittent. Worse with excertion and better with rest.   He denies any associated cough, fevers, chills, nausea or vomiting. He is currently on Eliquis and has been taking it consistently. He states that he had 1 similar episode to this in the past that required admission and thoracentesis. He is not on chronic oxygen at home. Review of Systems   Constitutional: Negative for diaphoresis and fever. HENT: Negative for ear pain, rhinorrhea, sinus pain and sore throat. Eyes: Negative for pain. Respiratory: Positive for shortness of breath. Negative for cough and chest tightness. Cardiovascular: Negative for chest pain and leg swelling. Gastrointestinal: Negative for abdominal pain, diarrhea, nausea and vomiting. Genitourinary: Negative for dysuria, flank pain and frequency. Musculoskeletal: Negative for back pain, neck pain and neck stiffness. Neurological: Negative for dizziness, weakness, light-headedness and headaches. Psychiatric/Behavioral: Negative for agitation and confusion. Physical Exam  Vitals reviewed. Constitutional:       General: He is not in acute distress. Appearance: Normal appearance. He is not toxic-appearing. HENT:      Head: Normocephalic and atraumatic. Nose: No congestion or rhinorrhea. Eyes:      General: No scleral icterus. Right eye: No discharge. Left eye: No discharge. Extraocular Movements: Extraocular movements intact. Pupils: Pupils are equal, round, and reactive to light.    Cardiovascular:      Rate and Rhythm: Normal rate and regular rhythm. Heart sounds: Normal heart sounds. No murmur heard. No friction rub. No gallop. Pulmonary:      Effort: Pulmonary effort is normal. No respiratory distress. Breath sounds: Wheezing present. No rhonchi or rales. Abdominal:      General: Abdomen is flat. There is no distension. Tenderness: There is no abdominal tenderness. Musculoskeletal:         General: No deformity or signs of injury. Cervical back: Normal range of motion and neck supple. No rigidity or tenderness. Right lower leg: No edema. Left lower leg: No edema. Skin:     General: Skin is warm and dry. Coloration: Skin is not jaundiced or pale. Neurological:      General: No focal deficit present. Mental Status: He is alert. Psychiatric:         Mood and Affect: Mood normal.          Procedures     MDM  Number of Diagnoses or Management Options  Diagnosis management comments: This is a 28-year-old male with past medical history of lung cancer who presents today to the emergency department with complaints of shortness of breath onset 3 days ago. He states that the symptoms started after he was prescribed a new medication to take for treatment of his lung cancer. He called his PCP and was informed to stop taking the medication. On arrival to the emergency department he is nontoxic, no acute distress. He is satting 89% on room air. Patient denies oxygen use at home. He was placed on 1 L of oxygen which improved his saturation. He appears to be more comfortable with the nasal cannula. On physical exam he has crackles diffusely to auscultation of his lungs. Lab work-up was obtained and showed a normal CBC. Normal CMP. EKG shows no ST elevation, ST depression, T wave inversions. High-sensitivity troponin x2 within normal limits. BNP elevated in the 6000s. This is the highest BNP this patient has had per ED notes. He was started on Lasix.   On reevaluation of the patient he says that the Lasix are making him go to the bathroom frequently. He states that his shortness of breath is only exertional and he does not feel at rest.  Discussed the case with Dr. Mahesh Camejo who accepted the patient for admission. Patient remains hemodynamically stable and is stable for admission. Amount and/or Complexity of Data Reviewed  Decide to obtain previous medical records or to obtain history from someone other than the patient: yes         ED Course as of 11/22/21 1325   Mon Nov 22, 2021   1224 Evaluated the patient. He states that the Lasix are making him go to the bathroom a lot but denies any improvement in his shortness of breath. States that it is worse when walking. [NS]   1324 Discussed with Dr. Mahesh Camejo. Pt will be admitted    [NS]      ED Course User Index  [NS] Keyonna Raymundo DO     ED Course as of 11/22/21 1349   Mon Nov 22, 2021   1224 Evaluated the patient. He states that the Lasix are making him go to the bathroom a lot but denies any improvement in his shortness of breath. States that it is worse when walking. [NS]   1324 Discussed with Dr. Mahesh Camejo. Pt will be admitted    [NS]      ED Course User Index  [NS] Keyonna Raymundo DO       --------------------------------------------- PAST HISTORY ---------------------------------------------  Past Medical History:  has a past medical history of A-fib ([address] Acute on chronic diastolic (congestive) heart failure ([address] Blood transfusion reaction, CAD (coronary artery disease), COPD (chronic obstructive pulmonary disease) ([address] GERD (gastroesophageal reflux disease), History of blood transfusion, History of cardioversion, Hyperlipidemia, Hypertension, Malignant neoplasm of lung ([address] and Sleep apnea. Past Surgical History:  has a past surgical history that includes Diagnostic Cardiac Cath Lab Procedure (04/17/2007); Coronary artery bypass graft (04/18/2007); ECHO Compl W Dop Color Flow (03/22/2012);  Coronary angioplasty with stent (03/20/2014); transesophageal echocardiogram (02/28/2020); bronchoscopy (N/A, 06/04/2020); bronchoscopy (06/04/2020); bronchoscopy (06/04/2020); bronchoscopy (06/04/2020); bronchoscopy (06/04/2020); bronchoscopy (06/04/2020); and Cardioversion (05/14/2021). Social History:  reports that he quit smoking about 16 months ago. His smoking use included cigarettes. He started smoking about 51 years ago. He has a 60.00 pack-year smoking history. He has never used smokeless tobacco. He reports current alcohol use of about 4.0 - 6.0 standard drinks of alcohol per week. He reports that he does not use drugs. Family History: family history includes Cancer in his maternal aunt; Hypertension in his father and mother; No Known Problems in his sister, sister, and sister. The patients home medications have been reviewed.     Allergies: Zocor [simvastatin - high dose]    -------------------------------------------------- RESULTS -------------------------------------------------    LABS:  Results for orders placed or performed during the hospital encounter of 11/22/21   COVID-19, Rapid    Specimen: Nasopharyngeal Swab   Result Value Ref Range    SARS-CoV-2, NAAT Not Detected Not Detected   CBC auto differential   Result Value Ref Range    WBC 6.7 4.5 - 11.5 E9/L    RBC 5.31 3.80 - 5.80 E12/L    Hemoglobin 14.4 12.5 - 16.5 g/dL    Hematocrit 46.2 37.0 - 54.0 %    MCV 87.0 80.0 - 99.9 fL    MCH 27.1 26.0 - 35.0 pg    MCHC 31.2 (L) 32.0 - 34.5 %    RDW 15.0 11.5 - 15.0 fL    Platelets 859 332 - 053 E9/L    MPV 10.2 7.0 - 12.0 fL    Neutrophils % 63.0 43.0 - 80.0 %    Immature Granulocytes % 0.3 0.0 - 5.0 %    Lymphocytes % 19.1 (L) 20.0 - 42.0 %    Monocytes % 11.7 2.0 - 12.0 %    Eosinophils % 5.0 0.0 - 6.0 %    Basophils % 0.9 0.0 - 2.0 %    Neutrophils Absolute 4.24 1.80 - 7.30 E9/L    Immature Granulocytes # 0.02 E9/L    Lymphocytes Absolute 1.29 (L) 1.50 - 4.00 E9/L    Monocytes Absolute 0.79 0.10 - 0.95 E9/L Eosinophils Absolute 0.34 0.05 - 0.50 E9/L    Basophils Absolute 0.06 0.00 - 0.20 E9/L   Comprehensive Metabolic Panel   Result Value Ref Range    Sodium 139 132 - 146 mmol/L    Potassium 3.7 3.5 - 5.0 mmol/L    Chloride 102 98 - 107 mmol/L    CO2 27 22 - 29 mmol/L    Anion Gap 10 7 - 16 mmol/L    Glucose 118 (H) 74 - 99 mg/dL    BUN 15 6 - 23 mg/dL    CREATININE 1.0 0.7 - 1.2 mg/dL    GFR Non-African American >60 >=60 mL/min/1.73    GFR African American >60     Calcium 9.3 8.6 - 10.2 mg/dL    Total Protein 6.2 (L) 6.4 - 8.3 g/dL    Albumin 3.2 (L) 3.5 - 5.2 g/dL    Total Bilirubin 0.3 0.0 - 1.2 mg/dL    Alkaline Phosphatase 82 40 - 129 U/L    ALT 11 0 - 40 U/L    AST 16 0 - 39 U/L   Lactic Acid, Plasma   Result Value Ref Range    Lactic Acid 1.7 0.5 - 2.2 mmol/L   Brain Natriuretic Peptide   Result Value Ref Range    Pro-BNP 6,085 (H) 0 - 125 pg/mL   Troponin   Result Value Ref Range    Troponin, High Sensitivity 20 (H) 0 - 11 ng/L   Troponin   Result Value Ref Range    Troponin, High Sensitivity 21 (H) 0 - 11 ng/L   EKG 12 Lead   Result Value Ref Range    Ventricular Rate 89 BPM    Atrial Rate 70 BPM    QRS Duration 142 ms    Q-T Interval 430 ms    QTc Calculation (Bazett) 523 ms    P Axis 32 degrees    R Axis 35 degrees    T Axis 17 degrees       RADIOLOGY:  CT CHEST W CONTRAST   Final Result   1. Minimal worsening of background congestive heart failure. 2.  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 3.  Unchanged mediastinal lymphadenopathy.       4.  When accounting for prominent admixture of non-opacified venous blood   within the superior vena cava and right atrium/right ventricle, as well as   predominant systemic arterial phase bolus timing, no gross pulmonary embolism   is identified to at least the segmental level. 5.  Otherwise, no significant change. Please see above comments. .      RECOMMENDATIONS:   1. Recommend follow-up thoracic imaging, as directed clinically. .         XR CHEST (2 VW)   Final Result   Improved aeration at the right base with persistent bilateral pleural   effusions.                 ------------------------- NURSING NOTES AND VITALS REVIEWED ---------------------------  Date / Time Roomed:  11/22/2021  9:02 AM  ED Bed Assignment:  04/04    The nursing notes within the ED encounter and vital signs as below have been reviewed. Patient Vitals for the past 24 hrs:   BP Temp Temp src Pulse Resp SpO2 Height Weight   11/22/21 1134 (!) 165/88 -- -- 94 18 95 % -- --   11/22/21 0848 (!) 168/101 97.6 °F (36.4 °C) Temporal 86 16 92 % -- --   11/22/21 0824 -- -- -- 110 16 (!) 89 % 5' 11\" (1.803 m) 284 lb (128.8 kg)       Oxygen Saturation Interpretation: Normal    ------------------------------------------ PROGRESS NOTES ------------------------------------------  Re-evaluation(s):  Time: 9364  Patients symptoms are improving  Repeat physical examination is improved    Counseling:  I have spoken with the patient and discussed todays results, in addition to providing specific details for the plan of care and counseling regarding the diagnosis and prognosis. Their questions are answered at this time and they are agreeable with the plan of admission.    --------------------------------- ADDITIONAL PROVIDER NOTES ---------------------------------  Consultations:  Time: 1324. Spoke with Dr. Sana Jaramillo. Discussed case. They will admit the patient.   This patient's ED course included: a personal history and physicial examination, re-evaluation prior to disposition, multiple bedside re-evaluations, IV medications, cardiac monitoring, continuous pulse oximetry and complex medical decision making and emergency management    This patient has remained hemodynamically stable during their ED course. Diagnosis:  No diagnosis found. Disposition:  Patient's disposition: Admit to med/surg floor  Patient's condition is stable.          Jacob Griffin,   Resident  11/22/21 7543

## 2021-11-22 NOTE — PLAN OF CARE
Problem: Falls - Risk of:  Goal: Will remain free from falls  Description: Will remain free from falls  Outcome: Met This Shift  Goal: Absence of physical injury  Description: Absence of physical injury  Outcome: Met This Shift     Problem: Cardiac:  Goal: Ability to maintain vital signs within normal range will improve  Description: Ability to maintain vital signs within normal range will improve  Outcome: Met This Shift  Goal: Cardiovascular alteration will improve  Description: Cardiovascular alteration will improve  Outcome: Met This Shift     Problem: Health Behavior:  Goal: Will modify at least one risk factor affecting health status  Description: Will modify at least one risk factor affecting health status  Outcome: Met This Shift

## 2021-11-23 LAB
ALBUMIN SERPL-MCNC: 3.2 G/DL (ref 3.5–5.2)
ALP BLD-CCNC: 83 U/L (ref 40–129)
ALT SERPL-CCNC: 10 U/L (ref 0–40)
ANION GAP SERPL CALCULATED.3IONS-SCNC: 10 MMOL/L (ref 7–16)
AST SERPL-CCNC: 16 U/L (ref 0–39)
BASOPHILS ABSOLUTE: 0.07 E9/L (ref 0–0.2)
BASOPHILS RELATIVE PERCENT: 0.9 % (ref 0–2)
BILIRUB SERPL-MCNC: 0.5 MG/DL (ref 0–1.2)
BUN BLDV-MCNC: 18 MG/DL (ref 6–23)
CALCIUM SERPL-MCNC: 8.7 MG/DL (ref 8.6–10.2)
CHLORIDE BLD-SCNC: 100 MMOL/L (ref 98–107)
CO2: 27 MMOL/L (ref 22–29)
CREAT SERPL-MCNC: 1 MG/DL (ref 0.7–1.2)
EOSINOPHILS ABSOLUTE: 0.44 E9/L (ref 0.05–0.5)
EOSINOPHILS RELATIVE PERCENT: 5.9 % (ref 0–6)
GFR AFRICAN AMERICAN: >60
GFR NON-AFRICAN AMERICAN: >60 ML/MIN/1.73
GLUCOSE BLD-MCNC: 130 MG/DL (ref 74–99)
HCT VFR BLD CALC: 44.6 % (ref 37–54)
HEMOGLOBIN: 14.3 G/DL (ref 12.5–16.5)
IMMATURE GRANULOCYTES #: 0.03 E9/L
IMMATURE GRANULOCYTES %: 0.4 % (ref 0–5)
LYMPHOCYTES ABSOLUTE: 1.46 E9/L (ref 1.5–4)
LYMPHOCYTES RELATIVE PERCENT: 19.7 % (ref 20–42)
MCH RBC QN AUTO: 27.4 PG (ref 26–35)
MCHC RBC AUTO-ENTMCNC: 32.1 % (ref 32–34.5)
MCV RBC AUTO: 85.4 FL (ref 80–99.9)
MONOCYTES ABSOLUTE: 0.93 E9/L (ref 0.1–0.95)
MONOCYTES RELATIVE PERCENT: 12.6 % (ref 2–12)
NEUTROPHILS ABSOLUTE: 4.47 E9/L (ref 1.8–7.3)
NEUTROPHILS RELATIVE PERCENT: 60.5 % (ref 43–80)
PDW BLD-RTO: 15.1 FL (ref 11.5–15)
PLATELET # BLD: 217 E9/L (ref 130–450)
PMV BLD AUTO: 10 FL (ref 7–12)
POTASSIUM REFLEX MAGNESIUM: 4.3 MMOL/L (ref 3.5–5)
RBC # BLD: 5.22 E12/L (ref 3.8–5.8)
SODIUM BLD-SCNC: 137 MMOL/L (ref 132–146)
TOTAL PROTEIN: 6.1 G/DL (ref 6.4–8.3)
WBC # BLD: 7.4 E9/L (ref 4.5–11.5)

## 2021-11-23 PROCEDURE — 6360000002 HC RX W HCPCS: Performed by: INTERNAL MEDICINE

## 2021-11-23 PROCEDURE — 1200000000 HC SEMI PRIVATE

## 2021-11-23 PROCEDURE — 87206 SMEAR FLUORESCENT/ACID STAI: CPT

## 2021-11-23 PROCEDURE — 2580000003 HC RX 258: Performed by: INTERNAL MEDICINE

## 2021-11-23 PROCEDURE — 6360000002 HC RX W HCPCS

## 2021-11-23 PROCEDURE — 85025 COMPLETE CBC W/AUTO DIFF WBC: CPT

## 2021-11-23 PROCEDURE — 87070 CULTURE OTHR SPECIMN AEROBIC: CPT

## 2021-11-23 PROCEDURE — 2700000000 HC OXYGEN THERAPY PER DAY

## 2021-11-23 PROCEDURE — 36415 COLL VENOUS BLD VENIPUNCTURE: CPT

## 2021-11-23 PROCEDURE — 6370000000 HC RX 637 (ALT 250 FOR IP): Performed by: INTERNAL MEDICINE

## 2021-11-23 PROCEDURE — 94640 AIRWAY INHALATION TREATMENT: CPT

## 2021-11-23 PROCEDURE — 97161 PT EVAL LOW COMPLEX 20 MIN: CPT

## 2021-11-23 PROCEDURE — 80053 COMPREHEN METABOLIC PANEL: CPT

## 2021-11-23 RX ORDER — METHYLPREDNISOLONE SODIUM SUCCINATE 40 MG/ML
40 INJECTION, POWDER, LYOPHILIZED, FOR SOLUTION INTRAMUSCULAR; INTRAVENOUS EVERY 8 HOURS
Status: DISCONTINUED | OUTPATIENT
Start: 2021-11-23 | End: 2021-11-24

## 2021-11-23 RX ADMIN — METOPROLOL SUCCINATE 125 MG: 100 TABLET, EXTENDED RELEASE ORAL at 08:10

## 2021-11-23 RX ADMIN — BUDESONIDE 250 MCG: 0.25 SUSPENSION RESPIRATORY (INHALATION) at 09:35

## 2021-11-23 RX ADMIN — IPRATROPIUM BROMIDE 0.5 MG: 0.5 SOLUTION RESPIRATORY (INHALATION) at 09:35

## 2021-11-23 RX ADMIN — FUROSEMIDE 40 MG: 40 INJECTION, SOLUTION INTRAMUSCULAR; INTRAVENOUS at 14:28

## 2021-11-23 RX ADMIN — METHYLPREDNISOLONE SODIUM SUCCINATE 40 MG: 40 INJECTION, POWDER, LYOPHILIZED, FOR SOLUTION INTRAMUSCULAR; INTRAVENOUS at 15:43

## 2021-11-23 RX ADMIN — IPRATROPIUM BROMIDE 0.5 MG: 0.5 SOLUTION RESPIRATORY (INHALATION) at 21:42

## 2021-11-23 RX ADMIN — ASPIRIN 81 MG: 81 TABLET, COATED ORAL at 08:10

## 2021-11-23 RX ADMIN — SODIUM CHLORIDE, PRESERVATIVE FREE 10 ML: 5 INJECTION INTRAVENOUS at 08:11

## 2021-11-23 RX ADMIN — IPRATROPIUM BROMIDE AND ALBUTEROL SULFATE 3 ML: .5; 3 SOLUTION RESPIRATORY (INHALATION) at 14:23

## 2021-11-23 RX ADMIN — APIXABAN 5 MG: 5 TABLET, FILM COATED ORAL at 08:10

## 2021-11-23 RX ADMIN — ARFORMOTEROL TARTRATE 15 MCG: 15 SOLUTION RESPIRATORY (INHALATION) at 21:42

## 2021-11-23 RX ADMIN — ARFORMOTEROL TARTRATE 15 MCG: 15 SOLUTION RESPIRATORY (INHALATION) at 09:35

## 2021-11-23 RX ADMIN — FUROSEMIDE 40 MG: 40 INJECTION, SOLUTION INTRAMUSCULAR; INTRAVENOUS at 05:53

## 2021-11-23 RX ADMIN — METHYLPREDNISOLONE SODIUM SUCCINATE 40 MG: 40 INJECTION, POWDER, LYOPHILIZED, FOR SOLUTION INTRAMUSCULAR; INTRAVENOUS at 23:43

## 2021-11-23 RX ADMIN — BUDESONIDE 250 MCG: 0.25 SUSPENSION RESPIRATORY (INHALATION) at 21:42

## 2021-11-23 RX ADMIN — LISINOPRIL 5 MG: 5 TABLET ORAL at 08:10

## 2021-11-23 RX ADMIN — ROSUVASTATIN CALCIUM 40 MG: 20 TABLET, FILM COATED ORAL at 08:10

## 2021-11-23 RX ADMIN — SODIUM CHLORIDE, PRESERVATIVE FREE 10 ML: 5 INJECTION INTRAVENOUS at 21:15

## 2021-11-23 ASSESSMENT — PAIN SCALES - GENERAL: PAINLEVEL_OUTOF10: 0

## 2021-11-23 NOTE — PROGRESS NOTES
Called to get clarification on if Eliquis was on hold or not. NP covering for alejandra called back and informed it was okay to give the Eliquis.

## 2021-11-23 NOTE — PLAN OF CARE
Problem: Falls - Risk of:  Goal: Will remain free from falls  Description: Will remain free from falls  11/23/2021 0902 by Andrea Gudino RN  Outcome: Met This Shift  11/23/2021 0728 by Marine Avila RN  Outcome: Met This Shift  Goal: Absence of physical injury  Description: Absence of physical injury  11/23/2021 0902 by Andrea Gudino RN  Outcome: Met This Shift  11/23/2021 0728 by Marine Avila RN  Outcome: Met This Shift     Problem: Cardiac:  Goal: Ability to maintain vital signs within normal range will improve  Description: Ability to maintain vital signs within normal range will improve  Outcome: Met This Shift  Goal: Cardiovascular alteration will improve  Description: Cardiovascular alteration will improve  Outcome: Met This Shift

## 2021-11-23 NOTE — CARE COORDINATION
Met with pt at bedside; states lives independently at home; still drives/works. Currently on R/A; has no home 02 , Bipap, etc. PCP . recent chemo RX for lung CA; uses no assistive devices at home. Denies any needs. Plan is home at discharge. CHF educator to follow. iv diuresis. Riley Thornton.

## 2021-11-23 NOTE — PROGRESS NOTES
Physical Therapy    Facility/Department: [address] MED SURG/TELE  Initial Assessment    NAME: Claudia Muniz  : 1960  MRN: [id number]    Date of Service: 2021      Attending Provider:  Owen Blancas MD    Evaluating PT:  Foster Boas. Jaclyn Fontenot., P.T. Room #:  0419/7439-Z  Diagnosis:  Acute on chronic diastolic (congestive) heart failure (HCC) [I50.33]  Precautions:  none    SUBJECTIVE:    Pt lives alone in a split level home with no stairs and no rail to enter. There are 5 steps and 1 rail to 2nd floor bed and bath. Pt ambulated with no AD PTA. OBJECTIVE:   Initial Evaluation  Date: 21   Was pt agreeable to Eval/treatment? yes   Does pt have pain? No c/o pain   Bed Mobility  Rolling: Independent  Supine to sit: Independent  Sit to supine: Independent  Scooting: Independent   Transfers Sit to stand: Independent  Stand to sit: Independent  Stand pivot: Independent   Ambulation   15 feet x 2 reps with no AD Independent    Stair negotiation: ascended and descended NA   AM-PAC 6 Clicks 80/21     BLE ROM is WFL. BLE strength is grossly 4+/5. Balance: sitting is Independent and standing with no AD is Independent   Endurance: good    ASSESSMENT:    Comments:  Pt is Independent with functional mobility at this time and has no skilled inpatient PT needs. Pt needed to use BR due to getting Lasix. He walked into and from BR on his own with steady gait and no LOB. Pt was left in bed with call light left by patient. Pt's/ family goals   1. To go home. Patient and or family understand(s) diagnosis, prognosis, and plan of care. PHYSICAL THERAPY PLAN OF CARE:    PT will discharge pt from our service at this time.        Referring provider/PT Order:  PT eval and treat  Diagnosis:  Acute on chronic diastolic (congestive) heart failure (HCC) [I50.33]    Time in  07:50  Time out  08:00    Evaluation Time includes thorough review of current medical information, gathering information on past

## 2021-11-23 NOTE — CONSULTS
[facility]  Hematology/Oncology  Consult      Patient Name: Alexey Gilman  YOB: 1960  PCP: Tierra Solis MD   Referring Provider:      Reason for Consultation:   Chief Complaint   Patient presents with    Shortness of Breath     lung ca 89 %        History of Present Illness: This is a 62-year-old male patient of Dr. Sheldon Maki following for right lower lobe squamous cell carcinoma status post SBRT with Dr. James Ortega with recurrent disease. He was on Opdivo/Yervoy receiving his last dose of Opdivo on 11/3/2021 and last dose of Yervoy on 10/6/2021, however recent CT scans showed progression in the RRL mass as well as mediastinal lymph nodes and was started on Gilotrif moving forward. Patient did call the office over the weekend complaining of increased shortness of breath and was told to hold Gilotrif until his appointment on 11/24/21. Patient presented to the emergency room with complaints of shortness of breath. CT chest with minimal worsening of background CHF. Moderate left and minimal right pleural effusion. No significant change of right lower lobe squamous cell carcinoma. Unchanged mediastinal lymphadenopathy. No gross PE identified. Pulmonology consulted with plan thoracentesis with Eliquis on hold. Overall his blood work remained stable other than elevated troponin. Patient for possible echo. Consultation for patient with known lung CA who stopped taking his current treatment. Patient feels better now. He is being actively diuresed. He is off of oxygen now and comfortable on room air. Review of system: Over 10 systems are reviewed and all were negative except as mentioned above.       Diagnostic Data:     Past Medical History:   Diagnosis Date    A-fib [facility])     Acute on chronic diastolic (congestive) heart failure ([facility] 11/22/2021    Blood transfusion reaction     CAD (coronary artery disease)     COPD (chronic obstructive pulmonary disease) ([facility]     GERD (gastroesophageal reflux disease)     History of blood transfusion     History of cardioversion 04/10/2020    Dr Wayne Treviño    Hyperlipidemia     Hypertension     Malignant neoplasm of lung ([facility]     Sleep apnea        Patient Active Problem List    Diagnosis Date Noted    Acute on chronic diastolic (congestive) heart failure ([facility] 11/22/2021    Pleural effusion on right 08/11/2021    ANTONIO (obstructive sleep apnea) 08/11/2021    Former smoker 08/11/2021    AF (atrial fibrillation) ([facility] 05/12/2021    Acute respiratory failure with hypoxia ([facility] 01/15/2021    Malignant neoplasm of lung ([facility]     Essential (primary) hypertension 07/07/2020    Gastro-esophageal reflux disease without esophagitis 07/07/2020    Unspecified atrial fibrillation ([facility] 07/07/2020    Hyperlipidemia, unspecified 07/07/2020    Chest pain 09/27/2019    CAD (coronary artery disease)     COPD (chronic obstructive pulmonary disease) ([facility]         Past Surgical History:   Procedure Laterality Date    BRONCHOSCOPY N/A 06/04/2020    BRONCHOSCOPY  NAVIGATIONAL performed by Rigobertopha DO Markos at [address]  06/04/2020    BRONCHOSCOPY/TRANSBRONCHIAL NEEDLE BIOPSY performed by 1C Companyannalee DO at [address]  06/04/2020    BRONCHOSCOPY BIOPSY BRONCHUS performed by Rigobertopha DO Markos at [address]  06/04/2020    BRONCHOSCOPY BRUSHINGS performed by Rigobertopha Markos DO at [address]  06/04/2020    BRONCHOSCOPY W/EBUS FNA performed by Darya Burrows DO at [address]  06/04/2020    BRONCHOSCOPY ALVEOLAR LAVAGE performed [facility] Dary[facility]rows [id number]  05/14/2021    successful   Dr Rawls [place]  03/20/2014    3.5/18 Resolute to [facility]    CORONARY ARTERY BYPASS GRAFT  04/18/2007    DIAGNOSTIC CARDIAC CATH LAB PROCEDURE  04/17/2007    ECHO COMPL W DOP COLOR FLOW 03/22/2012         TRANSESOPHAGEAL ECHOCARDIOGRAM  02/28/2020    julio césar       Family History  Family History   Problem Relation Age of Onset    Hypertension Mother     Hypertension Father     No Known Problems Sister     No Known Problems Sister     No Known Problems Sister     Cancer Maternal Aunt         cancer       Social History    TOBACCO:   reports that he quit smoking about 16 months ago. His smoking use included cigarettes. He started smoking about 51 years ago. He has a 60.00 pack-year smoking history. He has never used smokeless tobacco.  ETOH:   reports current alcohol use of about 4.0 - 6.0 standard drinks of alcohol per week. Home Medications  Prior to Admission medications    Medication Sig Start Date End Date Taking?  Authorizing Provider   metoprolol succinate (TOPROL XL) 25 MG extended release tablet Take 1 tablet by mouth daily 10/22/21  Yes Wesley Nearing, DO   metoprolol succinate (TOPROL XL) 100 MG extended release tablet TAKE 1 Daily 5/15/21  Yes Arjun Stahl MD   apixaban (ELIQUIS) 5 MG TABS tablet TAKE 1 TABLET TWICE A DAY 5/4/21  Yes Wesley Nearing, DO   ASPIRIN LOW DOSE 81 MG EC tablet TAKE 1 TABLET DAILY 5/4/21  Yes Wesley Nearing, DO   lisinopril (PRINIVIL;ZESTRIL) 5 MG tablet Take 5 mg by mouth daily   Yes Historical Provider, MD   albuterol (PROVENTIL) (2.5 MG/3ML) 0.083% nebulizer solution Take 0.083 mLs by nebulization 4 times daily as needed 1/21/21  Yes Historical Provider, MD   fluticasone-umeclidin-vilant (TRELEGY ELLIPTA) 100-62.5-25 MCG/INH AEPB Inhale 1 puff into the lungs daily 2/12/21  Yes Jamee aRi, APRN - CNP   PROAIR  (90 Base) MCG/ACT inhaler INHALE TWO PUFFS BY MOUTH EVERY 4 TO 6 HOURS AS NEEDED 4/28/20  Yes Historical Provider, MD   ipratropium-albuterol (DUONEB) 0.5-2.5 (3) MG/3ML SOLN nebulizer solution Inhale 3 mLs into the lungs every 4 hours 9/28/19  Yes Abbey Parker MD   nitroGLYCERIN (NITROSTAT) 0.4 MG SL tablet Place 1 tablet under the tongue every 5 minutes as needed for Chest pain 8/6/15  Yes Blaire Kyle MD   CRESTOR 40 MG tablet 40 mg daily  2/7/15  Yes Historical Provider, MD   vitamin D (ERGOCALCIFEROL) 49806 UNITS CAPS capsule Take 50,000 Units by mouth once a week mondays 3/6/13  Yes Historical Provider, MD       Allergies  Allergies   Allergen Reactions    Zocor [Simvastatin - High Dose] Other (See Comments)     Causes Rhabdomyolysis         Objective  /84   Pulse 95   Temp 98 °F (36.7 °C) (Oral)   Resp 16   Ht 5' 11\" (1.803 m)   Wt 292 lb 3.2 oz (132.5 kg)   SpO2 94%   BMI 40.75 kg/m²     Physical Exam:   Performance Status:  General: AAO to person, place, time, in no acute distress,   Head and neck : PERRLA, EOMI . Sclera non icteric. Oropharynx : Clear  Neck: no JVD,  no adenopathy  Heart: Regular rate and regular rhythm, no murmur  Lungs: Clear to auscultation   Extremities: No edema,no cyanosis, no clubbing. Abdomen: Soft, non-tender;no masses, no organomegaly  Skin:  No rash. Neurologic:Cranial nerves grossly intact. No focal motor or sensory deficits .     Recent Laboratory Data-   Lab Results   Component Value Date    WBC 7.4 11/23/2021    HGB 14.3 11/23/2021    HCT 44.6 11/23/2021    MCV 85.4 11/23/2021     11/23/2021    LYMPHOPCT 19.7 (L) 11/23/2021    RBC 5.22 11/23/2021    MCH 27.4 11/23/2021    MCHC 32.1 11/23/2021    RDW 15.1 (H) 11/23/2021    NEUTOPHILPCT 60.5 11/23/2021    MONOPCT 12.6 (H) 11/23/2021    BASOPCT 0.9 11/23/2021    NEUTROABS 4.47 11/23/2021    LYMPHSABS 1.46 (L) 11/23/2021    MONOSABS 0.93 11/23/2021    EOSABS 0.44 11/23/2021    BASOSABS 0.07 11/23/2021       Lab Results   Component Value Date     11/23/2021    K 4.3 11/23/2021     11/23/2021    CO2 27 11/23/2021    BUN 18 11/23/2021    CREATININE 1.0 11/23/2021    GLUCOSE 130 (H) 11/23/2021    CALCIUM 8.7 11/23/2021    PROT 6.1 (L) 11/23/2021    LABALBU 3.2 (L) 11/23/2021    BILITOT 0.5 11/23/2021 ALKPHOS 83 11/23/2021    AST 16 11/23/2021    ALT 10 11/23/2021    LABGLOM >60 11/23/2021    GFRAA >60 11/23/2021       No results found for: IRON, TIBC, FERRITIN        Radiology-    XR CHEST (2 VW)    Result Date: 11/22/2021  EXAMINATION: TWO XRAY VIEWS OF THE CHEST 11/22/2021 8:37 am COMPARISON: 11 August 2021. CT chest 2nd November 2021 HISTORY: ORDERING SYSTEM PROVIDED HISTORY: SOB TECHNOLOGIST PROVIDED HISTORY: Reason for exam:->SOB FINDINGS: Pleural effusions are redemonstrated. Suspect the right is somewhat larger than the left. Stable cardiomediastinal silhouette. Normal pulmonary vascularity. Clearly improved aeration at the right base which is likely related to diminishing volume of right pleural effusion as well as waning atelectasis and or infiltrate. Improved aeration at the right base with persistent bilateral pleural effusions. CT CHEST W CONTRAST    Result Date: 11/22/2021  EXAMINATION: CT OF THE CHEST WITH CONTRAST 11/22/2021 10:17 am TECHNIQUE: CT of the chest was performed with the administration of intravenous contrast. Multiplanar reformatted images are provided for review. Dose modulation, iterative reconstruction, and/or weight based adjustment of the mA/kV was utilized to reduce the radiation dose to as low as reasonably achievable. COMPARISON: PA/lateral chest radiograph 11/22/2021; CT of the chest, with contrast 11/02/2021; PET/CT 07/12/2021 HISTORY: Dyspnea; reported history of multifocal right lower lobe squamous cell carcinoma FINDINGS: LUNGS/PLEURA: Given differences in technique, previously-existing sequela of reported multifocal right lower lobe squamous cell carcinoma are not significantly changed, again most significant along the medial right infrahilar region, including post-obstructive atelectasis at the medial right lung base by tumoral encasement of multiple traversing segmental/subsegmental airways.  There are otherwise small dependent bibasilar pleural effusions, tracking into the pulmonary fissures, with mild adjacent subsegmental atelectasis versus infiltrate, all progressively lessening to both lung apices. These findings have overall minimally worsened since 11/02/2021. CARDIOVASCULAR: When accounting for prominent admixture of non-opacified venous blood within the superior vena cava and right atrium/right ventricle, as well as predominant systemic arterial phase bolus timing, no gross pulmonary embolism is identified to at least the segmental level. There are unchanged post-surgical alterations of prior midline sternotomy for coronary artery bypass grafting, and mild/moderate cardiomegaly, although with minimal worsening of now mild/moderate central pulmonary vascular congestion. Previously-demonstrated/described ascending thoracic aortic ectasia, sequela of chronic pulmonary arterial hypertension, and atherosclerotic arterial disease are also not significantly changed. LYMPH NODES: Previously-demonstrated/described mediastinal lymphadenopathy has not significantly changed. Negative for pulmonary hilar nor axillary lymphadenopathy, bilaterally. LOWER NECK: Multiple, small, heterogeneously-hypodense nodules are again scattered about both thyroid lobes, measuring up to 2.3 cm in greatest dimension about the inferior left thyroid pole, ametabolic/hypometabolic on comparison PET/CT 07/12/2021, and otherwise not significantly changed. OTHER MEDIASTINUM: There is minimal scattered gaseous distension of the visualized esophagus. UPPER ABDOMEN: There is nonspecific minimal bilateral perinephric fat stranding, with other incidentally-imaged upper abdominal structures otherwise not significantly changed. MUSCULOSKELETAL: There are grossly-unchanged, chronic, multilevel, asymmetric vertebral compression deformities, related vertebral column curvature/alignment abnormalities, osseous degenerative disease, and background diffuse osteopenia. .     1.   Minimal worsening of background congestive heart failure. 2.  Given differences in technique, previously-existing sequela of reported multifocal right lower lobe squamous cell carcinoma are not significantly changed, again most significant along the medial right infrahilar region, including post-obstructive atelectasis at the medial right lung base by tumoral encasement of multiple traversing segmental/subsegmental airways. 3.  Unchanged mediastinal lymphadenopathy. 4.  When accounting for prominent admixture of non-opacified venous blood within the superior vena cava and right atrium/right ventricle, as well as predominant systemic arterial phase bolus timing, no gross pulmonary embolism is identified to at least the segmental level. 5.  Otherwise, no significant change. Please see above comments. . RECOMMENDATIONS: 1. Recommend follow-up thoracic imaging, as directed clinically. .     CT CHEST W CONTRAST    Result Date: 11/3/2021  EXAMINATION: CT OF THE CHEST WITH CONTRAST 11/2/2021 3:19 pm TECHNIQUE: CT of the chest was performed with the administration of intravenous contrast. Multiplanar reformatted images are provided for review. Dose modulation, iterative reconstruction, and/or weight based adjustment of the mA/kV was utilized to reduce the radiation dose to as low as reasonably achievable. COMPARISON: 07/02/2021 HISTORY: ORDERING SYSTEM PROVIDED HISTORY: Squamous cell carcinoma of bronchus in right lower lobe (HCC) FINDINGS: Mediastinum:  Moderate ectasia of the ascending thoracic aorta measures 4.2 cm. There are multiple, bilateral low-attenuation thyroid nodules measuring up to 2 cm. The main pulmonary artery is moderately distended. The Cardiac size and configuration is grossly normal.  The esophagus is normal.  The trachea appears normal.  The aortic arch has normal morphology, course, and caliber. The origins of the great vessels are normal for non enhanced technique.   The mediastinum and sd appear normal.  There are mild to moderately enlarged mediastinal and hilar lymph nodes. A right paratracheal lymph node measures 17 mm, increased from 12 mm. Lungs/pleura: Small pleural effusions lie posteriorly Lung volumes are decreased. There is infiltrating soft tissue in the inferior right hilum centered at the bifurcation of the right lower lobe bronchus. A discrete mass is not discernible but overall size has changed from approximately 2.9 x 2.7 - 3.7 x 3.3 cm. Subsegmental atelectasis lies in the posterior right upper lobe, medial right middle lobe, and posteromedial right lower lobe. The remaining lung parenchyma has normal aeration and architecture. Upper Abdomen: The visualized upper abdominal viscera is normal. Soft Tissues/Bones: The patient is post median sternotomy and coronary bypass grafting. There is biconvex vertebral curvature. There is moderate degenerative disc disease and spondylosis of the thoracic vertebra, alignment appears normal.   The sternum and chest wall appear unremarkable. The axilla are free of masses or lymphadenopathy. 1. Moderate enlargement of amorphous right lower lobe/in area or hilar mass. 2. Mild progression of diffuse mediastinal and bi hilar lymphadenopathy. 3. Increasing pleural fluid with decreasing lung volumes and multifocal subsegmental atelectasis. 4. Unchanged ectasia of the ascending thoracic aorta and chronic pulmonary arterial hypertension. CT ABDOMEN PELVIS W IV CONTRAST Additional Contrast? Oral    Result Date: 11/3/2021  EXAMINATION: CT OF THE ABDOMEN AND PELVIS WITH CONTRAST 11/2/2021 3:24 pm TECHNIQUE: CT of the abdomen and pelvis was performed with the administration of intravenous contrast. Multiplanar reformatted images are provided for review. Dose modulation, iterative reconstruction, and/or weight based adjustment of the mA/kV was utilized to reduce the radiation dose to as low as reasonably achievable.  COMPARISON: 07/02/2021 HISTORY: ORDERING SYSTEM PROVIDED HISTORY: Squamous cell carcinoma of bronchus in right lower lobe [facility] TECHNOLOGIST PROVIDED HISTORY: Additional Contrast?->Oral FINDINGS: Lower Chest: Small pleural effusions lie posteriorly. Mild consolidation in the right lung base reflects atelectasis. Organs: The adrenal glands are normal. Splenic morphology and attenuation/echotexture is normal. The pancreas has normal morphology and parenchymal attenuation / enhancement. The kidneys have normal morphology, enhancement and are free of calculi and hydronephrosis. A 3 cm simple cyst lies in the left kidney. A 2.6 cm simple cyst lies in the right kidney. The liver has normal morphology and attenuation / echogenicity and is free of focal lesions. The gallbladder is fluid filled and free of intraluminal abnormalities. Gallbladder wall thickness is normal and there is no pericholecystic fluid. GI/Bowel: Diverticula project from the colon diffusely. The GI tract has normal course, caliber, and morphology. A normal appendix is identified. Pelvis: The bladder has normal morphology and wall thickness. The prostate is mildly enlarged, the seminal vesicles appear normal. Peritoneum/Retroperitoneum: There are no signs of free intraperitoneal air / gas. No free intraperitoneal fluid is present. The pelvis and retroperitoneum are free of masses or lymphadenopathy. Vasculature: The abdominal aorta, IVC and their branches are normal aside from mild aortic atherosclerotic vascular disease. Bones/Soft Tissues: There is biconvex vertebral curvature. The hips, bony pelvis, and lumbar spine appear unremarkable. The abdominal wall is intact. 1.  No sign of recurrent or metastatic abdominal/pelvic neoplasm. 2. Small pleural effusions with right basilar atelectasis. 3. Incidental renal cysts and diverticulosis. ASSESSMENT/PLAN :  61-year-old male   -RLL squamous cell carcinoma s/p SBRT with Dr. Parker Miller with recurrent disease.   He was on Opdivo/Yervoy receiving his last dose of Opdivo on 11/3/2021 and last dose of Yervoy on 10/6/2021, however recent CT scans showed progression in the RRL mass as well as mediastinal lymph nodes and switched to Gilotrif (11/2021). -Severely elevated proBNP and CT chest findings suggestive of congestive heart failure. Being actively diuresed. Being treated for COPD exacerbation. Feels better. Off of oxygen now and comfortable on room air. Continue treating CHF and COPD exacerbation. Shortness of breath likely unrelated to Gilotrif or recent immune therapy. He had only been on Gilotrif it for 7 days. CT scan findings not suggestive of interstitial lung disease or immune mediated pneumonitis.   -For possible echo and thoracentesis. Pulmonology on board  -We will follow. SIENNA Steward - CNP  Electronically signed 11/23/2021 at 4:33 PM   Patient seen and examined. Agree with CNP's assessment and plan. Note updated.   Curtis Bro MD

## 2021-11-23 NOTE — PLAN OF CARE
Problem: Falls - Risk of:  Goal: Will remain free from falls  Description: Will remain free from falls  11/23/2021 0728 by Filiberto Casiano RN  Outcome: Met This Shift  11/22/2021 1740 by Cata Sorensen RN  Outcome: Met This Shift     Problem: Falls - Risk of:  Goal: Absence of physical injury  Description: Absence of physical injury  11/23/2021 0728 by Filiberto Casiano RN  Outcome: Met This Shift  11/22/2021 1740 by Cata Sorensen RN  Outcome: Met This Shift

## 2021-11-23 NOTE — CONSULTS
Denise Feliciano M.D.,[facility]  Phil Yeager D.O., F.A.C.O.I., Royce Christensen M.D. Alfa Sharma M.D. Kaye Crandall D.O. Patient:  Dyllan Jung 62 y.o. male MRN: [id number]     Date of Service: 11/23/2021      PULMONARY CONSULTATION    Reason for Consultation: patient request  Referring Physician: MAY Barker    Communication with the referring physician will be sent via the electronic medical record. Chief Complaint: shortness of breath    CODE STATUS: FULL    SUBJECTIVE:  HPI:  Dyllan Jung is a 62 y.o. male known to our practice to Dr. Rosario Mckeon for stage 3 COPD and ANTONIO treated with bipap. He does not wear home oxygen. He was last seen in office this past February after a hospital stay where we saw him for a pleural effusion. He is currently undergoing [facility] and also uses immunotherapy. He was last seen after a recent hospital stay 8/14 where he had a right pleural effusion with thoracentesis 8/14 for 1.5L blood fluid removed with Dr. Rosario Mckeon. Patient presented to the ER with shortness of breath. Patient states he has felt short of breath over the past week. He finished his chemotherapy infusion and was switched to oral chemotherapy. He felt this was what was causing his shortness of breath so he stopped taking the medication Friday 11/19. His shortness of breath has persisted. Patient takes Eliquis and states he has taken it as prescribed. He is vaccinated for COVID-19. Patient was found to be hypoxic at 89% in ER and was placed on 2L NC. Admission labs: BMP within normal limits, proBNP 6085, troponin 20, albumin 3.2, WBC 6.7, hemoglobin 14.4, COVID-19 negative  Admission imaging: Chest x-ray showed improved aeration at the right base with persistent bilateral pleural effusions. CT chest with contrast showed minimal worsening of background congestive heart failure.   Given differences in technique, previously existing of beer 1 day every weekend. Prior history of moderately-heavy alcohol consumption. Denies any current or any history of illicit drug use/ abuse. 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 Social Determinants of Health     Financial Resource Strain:     Difficulty of Paying Living Expenses: Not on file   Food Insecurity:     Worried About [address] in the Last Year: Not on file    Gregg of Food in the Last Year: Not on file   Transportation Needs:     Lack of Transportation (Medical): Not on file    Lack of Transportation (Non-Medical): Not on file   Physical Activity:     Days of Exercise per Week: Not on file    Minutes of Exercise per Session: Not on file   Stress:     Feeling of Stress : Not on file   Social Connections:     Frequency of Communication with Friends and Family: Not on file    Frequency of Social Gatherings with Friends and Family: Not on file    Attends Yazidi Services: Not on file    Active Member of Clubs or Organizations: Not on file    Attends Club or Organization Meetings: Not on file    Marital Status: Not on file   Intimate Partner Violence:     Fear of Current or Ex-Partner: Not on file    Emotionally Abused: Not on file    Physically Abused: Not on file    Sexually Abused: Not on file   Housing Stability:     Unable to Pay for Housing in the Last Year: Not on file    Number of [place] in the Last Year: Not on file    Unstable Housing in the Last Year: Not on file     Smoking history: The patient is a Past smoker of 40 years. Pack year equal 60. ETOH:   reports current alcohol use of about 4.0 - 6.0 standard drinks of alcohol per week. Prior history of moderately/heavy alcohol consumption    Exposures: There  is not history of TB or TB exposure. There is not asbestos or silica dust exposure.   The patient reports does not have coal, foundry, [address] or [employer] exposure. Recent travel history none. There is not  history of recreational or IV drug use. There is not hot tub exposure. The patient does not have any exotic pets, turtles or exotic birds. 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     Vaccines:      Immunization History   Administered Date(s) Administered    Pneumococcal Polysaccharide ([id number]) 12/20/2006, 05/13/2015    Tdap (Boostrix, Adacel) 07/31/2012        Home Meds: Medications Prior to Admission: metoprolol succinate (TOPROL XL) 25 MG extended release tablet, Take 1 tablet by mouth daily  metoprolol succinate (TOPROL XL) 100 MG extended release tablet, TAKE 1 Daily  apixaban (ELIQUIS) 5 MG TABS tablet, TAKE 1 TABLET TWICE A DAY  ASPIRIN LOW DOSE 81 MG EC tablet, TAKE 1 TABLET DAILY  lisinopril (PRINIVIL;ZESTRIL) 5 MG tablet, Take 5 mg by mouth daily  albuterol (PROVENTIL) (2.5 MG/3ML) 0.083% nebulizer solution, Take 0.083 mLs by nebulization 4 times daily as needed  fluticasone-umeclidin-vilant (TRELEGY ELLIPTA) 100-62.5-25 MCG/INH AEPB, Inhale 1 puff into the lungs daily  PROAIR  (90 Base) MCG/ACT inhaler, INHALE TWO PUFFS BY MOUTH EVERY 4 TO 6 HOURS AS NEEDED  ipratropium-albuterol (DUONEB) 0.5-2.5 (3) MG/3ML SOLN nebulizer solution, Inhale 3 mLs into the lungs every 4 hours  nitroGLYCERIN (NITROSTAT) 0.4 MG SL tablet, Place 1 tablet under the tongue every 5 minutes as needed for Chest pain  CRESTOR 40 MG tablet, 40 mg daily   vitamin D (ERGOCALCIFEROL) 58943 UNITS CAPS capsule, Take 50,000 Units by mouth once a week mondays    CURRENT MEDS :  Scheduled Meds:   apixaban  5 mg Oral BID    aspirin  81 mg Oral Daily    rosuvastatin  40 mg Oral Daily    lisinopril  5 mg Oral Daily    sodium chloride flush  5-40 mL IntraVENous 2 times per day    furosemide  40 mg IntraVENous 2 times per day    Arformoterol Tartrate  15 mcg Nebulization BID    budesonide  250 mcg Nebulization BID    ipratropium  0.5 mg Nebulization TID    metoprolol succinate  125 mg Oral Daily       Continuous Infusions:   sodium chloride         Allergies   Allergen Reactions    Zocor [Simvastatin - High Dose] Other (See Comments)     Causes Rhabdomyolysis       REVIEW OF SYSTEMS:  Constitutional: Denies fever, weight loss, night sweats, and fatigue  Skin: Denies pigmentation, dark lesions, and rashes   HEENT: Denies hearing loss, tinnitus, ear drainage, epistaxis, sore throat, and hoarseness. Cardiovascular: Denies palpitations, chest pain, and chest pressure. Respiratory: + occasional productive cough, HARRIS, denies dyspnea at rest, hemoptysis, apnea, and choking. Gastrointestinal: Denies nausea, vomiting, poor appetite, diarrhea, heartburn or reflux  Genitourinary: Denies dysuria, frequency, urgency or hematuria  Musculoskeletal: Denies myalgias, muscle weakness, and bone pain  Neurological: Denies dizziness, vertigo, headache, and focal weakness  Psychological: Denies anxiety and depression  Endocrine: Denies heat intolerance and cold intolerance  Hematopoietic/Lymphatic: Denies bleeding problems and blood transfusions    OBJECTIVE:   /84   Pulse 95   Temp 98 °F (36.7 °C) (Oral)   Resp 16   Ht 5' 11\" (1.803 m)   Wt 292 lb 3.2 oz (132.5 kg)   SpO2 94%   BMI 40.75 kg/m²   SpO2 Readings from Last 1 Encounters:   11/23/21 94%        I/O:  No intake or output data in the 24 hours ending 11/23/21 1506  Vent Information  SpO2: 94 %    Physical Exam:  General: The patient is sitting in chair comfortably without any distress. Breathing is not labored. Obese  HEENT: Pupils are equal round and reactive to light, there are no oral lesions and no post-nasal drip   Neck: supple without adenopathy  Cardiovascular: Atrial fibrillation without murmur or gallop  Respiratory: faint wheeze.   Air entry is symmetric  Abdomen: soft, non-tender, non-distended, normal bowel sounds  Extremities: warm, no clubbing. Trace bilateral lower extremity edema  Skin: no rash or lesion  Neurologic: CN II-XII grossly intact, no focal deficits    Imaging personally reviewed:  CXR 11/22  Improved aeration at the right base with persistent bilateral pleural   effusions. CT chest W contrast 11/22  1. Minimal worsening of background congestive heart failure. 2.  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 3.  Unchanged mediastinal lymphadenopathy. 4.  When accounting for prominent admixture of non-opacified venous blood   within the superior vena cava and right atrium/right ventricle, as well as   predominant systemic arterial phase bolus timing, no gross pulmonary embolism   is identified to at least the segmental level. 5.  Otherwise, no significant change. Please see above comments. Echo 1/16/2021   Technically suboptimal and limited study. Left ventricular internal dimensions were normal in diastole and systole. Mild left ventricular concentric hypertrophy noted. Normal left ventricular ejection fraction. Septal motion consistent with post cardiac surgery. The left atrium is borderline dilated. Right ventricle global systolic function is reduced . Mildly enlarged right atrium size. Mild thickening of the mitral valve leaflets. Moderate mitral annular calcification potentially consistent with repair. Mild to moderate mitral regurgitation is present. The aortic valve appears mildly sclerotic. Mild aortic regurgitation is noted. Mild tricuspid regurgitation. Mildly dilated aortic root.    Dilation of the ascending aorta. There is a trivial circumferential pericardial effusion noted. Signature      ----------------------------------------------------------------   Electronically signed by Emanuel Daniels MD(Interpreting   physician) on 01/16/2021 02:16 PM   ----------------------------------------------------------------    Labs:  Lab Results   Component Value Date    WBC 7.4 11/23/2021    HGB 14.3 11/23/2021    HCT 44.6 11/23/2021    MCV 85.4 11/23/2021    MCH 27.4 11/23/2021    MCHC 32.1 11/23/2021    RDW 15.1 11/23/2021     11/23/2021    MPV 10.0 11/23/2021     Lab Results   Component Value Date     11/23/2021    K 4.3 11/23/2021     11/23/2021    CO2 27 11/23/2021    BUN 18 11/23/2021    CREATININE 1.0 11/23/2021    LABALBU 3.2 11/23/2021    LABALBU 4.7 06/03/2011    CALCIUM 8.7 11/23/2021    GFRAA >60 11/23/2021    LABGLOM >60 11/23/2021     Lab Results   Component Value Date    PROTIME 14.1 05/13/2021    INR 1.3 05/13/2021     Recent Labs     11/22/21  0914   PROBNP 6,085*     No results for input(s): TROPONINI in the last 72 hours. No results for input(s): PROCAL in the last 72 hours. This SmartLink has not been configured with any valid records. Micro:  11/22 COVID-19 (-)    Assessment:  Acute on chronic respiratory failure with hypoxia  COPD with mild acute exacerbation  Obstructive sleep apnea on home BiPAP  Recurrent right pleural effusion history of thoracentesis February 2021 cytology negative. Thoracentesis 8/14 with 1500 cc of bloody fluid removed fluid studies negative  Squamous cell cancer right lower lung. Status post SBRT. PET scan 7/12/2021 with progression of disease within the chest increased size of nodule in the medial right lower lobe and scattered lymphadenopathy. Poor surgical candidate.   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 Fib, on Eliquis  Hyperlipidemia  Morbid obesity BMI 40.8    Plan:  Monitor off oxygen  Obtain respiratory culture  Consult hematology/oncology  CT chest reviewed, moderate left and minimal right pleural effusion noted, cardiomegaly   Hold Eliquis for possible need for thoracentesis  Start Solu-Medrol 40 mg IV every 8 hours  Continue pleural effusion management with diuresis therapy. Monitor renal function and lytes. Pro-BNP 6,085. Thank you for allowing me to participate in the care of Porfirio Storm. Please feel free to call with questions. This plan of care was reviewed in collaboration with Dr. Stevie Unger    Electronically signed by SIENNA Martinez CNP on 11/23/2021 at 3:06 PM      Note: This report was completed utilizing computer voice recognition software. Every effort has been made to ensure accuracy, however; inadvertent computerized transcription errors may be present      I personally saw, examined, and cared for the patient. Labs, medications, radiographs reviewed. I agree with history exam and plans detailed in NP note.         Rajiv Burgos, DO

## 2021-11-23 NOTE — H&P
[address] [facility]  History and Physical      CHIEF COMPLAINT:    Chief Complaint   Patient presents with    Shortness of Breath     lung ca 89 %        Patient of Jj Orozco MD presents with:  Acute on chronic diastolic (congestive) heart failure ([facility]    History of Present Illness:   26-year-old male with a past medical history of A. fib, CAD, COPD, malignant neoplasm of lung, GERD, hyperlipidemia, hypertension, and ANTONIO. Patient presented to the ED with complaints of shortness of breath. Patient states shortness of breath began approximately a week ago and is worsening. Patient was just recently switched from IV chemotherapy to oral chemotherapy. Patient admits that he stopped taking his chemotherapy thinking this is what was causing his shortness of breath. Patient denied any aggravating factors or relieving factors. Patient is currently on 3 L O2 his baseline is room air. Patient is alert and oriented sitting up in bed without complaints. Patient denies any chest pain, fever, chills, headache, blurred vision, dizziness, and abdominal pain. ER work-up revealed an elevated BNP 6085, slightly elevated glucose, chest x-ray bilateral pleural effusions. Patient is admitted for further testing and treatment. REVIEW OF SYSTEMS:  Pertinent negatives are above in HPI. 10 point ROS otherwise negative.       Past Medical History:   Diagnosis Date    A-fib [facility])     Acute on chronic diastolic (congestive) heart failure ([facility] 11/22/2021    Blood transfusion reaction     CAD (coronary artery disease)     COPD (chronic obstructive pulmonary disease) (HCC)     GERD (gastroesophageal reflux disease)     History of blood transfusion     History of cardioversion 04/10/2020    Dr Meg Davis    Hyperlipidemia     Hypertension     Malignant neoplasm of lung ([facility]     Sleep apnea          Past Surgical History:   Procedure Laterality Date    BRONCHOSCOPY N/A 06/04/2020 BRONCHOSCOPY  NAVIGATIONAL performed by Júnior Gonsalez DO at [address] [place] [address]  06/04/2020    BRONCHOSCOPY/TRANSBRONCHIAL NEEDLE BIOPSY performed by Júnior Gonsalez DO at [address] [place] [address]  06/04/2020    BRONCHOSCOPY BIOPSY BRONCHUS performed by Júnior Gonsalez DO at [address] [place] [address]  06/04/2020    BRONCHOSCOPY BRUSHINGS performed by Júnior Gonsalez DO at [address] [place] [address]  06/04/2020    BRONCHOSCOPY W/EBUS FNA performed by Júnior Gonsalez DO at [address] [place] [address]  06/04/2020    BRONCHOSCOPY ALVEOLAR LAVAGE performed by Júnior Gonsalez DO at [facility]  05/14/2021    successful   Dr Nila Eastman  03/20/2014    3.5/18 Resolute to Mid-Cx    CORONARY ARTERY BYPASS GRAFT  04/18/2007    DIAGNOSTIC CARDIAC CATH LAB PROCEDURE  04/17/2007    ECHO COMPL W DOP COLOR FLOW  03/22/2012         TRANSESOPHAGEAL ECHOCARDIOGRAM  02/28/2020    angela       Medications Prior to Admission:    Medications Prior to Admission: metoprolol succinate (TOPROL XL) 25 MG extended release tablet, Take 1 tablet by mouth daily  metoprolol succinate (TOPROL XL) 100 MG extended release tablet, TAKE 1 Daily  apixaban (ELIQUIS) 5 MG TABS tablet, TAKE 1 TABLET TWICE A DAY  ASPIRIN LOW DOSE 81 MG EC tablet, TAKE 1 TABLET DAILY  lisinopril (PRINIVIL;ZESTRIL) 5 MG tablet, Take 5 mg by mouth daily  albuterol (PROVENTIL) (2.5 MG/3ML) 0.083% nebulizer solution, Take 0.083 mLs by nebulization 4 times daily as needed  fluticasone-umeclidin-vilant (TRELEGY ELLIPTA) 100-62.5-25 MCG/INH AEPB, Inhale 1 puff into the lungs daily  PROAIR  (90 Base) MCG/ACT inhaler, INHALE TWO PUFFS BY MOUTH EVERY 4 TO 6 HOURS AS NEEDED  ipratropium-albuterol (DUONEB) 0.5-2.5 (3) MG/3ML SOLN nebulizer solution, Inhale 3 mLs into the lungs every 4 hours  nitroGLYCERIN (NITROSTAT) 0.4 MG SL tablet, Place 1 tablet under the tongue every 5 minutes as needed for Chest pain  CRESTOR 40 MG tablet, 40 mg daily   vitamin D (ERGOCALCIFEROL) 87208 UNITS CAPS capsule, Take 50,000 Units by mouth once a week mondays    Note that the patient's home medications were reviewed and the above list is accurate to the best of my knowledge at the time of the exam.    Allergies:    Zocor [simvastatin - high dose]    Social History:    reports that he quit smoking about 16 months ago. His smoking use included cigarettes. He started smoking about 51 years ago. He has a 60.00 pack-year smoking history. He has never used smokeless tobacco. He reports current alcohol use of about 4.0 - 6.0 standard drinks of alcohol per week. He reports that he does not use drugs. Family History:   family history includes Cancer in his maternal aunt; Hypertension in his father and mother; No Known Problems in his sister, sister, and sister. PHYSICAL EXAM:    Vitals:  BP (!) 165/97   Pulse 87   Temp 98 °F (36.7 °C) (Oral)   Resp 18   Ht 5' 11\" (1.803 m)   Wt 295 lb (133.8 kg)   SpO2 96%   BMI 41.14 kg/m²       General appearance: NAD, conversant, ill-appearing  Eyes: Sclerae anicteric, PERRLA  HEENT: AT/NC, MMM  Neck: FROM, supple, no thyromegaly  Lymph: No cervical / supraclavicular lymphadenopathy  Lungs:  inspiratory and expiratory wheezes. CV: RRR, no MRGs, bilateral lower extremity edema ill-appearing  Abdomen: Soft, non-tender; no masses or HSM, +BS  Extremities: FROM without synovitis. No clubbing or cyanosis of the hands. Lower extremity edema bilateral  Skin: no rash, induration, lesions, or ulcers  Psych: Calm and cooperative. Normal judgement and insight. Normal mood and affect. Neuro: Alert and interactive, face symmetric, speech fluent. LABS:  All labs reviewed.   Of note:  CBC with Differential:    Lab Results   Component Value Date    WBC 7.4 11/23/2021    RBC 5.22 11/23/2021    HGB 14.3 11/23/2021    HCT 44.6 11/23/2021     11/23/2021    MCV 85.4 11/23/2021    MCH 27.4 11/23/2021    MCHC 32.1 11/23/2021    RDW 15.1 11/23/2021    SEGSPCT 70 03/19/2014    LYMPHOPCT 19.7 11/23/2021    MONOPCT 12.6 11/23/2021    BASOPCT 0.9 11/23/2021    MONOSABS 0.93 11/23/2021    LYMPHSABS 1.46 11/23/2021    EOSABS 0.44 11/23/2021    BASOSABS 0.07 11/23/2021     CMP:    Lab Results   Component Value Date     11/23/2021    K 4.3 11/23/2021     11/23/2021    CO2 27 11/23/2021    BUN 18 11/23/2021    CREATININE 1.0 11/23/2021    GFRAA >60 11/23/2021    LABGLOM >60 11/23/2021    GLUCOSE 130 11/23/2021    GLUCOSE 129 06/03/2011    PROT 6.1 11/23/2021    LABALBU 3.2 11/23/2021    LABALBU 4.7 06/03/2011    CALCIUM 8.7 11/23/2021    BILITOT 0.5 11/23/2021    ALKPHOS 83 11/23/2021    AST 16 11/23/2021    ALT 10 11/23/2021       Imaging:  CXR: Improved aeration at the right base with persistent bilateral pleural effusions. CT chest: Minimal worsening congestive heart failure. Given differences in technique. The existing sequela of reported multi right lower lobe squamous cell carcinoma are not significantly changed. EKG:  Right bundle branch block    Telemetry:  SR    ASSESSMENT/PLAN:  Principal Problem:    Acute on chronic diastolic (congestive) heart failure (HCC)  Resolved Problems:    * No resolved hospital problems.  *    58-year-old male with a significant past medical history of COPD, lung CA CAD, GERD admitted to telemetry unit with    Congestive heart failure- systolic v diastolic / plural effusions   - possible thoracentesis   - check echo if not recently done 1/21 : ef 60%, lhv in march 2021   -Diurese cautiously so as to avoid renal insufficiency- monitor renal fxn   -BNP every other day -on admission 6,085  -Daily weights strict I's and O's  -Low-sodium diet  -Supplement O2 to maintain pulse ox duration greater than 93% wean as tolerated  - hem oc consulted by pulm   -Medications for other comorbidities continue as appropriate with dose adjustments as needed. Medication for other comorbidities continue as appropriate dose adjustment as necessary. DVT prophylaxis  PT OT  Discharge planning  Case discussed with attending and agreed upon plan of care. Code status: Full  Requires inpatient level of care  SIENNA Ceballos CNP    6:44 AM  11/23/2021     Above note edited to reflect my thoughts     I personally saw, examined and provided care for the patient. Radiographs, labs and medication list were reviewed by me independently. The case was discussed in detail and plans for care were established. Review of Samanta RANDALL CNP, documentation was conducted and revisions were made as appropriate directly by me. I agree with the above documented exam, problem list, and plan of care.      Kavita Ratliff MD  4:15 PM  11/23/2021

## 2021-11-24 VITALS
RESPIRATION RATE: 20 BRPM | SYSTOLIC BLOOD PRESSURE: 116 MMHG | HEIGHT: 71 IN | HEART RATE: 100 BPM | DIASTOLIC BLOOD PRESSURE: 71 MMHG | TEMPERATURE: 97.9 F | BODY MASS INDEX: 40.6 KG/M2 | OXYGEN SATURATION: 93 % | WEIGHT: 290 LBS

## 2021-11-24 LAB
LV EF: 48 %
LVEF MODALITY: NORMAL

## 2021-11-24 PROCEDURE — 94640 AIRWAY INHALATION TREATMENT: CPT

## 2021-11-24 PROCEDURE — 2580000003 HC RX 258: Performed by: INTERNAL MEDICINE

## 2021-11-24 PROCEDURE — 6360000002 HC RX W HCPCS: Performed by: INTERNAL MEDICINE

## 2021-11-24 PROCEDURE — 94660 CPAP INITIATION&MGMT: CPT

## 2021-11-24 PROCEDURE — 6360000002 HC RX W HCPCS

## 2021-11-24 PROCEDURE — 93306 TTE W/DOPPLER COMPLETE: CPT

## 2021-11-24 PROCEDURE — 6370000000 HC RX 637 (ALT 250 FOR IP): Performed by: INTERNAL MEDICINE

## 2021-11-24 PROCEDURE — 6360000004 HC RX CONTRAST MEDICATION: Performed by: NURSE PRACTITIONER

## 2021-11-24 RX ORDER — PREDNISONE 20 MG/1
40 TABLET ORAL DAILY
Qty: 14 TABLET | Refills: 0 | Status: SHIPPED | OUTPATIENT
Start: 2021-11-24 | End: 2021-12-01

## 2021-11-24 RX ORDER — METHYLPREDNISOLONE SODIUM SUCCINATE 40 MG/ML
40 INJECTION, POWDER, LYOPHILIZED, FOR SOLUTION INTRAMUSCULAR; INTRAVENOUS EVERY 12 HOURS
Status: DISCONTINUED | OUTPATIENT
Start: 2021-11-24 | End: 2021-11-24 | Stop reason: HOSPADM

## 2021-11-24 RX ORDER — FUROSEMIDE 20 MG/1
20 TABLET ORAL DAILY
Qty: 30 TABLET | Refills: 0 | Status: SHIPPED | OUTPATIENT
Start: 2021-11-24 | End: 2021-12-20 | Stop reason: SDUPTHER

## 2021-11-24 RX ADMIN — BUDESONIDE 250 MCG: 0.25 SUSPENSION RESPIRATORY (INHALATION) at 09:45

## 2021-11-24 RX ADMIN — SODIUM CHLORIDE, PRESERVATIVE FREE 10 ML: 5 INJECTION INTRAVENOUS at 08:21

## 2021-11-24 RX ADMIN — IPRATROPIUM BROMIDE 0.5 MG: 0.5 SOLUTION RESPIRATORY (INHALATION) at 09:45

## 2021-11-24 RX ADMIN — FUROSEMIDE 40 MG: 40 INJECTION, SOLUTION INTRAMUSCULAR; INTRAVENOUS at 06:23

## 2021-11-24 RX ADMIN — LISINOPRIL 5 MG: 5 TABLET ORAL at 08:21

## 2021-11-24 RX ADMIN — ROSUVASTATIN CALCIUM 40 MG: 20 TABLET, FILM COATED ORAL at 08:21

## 2021-11-24 RX ADMIN — METOPROLOL SUCCINATE 125 MG: 100 TABLET, EXTENDED RELEASE ORAL at 08:21

## 2021-11-24 RX ADMIN — SODIUM CHLORIDE, PRESERVATIVE FREE 10 ML: 5 INJECTION INTRAVENOUS at 13:54

## 2021-11-24 RX ADMIN — METHYLPREDNISOLONE SODIUM SUCCINATE 40 MG: 40 INJECTION, POWDER, LYOPHILIZED, FOR SOLUTION INTRAMUSCULAR; INTRAVENOUS at 08:21

## 2021-11-24 RX ADMIN — ASPIRIN 81 MG: 81 TABLET, COATED ORAL at 08:21

## 2021-11-24 RX ADMIN — FUROSEMIDE 40 MG: 40 INJECTION, SOLUTION INTRAMUSCULAR; INTRAVENOUS at 14:46

## 2021-11-24 RX ADMIN — ARFORMOTEROL TARTRATE 15 MCG: 15 SOLUTION RESPIRATORY (INHALATION) at 09:45

## 2021-11-24 RX ADMIN — PERFLUTREN 1.65 MG: 6.52 INJECTION, SUSPENSION INTRAVENOUS at 13:54

## 2021-11-24 ASSESSMENT — PAIN SCALES - GENERAL: PAINLEVEL_OUTOF10: 0

## 2021-11-24 NOTE — PROGRESS NOTES
[facility]  Hematology/Oncology  Consult      Patient Name: Chris Mtz  YOB: 1960  PCP: Alan Nicholson MD   Referring Provider:      Reason for Consultation:   Chief Complaint   Patient presents with    Shortness of Breath     lung ca 89 %      Subjective: patient was having ECHO while rounding. History of Present Illness: This is a 73-year-old male patient of Dr. 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 Sebastien Wilson with recurrent disease. He was on Opdivo/Yervoy receiving his last dose of Opdivo on 11/3/2021 and last dose of Yervoy on 10/6/2021, however recent CT scans showed progression in the RRL mass as well as mediastinal lymph nodes and was started on Gilotrif moving forward. Patient did call the office over the weekend complaining of increased shortness of breath and was told to hold Gilotrif until his appointment on 11/24/21. Patient presented to the emergency room with complaints of shortness of breath. CT chest with minimal worsening of background CHF. Moderate left and minimal right pleural effusion. No significant change of right lower lobe squamous cell carcinoma. Unchanged mediastinal lymphadenopathy. No gross PE identified. Pulmonology consulted with plan thoracentesis with Eliquis on hold. Overall his blood work remained stable other than elevated troponin. Patient for possible echo. Consultation for patient with known lung CA who stopped taking his current treatment. Patient feels better now. He is being actively diuresed. He is off of oxygen now and comfortable on room air. Review of system: Over 10 systems are reviewed and all were negative except as mentioned above.       Diagnostic Data:     Past Medical History:   Diagnosis Date    A-fib [facility])     Acute on chronic diastolic (congestive) heart failure ([facility] 11/22/2021    Blood transfusion reaction     CAD (coronary artery disease)     COPD (chronic obstructive pulmonary disease) (HCC)     GERD (gastroesophageal reflux disease)     History of blood transfusion     History of cardioversion 04/10/2020    Dr Jason David    Hyperlipidemia     Hypertension     Malignant neoplasm of lung ([facility] [address] [address]     Sleep apnea        Patient Active Problem List    Diagnosis Date Noted    Acute on chronic diastolic (congestive) heart failure ([address] 11/22/2021    Pleural effusion on right 08/11/2021    ANTONIO (obstructive sleep apnea) 08/11/2021    Former smoker 08/11/2021    AF (atrial fibrillation) ([facility] [address] 05/12/2021    Acute respiratory failure with hypoxia ([facility] [address] 01/15/2021    Malignant neoplasm of lung ([facility]     Essential (primary) hypertension 07/07/2020    Gastro-esophageal reflux disease without esophagitis 07/07/2020    Unspecified atrial fibrillation ([facility] [address] 07/07/2020    Hyperlipidemia, unspecified 07/07/2020    Chest pain 09/27/2019    CAD (coronary artery disease)     COPD (chronic obstructive pulmonary disease) ([facility] [address]         Past Surgical History:   Procedure Laterality Date    BRONCHOSCOPY N/A 06/04/2020    BRONCHOSCOPY  NAVIGATIONAL performed by Nathaly Pulliam DO at [address] [facility]  06/04/2020    BRONCHOSCOPY/TRANSBRONCHIAL NEEDLE BIOPSY performed by Nathaly Pulliam DO at [address]  06/04/2020    BRONCHOSCOPY BIOPSY BRONCHUS performed by Nathaly Pulliam DO at [address]  06/04/2020    BRONCHOSCOPY BRUSHINGS performed by Nathaly Pulliam DO at [address]  06/04/2020    BRONCHOSCOPY W/EBUS FNA performed by Nathaly Pulliam DO at [address]  06/04/2020    BRONCHOSCOPY ALVEOLAR LAVAGE performed by Nathaly Pulliam DO at [address]  05/14/2021    successful   Dr Ramirez Range  03/20/2014    3.5/18 [facility]    CORONARY ARTERY BYPASS GRAFT  04/18/2007   [address] CATH LAB PROCEDURE  04/17/2007    ECHO COMPL W DOP COLOR FLOW  03/22/2012         TRANSESOPHAGEAL ECHOCARDIOGRAM  02/28/2020    angela       Family History  Family History   Problem Relation Age of Onset    Hypertension Mother     Hypertension Father     No Known Problems Sister     No Known Problems Sister     No Known Problems Sister     Cancer Maternal Aunt         cancer       Social History    TOBACCO:   reports that he quit smoking about 16 months ago. His smoking use included cigarettes. He started smoking about 51 years ago. He has a 60.00 pack-year smoking history. He has never used smokeless tobacco.  ETOH:   reports current alcohol use of about 4.0 - 6.0 standard drinks of alcohol per week. Home Medications  Prior to Admission medications    Medication Sig Start Date End Date Taking?  Authorizing Provider   metoprolol succinate (TOPROL XL) 25 MG extended release tablet Take 1 tablet by mouth daily 10/22/21  Yes Manjit Toro,    metoprolol succinate (TOPROL XL) 100 MG extended release tablet TAKE 1 Daily 5/15/21  Yes Jaime Owen MD   apixaban (ELIQUIS) 5 MG TABS tablet TAKE 1 TABLET TWICE A DAY 5/4/21  Yes Manjit Toro DO   ASPIRIN LOW DOSE 81 MG EC tablet TAKE 1 TABLET DAILY 5/4/21  Yes Manjit Toro DO   lisinopril (PRINIVIL;ZESTRIL) 5 MG tablet Take 5 mg by mouth daily   Yes Historical Provider, MD   albuterol (PROVENTIL) (2.5 MG/3ML) 0.083% nebulizer solution Take 0.083 mLs by nebulization 4 times daily as needed 1/21/21  Yes Historical Provider, MD   fluticasone-umeclidin-vilant (TRELEGY ELLIPTA) 100-62.5-25 MCG/INH AEPB Inhale 1 puff into the lungs daily 2/12/21  Yes Jamee Rai, APRN - CNP   PROAIR  (90 Base) MCG/ACT inhaler INHALE TWO PUFFS BY MOUTH EVERY 4 TO 6 HOURS AS NEEDED 4/28/20  Yes Historical Provider, MD   ipratropium-albuterol (DUONEB) 0.5-2.5 (3) MG/3ML SOLN nebulizer solution Inhale 3 mLs into the lungs every 4 hours 9/28/19  Yes Marigene Kehr, MD nitroGLYCERIN (NITROSTAT) 0.4 MG SL tablet Place 1 tablet under the tongue every 5 minutes as needed for Chest pain 8/6/15  Yes Brooks Angelucci, MD   CRESTOR 40 MG tablet 40 mg daily  2/7/15  Yes Historical Provider, MD   vitamin D (ERGOCALCIFEROL) 06214 UNITS CAPS capsule Take 50,000 Units by mouth once a week mondays 3/6/13  Yes Historical Provider, MD       Allergies  Allergies   Allergen Reactions    Zocor [Simvastatin - High Dose] Other (See Comments)     Causes Rhabdomyolysis         Objective  /71   Pulse 100   Temp 97.9 °F (36.6 °C) (Oral)   Resp 20   Ht 5' 11\" (1.803 m)   Wt 290 lb (131.5 kg)   SpO2 93%   BMI 40.45 kg/m²     Physical Exam:   Performance Status:  General: AAO to person, place, time, in no acute distress,   Head and neck : PERRLA, EOMI . Sclera non icteric. Oropharynx : Clear  Neck: no JVD,  no adenopathy  Heart: Regular rate and regular rhythm, no murmur  Lungs: Clear to auscultation   Extremities: No edema,no cyanosis, no clubbing. Abdomen: Soft, non-tender;no masses, no organomegaly  Skin:  No rash. Neurologic:Cranial nerves grossly intact. No focal motor or sensory deficits .     Recent Laboratory Data-   Lab Results   Component Value Date    WBC 7.4 11/23/2021    HGB 14.3 11/23/2021    HCT 44.6 11/23/2021    MCV 85.4 11/23/2021     11/23/2021    LYMPHOPCT 19.7 (L) 11/23/2021    RBC 5.22 11/23/2021    MCH 27.4 11/23/2021    MCHC 32.1 11/23/2021    RDW 15.1 (H) 11/23/2021    NEUTOPHILPCT 60.5 11/23/2021    MONOPCT 12.6 (H) 11/23/2021    BASOPCT 0.9 11/23/2021    NEUTROABS 4.47 11/23/2021    LYMPHSABS 1.46 (L) 11/23/2021    MONOSABS 0.93 11/23/2021    EOSABS 0.44 11/23/2021    BASOSABS 0.07 11/23/2021       Lab Results   Component Value Date     11/23/2021    K 4.3 11/23/2021     11/23/2021    CO2 27 11/23/2021    BUN 18 11/23/2021    CREATININE 1.0 11/23/2021    GLUCOSE 130 (H) 11/23/2021    CALCIUM 8.7 11/23/2021    PROT 6.1 (L) 11/23/2021 LABALBU 3.2 (L) 11/23/2021    BILITOT 0.5 11/23/2021    ALKPHOS 83 11/23/2021    AST 16 11/23/2021    ALT 10 11/23/2021    LABGLOM >60 11/23/2021    GFRAA >60 11/23/2021       No results found for: IRON, TIBC, FERRITIN        Radiology-    XR CHEST (2 VW)    Result Date: 11/22/2021  EXAMINATION: TWO XRAY VIEWS OF THE CHEST 11/22/2021 8:37 am COMPARISON: 11 August 2021. CT chest 2nd November 2021 HISTORY: ORDERING SYSTEM PROVIDED HISTORY: SOB TECHNOLOGIST PROVIDED HISTORY: Reason for exam:->SOB FINDINGS: Pleural effusions are redemonstrated. Suspect the right is somewhat larger than the left. Stable cardiomediastinal silhouette. Normal pulmonary vascularity. Clearly improved aeration at the right base which is likely related to diminishing volume of right pleural effusion as well as waning atelectasis and or infiltrate. Improved aeration at the right base with persistent bilateral pleural effusions. CT CHEST W CONTRAST    Result Date: 11/22/2021  EXAMINATION: CT OF THE CHEST WITH CONTRAST 11/22/2021 10:17 am TECHNIQUE: CT of the chest was performed with the administration of intravenous contrast. Multiplanar reformatted images are provided for review. Dose modulation, iterative reconstruction, and/or weight based adjustment of the mA/kV was utilized to reduce the radiation dose to as low as reasonably achievable. COMPARISON: PA/lateral chest radiograph 11/22/2021; CT of the chest, with contrast 11/02/2021; PET/CT 07/12/2021 HISTORY: Dyspnea; reported history of multifocal right lower lobe squamous cell carcinoma FINDINGS: LUNGS/PLEURA: Given differences in technique, previously-existing sequela of reported multifocal right lower lobe squamous cell carcinoma are not significantly changed, again most significant along the medial right infrahilar region, including post-obstructive atelectasis at the medial right lung base by tumoral encasement of multiple traversing segmental/subsegmental airways.  There are otherwise small dependent bibasilar pleural effusions, tracking into the pulmonary fissures, with mild adjacent subsegmental atelectasis versus infiltrate, all progressively lessening to both lung apices. These findings have overall minimally worsened since 11/02/2021. CARDIOVASCULAR: When accounting for prominent admixture of non-opacified venous blood within the superior vena cava and right atrium/right ventricle, as well as predominant systemic arterial phase bolus timing, no gross pulmonary embolism is identified to at least the segmental level. There are unchanged post-surgical alterations of prior midline sternotomy for coronary artery bypass grafting, and mild/moderate cardiomegaly, although with minimal worsening of now mild/moderate central pulmonary vascular congestion. Previously-demonstrated/described ascending thoracic aortic ectasia, sequela of chronic pulmonary arterial hypertension, and atherosclerotic arterial disease are also not significantly changed. LYMPH NODES: Previously-demonstrated/described mediastinal lymphadenopathy has not significantly changed. Negative for pulmonary hilar nor axillary lymphadenopathy, bilaterally. LOWER NECK: Multiple, small, heterogeneously-hypodense nodules are again scattered about both thyroid lobes, measuring up to 2.3 cm in greatest dimension about the inferior left thyroid pole, ametabolic/hypometabolic on comparison PET/CT 07/12/2021, and otherwise not significantly changed. OTHER MEDIASTINUM: There is minimal scattered gaseous distension of the visualized esophagus. UPPER ABDOMEN: There is nonspecific minimal bilateral perinephric fat stranding, with other incidentally-imaged upper abdominal structures otherwise not significantly changed.  MUSCULOSKELETAL: There are grossly-unchanged, chronic, multilevel, asymmetric vertebral compression deformities, related vertebral column curvature/alignment abnormalities, osseous degenerative disease, and background diffuse osteopenia. .     1. Minimal worsening of background congestive heart failure. 2.  Given differences in technique, previously-existing sequela of reported multifocal right lower lobe squamous cell carcinoma are not significantly changed, again most significant along the medial right infrahilar region, including post-obstructive atelectasis at the medial right lung base by tumoral encasement of multiple traversing segmental/subsegmental airways. 3.  Unchanged mediastinal lymphadenopathy. 4.  When accounting for prominent admixture of non-opacified venous blood within the superior vena cava and right atrium/right ventricle, as well as predominant systemic arterial phase bolus timing, no gross pulmonary embolism is identified to at least the segmental level. 5.  Otherwise, no significant change. Please see above comments. . RECOMMENDATIONS: 1. Recommend follow-up thoracic imaging, as directed clinically. .     CT CHEST W CONTRAST    Result Date: 11/3/2021  EXAMINATION: CT OF THE CHEST WITH CONTRAST 11/2/2021 3:19 pm TECHNIQUE: CT of the chest was performed with the administration of intravenous contrast. Multiplanar reformatted images are provided for review. Dose modulation, iterative reconstruction, and/or weight based adjustment of the mA/kV was utilized to reduce the radiation dose to as low as reasonably achievable. COMPARISON: 07/02/2021 HISTORY: ORDERING SYSTEM PROVIDED HISTORY: Squamous cell carcinoma of bronchus in right lower lobe (HCC) FINDINGS: Mediastinum:  Moderate ectasia of the ascending thoracic aorta measures 4.2 cm. There are multiple, bilateral low-attenuation thyroid nodules measuring up to 2 cm. The main pulmonary artery is moderately distended. The Cardiac size and configuration is grossly normal.  The esophagus is normal.  The trachea appears normal.  The aortic arch has normal morphology, course, and caliber. The origins of the great vessels are normal for non enhanced technique. The mediastinum and sd appear normal.  There are mild to moderately enlarged mediastinal and hilar lymph nodes. A right paratracheal lymph node measures 17 mm, increased from 12 mm. Lungs/pleura: Small pleural effusions lie posteriorly Lung volumes are decreased. There is infiltrating soft tissue in the inferior right hilum centered at the bifurcation of the right lower lobe bronchus. A discrete mass is not discernible but overall size has changed from approximately 2.9 x 2.7 - 3.7 x 3.3 cm. Subsegmental atelectasis lies in the posterior right upper lobe, medial right middle lobe, and posteromedial right lower lobe. The remaining lung parenchyma has normal aeration and architecture. Upper Abdomen: The visualized upper abdominal viscera is normal. Soft Tissues/Bones: The patient is post median sternotomy and coronary bypass grafting. There is biconvex vertebral curvature. There is moderate degenerative disc disease and spondylosis of the thoracic vertebra, alignment appears normal.   The sternum and chest wall appear unremarkable. The axilla are free of masses or lymphadenopathy. 1. Moderate enlargement of amorphous right lower lobe/in area or hilar mass. 2. Mild progression of diffuse mediastinal and bi hilar lymphadenopathy. 3. Increasing pleural fluid with decreasing lung volumes and multifocal subsegmental atelectasis. 4. Unchanged ectasia of the ascending thoracic aorta and chronic pulmonary arterial hypertension. CT ABDOMEN PELVIS W IV CONTRAST Additional Contrast? Oral    Result Date: 11/3/2021  EXAMINATION: CT OF THE ABDOMEN AND PELVIS WITH CONTRAST 11/2/2021 3:24 pm TECHNIQUE: CT of the abdomen and pelvis was performed with the administration of intravenous contrast. Multiplanar reformatted images are provided for review. Dose modulation, iterative reconstruction, and/or weight based adjustment of the mA/kV was utilized to reduce the radiation dose to as low as reasonably achievable. COMPARISON: 07/02/2021 HISTORY: ORDERING SYSTEM PROVIDED HISTORY: Squamous cell carcinoma of bronchus in right lower lobe [facility]) TECHNOLOGIST PROVIDED HISTORY: Additional Contrast?->Oral FINDINGS: Lower Chest: Small pleural effusions lie posteriorly. Mild consolidation in the right lung base reflects atelectasis. Organs: The adrenal glands are normal. Splenic morphology and attenuation/echotexture is normal. The pancreas has normal morphology and parenchymal attenuation / enhancement. The kidneys have normal morphology, enhancement and are free of calculi and hydronephrosis. A 3 cm simple cyst lies in the left kidney. A 2.6 cm simple cyst lies in the right kidney. The liver has normal morphology and attenuation / echogenicity and is free of focal lesions. The gallbladder is fluid filled and free of intraluminal abnormalities. Gallbladder wall thickness is normal and there is no pericholecystic fluid. GI/Bowel: Diverticula project from the colon diffusely. The GI tract has normal course, caliber, and morphology. A normal appendix is identified. Pelvis: The bladder has normal morphology and wall thickness. The prostate is mildly enlarged, the seminal vesicles appear normal. Peritoneum/Retroperitoneum: There are no signs of free intraperitoneal air / gas. No free intraperitoneal fluid is present. The pelvis and retroperitoneum are free of masses or lymphadenopathy. Vasculature: The abdominal aorta, IVC and their branches are normal aside from mild aortic atherosclerotic vascular disease. Bones/Soft Tissues: There is biconvex vertebral curvature. The hips, bony pelvis, and lumbar spine appear unremarkable. The abdominal wall is intact. 1.  No sign of recurrent or metastatic abdominal/pelvic neoplasm. 2. Small pleural effusions with right basilar atelectasis. 3. Incidental renal cysts and diverticulosis.          ASSESSMENT/PLAN :  62-year-old male   -RLL squamous cell carcinoma s/p SBRT with Dr. Kavita López with recurrent disease. He was on Opdivo/Yervoy receiving his last dose of Opdivo on 11/3/2021 and last dose of Yervoy on 10/6/2021, however recent CT scans showed progression in the RRL mass as well as mediastinal lymph nodes and switched to Gilotrif (11/2021). -Severely elevated proBNP and CT chest findings suggestive of congestive heart failure. Being actively diuresed. Being treated for COPD exacerbation. Feels better. Off of oxygen now and comfortable on room air. Continue treating CHF and COPD exacerbation. Shortness of breath likely unrelated to Gilotrif or recent immune therapy. He had only been on Gilotrif it for 7 days. CT scan findings not suggestive of interstitial lung disease or immune mediated pneumonitis.   -For possible echo and thoracentesis. Pulmonology on board  -We will follow. 11/24/21  - Continues on Lasix for diuresis for CHF exacerbation. Plan for possible ECHO. - Pulmonology following for COPD exacerbation and pleural effusion with possible need of thoracentesis, Eliquis remains on hold. - CT scan findings not suggestive of interstitial lung disease or immune mediated pneumonitis. Symptoms unlikely related to Gilotrif. To resume on DC. Short follow up time on DC as well       SIENNA Sanches - CNP  Electronically signed 11/24/2021 at 9:39 AM   Patient seen and examined. Agree with CNP's assessment and plan. Note updated.   Perlita Sánchez MD

## 2021-11-24 NOTE — PLAN OF CARE
Patient's chart updated to reflect:      . - HF care plan, HF education points and HF discharge instructions.  -Orders: 2 gram sodium diet, daily weights, I/O.  -PCP and cardiology follow up appointments to be scheduled within 7 days of hospital discharge. -CHF education session will be provided to the patient prior to hospital discharge.     Elba Cruz RN, BSN  Heart Failure Navigator

## 2021-11-24 NOTE — PROGRESS NOTES
Date: 11/24/2021    Time: 12:32 AM    Patient Placed On BIPAP/CPAP/ Non-Invasive Ventilation? Yes    If no must comment. Facial area red/color change? No           If YES are Blister/Lesion present? No   If yes must notify nursing staff  BIPAP/CPAP skin barrier?   No    Skin barrier type:mepilexlite       Comments:       11/24/21 0030   NIV Type   $NIV $Daily Charge   Skin Assessment Clean, dry, & intact   Skin Protection for O2 Device No  (pt refused)   NIV Started/Stopped On   Equipment Type focus   Mode Bilevel   Mask Type Full face mask   Mask Size Large   Settings/Measurements   IPAP 15 cmH20   CPAP/EPAP 8 cmH2O   Resp 21   FiO2    (RA)   Vt Exhaled 752 mL   Minute Volume 11.3 Liters   Mask Leak (lpm) 31 lpm   Comfort Level Good   Using Accessory Muscles Dora Schrader RCP

## 2021-11-24 NOTE — PROGRESS NOTES
Perfect serve message sent to [facility] and Dr Sonam Yap checking for discharge. Waiting for a reply back decision.

## 2021-11-24 NOTE — DISCHARGE SUMMARY
Physician Discharge Summary     Patient ID:  Porfirio Storm  [id number]  30 y.o.  1960    Admit date: 11/22/2021    Discharge date and time: 11/24/2021    Admission Diagnoses:   Patient Active Problem List   Diagnosis    CAD (coronary artery disease)    Essential (primary) hypertension    Gastro-esophageal reflux disease without esophagitis    COPD (chronic obstructive pulmonary disease) (HCC)    Chest pain    Unspecified atrial fibrillation (HCC)    Malignant neoplasm of lung (HCC)    Acute respiratory failure with hypoxia (HCC)    Hyperlipidemia, unspecified    AF (atrial fibrillation) (HCC)    Pleural effusion on right    ANTONIO (obstructive sleep apnea)    Former smoker    Acute on chronic diastolic (congestive) heart failure ([facility] [address]       Discharge Diagnoses: acute on chronic CHF    Consults: pulmonary/intensive care and hematology/oncology    Procedures: none    Hospital Course:   26-year-old male with a significant past medical history of COPD, lung CA CAD, GERD admitted to telemetry unit with     Congestive heart failure- systolic v diastolic / plural effusions   - Echo if not recently done 1/21 : ef 60%, lhv in march 2021 on stress   -Diurese >> prescription for lasix 20 mg daily on discharge   -BNP every other day -on admission 6,085  -Daily weights strict I's and O's  -Low-sodium diet  -Supplement O2 to maintain pulse ox duration greater than 93% wean as tolerated  -hem/Onc consulted by pulm   -Medications for other comorbidities continue as appropriate with dose adjustments as needed.   -IV solumedrol switched to PO prednisone        Recent Labs     11/22/21  0914 11/23/21  0319   WBC 6.7 7.4   HGB 14.4 14.3   HCT 46.2 44.6    217       Recent Labs     11/22/21  0914 11/23/21  0319    137   K 3.7 4.3    100   CO2 27 27   BUN 15 18   CREATININE 1.0 1.0   CALCIUM 9.3 8.7       XR CHEST (2 VW)    Result Date: 11/22/2021  EXAMINATION: TWO XRAY VIEWS OF THE CHEST 11/22/2021 8:37 am COMPARISON: 11 August 2021. CT chest 2nd November 2021 HISTORY: ORDERING SYSTEM PROVIDED HISTORY: SOB TECHNOLOGIST PROVIDED HISTORY: Reason for exam:->SOB FINDINGS: Pleural effusions are redemonstrated. Suspect the right is somewhat larger than the left. Stable cardiomediastinal silhouette. Normal pulmonary vascularity. Clearly improved aeration at the right base which is likely related to diminishing volume of right pleural effusion as well as waning atelectasis and or infiltrate. Improved aeration at the right base with persistent bilateral pleural effusions. CT CHEST W CONTRAST    Result Date: 11/22/2021  EXAMINATION: CT OF THE CHEST WITH CONTRAST 11/22/2021 10:17 am TECHNIQUE: CT of the chest was performed with the administration of intravenous contrast. Multiplanar reformatted images are provided for review. Dose modulation, iterative reconstruction, and/or weight based adjustment of the mA/kV was utilized to reduce the radiation dose to as low as reasonably achievable. COMPARISON: PA/lateral chest radiograph 11/22/2021; CT of the chest, with contrast 11/02/2021; PET/CT 07/12/2021 HISTORY: Dyspnea; reported history of multifocal right lower lobe squamous cell carcinoma FINDINGS: LUNGS/PLEURA: Given differences in technique, previously-existing sequela of reported multifocal right lower lobe squamous cell carcinoma are not significantly changed, again most significant along the medial right infrahilar region, including post-obstructive atelectasis at the medial right lung base by tumoral encasement of multiple traversing segmental/subsegmental airways. There are otherwise small dependent bibasilar pleural effusions, tracking into the pulmonary fissures, with mild adjacent subsegmental atelectasis versus infiltrate, all progressively lessening to both lung apices. These findings have overall minimally worsened since 11/02/2021.  CARDIOVASCULAR: When accounting for prominent admixture of non-opacified venous blood within the superior vena cava and right atrium/right ventricle, as well as predominant systemic arterial phase bolus timing, no gross pulmonary embolism is identified to at least the segmental level. There are unchanged post-surgical alterations of prior midline sternotomy for coronary artery bypass grafting, and mild/moderate cardiomegaly, although with minimal worsening of now mild/moderate central pulmonary vascular congestion. Previously-demonstrated/described ascending thoracic aortic ectasia, sequela of chronic pulmonary arterial hypertension, and atherosclerotic arterial disease are also not significantly changed. LYMPH NODES: Previously-demonstrated/described mediastinal lymphadenopathy has not significantly changed. Negative for pulmonary hilar nor axillary lymphadenopathy, bilaterally. LOWER NECK: Multiple, small, heterogeneously-hypodense nodules are again scattered about both thyroid lobes, measuring up to 2.3 cm in greatest dimension about the inferior left thyroid pole, ametabolic/hypometabolic on comparison PET/CT 07/12/2021, and otherwise not significantly changed. OTHER MEDIASTINUM: There is minimal scattered gaseous distension of the visualized esophagus. UPPER ABDOMEN: There is nonspecific minimal bilateral perinephric fat stranding, with other incidentally-imaged upper abdominal structures otherwise not significantly changed. MUSCULOSKELETAL: There are grossly-unchanged, chronic, multilevel, asymmetric vertebral compression deformities, related vertebral column curvature/alignment abnormalities, osseous degenerative disease, and background diffuse osteopenia. .     1. Minimal worsening of background congestive heart failure.  2.  Given differences in technique, previously-existing sequela of reported multifocal right lower lobe squamous cell carcinoma are not significantly changed, again most significant along the medial right infrahilar region, including post-obstructive atelectasis at the medial right lung base by tumoral encasement of multiple traversing segmental/subsegmental airways. 3.  Unchanged mediastinal lymphadenopathy. 4.  When accounting for prominent admixture of non-opacified venous blood within the superior vena cava and right atrium/right ventricle, as well as predominant systemic arterial phase bolus timing, no gross pulmonary embolism is identified to at least the segmental level. 5.  Otherwise, no significant change. Please see above comments. . RECOMMENDATIONS: 1. Recommend follow-up thoracic imaging, as directed clinically. Dexter Power Discharge Exam:    HEENT: NCAT,  PERRLA, No JVD  Heart:  RRR, no murmurs, gallops, or rubs. Lungs:  CTA bilaterally, no wheeze, rales or rhonchi  Abd: bowel sounds present, nontender, nondistended, no masses  Extrem:  No clubbing, cyanosis, or edema    Disposition: home     Patient Condition at Discharge: Stable    Patient Instructions:      Medication List        START taking these medications      furosemide 20 MG tablet  Commonly known as: Lasix  Take 1 tablet by mouth daily     predniSONE 20 MG tablet  Commonly known as: DELTASONE  Take 2 tablets by mouth daily for 7 days            CONTINUE taking these medications      apixaban 5 MG Tabs tablet  Commonly known as: Eliquis  TAKE 1 TABLET TWICE A DAY     Aspirin Low Dose 81 MG EC tablet  Generic drug: aspirin  TAKE 1 TABLET DAILY     Crestor 40 MG tablet  Generic drug: rosuvastatin     ipratropium-albuterol 0.5-2.5 (3) MG/3ML Soln nebulizer solution  Commonly known as: DUONEB  Inhale 3 mLs into the lungs every 4 hours     lisinopril 5 MG tablet  Commonly known as: PRINIVIL;ZESTRIL     * metoprolol succinate 100 MG extended release tablet  Commonly known as: TOPROL XL  TAKE 1 Daily     * metoprolol succinate 25 MG extended release tablet  Commonly known as:  Toprol XL  Take 1 tablet by mouth daily     nitroGLYCERIN 0.4 MG SL tablet  Commonly known as: Nitrostat  Place 1 tablet under the tongue every 5 minutes as needed for Chest pain     * ProAir  (90 Base) MCG/ACT inhaler  Generic drug: albuterol sulfate HFA     * albuterol (2.5 MG/3ML) 0.083% nebulizer solution  Commonly known as: PROVENTIL     Trelegy Ellipta 100-62.5-25 MCG/INH Aepb  Generic drug: fluticasone-umeclidin-vilant  Inhale 1 puff into the lungs daily     vitamin D 1.25 MG (01950 UT) Caps capsule  Commonly known as: ERGOCALCIFEROL           * This list has 4 medication(s) that are the same as other medications prescribed for you. Read the directions carefully, and ask your doctor or other care provider to review them with you. Where to Get Your Medications        These medications were sent to 2021 [address], [address] [place] [address]      Phone: [phone]   furosemide 20 MG tablet  predniSONE 20 MG tablet       Activity: activity as tolerated  Diet: regular diet    Pt has been advised to: Follow-up with Tierra Solis MD in 1 week. Follow-up with consultants as recommended by them    Note that over 30 minutes was spent in preparing discharge papers, discussing discharge with patient, medication review, etc.    Signed:  SIENNA Degroot CNP  11/24/2021  3:29 PM    Above note edited to reflect my thoughts     I personally saw, examined and provided care for the patient. Radiographs, labs and medication list were reviewed by me independently. The case was discussed in detail and plans for care were established. Review of MAY Degroot   , documentation was conducted and revisions were made as appropriate directly by me. I agree with the above documented exam, problem list, and plan of care.      Lilliam Brown MD  6:38 PM  11/24/2021

## 2021-11-24 NOTE — PROGRESS NOTES
Subjective: The patient is awake and alert. No acute events overnight. Denies chest pain, angina, SOB   Not requiring oxygen now     Objective:    /71   Pulse 100   Temp 97.9 °F (36.6 °C) (Oral)   Resp 20   Ht 5' 11\" (1.803 m)   Wt 290 lb (131.5 kg)   SpO2 93%   BMI 40.45 kg/m²     In: -   Out: 800   In: -   Out: 800 [Urine:800]    General appearance: NAD, conversant  HEENT: AT/NC, MMM  Neck: FROM, supple  Lungs: Clear to auscultation  CV: RRR, no MRGs  Vasc: Radial pulses 2+  Abdomen: Soft, non-tender; no masses or HSM  Extremities: No peripheral edema or digital cyanosis  Skin: no rash, lesions or ulcers  Psych: Alert and oriented to person, place and time  Neuro: Alert and interactive     Recent Labs     11/22/21  0914 11/23/21  0319   WBC 6.7 7.4   HGB 14.4 14.3   HCT 46.2 44.6    217       Recent Labs     11/22/21  0914 11/23/21  0319    137   K 3.7 4.3    100   CO2 27 27   BUN 15 18   CREATININE 1.0 1.0   CALCIUM 9.3 8.7       Assessment:    Principal Problem:    Acute on chronic diastolic (congestive) heart failure (HCC)  Resolved Problems:    * No resolved hospital problems. *      Plan:    27-year-old male with a significant past medical history of COPD, lung CA CAD, GERD admitted to telemetry unit with     Congestive heart failure- systolic v diastolic / plural effusions   - possible thoracentesis ? Per pulmonology   - Echo if not recently done 1/21 : ef 60%, lhv in march 2021 on stress   -Diurese cautiously so as to avoid renal insufficiency- monitor renal fxn   -BNP every other day -on admission 6,085  -Daily weights strict I's and O's  -Low-sodium diet  -Supplement O2 to maintain pulse ox duration greater than 93% wean as tolerated  -hem/Onc consulted by pulm   -Medications for other comorbidities continue as appropriate with dose adjustments as needed.       DVT Prophylaxis   PT/OT  Discharge planning       SIENNA Orr - CHELLE  12:29 PM  11/24/2021    Above note edited to reflect my thoughts     I personally saw, examined and provided care for the patient. Radiographs, labs and medication list were reviewed by me independently. The case was discussed in detail and plans for care were established. Review of MAY Escamilla   , documentation was conducted and revisions were made as appropriate directly by me. I agree with the above documented exam, problem list, and plan of care.      Baldemar Rodriguez MD  11/24/3021

## 2021-11-24 NOTE — CONSULTS
Patient currently admitted with diagnosis of Diastolic heart failure. Patient was awake and alert, sitting at the side of the bed during the consultation. He was engaged and asked appropriate questions throughout the education session. He is agreeable to heart failure education. We reviewed the introduction to Heart Failure, the HF zones, signs and symptoms to report on day 1 of onset, medications, medication compliance, the importance of obtaining daily weights, following a low sodium diet, reading food labels for the sodium content, keeping physician appointments, and smoking cessation. We discussed writing / tracking daily weights on a calendar / log. Contributing risk factors for Heart Failure are identified as shortness of breath. I advised patient they can reduce the risk for Heart Failure exacerbations by modifying / controlling the risk factors. We discussed self-managed care which includes the following:  to take medications as prescribed, report any intolerable side effects of medications to the cardiologist / doctor, do not just stop taking the medication; follow a cardiac heart healthy / low sodium diet; weigh yourself daily, exercise regularly- per doctor recommendation and not to smoke or use an excess amount of alcohol. We discussed calling the cardiologist / doctor with a weight gain of 3 pounds in one day or a total of 5 pounds or more in one week. Also, if you should have a significant weight loss of 3# or more in one day to call the doctor, they may need to decrease or hold the diuretic dose. On days you feels nauseated and not eating / drinking, having emesis or diarrhea,  informed to call the cardiologist  / doctor, they may need to decrease or hold diuretic to avoid dehydration. I stressed the importance of informing their cardiologist the first day of onset of any of the signs and symptoms in the \"Yellow Zone\" of the HF Zones. Patient verbalizes understanding. Greater than 30 minutes was spent educating patient. BNP:   Lab Results   Component Value Date    PROBNP 6,085 (H) 11/22/2021     History of:    has a past medical history of A-fib ([facility] [address] Acute on chronic diastolic (congestive) heart failure ([facility] [address] Blood transfusion reaction, CAD (coronary artery disease), COPD (chronic obstructive pulmonary disease) ([facility] [address] GERD (gastroesophageal reflux disease), History of blood transfusion, History of cardioversion, Hyperlipidemia, Hypertension, Malignant neoplasm of lung ([facility] [address] and Sleep apnea. has a past surgical history that includes Diagnostic Cardiac Cath Lab Procedure (04/17/2007); Coronary artery bypass graft (04/18/2007); ECHO Compl W Dop Color Flow (03/22/2012); Coronary angioplasty with stent (03/20/2014); transesophageal echocardiogram (02/28/2020); bronchoscopy (N/A, 06/04/2020); bronchoscopy (06/04/2020); bronchoscopy (06/04/2020); bronchoscopy (06/04/2020); bronchoscopy (06/04/2020); bronchoscopy (06/04/2020); and Cardioversion (05/14/2021). Medications:    methylPREDNISolone  40 mg IntraVENous Q8H    [Held by provider] apixaban  5 mg Oral BID    aspirin  81 mg Oral Daily    rosuvastatin  40 mg Oral Daily    lisinopril  5 mg Oral Daily    sodium chloride flush  5-40 mL IntraVENous 2 times per day    furosemide  40 mg IntraVENous 2 times per day    Arformoterol Tartrate  15 mcg Nebulization BID    budesonide  250 mcg Nebulization BID    ipratropium  0.5 mg Nebulization TID    metoprolol succinate  125 mg Oral Daily      sodium chloride       Code Status:Full Code    The patient is ordered:  Diet: ADULT DIET; Regular;  Low Sodium (2 gm)   Sodium controlled diet Yes  Fluid restriction daily ordered (fluid restriction recommended if patient is hyponatremic and/or diuretic is initiated or increased) No  FR:   Daily Weights:   Patient Vitals for the past 96 hrs (Last 3 readings):   Weight   11/24/21 0600 290 lb (131.5 kg)   11/23/21 0815 292 lb 3.2 oz (132.5 kg)   11/22/21 1645 295 lb (133.8 kg)     I/O:     Intake/Output Summary (Last 24 hours) at 11/24/2021 1017  Last data filed at 11/24/2021 0212  Gross per 24 hour   Intake --   Output 800 ml   Net -800 ml      The Heart Failure Booklet given to the patient with additional patient education addressing:  · What is Heart Failure? · Things You Can Do to Live Well with HF  · How to Take Your Medications  · How to Eat Less Salt  · Exercising Well with Heart Failure  · Signs and symptoms of HF to report  · Weight Yourself Each Day  · Home Self Management- activity, weight tracking, taking medications as prescribed, meals /diet planning (sodium and fluid restriction), how to read food labels, keeping physician follow ups, smoking cessation, follow the Heart Failure Zones  · The Heart Failure zones  · Sodium content pamphlet  · What foods I should avoid tip sheet    Instructed  to call 911 if you have any of the following symptoms:    ·    Struggling to breathe unrelieved with rest,  ·    Having chest pain, confusion or can't think clearly  ·    Have confusion or cant think clearly    Readmission Risk Score: 13.6 ( )    Discharge Plan:  I placed the Heart Failure Home Instructions in patient's discharge instructions. Per Heart Failure GWTG, the patient should have a follow-up appointment made within 7 days of discharge. Freda Gu RN BSN   Heart Failure Navigator    CONGESTIVE HEART FAILURE (CHF) GUIDELINES  (Must be completed for Primary Diagnosis CHF or History of CHF)    Type of CHF:    [] Acute   [] Chronic     [x] Acute on Chronic     Ventricular Function Assessment (check):       [x] HFpEF  [] HFpEF, borderline  [] HFpEF, improved  [] HFrEF    Echocardiogram: 01/16/2021  Ejection Fraction (%):  EF 60%     Technically suboptimal and limited study. Left ventricular internal dimensions were normal in diastole and systole. Mild left ventricular concentric hypertrophy noted.    Normal left ventricular ejection fraction. Septal motion consistent with post cardiac surgery. The left atrium is borderline dilated. Right ventricle global systolic function is reduced . Mildly enlarged right atrium size. Mild thickening of the mitral valve leaflets. Moderate mitral annular calcification potentially consistent with repair. Mild to moderate mitral regurgitation is present. The aortic valve appears mildly sclerotic. Mild aortic regurgitation is noted. Mild tricuspid regurgitation. Mildly dilated aortic root. Dilation of the ascending aorta. There is a trivial circumferential pericardial effusion noted. Angiotensin-Converting-Enzyme (ACE) inhibitor ordered:  [x] Yes  [] No (specify contraindication):  [] Renal Insufficiency  [] Cough  [] Hypotension  [] Allergy/angioedema  [] No left ventricular systolic dysfunction (LVSD)  [] Hyperkalemia  [] Moderate to severe aortic stenosis  [] Other (Specify):     Angiotensin II receptor blockers (ARB) ordered:  [] Yes  [x] No (specify contraindication):  [] Renal Insufficiency  [] Hypotension  [] Allergy/angioedema  [] No LVSD  [] Hyperkalemia  [] Moderate to severe aortic stenosis  [] Other (Specify):    ARNI - Angiotensin Receptor Neprilysin Inhibitor ordered:  [] Yes  [x] No    ACC/AHA Guidelines Beta Blocker (Carvedilol, Metoprolol Succinate, or Bisoprolol) ordered:    [x] Yes- Toprol XL    [] No (specify contraindication):  [] Bradycardia  [] Hypotension  [] LVD  [] 2nd or 3rd degree heart block  [] Bronchospastic airway disease  [] Decompensated CHF  [] Other (Specify):

## 2021-11-24 NOTE — PROGRESS NOTES
Mel Henderson M.D.,[facility] [place]  Elda Bonilla D.O., F.A.C.O.I., Bharathi Melara M.D. Rubia Antonio M.D. [place] D.O. Daily Pulmonary Progress Note    Patient:  Linsey Sexton 62 y.o. male MRN: [id number]     Date of Service: 11/24/2021      Synopsis     We are following patient for patient request    \"CC\" shortness of breath    Code status: Full      Subjective      Patient was seen and examined. 2D echo completed at bedside. Remains on room air. Using NIV for respiratory support at night but would like his settings adjusted. Review of Systems:  Constitutional: Denies fever, weight loss, night sweats, and fatigue  Skin: Denies pigmentation, dark lesions, and rashes   HEENT: Denies hearing loss, tinnitus, ear drainage, epistaxis, sore throat, and hoarseness. Cardiovascular: Denies palpitations, chest pain, and chest pressure.   Respiratory: Occasional cough not productive of sputum, dyspnea worse with exertion  Gastrointestinal: Denies nausea, vomiting, poor appetite, diarrhea, heartburn or reflux  Genitourinary: Denies dysuria, frequency, urgency or hematuria  Musculoskeletal: Denies myalgias, muscle weakness, and bone pain  Neurological: Denies dizziness, vertigo, headache, and focal weakness  Psychological: Denies anxiety and depression  Endocrine: Denies heat intolerance and cold intolerance  Hematopoietic/Lymphatic: Denies bleeding problems and blood transfusions    24-hour events:  2D echo completed    Objective   Vitals: /71   Pulse 100   Temp 97.9 °F (36.6 °C) (Oral)   Resp 20   Ht 5' 11\" (1.803 m)   Wt 290 lb (131.5 kg)   SpO2 93%   BMI 40.45 kg/m²     I/O:    Intake/Output Summary (Last 24 hours) at 11/24/2021 1430  Last data filed at 11/24/2021 0212  Gross per 24 hour   Intake --   Output 800 ml   Net -800 ml       Vent Information  Skin Assessment: Clean, dry, & intact  FiO2 :  (RA)  SpO2: 93 %  Mask Type: Full face mask  Mask Size: Large       IPAP: 15 cmH20  CPAP/EPAP: 8 cmH2O     CURRENT MEDS :  Scheduled Meds:   methylPREDNISolone  40 mg IntraVENous Q8H    [Held by provider] apixaban  5 mg Oral BID    aspirin  81 mg Oral Daily    rosuvastatin  40 mg Oral Daily    lisinopril  5 mg Oral Daily    sodium chloride flush  5-40 mL IntraVENous 2 times per day    furosemide  40 mg IntraVENous 2 times per day    Arformoterol Tartrate  15 mcg Nebulization BID    budesonide  250 mcg Nebulization BID    ipratropium  0.5 mg Nebulization TID    metoprolol succinate  125 mg Oral Daily       Physical Exam:  General Appearance: appears comfortable in no acute distress. HEENT: Normocephalic atraumatic without obvious abnormality   Neck: Lips, mucosa, and tongue normal.  Supple, symmetrical, trachea midline, no adenopathy;thyroid:  no enlargement/tenderness/nodules or JVD. Lung: Breath sounds CTA. Respirations   unlabored. Symmetrical expansion. No active wheezing  Heart: Irregular A. fib. No MRG  Abdomen: Soft, NT, ND. BS present x 4 quadrants. No bruit or organomegaly. Extremities: Pedal pulses 2+ symmetric b/l. Extremities normal, no cyanosis, clubbing, trace lower extremity edema  Musculokeletal: No joint swelling, no muscle tenderness. ROM normal in all joints of extremities. Neurologic: Mental status: Alert and Oriented X3 .     Pertinent/ New Labs and Imaging Studies     Imaging Personally Reviewed:  CXR 11/22  Improved aeration at the right base with persistent bilateral pleural   effusions.         CT chest W contrast 11/22  1.  Minimal worsening of background congestive heart failure.       2.  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       3.  Unchanged mediastinal lymphadenopathy.       4.  When accounting for prominent admixture of non-opacified venous blood   within the superior vena cava and right atrium/right ventricle, as well as   predominant systemic arterial phase bolus timing, no gross pulmonary embolism   is identified to at least the segmental level.       5.  Otherwise, no significant change.  Please see above comments.         Echo 1/16/2021   Technically suboptimal and limited study.   Left ventricular internal dimensions were normal in diastole and systole.   Mild left ventricular concentric hypertrophy noted.   Normal left ventricular ejection fraction.   Septal motion consistent with post cardiac surgery.   The left atrium is borderline dilated.   Right ventricle global systolic function is reduced .   Mildly enlarged right atrium size.   Mild thickening of the mitral valve leaflets.   Moderate mitral annular calcification potentially consistent with repair.   Mild to moderate mitral regurgitation is present.   The aortic valve appears mildly sclerotic.   Mild aortic regurgitation is noted.   Mild tricuspid regurgitation.   SUVFFJ dilated aortic root.   Dilation of the ascending aorta.   There is a trivial circumferential pericardial effusion noted.      Signature       Labs:  Lab Results   Component Value Date    WBC 7.4 11/23/2021    HGB 14.3 11/23/2021    HCT 44.6 11/23/2021    MCV 85.4 11/23/2021    MCH 27.4 11/23/2021    MCHC 32.1 11/23/2021    RDW 15.1 11/23/2021     11/23/2021    MPV 10.0 11/23/2021     Lab Results   Component Value Date     11/23/2021    K 4.3 11/23/2021     11/23/2021    CO2 27 11/23/2021    BUN 18 11/23/2021    CREATININE 1.0 11/23/2021    LABALBU 3.2 11/23/2021    LABALBU 4.7 06/03/2011    CALCIUM 8.7 11/23/2021    GFRAA >60 11/23/2021    LABGLOM >60 11/23/2021     Lab Results   Component Value Date    PROTIME 14.1 05/13/2021    INR 1.3 05/13/2021     Recent Labs 11/22/21  0914   PROBNP 6,085*     No results for input(s): PROCAL in the last 72 hours. This SmartLink has not been configured with any valid records. Micro:  No results for input(s): CULTRESP in the last 72 hours. No results for input(s): LABGRAM in the last 72 hours. No results for input(s): LEGUR in the last 72 hours. No results for input(s): STREPNEUMAGU in the last 72 hours. No results for input(s): LP1UAG in the last 72 hours. Assessment:    1. Acute on chronic respiratory failure with hypoxia  2. COPD with mild acute exacerbation  3. Obstructive sleep apnea on home BiPAP  4. Recurrent right pleural effusion history of thoracentesis February 2021 cytology negative.  Thoracentesis 8/14/21 with 1500 cc of bloody fluid removed fluid exudate, cytology negative for malignant cells   5. Squamous cell cancer right lower lung.  Status post SBRT.  PET scan 7/12/2021 with progression of disease within the chest increased size of nodule in the medial right lower lobe and scattered lymphadenopathy.  Poor surgical candidate.  Started on Opdivo/Yervoy treatment  6. CAD  7. Hypertension  8. Mild to moderate MR, preserved EF  9. 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 Permanent A. Fib, on Eliquis  11. Hyperlipidemia  12. Morbid obesity BMI 40.8      Plan:     13. Monitor off oxygen   14. Bipap 18/15. He follows with Dr Mendoza Setting his PCP manages his ANTONIO as outpatient. Dec 13 2019 he had sleep study [facility]  15. Obtain respiratory culture if able to produce specimen  16. CT chest reviewed, moderate left and minimal right pleural effusion noted, cardiomegaly   17. Repeat cxr in am  -Hold Eliquis for possible need for thoracentesis  18. Continue diuresis Lasix 40 mg IV twice daily   19. 2D echo completed at bedside  20. Solu-Medrol 40 mg IV every 8 hours tapered to every 12 hours today  21. Hematology oncology following   22.  DVT, GI prophylaxis    This plan of care was reviewed in collaboration with Dr. Bartolo Mckeon  Electronically signed by SIENNA Champagne CNP on 11/24/2021 at 2:30 PM          I personally saw, examined, and cared for the patient. Labs, medications, radiographs reviewed. I agree with history exam and plans detailed in NP note. Can be discharged from pulmonary standpoint. Outpatient follow-up with chest x-ray in couple weeks.       Nick Desouza, DO

## 2021-11-24 NOTE — CARE COORDINATION
Iv lasix bid; iv sm;room air; hemonc/pulm now following; -800cc; chf educator to see. May need thoracentesis ; eliquis on hold. Plan is home at d/c , no needs. Vinson Spurling.

## 2021-11-25 NOTE — PROGRESS NOTES
Physician Progress Note      Rogelio Fernandes  CSN #:                  [id number]  :                       1960  ADMIT DATE:       2021 9:02 AM  DISCH DATE:        2021 6:09 PM  RESPONDING  PROVIDER #:        Khadijah MAXWELL DO          QUERY TEXT:    Patient admitted with COPD exacerbation. Noted documentation of acute   respiratory failure in Pulmonology consult on . In order to support the   diagnosis of acute respiratory failure, please include additional clinical   indicators in your documentation. Or please document if the diagnosis of   acute respiratory failure has been ruled out after further study. The medical record reflects the following:  Risk Factors: COPD, CHF  Clinical Indicators: RR 16-21, Pulse ox 89% on room air, per nursing notes   respirations regular, unlabored per ER note \". [place] Crape Cecily Crape The patient has diffuse   intermittent wheezing occasionally. No prolonged expiratory phase. Patient   speaking in full sentences. He is on nasal cannula and in no respiratory   distress. [place] Crape [place] Crape \", and per Pulmonology consult \". [place] Crape Cecily Crape The patient is sitting in chair   comfortably without any distress. Breathing is not labored. [place] Crape Cecily Crape Respiratory:   faint wheeze. Air entry is symmetric. Cecily Crape Cecily Crape \"  Treatment: IV Solumedrol, IV Lasix, Pulmicort, Trelegy, Atrovent, Duoneb    Acute Respiratory Failure Clinical Indicators per 3[employer]-DRG Training Guide and   Quick Reference Guide:  pO2 < 60 mmHg or SpO2 (pulse oximetry) < 91% breathing room air  pCO2 > 50 and pH < 7.35  P/F ratio (pO2 / FIO2) < 300  pO2 decrease or pCO2 increase by 10 mmHg from baseline (if known)  Supplemental oxygen of 40% or more  Presence of respiratory distress, tachypnea, dyspnea, shortness of breath  Unable to speak in complete sentences  Use of accessory muscles to breathe  Extreme anxiety and feeling of impending doom  Tripod position  Confusion/altered mental status/obtunded    Thank you,  Anum TORREN, RN, CDIS  Clinical Documentation Improvement  [email or web address]  [phone]  Options provided:  -- Acute Respiratory Failure as evidenced by, Please document evidence. -- Acute Respiratory Failure ruled out after study  -- Acute Respiratory Failure ruled out after study and Chronic Respiratory   Failure confirmed  -- Other - I will add my own diagnosis  -- Disagree - Not applicable / Not valid  -- Disagree - Clinically unable to determine / Unknown  -- Refer to Clinical Documentation Reviewer    PROVIDER RESPONSE TEXT:    Provider is clinically unable to determine a response to this query.     Query created by: Bj Charles on 11/24/2021 10:18 AM      Electronically signed by:  Jeramy Connor DO 11/25/2021 8:31 AM

## 2021-11-26 LAB
CULTURE, RESPIRATORY: NORMAL
EKG ATRIAL RATE: 70 BPM
EKG P AXIS: 32 DEGREES
EKG Q-T INTERVAL: 430 MS
EKG QRS DURATION: 142 MS
EKG QTC CALCULATION (BAZETT): 523 MS
EKG R AXIS: 35 DEGREES
EKG T AXIS: 17 DEGREES
EKG VENTRICULAR RATE: 89 BPM
SMEAR, RESPIRATORY: NORMAL

## 2021-12-20 ENCOUNTER — OFFICE VISIT (OUTPATIENT)
Dept: CARDIOLOGY CLINIC | Age: 61
End: 2021-12-20
Payer: COMMERCIAL

## 2021-12-20 VITALS
DIASTOLIC BLOOD PRESSURE: 68 MMHG | WEIGHT: 276 LBS | OXYGEN SATURATION: 91 % | RESPIRATION RATE: 28 BRPM | SYSTOLIC BLOOD PRESSURE: 110 MMHG | HEART RATE: 110 BPM | HEIGHT: 71 IN | BODY MASS INDEX: 38.64 KG/M2

## 2021-12-20 DIAGNOSIS — I50.32 CHRONIC HEART FAILURE WITH PRESERVED EJECTION FRACTION (HCC): Primary | ICD-10-CM

## 2021-12-20 DIAGNOSIS — J96.01 ACUTE RESPIRATORY FAILURE WITH HYPOXIA (HCC): ICD-10-CM

## 2021-12-20 PROCEDURE — 99214 OFFICE O/P EST MOD 30 MIN: CPT | Performed by: NURSE PRACTITIONER

## 2021-12-20 PROCEDURE — 93000 ELECTROCARDIOGRAM COMPLETE: CPT | Performed by: INTERNAL MEDICINE

## 2021-12-20 RX ORDER — AFATINIB 40 MG/1
TABLET, FILM COATED ORAL DAILY
COMMUNITY
End: 2022-08-03 | Stop reason: ALTCHOICE

## 2021-12-20 RX ORDER — METOPROLOL SUCCINATE 100 MG/1
100 TABLET, EXTENDED RELEASE ORAL 2 TIMES DAILY
Qty: 180 TABLET | Refills: 3 | Status: SHIPPED
Start: 2021-12-20 | End: 2022-11-01

## 2021-12-20 RX ORDER — FUROSEMIDE 20 MG/1
20 TABLET ORAL DAILY
Qty: 30 TABLET | Refills: 3 | Status: SHIPPED
Start: 2021-12-20 | End: 2022-08-12 | Stop reason: SDUPTHER

## 2021-12-20 NOTE — PROGRESS NOTES
[facility]  Office Visit         Reason for Visit: Heart Failure    Primary Cardiologist: Dr. Salvatore Adames         History of Present Illness:     Mr. Tamara Dave is a 62year old male with a PMH of CAD, PAF, chronic HFpEF, severe COPD with hx of lung ca, ANTONIO, obesity, HTN, HLD. He recently presented to the emergency room with complaints of increased shortness of breath. He was admitted to the hospital for acute on chronic hypoxic respiratory failure in the setting of COPD exacerbation and recurrent pleural effusions. He was treated with IV steroids and IV diuretics. During hospitalization he underwent TTE that demonstrated LVEF 45-50%, severe LVH, mild RV dysfunction, mild-moderate MR. He subjectively improved and was discharged home. He presents today in post hospital follow-up, since discharge from the hospital he has been compliant with his current cardiac medications. He reports good urinary response to oral diuretic with furosemide. He is monitoring his diet for sodium content and staying well-hydrated. He has had improvement in his shortness of breath and denies orthopnea, PND, abdominal bloating or lower extremity edema. He denies any dizziness, lightheadedness, near-syncope, syncope, chest pain, pressure, heaviness, palpitations or strokelike symptoms.       Patient Active Problem List    Diagnosis Date Noted    Acute on chronic diastolic (congestive) heart failure (HCC) 11/22/2021    Pleural effusion on right 08/11/2021    ANTONIO (obstructive sleep apnea) 08/11/2021    Former smoker 08/11/2021    AF (atrial fibrillation) ([address] 05/12/2021    Acute respiratory failure with hypoxia ([address] 01/15/2021    Malignant neoplasm of lung ([address]     Essential (primary) hypertension 07/07/2020    Gastro-esophageal reflux disease without esophagitis 07/07/2020    Unspecified atrial fibrillation ([address] 07/07/2020    Hyperlipidemia, unspecified 07/07/2020    Chest pain 09/27/2019    CAD (coronary artery disease)      A. Acute inferior MI (4/17/07). B. Cardiac catheterization (4/17/07):   LAD - proximal 85% bifurcating with first diagonal  D1 - ostial 50%  Cx - prox to mid 40%  OM1 - ostial 90%  RCA - prox diffuse 99% with heavy thrombus  C. Successful thrombectomy.   D. Coronary artery bypass surgery by Dr. Telma Perez (4/18/07)  BRIONES to LAD  SVG to D1  SVG to PDA    Cath 3-14  70% Cx - 3.5 x 18 mm Resolute ELENA      COPD (chronic obstructive pulmonary disease) (HCC)          Past Medical History:   Diagnosis Date    A-fib ([address]     Acute on chronic diastolic (congestive) heart failure ([address] 11/22/2021    Blood transfusion reaction     CAD (coronary artery disease)     COPD (chronic obstructive pulmonary disease) (HCC)     GERD (gastroesophageal reflux disease)     History of blood transfusion     History of cardioversion 04/10/2020    Dr Alisson Dale    Hyperlipidemia     Hypertension     [facility]     Sleep apnea        Past Surgical History:   Procedure Laterality Date    BRONCHOSCOPY N/A 06/04/2020    BRONCHOSCOPY  NAVIGATIONAL performed by Alejandro Bath, DO at [address] [place] [address]  06/04/2020    BRONCHOSCOPY/TRANSBRONCHIAL NEEDLE BIOPSY performed by [facility], DO at [address] [place] [address]  06/04/2020    BRONCHOSCOPY BIOPSY BRONCHUS performed by [facility], DO at [address] [place] [address]  06/04/2020    BRONCHOSCOPY BRUSHINGS performed by [facility], DO at [address] [place] [address]  06/04/2020    BRONCHOSCOPY W/EBUS FNA performed by [facility], DO at [address] [place] [address]  06/04/2020    BRONCHOSCOPY ALVEOLAR LAVAGE performed by [facility], DO at [address]  05/14/2021    successful   Dr Tina Art  03/20/2014    3.5/18 Resolute to Mid-Cx    CORONARY ARTERY BYPASS GRAFT  04/18/2007    DIAGNOSTIC CARDIAC CATH LAB PROCEDURE  04/17/2007    ECHO COMPL W DOP COLOR FLOW  03/22/2012         TRANSESOPHAGEAL ECHOCARDIOGRAM  02/28/2020    angela         Allergies   Allergen Reactions    Zocor [Simvastatin - High Dose] Other (See Comments)     Causes Rhabdomyolysis         Outpatient Medications Marked as Taking for the 12/20/21 encounter (Office Visit) with SIENNA Banerjee CNP   Medication Sig Dispense Refill    Afatinib Dimaleate (GILOTRIF) 40 MG TABS Take by mouth daily      furosemide (LASIX) 20 MG tablet Take 1 tablet by mouth daily 30 tablet 0    metoprolol succinate (TOPROL XL) 25 MG extended release tablet Take 1 tablet by mouth daily 90 tablet 3    metoprolol succinate (TOPROL XL) 100 MG extended release tablet TAKE 1 Daily 180 tablet 3    apixaban (ELIQUIS) 5 MG TABS tablet TAKE 1 TABLET TWICE A  tablet 3    ASPIRIN LOW DOSE 81 MG EC tablet TAKE 1 TABLET DAILY 90 tablet 3    lisinopril (PRINIVIL;ZESTRIL) 5 MG tablet Take 5 mg by mouth daily      albuterol (PROVENTIL) (2.5 MG/3ML) 0.083% nebulizer solution Take 0.083 mLs by nebulization 4 times daily as needed      fluticasone-umeclidin-vilant (TRELEGY ELLIPTA) 100-62.5-25 MCG/INH AEPB Inhale 1 puff into the lungs daily 1 each 5    PROAIR  (90 Base) MCG/ACT inhaler INHALE TWO PUFFS BY MOUTH EVERY 4 TO 6 HOURS AS NEEDED      ipratropium-albuterol (DUONEB) 0.5-2.5 (3) MG/3ML SOLN nebulizer solution Inhale 3 mLs into the lungs every 4 hours 360 mL 0    nitroGLYCERIN (NITROSTAT) 0.4 MG SL tablet Place 1 tablet under the tongue every 5 minutes as needed for Chest pain 25 tablet 0    CRESTOR 40 MG tablet 40 mg daily       vitamin D (ERGOCALCIFEROL) 65172 UNITS CAPS capsule Take 50,000 Units by mouth once a week mondays           Review of Systems:   Cardiac: As per HPI  General: No fever, chills, rigors  Pulmonary: As per HPI  HEENT: No visual disturbances, difficult swallowing  GI: No nausea, vomiting, abdominal pain  : No dysuria or hematuria  Endocrine: No thyroid disease or diabetes  Musculoskeletal: LANDERS x 4, no focal motor deficits  Skin: Intact, no rashes  Neuro/Psych: No headache or seizures        Weights: Wt Readings from Last 3 Encounters:   12/20/21 276 lb (125.2 kg)   11/24/21 290 lb (131.5 kg)   10/22/21 284 lb 6.4 oz (129 kg)         Physical Examination:     /68   Pulse 110   Resp 28   Ht 5' 11\" (1.803 m)   Wt 276 lb (125.2 kg)   SpO2 91%   BMI 38.49 kg/m²     CONSTITUTIONAL: Alert and oriented times 3, no acute distress and cooperative to examination with proper mood and affect. SKIN: Skin color, texture, turgor normal. No rashes or lesions. LYMPH: no cervical nodes, no inguinal nodes  HEENT: Head is normocephalic, atraumatic. EOMI, PERRLA. NECK: Supple, symmetrical, trachea midline, no adenopathy, thyroid symmetric, not enlarged and no tenderness, skin normal.  CHEST/LUNGS: chest symmetric with normal A/P diameter, normal respiratory rate and rhythm, lungs clear to auscultation without wheezes, rales or rhonchi. No accessory muscle use. Scars None   CARDIOVASCULAR: Heart sounds are normal.  Regular rate and rhythm without murmur, gallop or rub. Normal S1 and S2. . Carotid and femoral pulses 2+/4 and equal bilaterally. ABDOMEN: Normal shape. No and Laparoscopic scar(s) present. Normal bowel sounds. No bruits. soft, nondistended, no masses or organomegaly. no evidence of hernia. Percussion: Normal without hepatosplenomegally. Tenderness: absent. RECTAL: deferred, not clinically indicated  NEUROLOGIC: There are no focalizing motor or sensory deficits. CN II-XII are grossly intact. Tr Rylan EXTREMITIES: no cyanosis, no clubbing and no edema. Warm and well perfused      All the following diagnostics were personally reviewed and interpreted by me.        LAB DATA:     11/23/2021 03:19   Sodium 137   Potassium 4.3   Chloride 100   CO2 27   BUN 18   Creatinine 1.0   Anion Gap 10   GFR Non- >60   GFR African American >60   Glucose 130 (H)   Calcium 8. 7   Total Protein 6.1 (L)   Albumin 3.2 (L)   Alk Phos 83   ALT 10   AST 16   Bilirubin 0.5   WBC 7.4   RBC 5.22   Hemoglobin Quant 14.3   Hematocrit 44.6   MCV 85.4   MCH 27.4   MCHC 32.1   MPV 10.0   RDW 15.1 (H)   Platelet Count 704       IMAGING:    CT Chest (11/22/2021)  Impression  1.  Minimal worsening of background congestive heart failure. 2.  Given differences in technique, previously-existing sequela of reported  multifocal right lower lobe squamous cell carcinoma are not significantly  changed, again most significant along the medial right infrahilar region,  including post-obstructive atelectasis at the medial right lung base by  tumoral encasement of multiple traversing segmental/subsegmental airways. 3.  Unchanged mediastinal lymphadenopathy. 4.  When accounting for prominent admixture of non-opacified venous blood  within the superior vena cava and right atrium/right ventricle, as well as  predominant systemic arterial phase bolus timing, no gross pulmonary embolism  is identified to at least the segmental level. 5.  Otherwise, no significant change.  Please see above comments. CARDIAC TESTING:    TTE (11/24/2021)   Summary:   Normal left ventricular chamber size. Visually estimated LVEF is 45-50 %. Mild global systolic dysfunction. Atrial fibrillation affects the accuracy   of interpretation. Diastolic pattern consistent with atrial fibrillation. Severe concentric LVH. The RV is mildly dilated with mild dysfunction. Normal left atrium. Mildly enlarged right atrium size. Mild-moderate mitral regurgitation is present. Moderate pulmonary hypertension with a PASP of 51 mm Hg. Mildly dilated aortic root and ascending aorta. No comparison study available. EKG  Atrial fibrillation   Right bundle branch block. Old inferior infarct. ASSESSMENT:  1. Chronic HFmrEF (EF 45-50%)  2. ACC stage C / NYHA class III  3. Euvolemic   4.  CAD s/p CABG x 3 (4/2007) LIMA-LAD, SVG-D1, SVG-PDA. ELENA LCX (3/2014)  5. Chronic atrial fibrillation, rate 110 today - OAC with Eliquis   6. HTN  7. HLD - on statin therapy   8. COPD with former tobacco abuse  9. Lung Ca  10. ANTONIO  11. GERD  12. Obesity       PLAN:  1. Increase Toprol (metoprolol succinate) to 100 mg twice daily     2. Continue rest of current cardiac medications     3. Referral to CHF clinic [facility]) in 2 weeks     4. Follow up with Dr. Dwain Jackson in 1-2 months    5. Weigh yourself daily    -Stay Hydrated    -Diet should sodium restricted to 2 grams    -Again watch your daily weight trends and if you gain water weight please follow below instructions.    -If you gain 3-5 pounds in 2-3 days OR notice that you are retaining fluid in anyway just like you did before then take an extra dose of your water pill (furosemide/Lasix) every day until you lose the weight or feel better.     -If you notice that you have taken more than 2 extra doses in 1 week then please call and let us know. -If at any time you feel that you are retaining fluid, your medications are not working, or you feel ill in anyway, then please call us for either same day appointment or the next day, and for instructions. Our goal is to keep you out of the emergency room and the hospital and we have ways to do it. You just need to call us in a timely manner.     -If you become sick for other reasons, and notice that you are not urinating as much, the urine is very dark, you have significant diarrhea or vomiting, then please DO NOT take your water pill and CALL US immediately.     Eilleen Reason APRN-CNP  [facility]

## 2021-12-20 NOTE — PATIENT INSTRUCTIONS
1. Increase Toprol (metoprolol succinate) to 100 mg twice daily     2. Continue rest of current cardiac medications     3. Referral to CHF clinic [facility]) in 2 weeks     4. Follow up with Dr. Sanchez Nascimento in 1-2 months    5. Weigh yourself daily    -Stay Hydrated    -Diet should sodium restricted to 2 grams    -Again watch your daily weight trends and if you gain water weight please follow below instructions.    -If you gain 3-5 pounds in 2-3 days OR notice that you are retaining fluid in anyway just like you did before then take an extra dose of your water pill (furosemide/Lasix) every day until you lose the weight or feel better.     -If you notice that you have taken more than 2 extra doses in 1 week then please call and let us know. -If at any time you feel that you are retaining fluid, your medications are not working, or you feel ill in anyway, then please call us for either same day appointment or the next day, and for instructions. Our goal is to keep you out of the emergency room and the hospital and we have ways to do it. You just need to call us in a timely manner.     -If you become sick for other reasons, and notice that you are not urinating as much, the urine is very dark, you have significant diarrhea or vomiting, then please DO NOT take your water pill and CALL US immediately.

## 2021-12-21 ENCOUNTER — TELEPHONE (OUTPATIENT)
Dept: OTHER | Age: 61
End: 2021-12-21

## 2022-01-14 ENCOUNTER — HOSPITAL ENCOUNTER (OUTPATIENT)
Dept: OTHER | Age: 62
Setting detail: THERAPIES SERIES
Discharge: HOME | End: 2022-01-14
Payer: COMMERCIAL

## 2022-01-14 VITALS
BODY MASS INDEX: 38.35 KG/M2 | WEIGHT: 275 LBS | HEART RATE: 80 BPM | OXYGEN SATURATION: 96 % | RESPIRATION RATE: 18 BRPM | DIASTOLIC BLOOD PRESSURE: 77 MMHG | SYSTOLIC BLOOD PRESSURE: 134 MMHG

## 2022-01-14 LAB
ANION GAP SERPL CALCULATED.3IONS-SCNC: 11 MMOL/L (ref 7–16)
BUN BLDV-MCNC: 12 MG/DL (ref 6–23)
CALCIUM SERPL-MCNC: 9 MG/DL (ref 8.6–10.2)
CHLORIDE BLD-SCNC: 101 MMOL/L (ref 98–107)
CO2: 26 MMOL/L (ref 22–29)
CREAT SERPL-MCNC: 0.9 MG/DL (ref 0.7–1.2)
GFR AFRICAN AMERICAN: >60
GFR NON-AFRICAN AMERICAN: >60 ML/MIN/1.73
GLUCOSE BLD-MCNC: 91 MG/DL (ref 74–99)
POTASSIUM SERPL-SCNC: 3.4 MMOL/L (ref 3.5–5)
PRO-BNP: 1571 PG/ML (ref 0–125)
SODIUM BLD-SCNC: 138 MMOL/L (ref 132–146)

## 2022-01-14 PROCEDURE — 99204 OFFICE O/P NEW MOD 45 MIN: CPT

## 2022-01-14 PROCEDURE — 36415 COLL VENOUS BLD VENIPUNCTURE: CPT

## 2022-01-14 PROCEDURE — 80048 BASIC METABOLIC PNL TOTAL CA: CPT

## 2022-01-14 PROCEDURE — 83880 ASSAY OF NATRIURETIC PEPTIDE: CPT

## 2022-01-14 NOTE — PROGRESS NOTES
Congestive Heart Failure [address] [facility] A Dwain Polanco   1960          Referring Provider: ROSANNE Cedillo NP  Primary Care Physician: Dr Kary Lopez  Cardiologist: Dr Dante Huang  Nephrologist: N/A        History of Present Illness:     Jase Tran is a 62 y.o. male with a history of HFrEF, most recent EF 15-20% on 8-11-21. Patient Story:    He does  Have slight dyspnea with exertion, shortness of breath, or decline in overall functional capacity. He does not have orthopnea, PND, nocturnal cough or hemoptysis. He does not have abdominal distention or bloating, early satiety, anorexia/change in appetite. He does has a good urinary response to  oral diuretic. He does not have  lower extremity edema. He denies lightheadedness, dizziness. He denies palpitations, syncope or near syncope. He does not complain of chest pain, pressure, discomfort.          Allergies   Allergen Reactions    Zocor [Simvastatin - High Dose] Other (See Comments)     Causes Rhabdomyolysis         Outpatient Medications Marked as Taking for the 1/14/22 encounter [facility]OSP[facility] Encounter) with [facility] ROOM 1   Medication Sig Dispense Refill    Afatinib Dimaleate (GILOTRIF) 40 MG TABS Take by mouth daily      metoprolol succinate (TOPROL XL) 100 MG extended release tablet Take 1 tablet by mouth 2 times daily 180 tablet 3    furosemide (LASIX) 20 MG tablet Take 1 tablet by mouth daily 30 tablet 3    apixaban (ELIQUIS) 5 MG TABS tablet TAKE 1 TABLET TWICE A  tablet 3    ASPIRIN LOW DOSE 81 MG EC tablet TAKE 1 TABLET DAILY 90 tablet 3    lisinopril (PRINIVIL;ZESTRIL) 5 MG tablet Take 5 mg by mouth daily      albuterol (PROVENTIL) (2.5 MG/3ML) 0.083% nebulizer solution Take 0.083 mLs by nebulization 4 times daily as needed      fluticasone-umeclidin-vilant (TRELEGY ELLIPTA) 100-62.5-25 MCG/INH AEPB Inhale 1 puff into the lungs daily 1 each 5    PROAIR  (90 Base) MCG/ACT inhaler INHALE TWO PUFFS BY MOUTH EVERY 4 TO 6 HOURS AS NEEDED      ipratropium-albuterol (DUONEB) 0.5-2.5 (3) MG/3ML SOLN nebulizer solution Inhale 3 mLs into the lungs every 4 hours 360 mL 0    nitroGLYCERIN (NITROSTAT) 0.4 MG SL tablet Place 1 tablet under the tongue every 5 minutes as needed for Chest pain 25 tablet 0    CRESTOR 40 MG tablet 40 mg daily       vitamin D (ERGOCALCIFEROL) 79916 UNITS CAPS capsule Take 50,000 Units by mouth once a week mondays             Guideline directed medical:  ARNI/ACE I/ARB: Yes  Beta blocker: Yes  Aldosterone antagonist:  No        Physical Examination:     /77   Pulse 80   Resp 18   Wt 275 lb (124.7 kg)   SpO2 96%   BMI 38.35 kg/m²     Assessment  Charting Type: Shift assessment                   Respiratory  Respiratory Pattern: Regular  Respiratory Depth: Normal  L Breath Sounds: Diminished,Expiratory Wheezes  R Breath Sounds: Diminished,Expiratory Wheezes              Cardiac  Cardiac Regularity: Irregular  Cardiac Rhythm: Atrial fib    Rhythm Interpretation  Pulse: 80         Gastrointestinal  Abdominal (WDL): Exceptions to WDL  Abdomen Inspection: Distended  Tenderness: Soft,Nontender  RUQ Bowel Sounds: Active  LUQ Bowel Sounds: Active  RLQ Bowel Sounds: Active  LLQ Bowel Sounds: Active          Bowel Sounds  RUQ Bowel Sounds: Active  LUQ Bowel Sounds: Active  RLQ Bowel Sounds: Active  LLQ Bowel Sounds:  Active    Peripheral Vascular  Peripheral Vascular (WDL): Within Defined Limits  Edema: None                        Psychosocial  Psychosocial (WDL): Within Defined Limits                        Pulse: 80                     LAB DATA:    Last 3 BMP      Sodium (mmol/L)   Date Value   11/23/2021 137   11/22/2021 139   08/14/2021 136     Potassium (mmol/L)   Date Value   11/22/2021 3.7   08/14/2021 4.8   08/13/2021 5.1 (H)     Potassium reflex Magnesium (mmol/L)   Date Value   11/23/2021 4.3   08/12/2021 4.5   08/11/2021 4.1     Chloride (mmol/L)   Date Value 11/23/2021 100   11/22/2021 102   08/14/2021 99     CO2 (mmol/L)   Date Value   11/23/2021 27   11/22/2021 27   08/14/2021 34 (H)     BUN (mg/dL)   Date Value   11/23/2021 18   11/22/2021 15   08/14/2021 16     Glucose (mg/dL)   Date Value   11/23/2021 130 (H)   11/22/2021 118 (H)   08/14/2021 302 (H)   06/03/2011 129 (H)     Calcium (mg/dL)   Date Value   11/23/2021 8.7   11/22/2021 9.3   08/14/2021 9.2       Last 3 BNP       Pro-BNP (pg/mL)   Date Value   11/22/2021 6,085 (H)   08/11/2021 2,688 (H)   01/19/2021 1,756 (H)          CBC: No results for input(s): WBC, HGB, PLT in the last 72 hours. BMP:  No results for input(s): NA, K, CL, CO2, BUN, CREATININE, GLUCOSE in the last 72 hours. Hepatic: No results for input(s): AST, ALT, ALB, BILITOT, ALKPHOS in the last 72 hours. Troponin: No results for input(s): TROPONINI in the last 72 hours. BNP: No results for input(s): BNP in the last 72 hours. Lipids: No results for input(s): CHOL, HDL in the last 72 hours. Invalid input(s): LDLCALCU  INR: No results for input(s): INR in the last 72 hours.         WEIGHTS:    Wt Readings from Last 3 Encounters:   01/14/22 275 lb (124.7 kg)   12/20/21 276 lb (125.2 kg)   11/24/21 290 lb (131.5 kg)         TELEMETRY:  Cardiac Regularity: Irregular  Cardiac Rhythm/Interpretation: A Fib        ASSESSMENT:  Jannet Newman is evolemic with stable weights     Interventions completed this visit:  IV diuretics given no  Lab work obtained yes, BMP/BNP   Reviewed currently prescribed medications with patient, educated on importance of compliance and answered any questions regarding their medication  Educated on signs and symptoms of HF  Educated on low sodium diet    PLAN:  Scheduled to follow up in CHF clinic on   Future Appointments   Date Time Provider Mirlande Ackerman   2/15/2022  2:00 PM [facility] CHF ROOM 1 [facility] CHF Cris Staff     Given clinic phone number and aware of signs and symptoms to call with any HF change in

## 2022-01-17 ENCOUNTER — TELEPHONE (OUTPATIENT)
Dept: CARDIOLOGY CLINIC | Age: 62
End: 2022-01-17

## 2022-01-17 DIAGNOSIS — I50.32 CHRONIC HEART FAILURE WITH PRESERVED EJECTION FRACTION (HCC): Primary | ICD-10-CM

## 2022-01-17 DIAGNOSIS — E87.6 HYPOKALEMIA: ICD-10-CM

## 2022-01-17 RX ORDER — POTASSIUM CHLORIDE 750 MG/1
10 TABLET, EXTENDED RELEASE ORAL DAILY
Qty: 90 TABLET | Refills: 1 | Status: SHIPPED
Start: 2022-01-17 | End: 2022-04-06 | Stop reason: DRUGHIGH

## 2022-01-17 NOTE — RESULT ENCOUNTER NOTE
Labs and CHF clinic note reviewed   Please have him start 10 meq daily   Follow up labs in 7-10 days     Thank you

## 2022-01-17 NOTE — TELEPHONE ENCOUNTER
Left provider TURNER Cedillo CNP instructions in VM. Home and cell same #. Requested call back to chf clinic to confirm understanding of instructions.

## 2022-01-17 NOTE — TELEPHONE ENCOUNTER
----- Message from Corrinne Ras, APRN - CNP sent at 1/17/2022 12:48 PM EST -----  Oops - potassium     Thank you   ----- Message -----  From: Ardeen Moritz, RN  Sent: 1/17/2022  12:47 PM EST  To: Corrinne Ras, APRN - CNP    What med  ----- Message -----  From: Corrinne Ras, APRN - CNP  Sent: 1/17/2022  11:59 AM EST  To: Ardeen Moritz, RN    Labs and CHF clinic note reviewed   Please have him start 10 meq daily   Follow up labs in 7-10 days     Thank you

## 2022-01-20 NOTE — TELEPHONE ENCOUNTER
Confirmed understanding of provider instructions.    He will use [facility] for lab 7-10 days after starting K+

## 2022-02-08 ENCOUNTER — TELEPHONE (OUTPATIENT)
Dept: CARDIOLOGY CLINIC | Age: 62
End: 2022-02-08

## 2022-02-08 NOTE — TELEPHONE ENCOUNTER
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 Please return call to advise further. Thank you.

## 2022-02-09 NOTE — TELEPHONE ENCOUNTER
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 He just needs a note of clearance. Please advise.

## 2022-02-10 NOTE — TELEPHONE ENCOUNTER
Patient notified of Dr. Genaro Ortez risk assessment for DOT and recommendations. BP/P check scheduled for 2/11/22 at 2:40 p.m. Patient put on list to call when April/May 2022 schedule opens.   Patient will  note, EKG, stress test.

## 2022-02-11 ENCOUNTER — NURSE ONLY (OUTPATIENT)
Dept: CARDIOLOGY CLINIC | Age: 62
End: 2022-02-11

## 2022-02-11 ENCOUNTER — TELEPHONE (OUTPATIENT)
Dept: CARDIOLOGY CLINIC | Age: 62
End: 2022-02-11

## 2022-02-11 NOTE — TELEPHONE ENCOUNTER
Patient was seen in office for a BP check per . Standing Bp: 118 /70  Standing P:92  Sitting Bp: 116/66  Sitting P: 93  Patient tolerated it well. Nilson Diaz MA. Janell Gao NP increased Toprol to 100 mg BID on 12/20/21.

## 2022-02-11 NOTE — PROGRESS NOTES
Patient was seen in office for a BP check per . Standing Bp: 118 /70  Standing P:92  Sitting Bp: 116/66  Sitting P: 93  Patient tolerated it well. Mahin Sarmiento MA.

## 2022-02-14 ENCOUNTER — HOSPITAL ENCOUNTER (OUTPATIENT)
Age: 62
Discharge: HOME | End: 2022-02-14
Payer: COMMERCIAL

## 2022-02-14 DIAGNOSIS — E87.6 HYPOKALEMIA: ICD-10-CM

## 2022-02-14 DIAGNOSIS — I50.32 CHRONIC HEART FAILURE WITH PRESERVED EJECTION FRACTION (HCC): ICD-10-CM

## 2022-02-14 LAB
ANION GAP SERPL CALCULATED.3IONS-SCNC: 13 MMOL/L (ref 7–16)
BUN BLDV-MCNC: 13 MG/DL (ref 6–23)
CALCIUM SERPL-MCNC: 9.4 MG/DL (ref 8.6–10.2)
CHLORIDE BLD-SCNC: 107 MMOL/L (ref 98–107)
CO2: 26 MMOL/L (ref 22–29)
CREAT SERPL-MCNC: 1.1 MG/DL (ref 0.7–1.2)
GFR AFRICAN AMERICAN: >60
GFR NON-AFRICAN AMERICAN: >60 ML/MIN/1.73
GLUCOSE BLD-MCNC: 103 MG/DL (ref 74–99)
POTASSIUM SERPL-SCNC: 4.3 MMOL/L (ref 3.5–5)
PRO-BNP: 1685 PG/ML (ref 0–125)
SODIUM BLD-SCNC: 146 MMOL/L (ref 132–146)

## 2022-02-14 PROCEDURE — 80048 BASIC METABOLIC PNL TOTAL CA: CPT

## 2022-02-14 PROCEDURE — 83880 ASSAY OF NATRIURETIC PEPTIDE: CPT

## 2022-02-14 PROCEDURE — 36415 COLL VENOUS BLD VENIPUNCTURE: CPT

## 2022-02-15 ENCOUNTER — HOSPITAL ENCOUNTER (OUTPATIENT)
Dept: OTHER | Age: 62
Setting detail: THERAPIES SERIES
Discharge: HOME | End: 2022-02-15
Payer: COMMERCIAL

## 2022-02-15 VITALS
OXYGEN SATURATION: 98 % | HEART RATE: 75 BPM | RESPIRATION RATE: 18 BRPM | BODY MASS INDEX: 36.54 KG/M2 | WEIGHT: 262 LBS | SYSTOLIC BLOOD PRESSURE: 121 MMHG | DIASTOLIC BLOOD PRESSURE: 85 MMHG

## 2022-02-15 PROCEDURE — 99214 OFFICE O/P EST MOD 30 MIN: CPT

## 2022-02-15 NOTE — PROGRESS NOTES
Congestive Heart Failure [address] [facility] JAZMIN Gresham   1960          Referring Provider: ROSANNE Cedillo NP  Primary Care Physician: Dr Bruce Oviedo  Cardiologist: Dr Diania Landau  Nephrologist: N/A        History of Present Illness:     Livan Garcia is a 62 y.o. male with a history of HFrEF, most recent EF 15-20% on 8-11-21. Patient Story:    He doesNOT   Have  dyspnea with exertion, shortness of breath, or decline in overall functional capacity. He does not have orthopnea, PND, nocturnal cough or hemoptysis. He does not have abdominal distention or bloating, early satiety, anorexia/change in appetite. He does has a good urinary response to  oral diuretic. He does not have  lower extremity edema. He denies lightheadedness, dizziness. He denies palpitations, syncope or near syncope. He does not complain of chest pain, pressure, discomfort.          Allergies   Allergen Reactions    Zocor [Simvastatin - High Dose] Other (See Comments)     Causes Rhabdomyolysis         Outpatient Medications Marked as Taking for the 2/15/22 encounter [facility]PITAL Encounter) with [facility] ROOM 1   Medication Sig Dispense Refill    potassium chloride (KLOR-CON M) 10 MEQ extended release tablet Take 1 tablet by mouth daily 90 tablet 1    Afatinib Dimaleate (GILOTRIF) 40 MG TABS Take by mouth daily      metoprolol succinate (TOPROL XL) 100 MG extended release tablet Take 1 tablet by mouth 2 times daily 180 tablet 3    furosemide (LASIX) 20 MG tablet Take 1 tablet by mouth daily 30 tablet 3    apixaban (ELIQUIS) 5 MG TABS tablet TAKE 1 TABLET TWICE A  tablet 3    ASPIRIN LOW DOSE 81 MG EC tablet TAKE 1 TABLET DAILY 90 tablet 3    lisinopril (PRINIVIL;ZESTRIL) 5 MG tablet Take 5 mg by mouth daily      albuterol (PROVENTIL) (2.5 MG/3ML) 0.083% nebulizer solution Take 0.083 mLs by nebulization 4 times daily as needed      fluticasone-umeclidin-vilant (TRELEGY ELLIPTA) 100-62.5-25 MCG/INH AEPB Inhale 1 puff into the lungs daily 1 each 5    PROAIR  (90 Base) MCG/ACT inhaler INHALE TWO PUFFS BY MOUTH EVERY 4 TO 6 HOURS AS NEEDED      ipratropium-albuterol (DUONEB) 0.5-2.5 (3) MG/3ML SOLN nebulizer solution Inhale 3 mLs into the lungs every 4 hours 360 mL 0    nitroGLYCERIN (NITROSTAT) 0.4 MG SL tablet Place 1 tablet under the tongue every 5 minutes as needed for Chest pain 25 tablet 0    CRESTOR 40 MG tablet 40 mg daily       vitamin D (ERGOCALCIFEROL) 83827 UNITS CAPS capsule Take 50,000 Units by mouth once a week mondays             Guideline directed medical:  ARNI/ACE I/ARB: Yes  Beta blocker: Yes  Aldosterone antagonist:  No        Physical Examination:     /85   Pulse 75   Resp 18   Wt 262 lb (118.8 kg)   SpO2 98%   BMI 36.54 kg/m²     Assessment  Charting Type: Shift assessment                   Respiratory  Respiratory Pattern: Regular  Respiratory Depth: Normal  Respiratory Quality/Effort: Unlabored  L Breath Sounds: Diminished,Expiratory Wheezes  R Breath Sounds: Diminished,Clear              Cardiac  Cardiac Regularity: Irregular  Cardiac Rhythm: Atrial fib    Rhythm Interpretation  Pulse: 75         Gastrointestinal  Abdominal (WDL): Exceptions to WDL  Abdomen Inspection: Distended  Tenderness: Soft,Nontender  RUQ Bowel Sounds: Active  LUQ Bowel Sounds: Active  RLQ Bowel Sounds: Active  LLQ Bowel Sounds: Active          Bowel Sounds  RUQ Bowel Sounds: Active  LUQ Bowel Sounds: Active  RLQ Bowel Sounds: Active  LLQ Bowel Sounds:  Active    Peripheral Vascular  Peripheral Vascular (WDL): Within Defined Limits  Edema: None                        Psychosocial  Psychosocial (WDL): Within Defined Limits                        Pulse: 75                     LAB DATA:    Last 3 BMP      Sodium (mmol/L)   Date Value   02/14/2022 146   01/14/2022 138   11/23/2021 137     Potassium (mmol/L)   Date Value   02/14/2022 4.3   01/14/2022 3.4 (L)   11/22/2021 3.7 Potassium reflex Magnesium (mmol/L)   Date Value   11/23/2021 4.3   08/12/2021 4.5   08/11/2021 4.1     Chloride (mmol/L)   Date Value   02/14/2022 107   01/14/2022 101   11/23/2021 100     CO2 (mmol/L)   Date Value   02/14/2022 26   01/14/2022 26   11/23/2021 27     BUN (mg/dL)   Date Value   02/14/2022 13   01/14/2022 12   11/23/2021 18     Glucose (mg/dL)   Date Value   02/14/2022 103 (H)   01/14/2022 91   11/23/2021 130 (H)   06/03/2011 129 (H)     Calcium (mg/dL)   Date Value   02/14/2022 9.4   01/14/2022 9.0   11/23/2021 8.7       Last 3 BNP       Pro-BNP (pg/mL)   Date Value   02/14/2022 1,685 (H)   01/14/2022 1,571 (H)   11/22/2021 6,085 (H)          CBC: No results for input(s): WBC, HGB, PLT in the last 72 hours. BMP:    Recent Labs     02/14/22  1633      K 4.3      CO2 26   BUN 13   CREATININE 1.1   GLUCOSE 103*     Hepatic: No results for input(s): AST, ALT, ALB, BILITOT, ALKPHOS in the last 72 hours. Troponin: No results for input(s): TROPONINI in the last 72 hours. BNP: No results for input(s): BNP in the last 72 hours. Lipids: No results for input(s): CHOL, HDL in the last 72 hours. Invalid input(s): LDLCALCU  INR: No results for input(s): INR in the last 72 hours.         WEIGHTS:    Wt Readings from Last 3 Encounters:   02/15/22 262 lb (118.8 kg)   01/14/22 275 lb (124.7 kg)   12/20/21 276 lb (125.2 kg)         TELEMETRY:  Cardiac Regularity: Irregular  Cardiac Rhythm/Interpretation: A Fib        ASSESSMENT:  Jennifer Contreras is evolemic with stable weights     Interventions completed this visit:  IV diuretics given no  Lab work obtained yes, BMP/BNP   Reviewed currently prescribed medications with patient, educated on importance of compliance and answered any questions regarding their medication  Educated on signs and symptoms of HF  Educated on low sodium diet    PLAN:  Scheduled to follow up in CHF clinic on   Future Appointments   Date Time Provider Mirlande Ackerman 4/5/2022  2:00 PM [facility] CHF ROOM 1 [facility] CLINICS     Given clinic phone number and aware of signs and symptoms to call with any HF change in  symptoms. PT DENIES ANY DISCOMFORT, WILL CALL FOR SOONER APPT IF NEEDED.

## 2022-02-23 ENCOUNTER — TELEPHONE (OUTPATIENT)
Dept: CARDIOLOGY CLINIC | Age: 62
End: 2022-02-23

## 2022-04-04 RX ORDER — LISINOPRIL 5 MG/1
TABLET ORAL
Qty: 90 TABLET | Refills: 3 | Status: SHIPPED | OUTPATIENT
Start: 2022-04-04

## 2022-04-05 ENCOUNTER — HOSPITAL ENCOUNTER (OUTPATIENT)
Dept: OTHER | Age: 62
Setting detail: THERAPIES SERIES
Discharge: HOME | End: 2022-04-05
Payer: COMMERCIAL

## 2022-04-05 VITALS
OXYGEN SATURATION: 94 % | RESPIRATION RATE: 16 BRPM | SYSTOLIC BLOOD PRESSURE: 136 MMHG | DIASTOLIC BLOOD PRESSURE: 75 MMHG | BODY MASS INDEX: 36.54 KG/M2 | HEART RATE: 73 BPM | WEIGHT: 262 LBS

## 2022-04-05 LAB
ANION GAP SERPL CALCULATED.3IONS-SCNC: 6 MMOL/L (ref 7–16)
BUN BLDV-MCNC: 20 MG/DL (ref 6–23)
CALCIUM SERPL-MCNC: 9.2 MG/DL (ref 8.6–10.2)
CHLORIDE BLD-SCNC: 103 MMOL/L (ref 98–107)
CO2: 30 MMOL/L (ref 22–29)
CREAT SERPL-MCNC: 1.1 MG/DL (ref 0.7–1.2)
GFR AFRICAN AMERICAN: >60
GFR NON-AFRICAN AMERICAN: >60 ML/MIN/1.73
GLUCOSE BLD-MCNC: 104 MG/DL (ref 74–99)
POTASSIUM SERPL-SCNC: 4.4 MMOL/L (ref 3.5–5)
PRO-BNP: 1736 PG/ML (ref 0–125)
SODIUM BLD-SCNC: 139 MMOL/L (ref 132–146)

## 2022-04-05 PROCEDURE — 80048 BASIC METABOLIC PNL TOTAL CA: CPT

## 2022-04-05 PROCEDURE — 36415 COLL VENOUS BLD VENIPUNCTURE: CPT

## 2022-04-05 PROCEDURE — 99214 OFFICE O/P EST MOD 30 MIN: CPT

## 2022-04-05 PROCEDURE — 83880 ASSAY OF NATRIURETIC PEPTIDE: CPT

## 2022-04-05 ASSESSMENT — PAIN SCALES - GENERAL: PAINLEVEL_OUTOF10: 0

## 2022-04-05 NOTE — PROGRESS NOTES
Congestive Heart Failure [address] [facility] Kirt Burt   1960          Referring Provider: ROSANNE Cedillo NP  Primary Care Physician: Dr Albania Flores  Cardiologist: Dr Ligia Santillan  Nephrologist: N/A        History of Present Illness:     Ellen Leavitt is a 62 y.o. male with a history of HFrEF, most recent EF 15-20% on 8-11-21. Patient Story:    He does not have dyspnea with exertion, shortness of breath, or decline in overall functional capacity. He does not have orthopnea, PND, nocturnal cough or hemoptysis. He does not have abdominal distention or bloating, early satiety, anorexia/change in appetite. He does have a good urinary response to  oral diuretic. He does have  lower extremity edema. He denies lightheadedness, dizziness. He denies palpitations, syncope or near syncope. He does not complain of chest pain, pressure, discomfort. Allergies   Allergen Reactions    Zocor [Simvastatin - High Dose] Other (See Comments)     Causes Rhabdomyolysis         Prior to Visit Medications    Medication Sig Taking?  Authorizing Provider   lisinopril (PRINIVIL;ZESTRIL) 5 MG tablet TAKE 1 TABLET DAILY (DOSE DECREASED)  Franci Rivera, DO   potassium chloride (KLOR-CON M) 10 MEQ extended release tablet Take 1 tablet by mouth daily  SIENNA Rosenthal CNP   Afatinib Dimaleate (GILOTRIF) 40 MG TABS Take by mouth daily  Historical Provider, MD   metoprolol succinate (TOPROL XL) 100 MG extended release tablet Take 1 tablet by mouth 2 times daily  SIENNA Rosenthal CNP   furosemide (LASIX) 20 MG tablet Take 1 tablet by mouth daily  SIENNA Rosenthal - CNP   apixaban (ELIQUIS) 5 MG TABS tablet TAKE 1 TABLET TWICE A DAY  Franci Rivera, DO   ASPIRIN LOW DOSE 81 MG EC tablet TAKE 1 TABLET DAILY  Franci Rivera, DO   albuterol (PROVENTIL) (2.5 MG/3ML) 0.083% nebulizer solution Take 0.083 mLs by nebulization 4 times daily as needed  Historical Provider, MD fluticasone-umeclidin-vilant (TRELEGY ELLIPTA) 100-62.5-25 MCG/INH AEPB Inhale 1 puff into the lungs daily  SIENNA Santiago - CNP   PROAIR  (90 Base) MCG/ACT inhaler INHALE TWO PUFFS BY MOUTH EVERY 4 TO 6 HOURS AS NEEDED  Historical Provider, MD   ipratropium-albuterol (DUONEB) 0.5-2.5 (3) MG/3ML SOLN nebulizer solution Inhale 3 mLs into the lungs every 4 hours  Jordan Guillaume MD   nitroGLYCERIN (NITROSTAT) 0.4 MG SL tablet Place 1 tablet under the tongue every 5 minutes as needed for Chest pain  Patient not taking: Reported on 4/5/2022  Rocio Kerr MD   CRESTOR 40 MG tablet 40 mg daily   Historical Provider, MD   vitamin D (ERGOCALCIFEROL) 14192 UNITS CAPS capsule Take 50,000 Units by mouth once a week mondays  Historical Provider, MD             Guideline directed medical:  ARNI/ACE I/ARB: Yes  Beta blocker:   Yes  Aldosterone antagonist:  No        Physical Examination:     /75   Pulse 73   Resp 16   Wt 262 lb (118.8 kg)   SpO2 94%   BMI 36.54 kg/m²     Assessment  Charting Type: Shift assessment                   Respiratory  Respiratory Quality/Effort: Unlabored    Breath Sounds  Right Upper Lobe: End Expiratory Wheezes  Right Middle Lobe: End Expiratory Wheezes  Right Lower Lobe: End Expiratory Wheezes  Left Upper Lobe: End Expiratory Wheezes  Left Lower Lobe: End Expiratory Wheezes         Cardiac  Cardiac Rhythm: Atrial fib    Rhythm Interpretation  Pulse: 73         Gastrointestinal  Abdominal (WDL): Within Defined Limits  Abdomen Inspection: Rounded,Soft               Peripheral Vascular  Peripheral Vascular (WDL): Exceptions to WDL  Edema: Right lower extremity,Left lower extremity  RLE Edema: +1,Non-pitting  LLE Edema: +1,Non-pitting                   Genitourinary  Genitourinary (WDL): Within Defined Limits    Psychosocial  Psychosocial (WDL): Within Defined Limits    Pain Assessment  Pain Assessment: 0-10  Pain Level: 0                   Pulse: 73                     LAB prescribed medications with patient, educated on importance of compliance and answered any questions regarding their medication  Educated on signs and symptoms of HF  Educated on low sodium diet    PLAN:  Scheduled to follow up in CHF clinic on   Future Appointments   Date Time Provider Mirlande Ackerman   4/9/2022  7:30 AM [facility] CT SCAN 3 SE[facility] CT [facility]   4/9/2022  8:00 AM [facility] CT SCAN 3 SEYZ CT [facility]   4/25/2022 11:20 AM Sulma Whitfield DO [facility]   6/14/2022  2:00 PM [facility] CHF ROOM 1 [facility]  [address]     Given clinic phone number and aware of signs and symptoms to call with any HF change in  symptoms.

## 2022-04-06 ENCOUNTER — TELEPHONE (OUTPATIENT)
Dept: CARDIOLOGY CLINIC | Age: 62
End: 2022-04-06

## 2022-04-06 DIAGNOSIS — I50.32 CHRONIC HEART FAILURE WITH PRESERVED EJECTION FRACTION (HCC): Primary | ICD-10-CM

## 2022-04-06 DIAGNOSIS — Z79.899 NEW MEDICATION ADDED: ICD-10-CM

## 2022-04-06 RX ORDER — SPIRONOLACTONE 25 MG/1
12.5 TABLET ORAL DAILY
Qty: 45 TABLET | Refills: 1 | Status: SHIPPED
Start: 2022-04-06 | End: 2022-04-06

## 2022-04-06 RX ORDER — SPIRONOLACTONE 25 MG/1
12.5 TABLET ORAL DAILY
Qty: 45 TABLET | Refills: 1 | Status: SHIPPED
Start: 2022-04-06 | End: 2022-06-03 | Stop reason: SDUPTHER

## 2022-04-06 NOTE — RESULT ENCOUNTER NOTE
Labs and CHF clinic note reviewed    Stop potassium supplement  Start spironolactone 12.5 mg daily     Follow up labs in 1 week     Thank you stretcher

## 2022-04-06 NOTE — TELEPHONE ENCOUNTER
----- Message from SIENNA Genao CNP sent at 4/6/2022 10:13 AM EDT -----  Labs and CHF clinic note reviewed    Stop potassium supplement  Start spironolactone 12.5 mg daily     Follow up labs in 1 week     Thank you

## 2022-04-09 ENCOUNTER — HOSPITAL ENCOUNTER (OUTPATIENT)
Dept: CT IMAGING | Age: 62
Discharge: HOME | End: 2022-04-11
Payer: COMMERCIAL

## 2022-04-09 DIAGNOSIS — R11.0 NAUSEA: ICD-10-CM

## 2022-04-09 DIAGNOSIS — C34.31 SQUAMOUS CELL CARCINOMA OF BRONCHUS IN RIGHT LOWER LOBE (HCC): ICD-10-CM

## 2022-04-09 PROCEDURE — 6360000004 HC RX CONTRAST MEDICATION: Performed by: RADIOLOGY

## 2022-04-09 PROCEDURE — 2580000003 HC RX 258: Performed by: RADIOLOGY

## 2022-04-09 PROCEDURE — 74177 CT ABD & PELVIS W/CONTRAST: CPT

## 2022-04-09 PROCEDURE — 71260 CT THORAX DX C+: CPT

## 2022-04-09 RX ORDER — SODIUM CHLORIDE 0.9 % (FLUSH) 0.9 %
10 SYRINGE (ML) INJECTION
Status: COMPLETED | OUTPATIENT
Start: 2022-04-09 | End: 2022-04-09

## 2022-04-09 RX ADMIN — IOPAMIDOL 90 ML: 755 INJECTION, SOLUTION INTRAVENOUS at 06:45

## 2022-04-09 RX ADMIN — Medication 10 ML: at 06:46

## 2022-04-29 RX ORDER — ASPIRIN 81 MG/1
TABLET, COATED ORAL
Qty: 90 TABLET | Refills: 3 | Status: SHIPPED | OUTPATIENT
Start: 2022-04-29

## 2022-06-03 DIAGNOSIS — I50.32 CHRONIC HEART FAILURE WITH PRESERVED EJECTION FRACTION (HCC): ICD-10-CM

## 2022-06-03 DIAGNOSIS — Z79.899 NEW MEDICATION ADDED: ICD-10-CM

## 2022-06-03 RX ORDER — SPIRONOLACTONE 25 MG/1
25 TABLET ORAL DAILY
Qty: 45 TABLET | Refills: 1 | Status: SHIPPED
Start: 2022-06-03 | End: 2022-08-12 | Stop reason: SDUPTHER

## 2022-06-14 ENCOUNTER — HOSPITAL ENCOUNTER (OUTPATIENT)
Dept: OTHER | Age: 62
Setting detail: THERAPIES SERIES
Discharge: HOME | End: 2022-06-14
Payer: COMMERCIAL

## 2022-06-14 VITALS
DIASTOLIC BLOOD PRESSURE: 67 MMHG | OXYGEN SATURATION: 98 % | BODY MASS INDEX: 37.8 KG/M2 | SYSTOLIC BLOOD PRESSURE: 120 MMHG | HEART RATE: 76 BPM | WEIGHT: 271 LBS | RESPIRATION RATE: 18 BRPM

## 2022-06-14 LAB
ANION GAP SERPL CALCULATED.3IONS-SCNC: 11 MMOL/L (ref 7–16)
BUN BLDV-MCNC: 33 MG/DL (ref 6–23)
CALCIUM SERPL-MCNC: 9 MG/DL (ref 8.6–10.2)
CHLORIDE BLD-SCNC: 102 MMOL/L (ref 98–107)
CO2: 24 MMOL/L (ref 22–29)
CREAT SERPL-MCNC: 1.3 MG/DL (ref 0.7–1.2)
GFR AFRICAN AMERICAN: >60
GFR NON-AFRICAN AMERICAN: 56 ML/MIN/1.73
GLUCOSE BLD-MCNC: 107 MG/DL (ref 74–99)
POTASSIUM SERPL-SCNC: 4.9 MMOL/L (ref 3.5–5)
PRO-BNP: 1036 PG/ML (ref 0–125)
SODIUM BLD-SCNC: 137 MMOL/L (ref 132–146)

## 2022-06-14 PROCEDURE — 36415 COLL VENOUS BLD VENIPUNCTURE: CPT

## 2022-06-14 PROCEDURE — 99214 OFFICE O/P EST MOD 30 MIN: CPT

## 2022-06-14 PROCEDURE — 83880 ASSAY OF NATRIURETIC PEPTIDE: CPT

## 2022-06-14 PROCEDURE — 80048 BASIC METABOLIC PNL TOTAL CA: CPT

## 2022-06-14 NOTE — PROGRESS NOTES
Congestive Heart Failure [address] [facility] JAZMIN Jiang   1960          Referring Provider: ROSANNE Cedillo NP  Primary Care Physician: Dr Krishna Baum  Cardiologist: Dr Jacob Nguyen  Nephrologist: N/A        History of Present Illness:     Azul Patel is a 62 y.o. male with a history of HFrEF, most recent EF 15-20% on 8-11-21. Patient Story:    He does  Have slight dyspnea with exertion, shortness of breath, or decline in overall functional capacity. He does not have orthopnea, PND, nocturnal cough or hemoptysis. He does not have abdominal distention or bloating, early satiety, anorexia/change in appetite. He does has a good urinary response to  oral diuretic. He does not have  lower extremity edema. He denies lightheadedness, dizziness. He denies palpitations, syncope or near syncope. He does not complain of chest pain, pressure, discomfort. Allergies   Allergen Reactions    Zocor [Simvastatin - High Dose] Other (See Comments)     Causes Rhabdomyolysis           Prior to Visit Medications    Medication Sig Taking?  Authorizing Provider   spironolactone (ALDACTONE) 25 MG tablet Take 1 tablet by mouth daily  SIENNA Beckham CNP   apixaban (ELIQUIS) 5 MG TABS tablet TAKE 1 TABLET TWICE A DAY  Eduar Skinner MD   ASPIRIN LOW DOSE 81 MG EC tablet TAKE 1 TABLET DAILY  Eduar Skinner MD   lisinopril (PRINIVIL;ZESTRIL) 5 MG tablet TAKE 1 TABLET DAILY (DOSE DECREASED)  Nikky Montgomery DO   Afatinib Dimaleate (GILOTRIF) 40 MG TABS Take by mouth daily  Historical Provider, MD   metoprolol succinate (TOPROL XL) 100 MG extended release tablet Take 1 tablet by mouth 2 times daily  SIENNA Beckham CNP   furosemide (LASIX) 20 MG tablet Take 1 tablet by mouth daily  SIENNA Beckham CNP   albuterol (PROVENTIL) (2.5 MG/3ML) 0.083% nebulizer solution Take 0.083 mLs by nebulization 4 times daily as needed  Historical Provider, MD fluticasone-umeclidin-vilant (TRELEGY ELLIPTA) 100-62.5-25 MCG/INH AEPB Inhale 1 puff into the lungs daily  SIENNA Ryan - CNP   PROAIR  (90 Base) MCG/ACT inhaler INHALE TWO PUFFS BY MOUTH EVERY 4 TO 6 HOURS AS NEEDED  Historical Provider, MD   ipratropium-albuterol (DUONEB) 0.5-2.5 (3) MG/3ML SOLN nebulizer solution Inhale 3 mLs into the lungs every 4 hours  Patience MD Rita   nitroGLYCERIN (NITROSTAT) 0.4 MG SL tablet Place 1 tablet under the tongue every 5 minutes as needed for Chest pain  Patient not taking: Reported on 4/5/2022  Cody Cates MD   CRESTOR 40 MG tablet 40 mg daily   Historical Provider, MD   vitamin D (ERGOCALCIFEROL) 38185 UNITS CAPS capsule Take 50,000 Units by mouth once a week mondays  Historical Provider, MD           Guideline directed medical:  ARNI/ACE I/ARB: Yes  Beta blocker:   Yes  Aldosterone antagonist:  Yes        Physical Examination:     /67   Pulse 76   Resp 18   Wt 271 lb (122.9 kg)   SpO2 98%   BMI 37.80 kg/m²     Assessment  Charting Type: Shift assessment                   Respiratory  Respiratory Pattern: Regular  Respiratory Quality/Effort: Unlabored  L Breath Sounds: Clear,Diminished  R Breath Sounds: Clear,Diminished              Cardiac  Cardiac Regularity: Irregular  Cardiac Rhythm: Atrial fib    Rhythm Interpretation  Heart Rate: 76         Gastrointestinal  Abdominal (WDL): Within Defined Limits  Abdomen Inspection: Rounded,Soft               Peripheral Vascular  Peripheral Vascular (WDL): Within Defined Limits  RLE Edema: None  LLE Edema: None                   Genitourinary  Genitourinary (WDL): Within Defined Limits    Psychosocial  Psychosocial (WDL): Within Defined Limits                        Heart Rate: 76                     LAB DATA:    Last 3 BMP      Sodium (mmol/L)   Date Value   04/05/2022 139   02/14/2022 146   01/14/2022 138     Potassium (mmol/L)   Date Value   04/05/2022 4.4   02/14/2022 4.3   01/14/2022 3.4 (L) Potassium reflex Magnesium (mmol/L)   Date Value   11/23/2021 4.3   08/12/2021 4.5   08/11/2021 4.1     Chloride (mmol/L)   Date Value   04/05/2022 103   02/14/2022 107   01/14/2022 101     CO2 (mmol/L)   Date Value   04/05/2022 30 (H)   02/14/2022 26   01/14/2022 26     BUN (mg/dL)   Date Value   04/05/2022 20   02/14/2022 13   01/14/2022 12     Glucose (mg/dL)   Date Value   04/05/2022 104 (H)   02/14/2022 103 (H)   01/14/2022 91   06/03/2011 129 (H)     Calcium (mg/dL)   Date Value   04/05/2022 9.2   02/14/2022 9.4   01/14/2022 9.0       Last 3 BNP       Pro-BNP (pg/mL)   Date Value   04/05/2022 1,736 (H)   02/14/2022 1,685 (H)   01/14/2022 1,571 (H)          CBC: No results for input(s): WBC, HGB, PLT in the last 72 hours. BMP:  No results for input(s): NA, K, CL, CO2, BUN, CREATININE, GLUCOSE in the last 72 hours. Hepatic: No results for input(s): AST, ALT, ALB, BILITOT, ALKPHOS in the last 72 hours. Troponin: No results for input(s): TROPONINI in the last 72 hours. BNP: No results for input(s): BNP in the last 72 hours. Lipids: No results for input(s): CHOL, HDL in the last 72 hours. Invalid input(s): LDLCALCU  INR: No results for input(s): INR in the last 72 hours.         WEIGHTS:    Wt Readings from Last 3 Encounters:   06/14/22 271 lb (122.9 kg)   04/05/22 262 lb (118.8 kg)   02/15/22 262 lb (118.8 kg)         TELEMETRY:  Cardiac Regularity: Irregular  Cardiac Rhythm/Interpretation: A Fib        ASSESSMENT:  Judy Tapia is evolemic with stable weights     Interventions completed this visit:  IV diuretics given no  Lab work obtained yes, BMP/BNP     Reviewed currently prescribed medications with patient, educated on importance of compliance and answered any questions regarding their medication  Educated on signs and symptoms of HF  Educated on low sodium diet    PLAN:  Scheduled to follow up in CHF clinic on   Future Appointments   Date Time Provider Mirladne Ackerman   11/8/2022  2:00 PM [facility] CHF ROOM 1 [facility] CHF Adore Rolon     Given clinic phone number and aware of signs and symptoms to call with any HF change in symptoms.

## 2022-06-17 ENCOUNTER — TELEPHONE (OUTPATIENT)
Dept: OTHER | Age: 62
End: 2022-06-17

## 2022-06-17 DIAGNOSIS — I50.9 CONGESTIVE HEART FAILURE, UNSPECIFIED HF CHRONICITY, UNSPECIFIED HEART FAILURE TYPE (HCC): Primary | ICD-10-CM

## 2022-06-17 NOTE — TELEPHONE ENCOUNTER
----- Message from SIENNA Miller - CNS sent at 6/17/2022 11:46 AM EDT -----  Labs and CHF clinic note reviewed:    Labs consistent with ATUL: BUN 20 >>33, Cr 1.1 >>1.3    Please remind him to stay well hydrated    Hold Lasix until Monday    Labs in one week please     Thank you!

## 2022-06-17 NOTE — RESULT ENCOUNTER NOTE
Labs and CHF clinic note reviewed:    Labs consistent with ATUL: BUN 20 >>33, Cr 1.1 >>1.3    Please remind him to stay well hydrated    Hold Lasix until Monday    Labs in one week please     Thank you!

## 2022-06-17 NOTE — TELEPHONE ENCOUNTER
Tries to keep hydrated, hard to do but will inc fluid intake. I have reviewed the provider's TURNER Reid CNS instructions with the patient, answering all questions to his satisfaction.     Electronically signed by Sandy Mesa RN on 6/17/2022 at 1:38 PM

## 2022-06-26 DIAGNOSIS — Z79.899 NEW MEDICATION ADDED: ICD-10-CM

## 2022-06-26 DIAGNOSIS — I50.32 CHRONIC HEART FAILURE WITH PRESERVED EJECTION FRACTION (HCC): ICD-10-CM

## 2022-06-27 RX ORDER — SPIRONOLACTONE 25 MG/1
TABLET ORAL
Qty: 30 TABLET | Refills: 2 | OUTPATIENT
Start: 2022-06-27

## 2022-07-15 DIAGNOSIS — I50.32 CHRONIC HEART FAILURE WITH PRESERVED EJECTION FRACTION (HCC): ICD-10-CM

## 2022-07-15 DIAGNOSIS — Z79.899 NEW MEDICATION ADDED: ICD-10-CM

## 2022-07-15 RX ORDER — SPIRONOLACTONE 25 MG/1
TABLET ORAL
Qty: 45 TABLET | Refills: 1 | OUTPATIENT
Start: 2022-07-15

## 2022-07-22 ENCOUNTER — HOSPITAL ENCOUNTER (OUTPATIENT)
Age: 62
Discharge: HOME | End: 2022-07-22
Payer: COMMERCIAL

## 2022-07-22 ENCOUNTER — HOSPITAL ENCOUNTER (OUTPATIENT)
Dept: CT IMAGING | Age: 62
Discharge: HOME | End: 2022-07-24
Payer: COMMERCIAL

## 2022-07-22 DIAGNOSIS — R11.0 NAUSEA: ICD-10-CM

## 2022-07-22 DIAGNOSIS — C34.31 SQUAMOUS CELL CARCINOMA OF BRONCHUS IN RIGHT LOWER LOBE (HCC): ICD-10-CM

## 2022-07-22 LAB
BUN BLDV-MCNC: 22 MG/DL (ref 6–23)
CREAT SERPL-MCNC: 1.2 MG/DL (ref 0.7–1.2)
GFR AFRICAN AMERICAN: >60
GFR NON-AFRICAN AMERICAN: >60 ML/MIN/1.73

## 2022-07-22 PROCEDURE — 82565 ASSAY OF CREATININE: CPT

## 2022-07-22 PROCEDURE — 74177 CT ABD & PELVIS W/CONTRAST: CPT

## 2022-07-22 PROCEDURE — 6360000004 HC RX CONTRAST MEDICATION: Performed by: RADIOLOGY

## 2022-07-22 PROCEDURE — 36415 COLL VENOUS BLD VENIPUNCTURE: CPT

## 2022-07-22 PROCEDURE — 84520 ASSAY OF UREA NITROGEN: CPT

## 2022-07-22 PROCEDURE — 71260 CT THORAX DX C+: CPT

## 2022-07-22 RX ADMIN — IOPAMIDOL 75 ML: 755 INJECTION, SOLUTION INTRAVENOUS at 16:13

## 2022-07-22 RX ADMIN — IOHEXOL 50 ML: 240 INJECTION, SOLUTION INTRATHECAL; INTRAVASCULAR; INTRAVENOUS; ORAL at 16:13

## 2022-07-26 ENCOUNTER — TELEPHONE (OUTPATIENT)
Dept: SURGERY | Age: 62
End: 2022-07-26

## 2022-07-26 NOTE — TELEPHONE ENCOUNTER
Prior Authorization Form:      DEMOGRAPHICS:                     Patient Name:  Paresh Lyon  Patient :  1960            Insurance:  Payor: Daysi Brandonsteph [address] / Plan: Daysi Astrid [address] [employer] - [place] PPO / Product Type: *No Product type* /   Insurance ID Number:    Payer/Plan Subscr  Sex Relation Sub. Ins. ID Effective Group Num   1.  [id number] -* YSABEL POWELL 1960 Male Self [id number]                                    [id number]         DIAGNOSIS & PROCEDURE:                       Procedure/Operatin: MEDIPORT INSERTATION          CPT Code: 51941    Diagnosis:  Marcos Beams CA    ICD10 Code: C34.910    Location:  [facility]    Surgeon:  Nancy Stern INFORMATION:                          Date: 2022    Time: 9:30              Anesthesia:  MAC/TIVA                                                       Status:  Outpatient        Special Comments:         Electronically signed by Lex Jama MA on 2022 at 1:58 PM

## 2022-08-03 ENCOUNTER — OFFICE VISIT (OUTPATIENT)
Dept: SURGERY | Age: 62
End: 2022-08-03
Payer: COMMERCIAL

## 2022-08-03 VITALS
DIASTOLIC BLOOD PRESSURE: 76 MMHG | WEIGHT: 282 LBS | RESPIRATION RATE: 18 BRPM | BODY MASS INDEX: 39.48 KG/M2 | SYSTOLIC BLOOD PRESSURE: 139 MMHG | OXYGEN SATURATION: 96 % | HEART RATE: 103 BPM | HEIGHT: 71 IN

## 2022-08-03 DIAGNOSIS — C34.91 MALIGNANT NEOPLASM OF RIGHT LUNG, UNSPECIFIED PART OF LUNG (HCC): Primary | ICD-10-CM

## 2022-08-03 PROCEDURE — 99204 OFFICE O/P NEW MOD 45 MIN: CPT | Performed by: SURGERY

## 2022-08-03 NOTE — H&P (VIEW-ONLY)
Patient's Name/Date of Birth: Gerry Garcia / 1960    Date: 8/3/2022    PCP: Kathya Short MD    Chief Complaint   Patient presents with    New Patient     Mediport consult        HPI:  Patient here for evaluation for mediport insertion. Patient with Hx of lung CA, nd is anticipating starting another round of chemotherapy. Patient's medications, allergies, past medical, surgical, social and family histories were reviewed and updated as appropriate.     Allergies   Allergen Reactions    Zocor [Simvastatin - High Dose] Other (See Comments)     Causes Rhabdomyolysis       Past Medical History:   Diagnosis Date    A-fib ([address]     Acute on chronic diastolic (congestive) heart failure (HCC) 11/22/2021    Blood transfusion reaction     CAD (coronary artery disease)     COPD (chronic obstructive pulmonary disease) (HCC)     GERD (gastroesophageal reflux disease)     History of blood transfusion     History of cardioversion 04/10/2020    Dr Sina Sanchez    Hyperlipidemia     Hypertension     Malignant neoplasm of lung [facility])     Sleep apnea         Past Surgical History:   Procedure Laterality Date    BRONCHOSCOPY N/A 06/04/2020    BRONCHOSCOPY  NAVIGATIONAL performed by Adamaris Koo DO at [address] [place] [address]  06/04/2020    BRONCHOSCOPY/TRANSBRONCHIAL NEEDLE BIOPSY performed by Adamaris Koo DO at [address] [place] [address]  06/04/2020    BRONCHOSCOPY BIOPSY BRONCHUS performed by Adamaris Koo DO at [address] [place] [address]  06/04/2020    BRONCHOSCOPY BRUSHINGS performed by Adamaris Koo DO at [address] [place] [address]  06/04/2020    BRONCHOSCOPY W/EBUS FNA performed by Adamaris Koo DO at [address] [place] [address]  06/04/2020    BRONCHOSCOPY ALVEOLAR LAVAGE performed by Adamaris Koo DO at [place] [address]  05/14/2021    successful   Dr Dari Bustamante  03/20/2014    3.5/18 Resolute to Mid-Cx    CORONARY ARTERY BYPASS GRAFT  2007    DIAGNOSTIC CARDIAC CATH LAB PROCEDURE  2007    ECHO COMPL W DOP COLOR FLOW  2012         TRANSESOPHAGEAL ECHOCARDIOGRAM  2020    julio césar        Social History     Tobacco Use    Smoking status: Former     Packs/day: 1.50     Years: 40.00     Pack years: 60.00     Types: Cigarettes     Start date:      Quit date: 2020     Years since quittin.0    Smokeless tobacco: Never   Substance Use Topics    Alcohol use:  Yes     Alcohol/week: 4.0 - 6.0 standard drinks     Types: 4 - 6 Cans of beer per week     Comment: on weekend       Current Outpatient Medications   Medication Sig Dispense Refill    spironolactone (ALDACTONE) 25 MG tablet Take 1 tablet by mouth daily 45 tablet 1    apixaban (ELIQUIS) 5 MG TABS tablet TAKE 1 TABLET TWICE A  tablet 3    ASPIRIN LOW DOSE 81 MG EC tablet TAKE 1 TABLET DAILY 90 tablet 3    lisinopril (PRINIVIL;ZESTRIL) 5 MG tablet TAKE 1 TABLET DAILY (DOSE DECREASED) 90 tablet 3    metoprolol succinate (TOPROL XL) 100 MG extended release tablet Take 1 tablet by mouth 2 times daily 180 tablet 3    furosemide (LASIX) 20 MG tablet Take 1 tablet by mouth daily 30 tablet 3    albuterol (PROVENTIL) (2.5 MG/3ML) 0.083% nebulizer solution Take 0.083 mLs by nebulization 4 times daily as needed      fluticasone-umeclidin-vilant (TRELEGY ELLIPTA) 100-62.5-25 MCG/INH AEPB Inhale 1 puff into the lungs daily 1 each 5    PROAIR  (90 Base) MCG/ACT inhaler INHALE TWO PUFFS BY MOUTH EVERY 4 TO 6 HOURS AS NEEDED      ipratropium-albuterol (DUONEB) 0.5-2.5 (3) MG/3ML SOLN nebulizer solution Inhale 3 mLs into the lungs every 4 hours 360 mL 0    nitroGLYCERIN (NITROSTAT) 0.4 MG SL tablet Place 1 tablet under the tongue every 5 minutes as needed for Chest pain 25 tablet 0    CRESTOR 40 MG tablet 40 mg daily       vitamin D (ERGOCALCIFEROL) 41910 UNITS CAPS capsule Take 50,000 Units by mouth once a week       Afatinib Dimaleate (GILOTRIF) 40 MG TABS Take by mouth daily (Patient not taking: Reported on 8/3/2022)       No current facility-administered medications for this visit. Labs:      Review of Systems  Constitutional: negative  Eyes: negative  Ears, nose, mouth, throat, and face: negative  Respiratory: negative  Cardiovascular: negative  Gastrointestinal: negative  Genitourinary:negative  Integument/breast: negative  Hematologic/lymphatic: negative  Musculoskeletal:negative  Neurological: negative  Allergic/Immunologic: negative    Physical exam:  /76   Pulse (!) 103   Resp 18   Ht 5' 11\" (1.803 m)   Wt 282 lb (127.9 kg)   SpO2 96%   BMI 39.33 kg/m²   General appearance: no acute distress  Head:NCAT, EOMI, PERRLA, conjunctiva pink  Neck: no masses, supple  Lungs: CTABL  Heart: RRR  Abdomen: soft, nondistended, nontender, no guarding, no peritoneal signs, normoactive bowel sounds  Extremities:no edema  Neuro exam: normal  Skin: no lesions, no rashes    Assessment/Plan:  . Proceed with mediport insertion  The procedure risks, benfits, possible complications including but not limited to bleeding, infection, pneumothorax and alternative options where explained to the patient, he understands and agrees to proceed with surgery. No follow-ups on file.     Shari San MD      Send copy of H&P to PCP, Blaire Kyle MD

## 2022-08-03 NOTE — PROGRESS NOTES
[facility] PRE-ADMISSION TESTING INSTRUCTIONS    The Preadmission Testing patient is instructed accordingly using the following criteria (check applicable):    ARRIVAL INSTRUCTIONS:  [x] Parking the day of Surgery is located in the Main Entrance lot. Upon entering the door, make an immediate right to the surgery reception desk    [x] Bring photo ID and insurance card    [] Bring in a copy of Living will or Durable Power of  papers. [x] Please be sure to arrange for responsible adult to provide transportation to and from the hospital    [x] Please arrange for responsible adult to be with you for the 24 hour period post procedure due to having anesthesia    [x] If you awake am of surgery not feeling well or have temperature >100 please call [phone]    GENERAL INSTRUCTIONS:    [x] Nothing by mouth after midnight, including gum, candy, mints or water    [x] You may brush your teeth, but do not swallow any water    [x] Take medications as instructed with 1-2 oz of water    [] Stop herbal supplements and vitamins 5 days prior to procedure    [x] Follow preop dosing of blood thinners per physician instructions    [] Take 1/2 dose of evening insulin, but no insulin after midnight    [] No oral diabetic medications after midnight    [] If diabetic and have low blood sugar or feel symptomatic, take 1-2oz apple juice only    [x] Bring inhalers day of surgery    [] Bring C-PAP/ Bi-Pap day of surgery    [] Bring urine specimen day of surgery    [x] Shower or bath with soap, lather and rinse well, AM of Surgery, no lotion, powders or creams to surgical site    [] Follow bowel prep as instructed per surgeon    [x] No tobacco products within 24 hours of surgery     [x] No alcohol or illegal drug use within 24 hours of surgery.     [x] Jewelry, body piercing's, eyeglasses, contact lenses and dentures are not permitted into surgery (bring cases)      [] Please do not wear any nail polish, make up or hair products on the day of surgery    [x] You can expect a call the business day prior to procedure to notify you if your arrival time changes    [x] If you receive a survey after surgery we would greatly appreciate your comments    [] Parent/guardian of a minor must accompany their child and remain on the premises  the entire time they are under our care     [] Pediatric patients may bring favorite toy, blanket or comfort item with them    [] A caregiver or family member must remain with the patient during their stay if they are mentally handicapped, have dementia, disoriented or unable to use a call light or would be a safety concern if left unattended    [x] Please notify surgeon if you develop any illness between now and time of surgery (cold, cough, sore throat, fever, nausea, vomiting) or any signs of infections  including skin, wounds, and dental.    [x]  The Outpatient Pharmacy is available to fill your prescription here on your day of surgery, ask your preop nurse for details    [] Other instructions    EDUCATIONAL MATERIALS PROVIDED:    [] PAT Preoperative Education Packet/Booklet     [] Medication List    [] Transfusion bracelet applied with instructions    [] Shower with soap, lather and rinse well, and use CHG wipes provided the evening before surgery as instructed    [] Incentive spirometer with instructions

## 2022-08-03 NOTE — PROGRESS NOTES
Patient's Name/Date of Birth: Chris Mtz / 1960    Date: 8/3/2022    PCP: Alan Nicholson MD    Chief Complaint   Patient presents with    New Patient     Mediport consult        HPI:  Patient here for evaluation for mediport insertion. Patient with Hx of lung CA, nd is anticipating starting another round of chemotherapy. Patient's medications, allergies, past medical, surgical, social and family histories were reviewed and updated as appropriate.     Allergies   Allergen Reactions    Zocor [Simvastatin - High Dose] Other (See Comments)     Causes Rhabdomyolysis       Past Medical History:   Diagnosis Date    A-fib ([address]     Acute on chronic diastolic (congestive) heart failure (HCC) 11/22/2021    Blood transfusion reaction     CAD (coronary artery disease)     COPD (chronic obstructive pulmonary disease) (HCC)     GERD (gastroesophageal reflux disease)     History of blood transfusion     History of cardioversion 04/10/2020    Dr Lakeisha Espinal    Hyperlipidemia     Hypertension     Malignant neoplasm of lung [facility])     Sleep apnea         Past Surgical History:   Procedure Laterality Date    BRONCHOSCOPY N/A 06/04/2020    BRONCHOSCOPY  NAVIGATIONAL performed by Christopher Antis DO at [address] [facility]  06/04/2020    BRONCHOSCOPY/TRANSBRONCHIAL NEEDLE BIOPSY performed by Christopher Antis DO at [address] [facility]  06/04/2020    BRONCHOSCOPY BIOPSY BRONCHUS performed by Christopher Antis DO at [address] [facility]  06/04/2020    BRONCHOSCOPY BRUSHINGS performed by Christopher Antis DO at [address] [facility]  06/04/2020    BRONCHOSCOPY W/EBUS FNA performed by Christopher Antis DO at [address] [facility]  06/04/2020    BRONCHOSCOPY ALVEOLAR LAVAGE performed by Christopher Antis DO at [place] [address]  05/14/2021    successful   Dr Bela Kim  03/20/2014    3.5/18 Resolute to Mid-Cx    CORONARY ARTERY BYPASS GRAFT  2007    DIAGNOSTIC CARDIAC CATH LAB PROCEDURE  2007    ECHO COMPL W DOP COLOR FLOW  2012         TRANSESOPHAGEAL ECHOCARDIOGRAM  2020    julio césar        Social History     Tobacco Use    Smoking status: Former     Packs/day: 1.50     Years: 40.00     Pack years: 60.00     Types: Cigarettes     Start date:      Quit date: 2020     Years since quittin.0    Smokeless tobacco: Never   Substance Use Topics    Alcohol use:  Yes     Alcohol/week: 4.0 - 6.0 standard drinks     Types: 4 - 6 Cans of beer per week     Comment: on weekend       Current Outpatient Medications   Medication Sig Dispense Refill    spironolactone (ALDACTONE) 25 MG tablet Take 1 tablet by mouth daily 45 tablet 1    apixaban (ELIQUIS) 5 MG TABS tablet TAKE 1 TABLET TWICE A  tablet 3    ASPIRIN LOW DOSE 81 MG EC tablet TAKE 1 TABLET DAILY 90 tablet 3    lisinopril (PRINIVIL;ZESTRIL) 5 MG tablet TAKE 1 TABLET DAILY (DOSE DECREASED) 90 tablet 3    metoprolol succinate (TOPROL XL) 100 MG extended release tablet Take 1 tablet by mouth 2 times daily 180 tablet 3    furosemide (LASIX) 20 MG tablet Take 1 tablet by mouth daily 30 tablet 3    albuterol (PROVENTIL) (2.5 MG/3ML) 0.083% nebulizer solution Take 0.083 mLs by nebulization 4 times daily as needed      fluticasone-umeclidin-vilant (TRELEGY ELLIPTA) 100-62.5-25 MCG/INH AEPB Inhale 1 puff into the lungs daily 1 each 5    PROAIR  (90 Base) MCG/ACT inhaler INHALE TWO PUFFS BY MOUTH EVERY 4 TO 6 HOURS AS NEEDED      ipratropium-albuterol (DUONEB) 0.5-2.5 (3) MG/3ML SOLN nebulizer solution Inhale 3 mLs into the lungs every 4 hours 360 mL 0    nitroGLYCERIN (NITROSTAT) 0.4 MG SL tablet Place 1 tablet under the tongue every 5 minutes as needed for Chest pain 25 tablet 0    CRESTOR 40 MG tablet 40 mg daily       vitamin D (ERGOCALCIFEROL) 24396 UNITS CAPS capsule Take 50,000 Units by mouth once a week       Afatinib Dimaleate (GILOTRIF) 40 MG TABS Take by mouth daily (Patient not taking: Reported on 8/3/2022)       No current facility-administered medications for this visit. Labs:      Review of Systems  Constitutional: negative  Eyes: negative  Ears, nose, mouth, throat, and face: negative  Respiratory: negative  Cardiovascular: negative  Gastrointestinal: negative  Genitourinary:negative  Integument/breast: negative  Hematologic/lymphatic: negative  Musculoskeletal:negative  Neurological: negative  Allergic/Immunologic: negative    Physical exam:  /76   Pulse (!) 103   Resp 18   Ht 5' 11\" (1.803 m)   Wt 282 lb (127.9 kg)   SpO2 96%   BMI 39.33 kg/m²   General appearance: no acute distress  Head:NCAT, EOMI, PERRLA, conjunctiva pink  Neck: no masses, supple  Lungs: CTABL  Heart: RRR  Abdomen: soft, nondistended, nontender, no guarding, no peritoneal signs, normoactive bowel sounds  Extremities:no edema  Neuro exam: normal  Skin: no lesions, no rashes    Assessment/Plan:  . Proceed with mediport insertion  The procedure risks, benfits, possible complications including but not limited to bleeding, infection, pneumothorax and alternative options where explained to the patient, he understands and agrees to proceed with surgery. No follow-ups on file.     Clark Beavers MD      Send copy of H&P to PCP, Boo Valdez MD

## 2022-08-04 ENCOUNTER — ANESTHESIA EVENT (OUTPATIENT)
Dept: OPERATING ROOM | Age: 62
End: 2022-08-04
Payer: COMMERCIAL

## 2022-08-04 NOTE — ANESTHESIA PRE PROCEDURE
Department of Anesthesiology  Preprocedure Note       Name:  Andrew Bartlett   Age:  62 y.o.  :  1960                                          MRN:  [id number]         Date:  2022      Surgeon: Gaudencio Chamorro):  Jennifer Shultz MD    Procedure: Procedure(s):  MEDI PORT INSERTION   ++STAFF FROM ROOM 6++    Medications prior to admission:   Prior to Admission medications    Medication Sig Start Date End Date Taking?  Authorizing Provider   spironolactone (ALDACTONE) 25 MG tablet Take 1 tablet by mouth daily 6/3/22   SIENNA Bowen CNP   apixaban (ELIQUIS) 5 MG TABS tablet TAKE 1 TABLET TWICE A DAY 22   Blaire Smith MD   ASPIRIN LOW DOSE 81 MG EC tablet TAKE 1 TABLET DAILY 22   Blaire Smith MD   lisinopril (PRINIVIL;ZESTRIL) 5 MG tablet TAKE 1 TABLET DAILY (DOSE DECREASED) 22   Frank Phoenix,    metoprolol succinate (TOPROL XL) 100 MG extended release tablet Take 1 tablet by mouth 2 times daily 21   SIENNA Bowen CNP   furosemide (LASIX) 20 MG tablet Take 1 tablet by mouth daily 21   SIENNA Bowen CNP   albuterol (PROVENTIL) (2.5 MG/3ML) 0.083% nebulizer solution Take 0.083 mLs by nebulization 4 times daily as needed 21   Historical Provider, MD   fluticasone-umeclidin-vilant (TRELEGY ELLIPTA) 100-62.5-25 MCG/INH AEPB Inhale 1 puff into the lungs daily 21   SIENNA Hinojosa CNP   PROAIR  (90 Base) MCG/ACT inhaler INHALE TWO PUFFS BY MOUTH EVERY 4 TO 6 HOURS AS NEEDED 20   Historical Provider, MD   ipratropium-albuterol (DUONEB) 0.5-2.5 (3) MG/3ML SOLN nebulizer solution Inhale 3 mLs into the lungs every 4 hours 19   Abraham White MD   nitroGLYCERIN (NITROSTAT) 0.4 MG SL tablet Place 1 tablet under the tongue every 5 minutes as needed for Chest pain 8/6/15   Sulaiman Paulino MD   CRESTOR 40 MG tablet Take 20 mg by mouth daily 2/7/15   Historical Provider, MD   vitamin D (ERGOCALCIFEROL) 82805 UNITS CAPS capsule Take 50,000 Units by mouth once a week mondays 3/6/13   Historical Provider, MD       Current medications:    No current facility-administered medications for this encounter. Current Outpatient Medications   Medication Sig Dispense Refill    spironolactone (ALDACTONE) 25 MG tablet Take 1 tablet by mouth daily 45 tablet 1    apixaban (ELIQUIS) 5 MG TABS tablet TAKE 1 TABLET TWICE A  tablet 3    ASPIRIN LOW DOSE 81 MG EC tablet TAKE 1 TABLET DAILY 90 tablet 3    lisinopril (PRINIVIL;ZESTRIL) 5 MG tablet TAKE 1 TABLET DAILY (DOSE DECREASED) 90 tablet 3    metoprolol succinate (TOPROL XL) 100 MG extended release tablet Take 1 tablet by mouth 2 times daily 180 tablet 3    furosemide (LASIX) 20 MG tablet Take 1 tablet by mouth daily 30 tablet 3    albuterol (PROVENTIL) (2.5 MG/3ML) 0.083% nebulizer solution Take 0.083 mLs by nebulization 4 times daily as needed      fluticasone-umeclidin-vilant (TRELEGY ELLIPTA) 100-62.5-25 MCG/INH AEPB Inhale 1 puff into the lungs daily 1 each 5    PROAIR  (90 Base) MCG/ACT inhaler INHALE TWO PUFFS BY MOUTH EVERY 4 TO 6 HOURS AS NEEDED      ipratropium-albuterol (DUONEB) 0.5-2.5 (3) MG/3ML SOLN nebulizer solution Inhale 3 mLs into the lungs every 4 hours 360 mL 0    nitroGLYCERIN (NITROSTAT) 0.4 MG SL tablet Place 1 tablet under the tongue every 5 minutes as needed for Chest pain 25 tablet 0    CRESTOR 40 MG tablet Take 20 mg by mouth daily      vitamin D (ERGOCALCIFEROL) 71256 UNITS CAPS capsule Take 50,000 Units by mouth once a week mondays         Allergies:     Allergies   Allergen Reactions    Zocor [Simvastatin - High Dose] Other (See Comments)     Causes Rhabdomyolysis       Problem List:    Patient Active Problem List   Diagnosis Code    CAD (coronary artery disease) I25.10    Essential (primary) hypertension I10    Gastro-esophageal reflux disease without esophagitis K21.9    COPD (chronic obstructive pulmonary disease) ([facility]) J44.9    Chest pain R07.9    Unspecified atrial fibrillation (HCC) I48.91    Malignant neoplasm of lung (HCC) C34.90    Acute respiratory failure with hypoxia (HCC) J96.01    Hyperlipidemia, unspecified E78.5    AF (atrial fibrillation) (HCC) I48.91    Pleural effusion on right J90    ANTONIO (obstructive sleep apnea) G47.33    Former smoker Z87.891    Acute on chronic diastolic (congestive) heart failure (HCC) I50.33       Past Medical History:        Diagnosis Date    A-fib [facility]     Acute on chronic diastolic (congestive) heart failure ([facility] [facility] 11/22/2021    Blood transfusion reaction     CAD (coronary artery disease)     COPD (chronic obstructive pulmonary disease) (HCC)     GERD (gastroesophageal reflux disease)     History of blood transfusion     Hyperlipidemia     Hypertension     Malignant neoplasm of lung (HCC)     ANTONIO on CPAP        Past Surgical History:        Procedure Laterality Date    BRONCHOSCOPY N/A 06/04/2020    BRONCHOSCOPY  NAVIGATIONAL performed by Chris Eric DO at [address] [place] [address]  06/04/2020    BRONCHOSCOPY/TRANSBRONCHIAL NEEDLE BIOPSY performed by Chris Eric DO at [address] [place] [address]  06/04/2020    BRONCHOSCOPY BIOPSY BRONCHUS performed by Chris Eric DO at [address] [place] [address]  06/04/2020    BRONCHOSCOPY BRUSHINGS performed by Chris Eric DO at [address] [place] [address]  06/04/2020    BRONCHOSCOPY W/EBUS FNA performed by Chris Eric DO at [address] [place] [address]  06/04/2020    BRONCHOSCOPY ALVEOLAR LAVAGE performed by Chris Eric DO at [facility]  05/14/2021    successful   Dr Gonzalez Page  03/20/2014    3.5/18 Resolute to Mid-Cx    CORONARY ARTERY BYPASS GRAFT  04/18/2007    DIAGNOSTIC CARDIAC CATH LAB PROCEDURE  04/17/2007    ECHO COMPL W DOP COLOR FLOW  03/22/2012         TRANSESOPHAGEAL ECHOCARDIOGRAM  02/28/2020    julio césar Social History:    Social History     Tobacco Use    Smoking status: Former     Packs/day: 1.50     Years: 40.00     Pack years: 60.00     Types: Cigarettes     Start date: 5     Quit date: 2020     Years since quittin.0    Smokeless tobacco: Never   Substance Use Topics    Alcohol use: Yes     Alcohol/week: 4.0 - 6.0 standard drinks     Types: 4 - 6 Cans of beer per week                                Counseling given: Not Answered      Vital Signs (Current):   Vitals:    22 1522   Weight: 280 lb (127 kg)   Height: 5' 11\" (1.803 m)                                              BP Readings from Last 3 Encounters:   22 139/76   22 120/67   22 136/75       NPO Status:                                                                                 BMI:   Wt Readings from Last 3 Encounters:   22 282 lb (127.9 kg)   22 271 lb (122.9 kg)   22 262 lb (118.8 kg)     Body mass index is 39.05 kg/m².     CBC:   Lab Results   Component Value Date/Time    WBC 7.4 2021 03:19 AM    RBC 5.22 2021 03:19 AM    HGB 14.3 2021 03:19 AM    HCT 44.6 2021 03:19 AM    MCV 85.4 2021 03:19 AM    RDW 15.1 2021 03:19 AM     2021 03:19 AM       CMP:   Lab Results   Component Value Date/Time     2022 02:00 PM    K 4.9 2022 02:00 PM    K 4.3 2021 03:19 AM     2022 02:00 PM    CO2 24 2022 02:00 PM    BUN 22 2022 02:38 PM    CREATININE 1.2 2022 02:38 PM    GFRAA >60 2022 02:38 PM    LABGLOM >60 2022 02:38 PM    GLUCOSE 107 2022 02:00 PM    GLUCOSE 129 2011 02:16 PM    PROT 6.1 2021 03:19 AM    CALCIUM 9.0 2022 02:00 PM    BILITOT 0.5 2021 03:19 AM    ALKPHOS 83 2021 03:19 AM    AST 16 2021 03:19 AM    ALT 10 2021 03:19 AM       POC Tests: No results for input(s): POCGLU, POCNA, POCK, POCCL, POCBUN, POCHEMO, POCHCT in the last 72 hours.    Coags:   Lab Results   Component Value Date/Time    PROTIME 14.1 05/13/2021 04:44 AM    INR 1.3 05/13/2021 04:44 AM    APTT 43.5 03/23/2021 09:20 AM       HCG (If Applicable): No results found for: PREGTESTUR, PREGSERUM, HCG, HCGQUANT     ABGs: No results found for: PHART, PO2ART, SXZ4RFS, LGR2QZY, BEART, C1BHSGYQ     Type & Screen (If Applicable):  No results found for: LABABO, LABRH    Drug/Infectious Status (If Applicable):  No results found for: HIV, HEPCAB    COVID-19 Screening (If Applicable):   Lab Results   Component Value Date/Time    COVID19 Not Detected 11/22/2021 09:57 AM    COVID19 Not Detected 05/06/2021 11:05 AM           Anesthesia Evaluation  Patient summary reviewed and Nursing notes reviewed no history of anesthetic complications:   Airway: Mallampati: IV  TM distance: >3 FB   Neck ROM: limited  Mouth opening: > = 3 FB   Dental: normal exam         Pulmonary: breath sounds clear to auscultation  (+) COPD:  sleep apnea:                            ROS comment: Lung Ca   Cardiovascular:    (+) hypertension:, CAD:, CHF: systolic,       ECG reviewed  Rhythm: irregular  Rate: normal  Echocardiogram reviewed               ROS comment: Mild systolic dysfunction on Echo EF 50% severe LVH     Neuro/Psych:   Negative Neuro/Psych ROS              GI/Hepatic/Renal:   (+) GERD:,           Endo/Other: Negative Endo/Other ROS                    Abdominal:   (+) obese,           Vascular: negative vascular ROS. Other Findings:           Anesthesia Plan      MAC     ASA 4       Induction: intravenous. continuous noninvasive hemodynamic monitor    Anesthetic plan and risks discussed with patient. Daylin Dewey MD   8/4/2022          Patient seen and chart reviewed. No interval change in history or exam.   Anesthesia plan discussed, risk/benefits addressed. Patient's concerns and questions answered.      SIENNA Weber - CRNA  August 5, 2022  8:41 AM

## 2022-08-05 ENCOUNTER — APPOINTMENT (OUTPATIENT)
Dept: GENERAL RADIOLOGY | Age: 62
End: 2022-08-05
Attending: SURGERY
Payer: COMMERCIAL

## 2022-08-05 ENCOUNTER — HOSPITAL ENCOUNTER (OUTPATIENT)
Age: 62
Setting detail: OUTPATIENT SURGERY
Discharge: HOME | End: 2022-08-05
Attending: SURGERY | Admitting: SURGERY
Payer: COMMERCIAL

## 2022-08-05 ENCOUNTER — ANESTHESIA (OUTPATIENT)
Dept: OPERATING ROOM | Age: 62
End: 2022-08-05
Payer: COMMERCIAL

## 2022-08-05 ENCOUNTER — HOSPITAL ENCOUNTER (OUTPATIENT)
Dept: GENERAL RADIOLOGY | Age: 62
Discharge: HOME | End: 2022-08-07
Attending: SURGERY
Payer: COMMERCIAL

## 2022-08-05 VITALS
HEIGHT: 71 IN | WEIGHT: 280 LBS | SYSTOLIC BLOOD PRESSURE: 122 MMHG | DIASTOLIC BLOOD PRESSURE: 70 MMHG | HEART RATE: 95 BPM | OXYGEN SATURATION: 94 % | TEMPERATURE: 97.1 F | BODY MASS INDEX: 39.2 KG/M2 | RESPIRATION RATE: 18 BRPM

## 2022-08-05 DIAGNOSIS — R52 PAIN: ICD-10-CM

## 2022-08-05 PROCEDURE — 3700000000 HC ANESTHESIA ATTENDED CARE: Performed by: SURGERY

## 2022-08-05 PROCEDURE — 3209999900 FLUORO FOR SURGICAL PROCEDURES

## 2022-08-05 PROCEDURE — 71045 X-RAY EXAM CHEST 1 VIEW: CPT

## 2022-08-05 PROCEDURE — 2709999900 HC NON-CHARGEABLE SUPPLY: Performed by: SURGERY

## 2022-08-05 PROCEDURE — 6360000002 HC RX W HCPCS: Performed by: NURSE ANESTHETIST, CERTIFIED REGISTERED

## 2022-08-05 PROCEDURE — 2580000003 HC RX 258: Performed by: NURSE ANESTHETIST, CERTIFIED REGISTERED

## 2022-08-05 PROCEDURE — 3700000001 HC ADD 15 MINUTES (ANESTHESIA): Performed by: SURGERY

## 2022-08-05 PROCEDURE — 6360000002 HC RX W HCPCS: Performed by: SURGERY

## 2022-08-05 PROCEDURE — 77001 FLUOROGUIDE FOR VEIN DEVICE: CPT | Performed by: SURGERY

## 2022-08-05 PROCEDURE — 36561 INSERT TUNNELED CV CATH: CPT | Performed by: SURGERY

## 2022-08-05 PROCEDURE — 2500000003 HC RX 250 WO HCPCS: Performed by: SURGERY

## 2022-08-05 PROCEDURE — 2580000003 HC RX 258: Performed by: SURGERY

## 2022-08-05 PROCEDURE — 7100000011 HC PHASE II RECOVERY - ADDTL 15 MIN: Performed by: SURGERY

## 2022-08-05 PROCEDURE — 7100000010 HC PHASE II RECOVERY - FIRST 15 MIN: Performed by: SURGERY

## 2022-08-05 PROCEDURE — 6360000004 HC RX CONTRAST MEDICATION: Performed by: SURGERY

## 2022-08-05 PROCEDURE — 3600000012 HC SURGERY LEVEL 2 ADDTL 15MIN: Performed by: SURGERY

## 2022-08-05 PROCEDURE — C1788 PORT, INDWELLING, IMP: HCPCS | Performed by: SURGERY

## 2022-08-05 PROCEDURE — 3600000002 HC SURGERY LEVEL 2 BASE: Performed by: SURGERY

## 2022-08-05 RX ORDER — HEPARIN SODIUM (PORCINE) LOCK FLUSH IV SOLN 100 UNIT/ML 100 UNIT/ML
SOLUTION INTRAVENOUS PRN
Status: DISCONTINUED | OUTPATIENT
Start: 2022-08-05 | End: 2022-08-05 | Stop reason: ALTCHOICE

## 2022-08-05 RX ORDER — PROPOFOL 10 MG/ML
INJECTION, EMULSION INTRAVENOUS CONTINUOUS PRN
Status: DISCONTINUED | OUTPATIENT
Start: 2022-08-05 | End: 2022-08-05 | Stop reason: SDUPTHER

## 2022-08-05 RX ORDER — SODIUM CHLORIDE 0.9 % (FLUSH) 0.9 %
5-40 SYRINGE (ML) INJECTION EVERY 12 HOURS SCHEDULED
Status: DISCONTINUED | OUTPATIENT
Start: 2022-08-05 | End: 2022-08-05 | Stop reason: HOSPADM

## 2022-08-05 RX ORDER — SODIUM CHLORIDE 9 MG/ML
INJECTION, SOLUTION INTRAVENOUS CONTINUOUS PRN
Status: DISCONTINUED | OUTPATIENT
Start: 2022-08-05 | End: 2022-08-05 | Stop reason: SDUPTHER

## 2022-08-05 RX ORDER — SODIUM CHLORIDE 0.9 % (FLUSH) 0.9 %
5-40 SYRINGE (ML) INJECTION PRN
Status: DISCONTINUED | OUTPATIENT
Start: 2022-08-05 | End: 2022-08-05 | Stop reason: HOSPADM

## 2022-08-05 RX ORDER — FENTANYL CITRATE 50 UG/ML
INJECTION, SOLUTION INTRAMUSCULAR; INTRAVENOUS PRN
Status: DISCONTINUED | OUTPATIENT
Start: 2022-08-05 | End: 2022-08-05 | Stop reason: SDUPTHER

## 2022-08-05 RX ORDER — SODIUM CHLORIDE 9 MG/ML
INJECTION, SOLUTION INTRAVENOUS CONTINUOUS
Status: DISCONTINUED | OUTPATIENT
Start: 2022-08-05 | End: 2022-08-05 | Stop reason: HOSPADM

## 2022-08-05 RX ORDER — LIDOCAINE HYDROCHLORIDE AND EPINEPHRINE 10; 10 MG/ML; UG/ML
INJECTION, SOLUTION INFILTRATION; PERINEURAL PRN
Status: DISCONTINUED | OUTPATIENT
Start: 2022-08-05 | End: 2022-08-05 | Stop reason: ALTCHOICE

## 2022-08-05 RX ORDER — SODIUM CHLORIDE 9 MG/ML
INJECTION, SOLUTION INTRAVENOUS PRN
Status: DISCONTINUED | OUTPATIENT
Start: 2022-08-05 | End: 2022-08-05 | Stop reason: HOSPADM

## 2022-08-05 RX ADMIN — FENTANYL CITRATE 100 MCG: 50 INJECTION, SOLUTION INTRAMUSCULAR; INTRAVENOUS at 09:12

## 2022-08-05 RX ADMIN — PROPOFOL 100 MCG/KG/MIN: 10 INJECTION, EMULSION INTRAVENOUS at 09:12

## 2022-08-05 RX ADMIN — SODIUM CHLORIDE: 9 INJECTION, SOLUTION INTRAVENOUS at 09:02

## 2022-08-05 RX ADMIN — CEFAZOLIN 3000 MG: 10 INJECTION, POWDER, FOR SOLUTION INTRAVENOUS at 09:02

## 2022-08-05 NOTE — ANESTHESIA POSTPROCEDURE EVALUATION
Department of Anesthesiology  Postprocedure Note    Patient: Parish Marshall  MRN: [id number]  YOB: 1960  Date of evaluation: 8/5/2022      Procedure Summary     Date: 08/05/22 Room / Location: [facility] 02 / [facility]    Anesthesia Start: 8528 Anesthesia Stop: 1020    Procedure: MEDI PORT INSERTION (Chest) Diagnosis:       Malignant neoplasm of lung, unspecified laterality, unspecified part of lung ([address]      (Malignant neoplasm of lung, unspecified laterality, unspecified part of lung ([address] [C34.90])    Surgeons: Mónica Quintana MD Responsible Provider: Nash Morse MD    Anesthesia Type: MAC ASA Status: 4          Anesthesia Type: No value filed.     Rossana Phase I: Rossana Score: 10    Rossana Phase II: Rossana Score: 10      Anesthesia Post Evaluation    Patient location during evaluation: PACU  Patient participation: complete - patient participated  Level of consciousness: awake and alert  Pain score: 2  Airway patency: patent  Nausea & Vomiting: no nausea and no vomiting  Complications: no  Cardiovascular status: blood pressure returned to baseline  Respiratory status: acceptable  Hydration status: euvolemic

## 2022-08-05 NOTE — OP NOTE
Operative Note      Patient: Anita Lora  YOB: 1960  MRN: [id number]    Date of Procedure: 8/5/2022    Pre-Op Diagnosis: Malignant ne[facility] of lung, unspecified laterality, unspecified [facility] [C34.90]    Post-Op Diagnosis: Same       Procedure(s):  MEDI PORT INSERTION  Intraoperative fluoroscopy  Surgeon(s):  Mirlande No MD    Assistant:   Resident: Julius Munoz MD    Anesthesia: Monitor Anesthesia Care    Estimated Blood Loss (mL): Minimal    Complications: None    Specimens:   * No specimens in log *    Implants:  Implant Name Type Inv. Item Serial No.  Lot No. LRB No. Used Action   PORT INFUS 8FR PWR INJ CT FOR VASC ACCS CATH - [id number]  PORT INFUS 8FR PWR INJ CT FOR VASC ACCS CATH  [employer]- [id number] Right 1 Implanted         Drains: * No LDAs found *    Findings: right subclavian port placement. Indications: Patient is a 62 y.o. male who was diagnosed with the above. Risks/Benefits/Alternatives were discussed with the patient, including bleeding, infection, pneumothorax. The patient agreed to undergo the procedure and informed consent was obtained. Procedure: After informed consent, the patient was brought to the operating room and placed supine. MAC anesthesia was then induced which the patient tolerated well. Time out was performed to identify the correct patient and procedure. They received appropriate perioperative antibiotics. The chest and neck was prepped and draped in the usual sterile fashion. After adequate anesthesia, introducer needle was used to cannulate the Right subclavian vein on the 2nd attempt. Guidewire was then inserted and fluoroscopy was used to identify correct placement at cavo-atrial junction. A 2 cm skin incision was made just beneath the Right clavicle, encompassing the wire. A small pocket was made for the port both bluntly and with cautery.   The dilator was then placed over the guidewire in Seldinger technique the catheter, which was pre-flushed with heparinized saline, was inserted to approximately the cavo-atrial junction. Fluoroscopy identified this as adequate positioning. The catheter was cut at this length and the medport was attached and fastener applied. The port was not withdrawing easily but did inject. Contrast was injected under fluoro which showed the catheter was in proper position and patent. The port still was not able to be aspirated so the decision was made to replace the port only. The port was noted to withdraw easily and easily flushed with heparinized saline. Contrast was again injected under fluoroscopy and the catheter was noted to be patent and in the proper position. The port was then tacked to the fascia using 3 3-0 nylon sutures. Hemostasis was ensured with electrocautery. Vicryl suture was used to close the subcutaneous tissue. 4-0 Monocryl was used to close the skin. Skin glue was applied. Patient tolerated the procedure well and transferred to the recovery room in stable condition where chest x-ray is pending. Counts were correct at the end of the case. Dr. Yee Fagan was present and scrubbed for the procedure.     Bushra Tirado MD  08/05/22  10:20 AM       Bushra Tirado MD    CC : Ana Uriarte MD      Electronically signed by Bushra Tirado MD on 8/5/2022 at 10:18 AM

## 2022-08-05 NOTE — PROGRESS NOTES
Clarified with resident, ok to restart aspirin and eliquis tomorrow. Patient guide for mediport given to family along with discharge instructions.

## 2022-08-05 NOTE — ANESTHESIA POSTPROCEDURE EVALUATION
Department of Anesthesiology  Postprocedure Note    Patient: Andrew Bartlett  MRN: [id number]  YOB: 1960  Date of evaluation: 8/5/2022      Procedure Summary     Date: 08/05/22 Room / Location: [facility] OR 02 / [facility]    Anesthesia Start: 0078 Anesthesia Stop: 1020    Procedure: MEDI PORT INSERTION (Chest) Diagnosis:       Malignant neoplasm of lung, unspecified laterality, unspecified part of lung ([address]      (Malignant neoplasm of lung, unspecified laterality, unspecified part of lung ([address] [C34.90])    Surgeons: Jennifer Shultz MD Responsible Provider: Demetrio Yune MD    Anesthesia Type: MAC ASA Status: 4          Anesthesia Type: No value filed.     Rossana Phase I: Rossana Score: 10    Rossana Phase II:        Anesthesia Post Evaluation    Patient location during evaluation: PACU  Patient participation: complete - patient participated  Level of consciousness: awake and alert  Pain score: 2  Airway patency: patent  Nausea & Vomiting: no nausea and no vomiting  Complications: no  Cardiovascular status: hemodynamically stable  Respiratory status: acceptable

## 2022-08-05 NOTE — INTERVAL H&P NOTE
Update History & Physical    The patient's History and Physical of August 3, 2022 was reviewed with the patient and I examined the patient. There was no change. The surgical site was confirmed by the patient and me. Plan: The risks, benefits, expected outcome, and alternative to the recommended procedure have been discussed with the patient. Patient understands and wants to proceed with the procedure.      Electronically signed by Oswald Bhat MD on 8/5/2022 at 9:04 AM

## 2022-08-09 DIAGNOSIS — I50.32 CHRONIC HEART FAILURE WITH PRESERVED EJECTION FRACTION (HCC): ICD-10-CM

## 2022-08-09 DIAGNOSIS — Z79.899 NEW MEDICATION ADDED: ICD-10-CM

## 2022-08-12 DIAGNOSIS — I50.32 CHRONIC HEART FAILURE WITH PRESERVED EJECTION FRACTION (HCC): Primary | ICD-10-CM

## 2022-08-12 RX ORDER — FUROSEMIDE 20 MG/1
20 TABLET ORAL DAILY
Qty: 90 TABLET | Refills: 1 | Status: SHIPPED | OUTPATIENT
Start: 2022-08-12

## 2022-08-12 RX ORDER — SPIRONOLACTONE 25 MG/1
TABLET ORAL
Qty: 30 TABLET | Refills: 2 | OUTPATIENT
Start: 2022-08-12

## 2022-08-12 RX ORDER — SPIRONOLACTONE 25 MG/1
25 TABLET ORAL DAILY
Qty: 90 TABLET | Refills: 1 | Status: SHIPPED | OUTPATIENT
Start: 2022-08-12

## 2022-09-09 ENCOUNTER — TELEPHONE (OUTPATIENT)
Dept: NON INVASIVE DIAGNOSTICS | Age: 62
End: 2022-09-09

## 2022-09-09 NOTE — TELEPHONE ENCOUNTER
Called and offered the patient a appointment the week of 09/12/2022. He said that he has lung cancer and is going through chemotherapy and cannot make appointments at this current time. Will call back if he needs us in the future.

## 2022-09-27 ENCOUNTER — OFFICE VISIT (OUTPATIENT)
Dept: CARDIOLOGY CLINIC | Age: 62
End: 2022-09-27
Payer: COMMERCIAL

## 2022-09-27 VITALS
HEIGHT: 71 IN | BODY MASS INDEX: 39.9 KG/M2 | DIASTOLIC BLOOD PRESSURE: 70 MMHG | WEIGHT: 285 LBS | HEART RATE: 92 BPM | RESPIRATION RATE: 16 BRPM | SYSTOLIC BLOOD PRESSURE: 122 MMHG

## 2022-09-27 DIAGNOSIS — I25.10 CORONARY ARTERY DISEASE INVOLVING NATIVE CORONARY ARTERY OF NATIVE HEART WITHOUT ANGINA PECTORIS: Primary | ICD-10-CM

## 2022-09-27 PROCEDURE — 99214 OFFICE O/P EST MOD 30 MIN: CPT | Performed by: INTERNAL MEDICINE

## 2022-09-27 PROCEDURE — 93000 ELECTROCARDIOGRAM COMPLETE: CPT | Performed by: INTERNAL MEDICINE

## 2022-09-27 RX ORDER — METOPROLOL SUCCINATE 25 MG
25 TABLET, EXTENDED RELEASE 24 HR ORAL DAILY
Qty: 90 TABLET | Refills: 3
Start: 2022-09-27

## 2022-09-27 RX ORDER — PACLITAXEL 100 MG/20ML
260 INJECTION, POWDER, LYOPHILIZED, FOR SUSPENSION INTRAVENOUS ONCE
COMMUNITY

## 2022-09-27 RX ORDER — NITROGLYCERIN 0.4 MG/1
0.4 TABLET SUBLINGUAL EVERY 5 MIN PRN
Qty: 25 TABLET | Refills: 0 | Status: SHIPPED | OUTPATIENT
Start: 2022-09-27

## 2022-09-27 RX ORDER — CARBOPLATIN 10 MG/ML
INJECTION INTRAVENOUS ONCE
COMMUNITY

## 2022-09-27 NOTE — PROGRESS NOTES
Tyson Schmitt  1960  Date of Service: 2022    Patient Active Problem List    Diagnosis Date Noted    Acute on chronic diastolic (congestive) heart failure ([facility] 2021    Pleural effusion on right 2021    ANTONIO (obstructive sleep apnea) 2021    Former smoker 2021    AF (atrial fibrillation) ([facility] [address] 2021    Acute respiratory failure with hypoxia ([facility] [address] 01/15/2021    [facility]     Essential (primary) hypertension 2020    Gastro-esophageal reflux disease without esophagitis 2020    Unspecified atrial fibrillation ([facility] 2020    Hyperlipidemia, unspecified 2020    Chest pain 2019    CAD (coronary artery disease)      Overview Note:     Acute inferior MI (07). Cardiac catheterization (07):   LAD - proximal 85% bifurcating with first diagonal  D1 - ostial 50%  Cx - prox to mid 40%  OM1 - ostial 90%  RCA - prox diffuse 99% with heavy thrombus  Successful thrombectomy. Coronary artery bypass surgery by Dr. Sotero Banuelos (07)  LIMA to LAD  SVG to D1  SVG to PDA    Cath 3-14  70% Cx - 3.5 x 18 mm Resolute ELENA      COPD (chronic obstructive pulmonary disease) ([facility]        Social History     Socioeconomic History    Marital status:      Spouse name: None    Number of children: None    Years of education: None    Highest education level: None   Occupational History    Occupation:    Tobacco Use    Smoking status: Former     Packs/day: 1.50     Years: 40.00     Pack years: 60.00     Types: Cigarettes     Start date:      Quit date: 2020     Years since quittin.2     Passive exposure: Past    Smokeless tobacco: Never   Vaping Use    Vaping Use: Never used   Substance and Sexual Activity    Alcohol use:  Yes     Alcohol/week: 4.0 - 6.0 standard drinks     Types: 4 - 6 Cans of beer per week    Drug use: No    Sexual activity: Not Currently   Social History Narrative    Social History Narrative: Tobacco cigarette smoker 1.5 PPD x 40 years (60 pack years). Quit 07/01/2020. Drinks 4-6 cans of beer 1 day every weekend. Prior history of moderately-heavy alcohol consumption. Denies any current or any history of illicit drug use/ abuse. 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 Current Outpatient Medications   Medication Sig Dispense Refill    CARBOplatin (PARAPLATIN) 150 MG/15ML chemo injection Infuse intravenously once      PACLitaxel-protein bound (ABRAXANE) 100 MG chemo IVPB Infuse 260 mg/m2 intravenously once      furosemide (LASIX) 20 MG tablet Take 1 tablet by mouth in the morning. 90 tablet 1    spironolactone (ALDACTONE) 25 MG tablet Take 1 tablet by mouth in the morning.  90 tablet 1    apixaban (ELIQUIS) 5 MG TABS tablet TAKE 1 TABLET TWICE A  tablet 3    ASPIRIN LOW DOSE 81 MG EC tablet TAKE 1 TABLET DAILY 90 tablet 3    lisinopril (PRINIVIL;ZESTRIL) 5 MG tablet TAKE 1 TABLET DAILY (DOSE DECREASED) 90 tablet 3    metoprolol succinate (TOPROL XL) 100 MG extended release tablet Take 1 tablet by mouth 2 times daily 180 tablet 3    albuterol (PROVENTIL) (2.5 MG/3ML) 0.083% nebulizer solution Take 0.083 mLs by nebulization 4 times daily as needed      fluticasone-umeclidin-vilant (TRELEGY ELLIPTA) 100-62.5-25 MCG/INH AEPB Inhale 1 puff into the lungs daily 1 each 5    PROAIR  (90 Base) MCG/ACT inhaler INHALE TWO PUFFS BY MOUTH EVERY 4 TO 6 HOURS AS NEEDED      ipratropium-albuterol (DUONEB) 0.5-2.5 (3) MG/3ML SOLN nebulizer solution Inhale 3 mLs into the lungs every 4 hours 360 mL 0    CRESTOR 40 MG tablet Take 20 mg by mouth daily      vitamin D (ERGOCALCIFEROL) 45796 UNITS CAPS capsule Take 50,000 Units by mouth once a week mondays      nitroGLYCERIN (NITROSTAT) 0.4 MG SL tablet Place 1 tablet under the tongue every 5 minutes as needed for Chest pain (Patient not taking: No sig reported) 25 tablet 0     No current facility-administered medications for this visit. Allergies   Allergen Reactions    Zocor [Simvastatin - High Dose] Other (See Comments)     Causes Rhabdomyolysis       Chief Complaint:  Michaela Richardson is here today for follow up and management/recomendations for CAD, CP, abnormal Stress test, HTN, RBBB, ODOT clearance. History of Present Illness: Michaela Richardson  has baseline dyspnea with exertion from his COPD. He states that lately it has even been better than his usual baseline. He denies any orthopnea/PND. He noticed mild left lower extremity edema when his chemotherapy was changed to his new regimen. He denies any chest discomfort. He denies any palpitations or presyncopal symptoms. REVIEW OF SYSTEMS:  As above. Patient does not complain of any fever, chills, nausea, vomiting or diarrhea. No focal, motor or neurological deficits. No changes in his/her vision, hearing, bowel or bladder habits. He is not known to have a history of thyroid problems. No recent nose bleeds. PHYSICAL EXAM:  Vitals:    09/27/22 1454   BP: 122/70   Pulse: 92   Resp: 16   Weight: 285 lb (129.3 kg)   Height: 5' 11\" (1.803 m)       GENERAL:  He is alert and oriented x 3, communicates well, in no distress. NECK:  No masses, trachea is mid position. Supple, full ROM, mild JVD or bruits. No palpable thyromegaly or lymphadenopathy. HEART: Distant. Irregular mildly tachycardic rhythm. Normal S1 and S2. There is an S4 gallop and a I/VI systolic murmur. LUNGS:  Poor air movement. Clear to auscultation bilaterally. No use of accessory muscles. symmetrical excursion. ABDOMEN: Morbidly obese. Soft, non-tender. Normal bowel sounds. EXTREMITIES:  Full ROM x 4. Mild to moderate left greater than right bilateral lower extremity edema on exam.  Good distal pulses. EYES:  Extraocular muscles intact. PERRL.   Normal lids & conjunctiva. ENT:  Nares are clear & not bleeding. Moist mucosa. Normal lips formation. No external masses   NEURO: no tremors, full ROM x 4, EOMI. SKIN:  Warm, dry and intact. Normal turgor. EKG: Atrial fibrillation. 92 bpm, nl axis, nonspecific ST - T wave changes, RBBB. Assessment:   Coronary artery disease as outlined above. No recurring ischemic symptoms at this time. Severe COPD. Sleep apnea. RBBB, stable  Hypertension, well controled at this time. Hypercholesterolemia  Paroxysmal atrial fibrillation. Following with electrophysiology. Heart rate controlled today. They are holding off on cardioversion until his lung cancer situation has been stabilized. Mild aortic regurgitation  Mild to moderate mitral regurgitation. Lung cancer. Mildly hypervolemic on exam today. Recommendations:  He is following the cholesterol with Dr. Byron Meier. Increase Lasix to 40 mg for the next 2 days. Thank you for allowing me to participate in your patient's care.       [address], [address] [facility]  Interventional Cardiology

## 2022-10-24 LAB
ABO/RH: NORMAL
ANTIBODY SCREEN: NORMAL

## 2022-10-24 RX ORDER — SODIUM CHLORIDE 9 MG/ML
20 INJECTION, SOLUTION INTRAVENOUS CONTINUOUS
Status: CANCELLED | OUTPATIENT
Start: 2022-10-25

## 2022-10-24 RX ORDER — SODIUM CHLORIDE 9 MG/ML
20 INJECTION, SOLUTION INTRAVENOUS CONTINUOUS
Status: CANCELLED | OUTPATIENT
Start: 2022-10-24

## 2022-10-24 RX ORDER — DIPHENHYDRAMINE HYDROCHLORIDE 50 MG/ML
25 INJECTION INTRAMUSCULAR; INTRAVENOUS ONCE
Status: CANCELLED | OUTPATIENT
Start: 2022-10-25 | End: 2022-10-25

## 2022-10-24 RX ORDER — ONDANSETRON 2 MG/ML
8 INJECTION INTRAMUSCULAR; INTRAVENOUS
Status: CANCELLED | OUTPATIENT
Start: 2022-10-25

## 2022-10-24 RX ORDER — ONDANSETRON 2 MG/ML
8 INJECTION INTRAMUSCULAR; INTRAVENOUS
Status: CANCELLED | OUTPATIENT
Start: 2022-10-24

## 2022-10-24 RX ORDER — ACETAMINOPHEN 325 MG/1
650 TABLET ORAL ONCE
Status: CANCELLED | OUTPATIENT
Start: 2022-10-24 | End: 2022-10-24

## 2022-10-24 RX ORDER — ALBUTEROL SULFATE 90 UG/1
4 AEROSOL, METERED RESPIRATORY (INHALATION) PRN
Status: CANCELLED | OUTPATIENT
Start: 2022-10-25

## 2022-10-24 RX ORDER — SODIUM CHLORIDE 9 MG/ML
25 INJECTION, SOLUTION INTRAVENOUS PRN
Status: CANCELLED | OUTPATIENT
Start: 2022-10-24

## 2022-10-24 RX ORDER — EPINEPHRINE 1 MG/ML
0.3 INJECTION, SOLUTION, CONCENTRATE INTRAVENOUS PRN
Status: CANCELLED | OUTPATIENT
Start: 2022-10-25

## 2022-10-24 RX ORDER — SODIUM CHLORIDE 0.9 % (FLUSH) 0.9 %
5-40 SYRINGE (ML) INJECTION PRN
Status: CANCELLED | OUTPATIENT
Start: 2022-10-24

## 2022-10-24 RX ORDER — EPINEPHRINE 1 MG/ML
0.3 INJECTION, SOLUTION, CONCENTRATE INTRAVENOUS PRN
Status: CANCELLED | OUTPATIENT
Start: 2022-10-24

## 2022-10-24 RX ORDER — ACETAMINOPHEN 325 MG/1
650 TABLET ORAL
Status: CANCELLED | OUTPATIENT
Start: 2022-10-24

## 2022-10-24 RX ORDER — DIPHENHYDRAMINE HYDROCHLORIDE 50 MG/ML
50 INJECTION INTRAMUSCULAR; INTRAVENOUS
Status: CANCELLED | OUTPATIENT
Start: 2022-10-24

## 2022-10-24 RX ORDER — ALBUTEROL SULFATE 90 UG/1
4 AEROSOL, METERED RESPIRATORY (INHALATION) PRN
Status: CANCELLED | OUTPATIENT
Start: 2022-10-24

## 2022-10-24 RX ORDER — SODIUM CHLORIDE 9 MG/ML
INJECTION, SOLUTION INTRAVENOUS CONTINUOUS
Status: CANCELLED | OUTPATIENT
Start: 2022-10-25

## 2022-10-24 RX ORDER — DIPHENHYDRAMINE HYDROCHLORIDE 50 MG/ML
25 INJECTION INTRAMUSCULAR; INTRAVENOUS ONCE
Status: CANCELLED | OUTPATIENT
Start: 2022-10-24 | End: 2022-10-24

## 2022-10-24 RX ORDER — ACETAMINOPHEN 325 MG/1
650 TABLET ORAL
Status: CANCELLED | OUTPATIENT
Start: 2022-10-25

## 2022-10-24 RX ORDER — SODIUM CHLORIDE 0.9 % (FLUSH) 0.9 %
5-40 SYRINGE (ML) INJECTION PRN
Status: CANCELLED | OUTPATIENT
Start: 2022-10-25

## 2022-10-24 RX ORDER — SODIUM CHLORIDE 9 MG/ML
25 INJECTION, SOLUTION INTRAVENOUS PRN
Status: CANCELLED | OUTPATIENT
Start: 2022-10-25

## 2022-10-24 RX ORDER — DIPHENHYDRAMINE HYDROCHLORIDE 50 MG/ML
50 INJECTION INTRAMUSCULAR; INTRAVENOUS
Status: CANCELLED | OUTPATIENT
Start: 2022-10-25

## 2022-10-24 RX ORDER — SODIUM CHLORIDE 9 MG/ML
INJECTION, SOLUTION INTRAVENOUS CONTINUOUS
Status: CANCELLED | OUTPATIENT
Start: 2022-10-24

## 2022-10-24 RX ORDER — FAMOTIDINE 10 MG/ML
20 INJECTION, SOLUTION INTRAVENOUS
Status: CANCELLED | OUTPATIENT
Start: 2022-10-25

## 2022-10-24 RX ORDER — ACETAMINOPHEN 325 MG/1
650 TABLET ORAL ONCE
Status: CANCELLED | OUTPATIENT
Start: 2022-10-25 | End: 2022-10-25

## 2022-10-25 ENCOUNTER — HOSPITAL ENCOUNTER (OUTPATIENT)
Dept: INFUSION THERAPY | Age: 62
Discharge: HOME | End: 2022-10-25
Payer: COMMERCIAL

## 2022-10-25 VITALS
SYSTOLIC BLOOD PRESSURE: 107 MMHG | DIASTOLIC BLOOD PRESSURE: 68 MMHG | RESPIRATION RATE: 16 BRPM | OXYGEN SATURATION: 96 % | TEMPERATURE: 97.1 F | HEART RATE: 83 BPM

## 2022-10-25 DIAGNOSIS — C34.31 MALIGNANT NEOPLASM OF LOWER LOBE OF RIGHT LUNG (HCC): Primary | ICD-10-CM

## 2022-10-25 LAB
BLOOD BANK DISPENSE STATUS: NORMAL
BLOOD BANK PRODUCT CODE: NORMAL
BPU ID: NORMAL
DESCRIPTION BLOOD BANK: NORMAL

## 2022-10-25 PROCEDURE — 36430 TRANSFUSION BLD/BLD COMPNT: CPT

## 2022-10-25 PROCEDURE — 6360000002 HC RX W HCPCS: Performed by: NURSE PRACTITIONER

## 2022-10-25 PROCEDURE — 96374 THER/PROPH/DIAG INJ IV PUSH: CPT

## 2022-10-25 PROCEDURE — 2580000003 HC RX 258: Performed by: INTERNAL MEDICINE

## 2022-10-25 PROCEDURE — 2580000003 HC RX 258: Performed by: NURSE PRACTITIONER

## 2022-10-25 PROCEDURE — 6360000002 HC RX W HCPCS: Performed by: INTERNAL MEDICINE

## 2022-10-25 PROCEDURE — 6370000000 HC RX 637 (ALT 250 FOR IP): Performed by: NURSE PRACTITIONER

## 2022-10-25 PROCEDURE — P9016 RBC LEUKOCYTES REDUCED: HCPCS

## 2022-10-25 PROCEDURE — 96375 TX/PRO/DX INJ NEW DRUG ADDON: CPT

## 2022-10-25 RX ORDER — SODIUM CHLORIDE 9 MG/ML
20 INJECTION, SOLUTION INTRAVENOUS CONTINUOUS
Status: DISCONTINUED | OUTPATIENT
Start: 2022-10-25 | End: 2022-10-26 | Stop reason: HOSPADM

## 2022-10-25 RX ORDER — SODIUM CHLORIDE 9 MG/ML
25 INJECTION, SOLUTION INTRAVENOUS PRN
Status: CANCELLED | OUTPATIENT
Start: 2022-10-25

## 2022-10-25 RX ORDER — DIPHENHYDRAMINE HYDROCHLORIDE 50 MG/ML
25 INJECTION INTRAMUSCULAR; INTRAVENOUS ONCE
Status: COMPLETED | OUTPATIENT
Start: 2022-10-25 | End: 2022-10-25

## 2022-10-25 RX ORDER — HEPARIN SODIUM (PORCINE) LOCK FLUSH IV SOLN 100 UNIT/ML 100 UNIT/ML
500 SOLUTION INTRAVENOUS PRN
Status: CANCELLED | OUTPATIENT
Start: 2022-10-25

## 2022-10-25 RX ORDER — SODIUM CHLORIDE 0.9 % (FLUSH) 0.9 %
5-40 SYRINGE (ML) INJECTION PRN
Status: CANCELLED | OUTPATIENT
Start: 2022-10-25

## 2022-10-25 RX ORDER — SODIUM CHLORIDE 0.9 % (FLUSH) 0.9 %
5-40 SYRINGE (ML) INJECTION PRN
Status: DISCONTINUED | OUTPATIENT
Start: 2022-10-25 | End: 2022-10-26 | Stop reason: HOSPADM

## 2022-10-25 RX ORDER — ACETAMINOPHEN 325 MG/1
650 TABLET ORAL ONCE
Status: COMPLETED | OUTPATIENT
Start: 2022-10-25 | End: 2022-10-25

## 2022-10-25 RX ORDER — HEPARIN SODIUM (PORCINE) LOCK FLUSH IV SOLN 100 UNIT/ML 100 UNIT/ML
500 SOLUTION INTRAVENOUS PRN
Status: DISCONTINUED | OUTPATIENT
Start: 2022-10-25 | End: 2022-10-26 | Stop reason: HOSPADM

## 2022-10-25 RX ADMIN — HYDROCORTISONE SODIUM SUCCINATE 25 MG: 100 INJECTION, POWDER, FOR SOLUTION INTRAMUSCULAR; INTRAVENOUS at 08:33

## 2022-10-25 RX ADMIN — SODIUM CHLORIDE, PRESERVATIVE FREE 10 ML: 5 INJECTION INTRAVENOUS at 08:36

## 2022-10-25 RX ADMIN — SODIUM CHLORIDE 20 ML/HR: 9 INJECTION, SOLUTION INTRAVENOUS at 08:33

## 2022-10-25 RX ADMIN — ACETAMINOPHEN 650 MG: 325 TABLET, FILM COATED ORAL at 08:33

## 2022-10-25 RX ADMIN — DIPHENHYDRAMINE HYDROCHLORIDE 25 MG: 50 INJECTION INTRAMUSCULAR; INTRAVENOUS at 08:35

## 2022-10-25 RX ADMIN — SODIUM CHLORIDE, PRESERVATIVE FREE 10 ML: 5 INJECTION INTRAVENOUS at 10:58

## 2022-10-25 RX ADMIN — HEPARIN 500 UNITS: 100 SYRINGE at 10:58

## 2022-11-01 RX ORDER — METOPROLOL SUCCINATE 100 MG/1
TABLET, EXTENDED RELEASE ORAL
Qty: 180 TABLET | Refills: 3 | Status: SHIPPED | OUTPATIENT
Start: 2022-11-01

## 2022-11-08 ENCOUNTER — HOSPITAL ENCOUNTER (OUTPATIENT)
Dept: OTHER | Age: 62
Setting detail: THERAPIES SERIES
Discharge: HOME | End: 2022-11-08
Payer: COMMERCIAL

## 2022-11-08 VITALS
OXYGEN SATURATION: 96 % | RESPIRATION RATE: 18 BRPM | DIASTOLIC BLOOD PRESSURE: 78 MMHG | SYSTOLIC BLOOD PRESSURE: 133 MMHG | HEART RATE: 98 BPM

## 2022-11-08 LAB
ANION GAP SERPL CALCULATED.3IONS-SCNC: 12 MMOL/L (ref 7–16)
BUN BLDV-MCNC: 36 MG/DL (ref 6–23)
CALCIUM SERPL-MCNC: 10.5 MG/DL (ref 8.6–10.2)
CHLORIDE BLD-SCNC: 103 MMOL/L (ref 98–107)
CO2: 25 MMOL/L (ref 22–29)
CREAT SERPL-MCNC: 1.5 MG/DL (ref 0.7–1.2)
GFR SERPL CREATININE-BSD FRML MDRD: 53 ML/MIN/1.73
GLUCOSE BLD-MCNC: 186 MG/DL (ref 74–99)
POTASSIUM SERPL-SCNC: 5 MMOL/L (ref 3.5–5)
PRO-BNP: 1317 PG/ML (ref 0–125)
SODIUM BLD-SCNC: 140 MMOL/L (ref 132–146)

## 2022-11-08 PROCEDURE — 80048 BASIC METABOLIC PNL TOTAL CA: CPT

## 2022-11-08 PROCEDURE — 83880 ASSAY OF NATRIURETIC PEPTIDE: CPT

## 2022-11-08 PROCEDURE — 36415 COLL VENOUS BLD VENIPUNCTURE: CPT

## 2022-11-08 PROCEDURE — 99214 OFFICE O/P EST MOD 30 MIN: CPT

## 2022-11-08 NOTE — PROGRESS NOTES
Congestive Heart Failure [address] [facility] [place] [facility]   1960          Referring Provider: ROSANNE Cedillo NP  Primary Care Physician: Dr Garima Frances  Cardiologist: Dr Adriana Wynn  Nephrologist: N/A        History of Present Illness:     Keke Gilmore is a 65 y.o. male with a history of HFrEF, most recent EF 15-20% on 8-11-21. Patient Story:    He does  Have slight dyspnea with exertion, shortness of breath, or decline in overall functional capacity. He does not have orthopnea, PND, nocturnal cough or hemoptysis. He does not have abdominal distention or bloating, early satiety, anorexia/change in appetite. He does have a good urinary response to  oral diuretic. He does not have  lower extremity edema. He denies lightheadedness, dizziness. He denies palpitations, syncope or near syncope. He does not complain of chest pain, pressure, discomfort. Allergies   Allergen Reactions    Zocor [Simvastatin - High Dose] Other (See Comments)     Causes Rhabdomyolysis           Prior to Visit Medications    Medication Sig Taking? Authorizing Provider   metoprolol succinate (TOPROL XL) 100 MG extended release tablet TAKE 1 TABLET TWICE A DAY  Sathish Burrell DO   PACLitaxel-protein bound (ABRAXANE) 100 MG chemo IVPB Infuse 260 mg/m2 intravenously once  Historical Provider, MD   TOPROL XL 25 MG extended release tablet Take 1 tablet by mouth daily  Patient taking differently: Take 25 mg by mouth daily Toprol XL 125mg in PM  Sathish Burrell DO   nitroGLYCERIN (NITROSTAT) 0.4 MG SL tablet Place 1 tablet under the tongue every 5 minutes as needed for Chest pain  Patient not taking: Reported on 11/8/2022  Sathish Burrell DO   furosemide (LASIX) 20 MG tablet Take 1 tablet by mouth in the morning. David Pap, APRN - CNP   spironolactone (ALDACTONE) 25 MG tablet Take 1 tablet by mouth in the morning.   SIENNA Orellana - CNP   apixaban (ELIQUIS) 5 MG TABS tablet TAKE 1 TABLET TWICE A DAY  Arlene Christianson MD   ASPIRIN LOW DOSE 81 MG EC tablet TAKE 1 TABLET DAILY  Patient not taking: Reported on 11/8/2022  Arlene Christianson MD   lisinopril (PRINIVIL;ZESTRIL) 5 MG tablet TAKE 1 TABLET DAILY (DOSE DECREASED)  Kacey Segura DO   albuterol (PROVENTIL) (2.5 MG/3ML) 0.083% nebulizer solution Take 0.083 mLs by nebulization 4 times daily as needed  Historical Provider, MD   fluticasone-umeclidin-vilant (TRELEGY ELLIPTA) 100-62.5-25 MCG/INH AEPB Inhale 1 puff into the lungs daily  SIENNA Silver - CNP   PROAIR  (90 Base) MCG/ACT inhaler INHALE TWO PUFFS BY MOUTH EVERY 4 TO 6 HOURS AS NEEDED  Historical Provider, MD   ipratropium-albuterol (DUONEB) 0.5-2.5 (3) MG/3ML SOLN nebulizer solution Inhale 3 mLs into the lungs every 4 hours  Babatunde Pham MD   CRESTOR 40 MG tablet Take 20 mg by mouth daily  Historical Provider, MD   vitamin D (ERGOCALCIFEROL) 42202 UNITS CAPS capsule Take 50,000 Units by mouth once a week mondays  Historical Provider, MD           Guideline directed medical:  ARNI/ACE I/ARB: Yes  Beta blocker:   Yes  Aldosterone antagonist:  Yes        Physical Examination:     /78   Pulse 98   Resp 18   SpO2 96%     Assessment  Charting Type: Shift assessment                   Respiratory  Respiratory Pattern: Regular  Respiratory Quality/Effort: Unlabored  L Breath Sounds: Clear, Diminished  R Breath Sounds: Clear, Diminished              Cardiac  Cardiac Regularity: Irregular  Cardiac Rhythm: Atrial fib    Rhythm Interpretation  Heart Rate: 98         Gastrointestinal  Abdominal (WDL): Within Defined Limits  Abdomen Inspection: Rounded, Soft               Peripheral Vascular  Peripheral Vascular (WDL): Within Defined Limits  RLE Edema: None  LLE Edema: None                   Genitourinary  Genitourinary (WDL): Within Defined Limits    Psychosocial  Psychosocial (WDL): Within Defined Limits                        Heart Rate: 98                     LAB DATA:    Last 3 BMP      Sodium (mmol/L)   Date Value   06/14/2022 137   04/05/2022 139   02/14/2022 146     Potassium (mmol/L)   Date Value   06/14/2022 4.9   04/05/2022 4.4   02/14/2022 4.3     Potassium reflex Magnesium (mmol/L)   Date Value   11/23/2021 4.3   08/12/2021 4.5   08/11/2021 4.1     Chloride (mmol/L)   Date Value   06/14/2022 102   04/05/2022 103   02/14/2022 107     CO2 (mmol/L)   Date Value   06/14/2022 24   04/05/2022 30 (H)   02/14/2022 26     BUN (mg/dL)   Date Value   07/22/2022 22   06/14/2022 33 (H)   04/05/2022 20     Glucose (mg/dL)   Date Value   06/14/2022 107 (H)   04/05/2022 104 (H)   02/14/2022 103 (H)   06/03/2011 129 (H)     Calcium (mg/dL)   Date Value   06/14/2022 9.0   04/05/2022 9.2   02/14/2022 9.4       Last 3 BNP       Pro-BNP (pg/mL)   Date Value   06/14/2022 1,036 (H)   04/05/2022 1,736 (H)   02/14/2022 1,685 (H)          CBC: No results for input(s): WBC, HGB, PLT in the last 72 hours. BMP:  No results for input(s): NA, K, CL, CO2, BUN, CREATININE, GLUCOSE in the last 72 hours. Hepatic: No results for input(s): AST, ALT, ALB, BILITOT, ALKPHOS in the last 72 hours. Troponin: No results for input(s): TROPONINI in the last 72 hours. BNP: No results for input(s): BNP in the last 72 hours. Lipids: No results for input(s): CHOL, HDL in the last 72 hours. Invalid input(s): LDLCALCU  INR: No results for input(s): INR in the last 72 hours. WEIGHTS:    Wt Readings from Last 3 Encounters:   09/27/22 285 lb (129.3 kg)   08/03/22 280 lb (127 kg)   08/03/22 282 lb (127.9 kg)         TELEMETRY:  Cardiac Regularity: Irregular  Cardiac Rhythm/Interpretation: A Fib        ASSESSMENT:  Kathrin Gomez is up 9 pounds since last CHF clinic visit on 6/14/22 but patient is not short of breath, lungs are clear to auscultation, abdomen soft, and no lower extremity edema noted. Patient does state he has a better appetite and is currently receiving chemotherapy.      Interventions completed this visit:  IV diuretics given no  Lab work obtained yes, BMP/BNP     Reviewed currently prescribed medications with patient, educated on importance of compliance and answered any questions regarding their medication  Educated on signs and symptoms of HF  Educated on low sodium diet    PLAN:  Scheduled to follow up in CHF clinic on   Future Appointments   Date Time Provider Mirlande Ackerman   12/6/2022  2:40 PM Ricky Turner DO [address] [facility]   2/14/2023  1:00 PM [facility] CHF ROOM 1 [facility] Deneen Reyes     Given clinic phone number and aware of signs and symptoms to call with any HF change in symptoms.

## 2022-11-09 NOTE — PROGRESS NOTES
Labs and CHF clinic note reviewed  Spoke with Mahin Guadalupe RN in CHF clinic   Will have patient get follow up labs in 2 weeks

## 2022-11-09 NOTE — PROGRESS NOTES
Spoke with Francisco Santos CNP d/t patient's creatine trending up. Order obtained to have patient get outpatient lab work. Called and left message on patient's voicemail for above order.

## 2022-11-14 DIAGNOSIS — C34.31 MALIGNANT NEOPLASM OF LOWER LOBE OF RIGHT LUNG (HCC): Primary | ICD-10-CM

## 2022-11-14 LAB
ABO/RH: NORMAL
ANTIBODY SCREEN: NORMAL

## 2022-11-14 RX ORDER — SODIUM CHLORIDE 9 MG/ML
20 INJECTION, SOLUTION INTRAVENOUS CONTINUOUS
Status: CANCELLED | OUTPATIENT
Start: 2022-11-14

## 2022-11-14 RX ORDER — SODIUM CHLORIDE 0.9 % (FLUSH) 0.9 %
5-40 SYRINGE (ML) INJECTION PRN
Status: CANCELLED | OUTPATIENT
Start: 2022-11-15

## 2022-11-14 RX ORDER — ACETAMINOPHEN 325 MG/1
650 TABLET ORAL ONCE
Status: CANCELLED | OUTPATIENT
Start: 2022-11-14 | End: 2022-11-14

## 2022-11-14 RX ORDER — ONDANSETRON 2 MG/ML
8 INJECTION INTRAMUSCULAR; INTRAVENOUS
Status: CANCELLED | OUTPATIENT
Start: 2022-11-15

## 2022-11-14 RX ORDER — ACETAMINOPHEN 325 MG/1
650 TABLET ORAL
Status: CANCELLED | OUTPATIENT
Start: 2022-11-15

## 2022-11-14 RX ORDER — DIPHENHYDRAMINE HYDROCHLORIDE 50 MG/ML
25 INJECTION INTRAMUSCULAR; INTRAVENOUS ONCE
Status: CANCELLED | OUTPATIENT
Start: 2022-11-14 | End: 2022-11-14

## 2022-11-14 RX ORDER — DIPHENHYDRAMINE HYDROCHLORIDE 50 MG/ML
50 INJECTION INTRAMUSCULAR; INTRAVENOUS
Status: CANCELLED | OUTPATIENT
Start: 2022-11-14

## 2022-11-14 RX ORDER — EPINEPHRINE 1 MG/ML
0.3 INJECTION, SOLUTION, CONCENTRATE INTRAVENOUS PRN
Status: CANCELLED | OUTPATIENT
Start: 2022-11-15

## 2022-11-14 RX ORDER — ALBUTEROL SULFATE 90 UG/1
4 AEROSOL, METERED RESPIRATORY (INHALATION) PRN
Status: CANCELLED | OUTPATIENT
Start: 2022-11-14

## 2022-11-14 RX ORDER — ONDANSETRON 2 MG/ML
8 INJECTION INTRAMUSCULAR; INTRAVENOUS
Status: CANCELLED | OUTPATIENT
Start: 2022-11-14

## 2022-11-14 RX ORDER — SODIUM CHLORIDE 9 MG/ML
20 INJECTION, SOLUTION INTRAVENOUS CONTINUOUS
Status: CANCELLED | OUTPATIENT
Start: 2022-11-15

## 2022-11-14 RX ORDER — SODIUM CHLORIDE 9 MG/ML
25 INJECTION, SOLUTION INTRAVENOUS PRN
Status: CANCELLED | OUTPATIENT
Start: 2022-11-15

## 2022-11-14 RX ORDER — ALBUTEROL SULFATE 90 UG/1
4 AEROSOL, METERED RESPIRATORY (INHALATION) PRN
Status: CANCELLED | OUTPATIENT
Start: 2022-11-15

## 2022-11-14 RX ORDER — SODIUM CHLORIDE 9 MG/ML
25 INJECTION, SOLUTION INTRAVENOUS PRN
Status: CANCELLED | OUTPATIENT
Start: 2022-11-14

## 2022-11-14 RX ORDER — SODIUM CHLORIDE 9 MG/ML
INJECTION, SOLUTION INTRAVENOUS CONTINUOUS
Status: CANCELLED | OUTPATIENT
Start: 2022-11-15

## 2022-11-14 RX ORDER — FAMOTIDINE 10 MG/ML
20 INJECTION, SOLUTION INTRAVENOUS
Status: CANCELLED | OUTPATIENT
Start: 2022-11-15

## 2022-11-14 RX ORDER — DIPHENHYDRAMINE HYDROCHLORIDE 50 MG/ML
25 INJECTION INTRAMUSCULAR; INTRAVENOUS ONCE
Status: CANCELLED | OUTPATIENT
Start: 2022-11-15 | End: 2022-11-15

## 2022-11-14 RX ORDER — EPINEPHRINE 1 MG/ML
0.3 INJECTION, SOLUTION, CONCENTRATE INTRAVENOUS PRN
Status: CANCELLED | OUTPATIENT
Start: 2022-11-14

## 2022-11-14 RX ORDER — SODIUM CHLORIDE 9 MG/ML
INJECTION, SOLUTION INTRAVENOUS CONTINUOUS
Status: CANCELLED | OUTPATIENT
Start: 2022-11-14

## 2022-11-14 RX ORDER — ACETAMINOPHEN 325 MG/1
650 TABLET ORAL ONCE
Status: CANCELLED | OUTPATIENT
Start: 2022-11-15 | End: 2022-11-15

## 2022-11-14 RX ORDER — ACETAMINOPHEN 325 MG/1
650 TABLET ORAL
Status: CANCELLED | OUTPATIENT
Start: 2022-11-14

## 2022-11-14 RX ORDER — DIPHENHYDRAMINE HYDROCHLORIDE 50 MG/ML
50 INJECTION INTRAMUSCULAR; INTRAVENOUS
Status: CANCELLED | OUTPATIENT
Start: 2022-11-15

## 2022-11-14 RX ORDER — SODIUM CHLORIDE 0.9 % (FLUSH) 0.9 %
5-40 SYRINGE (ML) INJECTION PRN
Status: CANCELLED | OUTPATIENT
Start: 2022-11-14

## 2022-11-15 ENCOUNTER — HOSPITAL ENCOUNTER (OUTPATIENT)
Dept: INFUSION THERAPY | Age: 62
Discharge: HOME | End: 2022-11-15
Payer: COMMERCIAL

## 2022-11-15 VITALS
OXYGEN SATURATION: 97 % | DIASTOLIC BLOOD PRESSURE: 53 MMHG | HEART RATE: 95 BPM | SYSTOLIC BLOOD PRESSURE: 98 MMHG | TEMPERATURE: 97 F | RESPIRATION RATE: 16 BRPM

## 2022-11-15 DIAGNOSIS — C34.31 MALIGNANT NEOPLASM OF LOWER LOBE OF RIGHT LUNG (HCC): Primary | ICD-10-CM

## 2022-11-15 PROCEDURE — 36430 TRANSFUSION BLD/BLD COMPNT: CPT

## 2022-11-15 PROCEDURE — 96375 TX/PRO/DX INJ NEW DRUG ADDON: CPT

## 2022-11-15 PROCEDURE — P9016 RBC LEUKOCYTES REDUCED: HCPCS

## 2022-11-15 PROCEDURE — 6370000000 HC RX 637 (ALT 250 FOR IP): Performed by: NURSE PRACTITIONER

## 2022-11-15 PROCEDURE — 96374 THER/PROPH/DIAG INJ IV PUSH: CPT

## 2022-11-15 PROCEDURE — 2580000003 HC RX 258: Performed by: NURSE PRACTITIONER

## 2022-11-15 PROCEDURE — 6360000002 HC RX W HCPCS: Performed by: NURSE PRACTITIONER

## 2022-11-15 RX ORDER — ONDANSETRON 2 MG/ML
8 INJECTION INTRAMUSCULAR; INTRAVENOUS
Status: CANCELLED | OUTPATIENT
Start: 2022-11-15

## 2022-11-15 RX ORDER — ACETAMINOPHEN 325 MG/1
650 TABLET ORAL
Status: CANCELLED | OUTPATIENT
Start: 2022-11-15

## 2022-11-15 RX ORDER — SODIUM CHLORIDE 9 MG/ML
25 INJECTION, SOLUTION INTRAVENOUS PRN
Status: DISCONTINUED | OUTPATIENT
Start: 2022-11-15 | End: 2022-11-16 | Stop reason: HOSPADM

## 2022-11-15 RX ORDER — SODIUM CHLORIDE 9 MG/ML
20 INJECTION, SOLUTION INTRAVENOUS CONTINUOUS
Status: DISCONTINUED | OUTPATIENT
Start: 2022-11-15 | End: 2022-11-16 | Stop reason: HOSPADM

## 2022-11-15 RX ORDER — SODIUM CHLORIDE 9 MG/ML
20 INJECTION, SOLUTION INTRAVENOUS CONTINUOUS
Status: CANCELLED | OUTPATIENT
Start: 2022-11-15

## 2022-11-15 RX ORDER — SODIUM CHLORIDE 0.9 % (FLUSH) 0.9 %
5-40 SYRINGE (ML) INJECTION PRN
Status: CANCELLED | OUTPATIENT
Start: 2022-11-15

## 2022-11-15 RX ORDER — ACETAMINOPHEN 325 MG/1
650 TABLET ORAL ONCE
Status: CANCELLED | OUTPATIENT
Start: 2022-11-15 | End: 2022-11-15

## 2022-11-15 RX ORDER — ALBUTEROL SULFATE 90 UG/1
4 AEROSOL, METERED RESPIRATORY (INHALATION) PRN
Status: CANCELLED | OUTPATIENT
Start: 2022-11-15

## 2022-11-15 RX ORDER — DIPHENHYDRAMINE HYDROCHLORIDE 50 MG/ML
25 INJECTION INTRAMUSCULAR; INTRAVENOUS ONCE
Status: COMPLETED | OUTPATIENT
Start: 2022-11-15 | End: 2022-11-15

## 2022-11-15 RX ORDER — ACETAMINOPHEN 325 MG/1
650 TABLET ORAL ONCE
Status: COMPLETED | OUTPATIENT
Start: 2022-11-15 | End: 2022-11-15

## 2022-11-15 RX ORDER — SODIUM CHLORIDE 9 MG/ML
25 INJECTION, SOLUTION INTRAVENOUS PRN
Status: CANCELLED | OUTPATIENT
Start: 2022-11-15

## 2022-11-15 RX ORDER — SODIUM CHLORIDE 0.9 % (FLUSH) 0.9 %
5-40 SYRINGE (ML) INJECTION PRN
Status: DISCONTINUED | OUTPATIENT
Start: 2022-11-15 | End: 2022-11-16 | Stop reason: HOSPADM

## 2022-11-15 RX ORDER — EPINEPHRINE 1 MG/ML
0.3 INJECTION, SOLUTION, CONCENTRATE INTRAVENOUS PRN
Status: CANCELLED | OUTPATIENT
Start: 2022-11-15

## 2022-11-15 RX ORDER — DIPHENHYDRAMINE HYDROCHLORIDE 50 MG/ML
25 INJECTION INTRAMUSCULAR; INTRAVENOUS ONCE
Status: CANCELLED | OUTPATIENT
Start: 2022-11-15 | End: 2022-11-15

## 2022-11-15 RX ORDER — SODIUM CHLORIDE 9 MG/ML
INJECTION, SOLUTION INTRAVENOUS CONTINUOUS
Status: CANCELLED | OUTPATIENT
Start: 2022-11-15

## 2022-11-15 RX ORDER — DIPHENHYDRAMINE HYDROCHLORIDE 50 MG/ML
50 INJECTION INTRAMUSCULAR; INTRAVENOUS
Status: CANCELLED | OUTPATIENT
Start: 2022-11-15

## 2022-11-15 RX ADMIN — SODIUM CHLORIDE, PRESERVATIVE FREE 10 ML: 5 INJECTION INTRAVENOUS at 10:35

## 2022-11-15 RX ADMIN — SODIUM CHLORIDE 20 ML/HR: 9 INJECTION, SOLUTION INTRAVENOUS at 08:17

## 2022-11-15 RX ADMIN — HYDROCORTISONE SODIUM SUCCINATE 25 MG: 100 INJECTION, POWDER, FOR SOLUTION INTRAMUSCULAR; INTRAVENOUS at 08:24

## 2022-11-15 RX ADMIN — DIPHENHYDRAMINE HYDROCHLORIDE 25 MG: 50 INJECTION, SOLUTION INTRAMUSCULAR; INTRAVENOUS at 08:20

## 2022-11-15 RX ADMIN — ACETAMINOPHEN 650 MG: 325 TABLET, FILM COATED ORAL at 08:17

## 2022-11-15 RX ADMIN — SODIUM CHLORIDE, PRESERVATIVE FREE 10 ML: 5 INJECTION INTRAVENOUS at 08:20

## 2022-11-15 NOTE — PROGRESS NOTES
Informed consent from 10/25/2022 verified and on patient chart. Patient received 1 unit prbc . Patient tolerated well. Vital signs stable. Reviewed orally and provided with written information regarding potential delayed reaction and instructions on what to do if reaction occurs. Questions answered to apparent satisfaction. Patient observed for 30 minutes after transfusion.

## 2022-11-21 DIAGNOSIS — C34.31 MALIGNANT NEOPLASM OF LOWER LOBE OF RIGHT LUNG (HCC): Primary | ICD-10-CM

## 2022-11-21 LAB
ABO/RH: NORMAL
ANTIBODY SCREEN: NORMAL

## 2022-11-21 RX ORDER — EPINEPHRINE 1 MG/ML
0.3 INJECTION, SOLUTION, CONCENTRATE INTRAVENOUS PRN
Status: CANCELLED | OUTPATIENT
Start: 2022-11-22

## 2022-11-21 RX ORDER — ACETAMINOPHEN 325 MG/1
650 TABLET ORAL ONCE
Status: CANCELLED | OUTPATIENT
Start: 2022-11-22 | End: 2022-11-22

## 2022-11-21 RX ORDER — ALBUTEROL SULFATE 90 UG/1
4 AEROSOL, METERED RESPIRATORY (INHALATION) PRN
Status: CANCELLED | OUTPATIENT
Start: 2022-11-22

## 2022-11-21 RX ORDER — SODIUM CHLORIDE 9 MG/ML
25 INJECTION, SOLUTION INTRAVENOUS PRN
Status: CANCELLED | OUTPATIENT
Start: 2022-11-22

## 2022-11-21 RX ORDER — SODIUM CHLORIDE 9 MG/ML
20 INJECTION, SOLUTION INTRAVENOUS CONTINUOUS
Status: CANCELLED | OUTPATIENT
Start: 2022-11-22

## 2022-11-21 RX ORDER — ACETAMINOPHEN 325 MG/1
650 TABLET ORAL
Status: CANCELLED | OUTPATIENT
Start: 2022-11-22

## 2022-11-21 RX ORDER — DIPHENHYDRAMINE HYDROCHLORIDE 50 MG/ML
25 INJECTION INTRAMUSCULAR; INTRAVENOUS ONCE
Status: CANCELLED | OUTPATIENT
Start: 2022-11-22 | End: 2022-11-22

## 2022-11-21 RX ORDER — SODIUM CHLORIDE 0.9 % (FLUSH) 0.9 %
5-40 SYRINGE (ML) INJECTION PRN
Status: CANCELLED | OUTPATIENT
Start: 2022-11-22

## 2022-11-21 RX ORDER — FAMOTIDINE 10 MG/ML
20 INJECTION, SOLUTION INTRAVENOUS
Status: CANCELLED | OUTPATIENT
Start: 2022-11-22

## 2022-11-21 RX ORDER — ONDANSETRON 2 MG/ML
8 INJECTION INTRAMUSCULAR; INTRAVENOUS
Status: CANCELLED | OUTPATIENT
Start: 2022-11-22

## 2022-11-21 RX ORDER — DIPHENHYDRAMINE HYDROCHLORIDE 50 MG/ML
50 INJECTION INTRAMUSCULAR; INTRAVENOUS
Status: CANCELLED | OUTPATIENT
Start: 2022-11-22

## 2022-11-21 RX ORDER — SODIUM CHLORIDE 9 MG/ML
INJECTION, SOLUTION INTRAVENOUS CONTINUOUS
Status: CANCELLED | OUTPATIENT
Start: 2022-11-22

## 2022-11-22 ENCOUNTER — HOSPITAL ENCOUNTER (OUTPATIENT)
Dept: INFUSION THERAPY | Age: 62
Discharge: HOME | End: 2022-11-22
Payer: COMMERCIAL

## 2022-11-22 VITALS
RESPIRATION RATE: 17 BRPM | TEMPERATURE: 97.3 F | SYSTOLIC BLOOD PRESSURE: 115 MMHG | HEART RATE: 73 BPM | DIASTOLIC BLOOD PRESSURE: 74 MMHG | OXYGEN SATURATION: 100 %

## 2022-11-22 DIAGNOSIS — C34.31 MALIGNANT NEOPLASM OF LOWER LOBE OF RIGHT LUNG (HCC): Primary | ICD-10-CM

## 2022-11-22 PROCEDURE — 96374 THER/PROPH/DIAG INJ IV PUSH: CPT

## 2022-11-22 PROCEDURE — P9016 RBC LEUKOCYTES REDUCED: HCPCS

## 2022-11-22 PROCEDURE — 36430 TRANSFUSION BLD/BLD COMPNT: CPT

## 2022-11-22 PROCEDURE — 6360000002 HC RX W HCPCS: Performed by: INTERNAL MEDICINE

## 2022-11-22 PROCEDURE — 96375 TX/PRO/DX INJ NEW DRUG ADDON: CPT

## 2022-11-22 PROCEDURE — 6370000000 HC RX 637 (ALT 250 FOR IP): Performed by: NURSE PRACTITIONER

## 2022-11-22 PROCEDURE — 6360000002 HC RX W HCPCS: Performed by: NURSE PRACTITIONER

## 2022-11-22 PROCEDURE — 2580000003 HC RX 258: Performed by: INTERNAL MEDICINE

## 2022-11-22 PROCEDURE — 2580000003 HC RX 258: Performed by: NURSE PRACTITIONER

## 2022-11-22 RX ORDER — SODIUM CHLORIDE 0.9 % (FLUSH) 0.9 %
5-40 SYRINGE (ML) INJECTION PRN
OUTPATIENT
Start: 2022-11-22

## 2022-11-22 RX ORDER — SODIUM CHLORIDE 0.9 % (FLUSH) 0.9 %
5-40 SYRINGE (ML) INJECTION PRN
Status: DISCONTINUED | OUTPATIENT
Start: 2022-11-22 | End: 2022-11-23 | Stop reason: HOSPADM

## 2022-11-22 RX ORDER — DIPHENHYDRAMINE HYDROCHLORIDE 50 MG/ML
25 INJECTION INTRAMUSCULAR; INTRAVENOUS ONCE
Status: CANCELLED | OUTPATIENT
Start: 2022-11-22 | End: 2022-11-22

## 2022-11-22 RX ORDER — SODIUM CHLORIDE 9 MG/ML
25 INJECTION, SOLUTION INTRAVENOUS PRN
OUTPATIENT
Start: 2022-11-22

## 2022-11-22 RX ORDER — HEPARIN SODIUM (PORCINE) LOCK FLUSH IV SOLN 100 UNIT/ML 100 UNIT/ML
500 SOLUTION INTRAVENOUS PRN
Status: DISCONTINUED | OUTPATIENT
Start: 2022-11-22 | End: 2022-11-23 | Stop reason: HOSPADM

## 2022-11-22 RX ORDER — ONDANSETRON 2 MG/ML
8 INJECTION INTRAMUSCULAR; INTRAVENOUS
OUTPATIENT
Start: 2022-11-22

## 2022-11-22 RX ORDER — DIPHENHYDRAMINE HYDROCHLORIDE 50 MG/ML
25 INJECTION INTRAMUSCULAR; INTRAVENOUS ONCE
Status: COMPLETED | OUTPATIENT
Start: 2022-11-22 | End: 2022-11-22

## 2022-11-22 RX ORDER — DIPHENHYDRAMINE HYDROCHLORIDE 50 MG/ML
50 INJECTION INTRAMUSCULAR; INTRAVENOUS
OUTPATIENT
Start: 2022-11-22

## 2022-11-22 RX ORDER — ACETAMINOPHEN 325 MG/1
650 TABLET ORAL ONCE
Status: COMPLETED | OUTPATIENT
Start: 2022-11-22 | End: 2022-11-22

## 2022-11-22 RX ORDER — SODIUM CHLORIDE 9 MG/ML
20 INJECTION, SOLUTION INTRAVENOUS CONTINUOUS
Status: CANCELLED | OUTPATIENT
Start: 2022-11-22

## 2022-11-22 RX ORDER — ALBUTEROL SULFATE 90 UG/1
4 AEROSOL, METERED RESPIRATORY (INHALATION) PRN
OUTPATIENT
Start: 2022-11-22

## 2022-11-22 RX ORDER — SODIUM CHLORIDE 0.9 % (FLUSH) 0.9 %
5-40 SYRINGE (ML) INJECTION PRN
Status: CANCELLED | OUTPATIENT
Start: 2022-11-22

## 2022-11-22 RX ORDER — EPINEPHRINE 1 MG/ML
0.3 INJECTION, SOLUTION, CONCENTRATE INTRAVENOUS PRN
OUTPATIENT
Start: 2022-11-22

## 2022-11-22 RX ORDER — SODIUM CHLORIDE 9 MG/ML
INJECTION, SOLUTION INTRAVENOUS CONTINUOUS
OUTPATIENT
Start: 2022-11-22

## 2022-11-22 RX ORDER — ACETAMINOPHEN 325 MG/1
650 TABLET ORAL ONCE
Status: CANCELLED | OUTPATIENT
Start: 2022-11-22 | End: 2022-11-22

## 2022-11-22 RX ORDER — HEPARIN SODIUM (PORCINE) LOCK FLUSH IV SOLN 100 UNIT/ML 100 UNIT/ML
500 SOLUTION INTRAVENOUS PRN
OUTPATIENT
Start: 2022-11-22

## 2022-11-22 RX ORDER — SODIUM CHLORIDE 9 MG/ML
20 INJECTION, SOLUTION INTRAVENOUS CONTINUOUS
Status: DISCONTINUED | OUTPATIENT
Start: 2022-11-22 | End: 2022-11-23 | Stop reason: HOSPADM

## 2022-11-22 RX ORDER — ACETAMINOPHEN 325 MG/1
650 TABLET ORAL
OUTPATIENT
Start: 2022-11-22

## 2022-11-22 RX ADMIN — SODIUM CHLORIDE 20 ML/HR: 9 INJECTION, SOLUTION INTRAVENOUS at 08:18

## 2022-11-22 RX ADMIN — SODIUM CHLORIDE, PRESERVATIVE FREE 10 ML: 5 INJECTION INTRAVENOUS at 10:35

## 2022-11-22 RX ADMIN — ACETAMINOPHEN 650 MG: 325 TABLET, FILM COATED ORAL at 08:18

## 2022-11-22 RX ADMIN — DIPHENHYDRAMINE HYDROCHLORIDE 25 MG: 50 INJECTION, SOLUTION INTRAMUSCULAR; INTRAVENOUS at 08:19

## 2022-11-22 RX ADMIN — HEPARIN 500 UNITS: 100 SYRINGE at 10:35

## 2022-11-22 RX ADMIN — HYDROCORTISONE SODIUM SUCCINATE 25 MG: 100 INJECTION, POWDER, FOR SOLUTION INTRAMUSCULAR; INTRAVENOUS at 08:23

## 2022-11-22 RX ADMIN — SODIUM CHLORIDE, PRESERVATIVE FREE 10 ML: 5 INJECTION INTRAVENOUS at 08:23

## 2022-11-22 RX ADMIN — SODIUM CHLORIDE, PRESERVATIVE FREE 10 ML: 5 INJECTION INTRAVENOUS at 08:20

## 2022-11-22 NOTE — PROGRESS NOTES
Informed consent  verified and on patient chart. Patient received 1 unit RBCs. Patient tolerated well. Vital signs stable. Reviewed orally and provided with written information regarding potential delayed reaction and instructions on what to do if reaction occurs. Questions answered to apparent satisfaction.

## 2022-11-27 ENCOUNTER — HOSPITAL ENCOUNTER (OUTPATIENT)
Dept: CT IMAGING | Age: 62
Discharge: HOME | End: 2022-11-29
Payer: COMMERCIAL

## 2022-11-27 DIAGNOSIS — R11.0 NAUSEA: ICD-10-CM

## 2022-11-27 DIAGNOSIS — C34.31 SQUAMOUS CELL CARCINOMA OF BRONCHUS IN RIGHT LOWER LOBE (HCC): ICD-10-CM

## 2022-11-27 PROCEDURE — 71260 CT THORAX DX C+: CPT

## 2022-11-27 PROCEDURE — 6360000004 HC RX CONTRAST MEDICATION: Performed by: RADIOLOGY

## 2022-11-27 PROCEDURE — 74177 CT ABD & PELVIS W/CONTRAST: CPT

## 2022-11-27 RX ADMIN — IOPAMIDOL 75 ML: 755 INJECTION, SOLUTION INTRAVENOUS at 11:05

## 2022-11-27 RX ADMIN — IOPAMIDOL 18 ML: 755 INJECTION, SOLUTION INTRAVENOUS at 11:05

## 2022-12-05 DIAGNOSIS — C34.31 MALIGNANT NEOPLASM OF LOWER LOBE OF RIGHT LUNG (HCC): Primary | ICD-10-CM

## 2022-12-05 LAB
ABO/RH: NORMAL
ANTIBODY SCREEN: NORMAL

## 2022-12-05 RX ORDER — SODIUM CHLORIDE 0.9 % (FLUSH) 0.9 %
5-40 SYRINGE (ML) INJECTION PRN
Status: CANCELLED | OUTPATIENT
Start: 2022-12-06

## 2022-12-05 RX ORDER — DIPHENHYDRAMINE HCL 25 MG
25 TABLET ORAL ONCE
Status: CANCELLED | OUTPATIENT
Start: 2022-12-06 | End: 2022-12-06

## 2022-12-05 RX ORDER — SODIUM CHLORIDE 9 MG/ML
INJECTION, SOLUTION INTRAVENOUS CONTINUOUS
Status: CANCELLED | OUTPATIENT
Start: 2022-12-06

## 2022-12-05 RX ORDER — ACETAMINOPHEN 325 MG/1
650 TABLET ORAL ONCE
Status: CANCELLED | OUTPATIENT
Start: 2022-12-06 | End: 2022-12-06

## 2022-12-05 RX ORDER — SODIUM CHLORIDE 9 MG/ML
25 INJECTION, SOLUTION INTRAVENOUS PRN
Status: CANCELLED | OUTPATIENT
Start: 2022-12-06

## 2022-12-05 RX ORDER — DIPHENHYDRAMINE HYDROCHLORIDE 50 MG/ML
50 INJECTION INTRAMUSCULAR; INTRAVENOUS
Status: CANCELLED | OUTPATIENT
Start: 2022-12-06

## 2022-12-05 RX ORDER — SODIUM CHLORIDE 9 MG/ML
20 INJECTION, SOLUTION INTRAVENOUS CONTINUOUS
Status: CANCELLED | OUTPATIENT
Start: 2022-12-06

## 2022-12-05 RX ORDER — ONDANSETRON 2 MG/ML
8 INJECTION INTRAMUSCULAR; INTRAVENOUS
Status: CANCELLED | OUTPATIENT
Start: 2022-12-06

## 2022-12-05 RX ORDER — FAMOTIDINE 10 MG/ML
20 INJECTION, SOLUTION INTRAVENOUS
Status: CANCELLED | OUTPATIENT
Start: 2022-12-06

## 2022-12-05 RX ORDER — EPINEPHRINE 1 MG/ML
0.3 INJECTION, SOLUTION, CONCENTRATE INTRAVENOUS PRN
Status: CANCELLED | OUTPATIENT
Start: 2022-12-06

## 2022-12-05 RX ORDER — ALBUTEROL SULFATE 90 UG/1
4 AEROSOL, METERED RESPIRATORY (INHALATION) PRN
Status: CANCELLED | OUTPATIENT
Start: 2022-12-06

## 2022-12-05 RX ORDER — ACETAMINOPHEN 325 MG/1
650 TABLET ORAL
Status: CANCELLED | OUTPATIENT
Start: 2022-12-06

## 2022-12-06 ENCOUNTER — HOSPITAL ENCOUNTER (OUTPATIENT)
Dept: INFUSION THERAPY | Age: 62
Discharge: HOME | End: 2022-12-06
Payer: COMMERCIAL

## 2022-12-06 ENCOUNTER — OFFICE VISIT (OUTPATIENT)
Dept: CARDIOLOGY CLINIC | Age: 62
End: 2022-12-06
Payer: COMMERCIAL

## 2022-12-06 VITALS
WEIGHT: 288.7 LBS | RESPIRATION RATE: 18 BRPM | HEART RATE: 83 BPM | SYSTOLIC BLOOD PRESSURE: 118 MMHG | BODY MASS INDEX: 40.42 KG/M2 | HEIGHT: 71 IN | DIASTOLIC BLOOD PRESSURE: 62 MMHG

## 2022-12-06 VITALS
RESPIRATION RATE: 16 BRPM | HEART RATE: 78 BPM | SYSTOLIC BLOOD PRESSURE: 114 MMHG | TEMPERATURE: 97.2 F | DIASTOLIC BLOOD PRESSURE: 59 MMHG | OXYGEN SATURATION: 98 %

## 2022-12-06 DIAGNOSIS — C34.31 MALIGNANT NEOPLASM OF LOWER LOBE OF RIGHT LUNG (HCC): Primary | ICD-10-CM

## 2022-12-06 DIAGNOSIS — I25.10 CORONARY ARTERY DISEASE INVOLVING NATIVE CORONARY ARTERY OF NATIVE HEART WITHOUT ANGINA PECTORIS: Primary | ICD-10-CM

## 2022-12-06 LAB
ALBUMIN SERPL-MCNC: 3.5 G/DL (ref 3.5–5.2)
ALP BLD-CCNC: 70 U/L (ref 40–129)
ALT SERPL-CCNC: 14 U/L (ref 0–40)
ANION GAP SERPL CALCULATED.3IONS-SCNC: 16 MMOL/L (ref 7–16)
AST SERPL-CCNC: 17 U/L (ref 0–39)
BILIRUB SERPL-MCNC: 0.2 MG/DL (ref 0–1.2)
BLOOD BANK DISPENSE STATUS: NORMAL
BLOOD BANK PRODUCT CODE: NORMAL
BPU ID: NORMAL
BUN BLDV-MCNC: 41 MG/DL (ref 6–23)
CALCIUM SERPL-MCNC: 9.6 MG/DL (ref 8.6–10.2)
CHLORIDE BLD-SCNC: 107 MMOL/L (ref 98–107)
CHOLESTEROL, TOTAL: 152 MG/DL (ref 0–199)
CO2: 20 MMOL/L (ref 22–29)
CREAT SERPL-MCNC: 1.8 MG/DL (ref 0.7–1.2)
DESCRIPTION BLOOD BANK: NORMAL
GFR SERPL CREATININE-BSD FRML MDRD: 42 ML/MIN/1.73
GLUCOSE BLD-MCNC: 127 MG/DL (ref 74–99)
IRON SATURATION: 29 % (ref 20–55)
IRON: 78 MCG/DL (ref 59–158)
POTASSIUM SERPL-SCNC: 5.2 MMOL/L (ref 3.5–5)
SODIUM BLD-SCNC: 143 MMOL/L (ref 132–146)
TOTAL IRON BINDING CAPACITY: 266 MCG/DL (ref 250–450)
TOTAL PROTEIN: 5.9 G/DL (ref 6.4–8.3)
TRIGL SERPL-MCNC: 58 MG/DL (ref 0–149)

## 2022-12-06 PROCEDURE — 96375 TX/PRO/DX INJ NEW DRUG ADDON: CPT

## 2022-12-06 PROCEDURE — 6360000002 HC RX W HCPCS: Performed by: NURSE PRACTITIONER

## 2022-12-06 PROCEDURE — 2580000003 HC RX 258: Performed by: NURSE PRACTITIONER

## 2022-12-06 PROCEDURE — P9016 RBC LEUKOCYTES REDUCED: HCPCS

## 2022-12-06 PROCEDURE — 93000 ELECTROCARDIOGRAM COMPLETE: CPT | Performed by: INTERNAL MEDICINE

## 2022-12-06 PROCEDURE — 3078F DIAST BP <80 MM HG: CPT | Performed by: INTERNAL MEDICINE

## 2022-12-06 PROCEDURE — 96374 THER/PROPH/DIAG INJ IV PUSH: CPT

## 2022-12-06 PROCEDURE — 6370000000 HC RX 637 (ALT 250 FOR IP): Performed by: NURSE PRACTITIONER

## 2022-12-06 PROCEDURE — 2580000003 HC RX 258

## 2022-12-06 PROCEDURE — 3074F SYST BP LT 130 MM HG: CPT | Performed by: INTERNAL MEDICINE

## 2022-12-06 PROCEDURE — 99213 OFFICE O/P EST LOW 20 MIN: CPT | Performed by: INTERNAL MEDICINE

## 2022-12-06 PROCEDURE — 36430 TRANSFUSION BLD/BLD COMPNT: CPT

## 2022-12-06 RX ORDER — SODIUM CHLORIDE 9 MG/ML
INJECTION, SOLUTION INTRAVENOUS
Status: COMPLETED
Start: 2022-12-06 | End: 2022-12-06

## 2022-12-06 RX ORDER — ONDANSETRON 8 MG/1
TABLET, ORALLY DISINTEGRATING ORAL EVERY 8 HOURS PRN
COMMUNITY
Start: 2022-10-10

## 2022-12-06 RX ORDER — ONDANSETRON 2 MG/ML
8 INJECTION INTRAMUSCULAR; INTRAVENOUS
OUTPATIENT
Start: 2022-12-06

## 2022-12-06 RX ORDER — ACETAMINOPHEN 325 MG/1
650 TABLET ORAL ONCE
Status: COMPLETED | OUTPATIENT
Start: 2022-12-06 | End: 2022-12-06

## 2022-12-06 RX ORDER — ONDANSETRON 2 MG/ML
INJECTION INTRAMUSCULAR; INTRAVENOUS
Status: DISCONTINUED
Start: 2022-12-06 | End: 2022-12-07 | Stop reason: HOSPADM

## 2022-12-06 RX ORDER — DIPHENHYDRAMINE HYDROCHLORIDE 50 MG/ML
50 INJECTION INTRAMUSCULAR; INTRAVENOUS
OUTPATIENT
Start: 2022-12-06

## 2022-12-06 RX ORDER — HYDROCODONE BITARTRATE AND ACETAMINOPHEN 5; 300 MG/1; MG/1
TABLET ORAL
COMMUNITY
Start: 2022-10-26

## 2022-12-06 RX ORDER — SODIUM CHLORIDE 0.9 % (FLUSH) 0.9 %
5-40 SYRINGE (ML) INJECTION PRN
Status: CANCELLED | OUTPATIENT
Start: 2022-12-06

## 2022-12-06 RX ORDER — ACETAMINOPHEN 325 MG/1
650 TABLET ORAL
OUTPATIENT
Start: 2022-12-06

## 2022-12-06 RX ORDER — SODIUM CHLORIDE 0.9 % (FLUSH) 0.9 %
5-40 SYRINGE (ML) INJECTION PRN
Status: DISCONTINUED | OUTPATIENT
Start: 2022-12-06 | End: 2022-12-07 | Stop reason: HOSPADM

## 2022-12-06 RX ORDER — SODIUM CHLORIDE 9 MG/ML
INJECTION, SOLUTION INTRAVENOUS CONTINUOUS
OUTPATIENT
Start: 2022-12-06

## 2022-12-06 RX ORDER — SODIUM CHLORIDE 9 MG/ML
20 INJECTION, SOLUTION INTRAVENOUS CONTINUOUS
Status: CANCELLED | OUTPATIENT
Start: 2022-12-06

## 2022-12-06 RX ORDER — DIPHENHYDRAMINE HCL 25 MG
25 TABLET ORAL ONCE
Status: COMPLETED | OUTPATIENT
Start: 2022-12-06 | End: 2022-12-06

## 2022-12-06 RX ORDER — DIPHENHYDRAMINE HCL 25 MG
25 TABLET ORAL ONCE
Status: CANCELLED | OUTPATIENT
Start: 2022-12-06 | End: 2022-12-06

## 2022-12-06 RX ORDER — EPINEPHRINE 1 MG/ML
0.3 INJECTION, SOLUTION, CONCENTRATE INTRAVENOUS PRN
OUTPATIENT
Start: 2022-12-06

## 2022-12-06 RX ORDER — SODIUM CHLORIDE 9 MG/ML
25 INJECTION, SOLUTION INTRAVENOUS PRN
OUTPATIENT
Start: 2022-12-06

## 2022-12-06 RX ORDER — GABAPENTIN 100 MG/1
100 CAPSULE ORAL 3 TIMES DAILY
COMMUNITY
Start: 2022-10-17

## 2022-12-06 RX ORDER — ACETAMINOPHEN 325 MG/1
650 TABLET ORAL ONCE
Status: CANCELLED | OUTPATIENT
Start: 2022-12-06 | End: 2022-12-06

## 2022-12-06 RX ORDER — ALBUTEROL SULFATE 90 UG/1
4 AEROSOL, METERED RESPIRATORY (INHALATION) PRN
OUTPATIENT
Start: 2022-12-06

## 2022-12-06 RX ORDER — SODIUM CHLORIDE 9 MG/ML
20 INJECTION, SOLUTION INTRAVENOUS CONTINUOUS
Status: DISCONTINUED | OUTPATIENT
Start: 2022-12-06 | End: 2022-12-07 | Stop reason: HOSPADM

## 2022-12-06 RX ADMIN — HYDROCORTISONE SODIUM SUCCINATE 25 MG: 100 INJECTION, POWDER, FOR SOLUTION INTRAMUSCULAR; INTRAVENOUS at 08:28

## 2022-12-06 RX ADMIN — ACETAMINOPHEN 650 MG: 325 TABLET, FILM COATED ORAL at 08:28

## 2022-12-06 RX ADMIN — DIPHENHYDRAMINE HCL 25 MG: 25 TABLET ORAL at 08:29

## 2022-12-06 RX ADMIN — SODIUM CHLORIDE 20 ML/HR: 9 INJECTION, SOLUTION INTRAVENOUS at 08:25

## 2022-12-06 RX ADMIN — SODIUM CHLORIDE, PRESERVATIVE FREE 10 ML: 5 INJECTION INTRAVENOUS at 08:24

## 2022-12-06 ASSESSMENT — PAIN SCALES - GENERAL: PAINLEVEL_OUTOF10: 0

## 2022-12-06 NOTE — PROGRESS NOTES
Adelina Bassal  1960  Date of Service: 2022    Patient Active Problem List    Diagnosis Date Noted    Acute on chronic diastolic (congestive) heart failure ([facility] 2021    Pleural effusion on right 2021    ANTONIO (obstructive sleep apnea) 2021    Former smoker 2021    AF (atrial fibrillation) ([facility] 2021    Acute respiratory failure with hypoxia ([facility] [address] 01/15/2021    Malignant neoplasm of lung ([facility]     Essential (primary) hypertension 2020    Gastro-esophageal reflux disease without esophagitis 2020    Unspecified atrial fibrillation ([facility] 2020    Hyperlipidemia, unspecified 2020    Chest pain 2019    CAD (coronary artery disease)      Overview Note:     Acute inferior MI (07). Cardiac catheterization (07):   LAD - proximal 85% bifurcating with first diagonal  D1 - ostial 50%  Cx - prox to mid 40%  OM1 - ostial 90%  RCA - prox diffuse 99% with heavy thrombus  Successful thrombectomy. Coronary artery bypass surgery by Dr. Cecily Cowart (07)  LIMA to LAD  SVG to D1  SVG to PDA    Cath 3-14  70% Cx - 3.5 x 18 mm Resolute ELENA      COPD (chronic obstructive pulmonary disease) ([facility] [address]        Social History     Socioeconomic History    Marital status:      Spouse name: None    Number of children: None    Years of education: None    Highest education level: None   Occupational History    Occupation:    Tobacco Use    Smoking status: Former     Packs/day: 1.50     Years: 40.00     Pack years: 60.00     Types: Cigarettes     Start date:      Quit date: 2020     Years since quittin.4     Passive exposure: Past    Smokeless tobacco: Never   Vaping Use    Vaping Use: Never used   Substance and Sexual Activity    Alcohol use:  Yes     Alcohol/week: 4.0 - 6.0 standard drinks     Types: 4 - 6 Cans of beer per week    Drug use: No    Sexual activity: Not Currently   Social History Narrative    Social History Narrative: Tobacco cigarette smoker 1.5 PPD x 40 years (60 pack years). Quit 07/01/2020. Drinks 4-6 cans of beer 1 day every weekend. Prior history of moderately-heavy alcohol consumption. Denies any current or any history of illicit drug use/ abuse. 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 Current Outpatient Medications   Medication Sig Dispense Refill    HYDROcodone-acetaminophen 5-300 MG TABS TAKE 1 TABLET BY MOUTH EVERY DAY AS NEEDED FOR PAIN      gabapentin (NEURONTIN) 100 MG capsule 100 mg 3 times daily. ondansetron (ZOFRAN-ODT) 8 MG TBDP disintegrating tablet every 8 hours as needed      metoprolol succinate (TOPROL XL) 100 MG extended release tablet TAKE 1 TABLET TWICE A DAY (Patient taking differently: 2 times daily Indications: 100 mg in am and 125 mg in pm) 180 tablet 3    PACLitaxel-protein bound (ABRAXANE) 100 MG chemo IVPB Infuse 260 mg/m2 intravenously once      nitroGLYCERIN (NITROSTAT) 0.4 MG SL tablet Place 1 tablet under the tongue every 5 minutes as needed for Chest pain 25 tablet 0    furosemide (LASIX) 20 MG tablet Take 1 tablet by mouth in the morning. 90 tablet 1    spironolactone (ALDACTONE) 25 MG tablet Take 1 tablet by mouth in the morning.  90 tablet 1    apixaban (ELIQUIS) 5 MG TABS tablet TAKE 1 TABLET TWICE A  tablet 3    ASPIRIN LOW DOSE 81 MG EC tablet TAKE 1 TABLET DAILY 90 tablet 3    lisinopril (PRINIVIL;ZESTRIL) 5 MG tablet TAKE 1 TABLET DAILY (DOSE DECREASED) 90 tablet 3    albuterol (PROVENTIL) (2.5 MG/3ML) 0.083% nebulizer solution Take 0.083 mLs by nebulization 4 times daily as needed      fluticasone-umeclidin-vilant (TRELEGY ELLIPTA) 100-62.5-25 MCG/INH AEPB Inhale 1 puff into the lungs daily 1 each 5    PROAIR  (90 Base) MCG/ACT inhaler INHALE TWO PUFFS BY MOUTH EVERY 4 TO 6 HOURS AS NEEDED      rosuvastatin (CRESTOR) 20 MG tablet Take 20 mg by mouth daily      vitamin D (ERGOCALCIFEROL) 96588 UNITS CAPS capsule Take 50,000 Units by mouth once a week mondays      TOPROL XL 25 MG extended release tablet Take 1 tablet by mouth daily (Patient not taking: Reported on 12/6/2022) 90 tablet 3    ipratropium-albuterol (DUONEB) 0.5-2.5 (3) MG/3ML SOLN nebulizer solution Inhale 3 mLs into the lungs every 4 hours (Patient not taking: Reported on 12/6/2022) 360 mL 0     No current facility-administered medications for this visit. Facility-Administered Medications Ordered in Other Visits   Medication Dose Route Frequency Provider Last Rate Last Admin    0.9 % sodium chloride infusion  20 mL/hr IntraVENous Continuous Valeta Able, APRN - CNP 20 mL/hr at 12/06/22 0825 20 mL/hr at 12/06/22 0825    sodium chloride flush 0.9 % injection 5-40 mL  5-40 mL IntraVENous PRN Valeta Able, APRN - CNP   10 mL at 12/06/22 0824    ondansetron (ZOFRAN) 4 MG/2ML injection                 Allergies   Allergen Reactions    Zocor [Simvastatin - High Dose] Other (See Comments)     Causes Rhabdomyolysis       Chief Complaint:  Brandie Wheeler is here today for follow up and management/recomendations for CAD, CP, abnormal Stress test, HTN, RBBB, ODOT clearance. History of Present Illness: Brandie Wheeler  has baseline dyspnea with exertion from his COPD. He states that he is on chemotherapy for his lung cancer. He states that this causes his hemoglobin to go low. He states that when this happens he has leg pains and he is more dyspneic. He denies any orthopnea/PND. He has intermittent left greater than right ankle edema. He denies any chest discomfort. He denies any palpitations or presyncopal symptoms. REVIEW OF SYSTEMS:  As above. Patient does not complain of any fever, chills, nausea, vomiting or diarrhea. No focal, motor or neurological deficits. No changes in his/her vision, hearing, bowel or bladder habits.   He is not known to have a history of thyroid problems. No recent nose bleeds. PHYSICAL EXAM:  Vitals:    12/06/22 1437   BP: 118/62   Pulse: 83   Resp: 18   Weight: 288 lb 11.2 oz (131 kg)   Height: 5' 11\" (1.803 m)       GENERAL:  He is alert and oriented x 3, communicates well, in no distress. NECK:  No masses, trachea is mid position. Supple, full ROM, mild JVD or bruits. No palpable thyromegaly or lymphadenopathy. HEART: Distant to auscultation. Irregular mildly tachycardic rhythm. Normal S1 and S2. There is a I/VI systolic murmur. LUNGS:  Poor air movement. Bilateral diffuse rhonchi and wheezing. Clear to auscultation bilaterally. No use of accessory muscles. symmetrical excursion. ABDOMEN: Soft, non-tender. Normal bowel sounds. Morbidly obese. EXTREMITIES:  Full ROM x 4. Mild to moderate left greater than right bilateral lower extremity edema on exam.  Good distal pulses. EYES:  Extraocular muscles intact. PERRL. Normal lids & conjunctiva. ENT:  Nares are clear & not bleeding. Moist mucosa. Normal lips formation. No external masses   NEURO: no tremors, full ROM x 4, EOMI. SKIN:  Warm, dry and intact. Normal turgor. EKG: Atrial fibrillation. 83 bpm, nl axis, nonspecific ST - T wave changes, RBBB. Assessment:   Coronary artery disease as outlined above. He denies ischemic symptoms at this time. Severe COPD. Sleep apnea. RBBB, stable  Hypertension, well controled at this time. Hypercholesterolemia  Paroxysmal/persistent atrial fibrillation. Following with electrophysiology. Heart rate controlled today. They are holding off on cardioversion until his lung cancer situation has been stabilized. Mild aortic regurgitation  Mild to moderate mitral regurgitation. Lung cancer. Recommendations:  He is following the cholesterol with Dr. Raymundo Barajas. Continue his current cardiac medications at this time. Thank you for allowing me to participate in your patient's care.       Marc Miller [address], [address] [facility]  Interventional Cardiology

## 2023-01-21 DIAGNOSIS — I50.32 CHRONIC HEART FAILURE WITH PRESERVED EJECTION FRACTION (HCC): ICD-10-CM

## 2023-01-23 RX ORDER — SPIRONOLACTONE 25 MG/1
TABLET ORAL
Qty: 90 TABLET | Refills: 3 | Status: SHIPPED | OUTPATIENT
Start: 2023-01-23

## 2023-01-24 ENCOUNTER — TELEPHONE (OUTPATIENT)
Dept: CARDIOLOGY CLINIC | Age: 63
End: 2023-01-24

## 2023-02-14 ENCOUNTER — HOSPITAL ENCOUNTER (OUTPATIENT)
Dept: OTHER | Age: 63
Setting detail: THERAPIES SERIES
Discharge: HOME | End: 2023-02-14

## 2023-02-14 DIAGNOSIS — I50.32 CHRONIC HEART FAILURE WITH PRESERVED EJECTION FRACTION (HCC): ICD-10-CM

## 2023-02-14 RX ORDER — FUROSEMIDE 20 MG/1
TABLET ORAL
Qty: 90 TABLET | Refills: 3 | Status: SHIPPED | OUTPATIENT
Start: 2023-02-14

## 2023-03-03 ENCOUNTER — HOSPITAL ENCOUNTER (OUTPATIENT)
Dept: CT IMAGING | Age: 63
Discharge: HOME | End: 2023-03-03
Payer: COMMERCIAL

## 2023-03-03 DIAGNOSIS — R11.0 NAUSEA: ICD-10-CM

## 2023-03-03 DIAGNOSIS — C34.31 SQUAMOUS CELL CARCINOMA OF BRONCHUS IN RIGHT LOWER LOBE (HCC): ICD-10-CM

## 2023-03-03 PROCEDURE — 71250 CT THORAX DX C-: CPT

## 2023-03-03 PROCEDURE — 6360000004 HC RX CONTRAST MEDICATION: Performed by: RADIOLOGY

## 2023-03-03 PROCEDURE — 74176 CT ABD & PELVIS W/O CONTRAST: CPT

## 2023-03-03 RX ADMIN — IOPAMIDOL 18 ML: 755 INJECTION, SOLUTION INTRAVENOUS at 11:03

## 2023-03-30 RX ORDER — LISINOPRIL 5 MG/1
TABLET ORAL
Qty: 90 TABLET | Refills: 3 | Status: SHIPPED | OUTPATIENT
Start: 2023-03-30

## 2023-04-03 ENCOUNTER — APPOINTMENT (OUTPATIENT)
Dept: ULTRASOUND IMAGING | Age: 63
DRG: 683 | End: 2023-04-03
Payer: COMMERCIAL

## 2023-04-03 ENCOUNTER — HOSPITAL ENCOUNTER (INPATIENT)
Age: 63
LOS: 6 days | Discharge: HOME | DRG: 683 | End: 2023-04-09
Attending: EMERGENCY MEDICINE | Admitting: FAMILY MEDICINE
Payer: COMMERCIAL

## 2023-04-03 DIAGNOSIS — Z51.5 PALLIATIVE CARE BY SPECIALIST: ICD-10-CM

## 2023-04-03 DIAGNOSIS — E87.5 HYPERKALEMIA: ICD-10-CM

## 2023-04-03 DIAGNOSIS — C34.91 SQUAMOUS CELL CARCINOMA OF RIGHT LUNG (HCC): ICD-10-CM

## 2023-04-03 DIAGNOSIS — N17.9 ACUTE RENAL FAILURE, UNSPECIFIED ACUTE RENAL FAILURE TYPE (HCC): Primary | ICD-10-CM

## 2023-04-03 DIAGNOSIS — D64.9 ANEMIA, UNSPECIFIED TYPE: ICD-10-CM

## 2023-04-03 LAB
ALBUMIN SERPL-MCNC: 3.9 G/DL (ref 3.5–5.2)
ALP SERPL-CCNC: 98 U/L (ref 40–129)
ALT SERPL-CCNC: 14 U/L (ref 0–40)
ANION GAP SERPL CALCULATED.3IONS-SCNC: 13 MMOL/L (ref 7–16)
ANION GAP SERPL CALCULATED.3IONS-SCNC: 14 MMOL/L (ref 7–16)
ANION GAP SERPL CALCULATED.3IONS-SCNC: 16 MMOL/L (ref 7–16)
ANION GAP SERPL CALCULATED.3IONS-SCNC: 17 MMOL/L (ref 7–16)
ANISOCYTOSIS: ABNORMAL
APTT BLD: 94.9 SEC (ref 24.5–35.1)
AST SERPL-CCNC: 12 U/L (ref 0–39)
B.E.: -11.2 MMOL/L (ref -3–3)
BACTERIA URNS QL MICRO: NORMAL /HPF
BASOPHILS # BLD: 0 E9/L (ref 0–0.2)
BASOPHILS NFR BLD: 0 % (ref 0–2)
BILIRUB SERPL-MCNC: 0.2 MG/DL (ref 0–1.2)
BILIRUB UR QL STRIP: NEGATIVE
BUN SERPL-MCNC: 100 MG/DL (ref 6–23)
BUN SERPL-MCNC: 103 MG/DL (ref 6–23)
BUN SERPL-MCNC: 88 MG/DL (ref 6–23)
BUN SERPL-MCNC: 91 MG/DL (ref 6–23)
CALCIUM SERPL-MCNC: 7.2 MG/DL (ref 8.6–10.2)
CALCIUM SERPL-MCNC: 7.6 MG/DL (ref 8.6–10.2)
CALCIUM SERPL-MCNC: 8 MG/DL (ref 8.6–10.2)
CALCIUM SERPL-MCNC: 8.1 MG/DL (ref 8.6–10.2)
CHLORIDE SERPL-SCNC: 104 MMOL/L (ref 98–107)
CHLORIDE SERPL-SCNC: 106 MMOL/L (ref 98–107)
CHLORIDE SERPL-SCNC: 106 MMOL/L (ref 98–107)
CHLORIDE SERPL-SCNC: 108 MMOL/L (ref 98–107)
CHLORIDE UR-SCNC: 92 MMOL/L
CHP ED QC CHECK: NORMAL
CHP ED QC CHECK: NORMAL
CLARITY UR: CLEAR
CO2 SERPL-SCNC: 15 MMOL/L (ref 22–29)
CO2 SERPL-SCNC: 16 MMOL/L (ref 22–29)
CO2 SERPL-SCNC: 17 MMOL/L (ref 22–29)
CO2 SERPL-SCNC: 17 MMOL/L (ref 22–29)
COHB: 0.2 % (ref 0–1.5)
COLOR UR: YELLOW
CREAT SERPL-MCNC: 6.9 MG/DL (ref 0.7–1.2)
CREAT SERPL-MCNC: 7.3 MG/DL (ref 0.7–1.2)
CREAT SERPL-MCNC: 8.5 MG/DL (ref 0.7–1.2)
CREAT SERPL-MCNC: 9.1 MG/DL (ref 0.7–1.2)
CREAT UR-MCNC: 41 MG/DL (ref 40–278)
CRITICAL: ABNORMAL
DATE ANALYZED: ABNORMAL
DATE OF COLLECTION: ABNORMAL
EKG ATRIAL RATE: 64 BPM
EKG Q-T INTERVAL: 184 MS
EKG QRS DURATION: 106 MS
EKG QTC CALCULATION (BAZETT): 339 MS
EKG R AXIS: 29 DEGREES
EKG T AXIS: -92 DEGREES
EKG VENTRICULAR RATE: 204 BPM
EOSINOPHIL # BLD: 0 E9/L (ref 0.05–0.5)
EOSINOPHIL NFR BLD: 0 % (ref 0–6)
ERYTHROCYTE [DISTWIDTH] IN BLOOD BY AUTOMATED COUNT: 15.2 FL (ref 11.5–15)
GLUCOSE BLD-MCNC: 124 MG/DL
GLUCOSE BLD-MCNC: 214 MG/DL
GLUCOSE SERPL-MCNC: 122 MG/DL (ref 74–99)
GLUCOSE SERPL-MCNC: 173 MG/DL (ref 74–99)
GLUCOSE SERPL-MCNC: 207 MG/DL (ref 74–99)
GLUCOSE SERPL-MCNC: 267 MG/DL (ref 74–99)
GLUCOSE UR STRIP-MCNC: 250 MG/DL
HCO3: 14.2 MMOL/L (ref 22–26)
HCT VFR BLD AUTO: 29.6 % (ref 37–54)
HGB BLD-MCNC: 9.2 G/DL (ref 12.5–16.5)
HGB UR QL STRIP: ABNORMAL
HHB: 5.9 % (ref 0–5)
INR BLD: 1.2
KETONES UR STRIP-MCNC: NEGATIVE MG/DL
LAB: ABNORMAL
LACTATE BLDV-SCNC: 1.3 MMOL/L (ref 0.5–1.9)
LACTATE BLDV-SCNC: 1.5 MMOL/L (ref 0.5–2.2)
LACTATE BLDV-SCNC: 1.6 MMOL/L (ref 0.5–1.9)
LEUKOCYTE ESTERASE UR QL STRIP: NEGATIVE
LIPASE: 74 U/L (ref 13–60)
LYMPHOCYTES # BLD: 0.19 E9/L (ref 1.5–4)
LYMPHOCYTES NFR BLD: 1.7 % (ref 20–42)
Lab: ABNORMAL
MAGNESIUM SERPL-MCNC: 1.4 MG/DL (ref 1.6–2.6)
MAGNESIUM SERPL-MCNC: 1.7 MG/DL (ref 1.6–2.6)
MCH RBC QN AUTO: 32.2 PG (ref 26–35)
MCHC RBC AUTO-ENTMCNC: 31.1 % (ref 32–34.5)
MCV RBC AUTO: 103.5 FL (ref 80–99.9)
METER GLUCOSE: 125 MG/DL (ref 74–99)
METER GLUCOSE: 175 MG/DL (ref 74–99)
METER GLUCOSE: 214 MG/DL (ref 74–99)
METER GLUCOSE: 263 MG/DL (ref 74–99)
METHB: 0.3 % (ref 0–1.5)
MODE: ABNORMAL
MONOCYTES # BLD: 0.1 E9/L (ref 0.1–0.95)
MONOCYTES NFR BLD: 0.9 % (ref 2–12)
MYELOCYTES NFR BLD MANUAL: 0.9 % (ref 0–0)
NEUTROPHILS # BLD: 9.41 E9/L (ref 1.8–7.3)
NEUTS SEG NFR BLD: 96.5 % (ref 43–80)
NITRITE UR QL STRIP: NEGATIVE
NRBC BLD-RTO: 0 /100 WBC
O2 CONTENT: 13.5 ML/DL
O2 SATURATION: 94.1 % (ref 92–98.5)
O2HB: 93.6 % (ref 94–97)
OPERATOR ID: 1023
OSMOLALITY URINE: 354 MOSM/KG (ref 300–900)
PATIENT TEMP: 37 C
PCO2: 30 MMHG (ref 35–45)
PH BLOOD GAS: 7.29 (ref 7.35–7.45)
PH UR STRIP: 5 [PH] (ref 5–9)
PHOSPHATE SERPL-MCNC: 6.3 MG/DL (ref 2.5–4.5)
PLATELET # BLD AUTO: 176 E9/L (ref 130–450)
PMV BLD AUTO: 9.7 FL (ref 7–12)
PO2: 83.8 MMHG (ref 75–100)
POIKILOCYTES: ABNORMAL
POLYCHROMASIA: ABNORMAL
POTASSIUM SERPL-SCNC: 7 MMOL/L (ref 3.5–5)
POTASSIUM SERPL-SCNC: 7.9 MMOL/L (ref 3.5–5)
POTASSIUM SERPL-SCNC: 8.1 MMOL/L (ref 3.5–5)
POTASSIUM SERPL-SCNC: 8.3 MMOL/L (ref 3.5–5)
PROT SERPL-MCNC: 6.7 G/DL (ref 6.4–8.3)
PROT UR STRIP-MCNC: 30 MG/DL
PROT UR-MCNC: 43 MG/DL (ref 0–12)
PROTEIN/CREAT RATIO: 1
PROTEIN/CREAT RATIO: 1 (ref 0–0.2)
PROTHROMBIN TIME: 13.4 SEC (ref 9.3–12.4)
RBC # BLD AUTO: 2.86 E12/L (ref 3.8–5.8)
RBC #/AREA URNS HPF: NORMAL /HPF (ref 0–2)
REASON FOR REJECTION: NORMAL
REJECTED TEST: NORMAL
SODIUM SERPL-SCNC: 136 MMOL/L (ref 132–146)
SODIUM SERPL-SCNC: 136 MMOL/L (ref 132–146)
SODIUM SERPL-SCNC: 138 MMOL/L (ref 132–146)
SODIUM SERPL-SCNC: 139 MMOL/L (ref 132–146)
SODIUM UR-SCNC: 105 MMOL/L
SOURCE, BLOOD GAS: ABNORMAL
SP GR UR STRIP: 1.02 (ref 1–1.03)
TEAR DROP CELLS: ABNORMAL
THB: 10.2 G/DL (ref 11.5–16.5)
TIME ANALYZED: 1926
TOXIC GRANULATION: ABNORMAL
TROPONIN, HIGH SENSITIVITY: 55 NG/L (ref 0–11)
TROPONIN, HIGH SENSITIVITY: 76 NG/L (ref 0–11)
UROBILINOGEN UR STRIP-ACNC: 0.2 E.U./DL
WBC # BLD: 9.7 E9/L (ref 4.5–11.5)
WBC #/AREA URNS HPF: NORMAL /HPF (ref 0–5)

## 2023-04-03 PROCEDURE — 76770 US EXAM ABDO BACK WALL COMP: CPT

## 2023-04-03 PROCEDURE — 2580000003 HC RX 258: Performed by: INTERNAL MEDICINE

## 2023-04-03 PROCEDURE — 6370000000 HC RX 637 (ALT 250 FOR IP): Performed by: INTERNAL MEDICINE

## 2023-04-03 PROCEDURE — 96374 THER/PROPH/DIAG INJ IV PUSH: CPT

## 2023-04-03 PROCEDURE — 85610 PROTHROMBIN TIME: CPT

## 2023-04-03 PROCEDURE — 94664 DEMO&/EVAL PT USE INHALER: CPT

## 2023-04-03 PROCEDURE — 36591 DRAW BLOOD OFF VENOUS DEVICE: CPT

## 2023-04-03 PROCEDURE — 99285 EMERGENCY DEPT VISIT HI MDM: CPT

## 2023-04-03 PROCEDURE — 84300 ASSAY OF URINE SODIUM: CPT

## 2023-04-03 PROCEDURE — 96375 TX/PRO/DX INJ NEW DRUG ADDON: CPT

## 2023-04-03 PROCEDURE — 2000000000 HC ICU R&B

## 2023-04-03 PROCEDURE — 6360000002 HC RX W HCPCS: Performed by: EMERGENCY MEDICINE

## 2023-04-03 PROCEDURE — 82436 ASSAY OF URINE CHLORIDE: CPT

## 2023-04-03 PROCEDURE — 93010 ELECTROCARDIOGRAM REPORT: CPT | Performed by: INTERNAL MEDICINE

## 2023-04-03 PROCEDURE — 83605 ASSAY OF LACTIC ACID: CPT

## 2023-04-03 PROCEDURE — 6370000000 HC RX 637 (ALT 250 FOR IP): Performed by: STUDENT IN AN ORGANIZED HEALTH CARE EDUCATION/TRAINING PROGRAM

## 2023-04-03 PROCEDURE — 93005 ELECTROCARDIOGRAM TRACING: CPT | Performed by: EMERGENCY MEDICINE

## 2023-04-03 PROCEDURE — 2580000003 HC RX 258: Performed by: NURSE PRACTITIONER

## 2023-04-03 PROCEDURE — 36600 WITHDRAWAL OF ARTERIAL BLOOD: CPT

## 2023-04-03 PROCEDURE — 2500000003 HC RX 250 WO HCPCS: Performed by: INTERNAL MEDICINE

## 2023-04-03 PROCEDURE — 81001 URINALYSIS AUTO W/SCOPE: CPT

## 2023-04-03 PROCEDURE — 87081 CULTURE SCREEN ONLY: CPT

## 2023-04-03 PROCEDURE — 82805 BLOOD GASES W/O2 SATURATION: CPT

## 2023-04-03 PROCEDURE — 80053 COMPREHEN METABOLIC PANEL: CPT

## 2023-04-03 PROCEDURE — 36415 COLL VENOUS BLD VENIPUNCTURE: CPT

## 2023-04-03 PROCEDURE — 84156 ASSAY OF PROTEIN URINE: CPT

## 2023-04-03 PROCEDURE — 84484 ASSAY OF TROPONIN QUANT: CPT

## 2023-04-03 PROCEDURE — 84100 ASSAY OF PHOSPHORUS: CPT

## 2023-04-03 PROCEDURE — 99291 CRITICAL CARE FIRST HOUR: CPT | Performed by: NURSE PRACTITIONER

## 2023-04-03 PROCEDURE — 85730 THROMBOPLASTIN TIME PARTIAL: CPT

## 2023-04-03 PROCEDURE — 6370000000 HC RX 637 (ALT 250 FOR IP): Performed by: EMERGENCY MEDICINE

## 2023-04-03 PROCEDURE — 85025 COMPLETE CBC W/AUTO DIFF WBC: CPT

## 2023-04-03 PROCEDURE — 6360000002 HC RX W HCPCS: Performed by: INTERNAL MEDICINE

## 2023-04-03 PROCEDURE — 94640 AIRWAY INHALATION TREATMENT: CPT

## 2023-04-03 PROCEDURE — 87040 BLOOD CULTURE FOR BACTERIA: CPT

## 2023-04-03 PROCEDURE — 83935 ASSAY OF URINE OSMOLALITY: CPT

## 2023-04-03 PROCEDURE — 82570 ASSAY OF URINE CREATININE: CPT

## 2023-04-03 PROCEDURE — 80048 BASIC METABOLIC PNL TOTAL CA: CPT

## 2023-04-03 PROCEDURE — 83735 ASSAY OF MAGNESIUM: CPT

## 2023-04-03 PROCEDURE — 82962 GLUCOSE BLOOD TEST: CPT

## 2023-04-03 PROCEDURE — 51702 INSERT TEMP BLADDER CATH: CPT

## 2023-04-03 PROCEDURE — 2500000003 HC RX 250 WO HCPCS: Performed by: EMERGENCY MEDICINE

## 2023-04-03 PROCEDURE — 83690 ASSAY OF LIPASE: CPT

## 2023-04-03 PROCEDURE — 2580000003 HC RX 258: Performed by: EMERGENCY MEDICINE

## 2023-04-03 RX ORDER — NITROGLYCERIN 0.4 MG/1
0.4 TABLET SUBLINGUAL ONCE
Status: COMPLETED | OUTPATIENT
Start: 2023-04-03 | End: 2023-04-03

## 2023-04-03 RX ORDER — PEGFILGRASTIM 6 MG/.6ML
6 INJECTION SUBCUTANEOUS
COMMUNITY

## 2023-04-03 RX ORDER — SODIUM CHLORIDE 0.9 % (FLUSH) 0.9 %
5-40 SYRINGE (ML) INJECTION EVERY 12 HOURS SCHEDULED
Status: DISCONTINUED | OUTPATIENT
Start: 2023-04-03 | End: 2023-04-09 | Stop reason: HOSPADM

## 2023-04-03 RX ORDER — LISINOPRIL 10 MG/1
5 TABLET ORAL ONCE
Status: COMPLETED | OUTPATIENT
Start: 2023-04-03 | End: 2023-04-03

## 2023-04-03 RX ORDER — ACETAMINOPHEN 325 MG/1
650 TABLET ORAL EVERY 6 HOURS PRN
Status: DISCONTINUED | OUTPATIENT
Start: 2023-04-03 | End: 2023-04-09 | Stop reason: HOSPADM

## 2023-04-03 RX ORDER — FUROSEMIDE 20 MG/1
20 TABLET ORAL EVERY MORNING
COMMUNITY

## 2023-04-03 RX ORDER — CALCIUM GLUCONATE 94 MG/ML
1000 INJECTION, SOLUTION INTRAVENOUS ONCE
Status: COMPLETED | OUTPATIENT
Start: 2023-04-03 | End: 2023-04-03

## 2023-04-03 RX ORDER — 0.9 % SODIUM CHLORIDE 0.9 %
2000 INTRAVENOUS SOLUTION INTRAVENOUS ONCE
Status: DISCONTINUED | OUTPATIENT
Start: 2023-04-03 | End: 2023-04-03

## 2023-04-03 RX ORDER — ACETAMINOPHEN 650 MG/1
650 SUPPOSITORY RECTAL EVERY 6 HOURS PRN
Status: DISCONTINUED | OUTPATIENT
Start: 2023-04-03 | End: 2023-04-09 | Stop reason: HOSPADM

## 2023-04-03 RX ORDER — 0.9 % SODIUM CHLORIDE 0.9 %
30 INTRAVENOUS SOLUTION INTRAVENOUS ONCE
Status: COMPLETED | OUTPATIENT
Start: 2023-04-03 | End: 2023-04-03

## 2023-04-03 RX ORDER — LISINOPRIL 5 MG/1
5 TABLET ORAL EVERY MORNING
COMMUNITY

## 2023-04-03 RX ORDER — METOPROLOL SUCCINATE 25 MG/1
100 TABLET, EXTENDED RELEASE ORAL ONCE
Status: COMPLETED | OUTPATIENT
Start: 2023-04-03 | End: 2023-04-03

## 2023-04-03 RX ORDER — SPIRONOLACTONE 25 MG/1
25 TABLET ORAL EVERY MORNING
COMMUNITY

## 2023-04-03 RX ORDER — ASPIRIN 81 MG/1
81 TABLET ORAL EVERY MORNING
COMMUNITY

## 2023-04-03 RX ORDER — SODIUM CHLORIDE 0.9 % (FLUSH) 0.9 %
5-40 SYRINGE (ML) INJECTION PRN
Status: DISCONTINUED | OUTPATIENT
Start: 2023-04-03 | End: 2023-04-09 | Stop reason: HOSPADM

## 2023-04-03 RX ORDER — DEXTROSE MONOHYDRATE 100 MG/ML
INJECTION, SOLUTION INTRAVENOUS CONTINUOUS PRN
Status: DISCONTINUED | OUTPATIENT
Start: 2023-04-03 | End: 2023-04-04

## 2023-04-03 RX ORDER — SODIUM CHLORIDE 9 MG/ML
INJECTION, SOLUTION INTRAVENOUS PRN
Status: DISCONTINUED | OUTPATIENT
Start: 2023-04-03 | End: 2023-04-09 | Stop reason: HOSPADM

## 2023-04-03 RX ORDER — METOPROLOL SUCCINATE 100 MG/1
100 TABLET, EXTENDED RELEASE ORAL 2 TIMES DAILY
COMMUNITY

## 2023-04-03 RX ORDER — POLYETHYLENE GLYCOL 3350 17 G/17G
17 POWDER, FOR SOLUTION ORAL DAILY PRN
Status: DISCONTINUED | OUTPATIENT
Start: 2023-04-03 | End: 2023-04-09 | Stop reason: HOSPADM

## 2023-04-03 RX ORDER — METOPROLOL SUCCINATE 25 MG/1
25 TABLET, EXTENDED RELEASE ORAL NIGHTLY
COMMUNITY

## 2023-04-03 RX ORDER — DEXTROSE MONOHYDRATE 25 G/50ML
25 INJECTION, SOLUTION INTRAVENOUS ONCE
Status: DISCONTINUED | OUTPATIENT
Start: 2023-04-03 | End: 2023-04-09 | Stop reason: HOSPADM

## 2023-04-03 RX ORDER — DEXTROSE MONOHYDRATE 100 MG/ML
INJECTION, SOLUTION INTRAVENOUS CONTINUOUS PRN
Status: DISCONTINUED | OUTPATIENT
Start: 2023-04-03 | End: 2023-04-09 | Stop reason: HOSPADM

## 2023-04-03 RX ADMIN — SODIUM BICARBONATE 50 MEQ: 84 INJECTION, SOLUTION INTRAVENOUS at 13:53

## 2023-04-03 RX ADMIN — SODIUM ZIRCONIUM CYCLOSILICATE 10 G: 10 POWDER, FOR SUSPENSION ORAL at 23:05

## 2023-04-03 RX ADMIN — SODIUM CHLORIDE 4356 ML: 9 INJECTION, SOLUTION INTRAVENOUS at 13:07

## 2023-04-03 RX ADMIN — DEXTROSE MONOHYDRATE 250 ML: 100 INJECTION, SOLUTION INTRAVENOUS at 13:58

## 2023-04-03 RX ADMIN — SODIUM BICARBONATE 50 MEQ: 84 INJECTION, SOLUTION INTRAVENOUS at 23:08

## 2023-04-03 RX ADMIN — INSULIN HUMAN 10 UNITS: 100 INJECTION, SOLUTION PARENTERAL at 13:28

## 2023-04-03 RX ADMIN — CALCIUM GLUCONATE 1000 MG: 98 INJECTION, SOLUTION INTRAVENOUS at 23:15

## 2023-04-03 RX ADMIN — CALCIUM GLUCONATE 1000 MG: 98 INJECTION, SOLUTION INTRAVENOUS at 14:00

## 2023-04-03 RX ADMIN — ALBUTEROL SULFATE 10 MG: 2.5 SOLUTION RESPIRATORY (INHALATION) at 13:25

## 2023-04-03 RX ADMIN — INSULIN HUMAN 10 UNITS: 100 INJECTION, SOLUTION PARENTERAL at 19:09

## 2023-04-03 RX ADMIN — INSULIN HUMAN 10 UNITS: 100 INJECTION, SOLUTION PARENTERAL at 23:09

## 2023-04-03 RX ADMIN — SODIUM CHLORIDE, PRESERVATIVE FREE 10 ML: 5 INJECTION INTRAVENOUS at 23:57

## 2023-04-03 RX ADMIN — METOPROLOL SUCCINATE 100 MG: 25 TABLET, EXTENDED RELEASE ORAL at 13:45

## 2023-04-03 RX ADMIN — NITROGLYCERIN 0.4 MG: 0.4 TABLET SUBLINGUAL at 13:46

## 2023-04-03 RX ADMIN — LISINOPRIL 5 MG: 10 TABLET ORAL at 13:46

## 2023-04-03 RX ADMIN — SODIUM ZIRCONIUM CYCLOSILICATE 10 G: 10 POWDER, FOR SUSPENSION ORAL at 13:31

## 2023-04-03 RX ADMIN — DEXTROSE MONOHYDRATE 125 ML: 100 INJECTION, SOLUTION INTRAVENOUS at 23:16

## 2023-04-03 RX ADMIN — SODIUM BICARBONATE: 84 INJECTION, SOLUTION INTRAVENOUS at 20:29

## 2023-04-03 RX ADMIN — DEXTROSE MONOHYDRATE 250 ML: 100 INJECTION, SOLUTION INTRAVENOUS at 19:10

## 2023-04-03 ASSESSMENT — PAIN SCALES - GENERAL
PAINLEVEL_OUTOF10: 0
PAINLEVEL_OUTOF10: 0
PAINLEVEL_OUTOF10: 4
PAINLEVEL_OUTOF10: 4

## 2023-04-03 ASSESSMENT — LIFESTYLE VARIABLES
HOW OFTEN DO YOU HAVE A DRINK CONTAINING ALCOHOL: MONTHLY OR LESS
HOW MANY STANDARD DRINKS CONTAINING ALCOHOL DO YOU HAVE ON A TYPICAL DAY: 1 OR 2

## 2023-04-03 ASSESSMENT — PAIN DESCRIPTION - DESCRIPTORS: DESCRIPTORS: NUMBNESS;TINGLING

## 2023-04-03 ASSESSMENT — PAIN DESCRIPTION - LOCATION: LOCATION: LEG

## 2023-04-03 ASSESSMENT — PAIN DESCRIPTION - PAIN TYPE: TYPE: CHRONIC PAIN

## 2023-04-03 ASSESSMENT — PAIN DESCRIPTION - ORIENTATION: ORIENTATION: RIGHT;LEFT

## 2023-04-03 ASSESSMENT — PAIN - FUNCTIONAL ASSESSMENT: PAIN_FUNCTIONAL_ASSESSMENT: 0-10

## 2023-04-04 ENCOUNTER — APPOINTMENT (OUTPATIENT)
Dept: ULTRASOUND IMAGING | Age: 63
DRG: 683 | End: 2023-04-04
Payer: COMMERCIAL

## 2023-04-04 LAB
ALBUMIN SERPL-MCNC: 3.6 G/DL (ref 3.5–5.2)
ALP SERPL-CCNC: 84 U/L (ref 40–129)
ALT SERPL-CCNC: 12 U/L (ref 0–40)
ANION GAP SERPL CALCULATED.3IONS-SCNC: 13 MMOL/L (ref 7–16)
ANION GAP SERPL CALCULATED.3IONS-SCNC: 13 MMOL/L (ref 7–16)
ANION GAP SERPL CALCULATED.3IONS-SCNC: 14 MMOL/L (ref 7–16)
ANION GAP SERPL CALCULATED.3IONS-SCNC: 15 MMOL/L (ref 7–16)
ANION GAP SERPL CALCULATED.3IONS-SCNC: 15 MMOL/L (ref 7–16)
ANISOCYTOSIS: ABNORMAL
AST SERPL-CCNC: 13 U/L (ref 0–39)
BASOPHILS # BLD: 0.02 E9/L (ref 0–0.2)
BASOPHILS NFR BLD: 0.2 % (ref 0–2)
BILIRUB DIRECT SERPL-MCNC: <0.2 MG/DL (ref 0–0.3)
BILIRUB INDIRECT SERPL-MCNC: ABNORMAL MG/DL (ref 0–1)
BILIRUB SERPL-MCNC: 0.3 MG/DL (ref 0–1.2)
BUN SERPL-MCNC: 67 MG/DL (ref 6–23)
BUN SERPL-MCNC: 72 MG/DL (ref 6–23)
BUN SERPL-MCNC: 75 MG/DL (ref 6–23)
BUN SERPL-MCNC: 76 MG/DL (ref 6–23)
BUN SERPL-MCNC: 84 MG/DL (ref 6–23)
CA-I BLD-SCNC: 0.97 MMOL/L (ref 1.15–1.33)
CALCIUM SERPL-MCNC: 7.8 MG/DL (ref 8.6–10.2)
CALCIUM SERPL-MCNC: 7.9 MG/DL (ref 8.6–10.2)
CALCIUM SERPL-MCNC: 8 MG/DL (ref 8.6–10.2)
CALCIUM SERPL-MCNC: 8.1 MG/DL (ref 8.6–10.2)
CALCIUM SERPL-MCNC: 8.3 MG/DL (ref 8.6–10.2)
CHLORIDE SERPL-SCNC: 100 MMOL/L (ref 98–107)
CHLORIDE SERPL-SCNC: 103 MMOL/L (ref 98–107)
CHLORIDE SERPL-SCNC: 103 MMOL/L (ref 98–107)
CHLORIDE SERPL-SCNC: 99 MMOL/L (ref 98–107)
CHLORIDE SERPL-SCNC: 99 MMOL/L (ref 98–107)
CK SERPL-CCNC: 203 U/L (ref 20–200)
CO2 SERPL-SCNC: 22 MMOL/L (ref 22–29)
CO2 SERPL-SCNC: 25 MMOL/L (ref 22–29)
CO2 SERPL-SCNC: 28 MMOL/L (ref 22–29)
CREAT SERPL-MCNC: 4.6 MG/DL (ref 0.7–1.2)
CREAT SERPL-MCNC: 4.8 MG/DL (ref 0.7–1.2)
CREAT SERPL-MCNC: 5.1 MG/DL (ref 0.7–1.2)
CREAT SERPL-MCNC: 5.6 MG/DL (ref 0.7–1.2)
CREAT SERPL-MCNC: 6.2 MG/DL (ref 0.7–1.2)
CRP SERPL HS-MCNC: 2.8 MG/DL (ref 0–0.4)
EOSINOPHIL # BLD: 0 E9/L (ref 0.05–0.5)
EOSINOPHIL NFR BLD: 0 % (ref 0–6)
ERYTHROCYTE [DISTWIDTH] IN BLOOD BY AUTOMATED COUNT: 15.1 FL (ref 11.5–15)
ERYTHROCYTE [SEDIMENTATION RATE] IN BLOOD BY WESTERGREN METHOD: 65 MM/HR (ref 0–15)
GLUCOSE SERPL-MCNC: 183 MG/DL (ref 74–99)
GLUCOSE SERPL-MCNC: 200 MG/DL (ref 74–99)
GLUCOSE SERPL-MCNC: 212 MG/DL (ref 74–99)
GLUCOSE SERPL-MCNC: 223 MG/DL (ref 74–99)
GLUCOSE SERPL-MCNC: 224 MG/DL (ref 74–99)
HCT VFR BLD AUTO: 27.5 % (ref 37–54)
HGB BLD-MCNC: 8.8 G/DL (ref 12.5–16.5)
IMM GRANULOCYTES # BLD: 0.11 E9/L
IMM GRANULOCYTES NFR BLD: 1.1 % (ref 0–5)
LV EF: 60 %
LVEF MODALITY: NORMAL
LYMPHOCYTES # BLD: 0.32 E9/L (ref 1.5–4)
LYMPHOCYTES NFR BLD: 3.2 % (ref 20–42)
MAGNESIUM SERPL-MCNC: 1.4 MG/DL (ref 1.6–2.6)
MAGNESIUM SERPL-MCNC: 1.9 MG/DL (ref 1.6–2.6)
MCH RBC QN AUTO: 32.1 PG (ref 26–35)
MCHC RBC AUTO-ENTMCNC: 32 % (ref 32–34.5)
MCV RBC AUTO: 100.4 FL (ref 80–99.9)
METER GLUCOSE: 207 MG/DL (ref 74–99)
METER GLUCOSE: 232 MG/DL (ref 74–99)
METER GLUCOSE: 258 MG/DL (ref 74–99)
MONOCYTES # BLD: 0.09 E9/L (ref 0.1–0.95)
MONOCYTES NFR BLD: 0.9 % (ref 2–12)
NEUTROPHILS # BLD: 9.52 E9/L (ref 1.8–7.3)
NEUTS SEG NFR BLD: 94.6 % (ref 43–80)
OVALOCYTES: ABNORMAL
PHOSPHATE SERPL-MCNC: 4.4 MG/DL (ref 2.5–4.5)
PLATELET # BLD AUTO: 166 E9/L (ref 130–450)
PMV BLD AUTO: 10.2 FL (ref 7–12)
POIKILOCYTES: ABNORMAL
POLYCHROMASIA: ABNORMAL
POTASSIUM SERPL-SCNC: 5.4 MMOL/L (ref 3.5–5)
POTASSIUM SERPL-SCNC: 5.4 MMOL/L (ref 3.5–5)
POTASSIUM SERPL-SCNC: 5.5 MMOL/L (ref 3.5–5)
POTASSIUM SERPL-SCNC: 5.8 MMOL/L (ref 3.5–5)
POTASSIUM SERPL-SCNC: 6.6 MMOL/L (ref 3.5–5)
PROCALCITONIN: 0.13 NG/ML (ref 0–0.08)
PROT SERPL-MCNC: 6.3 G/DL (ref 6.4–8.3)
PTH-INTACT SERPL-MCNC: 237 PG/ML (ref 15–65)
RBC # BLD AUTO: 2.74 E12/L (ref 3.8–5.8)
REASON FOR REJECTION: NORMAL
REJECTED TEST: NORMAL
SODIUM SERPL-SCNC: 138 MMOL/L (ref 132–146)
SODIUM SERPL-SCNC: 140 MMOL/L (ref 132–146)
SODIUM SERPL-SCNC: 142 MMOL/L (ref 132–146)
SODIUM SERPL-SCNC: 142 MMOL/L (ref 132–146)
SODIUM SERPL-SCNC: 143 MMOL/L (ref 132–146)
TEAR DROP CELLS: ABNORMAL
TOXIC GRANULATION: ABNORMAL
URATE SERPL-MCNC: 9.8 MG/DL (ref 3.4–7)
WBC # BLD: 10.1 E9/L (ref 4.5–11.5)

## 2023-04-04 PROCEDURE — 36415 COLL VENOUS BLD VENIPUNCTURE: CPT

## 2023-04-04 PROCEDURE — 6360000004 HC RX CONTRAST MEDICATION: Performed by: FAMILY MEDICINE

## 2023-04-04 PROCEDURE — 85025 COMPLETE CBC W/AUTO DIFF WBC: CPT

## 2023-04-04 PROCEDURE — 6360000002 HC RX W HCPCS: Performed by: INTERNAL MEDICINE

## 2023-04-04 PROCEDURE — 80048 BASIC METABOLIC PNL TOTAL CA: CPT

## 2023-04-04 PROCEDURE — 85651 RBC SED RATE NONAUTOMATED: CPT

## 2023-04-04 PROCEDURE — 2500000003 HC RX 250 WO HCPCS: Performed by: INTERNAL MEDICINE

## 2023-04-04 PROCEDURE — 93306 TTE W/DOPPLER COMPLETE: CPT

## 2023-04-04 PROCEDURE — 6370000000 HC RX 637 (ALT 250 FOR IP): Performed by: INTERNAL MEDICINE

## 2023-04-04 PROCEDURE — 2580000003 HC RX 258: Performed by: INTERNAL MEDICINE

## 2023-04-04 PROCEDURE — 84100 ASSAY OF PHOSPHORUS: CPT

## 2023-04-04 PROCEDURE — 86140 C-REACTIVE PROTEIN: CPT

## 2023-04-04 PROCEDURE — 6360000002 HC RX W HCPCS: Performed by: NURSE PRACTITIONER

## 2023-04-04 PROCEDURE — 80076 HEPATIC FUNCTION PANEL: CPT

## 2023-04-04 PROCEDURE — 82550 ASSAY OF CK (CPK): CPT

## 2023-04-04 PROCEDURE — 99222 1ST HOSP IP/OBS MODERATE 55: CPT | Performed by: NURSE PRACTITIONER

## 2023-04-04 PROCEDURE — 2060000000 HC ICU INTERMEDIATE R&B

## 2023-04-04 PROCEDURE — 84550 ASSAY OF BLOOD/URIC ACID: CPT

## 2023-04-04 PROCEDURE — 94640 AIRWAY INHALATION TREATMENT: CPT

## 2023-04-04 PROCEDURE — 83735 ASSAY OF MAGNESIUM: CPT

## 2023-04-04 PROCEDURE — 82330 ASSAY OF CALCIUM: CPT

## 2023-04-04 PROCEDURE — 6370000000 HC RX 637 (ALT 250 FOR IP): Performed by: FAMILY MEDICINE

## 2023-04-04 PROCEDURE — 36591 DRAW BLOOD OFF VENOUS DEVICE: CPT

## 2023-04-04 PROCEDURE — 83970 ASSAY OF PARATHORMONE: CPT

## 2023-04-04 PROCEDURE — 84145 PROCALCITONIN (PCT): CPT

## 2023-04-04 PROCEDURE — 82962 GLUCOSE BLOOD TEST: CPT

## 2023-04-04 PROCEDURE — 6360000002 HC RX W HCPCS: Performed by: FAMILY MEDICINE

## 2023-04-04 PROCEDURE — 93975 VASCULAR STUDY: CPT

## 2023-04-04 PROCEDURE — 76770 US EXAM ABDO BACK WALL COMP: CPT

## 2023-04-04 PROCEDURE — 2580000003 HC RX 258: Performed by: NURSE PRACTITIONER

## 2023-04-04 RX ORDER — CALCIUM GLUCONATE 94 MG/ML
1000 INJECTION, SOLUTION INTRAVENOUS ONCE
Status: COMPLETED | OUTPATIENT
Start: 2023-04-04 | End: 2023-04-04

## 2023-04-04 RX ORDER — DEXTROSE MONOHYDRATE 25 G/50ML
25 INJECTION, SOLUTION INTRAVENOUS ONCE
Status: COMPLETED | OUTPATIENT
Start: 2023-04-04 | End: 2023-04-04

## 2023-04-04 RX ORDER — METOPROLOL SUCCINATE 100 MG/1
100 TABLET, EXTENDED RELEASE ORAL 2 TIMES DAILY
Status: DISCONTINUED | OUTPATIENT
Start: 2023-04-04 | End: 2023-04-06

## 2023-04-04 RX ORDER — METOPROLOL SUCCINATE 25 MG/1
25 TABLET, EXTENDED RELEASE ORAL NIGHTLY
Status: DISCONTINUED | OUTPATIENT
Start: 2023-04-04 | End: 2023-04-04

## 2023-04-04 RX ORDER — ASPIRIN 81 MG/1
81 TABLET ORAL EVERY MORNING
Status: DISCONTINUED | OUTPATIENT
Start: 2023-04-04 | End: 2023-04-09

## 2023-04-04 RX ORDER — SODIUM POLYSTYRENE SULFONATE 15 G/60ML
15 SUSPENSION ORAL; RECTAL ONCE
Status: DISCONTINUED | OUTPATIENT
Start: 2023-04-04 | End: 2023-04-09 | Stop reason: HOSPADM

## 2023-04-04 RX ORDER — ALBUTEROL SULFATE 2.5 MG/3ML
2.5 SOLUTION RESPIRATORY (INHALATION) 4 TIMES DAILY PRN
Status: DISCONTINUED | OUTPATIENT
Start: 2023-04-04 | End: 2023-04-09 | Stop reason: HOSPADM

## 2023-04-04 RX ORDER — ROSUVASTATIN CALCIUM 10 MG/1
10 TABLET, COATED ORAL EVERY MORNING
Status: DISCONTINUED | OUTPATIENT
Start: 2023-04-04 | End: 2023-04-09 | Stop reason: HOSPADM

## 2023-04-04 RX ORDER — ARFORMOTEROL TARTRATE 15 UG/2ML
15 SOLUTION RESPIRATORY (INHALATION) 2 TIMES DAILY
Status: DISCONTINUED | OUTPATIENT
Start: 2023-04-04 | End: 2023-04-09 | Stop reason: HOSPADM

## 2023-04-04 RX ORDER — SODIUM POLYSTYRENE SULFONATE 15 G/60ML
15 SUSPENSION ORAL; RECTAL ONCE
Status: DISCONTINUED | OUTPATIENT
Start: 2023-04-04 | End: 2023-04-04

## 2023-04-04 RX ORDER — BUDESONIDE 0.25 MG/2ML
0.25 INHALANT ORAL 2 TIMES DAILY
Status: DISCONTINUED | OUTPATIENT
Start: 2023-04-04 | End: 2023-04-09 | Stop reason: HOSPADM

## 2023-04-04 RX ORDER — LANOLIN ALCOHOL/MO/W.PET/CERES
400 CREAM (GRAM) TOPICAL 2 TIMES DAILY
Status: DISCONTINUED | OUTPATIENT
Start: 2023-04-04 | End: 2023-04-05

## 2023-04-04 RX ORDER — MAGNESIUM SULFATE IN WATER 40 MG/ML
2000 INJECTION, SOLUTION INTRAVENOUS ONCE
Status: COMPLETED | OUTPATIENT
Start: 2023-04-04 | End: 2023-04-04

## 2023-04-04 RX ORDER — TAMSULOSIN HYDROCHLORIDE 0.4 MG/1
0.4 CAPSULE ORAL DAILY
Status: DISCONTINUED | OUTPATIENT
Start: 2023-04-04 | End: 2023-04-09 | Stop reason: HOSPADM

## 2023-04-04 RX ORDER — SODIUM CHLORIDE, SODIUM LACTATE, POTASSIUM CHLORIDE, CALCIUM CHLORIDE 600; 310; 30; 20 MG/100ML; MG/100ML; MG/100ML; MG/100ML
INJECTION, SOLUTION INTRAVENOUS CONTINUOUS
Status: DISCONTINUED | OUTPATIENT
Start: 2023-04-04 | End: 2023-04-07

## 2023-04-04 RX ORDER — FINASTERIDE 5 MG/1
5 TABLET, FILM COATED ORAL DAILY
Status: DISCONTINUED | OUTPATIENT
Start: 2023-04-04 | End: 2023-04-09 | Stop reason: HOSPADM

## 2023-04-04 RX ORDER — SODIUM CHLORIDE 9 MG/ML
INJECTION, SOLUTION INTRAVENOUS CONTINUOUS
Status: DISCONTINUED | OUTPATIENT
Start: 2023-04-04 | End: 2023-04-04

## 2023-04-04 RX ORDER — ERGOCALCIFEROL 1.25 MG/1
50000 CAPSULE ORAL WEEKLY
Status: DISCONTINUED | OUTPATIENT
Start: 2023-04-10 | End: 2023-04-09 | Stop reason: HOSPADM

## 2023-04-04 RX ADMIN — BUDESONIDE 250 MCG: 0.25 SUSPENSION RESPIRATORY (INHALATION) at 20:20

## 2023-04-04 RX ADMIN — DEXTROSE MONOHYDRATE 25 G: 25 INJECTION, SOLUTION INTRAVENOUS at 19:57

## 2023-04-04 RX ADMIN — METOPROLOL SUCCINATE 100 MG: 100 TABLET, EXTENDED RELEASE ORAL at 20:01

## 2023-04-04 RX ADMIN — SODIUM BICARBONATE 50 MEQ: 84 INJECTION, SOLUTION INTRAVENOUS at 02:32

## 2023-04-04 RX ADMIN — SODIUM ZIRCONIUM CYCLOSILICATE 10 G: 10 POWDER, FOR SUSPENSION ORAL at 05:35

## 2023-04-04 RX ADMIN — SODIUM CHLORIDE, POTASSIUM CHLORIDE, SODIUM LACTATE AND CALCIUM CHLORIDE: 600; 310; 30; 20 INJECTION, SOLUTION INTRAVENOUS at 18:41

## 2023-04-04 RX ADMIN — IPRATROPIUM BROMIDE 0.5 MG: 0.5 SOLUTION RESPIRATORY (INHALATION) at 20:21

## 2023-04-04 RX ADMIN — BUDESONIDE 250 MCG: 0.25 SUSPENSION RESPIRATORY (INHALATION) at 08:20

## 2023-04-04 RX ADMIN — IPRATROPIUM BROMIDE 0.5 MG: 0.5 SOLUTION RESPIRATORY (INHALATION) at 12:46

## 2023-04-04 RX ADMIN — MAGNESIUM SULFATE HEPTAHYDRATE 2000 MG: 40 INJECTION, SOLUTION INTRAVENOUS at 07:23

## 2023-04-04 RX ADMIN — SODIUM ZIRCONIUM CYCLOSILICATE 10 G: 10 POWDER, FOR SUSPENSION ORAL at 16:05

## 2023-04-04 RX ADMIN — SODIUM CHLORIDE, PRESERVATIVE FREE 10 ML: 5 INJECTION INTRAVENOUS at 20:02

## 2023-04-04 RX ADMIN — IPRATROPIUM BROMIDE 0.5 MG: 0.5 SOLUTION RESPIRATORY (INHALATION) at 08:20

## 2023-04-04 RX ADMIN — Medication 400 MG: at 12:19

## 2023-04-04 RX ADMIN — PERFLUTREN 1.5 ML: 6.52 INJECTION, SUSPENSION INTRAVENOUS at 09:33

## 2023-04-04 RX ADMIN — FINASTERIDE 5 MG: 5 TABLET, FILM COATED ORAL at 16:05

## 2023-04-04 RX ADMIN — IPRATROPIUM BROMIDE 0.5 MG: 0.5 SOLUTION RESPIRATORY (INHALATION) at 16:25

## 2023-04-04 RX ADMIN — ARFORMOTEROL TARTRATE 15 MCG: 15 SOLUTION RESPIRATORY (INHALATION) at 20:20

## 2023-04-04 RX ADMIN — DEXTROSE MONOHYDRATE 25 G: 25 INJECTION, SOLUTION INTRAVENOUS at 02:32

## 2023-04-04 RX ADMIN — APIXABAN 5 MG: 5 TABLET, FILM COATED ORAL at 08:12

## 2023-04-04 RX ADMIN — SODIUM ZIRCONIUM CYCLOSILICATE 10 G: 10 POWDER, FOR SUSPENSION ORAL at 23:01

## 2023-04-04 RX ADMIN — SODIUM CHLORIDE, PRESERVATIVE FREE 10 ML: 5 INJECTION INTRAVENOUS at 08:12

## 2023-04-04 RX ADMIN — SODIUM CHLORIDE, POTASSIUM CHLORIDE, SODIUM LACTATE AND CALCIUM CHLORIDE: 600; 310; 30; 20 INJECTION, SOLUTION INTRAVENOUS at 11:53

## 2023-04-04 RX ADMIN — ARFORMOTEROL TARTRATE 15 MCG: 15 SOLUTION RESPIRATORY (INHALATION) at 08:20

## 2023-04-04 RX ADMIN — SODIUM BICARBONATE 50 MEQ: 84 INJECTION, SOLUTION INTRAVENOUS at 07:04

## 2023-04-04 RX ADMIN — INSULIN HUMAN 10 UNITS: 100 INJECTION, SOLUTION PARENTERAL at 02:31

## 2023-04-04 RX ADMIN — Medication 400 MG: at 20:01

## 2023-04-04 RX ADMIN — INSULIN HUMAN 10 UNITS: 100 INJECTION, SOLUTION PARENTERAL at 07:05

## 2023-04-04 RX ADMIN — CALCIUM CARBONATE-VITAMIN D TAB 500 MG-200 UNIT 1 TABLET: 500-200 TAB at 20:01

## 2023-04-04 RX ADMIN — DEXTROSE MONOHYDRATE 25 G: 25 INJECTION, SOLUTION INTRAVENOUS at 07:05

## 2023-04-04 RX ADMIN — ROSUVASTATIN CALCIUM 10 MG: 10 TABLET, FILM COATED ORAL at 08:12

## 2023-04-04 RX ADMIN — CALCIUM GLUCONATE 1000 MG: 98 INJECTION, SOLUTION INTRAVENOUS at 20:03

## 2023-04-04 RX ADMIN — METOPROLOL SUCCINATE 100 MG: 100 TABLET, EXTENDED RELEASE ORAL at 08:12

## 2023-04-04 RX ADMIN — CALCIUM GLUCONATE 1000 MG: 98 INJECTION, SOLUTION INTRAVENOUS at 02:33

## 2023-04-04 RX ADMIN — SODIUM BICARBONATE: 84 INJECTION, SOLUTION INTRAVENOUS at 04:04

## 2023-04-04 RX ADMIN — ASPIRIN 81 MG: 81 TABLET, COATED ORAL at 08:12

## 2023-04-04 RX ADMIN — TAMSULOSIN HYDROCHLORIDE 0.4 MG: 0.4 CAPSULE ORAL at 16:05

## 2023-04-04 RX ADMIN — CALCIUM GLUCONATE 1000 MG: 98 INJECTION, SOLUTION INTRAVENOUS at 07:04

## 2023-04-04 RX ADMIN — SODIUM BICARBONATE 50 MEQ: 84 INJECTION, SOLUTION INTRAVENOUS at 07:06

## 2023-04-04 RX ADMIN — INSULIN HUMAN 10 UNITS: 100 INJECTION, SOLUTION PARENTERAL at 19:57

## 2023-04-04 RX ADMIN — CALCIUM CARBONATE-VITAMIN D TAB 500 MG-200 UNIT 1 TABLET: 500-200 TAB at 12:19

## 2023-04-04 ASSESSMENT — PAIN SCALES - GENERAL
PAINLEVEL_OUTOF10: 0

## 2023-04-05 LAB
ALBUMIN SERPL-MCNC: 3.3 G/DL (ref 3.5–5.2)
ALP SERPL-CCNC: 71 U/L (ref 40–129)
ALT SERPL-CCNC: 12 U/L (ref 0–40)
ANION GAP SERPL CALCULATED.3IONS-SCNC: 10 MMOL/L (ref 7–16)
ANION GAP SERPL CALCULATED.3IONS-SCNC: 11 MMOL/L (ref 7–16)
ANION GAP SERPL CALCULATED.3IONS-SCNC: 11 MMOL/L (ref 7–16)
ANION GAP SERPL CALCULATED.3IONS-SCNC: 12 MMOL/L (ref 7–16)
ANION GAP SERPL CALCULATED.3IONS-SCNC: 14 MMOL/L (ref 7–16)
AST SERPL-CCNC: 13 U/L (ref 0–39)
B PARAP IS1001 DNA NPH QL NAA+NON-PROBE: NOT DETECTED
B PERT.PT PRMT NPH QL NAA+NON-PROBE: NOT DETECTED
BASOPHILS # BLD: 0 E9/L (ref 0–0.2)
BASOPHILS NFR BLD: 0 % (ref 0–2)
BILIRUB DIRECT SERPL-MCNC: <0.2 MG/DL (ref 0–0.3)
BILIRUB INDIRECT SERPL-MCNC: ABNORMAL MG/DL (ref 0–1)
BILIRUB SERPL-MCNC: 0.3 MG/DL (ref 0–1.2)
BUN SERPL-MCNC: 48 MG/DL (ref 6–23)
BUN SERPL-MCNC: 49 MG/DL (ref 6–23)
BUN SERPL-MCNC: 56 MG/DL (ref 6–23)
BUN SERPL-MCNC: 61 MG/DL (ref 6–23)
BUN SERPL-MCNC: 64 MG/DL (ref 6–23)
C PNEUM DNA NPH QL NAA+NON-PROBE: NOT DETECTED
CALCIUM SERPL-MCNC: 8.1 MG/DL (ref 8.6–10.2)
CALCIUM SERPL-MCNC: 8.1 MG/DL (ref 8.6–10.2)
CALCIUM SERPL-MCNC: 8.2 MG/DL (ref 8.6–10.2)
CHLORIDE SERPL-SCNC: 102 MMOL/L (ref 98–107)
CHLORIDE SERPL-SCNC: 104 MMOL/L (ref 98–107)
CHLORIDE SERPL-SCNC: 105 MMOL/L (ref 98–107)
CHLORIDE SERPL-SCNC: 106 MMOL/L (ref 98–107)
CHLORIDE SERPL-SCNC: 99 MMOL/L (ref 98–107)
CO2 SERPL-SCNC: 25 MMOL/L (ref 22–29)
CO2 SERPL-SCNC: 26 MMOL/L (ref 22–29)
CO2 SERPL-SCNC: 27 MMOL/L (ref 22–29)
CREAT SERPL-MCNC: 3 MG/DL (ref 0.7–1.2)
CREAT SERPL-MCNC: 3.1 MG/DL (ref 0.7–1.2)
CREAT SERPL-MCNC: 3.5 MG/DL (ref 0.7–1.2)
CREAT SERPL-MCNC: 4 MG/DL (ref 0.7–1.2)
CREAT SERPL-MCNC: 4.2 MG/DL (ref 0.7–1.2)
EOSINOPHIL # BLD: 0 E9/L (ref 0.05–0.5)
EOSINOPHIL NFR BLD: 0 % (ref 0–6)
ERYTHROCYTE [DISTWIDTH] IN BLOOD BY AUTOMATED COUNT: 15.3 FL (ref 11.5–15)
FLUAV RNA NPH QL NAA+NON-PROBE: NOT DETECTED
FLUBV RNA NPH QL NAA+NON-PROBE: NOT DETECTED
GLUCOSE SERPL-MCNC: 116 MG/DL (ref 74–99)
GLUCOSE SERPL-MCNC: 117 MG/DL (ref 74–99)
GLUCOSE SERPL-MCNC: 127 MG/DL (ref 74–99)
GLUCOSE SERPL-MCNC: 150 MG/DL (ref 74–99)
GLUCOSE SERPL-MCNC: 209 MG/DL (ref 74–99)
HADV DNA NPH QL NAA+NON-PROBE: NOT DETECTED
HCOV 229E RNA NPH QL NAA+NON-PROBE: NOT DETECTED
HCOV HKU1 RNA NPH QL NAA+NON-PROBE: NOT DETECTED
HCOV NL63 RNA NPH QL NAA+NON-PROBE: NOT DETECTED
HCOV OC43 RNA NPH QL NAA+NON-PROBE: NOT DETECTED
HCT VFR BLD AUTO: 26.1 % (ref 37–54)
HGB BLD-MCNC: 8.5 G/DL (ref 12.5–16.5)
HMPV RNA NPH QL NAA+NON-PROBE: NOT DETECTED
HPIV1 RNA NPH QL NAA+NON-PROBE: NOT DETECTED
HPIV2 RNA NPH QL NAA+NON-PROBE: NOT DETECTED
HPIV3 RNA NPH QL NAA+NON-PROBE: NOT DETECTED
HPIV4 RNA NPH QL NAA+NON-PROBE: NOT DETECTED
LYMPHOCYTES # BLD: 0 E9/L (ref 1.5–4)
LYMPHOCYTES NFR BLD: 0 % (ref 20–42)
M PNEUMO DNA NPH QL NAA+NON-PROBE: NOT DETECTED
MAGNESIUM SERPL-MCNC: 1.3 MG/DL (ref 1.6–2.6)
MCH RBC QN AUTO: 33.5 PG (ref 26–35)
MCHC RBC AUTO-ENTMCNC: 32.6 % (ref 32–34.5)
MCV RBC AUTO: 102.8 FL (ref 80–99.9)
METAMYELOCYTES NFR BLD MANUAL: 2.6 % (ref 0–1)
METER GLUCOSE: 117 MG/DL (ref 74–99)
METER GLUCOSE: 121 MG/DL (ref 74–99)
METER GLUCOSE: 122 MG/DL (ref 74–99)
METER GLUCOSE: 125 MG/DL (ref 74–99)
MONOCYTES # BLD: 0.57 E9/L (ref 0.1–0.95)
MONOCYTES NFR BLD: 0.9 % (ref 2–12)
NEUTROPHILS # BLD: 56.43 E9/L (ref 1.8–7.3)
NEUTS SEG NFR BLD: 96.5 % (ref 43–80)
NRBC BLD-RTO: 0 /100 WBC
ORGANISM: ABNORMAL
PHOSPHATE SERPL-MCNC: 5.4 MG/DL (ref 2.5–4.5)
PLATELET # BLD AUTO: 167 E9/L (ref 130–450)
PMV BLD AUTO: 10.3 FL (ref 7–12)
POIKILOCYTES: ABNORMAL
POTASSIUM SERPL-SCNC: 4.9 MMOL/L (ref 3.5–5)
POTASSIUM SERPL-SCNC: 5 MMOL/L (ref 3.5–5)
POTASSIUM SERPL-SCNC: 5.1 MMOL/L (ref 3.5–5)
POTASSIUM SERPL-SCNC: 5.3 MMOL/L (ref 3.5–5)
POTASSIUM SERPL-SCNC: 5.5 MMOL/L (ref 3.5–5)
PROT SERPL-MCNC: 6 G/DL (ref 6.4–8.3)
RBC # BLD AUTO: 2.54 E12/L (ref 3.8–5.8)
RSV RNA NPH QL NAA+NON-PROBE: NOT DETECTED
RV+EV RNA NPH QL NAA+NON-PROBE: NOT DETECTED
SARS-COV-2 RNA NPH QL NAA+NON-PROBE: NOT DETECTED
SODIUM SERPL-SCNC: 138 MMOL/L (ref 132–146)
SODIUM SERPL-SCNC: 140 MMOL/L (ref 132–146)
SODIUM SERPL-SCNC: 142 MMOL/L (ref 132–146)
SODIUM SERPL-SCNC: 143 MMOL/L (ref 132–146)
SODIUM SERPL-SCNC: 143 MMOL/L (ref 132–146)
TEAR DROP CELLS: ABNORMAL
WBC # BLD: 57 E9/L (ref 4.5–11.5)

## 2023-04-05 PROCEDURE — 36415 COLL VENOUS BLD VENIPUNCTURE: CPT

## 2023-04-05 PROCEDURE — 94640 AIRWAY INHALATION TREATMENT: CPT

## 2023-04-05 PROCEDURE — 80048 BASIC METABOLIC PNL TOTAL CA: CPT

## 2023-04-05 PROCEDURE — 6360000002 HC RX W HCPCS: Performed by: INTERNAL MEDICINE

## 2023-04-05 PROCEDURE — 2580000003 HC RX 258: Performed by: INTERNAL MEDICINE

## 2023-04-05 PROCEDURE — 82962 GLUCOSE BLOOD TEST: CPT

## 2023-04-05 PROCEDURE — 2500000003 HC RX 250 WO HCPCS: Performed by: FAMILY MEDICINE

## 2023-04-05 PROCEDURE — 85025 COMPLETE CBC W/AUTO DIFF WBC: CPT

## 2023-04-05 PROCEDURE — 84100 ASSAY OF PHOSPHORUS: CPT

## 2023-04-05 PROCEDURE — 83735 ASSAY OF MAGNESIUM: CPT

## 2023-04-05 PROCEDURE — 87040 BLOOD CULTURE FOR BACTERIA: CPT

## 2023-04-05 PROCEDURE — 2060000000 HC ICU INTERMEDIATE R&B

## 2023-04-05 PROCEDURE — 6370000000 HC RX 637 (ALT 250 FOR IP): Performed by: INTERNAL MEDICINE

## 2023-04-05 PROCEDURE — 80076 HEPATIC FUNCTION PANEL: CPT

## 2023-04-05 PROCEDURE — 87088 URINE BACTERIA CULTURE: CPT

## 2023-04-05 PROCEDURE — 51798 US URINE CAPACITY MEASURE: CPT

## 2023-04-05 PROCEDURE — 6370000000 HC RX 637 (ALT 250 FOR IP): Performed by: FAMILY MEDICINE

## 2023-04-05 PROCEDURE — 2580000003 HC RX 258: Performed by: FAMILY MEDICINE

## 2023-04-05 PROCEDURE — 0202U NFCT DS 22 TRGT SARS-COV-2: CPT

## 2023-04-05 RX ORDER — INSULIN LISPRO 100 [IU]/ML
0-4 INJECTION, SOLUTION INTRAVENOUS; SUBCUTANEOUS NIGHTLY
Status: DISCONTINUED | OUTPATIENT
Start: 2023-04-05 | End: 2023-04-09 | Stop reason: HOSPADM

## 2023-04-05 RX ORDER — SODIUM POLYSTYRENE SULFONATE 15 G/60ML
30 SUSPENSION ORAL; RECTAL ONCE
Status: COMPLETED | OUTPATIENT
Start: 2023-04-05 | End: 2023-04-05

## 2023-04-05 RX ORDER — INSULIN LISPRO 100 [IU]/ML
0-8 INJECTION, SOLUTION INTRAVENOUS; SUBCUTANEOUS
Status: DISCONTINUED | OUTPATIENT
Start: 2023-04-05 | End: 2023-04-09 | Stop reason: HOSPADM

## 2023-04-05 RX ORDER — LANOLIN ALCOHOL/MO/W.PET/CERES
400 CREAM (GRAM) TOPICAL 3 TIMES DAILY
Status: DISCONTINUED | OUTPATIENT
Start: 2023-04-05 | End: 2023-04-09 | Stop reason: HOSPADM

## 2023-04-05 RX ADMIN — CALCIUM CARBONATE-VITAMIN D TAB 500 MG-200 UNIT 1 TABLET: 500-200 TAB at 11:58

## 2023-04-05 RX ADMIN — ASPIRIN 81 MG: 81 TABLET, COATED ORAL at 07:59

## 2023-04-05 RX ADMIN — Medication 400 MG: at 20:13

## 2023-04-05 RX ADMIN — SODIUM CHLORIDE 2.5 MG/HR: 900 INJECTION, SOLUTION INTRAVENOUS at 21:09

## 2023-04-05 RX ADMIN — BUDESONIDE 250 MCG: 0.25 SUSPENSION RESPIRATORY (INHALATION) at 19:56

## 2023-04-05 RX ADMIN — SODIUM CHLORIDE, POTASSIUM CHLORIDE, SODIUM LACTATE AND CALCIUM CHLORIDE: 600; 310; 30; 20 INJECTION, SOLUTION INTRAVENOUS at 21:17

## 2023-04-05 RX ADMIN — IPRATROPIUM BROMIDE 0.5 MG: 0.5 SOLUTION RESPIRATORY (INHALATION) at 12:22

## 2023-04-05 RX ADMIN — CALCIUM CARBONATE-VITAMIN D TAB 500 MG-200 UNIT 1 TABLET: 500-200 TAB at 15:54

## 2023-04-05 RX ADMIN — SODIUM ZIRCONIUM CYCLOSILICATE 10 G: 10 POWDER, FOR SUSPENSION ORAL at 08:00

## 2023-04-05 RX ADMIN — MUPIROCIN: 20 OINTMENT TOPICAL at 20:14

## 2023-04-05 RX ADMIN — CALCIUM CARBONATE-VITAMIN D TAB 500 MG-200 UNIT 1 TABLET: 500-200 TAB at 08:00

## 2023-04-05 RX ADMIN — APIXABAN 5 MG: 5 TABLET, FILM COATED ORAL at 20:13

## 2023-04-05 RX ADMIN — METOPROLOL SUCCINATE 100 MG: 100 TABLET, EXTENDED RELEASE ORAL at 20:13

## 2023-04-05 RX ADMIN — SODIUM CHLORIDE, POTASSIUM CHLORIDE, SODIUM LACTATE AND CALCIUM CHLORIDE: 600; 310; 30; 20 INJECTION, SOLUTION INTRAVENOUS at 01:10

## 2023-04-05 RX ADMIN — SODIUM CHLORIDE, POTASSIUM CHLORIDE, SODIUM LACTATE AND CALCIUM CHLORIDE: 600; 310; 30; 20 INJECTION, SOLUTION INTRAVENOUS at 14:12

## 2023-04-05 RX ADMIN — Medication 400 MG: at 14:05

## 2023-04-05 RX ADMIN — SODIUM CHLORIDE, PRESERVATIVE FREE 10 ML: 5 INJECTION INTRAVENOUS at 20:58

## 2023-04-05 RX ADMIN — IPRATROPIUM BROMIDE 0.5 MG: 0.5 SOLUTION RESPIRATORY (INHALATION) at 19:57

## 2023-04-05 RX ADMIN — IPRATROPIUM BROMIDE 0.5 MG: 0.5 SOLUTION RESPIRATORY (INHALATION) at 08:41

## 2023-04-05 RX ADMIN — BUDESONIDE 250 MCG: 0.25 SUSPENSION RESPIRATORY (INHALATION) at 08:41

## 2023-04-05 RX ADMIN — ARFORMOTEROL TARTRATE 15 MCG: 15 SOLUTION RESPIRATORY (INHALATION) at 19:56

## 2023-04-05 RX ADMIN — SODIUM CHLORIDE, POTASSIUM CHLORIDE, SODIUM LACTATE AND CALCIUM CHLORIDE: 600; 310; 30; 20 INJECTION, SOLUTION INTRAVENOUS at 07:59

## 2023-04-05 RX ADMIN — ARFORMOTEROL TARTRATE 15 MCG: 15 SOLUTION RESPIRATORY (INHALATION) at 08:41

## 2023-04-05 RX ADMIN — TAMSULOSIN HYDROCHLORIDE 0.4 MG: 0.4 CAPSULE ORAL at 08:00

## 2023-04-05 RX ADMIN — MUPIROCIN: 20 OINTMENT TOPICAL at 11:57

## 2023-04-05 RX ADMIN — Medication 400 MG: at 08:00

## 2023-04-05 RX ADMIN — SODIUM POLYSTYRENE SULFONATE 30 G: 15 SUSPENSION ORAL; RECTAL at 11:59

## 2023-04-05 RX ADMIN — ROSUVASTATIN CALCIUM 10 MG: 10 TABLET, FILM COATED ORAL at 07:59

## 2023-04-05 RX ADMIN — FINASTERIDE 5 MG: 5 TABLET, FILM COATED ORAL at 10:20

## 2023-04-05 RX ADMIN — IPRATROPIUM BROMIDE 0.5 MG: 0.5 SOLUTION RESPIRATORY (INHALATION) at 15:57

## 2023-04-05 RX ADMIN — SODIUM CHLORIDE, PRESERVATIVE FREE 10 ML: 5 INJECTION INTRAVENOUS at 20:13

## 2023-04-05 RX ADMIN — METOPROLOL SUCCINATE 100 MG: 100 TABLET, EXTENDED RELEASE ORAL at 08:00

## 2023-04-05 ASSESSMENT — PAIN DESCRIPTION - ORIENTATION: ORIENTATION: LEFT;RIGHT

## 2023-04-05 ASSESSMENT — PAIN SCALES - GENERAL
PAINLEVEL_OUTOF10: 0
PAINLEVEL_OUTOF10: 4

## 2023-04-05 ASSESSMENT — PAIN DESCRIPTION - LOCATION: LOCATION: FOOT

## 2023-04-06 ENCOUNTER — APPOINTMENT (OUTPATIENT)
Dept: CT IMAGING | Age: 63
DRG: 683 | End: 2023-04-06
Payer: COMMERCIAL

## 2023-04-06 ENCOUNTER — APPOINTMENT (OUTPATIENT)
Dept: GENERAL RADIOLOGY | Age: 63
DRG: 683 | End: 2023-04-06
Payer: COMMERCIAL

## 2023-04-06 PROBLEM — I50.32 CHRONIC DIASTOLIC (CONGESTIVE) HEART FAILURE (HCC): Status: ACTIVE | Noted: 2021-11-22

## 2023-04-06 PROBLEM — E87.5 HYPERKALEMIA: Status: ACTIVE | Noted: 2023-01-01

## 2023-04-06 PROBLEM — C34.91 SQUAMOUS CELL CARCINOMA OF RIGHT LUNG (HCC): Status: ACTIVE | Noted: 2023-04-06

## 2023-04-06 PROBLEM — I25.5 ISCHEMIC CARDIOMYOPATHY: Status: ACTIVE | Noted: 2023-04-06

## 2023-04-06 PROBLEM — E66.01 MORBID OBESITY (HCC): Status: ACTIVE | Noted: 2023-01-01

## 2023-04-06 LAB
ALBUMIN SERPL-MCNC: 3.2 G/DL (ref 3.5–5.2)
ALP SERPL-CCNC: 79 U/L (ref 40–129)
ALT SERPL-CCNC: 13 U/L (ref 0–40)
ANION GAP SERPL CALCULATED.3IONS-SCNC: 9 MMOL/L (ref 7–16)
AST SERPL-CCNC: 15 U/L (ref 0–39)
BASOPHILS # BLD: 0 E9/L (ref 0–0.2)
BASOPHILS NFR BLD: 0 % (ref 0–2)
BILIRUB DIRECT SERPL-MCNC: <0.2 MG/DL (ref 0–0.3)
BILIRUB INDIRECT SERPL-MCNC: ABNORMAL MG/DL (ref 0–1)
BILIRUB SERPL-MCNC: 0.3 MG/DL (ref 0–1.2)
BUN SERPL-MCNC: 42 MG/DL (ref 6–23)
CALCIUM SERPL-MCNC: 7.9 MG/DL (ref 8.6–10.2)
CHLORIDE SERPL-SCNC: 108 MMOL/L (ref 98–107)
CO2 SERPL-SCNC: 26 MMOL/L (ref 22–29)
CREAT SERPL-MCNC: 2.8 MG/DL (ref 0.7–1.2)
EOSINOPHIL # BLD: 0 E9/L (ref 0.05–0.5)
EOSINOPHIL NFR BLD: 0 % (ref 0–6)
ERYTHROCYTE [DISTWIDTH] IN BLOOD BY AUTOMATED COUNT: 15.4 FL (ref 11.5–15)
GLUCOSE SERPL-MCNC: 108 MG/DL (ref 74–99)
HCT VFR BLD AUTO: 25.2 % (ref 37–54)
HGB BLD-MCNC: 8 G/DL (ref 12.5–16.5)
LYMPHOCYTES # BLD: 0.49 E9/L (ref 1.5–4)
LYMPHOCYTES NFR BLD: 0.9 % (ref 20–42)
MAGNESIUM SERPL-MCNC: 1.1 MG/DL (ref 1.6–2.6)
MCH RBC QN AUTO: 32.8 PG (ref 26–35)
MCHC RBC AUTO-ENTMCNC: 31.7 % (ref 32–34.5)
MCV RBC AUTO: 103.3 FL (ref 80–99.9)
METAMYELOCYTES NFR BLD MANUAL: 1.7 % (ref 0–1)
METER GLUCOSE: 121 MG/DL (ref 74–99)
METER GLUCOSE: 132 MG/DL (ref 74–99)
METER GLUCOSE: 192 MG/DL (ref 74–99)
METER GLUCOSE: 197 MG/DL (ref 74–99)
MONOCYTES # BLD: 0 E9/L (ref 0.1–0.95)
MONOCYTES NFR BLD: 0 % (ref 2–12)
NEUTROPHILS # BLD: 48.51 E9/L (ref 1.8–7.3)
NEUTS SEG NFR BLD: 97.4 % (ref 43–80)
NRBC BLD-RTO: 0 /100 WBC
PATHOLOGIST REVIEW: NORMAL
PHOSPHATE SERPL-MCNC: 3.8 MG/DL (ref 2.5–4.5)
PLATELET # BLD AUTO: 148 E9/L (ref 130–450)
PMV BLD AUTO: 10 FL (ref 7–12)
POIKILOCYTES: ABNORMAL
POTASSIUM SERPL-SCNC: 5 MMOL/L (ref 3.5–5)
PROT SERPL-MCNC: 5.6 G/DL (ref 6.4–8.3)
RBC # BLD AUTO: 2.44 E12/L (ref 3.8–5.8)
SODIUM SERPL-SCNC: 143 MMOL/L (ref 132–146)
TEAR DROP CELLS: ABNORMAL
WBC # BLD: 49 E9/L (ref 4.5–11.5)

## 2023-04-06 PROCEDURE — 87449 NOS EACH ORGANISM AG IA: CPT

## 2023-04-06 PROCEDURE — 6370000000 HC RX 637 (ALT 250 FOR IP): Performed by: INTERNAL MEDICINE

## 2023-04-06 PROCEDURE — 80048 BASIC METABOLIC PNL TOTAL CA: CPT

## 2023-04-06 PROCEDURE — 71250 CT THORAX DX C-: CPT

## 2023-04-06 PROCEDURE — 2580000003 HC RX 258: Performed by: INTERNAL MEDICINE

## 2023-04-06 PROCEDURE — 2500000003 HC RX 250 WO HCPCS: Performed by: FAMILY MEDICINE

## 2023-04-06 PROCEDURE — 71046 X-RAY EXAM CHEST 2 VIEWS: CPT

## 2023-04-06 PROCEDURE — 2580000003 HC RX 258: Performed by: FAMILY MEDICINE

## 2023-04-06 PROCEDURE — 80076 HEPATIC FUNCTION PANEL: CPT

## 2023-04-06 PROCEDURE — 6360000002 HC RX W HCPCS: Performed by: FAMILY MEDICINE

## 2023-04-06 PROCEDURE — 36415 COLL VENOUS BLD VENIPUNCTURE: CPT

## 2023-04-06 PROCEDURE — 2060000000 HC ICU INTERMEDIATE R&B

## 2023-04-06 PROCEDURE — 99223 1ST HOSP IP/OBS HIGH 75: CPT | Performed by: INTERNAL MEDICINE

## 2023-04-06 PROCEDURE — 6360000002 HC RX W HCPCS: Performed by: INTERNAL MEDICINE

## 2023-04-06 PROCEDURE — 83735 ASSAY OF MAGNESIUM: CPT

## 2023-04-06 PROCEDURE — 6370000000 HC RX 637 (ALT 250 FOR IP): Performed by: FAMILY MEDICINE

## 2023-04-06 PROCEDURE — 82962 GLUCOSE BLOOD TEST: CPT

## 2023-04-06 PROCEDURE — 94640 AIRWAY INHALATION TREATMENT: CPT

## 2023-04-06 PROCEDURE — 84100 ASSAY OF PHOSPHORUS: CPT

## 2023-04-06 PROCEDURE — 6360000004 HC RX CONTRAST MEDICATION: Performed by: RADIOLOGY

## 2023-04-06 PROCEDURE — 74176 CT ABD & PELVIS W/O CONTRAST: CPT

## 2023-04-06 PROCEDURE — 6370000000 HC RX 637 (ALT 250 FOR IP): Performed by: STUDENT IN AN ORGANIZED HEALTH CARE EDUCATION/TRAINING PROGRAM

## 2023-04-06 PROCEDURE — 85025 COMPLETE CBC W/AUTO DIFF WBC: CPT

## 2023-04-06 RX ORDER — ONDANSETRON 2 MG/ML
4 INJECTION INTRAMUSCULAR; INTRAVENOUS EVERY 6 HOURS PRN
Status: DISCONTINUED | OUTPATIENT
Start: 2023-04-06 | End: 2023-04-06

## 2023-04-06 RX ORDER — ONDANSETRON 2 MG/ML
8 INJECTION INTRAMUSCULAR; INTRAVENOUS EVERY 8 HOURS PRN
Status: DISCONTINUED | OUTPATIENT
Start: 2023-04-06 | End: 2023-04-09 | Stop reason: HOSPADM

## 2023-04-06 RX ORDER — PREGABALIN 75 MG/1
75 CAPSULE ORAL 2 TIMES DAILY
Status: DISCONTINUED | OUTPATIENT
Start: 2023-04-06 | End: 2023-04-09 | Stop reason: HOSPADM

## 2023-04-06 RX ORDER — METHYLPREDNISOLONE SODIUM SUCCINATE 40 MG/ML
40 INJECTION, POWDER, LYOPHILIZED, FOR SOLUTION INTRAMUSCULAR; INTRAVENOUS EVERY 8 HOURS
Status: DISCONTINUED | OUTPATIENT
Start: 2023-04-06 | End: 2023-04-09

## 2023-04-06 RX ORDER — METOPROLOL SUCCINATE 25 MG/1
25 TABLET, EXTENDED RELEASE ORAL DAILY
Status: COMPLETED | OUTPATIENT
Start: 2023-04-06 | End: 2023-04-06

## 2023-04-06 RX ADMIN — BUDESONIDE 250 MCG: 0.25 SUSPENSION RESPIRATORY (INHALATION) at 09:24

## 2023-04-06 RX ADMIN — CALCIUM CARBONATE-VITAMIN D TAB 500 MG-200 UNIT 1 TABLET: 500-200 TAB at 11:13

## 2023-04-06 RX ADMIN — Medication 400 MG: at 08:21

## 2023-04-06 RX ADMIN — ROSUVASTATIN CALCIUM 10 MG: 10 TABLET, FILM COATED ORAL at 08:21

## 2023-04-06 RX ADMIN — ONDANSETRON 8 MG: 2 INJECTION INTRAMUSCULAR; INTRAVENOUS at 13:57

## 2023-04-06 RX ADMIN — CALCIUM CARBONATE-VITAMIN D TAB 500 MG-200 UNIT 1 TABLET: 500-200 TAB at 08:22

## 2023-04-06 RX ADMIN — Medication 400 MG: at 20:34

## 2023-04-06 RX ADMIN — METHYLPREDNISOLONE SODIUM SUCCINATE 40 MG: 40 INJECTION INTRAMUSCULAR; INTRAVENOUS at 09:32

## 2023-04-06 RX ADMIN — BUDESONIDE 250 MCG: 0.25 SUSPENSION RESPIRATORY (INHALATION) at 19:16

## 2023-04-06 RX ADMIN — IOPAMIDOL 18 ML: 755 INJECTION, SOLUTION INTRAVENOUS at 15:50

## 2023-04-06 RX ADMIN — IPRATROPIUM BROMIDE 0.5 MG: 0.5 SOLUTION RESPIRATORY (INHALATION) at 16:09

## 2023-04-06 RX ADMIN — IPRATROPIUM BROMIDE 0.5 MG: 0.5 SOLUTION RESPIRATORY (INHALATION) at 09:24

## 2023-04-06 RX ADMIN — SODIUM CHLORIDE, PRESERVATIVE FREE 10 ML: 5 INJECTION INTRAVENOUS at 20:35

## 2023-04-06 RX ADMIN — ASPIRIN 81 MG: 81 TABLET, COATED ORAL at 08:22

## 2023-04-06 RX ADMIN — METOPROLOL SUCCINATE 25 MG: 25 TABLET, EXTENDED RELEASE ORAL at 11:13

## 2023-04-06 RX ADMIN — METOPROLOL SUCCINATE 100 MG: 100 TABLET, EXTENDED RELEASE ORAL at 08:22

## 2023-04-06 RX ADMIN — CALCIUM CARBONATE-VITAMIN D TAB 500 MG-200 UNIT 1 TABLET: 500-200 TAB at 16:33

## 2023-04-06 RX ADMIN — PREGABALIN 75 MG: 75 CAPSULE ORAL at 20:34

## 2023-04-06 RX ADMIN — SODIUM CHLORIDE 10 MG/HR: 900 INJECTION, SOLUTION INTRAVENOUS at 05:08

## 2023-04-06 RX ADMIN — SODIUM CHLORIDE, POTASSIUM CHLORIDE, SODIUM LACTATE AND CALCIUM CHLORIDE: 600; 310; 30; 20 INJECTION, SOLUTION INTRAVENOUS at 08:27

## 2023-04-06 RX ADMIN — Medication 400 MG: at 14:02

## 2023-04-06 RX ADMIN — IPRATROPIUM BROMIDE 0.5 MG: 0.5 SOLUTION RESPIRATORY (INHALATION) at 19:16

## 2023-04-06 RX ADMIN — APIXABAN 5 MG: 5 TABLET, FILM COATED ORAL at 08:21

## 2023-04-06 RX ADMIN — TAMSULOSIN HYDROCHLORIDE 0.4 MG: 0.4 CAPSULE ORAL at 08:22

## 2023-04-06 RX ADMIN — MUPIROCIN: 20 OINTMENT TOPICAL at 20:35

## 2023-04-06 RX ADMIN — SODIUM CHLORIDE, POTASSIUM CHLORIDE, SODIUM LACTATE AND CALCIUM CHLORIDE: 600; 310; 30; 20 INJECTION, SOLUTION INTRAVENOUS at 19:07

## 2023-04-06 RX ADMIN — PREGABALIN 75 MG: 75 CAPSULE ORAL at 14:45

## 2023-04-06 RX ADMIN — METHYLPREDNISOLONE SODIUM SUCCINATE 40 MG: 40 INJECTION INTRAMUSCULAR; INTRAVENOUS at 16:33

## 2023-04-06 RX ADMIN — APIXABAN 5 MG: 5 TABLET, FILM COATED ORAL at 20:34

## 2023-04-06 RX ADMIN — SODIUM CHLORIDE, POTASSIUM CHLORIDE, SODIUM LACTATE AND CALCIUM CHLORIDE: 600; 310; 30; 20 INJECTION, SOLUTION INTRAVENOUS at 03:33

## 2023-04-06 RX ADMIN — ARFORMOTEROL TARTRATE 15 MCG: 15 SOLUTION RESPIRATORY (INHALATION) at 09:23

## 2023-04-06 RX ADMIN — MUPIROCIN: 20 OINTMENT TOPICAL at 08:25

## 2023-04-06 RX ADMIN — METOPROLOL SUCCINATE 125 MG: 100 TABLET, EXTENDED RELEASE ORAL at 20:34

## 2023-04-06 RX ADMIN — FINASTERIDE 5 MG: 5 TABLET, FILM COATED ORAL at 08:21

## 2023-04-06 RX ADMIN — MAGNESIUM SULFATE HEPTAHYDRATE 3000 MG: 500 INJECTION, SOLUTION INTRAMUSCULAR; INTRAVENOUS at 19:04

## 2023-04-06 RX ADMIN — ACETAMINOPHEN 650 MG: 325 TABLET ORAL at 08:22

## 2023-04-06 RX ADMIN — ARFORMOTEROL TARTRATE 15 MCG: 15 SOLUTION RESPIRATORY (INHALATION) at 19:16

## 2023-04-06 ASSESSMENT — PAIN SCALES - GENERAL
PAINLEVEL_OUTOF10: 0
PAINLEVEL_OUTOF10: 0
PAINLEVEL_OUTOF10: 3

## 2023-04-06 ASSESSMENT — PAIN DESCRIPTION - DESCRIPTORS: DESCRIPTORS: ACHING;GNAWING;SORE

## 2023-04-06 ASSESSMENT — PAIN DESCRIPTION - LOCATION: LOCATION: GENERALIZED

## 2023-04-07 PROBLEM — Z51.5 PALLIATIVE CARE BY SPECIALIST: Status: ACTIVE | Noted: 2023-04-07

## 2023-04-07 LAB
ANION GAP SERPL CALCULATED.3IONS-SCNC: 13 MMOL/L (ref 7–16)
ANION GAP SERPL CALCULATED.3IONS-SCNC: 8 MMOL/L (ref 7–16)
BACTERIA UR CULT: NORMAL
BASOPHILS # BLD: 0 E9/L (ref 0–0.2)
BASOPHILS NFR BLD: 0 % (ref 0–2)
BUN SERPL-MCNC: 34 MG/DL (ref 6–23)
BUN SERPL-MCNC: 38 MG/DL (ref 6–23)
CALCIUM SERPL-MCNC: 8.6 MG/DL (ref 8.6–10.2)
CALCIUM SERPL-MCNC: 8.8 MG/DL (ref 8.6–10.2)
CALCIUM SERPL-MCNC: 9.1 MG/DL (ref 8.6–10.2)
CALCIUM SERPL-MCNC: 9.4 MG/DL (ref 8.6–10.2)
CHLORIDE SERPL-SCNC: 102 MMOL/L (ref 98–107)
CHLORIDE SERPL-SCNC: 102 MMOL/L (ref 98–107)
CHLORIDE SERPL-SCNC: 105 MMOL/L (ref 98–107)
CHLORIDE SERPL-SCNC: 106 MMOL/L (ref 98–107)
CO2 SERPL-SCNC: 20 MMOL/L (ref 22–29)
CO2 SERPL-SCNC: 23 MMOL/L (ref 22–29)
CO2 SERPL-SCNC: 24 MMOL/L (ref 22–29)
CO2 SERPL-SCNC: 24 MMOL/L (ref 22–29)
CREAT SERPL-MCNC: 1.9 MG/DL (ref 0.7–1.2)
CREAT SERPL-MCNC: 1.9 MG/DL (ref 0.7–1.2)
CREAT SERPL-MCNC: 2 MG/DL (ref 0.7–1.2)
CREAT SERPL-MCNC: 2.1 MG/DL (ref 0.7–1.2)
EOSINOPHIL # BLD: 0 E9/L (ref 0.05–0.5)
EOSINOPHIL NFR BLD: 0 % (ref 0–6)
ERYTHROCYTE [DISTWIDTH] IN BLOOD BY AUTOMATED COUNT: 14.9 FL (ref 11.5–15)
GLUCOSE SERPL-MCNC: 186 MG/DL (ref 74–99)
GLUCOSE SERPL-MCNC: 200 MG/DL (ref 74–99)
GLUCOSE SERPL-MCNC: 205 MG/DL (ref 74–99)
GLUCOSE SERPL-MCNC: 235 MG/DL (ref 74–99)
HCT VFR BLD AUTO: 25.6 % (ref 37–54)
HGB BLD-MCNC: 8 G/DL (ref 12.5–16.5)
LEGIONELLA AG UR QL: NORMAL
LYMPHOCYTES # BLD: 0 E9/L (ref 1.5–4)
LYMPHOCYTES NFR BLD: 0 % (ref 20–42)
MAGNESIUM SERPL-MCNC: 1.8 MG/DL (ref 1.6–2.6)
MCH RBC QN AUTO: 32.7 PG (ref 26–35)
MCHC RBC AUTO-ENTMCNC: 31.3 % (ref 32–34.5)
MCV RBC AUTO: 104.5 FL (ref 80–99.9)
METAMYELOCYTES NFR BLD MANUAL: 11.3 % (ref 0–1)
METER GLUCOSE: 188 MG/DL (ref 74–99)
METER GLUCOSE: 214 MG/DL (ref 74–99)
METER GLUCOSE: 234 MG/DL (ref 74–99)
METER GLUCOSE: 245 MG/DL (ref 74–99)
MONOCYTES # BLD: 0 E9/L (ref 0.1–0.95)
MONOCYTES NFR BLD: 0 % (ref 2–12)
NEUTROPHILS # BLD: 38.4 E9/L (ref 1.8–7.3)
NEUTS SEG NFR BLD: 88.7 % (ref 43–80)
NRBC BLD-RTO: 0 /100 WBC
PHOSPHATE SERPL-MCNC: 3.7 MG/DL (ref 2.5–4.5)
PLATELET # BLD AUTO: 128 E9/L (ref 130–450)
PMV BLD AUTO: 11 FL (ref 7–12)
POIKILOCYTES: ABNORMAL
POTASSIUM SERPL-SCNC: 5.3 MMOL/L (ref 3.5–5)
POTASSIUM SERPL-SCNC: 5.5 MMOL/L (ref 3.5–5)
POTASSIUM SERPL-SCNC: 5.7 MMOL/L (ref 3.5–5)
POTASSIUM SERPL-SCNC: 6.1 MMOL/L (ref 3.5–5)
RBC # BLD AUTO: 2.45 E12/L (ref 3.8–5.8)
S PNEUM AG SPEC QL: NORMAL
SMUDGE CELLS: ABNORMAL
SODIUM SERPL-SCNC: 134 MMOL/L (ref 132–146)
SODIUM SERPL-SCNC: 135 MMOL/L (ref 132–146)
SODIUM SERPL-SCNC: 136 MMOL/L (ref 132–146)
SODIUM SERPL-SCNC: 138 MMOL/L (ref 132–146)
TEAR DROP CELLS: ABNORMAL
WBC # BLD: 38.4 E9/L (ref 4.5–11.5)

## 2023-04-07 PROCEDURE — 6370000000 HC RX 637 (ALT 250 FOR IP): Performed by: INTERNAL MEDICINE

## 2023-04-07 PROCEDURE — 99233 SBSQ HOSP IP/OBS HIGH 50: CPT | Performed by: INTERNAL MEDICINE

## 2023-04-07 PROCEDURE — 80048 BASIC METABOLIC PNL TOTAL CA: CPT

## 2023-04-07 PROCEDURE — 6370000000 HC RX 637 (ALT 250 FOR IP): Performed by: STUDENT IN AN ORGANIZED HEALTH CARE EDUCATION/TRAINING PROGRAM

## 2023-04-07 PROCEDURE — 6360000002 HC RX W HCPCS: Performed by: FAMILY MEDICINE

## 2023-04-07 PROCEDURE — 2580000003 HC RX 258: Performed by: INTERNAL MEDICINE

## 2023-04-07 PROCEDURE — 2060000000 HC ICU INTERMEDIATE R&B

## 2023-04-07 PROCEDURE — 6360000002 HC RX W HCPCS: Performed by: INTERNAL MEDICINE

## 2023-04-07 PROCEDURE — 36415 COLL VENOUS BLD VENIPUNCTURE: CPT

## 2023-04-07 PROCEDURE — 83735 ASSAY OF MAGNESIUM: CPT

## 2023-04-07 PROCEDURE — 82962 GLUCOSE BLOOD TEST: CPT

## 2023-04-07 PROCEDURE — 84100 ASSAY OF PHOSPHORUS: CPT

## 2023-04-07 PROCEDURE — 94640 AIRWAY INHALATION TREATMENT: CPT

## 2023-04-07 PROCEDURE — 6370000000 HC RX 637 (ALT 250 FOR IP): Performed by: FAMILY MEDICINE

## 2023-04-07 PROCEDURE — 99231 SBSQ HOSP IP/OBS SF/LOW 25: CPT

## 2023-04-07 PROCEDURE — 85025 COMPLETE CBC W/AUTO DIFF WBC: CPT

## 2023-04-07 RX ORDER — DEXTROSE MONOHYDRATE 25 G/50ML
25 INJECTION, SOLUTION INTRAVENOUS ONCE
Status: DISCONTINUED | OUTPATIENT
Start: 2023-04-07 | End: 2023-04-07 | Stop reason: CLARIF

## 2023-04-07 RX ORDER — CALCIUM GLUCONATE 94 MG/ML
1000 INJECTION, SOLUTION INTRAVENOUS ONCE
Status: DISCONTINUED | OUTPATIENT
Start: 2023-04-07 | End: 2023-04-07 | Stop reason: SDUPTHER

## 2023-04-07 RX ORDER — SODIUM CHLORIDE 9 MG/ML
INJECTION, SOLUTION INTRAVENOUS CONTINUOUS
Status: ACTIVE | OUTPATIENT
Start: 2023-04-07 | End: 2023-04-08

## 2023-04-07 RX ADMIN — SODIUM CHLORIDE, PRESERVATIVE FREE 10 ML: 5 INJECTION INTRAVENOUS at 09:06

## 2023-04-07 RX ADMIN — IPRATROPIUM BROMIDE 0.5 MG: 0.5 SOLUTION RESPIRATORY (INHALATION) at 16:38

## 2023-04-07 RX ADMIN — IPRATROPIUM BROMIDE 0.5 MG: 0.5 SOLUTION RESPIRATORY (INHALATION) at 09:22

## 2023-04-07 RX ADMIN — ROSUVASTATIN CALCIUM 10 MG: 10 TABLET, FILM COATED ORAL at 09:04

## 2023-04-07 RX ADMIN — APIXABAN 5 MG: 5 TABLET, FILM COATED ORAL at 20:14

## 2023-04-07 RX ADMIN — Medication 400 MG: at 20:14

## 2023-04-07 RX ADMIN — CALCIUM GLUCONATE 1000 MG: 98 INJECTION, SOLUTION INTRAVENOUS at 11:07

## 2023-04-07 RX ADMIN — PREGABALIN 75 MG: 75 CAPSULE ORAL at 09:04

## 2023-04-07 RX ADMIN — ARFORMOTEROL TARTRATE 15 MCG: 15 SOLUTION RESPIRATORY (INHALATION) at 09:22

## 2023-04-07 RX ADMIN — INSULIN LISPRO 2 UNITS: 100 INJECTION, SOLUTION INTRAVENOUS; SUBCUTANEOUS at 11:11

## 2023-04-07 RX ADMIN — CALCIUM CARBONATE-VITAMIN D TAB 500 MG-200 UNIT 1 TABLET: 500-200 TAB at 09:04

## 2023-04-07 RX ADMIN — Medication 400 MG: at 09:04

## 2023-04-07 RX ADMIN — TAMSULOSIN HYDROCHLORIDE 0.4 MG: 0.4 CAPSULE ORAL at 09:04

## 2023-04-07 RX ADMIN — Medication 400 MG: at 15:36

## 2023-04-07 RX ADMIN — METHYLPREDNISOLONE SODIUM SUCCINATE 40 MG: 40 INJECTION INTRAMUSCULAR; INTRAVENOUS at 16:36

## 2023-04-07 RX ADMIN — METHYLPREDNISOLONE SODIUM SUCCINATE 40 MG: 40 INJECTION INTRAMUSCULAR; INTRAVENOUS at 01:18

## 2023-04-07 RX ADMIN — METHYLPREDNISOLONE SODIUM SUCCINATE 40 MG: 40 INJECTION INTRAMUSCULAR; INTRAVENOUS at 09:04

## 2023-04-07 RX ADMIN — APIXABAN 5 MG: 5 TABLET, FILM COATED ORAL at 09:04

## 2023-04-07 RX ADMIN — SODIUM CHLORIDE: 9 INJECTION, SOLUTION INTRAVENOUS at 23:46

## 2023-04-07 RX ADMIN — MUPIROCIN: 20 OINTMENT TOPICAL at 09:02

## 2023-04-07 RX ADMIN — CALCIUM CARBONATE-VITAMIN D TAB 500 MG-200 UNIT 1 TABLET: 500-200 TAB at 16:36

## 2023-04-07 RX ADMIN — IPRATROPIUM BROMIDE 0.5 MG: 0.5 SOLUTION RESPIRATORY (INHALATION) at 20:39

## 2023-04-07 RX ADMIN — ARFORMOTEROL TARTRATE 15 MCG: 15 SOLUTION RESPIRATORY (INHALATION) at 20:38

## 2023-04-07 RX ADMIN — BUDESONIDE 250 MCG: 0.25 SUSPENSION RESPIRATORY (INHALATION) at 09:22

## 2023-04-07 RX ADMIN — INSULIN HUMAN 10 UNITS: 100 INJECTION, SOLUTION PARENTERAL at 23:46

## 2023-04-07 RX ADMIN — METOPROLOL SUCCINATE 125 MG: 100 TABLET, EXTENDED RELEASE ORAL at 20:14

## 2023-04-07 RX ADMIN — METOPROLOL SUCCINATE 125 MG: 100 TABLET, EXTENDED RELEASE ORAL at 09:04

## 2023-04-07 RX ADMIN — IPRATROPIUM BROMIDE 0.5 MG: 0.5 SOLUTION RESPIRATORY (INHALATION) at 12:18

## 2023-04-07 RX ADMIN — CALCIUM GLUCONATE 1000 MG: 98 INJECTION, SOLUTION INTRAVENOUS at 20:22

## 2023-04-07 RX ADMIN — DEXTROSE MONOHYDRATE 250 ML: 100 INJECTION, SOLUTION INTRAVENOUS at 23:44

## 2023-04-07 RX ADMIN — BUDESONIDE 250 MCG: 0.25 SUSPENSION RESPIRATORY (INHALATION) at 20:38

## 2023-04-07 RX ADMIN — MUPIROCIN: 20 OINTMENT TOPICAL at 20:15

## 2023-04-07 RX ADMIN — PREGABALIN 75 MG: 75 CAPSULE ORAL at 20:14

## 2023-04-07 RX ADMIN — SODIUM CHLORIDE, PRESERVATIVE FREE 10 ML: 5 INJECTION INTRAVENOUS at 20:15

## 2023-04-07 RX ADMIN — ASPIRIN 81 MG: 81 TABLET, COATED ORAL at 09:04

## 2023-04-07 RX ADMIN — FINASTERIDE 5 MG: 5 TABLET, FILM COATED ORAL at 09:04

## 2023-04-07 RX ADMIN — SODIUM ZIRCONIUM CYCLOSILICATE 10 G: 10 POWDER, FOR SUSPENSION ORAL at 20:14

## 2023-04-07 RX ADMIN — CALCIUM CARBONATE-VITAMIN D TAB 500 MG-200 UNIT 1 TABLET: 500-200 TAB at 11:11

## 2023-04-07 ASSESSMENT — PAIN SCALES - GENERAL
PAINLEVEL_OUTOF10: 0
PAINLEVEL_OUTOF10: 0

## 2023-04-08 LAB
ANION GAP SERPL CALCULATED.3IONS-SCNC: 8 MMOL/L (ref 7–16)
BACTERIA BLD CULT ORG #2: NORMAL
BACTERIA BLD CULT: NORMAL
BASOPHILS # BLD: 0 E9/L (ref 0–0.2)
BASOPHILS NFR BLD: 0 % (ref 0–2)
BUN SERPL-MCNC: 37 MG/DL (ref 6–23)
CALCIUM SERPL-MCNC: 9.1 MG/DL (ref 8.6–10.2)
CHLORIDE SERPL-SCNC: 106 MMOL/L (ref 98–107)
CHLORIDE UR-SCNC: 67 MMOL/L
CO2 SERPL-SCNC: 24 MMOL/L (ref 22–29)
CREAT SERPL-MCNC: 1.8 MG/DL (ref 0.7–1.2)
CREAT UR-MCNC: 82 MG/DL (ref 40–278)
EOSINOPHIL # BLD: 0 E9/L (ref 0.05–0.5)
EOSINOPHIL NFR BLD: 0 % (ref 0–6)
ERYTHROCYTE [DISTWIDTH] IN BLOOD BY AUTOMATED COUNT: 14.5 FL (ref 11.5–15)
GLUCOSE SERPL-MCNC: 183 MG/DL (ref 74–99)
HCT VFR BLD AUTO: 25.6 % (ref 37–54)
HGB BLD-MCNC: 8.1 G/DL (ref 12.5–16.5)
HYPOCHROMIA: ABNORMAL
LYMPHOCYTES # BLD: 0.24 E9/L (ref 1.5–4)
LYMPHOCYTES NFR BLD: 1 % (ref 20–42)
MAGNESIUM SERPL-MCNC: 2.1 MG/DL (ref 1.6–2.6)
MCH RBC QN AUTO: 32.5 PG (ref 26–35)
MCHC RBC AUTO-ENTMCNC: 31.6 % (ref 32–34.5)
MCV RBC AUTO: 102.8 FL (ref 80–99.9)
METER GLUCOSE: 190 MG/DL (ref 74–99)
METER GLUCOSE: 202 MG/DL (ref 74–99)
METER GLUCOSE: 205 MG/DL (ref 74–99)
METER GLUCOSE: 208 MG/DL (ref 74–99)
MONOCYTES # BLD: 0.48 E9/L (ref 0.1–0.95)
MONOCYTES NFR BLD: 2 % (ref 2–12)
NEUTROPHILS # BLD: 23.47 E9/L (ref 1.8–7.3)
NEUTS SEG NFR BLD: 97 % (ref 43–80)
OSMOLALITY URINE: 572 MOSM/KG (ref 300–900)
OVALOCYTES: ABNORMAL
PHOSPHATE SERPL-MCNC: 3.7 MG/DL (ref 2.5–4.5)
PLATELET # BLD AUTO: 128 E9/L (ref 130–450)
PMV BLD AUTO: 11 FL (ref 7–12)
POIKILOCYTES: ABNORMAL
POLYCHROMASIA: ABNORMAL
POTASSIUM SERPL-SCNC: 5.3 MMOL/L (ref 3.5–5)
PROT UR-MCNC: 43 MG/DL (ref 0–12)
PROTEIN/CREAT RATIO: 0.5
PROTEIN/CREAT RATIO: 0.5 (ref 0–0.2)
RBC # BLD AUTO: 2.49 E12/L (ref 3.8–5.8)
SODIUM SERPL-SCNC: 138 MMOL/L (ref 132–146)
SODIUM UR-SCNC: 76 MMOL/L
TEAR DROP CELLS: ABNORMAL
WBC # BLD: 24.2 E9/L (ref 4.5–11.5)

## 2023-04-08 PROCEDURE — 2580000003 HC RX 258: Performed by: INTERNAL MEDICINE

## 2023-04-08 PROCEDURE — 6370000000 HC RX 637 (ALT 250 FOR IP): Performed by: INTERNAL MEDICINE

## 2023-04-08 PROCEDURE — 6360000002 HC RX W HCPCS: Performed by: FAMILY MEDICINE

## 2023-04-08 PROCEDURE — 6370000000 HC RX 637 (ALT 250 FOR IP): Performed by: STUDENT IN AN ORGANIZED HEALTH CARE EDUCATION/TRAINING PROGRAM

## 2023-04-08 PROCEDURE — 36415 COLL VENOUS BLD VENIPUNCTURE: CPT

## 2023-04-08 PROCEDURE — 6370000000 HC RX 637 (ALT 250 FOR IP): Performed by: FAMILY MEDICINE

## 2023-04-08 PROCEDURE — 99233 SBSQ HOSP IP/OBS HIGH 50: CPT | Performed by: INTERNAL MEDICINE

## 2023-04-08 PROCEDURE — 84156 ASSAY OF PROTEIN URINE: CPT

## 2023-04-08 PROCEDURE — 2060000000 HC ICU INTERMEDIATE R&B

## 2023-04-08 PROCEDURE — 83935 ASSAY OF URINE OSMOLALITY: CPT

## 2023-04-08 PROCEDURE — 6360000002 HC RX W HCPCS: Performed by: INTERNAL MEDICINE

## 2023-04-08 PROCEDURE — 85025 COMPLETE CBC W/AUTO DIFF WBC: CPT

## 2023-04-08 PROCEDURE — 84300 ASSAY OF URINE SODIUM: CPT

## 2023-04-08 PROCEDURE — 82962 GLUCOSE BLOOD TEST: CPT

## 2023-04-08 PROCEDURE — 94640 AIRWAY INHALATION TREATMENT: CPT

## 2023-04-08 PROCEDURE — 83735 ASSAY OF MAGNESIUM: CPT

## 2023-04-08 PROCEDURE — 82570 ASSAY OF URINE CREATININE: CPT

## 2023-04-08 PROCEDURE — 84100 ASSAY OF PHOSPHORUS: CPT

## 2023-04-08 PROCEDURE — 82436 ASSAY OF URINE CHLORIDE: CPT

## 2023-04-08 PROCEDURE — 80048 BASIC METABOLIC PNL TOTAL CA: CPT

## 2023-04-08 RX ADMIN — ARFORMOTEROL TARTRATE 15 MCG: 15 SOLUTION RESPIRATORY (INHALATION) at 20:43

## 2023-04-08 RX ADMIN — CALCIUM CARBONATE-VITAMIN D TAB 500 MG-200 UNIT 1 TABLET: 500-200 TAB at 08:00

## 2023-04-08 RX ADMIN — MUPIROCIN: 20 OINTMENT TOPICAL at 08:02

## 2023-04-08 RX ADMIN — METHYLPREDNISOLONE SODIUM SUCCINATE 40 MG: 40 INJECTION INTRAMUSCULAR; INTRAVENOUS at 17:07

## 2023-04-08 RX ADMIN — METOPROLOL SUCCINATE 125 MG: 100 TABLET, EXTENDED RELEASE ORAL at 21:41

## 2023-04-08 RX ADMIN — FINASTERIDE 5 MG: 5 TABLET, FILM COATED ORAL at 08:01

## 2023-04-08 RX ADMIN — BUDESONIDE 250 MCG: 0.25 SUSPENSION RESPIRATORY (INHALATION) at 09:15

## 2023-04-08 RX ADMIN — INSULIN LISPRO 2 UNITS: 100 INJECTION, SOLUTION INTRAVENOUS; SUBCUTANEOUS at 11:54

## 2023-04-08 RX ADMIN — Medication 400 MG: at 08:01

## 2023-04-08 RX ADMIN — PREGABALIN 75 MG: 75 CAPSULE ORAL at 08:01

## 2023-04-08 RX ADMIN — ASPIRIN 81 MG: 81 TABLET, COATED ORAL at 08:00

## 2023-04-08 RX ADMIN — MUPIROCIN: 20 OINTMENT TOPICAL at 21:42

## 2023-04-08 RX ADMIN — IPRATROPIUM BROMIDE 0.5 MG: 0.5 SOLUTION RESPIRATORY (INHALATION) at 12:46

## 2023-04-08 RX ADMIN — ROSUVASTATIN CALCIUM 10 MG: 10 TABLET, FILM COATED ORAL at 08:00

## 2023-04-08 RX ADMIN — METOPROLOL SUCCINATE 125 MG: 100 TABLET, EXTENDED RELEASE ORAL at 08:00

## 2023-04-08 RX ADMIN — SODIUM CHLORIDE, PRESERVATIVE FREE 10 ML: 5 INJECTION INTRAVENOUS at 21:42

## 2023-04-08 RX ADMIN — CALCIUM CARBONATE-VITAMIN D TAB 500 MG-200 UNIT 1 TABLET: 500-200 TAB at 17:07

## 2023-04-08 RX ADMIN — IPRATROPIUM BROMIDE 0.5 MG: 0.5 SOLUTION RESPIRATORY (INHALATION) at 20:43

## 2023-04-08 RX ADMIN — APIXABAN 5 MG: 5 TABLET, FILM COATED ORAL at 21:41

## 2023-04-08 RX ADMIN — IPRATROPIUM BROMIDE 0.5 MG: 0.5 SOLUTION RESPIRATORY (INHALATION) at 09:15

## 2023-04-08 RX ADMIN — IPRATROPIUM BROMIDE 0.5 MG: 0.5 SOLUTION RESPIRATORY (INHALATION) at 16:24

## 2023-04-08 RX ADMIN — Medication 400 MG: at 21:41

## 2023-04-08 RX ADMIN — Medication 400 MG: at 14:48

## 2023-04-08 RX ADMIN — ARFORMOTEROL TARTRATE 15 MCG: 15 SOLUTION RESPIRATORY (INHALATION) at 09:15

## 2023-04-08 RX ADMIN — APIXABAN 5 MG: 5 TABLET, FILM COATED ORAL at 08:00

## 2023-04-08 RX ADMIN — METHYLPREDNISOLONE SODIUM SUCCINATE 40 MG: 40 INJECTION INTRAMUSCULAR; INTRAVENOUS at 08:01

## 2023-04-08 RX ADMIN — TAMSULOSIN HYDROCHLORIDE 0.4 MG: 0.4 CAPSULE ORAL at 08:00

## 2023-04-08 RX ADMIN — METHYLPREDNISOLONE SODIUM SUCCINATE 40 MG: 40 INJECTION INTRAMUSCULAR; INTRAVENOUS at 01:23

## 2023-04-08 RX ADMIN — CALCIUM CARBONATE-VITAMIN D TAB 500 MG-200 UNIT 1 TABLET: 500-200 TAB at 11:55

## 2023-04-08 RX ADMIN — INSULIN LISPRO 2 UNITS: 100 INJECTION, SOLUTION INTRAVENOUS; SUBCUTANEOUS at 17:06

## 2023-04-08 RX ADMIN — BUDESONIDE 250 MCG: 0.25 SUSPENSION RESPIRATORY (INHALATION) at 20:43

## 2023-04-08 RX ADMIN — PREGABALIN 75 MG: 75 CAPSULE ORAL at 21:41

## 2023-04-08 RX ADMIN — SODIUM ZIRCONIUM CYCLOSILICATE 10 G: 10 POWDER, FOR SUSPENSION ORAL at 08:01

## 2023-04-08 RX ADMIN — SODIUM CHLORIDE, PRESERVATIVE FREE 10 ML: 5 INJECTION INTRAVENOUS at 08:01

## 2023-04-09 VITALS
BODY MASS INDEX: 44.1 KG/M2 | WEIGHT: 315 LBS | SYSTOLIC BLOOD PRESSURE: 127 MMHG | RESPIRATION RATE: 18 BRPM | DIASTOLIC BLOOD PRESSURE: 85 MMHG | HEART RATE: 98 BPM | HEIGHT: 71 IN | TEMPERATURE: 98.5 F | OXYGEN SATURATION: 94 %

## 2023-04-09 LAB
ANION GAP SERPL CALCULATED.3IONS-SCNC: 8 MMOL/L (ref 7–16)
BASOPHILS # BLD: 0 E9/L (ref 0–0.2)
BASOPHILS NFR BLD: 0 % (ref 0–2)
BUN SERPL-MCNC: 46 MG/DL (ref 6–23)
CALCIUM SERPL-MCNC: 9.1 MG/DL (ref 8.6–10.2)
CHLORIDE SERPL-SCNC: 105 MMOL/L (ref 98–107)
CO2 SERPL-SCNC: 25 MMOL/L (ref 22–29)
CREAT SERPL-MCNC: 1.9 MG/DL (ref 0.7–1.2)
EOSINOPHIL # BLD: 0 E9/L (ref 0.05–0.5)
EOSINOPHIL NFR BLD: 0 % (ref 0–6)
ERYTHROCYTE [DISTWIDTH] IN BLOOD BY AUTOMATED COUNT: 14.6 FL (ref 11.5–15)
GLUCOSE SERPL-MCNC: 229 MG/DL (ref 74–99)
HCT VFR BLD AUTO: 24.4 % (ref 37–54)
HGB BLD-MCNC: 7.9 G/DL (ref 12.5–16.5)
HYPOCHROMIA: ABNORMAL
LYMPHOCYTES # BLD: 0.5 E9/L (ref 1.5–4)
LYMPHOCYTES NFR BLD: 3.5 % (ref 20–42)
MAGNESIUM SERPL-MCNC: 2 MG/DL (ref 1.6–2.6)
MCH RBC QN AUTO: 32.5 PG (ref 26–35)
MCHC RBC AUTO-ENTMCNC: 32.4 % (ref 32–34.5)
MCV RBC AUTO: 100.4 FL (ref 80–99.9)
METER GLUCOSE: 218 MG/DL (ref 74–99)
METER GLUCOSE: 256 MG/DL (ref 74–99)
MONOCYTES # BLD: 0.37 E9/L (ref 0.1–0.95)
MONOCYTES NFR BLD: 2.6 % (ref 2–12)
NEUTROPHILS # BLD: 11.53 E9/L (ref 1.8–7.3)
NEUTS SEG NFR BLD: 93 % (ref 43–80)
NRBC BLD-RTO: 0 /100 WBC
OVALOCYTES: ABNORMAL
PHOSPHATE SERPL-MCNC: 3.7 MG/DL (ref 2.5–4.5)
PLATELET # BLD AUTO: 134 E9/L (ref 130–450)
PMV BLD AUTO: 11.3 FL (ref 7–12)
POIKILOCYTES: ABNORMAL
POTASSIUM SERPL-SCNC: 5.1 MMOL/L (ref 3.5–5)
RBC # BLD AUTO: 2.43 E12/L (ref 3.8–5.8)
SODIUM SERPL-SCNC: 138 MMOL/L (ref 132–146)
TEAR DROP CELLS: ABNORMAL
TOXIC GRANULATION: ABNORMAL
VARIANT LYMPHS NFR BLD: 0.9 % (ref 0–4)
WBC # BLD: 12.4 E9/L (ref 4.5–11.5)

## 2023-04-09 PROCEDURE — 6370000000 HC RX 637 (ALT 250 FOR IP): Performed by: FAMILY MEDICINE

## 2023-04-09 PROCEDURE — 6360000002 HC RX W HCPCS: Performed by: FAMILY MEDICINE

## 2023-04-09 PROCEDURE — 6370000000 HC RX 637 (ALT 250 FOR IP): Performed by: INTERNAL MEDICINE

## 2023-04-09 PROCEDURE — 94640 AIRWAY INHALATION TREATMENT: CPT

## 2023-04-09 PROCEDURE — 36415 COLL VENOUS BLD VENIPUNCTURE: CPT

## 2023-04-09 PROCEDURE — 84100 ASSAY OF PHOSPHORUS: CPT

## 2023-04-09 PROCEDURE — 82962 GLUCOSE BLOOD TEST: CPT

## 2023-04-09 PROCEDURE — 85025 COMPLETE CBC W/AUTO DIFF WBC: CPT

## 2023-04-09 PROCEDURE — 6370000000 HC RX 637 (ALT 250 FOR IP): Performed by: STUDENT IN AN ORGANIZED HEALTH CARE EDUCATION/TRAINING PROGRAM

## 2023-04-09 PROCEDURE — 83735 ASSAY OF MAGNESIUM: CPT

## 2023-04-09 PROCEDURE — 80048 BASIC METABOLIC PNL TOTAL CA: CPT

## 2023-04-09 PROCEDURE — 99232 SBSQ HOSP IP/OBS MODERATE 35: CPT | Performed by: INTERNAL MEDICINE

## 2023-04-09 PROCEDURE — 6360000002 HC RX W HCPCS: Performed by: INTERNAL MEDICINE

## 2023-04-09 PROCEDURE — 2580000003 HC RX 258: Performed by: INTERNAL MEDICINE

## 2023-04-09 RX ORDER — PREGABALIN 75 MG/1
75 CAPSULE ORAL 2 TIMES DAILY
Qty: 60 CAPSULE | Refills: 0 | Status: SHIPPED | OUTPATIENT
Start: 2023-04-09 | End: 2023-04-24

## 2023-04-09 RX ORDER — HEPARIN SODIUM (PORCINE) LOCK FLUSH IV SOLN 100 UNIT/ML 100 UNIT/ML
500 SOLUTION INTRAVENOUS PRN
Status: DISCONTINUED | OUTPATIENT
Start: 2023-04-09 | End: 2023-04-09 | Stop reason: HOSPADM

## 2023-04-09 RX ORDER — GUAIFENESIN 400 MG/1
400 TABLET ORAL 4 TIMES DAILY
Status: DISCONTINUED | OUTPATIENT
Start: 2023-04-09 | End: 2023-04-09 | Stop reason: HOSPADM

## 2023-04-09 RX ORDER — METHYLPREDNISOLONE 4 MG/1
TABLET ORAL
Qty: 1 KIT | Refills: 0 | Status: SHIPPED | OUTPATIENT
Start: 2023-04-09 | End: 2023-04-15

## 2023-04-09 RX ORDER — FINASTERIDE 5 MG/1
5 TABLET, FILM COATED ORAL DAILY
Qty: 30 TABLET | Refills: 3 | Status: SHIPPED | OUTPATIENT
Start: 2023-04-10

## 2023-04-09 RX ORDER — CYPROHEPTADINE HYDROCHLORIDE 4 MG/1
4 TABLET ORAL 2 TIMES DAILY
Qty: 14 TABLET | Refills: 0 | Status: SHIPPED | OUTPATIENT
Start: 2023-04-09 | End: 2023-04-16

## 2023-04-09 RX ORDER — TAMSULOSIN HYDROCHLORIDE 0.4 MG/1
0.4 CAPSULE ORAL DAILY
Qty: 30 CAPSULE | Refills: 3 | Status: SHIPPED | OUTPATIENT
Start: 2023-04-10

## 2023-04-09 RX ORDER — METHYLPREDNISOLONE SODIUM SUCCINATE 40 MG/ML
40 INJECTION, POWDER, LYOPHILIZED, FOR SOLUTION INTRAMUSCULAR; INTRAVENOUS EVERY 12 HOURS
Status: DISCONTINUED | OUTPATIENT
Start: 2023-04-09 | End: 2023-04-09 | Stop reason: HOSPADM

## 2023-04-09 RX ORDER — GUAIFENESIN 400 MG/1
400 TABLET ORAL 4 TIMES DAILY
Qty: 56 TABLET | Refills: 0 | Status: SHIPPED | OUTPATIENT
Start: 2023-04-09

## 2023-04-09 RX ORDER — CYPROHEPTADINE HYDROCHLORIDE 4 MG/1
4 TABLET ORAL 2 TIMES DAILY
Status: DISCONTINUED | OUTPATIENT
Start: 2023-04-09 | End: 2023-04-09 | Stop reason: HOSPADM

## 2023-04-09 RX ORDER — FLUTICASONE PROPIONATE 50 MCG
1 SPRAY, SUSPENSION (ML) NASAL DAILY
Qty: 16 G | Refills: 3 | Status: SHIPPED | OUTPATIENT
Start: 2023-04-09

## 2023-04-09 RX ORDER — FLUTICASONE PROPIONATE 50 MCG
1 SPRAY, SUSPENSION (ML) NASAL DAILY
Status: DISCONTINUED | OUTPATIENT
Start: 2023-04-09 | End: 2023-04-09 | Stop reason: HOSPADM

## 2023-04-09 RX ADMIN — APIXABAN 5 MG: 5 TABLET, FILM COATED ORAL at 08:21

## 2023-04-09 RX ADMIN — SODIUM CHLORIDE, PRESERVATIVE FREE 10 ML: 5 INJECTION INTRAVENOUS at 08:28

## 2023-04-09 RX ADMIN — PREGABALIN 75 MG: 75 CAPSULE ORAL at 08:29

## 2023-04-09 RX ADMIN — Medication 400 MG: at 08:20

## 2023-04-09 RX ADMIN — GUAIFENESIN 400 MG: 400 TABLET ORAL at 14:24

## 2023-04-09 RX ADMIN — IPRATROPIUM BROMIDE 0.5 MG: 0.5 SOLUTION RESPIRATORY (INHALATION) at 09:07

## 2023-04-09 RX ADMIN — SODIUM ZIRCONIUM CYCLOSILICATE 10 G: 10 POWDER, FOR SUSPENSION ORAL at 08:22

## 2023-04-09 RX ADMIN — GUAIFENESIN 400 MG: 400 TABLET ORAL at 10:03

## 2023-04-09 RX ADMIN — TAMSULOSIN HYDROCHLORIDE 0.4 MG: 0.4 CAPSULE ORAL at 08:21

## 2023-04-09 RX ADMIN — ARFORMOTEROL TARTRATE 15 MCG: 15 SOLUTION RESPIRATORY (INHALATION) at 09:07

## 2023-04-09 RX ADMIN — IPRATROPIUM BROMIDE 0.5 MG: 0.5 SOLUTION RESPIRATORY (INHALATION) at 12:46

## 2023-04-09 RX ADMIN — Medication 400 MG: at 14:24

## 2023-04-09 RX ADMIN — INSULIN LISPRO 2 UNITS: 100 INJECTION, SOLUTION INTRAVENOUS; SUBCUTANEOUS at 08:21

## 2023-04-09 RX ADMIN — FLUTICASONE PROPIONATE 1 SPRAY: 50 SPRAY, METERED NASAL at 10:03

## 2023-04-09 RX ADMIN — FINASTERIDE 5 MG: 5 TABLET, FILM COATED ORAL at 08:22

## 2023-04-09 RX ADMIN — METHYLPREDNISOLONE SODIUM SUCCINATE 40 MG: 40 INJECTION INTRAMUSCULAR; INTRAVENOUS at 02:56

## 2023-04-09 RX ADMIN — ROSUVASTATIN CALCIUM 10 MG: 10 TABLET, FILM COATED ORAL at 08:21

## 2023-04-09 RX ADMIN — CALCIUM CARBONATE-VITAMIN D TAB 500 MG-200 UNIT 1 TABLET: 500-200 TAB at 08:20

## 2023-04-09 RX ADMIN — INSULIN LISPRO 4 UNITS: 100 INJECTION, SOLUTION INTRAVENOUS; SUBCUTANEOUS at 11:53

## 2023-04-09 RX ADMIN — HEPARIN SODIUM (PORCINE) LOCK FLUSH IV SOLN 100 UNIT/ML 500 UNITS: 100 SOLUTION at 15:08

## 2023-04-09 RX ADMIN — BUDESONIDE 250 MCG: 0.25 SUSPENSION RESPIRATORY (INHALATION) at 09:07

## 2023-04-09 RX ADMIN — MUPIROCIN: 20 OINTMENT TOPICAL at 08:27

## 2023-04-09 RX ADMIN — METOPROLOL SUCCINATE 125 MG: 100 TABLET, EXTENDED RELEASE ORAL at 08:21

## 2023-04-09 RX ADMIN — CYPROHEPTADINE HYDROCHLORIDE 4 MG: 4 TABLET ORAL at 10:04

## 2023-04-10 LAB
BACTERIA BLD CULT ORG #2: NORMAL
BACTERIA BLD CULT: NORMAL

## 2023-04-18 ENCOUNTER — HOSPITAL ENCOUNTER (OUTPATIENT)
Age: 63
Discharge: HOME | End: 2023-04-18
Payer: COMMERCIAL

## 2023-04-18 DIAGNOSIS — N17.9 ACUTE RENAL FAILURE, UNSPECIFIED ACUTE RENAL FAILURE TYPE (HCC): ICD-10-CM

## 2023-04-18 LAB
ANION GAP SERPL CALCULATED.3IONS-SCNC: 9 MMOL/L (ref 7–16)
BUN SERPL-MCNC: 35 MG/DL (ref 6–23)
CALCIUM SERPL-MCNC: 8.9 MG/DL (ref 8.6–10.2)
CHLORIDE SERPL-SCNC: 108 MMOL/L (ref 98–107)
CO2 SERPL-SCNC: 24 MMOL/L (ref 22–29)
CREAT SERPL-MCNC: 1.5 MG/DL (ref 0.7–1.2)
GLUCOSE SERPL-MCNC: 162 MG/DL (ref 74–99)
POTASSIUM SERPL-SCNC: 5.9 MMOL/L (ref 3.5–5)
SODIUM SERPL-SCNC: 141 MMOL/L (ref 132–146)

## 2023-04-18 PROCEDURE — 80048 BASIC METABOLIC PNL TOTAL CA: CPT

## 2023-04-18 PROCEDURE — 36415 COLL VENOUS BLD VENIPUNCTURE: CPT

## 2023-04-24 RX ORDER — ASPIRIN 81 MG/1
TABLET, COATED ORAL
Qty: 90 TABLET | Refills: 3 | Status: SHIPPED | OUTPATIENT
Start: 2023-04-24

## 2023-04-29 NOTE — PROGRESS NOTES
Subjective: The patient is awake and alert. Feeling g well but ambulating pulse ox still in 80s  Objective:    BP (!) 147/78   Pulse 77   Temp 97.3 °F (36.3 °C) (Temporal)   Resp 16   Ht 5' 11\" (1.803 m)   Wt 296 lb 9.6 oz (134.5 kg)   SpO2 98%   BMI 41.37 kg/m²     In: 600 [P.O.:600]  Out: 1100   In: 600   Out: 1100 [Urine:1100]    General appearance: NAD, conversant  HEENT: AT/NC, MMM  Neck: FROM, supple  Lungs:wheeze resolved   CV: RRR, no MRGs  Vasc: Radial pulses 2+  Abdomen: Soft, non-tender; no masses or HSM  Extremities: No peripheral edema or digital cyanosis  Skin: no rash, lesions or ulcers  Psych: Alert and oriented to person, place and time  Neuro: Alert and interactive     No results for input(s): WBC, HGB, HCT, PLT in the last 72 hours. Recent Labs     01/16/21  0543 01/17/21  0546 01/18/21  0527    140 138   K 3.4* 3.9 4.2    96* 96*   CO2 31* 30* 33*   BUN 14 16 19   CREATININE 1.1 1.1 1.0   CALCIUM 8.7 8.9 9.3       Assessment:    Principal Problem:    Acute respiratory failure with hypoxia (HCC)  Active Problems:    CAD (coronary artery disease)    Hypertension    COPD (chronic obstructive pulmonary disease) (HCC)    PAF (paroxysmal atrial fibrillation) (HCC)    Malignant neoplasm of lung (HCC)  Resolved Problems:    * No resolved hospital problems.  *      Plan:  Admit to telemetry for evaluation of shortness of breath and patient with known history of lung cancer status post completion of chemotherapy, currently undergoing radiation  IV diuresis- lasix 20 bid , wean down oxygen-currently on 3 L  -improved shortness of breath, but ambulating sats in 80's  S/p 800 cc removed via thoracentesis on right   Pulmonology evaluation appreciated - on po steroids taper  , nebs   Echo w ef 60%    Resume home medication including Multaq and diltiazem for rhythm/rate   dc plan once o2 sats better       DVT Prophylaxis   PT/OT  Discharge planning           Soraida Rosenberg MD  3:15 PM  1/18/2021 Adult

## 2023-05-10 ENCOUNTER — HOSPITAL ENCOUNTER (EMERGENCY)
Age: 63
Discharge: ANOTHER HOSPITAL | End: 2023-05-11
Attending: EMERGENCY MEDICINE
Payer: COMMERCIAL

## 2023-05-10 ENCOUNTER — APPOINTMENT (OUTPATIENT)
Dept: GENERAL RADIOLOGY | Age: 63
End: 2023-05-10
Payer: COMMERCIAL

## 2023-05-10 ENCOUNTER — HOSPITAL ENCOUNTER (OUTPATIENT)
Dept: OTHER | Age: 63
Setting detail: THERAPIES SERIES
Discharge: HOME | End: 2023-05-10
Payer: COMMERCIAL

## 2023-05-10 VITALS
SYSTOLIC BLOOD PRESSURE: 95 MMHG | DIASTOLIC BLOOD PRESSURE: 50 MMHG | WEIGHT: 301 LBS | BODY MASS INDEX: 41.98 KG/M2 | RESPIRATION RATE: 18 BRPM | OXYGEN SATURATION: 92 % | HEART RATE: 83 BPM

## 2023-05-10 DIAGNOSIS — E87.5 HYPERKALEMIA: Primary | ICD-10-CM

## 2023-05-10 DIAGNOSIS — I95.9 HYPOTENSION, UNSPECIFIED HYPOTENSION TYPE: ICD-10-CM

## 2023-05-10 DIAGNOSIS — N17.9 AKI (ACUTE KIDNEY INJURY) (HCC): ICD-10-CM

## 2023-05-10 LAB
ALBUMIN SERPL-MCNC: 3.8 G/DL (ref 3.5–5.2)
ALP SERPL-CCNC: 126 U/L (ref 40–129)
ALT SERPL-CCNC: 8 U/L (ref 0–40)
ANION GAP SERPL CALCULATED.3IONS-SCNC: 12 MMOL/L (ref 7–16)
ANION GAP SERPL CALCULATED.3IONS-SCNC: 13 MMOL/L (ref 7–16)
AST SERPL-CCNC: 16 U/L (ref 0–39)
BASOPHILS # BLD: 0.12 E9/L (ref 0–0.2)
BASOPHILS NFR BLD: 0.8 % (ref 0–2)
BILIRUB SERPL-MCNC: 0.6 MG/DL (ref 0–1.2)
BNP BLD-MCNC: 5889 PG/ML (ref 0–125)
BNP BLD-MCNC: 6422 PG/ML (ref 0–125)
BUN SERPL-MCNC: 49 MG/DL (ref 6–23)
BUN SERPL-MCNC: 50 MG/DL (ref 6–23)
CALCIUM SERPL-MCNC: 9.2 MG/DL (ref 8.6–10.2)
CALCIUM SERPL-MCNC: 9.8 MG/DL (ref 8.6–10.2)
CHLORIDE SERPL-SCNC: 101 MMOL/L (ref 98–107)
CHLORIDE SERPL-SCNC: 102 MMOL/L (ref 98–107)
CHP ED QC CHECK: NORMAL
CO2 SERPL-SCNC: 22 MMOL/L (ref 22–29)
CO2 SERPL-SCNC: 23 MMOL/L (ref 22–29)
CREAT SERPL-MCNC: 4.4 MG/DL (ref 0.7–1.2)
CREAT SERPL-MCNC: 4.7 MG/DL (ref 0.7–1.2)
EOSINOPHIL # BLD: 0 E9/L (ref 0.05–0.5)
EOSINOPHIL NFR BLD: 0 % (ref 0–6)
ERYTHROCYTE [DISTWIDTH] IN BLOOD BY AUTOMATED COUNT: 17.1 FL (ref 11.5–15)
GLUCOSE BLD-MCNC: 202 MG/DL
GLUCOSE SERPL-MCNC: 115 MG/DL (ref 74–99)
GLUCOSE SERPL-MCNC: 132 MG/DL (ref 74–99)
HCT VFR BLD AUTO: 24.7 % (ref 37–54)
HGB BLD-MCNC: 7.4 G/DL (ref 12.5–16.5)
LYMPHOCYTES # BLD: 1.16 E9/L (ref 1.5–4)
LYMPHOCYTES NFR BLD: 7.6 % (ref 20–42)
MCH RBC QN AUTO: 29.6 PG (ref 26–35)
MCHC RBC AUTO-ENTMCNC: 30 % (ref 32–34.5)
MCV RBC AUTO: 98.8 FL (ref 80–99.9)
METAMYELOCYTES NFR BLD MANUAL: 3.4 % (ref 0–1)
METER GLUCOSE: 202 MG/DL (ref 74–99)
METER GLUCOSE: 78 MG/DL (ref 74–99)
METER GLUCOSE: 92 MG/DL (ref 74–99)
MONOCYTES # BLD: 1.02 E9/L (ref 0.1–0.95)
MONOCYTES NFR BLD: 6.7 % (ref 2–12)
MYELOCYTES NFR BLD MANUAL: 1.7 % (ref 0–0)
NEUTROPHILS # BLD: 12.33 E9/L (ref 1.8–7.3)
NEUTS SEG NFR BLD: 79.8 % (ref 43–80)
NRBC BLD-RTO: 0 /100 WBC
OVALOCYTES: ABNORMAL
PLATELET # BLD AUTO: 217 E9/L (ref 130–450)
PMV BLD AUTO: 11.9 FL (ref 7–12)
POIKILOCYTES: ABNORMAL
POLYCHROMASIA: ABNORMAL
POTASSIUM SERPL-SCNC: 5.9 MMOL/L (ref 3.5–5)
POTASSIUM SERPL-SCNC: 6.5 MMOL/L (ref 3.5–5)
PROT SERPL-MCNC: 6.7 G/DL (ref 6.4–8.3)
RBC # BLD AUTO: 2.5 E12/L (ref 3.8–5.8)
SODIUM SERPL-SCNC: 135 MMOL/L (ref 132–146)
SODIUM SERPL-SCNC: 138 MMOL/L (ref 132–146)
TEAR DROP CELLS: ABNORMAL
TOXIC GRANULATION: ABNORMAL
TROPONIN, HIGH SENSITIVITY: 119 NG/L (ref 0–11)
TROPONIN, HIGH SENSITIVITY: 124 NG/L (ref 0–11)
WBC # BLD: 14.5 E9/L (ref 4.5–11.5)

## 2023-05-10 PROCEDURE — 85025 COMPLETE CBC W/AUTO DIFF WBC: CPT

## 2023-05-10 PROCEDURE — 93005 ELECTROCARDIOGRAM TRACING: CPT

## 2023-05-10 PROCEDURE — 6370000000 HC RX 637 (ALT 250 FOR IP)

## 2023-05-10 PROCEDURE — 6360000002 HC RX W HCPCS

## 2023-05-10 PROCEDURE — 83880 ASSAY OF NATRIURETIC PEPTIDE: CPT

## 2023-05-10 PROCEDURE — 71045 X-RAY EXAM CHEST 1 VIEW: CPT

## 2023-05-10 PROCEDURE — 84484 ASSAY OF TROPONIN QUANT: CPT

## 2023-05-10 PROCEDURE — 80048 BASIC METABOLIC PNL TOTAL CA: CPT

## 2023-05-10 PROCEDURE — 96365 THER/PROPH/DIAG IV INF INIT: CPT

## 2023-05-10 PROCEDURE — 99285 EMERGENCY DEPT VISIT HI MDM: CPT

## 2023-05-10 PROCEDURE — 96375 TX/PRO/DX INJ NEW DRUG ADDON: CPT

## 2023-05-10 PROCEDURE — 2580000003 HC RX 258

## 2023-05-10 PROCEDURE — 82962 GLUCOSE BLOOD TEST: CPT

## 2023-05-10 PROCEDURE — 80053 COMPREHEN METABOLIC PANEL: CPT

## 2023-05-10 PROCEDURE — 36415 COLL VENOUS BLD VENIPUNCTURE: CPT

## 2023-05-10 PROCEDURE — 99214 OFFICE O/P EST MOD 30 MIN: CPT

## 2023-05-10 PROCEDURE — 2500000003 HC RX 250 WO HCPCS

## 2023-05-10 RX ORDER — 0.9 % SODIUM CHLORIDE 0.9 %
500 INTRAVENOUS SOLUTION INTRAVENOUS ONCE
Status: COMPLETED | OUTPATIENT
Start: 2023-05-10 | End: 2023-05-10

## 2023-05-10 RX ORDER — SODIUM CHLORIDE 0.9 % (FLUSH) 0.9 %
SYRINGE (ML) INJECTION
Status: DISCONTINUED
Start: 2023-05-10 | End: 2023-05-11 | Stop reason: HOSPADM

## 2023-05-10 RX ORDER — SODIUM CHLORIDE 9 MG/ML
INJECTION, SOLUTION INTRAVENOUS CONTINUOUS
Status: DISCONTINUED | OUTPATIENT
Start: 2023-05-10 | End: 2023-05-11 | Stop reason: HOSPADM

## 2023-05-10 RX ORDER — DEXTROSE MONOHYDRATE 100 MG/ML
INJECTION, SOLUTION INTRAVENOUS CONTINUOUS PRN
Status: DISCONTINUED | OUTPATIENT
Start: 2023-05-10 | End: 2023-05-11 | Stop reason: HOSPADM

## 2023-05-10 RX ADMIN — INSULIN HUMAN 10 UNITS: 100 INJECTION, SOLUTION PARENTERAL at 20:43

## 2023-05-10 RX ADMIN — SODIUM CHLORIDE 500 ML: 9 INJECTION, SOLUTION INTRAVENOUS at 20:02

## 2023-05-10 RX ADMIN — SODIUM BICARBONATE 50 MEQ: 84 INJECTION, SOLUTION INTRAVENOUS at 20:04

## 2023-05-10 RX ADMIN — DEXTROSE MONOHYDRATE 250 ML: 100 INJECTION, SOLUTION INTRAVENOUS at 19:59

## 2023-05-10 RX ADMIN — CALCIUM GLUCONATE 1000 MG: 98 INJECTION, SOLUTION INTRAVENOUS at 18:36

## 2023-05-10 RX ADMIN — SODIUM CHLORIDE: 9 INJECTION, SOLUTION INTRAVENOUS at 22:44

## 2023-05-10 RX ADMIN — SODIUM ZIRCONIUM CYCLOSILICATE 10 G: 10 POWDER, FOR SUSPENSION ORAL at 20:14

## 2023-05-10 ASSESSMENT — PAIN - FUNCTIONAL ASSESSMENT: PAIN_FUNCTIONAL_ASSESSMENT: NONE - DENIES PAIN

## 2023-05-10 NOTE — PLAN OF CARE
Problem: Chronic Conditions and Co-morbidities  Goal: Patient's chronic conditions and co-morbidity symptoms are monitored and maintained or improved  Flowsheets (Taken 5/10/2023 7144)  Care Plan - Patient's Chronic Conditions and Co-Morbidity Symptoms are Monitored and Maintained or Improved: Monitor and assess patient's chronic conditions and comorbid symptoms for stability, deterioration, or improvement

## 2023-05-10 NOTE — RESULT ENCOUNTER NOTE
Received call from [address] and spoke with Colleen Pickens RN  Patient recently hospitalized last month for ATUL and hyperkalemia following chemotherapy treatments. Appears that he is still taking spironolactone? He was getting [facility] inpatient however not currently on medication list.   Labs from CHF clinic demonstrate recurrent ATUL and hyperkalemia     Patient was directed to the ED from the CHF clinic.      Thank you

## 2023-05-10 NOTE — ED PROVIDER NOTES
[address] [facility] ENCOUNTER        Pt Name: Brandi Valladares  MRN: [id number]  [place] 1960  Date of evaluation: 5/10/2023  Provider: Galileo Lynch MD  PCP: Perez Ornelas MD  Note Started: 6:00 PM EDT 5/10/23    CHIEF COMPLAINT       Chief Complaint   Patient presents with    Abnormal Lab     abnormal labs from [facility] clinic. HISTORY OF PRESENT ILLNESS: 1 or more Elements   History From: patient    Limitations to history : None    Brandi Valladares is a 65 y.o. male who presents for abnormal labs from [facility] clinic today. Patient states he was at the [facility] clinic for routine labs today. He was on his way home and they called him with his creatinine and potassium were elevated and told him to come to the ED. He denies new or worsening symptoms today. Denies shortness of breath, chest pain, abdominal pain, nausea, vomiting, diarrhea, constipation, dysuria, fever, orthopnea, leg swelling. Of note the patient has a history of lung cancer and is on chemo. His last chemo was about 1.5 weeks ago and his next chemo is this Monday. Nursing Notes were all reviewed and agreed with or any disagreements were addressed in the HPI. REVIEW OF EXTERNAL NOTE :       Patient was recently admitted on 4/3/2023 for acute renal failure, hyperkalemia, anemia, squamous cell carcinoma of right lung    History of lung cancer on chemo, COPD, A-fib, CAD    Echo on 4/4/2023 shows EF 60%    REVIEW OF SYSTEMS :           Positives and Pertinent negatives as per HPI.      SURGICAL HISTORY     Past Surgical History:   Procedure Laterality Date    BRONCHOSCOPY N/A 06/04/2020    BRONCHOSCOPY  NAVIGATIONAL performed by Errol Dash DO at [facility]  06/04/2020    BRONCHOSCOPY/TRANSBRONCHIAL NEEDLE BIOPSY performed by Errol Dash DO at [facility]  06/04/2020    BRONCHOSCOPY BIOPSY BRONCHUS performed by

## 2023-05-10 NOTE — PROGRESS NOTES
5/10/23- 1500- post CHF Clinic visit today. I called and spoke with Jannie Amato's labs. K-5.9  Bun-50  Cr-4.4  Zulema Akbar instructed to have to go to the ED. I called Elana Moreno and informed him of his blood work results and he needed to go to the ED now. He verbalized understanding. Pt discharged from the hospital on 4/9/23. Receiving chemo every other Monday. He states he was receiving every Monday and had to change to every other week d/t kidney issues and was hospitalized. Sy Malik RN.
tablet by mouth every morning  Historical Provider, MD   vitamin D (ERGOCALCIFEROL) 06866 UNITS CAPS capsule Take 1 capsule by mouth once a week **MONDAYS**  Historical Provider, MD           Guideline directed medical:  ARNI/ACE I/ARB: Yes  Beta blocker: Yes  Aldosterone antagonist:  Yes  SGLT2-no        Physical Examination:     BP (!) 95/50   Pulse 83   Resp 18   Wt (!) 301 lb (136.5 kg)   SpO2 92%   BMI 41.98 kg/m²     Assessment  Charting Type: Shift assessment                   Respiratory  Respiratory Quality/Effort: Unlabored    Breath Sounds  Right Upper Lobe: Diminished  Right Middle Lobe: Clear  Right Lower Lobe: Diminished  Left Upper Lobe: End Expiratory Wheezes  Left Lower Lobe: End Expiratory Wheezes         Cardiac  Cardiac Regularity: Irregular  Cardiac Rhythm: Atrial fib    Rhythm Interpretation  Pulse: 83         Gastrointestinal  Abdominal (WDL): Within Defined Limits  Abdomen Inspection: Rounded, Soft  RUQ Bowel Sounds: Active  LUQ Bowel Sounds: Active  RLQ Bowel Sounds: Active  LLQ Bowel Sounds: Active  Tenderness: Soft, Nontender          Bowel Sounds  RUQ Bowel Sounds: Active  LUQ Bowel Sounds: Active  RLQ Bowel Sounds: Active  LLQ Bowel Sounds:  Active    Peripheral Vascular  Peripheral Vascular (WDL): Exceptions to WDL  Edema: Right lower extremity, Left lower extremity  RLE Edema: +1, Non-pitting  LLE Edema: +1, Non-pitting                   Genitourinary  Genitourinary (WDL): Within Defined Limits    Psychosocial  Psychosocial (WDL): Within Defined Limits                        Pulse: 83                     LAB DATA:    Last 3 BMP      Sodium (mmol/L)   Date Value   04/18/2023 141   04/12/2023 141   04/11/2023 142     Potassium (mmol/L)   Date Value   04/18/2023 5.9 (H)   04/12/2023 4.9   04/11/2023 5.1 (H)     Potassium reflex Magnesium (mmol/L)   Date Value   04/03/2023 8.3 (HH)   04/03/2023 8.1 (HH)   11/23/2021 4.3     Chloride (mmol/L)   Date Value   04/18/2023 108 (H)

## 2023-05-11 ENCOUNTER — TELEPHONE (OUTPATIENT)
Dept: CARDIOLOGY CLINIC | Age: 63
End: 2023-05-11

## 2023-05-11 ENCOUNTER — HOSPITAL ENCOUNTER (INPATIENT)
Age: 63
LOS: 7 days | Discharge: HOME | DRG: 393 | End: 2023-05-18
Attending: FAMILY MEDICINE | Admitting: FAMILY MEDICINE
Payer: COMMERCIAL

## 2023-05-11 VITALS
OXYGEN SATURATION: 97 % | TEMPERATURE: 98.2 F | SYSTOLIC BLOOD PRESSURE: 89 MMHG | RESPIRATION RATE: 19 BRPM | BODY MASS INDEX: 42 KG/M2 | DIASTOLIC BLOOD PRESSURE: 48 MMHG | HEART RATE: 80 BPM | HEIGHT: 71 IN | WEIGHT: 300 LBS

## 2023-05-11 LAB
ABO + RH BLD: NORMAL
ANION GAP SERPL CALCULATED.3IONS-SCNC: 13 MMOL/L (ref 7–16)
ANION GAP SERPL CALCULATED.3IONS-SCNC: 14 MMOL/L (ref 7–16)
ANION GAP SERPL CALCULATED.3IONS-SCNC: 9 MMOL/L (ref 7–16)
ANISOCYTOSIS: ABNORMAL
B PARAP IS1001 DNA NPH QL NAA+NON-PROBE: NOT DETECTED
B PERT.PT PRMT NPH QL NAA+NON-PROBE: NOT DETECTED
BASOPHILS # BLD: 0 E9/L (ref 0–0.2)
BASOPHILS NFR BLD: 0.7 % (ref 0–2)
BLD GP AB SCN SERPL QL: NORMAL
BUN SERPL-MCNC: 38 MG/DL (ref 6–23)
BUN SERPL-MCNC: 44 MG/DL (ref 6–23)
BUN SERPL-MCNC: 47 MG/DL (ref 6–23)
C PNEUM DNA NPH QL NAA+NON-PROBE: NOT DETECTED
CALCIUM SERPL-MCNC: 8.6 MG/DL (ref 8.6–10.2)
CALCIUM SERPL-MCNC: 8.6 MG/DL (ref 8.6–10.2)
CALCIUM SERPL-MCNC: 8.8 MG/DL (ref 8.6–10.2)
CHLORIDE SERPL-SCNC: 102 MMOL/L (ref 98–107)
CHLORIDE SERPL-SCNC: 102 MMOL/L (ref 98–107)
CHLORIDE SERPL-SCNC: 103 MMOL/L (ref 98–107)
CHLORIDE UR-SCNC: 62 MMOL/L
CO2 SERPL-SCNC: 22 MMOL/L (ref 22–29)
CO2 SERPL-SCNC: 22 MMOL/L (ref 22–29)
CO2 SERPL-SCNC: 24 MMOL/L (ref 22–29)
CREAT SERPL-MCNC: 3.4 MG/DL (ref 0.7–1.2)
CREAT SERPL-MCNC: 4.1 MG/DL (ref 0.7–1.2)
CREAT SERPL-MCNC: 4.5 MG/DL (ref 0.7–1.2)
CREAT UR-MCNC: 60 MG/DL (ref 40–278)
EKG ATRIAL RATE: 125 BPM
EKG ATRIAL RATE: 77 BPM
EKG Q-T INTERVAL: 332 MS
EKG Q-T INTERVAL: 414 MS
EKG QRS DURATION: 112 MS
EKG QRS DURATION: 122 MS
EKG QTC CALCULATION (BAZETT): 465 MS
EKG QTC CALCULATION (BAZETT): 473 MS
EKG R AXIS: 0 DEGREES
EKG R AXIS: 17 DEGREES
EKG T AXIS: 124 DEGREES
EKG T AXIS: 27 DEGREES
EKG VENTRICULAR RATE: 122 BPM
EKG VENTRICULAR RATE: 76 BPM
EOSINOPHIL # BLD: 0.21 E9/L (ref 0.05–0.5)
EOSINOPHIL NFR BLD: 1.7 % (ref 0–6)
ERYTHROCYTE [DISTWIDTH] IN BLOOD BY AUTOMATED COUNT: 17.2 FL (ref 11.5–15)
FLUAV RNA NPH QL NAA+NON-PROBE: NOT DETECTED
FLUBV RNA NPH QL NAA+NON-PROBE: NOT DETECTED
GLUCOSE SERPL-MCNC: 138 MG/DL (ref 74–99)
GLUCOSE SERPL-MCNC: 146 MG/DL (ref 74–99)
GLUCOSE SERPL-MCNC: 181 MG/DL (ref 74–99)
HADV DNA NPH QL NAA+NON-PROBE: NOT DETECTED
HCOV 229E RNA NPH QL NAA+NON-PROBE: NOT DETECTED
HCOV HKU1 RNA NPH QL NAA+NON-PROBE: NOT DETECTED
HCOV NL63 RNA NPH QL NAA+NON-PROBE: NOT DETECTED
HCOV OC43 RNA NPH QL NAA+NON-PROBE: NOT DETECTED
HCT VFR BLD AUTO: 21.9 % (ref 37–54)
HCT VFR BLD AUTO: 22.9 % (ref 37–54)
HCT VFR BLD AUTO: 27.3 % (ref 37–54)
HGB BLD-MCNC: 6.4 G/DL (ref 12.5–16.5)
HGB BLD-MCNC: 7.2 G/DL (ref 12.5–16.5)
HGB BLD-MCNC: 8.6 G/DL (ref 12.5–16.5)
HMPV RNA NPH QL NAA+NON-PROBE: NOT DETECTED
HPIV1 RNA NPH QL NAA+NON-PROBE: NOT DETECTED
HPIV2 RNA NPH QL NAA+NON-PROBE: NOT DETECTED
HPIV3 RNA NPH QL NAA+NON-PROBE: NOT DETECTED
HPIV4 RNA NPH QL NAA+NON-PROBE: NOT DETECTED
LYMPHOCYTES # BLD: 1.5 E9/L (ref 1.5–4)
LYMPHOCYTES NFR BLD: 12.3 % (ref 20–42)
M PNEUMO DNA NPH QL NAA+NON-PROBE: NOT DETECTED
MAGNESIUM SERPL-MCNC: 1.7 MG/DL (ref 1.6–2.6)
MCH RBC QN AUTO: 29.5 PG (ref 26–35)
MCHC RBC AUTO-ENTMCNC: 29.2 % (ref 32–34.5)
MCV RBC AUTO: 100.9 FL (ref 80–99.9)
MICROALBUMIN UR-MCNC: 125.5 MG/L
MICROALBUMIN/CREAT UR-RTO: 209.2 (ref 0–30)
MONOCYTES # BLD: 1.38 E9/L (ref 0.1–0.95)
MONOCYTES NFR BLD: 11.4 % (ref 2–12)
NEUTROPHILS # BLD: 9.38 E9/L (ref 1.8–7.3)
NEUTS SEG NFR BLD: 74.6 % (ref 43–80)
OVALOCYTES: ABNORMAL
PHOSPHATE SERPL-MCNC: 6.5 MG/DL (ref 2.5–4.5)
PLATELET # BLD AUTO: 194 E9/L (ref 130–450)
PMV BLD AUTO: 11.8 FL (ref 7–12)
POIKILOCYTES: ABNORMAL
POLYCHROMASIA: ABNORMAL
POTASSIUM SERPL-SCNC: 5 MMOL/L (ref 3.5–5)
POTASSIUM SERPL-SCNC: 5.2 MMOL/L (ref 3.5–5)
POTASSIUM SERPL-SCNC: 5.3 MMOL/L (ref 3.5–5)
POTASSIUM UR-SCNC: 9.7 MMOL/L
PROCALCITONIN: 0.19 NG/ML (ref 0–0.08)
RBC # BLD AUTO: 2.17 E12/L (ref 3.8–5.8)
ROULEAUX: ABNORMAL
RSV RNA NPH QL NAA+NON-PROBE: NOT DETECTED
RV+EV RNA NPH QL NAA+NON-PROBE: NOT DETECTED
SARS-COV-2 RNA NPH QL NAA+NON-PROBE: NOT DETECTED
SODIUM SERPL-SCNC: 136 MMOL/L (ref 132–146)
SODIUM SERPL-SCNC: 137 MMOL/L (ref 132–146)
SODIUM SERPL-SCNC: 138 MMOL/L (ref 132–146)
SODIUM UR-SCNC: 78 MMOL/L
TEAR DROP CELLS: ABNORMAL
TROPONIN, HIGH SENSITIVITY: 103 NG/L (ref 0–11)
UREA NITROGEN, UR: 233 MG/DL (ref 800–1666)
WBC # BLD: 12.5 E9/L (ref 4.5–11.5)

## 2023-05-11 PROCEDURE — 03HC33Z INSERTION OF INFUSION DEVICE INTO LEFT RADIAL ARTERY, PERCUTANEOUS APPROACH: ICD-10-PCS | Performed by: INTERNAL MEDICINE

## 2023-05-11 PROCEDURE — 2580000003 HC RX 258: Performed by: STUDENT IN AN ORGANIZED HEALTH CARE EDUCATION/TRAINING PROGRAM

## 2023-05-11 PROCEDURE — 30233N1 TRANSFUSION OF NONAUTOLOGOUS RED BLOOD CELLS INTO PERIPHERAL VEIN, PERCUTANEOUS APPROACH: ICD-10-PCS | Performed by: FAMILY MEDICINE

## 2023-05-11 PROCEDURE — 87206 SMEAR FLUORESCENT/ACID STAI: CPT

## 2023-05-11 PROCEDURE — 84133 ASSAY OF URINE POTASSIUM: CPT

## 2023-05-11 PROCEDURE — 87081 CULTURE SCREEN ONLY: CPT

## 2023-05-11 PROCEDURE — 87040 BLOOD CULTURE FOR BACTERIA: CPT

## 2023-05-11 PROCEDURE — 2500000003 HC RX 250 WO HCPCS: Performed by: INTERNAL MEDICINE

## 2023-05-11 PROCEDURE — 82436 ASSAY OF URINE CHLORIDE: CPT

## 2023-05-11 PROCEDURE — 84100 ASSAY OF PHOSPHORUS: CPT

## 2023-05-11 PROCEDURE — 2500000003 HC RX 250 WO HCPCS: Performed by: STUDENT IN AN ORGANIZED HEALTH CARE EDUCATION/TRAINING PROGRAM

## 2023-05-11 PROCEDURE — 80048 BASIC METABOLIC PNL TOTAL CA: CPT

## 2023-05-11 PROCEDURE — 84540 ASSAY OF URINE/UREA-N: CPT

## 2023-05-11 PROCEDURE — 96365 THER/PROPH/DIAG IV INF INIT: CPT

## 2023-05-11 PROCEDURE — 87070 CULTURE OTHR SPECIMN AEROBIC: CPT

## 2023-05-11 PROCEDURE — 2700000000 HC OXYGEN THERAPY PER DAY

## 2023-05-11 PROCEDURE — 82570 ASSAY OF URINE CREATININE: CPT

## 2023-05-11 PROCEDURE — 36620 INSERTION CATHETER ARTERY: CPT

## 2023-05-11 PROCEDURE — 6370000000 HC RX 637 (ALT 250 FOR IP): Performed by: FAMILY MEDICINE

## 2023-05-11 PROCEDURE — 93005 ELECTROCARDIOGRAM TRACING: CPT

## 2023-05-11 PROCEDURE — 87088 URINE BACTERIA CULTURE: CPT

## 2023-05-11 PROCEDURE — 82044 UR ALBUMIN SEMIQUANTITATIVE: CPT

## 2023-05-11 PROCEDURE — 37799 UNLISTED PX VASCULAR SURGERY: CPT

## 2023-05-11 PROCEDURE — 2500000003 HC RX 250 WO HCPCS

## 2023-05-11 PROCEDURE — 85014 HEMATOCRIT: CPT

## 2023-05-11 PROCEDURE — 93010 ELECTROCARDIOGRAM REPORT: CPT | Performed by: INTERNAL MEDICINE

## 2023-05-11 PROCEDURE — 36430 TRANSFUSION BLD/BLD COMPNT: CPT

## 2023-05-11 PROCEDURE — P9016 RBC LEUKOCYTES REDUCED: HCPCS

## 2023-05-11 PROCEDURE — 85025 COMPLETE CBC W/AUTO DIFF WBC: CPT

## 2023-05-11 PROCEDURE — 0202U NFCT DS 22 TRGT SARS-COV-2: CPT

## 2023-05-11 PROCEDURE — 86850 RBC ANTIBODY SCREEN: CPT

## 2023-05-11 PROCEDURE — 84300 ASSAY OF URINE SODIUM: CPT

## 2023-05-11 PROCEDURE — 83735 ASSAY OF MAGNESIUM: CPT

## 2023-05-11 PROCEDURE — 36415 COLL VENOUS BLD VENIPUNCTURE: CPT

## 2023-05-11 PROCEDURE — 86901 BLOOD TYPING SEROLOGIC RH(D): CPT

## 2023-05-11 PROCEDURE — 6370000000 HC RX 637 (ALT 250 FOR IP)

## 2023-05-11 PROCEDURE — 85018 HEMOGLOBIN: CPT

## 2023-05-11 PROCEDURE — 2580000003 HC RX 258

## 2023-05-11 PROCEDURE — 84145 PROCALCITONIN (PCT): CPT

## 2023-05-11 PROCEDURE — 84484 ASSAY OF TROPONIN QUANT: CPT

## 2023-05-11 PROCEDURE — 86923 COMPATIBILITY TEST ELECTRIC: CPT

## 2023-05-11 PROCEDURE — 86900 BLOOD TYPING SEROLOGIC ABO: CPT

## 2023-05-11 PROCEDURE — 2000000000 HC ICU R&B

## 2023-05-11 RX ORDER — PREGABALIN 25 MG/1
25 CAPSULE ORAL DAILY
Status: ON HOLD | COMMUNITY
Start: 2023-04-03 | End: 2023-05-11

## 2023-05-11 RX ORDER — ESCITALOPRAM OXALATE 10 MG/1
10 TABLET ORAL DAILY
Status: DISCONTINUED | OUTPATIENT
Start: 2023-05-11 | End: 2023-05-18 | Stop reason: HOSPADM

## 2023-05-11 RX ORDER — HYDROCODONE BITARTRATE AND ACETAMINOPHEN 5; 300 MG/1; MG/1
1 TABLET ORAL 2 TIMES DAILY PRN
Status: ON HOLD | COMMUNITY
Start: 2023-04-26

## 2023-05-11 RX ORDER — 0.9 % SODIUM CHLORIDE 0.9 %
500 INTRAVENOUS SOLUTION INTRAVENOUS ONCE
Status: COMPLETED | OUTPATIENT
Start: 2023-05-11 | End: 2023-05-11

## 2023-05-11 RX ORDER — TAMSULOSIN HYDROCHLORIDE 0.4 MG/1
0.4 CAPSULE ORAL DAILY
Status: DISCONTINUED | OUTPATIENT
Start: 2023-05-11 | End: 2023-05-18 | Stop reason: HOSPADM

## 2023-05-11 RX ORDER — SODIUM CHLORIDE 9 MG/ML
INJECTION, SOLUTION INTRAVENOUS PRN
Status: DISCONTINUED | OUTPATIENT
Start: 2023-05-11 | End: 2023-05-12

## 2023-05-11 RX ORDER — ALBUTEROL SULFATE 2.5 MG/3ML
2.5 SOLUTION RESPIRATORY (INHALATION) EVERY 6 HOURS PRN
Status: DISCONTINUED | OUTPATIENT
Start: 2023-05-11 | End: 2023-05-18 | Stop reason: HOSPADM

## 2023-05-11 RX ORDER — ACETAMINOPHEN 325 MG/1
650 TABLET ORAL EVERY 6 HOURS PRN
Status: DISCONTINUED | OUTPATIENT
Start: 2023-05-11 | End: 2023-05-18 | Stop reason: HOSPADM

## 2023-05-11 RX ORDER — SODIUM CHLORIDE 0.9 % (FLUSH) 0.9 %
5-40 SYRINGE (ML) INJECTION PRN
Status: DISCONTINUED | OUTPATIENT
Start: 2023-05-11 | End: 2023-05-18 | Stop reason: HOSPADM

## 2023-05-11 RX ORDER — ONDANSETRON 4 MG/1
4 TABLET, ORALLY DISINTEGRATING ORAL EVERY 8 HOURS PRN
Status: DISCONTINUED | OUTPATIENT
Start: 2023-05-11 | End: 2023-05-18 | Stop reason: HOSPADM

## 2023-05-11 RX ORDER — FINASTERIDE 5 MG/1
5 TABLET, FILM COATED ORAL DAILY
Status: DISCONTINUED | OUTPATIENT
Start: 2023-05-11 | End: 2023-05-18 | Stop reason: HOSPADM

## 2023-05-11 RX ORDER — POLYETHYLENE GLYCOL 3350 17 G/17G
17 POWDER, FOR SOLUTION ORAL DAILY PRN
Status: DISCONTINUED | OUTPATIENT
Start: 2023-05-11 | End: 2023-05-18 | Stop reason: HOSPADM

## 2023-05-11 RX ORDER — SODIUM CHLORIDE 9 MG/ML
INJECTION, SOLUTION INTRAVENOUS PRN
Status: DISCONTINUED | OUTPATIENT
Start: 2023-05-11 | End: 2023-05-18 | Stop reason: HOSPADM

## 2023-05-11 RX ORDER — ASPIRIN 81 MG/1
81 TABLET ORAL DAILY
Status: DISCONTINUED | OUTPATIENT
Start: 2023-05-11 | End: 2023-05-18 | Stop reason: HOSPADM

## 2023-05-11 RX ORDER — NOREPINEPHRINE BIT/0.9 % NACL 16MG/250ML
1-100 INFUSION BOTTLE (ML) INTRAVENOUS CONTINUOUS
Status: DISCONTINUED | OUTPATIENT
Start: 2023-05-11 | End: 2023-05-11

## 2023-05-11 RX ORDER — ONDANSETRON 2 MG/ML
4 INJECTION INTRAMUSCULAR; INTRAVENOUS EVERY 6 HOURS PRN
Status: DISCONTINUED | OUTPATIENT
Start: 2023-05-11 | End: 2023-05-18 | Stop reason: HOSPADM

## 2023-05-11 RX ORDER — DEXTROSE MONOHYDRATE 100 MG/ML
INJECTION, SOLUTION INTRAVENOUS CONTINUOUS PRN
Status: DISCONTINUED | OUTPATIENT
Start: 2023-05-11 | End: 2023-05-18 | Stop reason: HOSPADM

## 2023-05-11 RX ORDER — NOREPINEPHRINE BIT/0.9 % NACL 16MG/250ML
1-100 INFUSION BOTTLE (ML) INTRAVENOUS CONTINUOUS
Status: DISCONTINUED | OUTPATIENT
Start: 2023-05-11 | End: 2023-05-12

## 2023-05-11 RX ORDER — LIDOCAINE HYDROCHLORIDE 10 MG/ML
INJECTION, SOLUTION INFILTRATION; PERINEURAL
Status: DISCONTINUED
Start: 2023-05-11 | End: 2023-05-11

## 2023-05-11 RX ORDER — NOREPINEPHRINE BIT/0.9 % NACL 16MG/250ML
INFUSION BOTTLE (ML) INTRAVENOUS
Status: DISPENSED
Start: 2023-05-11 | End: 2023-05-11

## 2023-05-11 RX ORDER — SODIUM CHLORIDE 9 MG/ML
INJECTION, SOLUTION INTRAVENOUS PRN
Status: DISCONTINUED | OUTPATIENT
Start: 2023-05-11 | End: 2023-05-11

## 2023-05-11 RX ORDER — MONTELUKAST SODIUM 4 MG/1
1 TABLET, CHEWABLE ORAL 2 TIMES DAILY WITH MEALS
Status: DISCONTINUED | OUTPATIENT
Start: 2023-05-11 | End: 2023-05-16

## 2023-05-11 RX ORDER — SODIUM CHLORIDE 0.9 % (FLUSH) 0.9 %
5-40 SYRINGE (ML) INJECTION EVERY 12 HOURS SCHEDULED
Status: DISCONTINUED | OUTPATIENT
Start: 2023-05-11 | End: 2023-05-18 | Stop reason: HOSPADM

## 2023-05-11 RX ORDER — ROSUVASTATIN CALCIUM 20 MG/1
20 TABLET, COATED ORAL EVERY MORNING
Status: DISCONTINUED | OUTPATIENT
Start: 2023-05-11 | End: 2023-05-18 | Stop reason: HOSPADM

## 2023-05-11 RX ORDER — ESCITALOPRAM OXALATE 10 MG/1
10 TABLET ORAL DAILY
Status: ON HOLD | COMMUNITY
Start: 2023-04-19

## 2023-05-11 RX ORDER — ACETAMINOPHEN 650 MG/1
650 SUPPOSITORY RECTAL EVERY 6 HOURS PRN
Status: DISCONTINUED | OUTPATIENT
Start: 2023-05-11 | End: 2023-05-18 | Stop reason: HOSPADM

## 2023-05-11 RX ADMIN — ESCITALOPRAM OXALATE 10 MG: 10 TABLET ORAL at 09:00

## 2023-05-11 RX ADMIN — SODIUM ZIRCONIUM CYCLOSILICATE 10 G: 10 POWDER, FOR SUSPENSION ORAL at 07:13

## 2023-05-11 RX ADMIN — Medication 10 ML: at 21:38

## 2023-05-11 RX ADMIN — ASPIRIN 81 MG: 81 TABLET, COATED ORAL at 09:00

## 2023-05-11 RX ADMIN — PREGABALIN 75 MG: 25 CAPSULE ORAL at 21:36

## 2023-05-11 RX ADMIN — Medication 10 ML: at 09:00

## 2023-05-11 RX ADMIN — PREGABALIN 75 MG: 25 CAPSULE ORAL at 09:00

## 2023-05-11 RX ADMIN — COLESTIPOL HYDROCHLORIDE 1 G: 1 TABLET, FILM COATED ORAL at 17:03

## 2023-05-11 RX ADMIN — Medication 9 MCG/MIN: at 05:56

## 2023-05-11 RX ADMIN — SODIUM CHLORIDE 500 ML: 9 INJECTION, SOLUTION INTRAVENOUS at 04:04

## 2023-05-11 RX ADMIN — Medication 5 MCG/MIN: at 11:05

## 2023-05-11 RX ADMIN — NOREPINEPHRINE BITARTRATE 5 MCG/MIN: 1 SOLUTION INTRAVENOUS at 00:11

## 2023-05-11 RX ADMIN — ROSUVASTATIN CALCIUM 20 MG: 20 TABLET, FILM COATED ORAL at 09:00

## 2023-05-11 ASSESSMENT — ENCOUNTER SYMPTOMS
COUGH: 0
ABDOMINAL PAIN: 0
BACK PAIN: 0
CONSTIPATION: 0
DIARRHEA: 0
VOMITING: 0
SHORTNESS OF BREATH: 0
RHINORRHEA: 0

## 2023-05-11 ASSESSMENT — LIFESTYLE VARIABLES
HOW OFTEN DO YOU HAVE A DRINK CONTAINING ALCOHOL: NEVER
HOW MANY STANDARD DRINKS CONTAINING ALCOHOL DO YOU HAVE ON A TYPICAL DAY: PATIENT DOES NOT DRINK

## 2023-05-11 NOTE — ED NOTES
[facility] [id number]  [facility]#: [phone]     [facility] Vik  05/10/23 2349

## 2023-05-11 NOTE — PROGRESS NOTES
[address] [facility] [address], RN, Access Center    Dx: hyperkalemia, ATUL, heart failure    Transfer to [facility] - MICU

## 2023-05-11 NOTE — PROCEDURES
PROCEDURE  5/10/23       Time: 2300    CENTRAL LINE INSERTION  Risks, benefits and alternatives (for applicable procedures below) described. Performed By: Ralph Mustafa MD.    Indication: hypovolemia and centrally administered medications. Informed consent: Written consent obtained. The patient was counseled regarding the procedure in person, it's indications, risks, potential complications and alternatives and any questions were answered. Consent was obtained. .  Procedure: After routine sterile preparation, local anesthesia obtained by infiltration using 1% Lidocaine without epinephrine. A left 3-Lumen 7F Central Venous Catheter was placed by internal jugular vein approach and secured by standard fashion. Ultrasound Guidance:   used. Number of Attempts: 1  Post-procedure Findings: A post procedural chest x-ray  was ordered and is still pending at this time. Patient tolerated the procedure well.

## 2023-05-12 LAB
ALBUMIN SERPL-MCNC: 3.2 G/DL (ref 3.5–5.2)
ALP SERPL-CCNC: 107 U/L (ref 40–129)
ALT SERPL-CCNC: 6 U/L (ref 0–40)
ANION GAP SERPL CALCULATED.3IONS-SCNC: 13 MMOL/L (ref 7–16)
ANISOCYTOSIS: ABNORMAL
AST SERPL-CCNC: 11 U/L (ref 0–39)
BASOPHILS # BLD: 0 E9/L (ref 0–0.2)
BASOPHILS NFR BLD: 0.9 % (ref 0–2)
BILIRUB SERPL-MCNC: 1.2 MG/DL (ref 0–1.2)
BUN SERPL-MCNC: 28 MG/DL (ref 6–23)
BURR CELLS: ABNORMAL
CALCIUM SERPL-MCNC: 8.7 MG/DL (ref 8.6–10.2)
CHLORIDE SERPL-SCNC: 101 MMOL/L (ref 98–107)
CO2 SERPL-SCNC: 24 MMOL/L (ref 22–29)
CREAT SERPL-MCNC: 2.8 MG/DL (ref 0.7–1.2)
EOSINOPHIL # BLD: 0.1 E9/L (ref 0.05–0.5)
EOSINOPHIL NFR BLD: 0.9 % (ref 0–6)
ERYTHROCYTE [DISTWIDTH] IN BLOOD BY AUTOMATED COUNT: 16.9 FL (ref 11.5–15)
GLUCOSE SERPL-MCNC: 145 MG/DL (ref 74–99)
HCT VFR BLD AUTO: 28.1 % (ref 37–54)
HGB BLD-MCNC: 8.9 G/DL (ref 12.5–16.5)
LYMPHOCYTES # BLD: 1.42 E9/L (ref 1.5–4)
LYMPHOCYTES NFR BLD: 13 % (ref 20–42)
MAGNESIUM SERPL-MCNC: 1.5 MG/DL (ref 1.6–2.6)
MCH RBC QN AUTO: 29.9 PG (ref 26–35)
MCHC RBC AUTO-ENTMCNC: 31.7 % (ref 32–34.5)
MCV RBC AUTO: 94.3 FL (ref 80–99.9)
METER GLUCOSE: 117 MG/DL (ref 74–99)
METER GLUCOSE: 152 MG/DL (ref 74–99)
MONOCYTES # BLD: 0.44 E9/L (ref 0.1–0.95)
MONOCYTES NFR BLD: 3.5 % (ref 2–12)
NEUTROPHILS # BLD: 8.83 E9/L (ref 1.8–7.3)
NEUTS SEG NFR BLD: 80.9 % (ref 43–80)
ORGANISM: ABNORMAL
OVALOCYTES: ABNORMAL
PHOSPHATE SERPL-MCNC: 4.7 MG/DL (ref 2.5–4.5)
PLATELET # BLD AUTO: 220 E9/L (ref 130–450)
PMV BLD AUTO: 11.4 FL (ref 7–12)
POIKILOCYTES: ABNORMAL
POLYCHROMASIA: ABNORMAL
POTASSIUM SERPL-SCNC: 4.9 MMOL/L (ref 3.5–5)
PROMYELOCYTES NFR BLD MANUAL: 1.7 % (ref 0–0)
PROT SERPL-MCNC: 6.2 G/DL (ref 6.4–8.3)
RBC # BLD AUTO: 2.98 E12/L (ref 3.8–5.8)
SODIUM SERPL-SCNC: 138 MMOL/L (ref 132–146)
TEAR DROP CELLS: ABNORMAL
TOXIC GRANULATION: ABNORMAL
WBC # BLD: 10.9 E9/L (ref 4.5–11.5)

## 2023-05-12 PROCEDURE — 84100 ASSAY OF PHOSPHORUS: CPT

## 2023-05-12 PROCEDURE — 6370000000 HC RX 637 (ALT 250 FOR IP)

## 2023-05-12 PROCEDURE — 2580000003 HC RX 258

## 2023-05-12 PROCEDURE — 6370000000 HC RX 637 (ALT 250 FOR IP): Performed by: FAMILY MEDICINE

## 2023-05-12 PROCEDURE — 83735 ASSAY OF MAGNESIUM: CPT

## 2023-05-12 PROCEDURE — 99233 SBSQ HOSP IP/OBS HIGH 50: CPT | Performed by: INTERNAL MEDICINE

## 2023-05-12 PROCEDURE — 82962 GLUCOSE BLOOD TEST: CPT

## 2023-05-12 PROCEDURE — 85025 COMPLETE CBC W/AUTO DIFF WBC: CPT

## 2023-05-12 PROCEDURE — 6370000000 HC RX 637 (ALT 250 FOR IP): Performed by: INTERNAL MEDICINE

## 2023-05-12 PROCEDURE — 2700000000 HC OXYGEN THERAPY PER DAY

## 2023-05-12 PROCEDURE — 6360000002 HC RX W HCPCS: Performed by: INTERNAL MEDICINE

## 2023-05-12 PROCEDURE — 80053 COMPREHEN METABOLIC PANEL: CPT

## 2023-05-12 PROCEDURE — 2000000000 HC ICU R&B

## 2023-05-12 RX ORDER — MAGNESIUM SULFATE IN WATER 40 MG/ML
2000 INJECTION, SOLUTION INTRAVENOUS ONCE
Status: COMPLETED | OUTPATIENT
Start: 2023-05-12 | End: 2023-05-12

## 2023-05-12 RX ADMIN — PREGABALIN 75 MG: 25 CAPSULE ORAL at 20:25

## 2023-05-12 RX ADMIN — PREGABALIN 75 MG: 25 CAPSULE ORAL at 08:31

## 2023-05-12 RX ADMIN — MAGNESIUM SULFATE HEPTAHYDRATE 2000 MG: 40 INJECTION, SOLUTION INTRAVENOUS at 14:05

## 2023-05-12 RX ADMIN — APIXABAN 5 MG: 5 TABLET, FILM COATED ORAL at 08:31

## 2023-05-12 RX ADMIN — COLESTIPOL HYDROCHLORIDE 1 G: 1 TABLET, FILM COATED ORAL at 09:57

## 2023-05-12 RX ADMIN — COLESTIPOL HYDROCHLORIDE 1 G: 1 TABLET, FILM COATED ORAL at 17:18

## 2023-05-12 RX ADMIN — MUPIROCIN: 20 OINTMENT TOPICAL at 20:25

## 2023-05-12 RX ADMIN — ASPIRIN 81 MG: 81 TABLET, COATED ORAL at 08:29

## 2023-05-12 RX ADMIN — ROSUVASTATIN CALCIUM 20 MG: 20 TABLET, FILM COATED ORAL at 08:29

## 2023-05-12 RX ADMIN — ESCITALOPRAM OXALATE 10 MG: 10 TABLET ORAL at 08:29

## 2023-05-12 RX ADMIN — Medication 10 ML: at 08:32

## 2023-05-12 RX ADMIN — APIXABAN 5 MG: 5 TABLET, FILM COATED ORAL at 20:25

## 2023-05-12 RX ADMIN — Medication 10 ML: at 20:27

## 2023-05-12 RX ADMIN — MUPIROCIN: 20 OINTMENT TOPICAL at 18:10

## 2023-05-13 LAB
ALBUMIN SERPL-MCNC: 2.9 G/DL (ref 3.5–5.2)
ALP SERPL-CCNC: 114 U/L (ref 40–129)
ALT SERPL-CCNC: 6 U/L (ref 0–40)
ANION GAP SERPL CALCULATED.3IONS-SCNC: 9 MMOL/L (ref 7–16)
AST SERPL-CCNC: 10 U/L (ref 0–39)
BACTERIA SPEC RESP CULT: NORMAL
BACTERIA UR CULT: NORMAL
BASOPHILS # BLD: 0.09 E9/L (ref 0–0.2)
BASOPHILS NFR BLD: 0.9 % (ref 0–2)
BILIRUB SERPL-MCNC: 0.6 MG/DL (ref 0–1.2)
BUN SERPL-MCNC: 21 MG/DL (ref 6–23)
CALCIUM SERPL-MCNC: 9.2 MG/DL (ref 8.6–10.2)
CHLORIDE SERPL-SCNC: 101 MMOL/L (ref 98–107)
CO2 SERPL-SCNC: 25 MMOL/L (ref 22–29)
CREAT SERPL-MCNC: 2.1 MG/DL (ref 0.7–1.2)
EOSINOPHIL # BLD: 0.22 E9/L (ref 0.05–0.5)
EOSINOPHIL NFR BLD: 2.3 % (ref 0–6)
ERYTHROCYTE [DISTWIDTH] IN BLOOD BY AUTOMATED COUNT: 16.1 FL (ref 11.5–15)
GLUCOSE SERPL-MCNC: 131 MG/DL (ref 74–99)
HCT VFR BLD AUTO: 30 % (ref 37–54)
HGB BLD-MCNC: 9.3 G/DL (ref 12.5–16.5)
IMM GRANULOCYTES # BLD: 0.38 E9/L
IMM GRANULOCYTES NFR BLD: 3.9 % (ref 0–5)
LYMPHOCYTES # BLD: 1.02 E9/L (ref 1.5–4)
LYMPHOCYTES NFR BLD: 10.6 % (ref 20–42)
MAGNESIUM SERPL-MCNC: 1.7 MG/DL (ref 1.6–2.6)
MCH RBC QN AUTO: 29.2 PG (ref 26–35)
MCHC RBC AUTO-ENTMCNC: 31 % (ref 32–34.5)
MCV RBC AUTO: 94.3 FL (ref 80–99.9)
METER GLUCOSE: 117 MG/DL (ref 74–99)
MONOCYTES # BLD: 0.7 E9/L (ref 0.1–0.95)
MONOCYTES NFR BLD: 7.3 % (ref 2–12)
NEUTROPHILS # BLD: 7.23 E9/L (ref 1.8–7.3)
NEUTS SEG NFR BLD: 75 % (ref 43–80)
PHOSPHATE SERPL-MCNC: 3.9 MG/DL (ref 2.5–4.5)
PLATELET # BLD AUTO: 209 E9/L (ref 130–450)
PMV BLD AUTO: 11.1 FL (ref 7–12)
POTASSIUM SERPL-SCNC: 5.1 MMOL/L (ref 3.5–5)
PROT SERPL-MCNC: 6.2 G/DL (ref 6.4–8.3)
RBC # BLD AUTO: 3.18 E12/L (ref 3.8–5.8)
SMEAR, RESPIRATORY: NORMAL
SODIUM SERPL-SCNC: 135 MMOL/L (ref 132–146)
WBC # BLD: 9.6 E9/L (ref 4.5–11.5)

## 2023-05-13 PROCEDURE — 6370000000 HC RX 637 (ALT 250 FOR IP): Performed by: FAMILY MEDICINE

## 2023-05-13 PROCEDURE — 6370000000 HC RX 637 (ALT 250 FOR IP)

## 2023-05-13 PROCEDURE — 85025 COMPLETE CBC W/AUTO DIFF WBC: CPT

## 2023-05-13 PROCEDURE — 82962 GLUCOSE BLOOD TEST: CPT

## 2023-05-13 PROCEDURE — 99233 SBSQ HOSP IP/OBS HIGH 50: CPT | Performed by: INTERNAL MEDICINE

## 2023-05-13 PROCEDURE — 80053 COMPREHEN METABOLIC PANEL: CPT

## 2023-05-13 PROCEDURE — 94640 AIRWAY INHALATION TREATMENT: CPT

## 2023-05-13 PROCEDURE — 83735 ASSAY OF MAGNESIUM: CPT

## 2023-05-13 PROCEDURE — 6360000002 HC RX W HCPCS

## 2023-05-13 PROCEDURE — 94664 DEMO&/EVAL PT USE INHALER: CPT

## 2023-05-13 PROCEDURE — 2140000000 HC CCU INTERMEDIATE R&B

## 2023-05-13 PROCEDURE — 6370000000 HC RX 637 (ALT 250 FOR IP): Performed by: INTERNAL MEDICINE

## 2023-05-13 PROCEDURE — 2700000000 HC OXYGEN THERAPY PER DAY

## 2023-05-13 PROCEDURE — 2580000003 HC RX 258

## 2023-05-13 PROCEDURE — 84100 ASSAY OF PHOSPHORUS: CPT

## 2023-05-13 RX ORDER — BUDESONIDE 0.25 MG/2ML
0.5 INHALANT ORAL 2 TIMES DAILY
Status: DISCONTINUED | OUTPATIENT
Start: 2023-05-13 | End: 2023-05-18 | Stop reason: HOSPADM

## 2023-05-13 RX ORDER — METOPROLOL SUCCINATE 100 MG/1
100 TABLET, EXTENDED RELEASE ORAL NIGHTLY
Status: DISCONTINUED | OUTPATIENT
Start: 2023-05-13 | End: 2023-05-18 | Stop reason: HOSPADM

## 2023-05-13 RX ORDER — LISINOPRIL 5 MG/1
5 TABLET ORAL EVERY MORNING
Status: DISCONTINUED | OUTPATIENT
Start: 2023-05-14 | End: 2023-05-18 | Stop reason: HOSPADM

## 2023-05-13 RX ORDER — IPRATROPIUM BROMIDE AND ALBUTEROL SULFATE 2.5; .5 MG/3ML; MG/3ML
1 SOLUTION RESPIRATORY (INHALATION) 4 TIMES DAILY
Status: DISCONTINUED | OUTPATIENT
Start: 2023-05-13 | End: 2023-05-18 | Stop reason: HOSPADM

## 2023-05-13 RX ORDER — MAGNESIUM SULFATE 1 G/100ML
1000 INJECTION INTRAVENOUS ONCE
Status: COMPLETED | OUTPATIENT
Start: 2023-05-13 | End: 2023-05-13

## 2023-05-13 RX ORDER — METOPROLOL SUCCINATE 25 MG/1
25 TABLET, EXTENDED RELEASE ORAL NIGHTLY
Status: DISCONTINUED | OUTPATIENT
Start: 2023-05-13 | End: 2023-05-18 | Stop reason: HOSPADM

## 2023-05-13 RX ORDER — METOPROLOL SUCCINATE 100 MG/1
100 TABLET, EXTENDED RELEASE ORAL DAILY
Status: DISCONTINUED | OUTPATIENT
Start: 2023-05-14 | End: 2023-05-18 | Stop reason: HOSPADM

## 2023-05-13 RX ADMIN — IPRATROPIUM BROMIDE AND ALBUTEROL SULFATE 1 AMPULE: .5; 2.5 SOLUTION RESPIRATORY (INHALATION) at 15:48

## 2023-05-13 RX ADMIN — APIXABAN 5 MG: 5 TABLET, FILM COATED ORAL at 21:15

## 2023-05-13 RX ADMIN — PREGABALIN 75 MG: 25 CAPSULE ORAL at 08:30

## 2023-05-13 RX ADMIN — ROSUVASTATIN CALCIUM 20 MG: 20 TABLET, FILM COATED ORAL at 08:30

## 2023-05-13 RX ADMIN — BUDESONIDE 500 MCG: 0.25 SUSPENSION RESPIRATORY (INHALATION) at 08:50

## 2023-05-13 RX ADMIN — METOPROLOL SUCCINATE 100 MG: 100 TABLET, EXTENDED RELEASE ORAL at 21:46

## 2023-05-13 RX ADMIN — APIXABAN 5 MG: 5 TABLET, FILM COATED ORAL at 08:30

## 2023-05-13 RX ADMIN — BUDESONIDE 500 MCG: 0.25 SUSPENSION RESPIRATORY (INHALATION) at 19:27

## 2023-05-13 RX ADMIN — PREGABALIN 75 MG: 25 CAPSULE ORAL at 21:15

## 2023-05-13 RX ADMIN — METOPROLOL SUCCINATE 25 MG: 25 TABLET, EXTENDED RELEASE ORAL at 21:48

## 2023-05-13 RX ADMIN — ASPIRIN 81 MG: 81 TABLET, COATED ORAL at 08:30

## 2023-05-13 RX ADMIN — IPRATROPIUM BROMIDE AND ALBUTEROL SULFATE 1 AMPULE: .5; 2.5 SOLUTION RESPIRATORY (INHALATION) at 19:27

## 2023-05-13 RX ADMIN — IPRATROPIUM BROMIDE AND ALBUTEROL SULFATE 1 AMPULE: .5; 2.5 SOLUTION RESPIRATORY (INHALATION) at 11:47

## 2023-05-13 RX ADMIN — COLESTIPOL HYDROCHLORIDE 1 G: 1 TABLET, FILM COATED ORAL at 08:30

## 2023-05-13 RX ADMIN — IPRATROPIUM BROMIDE AND ALBUTEROL SULFATE 1 AMPULE: .5; 2.5 SOLUTION RESPIRATORY (INHALATION) at 08:50

## 2023-05-13 RX ADMIN — MUPIROCIN: 20 OINTMENT TOPICAL at 08:30

## 2023-05-13 RX ADMIN — Medication 10 ML: at 21:15

## 2023-05-13 RX ADMIN — COLESTIPOL HYDROCHLORIDE 1 G: 1 TABLET, FILM COATED ORAL at 18:47

## 2023-05-13 RX ADMIN — MAGNESIUM SULFATE HEPTAHYDRATE 1000 MG: 1 INJECTION, SOLUTION INTRAVENOUS at 08:37

## 2023-05-13 RX ADMIN — ESCITALOPRAM OXALATE 10 MG: 10 TABLET ORAL at 08:30

## 2023-05-13 ASSESSMENT — PAIN SCALES - GENERAL
PAINLEVEL_OUTOF10: 0

## 2023-05-14 LAB
ALBUMIN SERPL-MCNC: 3.3 G/DL (ref 3.5–5.2)
ALP SERPL-CCNC: 106 U/L (ref 40–129)
ALT SERPL-CCNC: 6 U/L (ref 0–40)
ANION GAP SERPL CALCULATED.3IONS-SCNC: 8 MMOL/L (ref 7–16)
AST SERPL-CCNC: 12 U/L (ref 0–39)
BASOPHILS # BLD: 0.07 E9/L (ref 0–0.2)
BASOPHILS NFR BLD: 0.7 % (ref 0–2)
BILIRUB SERPL-MCNC: 0.6 MG/DL (ref 0–1.2)
BUN SERPL-MCNC: 21 MG/DL (ref 6–23)
CALCIUM SERPL-MCNC: 9.7 MG/DL (ref 8.6–10.2)
CHLORIDE SERPL-SCNC: 99 MMOL/L (ref 98–107)
CO2 SERPL-SCNC: 25 MMOL/L (ref 22–29)
CORTIS SERPL-MCNC: 14.42 MCG/DL (ref 2.68–18.4)
CREAT SERPL-MCNC: 2 MG/DL (ref 0.7–1.2)
EOSINOPHIL # BLD: 0.23 E9/L (ref 0.05–0.5)
EOSINOPHIL NFR BLD: 2.4 % (ref 0–6)
ERYTHROCYTE [DISTWIDTH] IN BLOOD BY AUTOMATED COUNT: 16 FL (ref 11.5–15)
FERRITIN SERPL-MCNC: 445 NG/ML
GLUCOSE SERPL-MCNC: 128 MG/DL (ref 74–99)
HCT VFR BLD AUTO: 31.1 % (ref 37–54)
HGB BLD-MCNC: 9.7 G/DL (ref 12.5–16.5)
IMM GRANULOCYTES # BLD: 0.21 E9/L
IMM GRANULOCYTES NFR BLD: 2.2 % (ref 0–5)
IRON SATN MFR SERPL: 14 % (ref 20–55)
IRON SERPL-MCNC: 26 MCG/DL (ref 59–158)
LYMPHOCYTES # BLD: 0.97 E9/L (ref 1.5–4)
LYMPHOCYTES NFR BLD: 9.9 % (ref 20–42)
MAGNESIUM SERPL-MCNC: 1.7 MG/DL (ref 1.6–2.6)
MCH RBC QN AUTO: 29.4 PG (ref 26–35)
MCHC RBC AUTO-ENTMCNC: 31.2 % (ref 32–34.5)
MCV RBC AUTO: 94.2 FL (ref 80–99.9)
METER GLUCOSE: 134 MG/DL (ref 74–99)
MONOCYTES # BLD: 0.76 E9/L (ref 0.1–0.95)
MONOCYTES NFR BLD: 7.8 % (ref 2–12)
NEUTROPHILS # BLD: 7.51 E9/L (ref 1.8–7.3)
NEUTS SEG NFR BLD: 77 % (ref 43–80)
PHOSPHATE SERPL-MCNC: 4.7 MG/DL (ref 2.5–4.5)
PLATELET # BLD AUTO: 249 E9/L (ref 130–450)
PMV BLD AUTO: 10.8 FL (ref 7–12)
POTASSIUM SERPL-SCNC: 5.4 MMOL/L (ref 3.5–5)
POTASSIUM SERPL-SCNC: 5.5 MMOL/L (ref 3.5–5)
PROT SERPL-MCNC: 6.7 G/DL (ref 6.4–8.3)
RBC # BLD AUTO: 3.3 E12/L (ref 3.8–5.8)
SODIUM SERPL-SCNC: 132 MMOL/L (ref 132–146)
TIBC SERPL-MCNC: 191 MCG/DL (ref 250–450)
WBC # BLD: 9.8 E9/L (ref 4.5–11.5)

## 2023-05-14 PROCEDURE — 85025 COMPLETE CBC W/AUTO DIFF WBC: CPT

## 2023-05-14 PROCEDURE — 6370000000 HC RX 637 (ALT 250 FOR IP)

## 2023-05-14 PROCEDURE — 82533 TOTAL CORTISOL: CPT

## 2023-05-14 PROCEDURE — 84132 ASSAY OF SERUM POTASSIUM: CPT

## 2023-05-14 PROCEDURE — 80053 COMPREHEN METABOLIC PANEL: CPT

## 2023-05-14 PROCEDURE — 6360000002 HC RX W HCPCS

## 2023-05-14 PROCEDURE — 36415 COLL VENOUS BLD VENIPUNCTURE: CPT

## 2023-05-14 PROCEDURE — 82728 ASSAY OF FERRITIN: CPT

## 2023-05-14 PROCEDURE — 83540 ASSAY OF IRON: CPT

## 2023-05-14 PROCEDURE — 2700000000 HC OXYGEN THERAPY PER DAY

## 2023-05-14 PROCEDURE — 2140000000 HC CCU INTERMEDIATE R&B

## 2023-05-14 PROCEDURE — 2580000003 HC RX 258

## 2023-05-14 PROCEDURE — 94640 AIRWAY INHALATION TREATMENT: CPT

## 2023-05-14 PROCEDURE — 6370000000 HC RX 637 (ALT 250 FOR IP): Performed by: FAMILY MEDICINE

## 2023-05-14 PROCEDURE — 84100 ASSAY OF PHOSPHORUS: CPT

## 2023-05-14 PROCEDURE — 6370000000 HC RX 637 (ALT 250 FOR IP): Performed by: INTERNAL MEDICINE

## 2023-05-14 PROCEDURE — 82962 GLUCOSE BLOOD TEST: CPT

## 2023-05-14 PROCEDURE — 83735 ASSAY OF MAGNESIUM: CPT

## 2023-05-14 PROCEDURE — 83550 IRON BINDING TEST: CPT

## 2023-05-14 RX ADMIN — SODIUM ZIRCONIUM CYCLOSILICATE 10 G: 10 POWDER, FOR SUSPENSION ORAL at 20:29

## 2023-05-14 RX ADMIN — Medication 10 ML: at 08:34

## 2023-05-14 RX ADMIN — IPRATROPIUM BROMIDE AND ALBUTEROL SULFATE 1 AMPULE: .5; 2.5 SOLUTION RESPIRATORY (INHALATION) at 08:18

## 2023-05-14 RX ADMIN — APIXABAN 5 MG: 5 TABLET, FILM COATED ORAL at 20:31

## 2023-05-14 RX ADMIN — IPRATROPIUM BROMIDE AND ALBUTEROL SULFATE 1 AMPULE: .5; 2.5 SOLUTION RESPIRATORY (INHALATION) at 11:48

## 2023-05-14 RX ADMIN — IPRATROPIUM BROMIDE AND ALBUTEROL SULFATE 1 AMPULE: .5; 2.5 SOLUTION RESPIRATORY (INHALATION) at 18:46

## 2023-05-14 RX ADMIN — COLESTIPOL HYDROCHLORIDE 1 G: 1 TABLET, FILM COATED ORAL at 17:30

## 2023-05-14 RX ADMIN — COLESTIPOL HYDROCHLORIDE 1 G: 1 TABLET, FILM COATED ORAL at 08:33

## 2023-05-14 RX ADMIN — ESCITALOPRAM OXALATE 10 MG: 10 TABLET ORAL at 08:34

## 2023-05-14 RX ADMIN — PREGABALIN 75 MG: 25 CAPSULE ORAL at 20:30

## 2023-05-14 RX ADMIN — Medication 10 ML: at 20:30

## 2023-05-14 RX ADMIN — LISINOPRIL 5 MG: 5 TABLET ORAL at 08:34

## 2023-05-14 RX ADMIN — APIXABAN 5 MG: 5 TABLET, FILM COATED ORAL at 08:33

## 2023-05-14 RX ADMIN — BUDESONIDE 500 MCG: 0.25 SUSPENSION RESPIRATORY (INHALATION) at 08:18

## 2023-05-14 RX ADMIN — ASPIRIN 81 MG: 81 TABLET, COATED ORAL at 08:34

## 2023-05-14 RX ADMIN — PREGABALIN 75 MG: 25 CAPSULE ORAL at 08:33

## 2023-05-14 RX ADMIN — SODIUM ZIRCONIUM CYCLOSILICATE 10 G: 10 POWDER, FOR SUSPENSION ORAL at 11:25

## 2023-05-14 RX ADMIN — IPRATROPIUM BROMIDE AND ALBUTEROL SULFATE 1 AMPULE: .5; 2.5 SOLUTION RESPIRATORY (INHALATION) at 15:32

## 2023-05-14 RX ADMIN — ROSUVASTATIN CALCIUM 20 MG: 20 TABLET, FILM COATED ORAL at 08:33

## 2023-05-14 RX ADMIN — BUDESONIDE 500 MCG: 0.25 SUSPENSION RESPIRATORY (INHALATION) at 18:47

## 2023-05-14 ASSESSMENT — PAIN SCALES - GENERAL
PAINLEVEL_OUTOF10: 0
PAINLEVEL_OUTOF10: 0

## 2023-05-15 LAB
ALBUMIN SERPL-MCNC: 3 G/DL (ref 3.5–5.2)
ALP SERPL-CCNC: 90 U/L (ref 40–129)
ALT SERPL-CCNC: 7 U/L (ref 0–40)
ANION GAP SERPL CALCULATED.3IONS-SCNC: 12 MMOL/L (ref 7–16)
AST SERPL-CCNC: 12 U/L (ref 0–39)
BASOPHILS # BLD: 0.09 E9/L (ref 0–0.2)
BASOPHILS NFR BLD: 0.9 % (ref 0–2)
BILIRUB SERPL-MCNC: 0.5 MG/DL (ref 0–1.2)
BLOOD BANK DISPENSE STATUS: NORMAL
BLOOD BANK PRODUCT CODE: NORMAL
BPU ID: NORMAL
BUN SERPL-MCNC: 27 MG/DL (ref 6–23)
CALCIUM SERPL-MCNC: 9.1 MG/DL (ref 8.6–10.2)
CHLORIDE SERPL-SCNC: 96 MMOL/L (ref 98–107)
CO2 SERPL-SCNC: 24 MMOL/L (ref 22–29)
CREAT SERPL-MCNC: 2.2 MG/DL (ref 0.7–1.2)
DESCRIPTION BLOOD BANK: NORMAL
EOSINOPHIL # BLD: 0.2 E9/L (ref 0.05–0.5)
EOSINOPHIL NFR BLD: 2.1 % (ref 0–6)
ERYTHROCYTE [DISTWIDTH] IN BLOOD BY AUTOMATED COUNT: 15.9 FL (ref 11.5–15)
GLUCOSE SERPL-MCNC: 118 MG/DL (ref 74–99)
HCT VFR BLD AUTO: 29.8 % (ref 37–54)
HGB BLD-MCNC: 9.1 G/DL (ref 12.5–16.5)
IMM GRANULOCYTES # BLD: 0.16 E9/L
IMM GRANULOCYTES NFR BLD: 1.7 % (ref 0–5)
LYMPHOCYTES # BLD: 0.87 E9/L (ref 1.5–4)
LYMPHOCYTES NFR BLD: 9.1 % (ref 20–42)
MAGNESIUM SERPL-MCNC: 1.9 MG/DL (ref 1.6–2.6)
MCH RBC QN AUTO: 29.1 PG (ref 26–35)
MCHC RBC AUTO-ENTMCNC: 30.5 % (ref 32–34.5)
MCV RBC AUTO: 95.2 FL (ref 80–99.9)
METER GLUCOSE: 122 MG/DL (ref 74–99)
MONOCYTES # BLD: 0.84 E9/L (ref 0.1–0.95)
MONOCYTES NFR BLD: 8.8 % (ref 2–12)
NEUTROPHILS # BLD: 7.42 E9/L (ref 1.8–7.3)
NEUTS SEG NFR BLD: 77.4 % (ref 43–80)
PHOSPHATE SERPL-MCNC: 5.3 MG/DL (ref 2.5–4.5)
PLATELET # BLD AUTO: 232 E9/L (ref 130–450)
PMV BLD AUTO: 10.8 FL (ref 7–12)
POTASSIUM SERPL-SCNC: 5.2 MMOL/L (ref 3.5–5)
PROT SERPL-MCNC: 6.3 G/DL (ref 6.4–8.3)
RBC # BLD AUTO: 3.13 E12/L (ref 3.8–5.8)
SODIUM SERPL-SCNC: 132 MMOL/L (ref 132–146)
WBC # BLD: 9.6 E9/L (ref 4.5–11.5)

## 2023-05-15 PROCEDURE — 84100 ASSAY OF PHOSPHORUS: CPT

## 2023-05-15 PROCEDURE — 6370000000 HC RX 637 (ALT 250 FOR IP): Performed by: FAMILY MEDICINE

## 2023-05-15 PROCEDURE — 94640 AIRWAY INHALATION TREATMENT: CPT

## 2023-05-15 PROCEDURE — 97165 OT EVAL LOW COMPLEX 30 MIN: CPT

## 2023-05-15 PROCEDURE — 6370000000 HC RX 637 (ALT 250 FOR IP)

## 2023-05-15 PROCEDURE — 2580000003 HC RX 258

## 2023-05-15 PROCEDURE — 85025 COMPLETE CBC W/AUTO DIFF WBC: CPT

## 2023-05-15 PROCEDURE — 6370000000 HC RX 637 (ALT 250 FOR IP): Performed by: INTERNAL MEDICINE

## 2023-05-15 PROCEDURE — 83735 ASSAY OF MAGNESIUM: CPT

## 2023-05-15 PROCEDURE — 97530 THERAPEUTIC ACTIVITIES: CPT

## 2023-05-15 PROCEDURE — 97161 PT EVAL LOW COMPLEX 20 MIN: CPT

## 2023-05-15 PROCEDURE — 6360000002 HC RX W HCPCS

## 2023-05-15 PROCEDURE — 2140000000 HC CCU INTERMEDIATE R&B

## 2023-05-15 PROCEDURE — 2700000000 HC OXYGEN THERAPY PER DAY

## 2023-05-15 PROCEDURE — 82962 GLUCOSE BLOOD TEST: CPT

## 2023-05-15 PROCEDURE — 36415 COLL VENOUS BLD VENIPUNCTURE: CPT

## 2023-05-15 PROCEDURE — 80053 COMPREHEN METABOLIC PANEL: CPT

## 2023-05-15 RX ADMIN — MUPIROCIN: 20 OINTMENT TOPICAL at 08:53

## 2023-05-15 RX ADMIN — ASPIRIN 81 MG: 81 TABLET, COATED ORAL at 08:37

## 2023-05-15 RX ADMIN — ESCITALOPRAM OXALATE 10 MG: 10 TABLET ORAL at 08:37

## 2023-05-15 RX ADMIN — COLESTIPOL HYDROCHLORIDE 1 G: 1 TABLET, FILM COATED ORAL at 09:54

## 2023-05-15 RX ADMIN — MUPIROCIN: 20 OINTMENT TOPICAL at 20:30

## 2023-05-15 RX ADMIN — IPRATROPIUM BROMIDE AND ALBUTEROL SULFATE 1 AMPULE: .5; 2.5 SOLUTION RESPIRATORY (INHALATION) at 16:15

## 2023-05-15 RX ADMIN — Medication 10 ML: at 20:29

## 2023-05-15 RX ADMIN — IPRATROPIUM BROMIDE AND ALBUTEROL SULFATE 1 AMPULE: .5; 2.5 SOLUTION RESPIRATORY (INHALATION) at 08:34

## 2023-05-15 RX ADMIN — SODIUM ZIRCONIUM CYCLOSILICATE 10 G: 10 POWDER, FOR SUSPENSION ORAL at 17:07

## 2023-05-15 RX ADMIN — APIXABAN 5 MG: 5 TABLET, FILM COATED ORAL at 20:28

## 2023-05-15 RX ADMIN — BUDESONIDE 500 MCG: 0.25 SUSPENSION RESPIRATORY (INHALATION) at 20:15

## 2023-05-15 RX ADMIN — COLESTIPOL HYDROCHLORIDE 1 G: 1 TABLET, FILM COATED ORAL at 17:07

## 2023-05-15 RX ADMIN — APIXABAN 5 MG: 5 TABLET, FILM COATED ORAL at 08:37

## 2023-05-15 RX ADMIN — IPRATROPIUM BROMIDE AND ALBUTEROL SULFATE 1 AMPULE: .5; 2.5 SOLUTION RESPIRATORY (INHALATION) at 20:16

## 2023-05-15 RX ADMIN — Medication 10 ML: at 08:45

## 2023-05-15 RX ADMIN — ROSUVASTATIN CALCIUM 20 MG: 20 TABLET, FILM COATED ORAL at 08:37

## 2023-05-15 RX ADMIN — IPRATROPIUM BROMIDE AND ALBUTEROL SULFATE 1 AMPULE: .5; 2.5 SOLUTION RESPIRATORY (INHALATION) at 12:38

## 2023-05-15 RX ADMIN — BUDESONIDE 500 MCG: 0.25 SUSPENSION RESPIRATORY (INHALATION) at 08:34

## 2023-05-15 RX ADMIN — PREGABALIN 75 MG: 25 CAPSULE ORAL at 08:37

## 2023-05-15 RX ADMIN — SODIUM ZIRCONIUM CYCLOSILICATE 10 G: 10 POWDER, FOR SUSPENSION ORAL at 08:38

## 2023-05-15 RX ADMIN — PREGABALIN 75 MG: 25 CAPSULE ORAL at 20:28

## 2023-05-15 ASSESSMENT — PAIN SCALES - GENERAL: PAINLEVEL_OUTOF10: 0

## 2023-05-15 NOTE — PLAN OF CARE
Patient's chart updated to reflect:      . - HF care plan, HF education points and HF discharge instructions.  -Orders: 2 gram sodium diet, daily weights, I/O.  -PCP and cardiology follow up appointments to be scheduled within 7 days of hospital discharge. -CHF education session will be provided to the patient prior to hospital discharge.     Ira [facility] RN RN, BSN  Heart Failure Navigator

## 2023-05-15 NOTE — DISCHARGE INSTRUCTIONS
HEART FAILURE  / CONGESTIVE HEART FAILURE  DISCHARGE INSTRUCTIONS:  GUIDELINES TO FOLLOW AT HOME    Self- Managed Care:     MEDICATIONS:  Take your medication as directed. If you are experiencing any side effects, inform your doctor, Do not stop taking any of your medications without letting your doctor know. Check with your doctor before taking any over-the-counter medications / herbal / or dietary supplements. They may interfere with your other medications. Do not take ibuprofen (Advil or Motrin) and naproxen (Aleve) without talking to your doctor first. They could make your heart failure worse. WEIGHT MONITORING:   Weigh yourself everyday (with the same scale) around the same time of the day and write it down. (you can chart them on a calendar or keep track of them on paper. Notify your doctor of a weight gain of 3 pounds or more in 1 day   OR a total of 5 pounds or more in 1 week    Take your weight record to your doctor visits  Also, the same goes if you loose more than 3# in one day, let your heart doctor know. DIET:   Cardiac heart healthy diet- Low saturated / low trans fat, no added salt, caffeine restricted, Low sodium diet-   No more than 2,000mg (2 grams) of salt / sodium per day (which equals to a little less than  a teaspoon of salt)  If your doctor wants you on a fluid restriction. ..it is usually recommended a fluid limit of 2,000cc -  Fluid restriction- 2,000 ml (milliliters) = 64 ounces = you can have 8 glasses of fluid per day (each glass 8 ounces)    Follow a low salt diet - avoid using salt at the table, avoid / limit use of canned soups, processed / packaged foods, salted snacks, olives and pickles. Do not use a salt substitute without checking with your doctor, they may contain a high amount of potassioum. (Mrs. Carson Breen is safe to use).     Limit the use of alcohol       CALL YOUR DOCTOR THE FIRST DAY YOU NOTICE ANY OF THESE   SYMPTOMS:  You have a weight

## 2023-05-15 NOTE — CARE COORDINATION
Per nursing rounds, patient was a transfer from MICU, chemo patient, on 2L NC, hyperkalemia and ATUL, K 5.2, BUN 27, creatinine 2.2, GFR <33; nephrology following. Lasix, aldactone, and lisinopril on hold for now with plans to reintroduce over the next 1 to 3 days, on [facility]; hematology oncology following. Per previous CM, patient plans to return home, no needs and sister to transport.     JOHNNY Atkinson, [address] [phone]

## 2023-05-16 LAB
ANION GAP SERPL CALCULATED.3IONS-SCNC: 9 MMOL/L (ref 7–16)
BACTERIA BLD CULT ORG #2: NORMAL
BACTERIA BLD CULT: NORMAL
BASOPHILS # BLD: 0.06 E9/L (ref 0–0.2)
BASOPHILS NFR BLD: 0.9 % (ref 0–2)
BUN SERPL-MCNC: 28 MG/DL (ref 6–23)
CALCIUM SERPL-MCNC: 9.3 MG/DL (ref 8.6–10.2)
CHLORIDE SERPL-SCNC: 102 MMOL/L (ref 98–107)
CO2 SERPL-SCNC: 23 MMOL/L (ref 22–29)
CREAT SERPL-MCNC: 1.8 MG/DL (ref 0.7–1.2)
EOSINOPHIL # BLD: 0.19 E9/L (ref 0.05–0.5)
EOSINOPHIL NFR BLD: 2.7 % (ref 0–6)
ERYTHROCYTE [DISTWIDTH] IN BLOOD BY AUTOMATED COUNT: 15.9 FL (ref 11.5–15)
GLUCOSE SERPL-MCNC: 127 MG/DL (ref 74–99)
HCT VFR BLD AUTO: 29.6 % (ref 37–54)
HGB BLD-MCNC: 9.1 G/DL (ref 12.5–16.5)
IMM GRANULOCYTES # BLD: 0.08 E9/L
IMM GRANULOCYTES NFR BLD: 1.1 % (ref 0–5)
LYMPHOCYTES # BLD: 0.9 E9/L (ref 1.5–4)
LYMPHOCYTES NFR BLD: 12.8 % (ref 20–42)
MAGNESIUM SERPL-MCNC: 1.9 MG/DL (ref 1.6–2.6)
MCH RBC QN AUTO: 29.2 PG (ref 26–35)
MCHC RBC AUTO-ENTMCNC: 30.7 % (ref 32–34.5)
MCV RBC AUTO: 94.9 FL (ref 80–99.9)
METER GLUCOSE: 120 MG/DL (ref 74–99)
METER GLUCOSE: 121 MG/DL (ref 74–99)
METER GLUCOSE: 151 MG/DL (ref 74–99)
MONOCYTES # BLD: 0.79 E9/L (ref 0.1–0.95)
MONOCYTES NFR BLD: 11.3 % (ref 2–12)
NEUTROPHILS # BLD: 5 E9/L (ref 1.8–7.3)
NEUTS SEG NFR BLD: 71.2 % (ref 43–80)
PHOSPHATE SERPL-MCNC: 5.1 MG/DL (ref 2.5–4.5)
PLATELET # BLD AUTO: 242 E9/L (ref 130–450)
PMV BLD AUTO: 10.9 FL (ref 7–12)
POTASSIUM SERPL-SCNC: 4.8 MMOL/L (ref 3.5–5)
RBC # BLD AUTO: 3.12 E12/L (ref 3.8–5.8)
SODIUM SERPL-SCNC: 134 MMOL/L (ref 132–146)
WBC # BLD: 7 E9/L (ref 4.5–11.5)

## 2023-05-16 PROCEDURE — 6370000000 HC RX 637 (ALT 250 FOR IP)

## 2023-05-16 PROCEDURE — 36415 COLL VENOUS BLD VENIPUNCTURE: CPT

## 2023-05-16 PROCEDURE — 83735 ASSAY OF MAGNESIUM: CPT

## 2023-05-16 PROCEDURE — 6370000000 HC RX 637 (ALT 250 FOR IP): Performed by: FAMILY MEDICINE

## 2023-05-16 PROCEDURE — 2700000000 HC OXYGEN THERAPY PER DAY

## 2023-05-16 PROCEDURE — 94640 AIRWAY INHALATION TREATMENT: CPT

## 2023-05-16 PROCEDURE — 80048 BASIC METABOLIC PNL TOTAL CA: CPT

## 2023-05-16 PROCEDURE — 85025 COMPLETE CBC W/AUTO DIFF WBC: CPT

## 2023-05-16 PROCEDURE — 2580000003 HC RX 258

## 2023-05-16 PROCEDURE — 6360000002 HC RX W HCPCS

## 2023-05-16 PROCEDURE — 6370000000 HC RX 637 (ALT 250 FOR IP): Performed by: INTERNAL MEDICINE

## 2023-05-16 PROCEDURE — 82962 GLUCOSE BLOOD TEST: CPT

## 2023-05-16 PROCEDURE — 84100 ASSAY OF PHOSPHORUS: CPT

## 2023-05-16 PROCEDURE — 2140000000 HC CCU INTERMEDIATE R&B

## 2023-05-16 RX ADMIN — IPRATROPIUM BROMIDE AND ALBUTEROL SULFATE 1 AMPULE: .5; 2.5 SOLUTION RESPIRATORY (INHALATION) at 20:09

## 2023-05-16 RX ADMIN — METOPROLOL SUCCINATE 100 MG: 100 TABLET, EXTENDED RELEASE ORAL at 08:12

## 2023-05-16 RX ADMIN — APIXABAN 5 MG: 5 TABLET, FILM COATED ORAL at 08:13

## 2023-05-16 RX ADMIN — Medication 10 ML: at 22:00

## 2023-05-16 RX ADMIN — COLESTIPOL HYDROCHLORIDE 1 G: 1 TABLET, FILM COATED ORAL at 12:23

## 2023-05-16 RX ADMIN — APIXABAN 5 MG: 5 TABLET, FILM COATED ORAL at 22:00

## 2023-05-16 RX ADMIN — ACETAMINOPHEN 650 MG: 325 TABLET ORAL at 22:08

## 2023-05-16 RX ADMIN — PREGABALIN 75 MG: 25 CAPSULE ORAL at 22:00

## 2023-05-16 RX ADMIN — IPRATROPIUM BROMIDE AND ALBUTEROL SULFATE 1 AMPULE: .5; 2.5 SOLUTION RESPIRATORY (INHALATION) at 16:21

## 2023-05-16 RX ADMIN — ESCITALOPRAM OXALATE 10 MG: 10 TABLET ORAL at 08:13

## 2023-05-16 RX ADMIN — ROSUVASTATIN CALCIUM 20 MG: 20 TABLET, FILM COATED ORAL at 08:12

## 2023-05-16 RX ADMIN — ASPIRIN 81 MG: 81 TABLET, COATED ORAL at 08:13

## 2023-05-16 RX ADMIN — MUPIROCIN: 20 OINTMENT TOPICAL at 10:29

## 2023-05-16 RX ADMIN — PREGABALIN 75 MG: 25 CAPSULE ORAL at 08:12

## 2023-05-16 RX ADMIN — Medication 10 ML: at 08:15

## 2023-05-16 RX ADMIN — SODIUM ZIRCONIUM CYCLOSILICATE 10 G: 10 POWDER, FOR SUSPENSION ORAL at 08:13

## 2023-05-16 RX ADMIN — BUDESONIDE 500 MCG: 0.25 SUSPENSION RESPIRATORY (INHALATION) at 09:30

## 2023-05-16 RX ADMIN — IPRATROPIUM BROMIDE AND ALBUTEROL SULFATE 1 AMPULE: .5; 2.5 SOLUTION RESPIRATORY (INHALATION) at 12:13

## 2023-05-16 RX ADMIN — BUDESONIDE 500 MCG: 0.25 SUSPENSION RESPIRATORY (INHALATION) at 20:09

## 2023-05-16 RX ADMIN — SODIUM ZIRCONIUM CYCLOSILICATE 10 G: 10 POWDER, FOR SUSPENSION ORAL at 16:05

## 2023-05-16 RX ADMIN — IPRATROPIUM BROMIDE AND ALBUTEROL SULFATE 1 AMPULE: .5; 2.5 SOLUTION RESPIRATORY (INHALATION) at 09:30

## 2023-05-16 ASSESSMENT — PAIN DESCRIPTION - DESCRIPTORS: DESCRIPTORS: ACHING

## 2023-05-16 ASSESSMENT — PAIN SCALES - GENERAL
PAINLEVEL_OUTOF10: 3
PAINLEVEL_OUTOF10: 0

## 2023-05-16 ASSESSMENT — PAIN DESCRIPTION - LOCATION: LOCATION: HEAD

## 2023-05-16 NOTE — CARE COORDINATION
Patient continues on 2L NC, no oxygen at baseline and ambulating pulse-ox testing will need completed prior to discharge. Patient pending cardiology consult due to afib RVR and hypotension. Patient evaluated by PT, [facility] score 18/24. Patient plans to return home, no needs reported and sister to transport.     Madiha Avina, MSW, [address] [phone]

## 2023-05-16 NOTE — PLAN OF CARE
Problem: Chronic Conditions and Co-morbidities  Goal: Patient's chronic conditions and co-morbidity symptoms are monitored and maintained or improved  5/15/2023 2344 by Edwin Gama RN  Outcome: Progressing  Flowsheets (Taken 5/15/2023 2024)  Care Plan - Patient's Chronic Conditions and Co-Morbidity Symptoms are Monitored and Maintained or Improved: Monitor and assess patient's chronic conditions and comorbid symptoms for stability, deterioration, or improvement  5/15/2023 1009 by Hardik Louie RN  Outcome: Progressing     Problem: Discharge Planning  Goal: Discharge to home or other facility with appropriate resources  Outcome: Progressing  Flowsheets (Taken 5/15/2023 2024)  Discharge to home or other facility with appropriate resources: Identify barriers to discharge with patient and caregiver     Problem: Safety - Adult  Goal: Free from fall injury  Outcome: Progressing     Problem: ABCDS Injury Assessment  Goal: Absence of physical injury  Outcome: Progressing  Flowsheets (Taken 5/15/2023 1008 by Hardik Louie RN)  Absence of Physical Injury: Implement safety measures based on patient assessment     Problem: Skin/Tissue Integrity  Goal: Absence of new skin breakdown  Description: 1. Monitor for areas of redness and/or skin breakdown  2. Assess vascular access sites hourly  3. Every 4-6 hours minimum:  Change oxygen saturation probe site  4. Every 4-6 hours:  If on nasal continuous positive airway pressure, respiratory therapy assess nares and determine need for appliance change or resting period.   Outcome: Progressing     Problem: Pain  Goal: Verbalizes/displays adequate comfort level or baseline comfort level  Outcome: Progressing     Problem: Cardiovascular - Adult  Goal: Maintains optimal cardiac output and hemodynamic stability  Outcome: Progressing  Flowsheets (Taken 5/15/2023 2024)  Maintains optimal cardiac output and hemodynamic stability: Monitor blood pressure and heart rate

## 2023-05-16 NOTE — PLAN OF CARE
Problem: Chronic Conditions and Co-morbidities  Goal: Patient's chronic conditions and co-morbidity symptoms are monitored and maintained or improved  5/16/2023 0959 by Eloina Fernandez RN  Outcome: Progressing  5/15/2023 2344 by Danni Manuel RN  Outcome: Progressing  Flowsheets (Taken 5/15/2023 2024)  Care Plan - Patient's Chronic Conditions and Co-Morbidity Symptoms are Monitored and Maintained or Improved: Monitor and assess patient's chronic conditions and comorbid symptoms for stability, deterioration, or improvement     Problem: Discharge Planning  Goal: Discharge to home or other facility with appropriate resources  5/15/2023 2344 by Danni Manuel RN  Outcome: Progressing  Flowsheets (Taken 5/15/2023 2024)  Discharge to home or other facility with appropriate resources: Identify barriers to discharge with patient and caregiver     Problem: Safety - Adult  Goal: Free from fall injury  Recent Flowsheet Documentation  Taken 5/16/2023 0958 by Eloina Fernandez RN  Free From Fall Injury: Instruct family/caregiver on patient safety  5/15/2023 2344 by Danni Manuel RN  Outcome: Progressing     Problem: ABCDS Injury Assessment  Goal: Absence of physical injury  Recent Flowsheet Documentation  Taken 5/16/2023 0958 by Eloina Fernandez RN  Absence of Physical Injury: Implement safety measures based on patient assessment  5/15/2023 2344 by Danni Manuel RN  Outcome: Progressing  Flowsheets (Taken 5/15/2023 1008 by Eloina Fernandez RN)  Absence of Physical Injury: Implement safety measures based on patient assessment     Problem: Skin/Tissue Integrity  Goal: Absence of new skin breakdown  Description: 1. Monitor for areas of redness and/or skin breakdown  2. Assess vascular access sites hourly  3. Every 4-6 hours minimum:  Change oxygen saturation probe site  4.   Every 4-6 hours:  If on nasal continuous positive airway pressure, respiratory therapy assess nares and determine need for appliance change or

## 2023-05-17 PROBLEM — I48.0 PAF (PAROXYSMAL ATRIAL FIBRILLATION) (HCC): Status: ACTIVE | Noted: 2021-05-12

## 2023-05-17 LAB
ANION GAP SERPL CALCULATED.3IONS-SCNC: 13 MMOL/L (ref 7–16)
ANION GAP SERPL CALCULATED.3IONS-SCNC: 8 MMOL/L (ref 7–16)
BASOPHILS # BLD: 0.08 E9/L (ref 0–0.2)
BASOPHILS NFR BLD: 1.2 % (ref 0–2)
BUN SERPL-MCNC: 29 MG/DL (ref 6–23)
BUN SERPL-MCNC: 31 MG/DL (ref 6–23)
CALCIUM SERPL-MCNC: 10 MG/DL (ref 8.6–10.2)
CALCIUM SERPL-MCNC: 10.1 MG/DL (ref 8.6–10.2)
CHLORIDE SERPL-SCNC: 100 MMOL/L (ref 98–107)
CHLORIDE SERPL-SCNC: 99 MMOL/L (ref 98–107)
CO2 SERPL-SCNC: 20 MMOL/L (ref 22–29)
CO2 SERPL-SCNC: 27 MMOL/L (ref 22–29)
CREAT SERPL-MCNC: 1.7 MG/DL (ref 0.7–1.2)
CREAT SERPL-MCNC: 1.9 MG/DL (ref 0.7–1.2)
EOSINOPHIL # BLD: 0.17 E9/L (ref 0.05–0.5)
EOSINOPHIL NFR BLD: 2.4 % (ref 0–6)
ERYTHROCYTE [DISTWIDTH] IN BLOOD BY AUTOMATED COUNT: 15.6 FL (ref 11.5–15)
GLUCOSE SERPL-MCNC: 104 MG/DL (ref 74–99)
GLUCOSE SERPL-MCNC: 148 MG/DL (ref 74–99)
HCT VFR BLD AUTO: 32 % (ref 37–54)
HGB BLD-MCNC: 9.8 G/DL (ref 12.5–16.5)
IMM GRANULOCYTES # BLD: 0.07 E9/L
IMM GRANULOCYTES NFR BLD: 1 % (ref 0–5)
LYMPHOCYTES # BLD: 1.02 E9/L (ref 1.5–4)
LYMPHOCYTES NFR BLD: 14.7 % (ref 20–42)
MAGNESIUM SERPL-MCNC: 2.1 MG/DL (ref 1.6–2.6)
MCH RBC QN AUTO: 29.2 PG (ref 26–35)
MCHC RBC AUTO-ENTMCNC: 30.6 % (ref 32–34.5)
MCV RBC AUTO: 95.2 FL (ref 80–99.9)
METER GLUCOSE: 124 MG/DL (ref 74–99)
METER GLUCOSE: 187 MG/DL (ref 74–99)
MONOCYTES # BLD: 0.89 E9/L (ref 0.1–0.95)
MONOCYTES NFR BLD: 12.8 % (ref 2–12)
NEUTROPHILS # BLD: 4.72 E9/L (ref 1.8–7.3)
NEUTS SEG NFR BLD: 67.9 % (ref 43–80)
PHOSPHATE SERPL-MCNC: 4.9 MG/DL (ref 2.5–4.5)
PLATELET # BLD AUTO: 207 E9/L (ref 130–450)
PMV BLD AUTO: 10.1 FL (ref 7–12)
POTASSIUM SERPL-SCNC: 4.9 MMOL/L (ref 3.5–5)
POTASSIUM SERPL-SCNC: 5.3 MMOL/L (ref 3.5–5)
RBC # BLD AUTO: 3.36 E12/L (ref 3.8–5.8)
SODIUM SERPL-SCNC: 133 MMOL/L (ref 132–146)
SODIUM SERPL-SCNC: 134 MMOL/L (ref 132–146)
WBC # BLD: 7 E9/L (ref 4.5–11.5)

## 2023-05-17 PROCEDURE — 6360000002 HC RX W HCPCS

## 2023-05-17 PROCEDURE — 94640 AIRWAY INHALATION TREATMENT: CPT

## 2023-05-17 PROCEDURE — 99222 1ST HOSP IP/OBS MODERATE 55: CPT | Performed by: INTERNAL MEDICINE

## 2023-05-17 PROCEDURE — 2700000000 HC OXYGEN THERAPY PER DAY

## 2023-05-17 PROCEDURE — APPSS60 APP SPLIT SHARED TIME 46-60 MINUTES

## 2023-05-17 PROCEDURE — 84100 ASSAY OF PHOSPHORUS: CPT

## 2023-05-17 PROCEDURE — 6370000000 HC RX 637 (ALT 250 FOR IP)

## 2023-05-17 PROCEDURE — 36415 COLL VENOUS BLD VENIPUNCTURE: CPT

## 2023-05-17 PROCEDURE — 80048 BASIC METABOLIC PNL TOTAL CA: CPT

## 2023-05-17 PROCEDURE — 82962 GLUCOSE BLOOD TEST: CPT

## 2023-05-17 PROCEDURE — 97530 THERAPEUTIC ACTIVITIES: CPT

## 2023-05-17 PROCEDURE — 2140000000 HC CCU INTERMEDIATE R&B

## 2023-05-17 PROCEDURE — 83735 ASSAY OF MAGNESIUM: CPT

## 2023-05-17 PROCEDURE — 2580000003 HC RX 258

## 2023-05-17 PROCEDURE — 85025 COMPLETE CBC W/AUTO DIFF WBC: CPT

## 2023-05-17 PROCEDURE — 6370000000 HC RX 637 (ALT 250 FOR IP): Performed by: INTERNAL MEDICINE

## 2023-05-17 PROCEDURE — 97535 SELF CARE MNGMENT TRAINING: CPT

## 2023-05-17 RX ORDER — IPRATROPIUM BROMIDE AND ALBUTEROL SULFATE 2.5; .5 MG/3ML; MG/3ML
3 SOLUTION RESPIRATORY (INHALATION) 4 TIMES DAILY
Qty: 360 ML | Refills: 2
Start: 2023-05-18 | End: 2023-05-23

## 2023-05-17 RX ORDER — BUDESONIDE 0.25 MG/2ML
500 INHALANT ORAL 2 TIMES DAILY
Qty: 60 EACH | Refills: 2 | Status: ON HOLD | OUTPATIENT
Start: 2023-05-18

## 2023-05-17 RX ADMIN — ROSUVASTATIN CALCIUM 20 MG: 20 TABLET, FILM COATED ORAL at 08:40

## 2023-05-17 RX ADMIN — PREGABALIN 75 MG: 25 CAPSULE ORAL at 22:52

## 2023-05-17 RX ADMIN — METOPROLOL SUCCINATE 100 MG: 100 TABLET, EXTENDED RELEASE ORAL at 22:56

## 2023-05-17 RX ADMIN — IPRATROPIUM BROMIDE AND ALBUTEROL SULFATE 1 AMPULE: .5; 2.5 SOLUTION RESPIRATORY (INHALATION) at 11:47

## 2023-05-17 RX ADMIN — BUDESONIDE 500 MCG: 0.25 SUSPENSION RESPIRATORY (INHALATION) at 19:43

## 2023-05-17 RX ADMIN — IPRATROPIUM BROMIDE AND ALBUTEROL SULFATE 1 AMPULE: .5; 2.5 SOLUTION RESPIRATORY (INHALATION) at 09:01

## 2023-05-17 RX ADMIN — Medication 10 ML: at 08:41

## 2023-05-17 RX ADMIN — PREGABALIN 75 MG: 25 CAPSULE ORAL at 08:40

## 2023-05-17 RX ADMIN — SODIUM ZIRCONIUM CYCLOSILICATE 10 G: 10 POWDER, FOR SUSPENSION ORAL at 17:06

## 2023-05-17 RX ADMIN — METOPROLOL SUCCINATE 25 MG: 25 TABLET, EXTENDED RELEASE ORAL at 22:52

## 2023-05-17 RX ADMIN — Medication 10 ML: at 22:56

## 2023-05-17 RX ADMIN — ESCITALOPRAM OXALATE 10 MG: 10 TABLET ORAL at 08:40

## 2023-05-17 RX ADMIN — ASPIRIN 81 MG: 81 TABLET, COATED ORAL at 08:40

## 2023-05-17 RX ADMIN — ACETAMINOPHEN 650 MG: 325 TABLET ORAL at 22:55

## 2023-05-17 RX ADMIN — APIXABAN 5 MG: 5 TABLET, FILM COATED ORAL at 22:53

## 2023-05-17 RX ADMIN — IPRATROPIUM BROMIDE AND ALBUTEROL SULFATE 1 AMPULE: .5; 2.5 SOLUTION RESPIRATORY (INHALATION) at 19:43

## 2023-05-17 RX ADMIN — IPRATROPIUM BROMIDE AND ALBUTEROL SULFATE 1 AMPULE: .5; 2.5 SOLUTION RESPIRATORY (INHALATION) at 15:57

## 2023-05-17 RX ADMIN — APIXABAN 5 MG: 5 TABLET, FILM COATED ORAL at 08:40

## 2023-05-17 RX ADMIN — BUDESONIDE 500 MCG: 0.25 SUSPENSION RESPIRATORY (INHALATION) at 09:02

## 2023-05-17 RX ADMIN — METOPROLOL SUCCINATE 100 MG: 100 TABLET, EXTENDED RELEASE ORAL at 08:59

## 2023-05-17 RX ADMIN — SODIUM ZIRCONIUM CYCLOSILICATE 10 G: 10 POWDER, FOR SUSPENSION ORAL at 08:32

## 2023-05-17 ASSESSMENT — PAIN DESCRIPTION - LOCATION: LOCATION: HEAD

## 2023-05-17 ASSESSMENT — PAIN DESCRIPTION - DESCRIPTORS: DESCRIPTORS: ACHING

## 2023-05-17 ASSESSMENT — PAIN SCALES - GENERAL
PAINLEVEL_OUTOF10: 0
PAINLEVEL_OUTOF10: 0

## 2023-05-17 NOTE — CONSULTS
tracking it. I advised patient they can reduce the risk for Heart Failure exacerbations by modifying / controlling the risk factors. We discussed self-managed care which includes the following:  to take medications as prescribed, report any intolerable side effects of medications to the cardiologist / doctor, do not just stop taking the medication; follow a cardiac heart healthy / low sodium diet; weigh yourself daily, exercise regularly- per doctor recommendation and not to smoke or use an excess amount of alcohol. We discussed calling the cardiologist / doctor with a weight gain of 3 pounds in one day or a total of 5 pounds or more in one week. Also, if you should have a significant weight loss of 3# or more in one day to call the doctor, they may need to decrease or hold the diuretic dose. On days you feels nauseated and not eating / drinking, having emesis or diarrhea,  informed to call the cardiologist  / doctor, they may need to decrease or hold diuretic to avoid dehydration. I stressed the importance of informing their cardiologist the first day of onset of any of the signs and symptoms in the \"Yellow Zone\" of the HF Zones. Patient verbalizes understanding. Greater than 30 minutes was spent educating patient. The Heart Failure Booklet given to the patient with additional patient education addressing:  What is Heart Failure? Things You Can Do to Live Well with HF  How to Take Your Medications  How to Eat Less Salt  [place] its Salt?   Exercising Well with Heart Failure  Signs and symptoms of HF to report  Weight Yourself Each Day  Home Self Management- activity, weight tracking, taking medications as prescribed, meals /diet planning (sodium and fluid restriction), how to read food labels, keeping physician follow ups, smoking cessation, follow the Heart Failure Zones  The Heart Failure zones  Every Dose Every Day      Instructed  to call 911 if you have any of the following symptoms:
dermatitis or ulcers   NEURO / PSYCH: Oriented to person, place and time. Speech clear and appropriate. Follows all commands. Pleasant affect     DATA:    EC2023: A-fib with competing junctional pacemaker. RBBB. Vent rate 76, QRS duration 122, QTc 465  Tele strips: AF with CVR. Bpm 90-low 100s  Diagnostic:      Intake/Output Summary (Last 24 hours) at 2023 0742  Last data filed at 2023 0618  Gross per 24 hour   Intake 540 ml   Output --   Net 540 ml       Labs:   CBC:   Recent Labs     23   WBC 7.0 7.0   HGB 9.1* 9.8*   HCT 29.6* 32.0*    207     BMP:   Recent Labs     23    133   K 4.8 5.3*   CO2 23 20*   BUN 28* 29*   CREATININE 1.8* 1.9*   LABGLOM 42 39   CALCIUM 9.3 10.1     Mag:   Recent Labs     23   MG 1.9 2.1     Phos:   Recent Labs     23   PHOS 5.1* 4.9*       FASTING LIPID PANEL:  Lab Results   Component Value Date/Time    CHOL 152 2022 10:10 AM    HDL 29 2021 05:43 AM    LDLCALC 59 2021 05:43 AM    TRIG 58 2022 10:10 AM     LIVER PROFILE:  Recent Labs     05/15/23  0429   AST 12   ALT 7   LABALBU 3.0*      Latest Reference Range & Units 23 03:59   Troponin, High Sensitivity 0 - 11 ng/L 103 (H)   (H): Data is abnormally high    CXR:  Left sign rib subclavian central line terminates in distal brachiocephalic  vein. Right subclavian MediPort catheter is stable. Mild pulmonary vascular congestion. Stable mild cardiac enlargement. Post sternotomy. Assessment and plan to follow as per Dr. Penny Martínez. Electronically signed by SIENNA Griffiths CNP on 2023 at 7:42 AM      I have personally spent 38 minutes in addition to the above and more than 51% of the total time involved in performing this consultation. I have personally and separately seen and evaluated the patient.   I personally and separately obtained the history

## 2023-05-18 VITALS
TEMPERATURE: 97.7 F | SYSTOLIC BLOOD PRESSURE: 89 MMHG | BODY MASS INDEX: 40.65 KG/M2 | OXYGEN SATURATION: 97 % | WEIGHT: 290.4 LBS | DIASTOLIC BLOOD PRESSURE: 56 MMHG | HEART RATE: 76 BPM | RESPIRATION RATE: 18 BRPM | HEIGHT: 71 IN

## 2023-05-18 LAB
ANION GAP SERPL CALCULATED.3IONS-SCNC: 9 MMOL/L (ref 7–16)
BASOPHILS # BLD: 0.09 E9/L (ref 0–0.2)
BASOPHILS NFR BLD: 1.4 % (ref 0–2)
BUN SERPL-MCNC: 30 MG/DL (ref 6–23)
CALCIUM SERPL-MCNC: 9.9 MG/DL (ref 8.6–10.2)
CHLORIDE SERPL-SCNC: 99 MMOL/L (ref 98–107)
CO2 SERPL-SCNC: 25 MMOL/L (ref 22–29)
CREAT SERPL-MCNC: 1.6 MG/DL (ref 0.7–1.2)
EOSINOPHIL # BLD: 0.21 E9/L (ref 0.05–0.5)
EOSINOPHIL NFR BLD: 3.3 % (ref 0–6)
ERYTHROCYTE [DISTWIDTH] IN BLOOD BY AUTOMATED COUNT: 15.4 FL (ref 11.5–15)
GLUCOSE SERPL-MCNC: 119 MG/DL (ref 74–99)
HCT VFR BLD AUTO: 29.8 % (ref 37–54)
HGB BLD-MCNC: 9.3 G/DL (ref 12.5–16.5)
IMM GRANULOCYTES # BLD: 0.06 E9/L
IMM GRANULOCYTES NFR BLD: 0.9 % (ref 0–5)
LYMPHOCYTES # BLD: 1.12 E9/L (ref 1.5–4)
LYMPHOCYTES NFR BLD: 17.5 % (ref 20–42)
MCH RBC QN AUTO: 28.9 PG (ref 26–35)
MCHC RBC AUTO-ENTMCNC: 31.2 % (ref 32–34.5)
MCV RBC AUTO: 92.5 FL (ref 80–99.9)
METER GLUCOSE: 131 MG/DL (ref 74–99)
METER GLUCOSE: 139 MG/DL (ref 74–99)
MONOCYTES # BLD: 0.94 E9/L (ref 0.1–0.95)
MONOCYTES NFR BLD: 14.7 % (ref 2–12)
NEUTROPHILS # BLD: 3.97 E9/L (ref 1.8–7.3)
NEUTS SEG NFR BLD: 62.2 % (ref 43–80)
PLATELET # BLD AUTO: 227 E9/L (ref 130–450)
PMV BLD AUTO: 10.2 FL (ref 7–12)
POTASSIUM SERPL-SCNC: 4.5 MMOL/L (ref 3.5–5)
RBC # BLD AUTO: 3.22 E12/L (ref 3.8–5.8)
SODIUM SERPL-SCNC: 133 MMOL/L (ref 132–146)
WBC # BLD: 6.4 E9/L (ref 4.5–11.5)

## 2023-05-18 PROCEDURE — 6370000000 HC RX 637 (ALT 250 FOR IP): Performed by: INTERNAL MEDICINE

## 2023-05-18 PROCEDURE — 2580000003 HC RX 258

## 2023-05-18 PROCEDURE — 6370000000 HC RX 637 (ALT 250 FOR IP)

## 2023-05-18 PROCEDURE — 36415 COLL VENOUS BLD VENIPUNCTURE: CPT

## 2023-05-18 PROCEDURE — 94640 AIRWAY INHALATION TREATMENT: CPT

## 2023-05-18 PROCEDURE — 80048 BASIC METABOLIC PNL TOTAL CA: CPT

## 2023-05-18 PROCEDURE — 82962 GLUCOSE BLOOD TEST: CPT

## 2023-05-18 PROCEDURE — 6360000002 HC RX W HCPCS

## 2023-05-18 PROCEDURE — 85025 COMPLETE CBC W/AUTO DIFF WBC: CPT

## 2023-05-18 PROCEDURE — 2700000000 HC OXYGEN THERAPY PER DAY

## 2023-05-18 RX ADMIN — BUDESONIDE 500 MCG: 0.25 SUSPENSION RESPIRATORY (INHALATION) at 09:18

## 2023-05-18 RX ADMIN — Medication 10 ML: at 08:20

## 2023-05-18 RX ADMIN — ESCITALOPRAM OXALATE 10 MG: 10 TABLET ORAL at 08:19

## 2023-05-18 RX ADMIN — IPRATROPIUM BROMIDE AND ALBUTEROL SULFATE 1 AMPULE: .5; 2.5 SOLUTION RESPIRATORY (INHALATION) at 11:59

## 2023-05-18 RX ADMIN — SODIUM ZIRCONIUM CYCLOSILICATE 10 G: 10 POWDER, FOR SUSPENSION ORAL at 08:18

## 2023-05-18 RX ADMIN — ASPIRIN 81 MG: 81 TABLET, COATED ORAL at 08:18

## 2023-05-18 RX ADMIN — METOPROLOL SUCCINATE 100 MG: 100 TABLET, EXTENDED RELEASE ORAL at 08:18

## 2023-05-18 RX ADMIN — IPRATROPIUM BROMIDE AND ALBUTEROL SULFATE 1 AMPULE: .5; 2.5 SOLUTION RESPIRATORY (INHALATION) at 09:17

## 2023-05-18 RX ADMIN — APIXABAN 5 MG: 5 TABLET, FILM COATED ORAL at 08:39

## 2023-05-18 RX ADMIN — ROSUVASTATIN CALCIUM 20 MG: 20 TABLET, FILM COATED ORAL at 08:19

## 2023-05-18 RX ADMIN — PREGABALIN 75 MG: 25 CAPSULE ORAL at 08:39

## 2023-05-18 NOTE — CARE COORDINATION
Discharge order noted. Patient to discharge home today. Ambulating pulse-ox testing completed and patient required 1L NC. Met with patient at bedside to choice for DME company, he would like a referral made to [facility] [employer] and family to transport home. Referral made to Debra Mcknight at [facility]; oxygen to be delivered to patient's room. The Plan for Transition of Care is related to the following treatment goals: discharge planning    The Patient and/or patient representative Yaneth Teague was provided with a choice of provider and agrees with the discharge plan. [x] Yes [] No    Freedom of choice list was provided with basic dialogue that supports the patient's individualized plan of care/goals, treatment preferences and shares the quality data associated with the providers.  [x] Yes [] No     JOHNNY Price, [address] [phone]

## 2023-05-18 NOTE — PLAN OF CARE
Problem: Discharge Planning  Goal: Discharge to home or other facility with appropriate resources  Outcome: Progressing     Problem: Skin/Tissue Integrity  Goal: Absence of new skin breakdown  Description: 1. Monitor for areas of redness and/or skin breakdown  2. Assess vascular access sites hourly  3. Every 4-6 hours minimum:  Change oxygen saturation probe site  4. Every 4-6 hours:  If on nasal continuous positive airway pressure, respiratory therapy assess nares and determine need for appliance change or resting period.   Outcome: Progressing     Problem: Safety - Adult  Goal: Free from fall injury  Outcome: Progressing     Problem: ABCDS Injury Assessment  Goal: Absence of physical injury  Outcome: Progressing     Problem: Metabolic/Fluid and Electrolytes - Adult  Goal: Hemodynamic stability and optimal renal function maintained  Outcome: Progressing

## 2023-05-18 NOTE — DISCHARGE SUMMARY
Physician Discharge Summary     Patient ID:  Sheyla De Leon  [id number]  65 y.o.  1960    Admit date: 5/11/2023    Discharge date and time: 5/18/23    Admission Diagnoses:   Patient Active Problem List   Diagnosis    CAD (coronary artery disease)    Primary hypertension    Gastro-esophageal reflux disease without esophagitis    COPD (chronic obstructive pulmonary disease) (HCC)    Chest pain    Unspecified atrial fibrillation (HCC)    Malignant neoplasm of lung (HCC)    Acute respiratory failure with hypoxia (HCC)    Hyperlipidemia, unspecified    PAF (paroxysmal atrial fibrillation) (HCC)    Pleural effusion on right    Obstructive sleep apnea    Former smoker    Chronic diastolic (congestive) heart failure (HCC)    Acute renal failure (HCC)    Hyperkalemia    Ischemic cardiomyopathy    Squamous cell carcinoma of right lung (HCC)    Morbid obesity ([address]    Palliative care by specialist       Discharge Diagnoses:   Patient Active Problem List   Diagnosis    CAD (coronary artery disease)    Primary hypertension    Gastro-esophageal reflux disease without esophagitis    COPD (chronic obstructive pulmonary disease) (HCC)    Chest pain    Unspecified atrial fibrillation ([address]    Malignant neoplasm of lung (HCC)    Acute respiratory failure with hypoxia (HCC)    Hyperlipidemia, unspecified    PAF (paroxysmal atrial fibrillation) (HCC)    Pleural effusion on right    Obstructive sleep apnea    Former smoker    Chronic diastolic (congestive) heart failure (HCC)    Acute renal failure (HCC)    Hyperkalemia    Ischemic cardiomyopathy    Squamous cell carcinoma of right lung (HCC)    Morbid obesity ([address]    Palliative care by specialist       Consults: cardiology, pulmonary/intensive care, ID, nephrology, and hematology/oncology    HPI: 66 y/o WM w lung Ca on chemo and recent admit for ARF and hyperkalemia presented to hosp recurrent renal failure and hyperkalemia and acute on chronic anemia.  Presented to [facility] and

## 2023-05-18 NOTE — PROGRESS NOTES
[facility] MD Alexander Rutherford, MD Katie Hodges, CHELLE Reis, ANP  Chandan Barbosa, CHELLE  Progress Note    5/15/2023    SUBJECTIVE:   5/12: Seen in the ICU. Alert and interactive. On room air. No longer requiring pressor support. Appetite is good. Denies dyspnea, palpitations, or chest pain. No longer having diarrhea. 5/13: Resting comfortably. Asleep. No new complaint or issue. Hemodynamically stable. Denies dyspnea on nasal cannula. 5/14: No new complaint or issue. Still tachycardic. Still hypotensive. Feeling much better. 5/15: Mild fatigue, dyspnea with exertion. Heart rate still 90s-120s. Blood pressures still lowish. Denies chest pain or palpitations. PROBLEM LIST:    Principal Problem:    Hyperkalemia  Resolved Problems:    * No resolved hospital problems. *         DIET:    ADULT DIET; Regular; 3 carb choices (45 gm/meal); Low Fat/Low Chol/High Fiber/KEVIN; No Added Salt (3-4 gm); Low Potassium (Less than 3000 mg/day); Low Phosphorus (Less than 1000 mg);  Less than 60 gm     MEDS (scheduled):    sodium zirconium cyclosilicate  10 g Oral BID    ipratropium-albuterol  1 ampule Inhalation 4x daily    budesonide  0.5 mg Nebulization BID    metoprolol succinate  100 mg Oral Daily    metoprolol succinate  100 mg Oral Nightly    lisinopril  5 mg Oral QAM    metoprolol succinate  25 mg Oral Nightly    mupirocin   Each Nostril BID    apixaban  5 mg Oral BID    aspirin  81 mg Oral Daily    escitalopram  10 mg Oral Daily    [Held by provider] finasteride  5 mg Oral Daily    pregabalin  75 mg Oral BID    rosuvastatin  20 mg Oral QAM    [Held by provider] tamsulosin  0.4 mg Oral Daily    sodium chloride flush  5-40 mL IntraVENous 2 times per day    colestipol  1 g Oral BID WC       MEDS (infusions):   sodium chloride      dextrose         MEDS (prn):  albuterol, sodium chloride flush, sodium chloride, ondansetron **OR** ondansetron,
[facility] MD Rosalva Quinonez MD Agapito Lyons, MD Mennie Gustin, CHELLE Reis, CHUCK Vogel CNP  Progress Note    5/17/2023    SUBJECTIVE:   5/12: Seen in the ICU. Alert and interactive. On room air. No longer requiring pressor support. Appetite is good. Denies dyspnea, palpitations, or chest pain. No longer having diarrhea. 5/13: Resting comfortably. Asleep. No new complaint or issue. Hemodynamically stable. Denies dyspnea on nasal cannula. 5/14: No new complaint or issue. Still tachycardic. Still hypotensive. Feeling much better. 5/15: Mild fatigue, dyspnea with exertion. Heart rate still 90s-120s. Blood pressures still lowish. Denies chest pain or palpitations. 5/16: In good spirits. Denies complaint. ROS unremarkable. Heart rate stable. Blood pressure improved. 5/17:  Feels good. Denies chest pain or SOB. NAD. Oxygen on at 1L/NC. Denies nausea, vomiting or abd pain. Appetite is OK. PROBLEM LIST:    Principal Problem:    Hyperkalemia  Resolved Problems:    * No resolved hospital problems. *         DIET:    ADULT DIET; Regular; 3 carb choices (45 gm/meal); Low Fat/Low Chol/High Fiber/KEVIN; No Added Salt (3-4 gm); Low Potassium (Less than 3000 mg/day); Low Phosphorus (Less than 1000 mg);  Less than 60 gm     MEDS (scheduled):    sodium zirconium cyclosilicate  10 g Oral BID    ipratropium 0.5 mg-albuterol 2.5 mg  1 ampule Inhalation 4x daily    budesonide  0.5 mg Nebulization BID    metoprolol succinate  100 mg Oral Daily    metoprolol succinate  100 mg Oral Nightly    [Held by provider] lisinopril  5 mg Oral QAM    metoprolol succinate  25 mg Oral Nightly    apixaban  5 mg Oral BID    aspirin  81 mg Oral Daily    escitalopram  10 mg Oral Daily    [Held by provider] finasteride  5 mg Oral Daily    pregabalin  75 mg Oral BID    rosuvastatin  20 mg Oral QAM    [Held by provider] tamsulosin  0.4 mg Oral Daily    sodium
New consult sent to cardiology via [email or web address].
Occupational Therapy  OCCUPATIONAL THERAPY INITIAL EVALUATION     Matilda Carr [facility] [id number] [place] [address]  [facility]      Date:2023                                                Patient Name: Daniel Abel  MRN: [id number]  : 1960  Room: 0559/5346-Z    [address] [place] [address] [id number]    Referring Provider: Jed Farfan MD  Specific Provider Orders/Date: OT eval and treat 23     Diagnosis: Hyperkalemia [E87.5]   Pt admitted to hospital on 5/10/23 for abnormal labs from CHF clinic      Pertinent Medical History:  has a past medical history of A-fib ([facility] Acute on chronic diastolic (congestive) heart failure ([facility] Blood transfusion reaction, CAD (coronary artery disease), COPD (chronic obstructive pulmonary disease) ([facility] GERD (gastroesophageal reflux disease), History of blood transfusion, Hyperlipidemia, Hypertension, Malignant neoplasm of lung ([facility] and ANTONIO on CPAP.        Precautions:  Fall Risk, current chemo (lung CA)    Assessment of current deficits    [x] Functional mobility  [x]ADLs  [x] Strength               []Cognition    [x] Functional transfers   [x] IADLs         [x] Safety Awareness   [x]Endurance    [] Fine Coordination              [x] Balance      [] Vision/perception   []Sensation     []Gross Motor Coordination  [] ROM  [] Delirium                   [] Motor Control     OT PLAN OF CARE   OT POC based on physician orders, patient diagnosis and results of clinical assessment    Frequency/Duration 1-3 days/wk for 2 weeks PRN   Specific OT Treatment Interventions to include:   * Instruction/training on adapted ADL techniques and AE recommendations to increase functional independence within precautions       * Training on energy conservation strategies, correct breathing pattern and techniques to improve independence/tolerance for self-care routine  * Functional transfer/mobility training/DME recommendations for
Occupational Therapy  OCCUPATIONAL THERAPY INITIAL EVALUATION     Matilda Carr [facility] [id number] [place] [address]      [id number]                                                Patient Name: Ta Rojas  MRN: [id number]  : 1960  Room: [id number]    Referring Provider: Adelso Oconnell MD  Specific Provider Orders/Date: OT eval and treat 23     Diagnosis: Hyperkalemia [E87.5]   Pt admitted to hospital on 5/10/23 for abnormal labs from CHF clinic      Pertinent Medical History:  has a past medical history of A-fib ([facility] [address] Acute on chronic diastolic (congestive) heart failure ([facility] [address] Blood transfusion reaction, CAD (coronary artery disease), COPD (chronic obstructive pulmonary disease) ([facility] GERD (gastroesophageal reflux disease), History of blood transfusion, Hyperlipidemia, Hypertension, Malignant neoplasm of lung ([facility] [address] and ANTONIO on CPAP.        Precautions:  Fall Risk, current chemo (lung CA)    Assessment of current deficits    [x] Functional mobility  [x]ADLs  [x] Strength               []Cognition    [x] Functional transfers   [x] IADLs         [x] Safety Awareness   [x]Endurance    [] Fine Coordination              [x] Balance      [] Vision/perception   []Sensation     []Gross Motor Coordination  [] ROM  [] Delirium                   [] Motor Control     OT PLAN OF CARE   OT POC based on physician orders, patient diagnosis and results of clinical assessment    Frequency/Duration 1-3 days/wk for 2 weeks PRN   Specific OT Treatment Interventions to include:   * Instruction/training on adapted ADL techniques and AE recommendations to increase functional independence within precautions       * Training on energy conservation strategies, correct breathing pattern and techniques to improve independence/tolerance for self-care routine  * Functional transfer/mobility training/DME recommendations for
Okay to discontinue colestipol per doctor Kenji Martin.
PULSE OX ON ROOM AIR SITTING 88%   PULSE OX ON 2 LITERS SITTING RECOVERY 92%   PULSE OX ON ROOM AIR AMBULATING 85 %   PULSE OX ON 2 LITERS AMBULATING RECOVERY 95%
PULSE OX ON ROOM AIR SITTING_94___%   PULSE OX ON __1__LITERS SITTING RECOVERY _96___%   PULSE OX ON ROOM AIR AMBULATING __88__%   PULSE OX ON __1__LITERS AMBULATING RECOVERY _92__%
Physical Therapy  Physical Therapy Initial Assessment    Name: Marialuisa Kennedy  : 1960  MRN: [id number]      Date of Service: 5/15/2023    Evaluating PT:  Megan Steinberg PT, DPT [id number]    Room #:  2826/6997-U  Diagnosis:  Hyperkalemia [E87.5]  PMHx/PSHx:   has a past medical history of A-fib ([facility] Acute on chronic diastolic [facility]congest[facility] Blood transfusion reaction, CAD (coronary artery disease), COPD (chronic obstructive pulmonary disease) [facility] GERD (gastroesophageal reflux disease), History of blood transfusion, Hyperlipidemia, Hypertension, Malignant neoplasm of lung ([facility] and ANTONIO on CPAP. has a past surgical history that includes Diagnostic Cardiac Cath Lab Procedure (2007); Coronary artery bypass graft (2007); ECHO Compl W Dop Color Flow (2012); Coronary angioplasty with stent (2014); transesophageal echocardiogram (2020); bronchoscopy (N/A, 2020); bronchoscopy (2020); bronchoscopy (2020); bronchoscopy (2020); bronchoscopy (2020); bronchoscopy (2020); Cardioversion (2021); Colonoscopy; and Port Surgery (N/A, 2022). Procedure/Surgery:  None  Precautions:  Falls, special care precautions, O2  Equipment Needs:  TBD    SUBJECTIVE:    Pt lives alone in a 2 story home with 0 stair(s) to enter. Bed is on 2nd floor and bath is on 2nd floor. Full flight of stairs with 1 rail to 2nd floor. Pt ambulated with no AD PTA. OBJECTIVE:   Initial Evaluation  Date: 5/15/2023 Treatment Short Term/ Long Term   Goals   AM-PAC 6 Clicks      Was pt agreeable to Eval/treatment? Yes     Does pt have pain? No c/o pain     Bed Mobility  Rolling: NT  Supine to sit: Supervision  Sit to supine: Supervision  Scooting: NT  Rolling: Independent  Supine to sit:  Independent  Sit to supine: Independent  Scooting: Independent   Transfers Sit to stand: Supervision  Stand to sit: Supervision  Stand pivot: NT  Sit to stand:
Reached out to cardiology and heme/oncology to see if they want to hold aspirin or eliquis due to patient's anemia. Per Cardiology, patient is ok to continue current medication. Awaiting for oncology's call back.
Subjective:    Feeling  yenifer depressed today, wants to go home. Cardio consult started. K 5.3 today, Cr 1.9 , BP borderline    Objective:    /64   Pulse 83   Temp 97.6 °F (36.4 °C) (Temporal)   Resp 13   Ht 5' 11\" (1.803 m)   Wt (!) 303 lb 4.8 oz (137.6 kg)   SpO2 99%   BMI 42.30 kg/m²     Gen: AAOx3. No acute distress, color improved, moribidly obese  HEENT: NCAT, PERRLA, EOMI  Neck: No JVD or bruit  Chest: Symmetric. Nonlabored  Heart:  irreg irreg S1 S2.  Lungs:  CTAB. No wheezes, rales or rhonchi  Abd: obese, Soft, NT, ND. Positive bowel sounds. No rebound or guarding   Extrem:  1 plus edema  Neuro: 2-12 intact.  No focal deficits  Psych ; flat affect    CBC:   Lab Results   Component Value Date/Time    WBC 7.0 05/17/2023 04:26 AM    RBC 3.36 05/17/2023 04:26 AM    HGB 9.8 05/17/2023 04:26 AM    HCT 32.0 05/17/2023 04:26 AM    MCV 95.2 05/17/2023 04:26 AM    MCH 29.2 05/17/2023 04:26 AM    MCHC 30.6 05/17/2023 04:26 AM    RDW 15.6 05/17/2023 04:26 AM     05/17/2023 04:26 AM    MPV 10.1 05/17/2023 04:26 AM     CMP:    Lab Results   Component Value Date/Time     05/17/2023 04:26 AM    K 5.3 05/17/2023 04:26 AM    K 6.5 05/10/2023 06:00 PM     05/17/2023 04:26 AM    CO2 20 05/17/2023 04:26 AM    BUN 29 05/17/2023 04:26 AM    CREATININE 1.9 05/17/2023 04:26 AM    GFRAA >60 07/22/2022 02:38 PM    LABGLOM 39 05/17/2023 04:26 AM    GLUCOSE 104 05/17/2023 04:26 AM    GLUCOSE 129 06/03/2011 02:16 PM    PROT 6.3 05/15/2023 04:29 AM    LABALBU 3.0 05/15/2023 04:29 AM    LABALBU 4.7 06/03/2011 02:16 PM    CALCIUM 10.1 05/17/2023 04:26 AM    BILITOT 0.5 05/15/2023 04:29 AM    ALKPHOS 90 05/15/2023 04:29 AM    AST 12 05/15/2023 04:29 AM    ALT 7 05/15/2023 04:29 AM        Assessment:    Patient Active Problem List   Diagnosis    CAD (coronary artery disease)    Primary hypertension    Gastro-esophageal reflux disease without esophagitis    [facility]
Subjective:    Feeling better, DW Renal, Cardio consult pending, BP better today, HR stable, diarrhea resolved now constipated. Objective:    /76   Pulse 88   Temp 98.1 °F (36.7 °C) (Oral)   Resp 18   Ht 5' 11\" (1.803 m)   Wt (!) 303 lb 4.8 oz (137.6 kg)   SpO2 98%   BMI 42.30 kg/m²     Gen: AAOx3. No acute distress, color improved, moribidly obese  HEENT: NCAT, PERRLA, EOMI  Neck: No JVD or bruit  Chest: Symmetric. Nonlabored  Heart:  RRR S1 S2. No murmurs, rubs, or gallops  Lungs:  CTAB. No wheezes, rales or rhonchi  Abd: obese, Soft, NT, ND. Positive bowel sounds. No rebound or guarding   Extrem:  1 plus edema  Neuro: 2-12 intact.  No focal deficits    CBC:   Lab Results   Component Value Date/Time    WBC 7.0 05/16/2023 04:29 AM    RBC 3.12 05/16/2023 04:29 AM    HGB 9.1 05/16/2023 04:29 AM    HCT 29.6 05/16/2023 04:29 AM    MCV 94.9 05/16/2023 04:29 AM    MCH 29.2 05/16/2023 04:29 AM    MCHC 30.7 05/16/2023 04:29 AM    RDW 15.9 05/16/2023 04:29 AM     05/16/2023 04:29 AM    MPV 10.9 05/16/2023 04:29 AM     CMP:    Lab Results   Component Value Date/Time     05/16/2023 04:29 AM    K 4.8 05/16/2023 04:29 AM    K 6.5 05/10/2023 06:00 PM     05/16/2023 04:29 AM    CO2 23 05/16/2023 04:29 AM    BUN 28 05/16/2023 04:29 AM    CREATININE 1.8 05/16/2023 04:29 AM    GFRAA >60 07/22/2022 02:38 PM    LABGLOM 42 05/16/2023 04:29 AM    GLUCOSE 127 05/16/2023 04:29 AM    GLUCOSE 129 06/03/2011 02:16 PM    PROT 6.3 05/15/2023 04:29 AM    LABALBU 3.0 05/15/2023 04:29 AM    LABALBU 4.7 06/03/2011 02:16 PM    CALCIUM 9.3 05/16/2023 04:29 AM    BILITOT 0.5 05/15/2023 04:29 AM    ALKPHOS 90 05/15/2023 04:29 AM    AST 12 05/15/2023 04:29 AM    ALT 7 05/15/2023 04:29 AM        Assessment:    Patient Active Problem List   Diagnosis    CAD (coronary artery disease)    Primary hypertension    Gastro-esophageal reflux disease without esophagitis    [facility]
Subjective:    Weekend notes reviewed, hypotensive this morning, art line while in MICU, Hgb stable and Renal improved. Supposed be off BB and Ace and pt stated didn't get this morning but nurse called to hold? Objective:    BP 98/72   Pulse (!) 104   Temp 98.2 °F (36.8 °C) (Oral)   Resp 16   Ht 5' 11\" (1.803 m)   Wt (!) 303 lb 4.8 oz (137.6 kg)   SpO2 94%   BMI 42.30 kg/m²     Gen: AAOx3. No acute distress, color improved, moribidly obese  HEENT: NCAT, PERRLA, EOMI  Neck: No JVD or bruit  Chest: Symmetric. Nonlabored  Heart:  RRR S1 S2. No murmurs, rubs, or gallops  Lungs:  CTAB. No wheezes, rales or rhonchi  Abd: obese, Soft, NT, ND. Positive bowel sounds. No rebound or guarding   Extrem:  1 plus edema  Neuro: 2-12 intact.  No focal deficits    CBC:   Lab Results   Component Value Date/Time    WBC 9.6 05/15/2023 04:29 AM    RBC 3.13 05/15/2023 04:29 AM    HGB 9.1 05/15/2023 04:29 AM    HCT 29.8 05/15/2023 04:29 AM    MCV 95.2 05/15/2023 04:29 AM    MCH 29.1 05/15/2023 04:29 AM    MCHC 30.5 05/15/2023 04:29 AM    RDW 15.9 05/15/2023 04:29 AM     05/15/2023 04:29 AM    MPV 10.8 05/15/2023 04:29 AM     CMP:    Lab Results   Component Value Date/Time     05/15/2023 04:29 AM    K 5.2 05/15/2023 04:29 AM    K 6.5 05/10/2023 06:00 PM    CL 96 05/15/2023 04:29 AM    CO2 24 05/15/2023 04:29 AM    BUN 27 05/15/2023 04:29 AM    CREATININE 2.2 05/15/2023 04:29 AM    GFRAA >60 07/22/2022 02:38 PM    LABGLOM 33 05/15/2023 04:29 AM    GLUCOSE 118 05/15/2023 04:29 AM    GLUCOSE 129 06/03/2011 02:16 PM    PROT 6.3 05/15/2023 04:29 AM    LABALBU 3.0 05/15/2023 04:29 AM    LABALBU 4.7 06/03/2011 02:16 PM    CALCIUM 9.1 05/15/2023 04:29 AM    BILITOT 0.5 05/15/2023 04:29 AM    ALKPHOS 90 05/15/2023 04:29 AM    AST 12 05/15/2023 04:29 AM    ALT 7 05/15/2023 04:29 AM        Assessment:    Patient Active Problem List   Diagnosis    CAD (coronary artery disease)    Primary hypertension    Gastro-esophageal reflux
dextrose         MEDS (prn):  albuterol, sodium chloride flush, sodium chloride, ondansetron **OR** ondansetron, polyethylene glycol, acetaminophen **OR** acetaminophen, glucose, dextrose bolus **OR** dextrose bolus, glucagon (rDNA), dextrose    PHYSICAL EXAM:     Patient Vitals for the past 24 hrs:   BP Temp Temp src Pulse Resp SpO2   05/16/23 1453 92/70 98.1 °F (36.7 °C) Oral 87 18 97 %   05/16/23 1213 -- -- -- 88 18 --   05/16/23 1100 112/76 98.1 °F (36.7 °C) Oral 95 18 98 %   05/16/23 1001 126/76 98.6 °F (37 °C) Oral (!) 112 18 --   05/16/23 0930 -- -- -- -- -- 90 %   05/16/23 0800 126/76 98.4 °F (36.9 °C) Oral (!) 113 18 97 %   @      Intake/Output Summary (Last 24 hours) at 5/16/2023 2125  Last data filed at 5/16/2023 1411  Gross per 24 hour   Intake 420 ml   Output --   Net 420 ml         Wt Readings from Last 3 Encounters:   05/12/23 (!) 303 lb 4.8 oz (137.6 kg)   05/10/23 300 lb (136.1 kg)   05/10/23 (!) 301 lb (136.5 kg)       Constitutional:  in no acute distress  HEENT: NC/AT, EOMI, sclera and conjunctiva are clear and anicteric, mucus membranes moist  Neck: Trachea midline, no JVD  Cardiovascular: S1, S2 irregularly irregular rhythm, no murmur,or rub  Respiratory:  CTAB. No crackles, no wheeze  Gastrointestinal:  Soft, nontender, nondistended, NABS  Ext: no edema, feet warm  Skin: dry, no rash  Neuro: awake, alert, interactive and appropriate. Moves all 4 extremities.       DATA:    Recent Labs     05/14/23  0605 05/15/23  0429 05/16/23  0429   WBC 9.8 9.6 7.0   HGB 9.7* 9.1* 9.1*   HCT 31.1* 29.8* 29.6*   MCV 94.2 95.2 94.9    232 242     Recent Labs     05/14/23  0605 05/14/23  1056 05/15/23  0429 05/16/23  0429     --  132 134   K 5.4* 5.5* 5.2* 4.8   CL 99  --  96* 102   CO2 25  --  24 23   MG 1.7  --  1.9 1.9   PHOS 4.7*  --  5.3* 5.1*   BUN 21  --  27* 28*   CREATININE 2.0*  --  2.2* 1.8*   ALT 6  --  7  --    AST 12  --  12  --    BILITOT 0.6  --  0.5  --    ALKPHOS 106  --  90  --
Lab Results   Component Value Date    IRON 26 (L) 05/14/2023    TIBC 191 (L) 05/14/2023    FERRITIN 445 05/14/2023           Radiology-    XR CHEST PORTABLE    Result Date: 5/10/2023  EXAMINATION: ONE XRAY VIEW OF THE CHEST 5/10/2023 10:30 pm COMPARISON: Chest one view, 05/10/2023 HISTORY: ORDERING SYSTEM PROVIDED HISTORY: left IJ central line placement TECHNOLOGIST PROVIDED HISTORY: Reason for exam:->left IJ central line placement FINDINGS: Lines, tubes, and devices: Left internal jugular central line with tip projecting into the distal left brachiocephalic vein. There is stable right subclavian MediPort catheter position. Lungs and pleura: There is mild pulmonary vascular redistribution. No focal consolidation. No pleural effusion. No pneumothorax. Cardiomediastinal silhouette: The mediastinum is stable. The heart is mildly enlarged. Bones and soft tissues: There are stable median sternotomy wires and surgical clips. Left sign rib subclavian central line terminates in distal brachiocephalic vein. Right subclavian MediPort catheter is stable. Mild pulmonary vascular congestion. Stable mild cardiac enlargement. Post sternotomy. XR CHEST PORTABLE    Result Date: 5/10/2023  EXAMINATION: ONE XRAY VIEW OF THE CHEST 5/10/2023 6:01 pm COMPARISON: 04/06/2023. HISTORY: ORDERING SYSTEM PROVIDED HISTORY: shortness of breath, h/o lung CA on chemo TECHNOLOGIST PROVIDED HISTORY: Reason for exam:->shortness of breath, h/o lung CA on chemo FINDINGS: In Irxrss-S-Lots is again noted on the right. The patient is status post sternotomy. The cardiomediastinal silhouette is enlarged. This finding is unchanged. No definite pulmonary consolidation or collapse is identified. No pneumothorax or large pleural effusion is seen. Enlarged cardiomediastinal silhouette, unchanged. No definite acute pulmonary disease. ASSESSMENT/PLAN :  45-year-old male   - RLL squamous cell carcinoma.  He is s/p SBRT with

## 2023-05-22 ENCOUNTER — HOSPITAL ENCOUNTER (INPATIENT)
Age: 63
LOS: 11 days | Discharge: HOME | End: 2023-06-02
Attending: EMERGENCY MEDICINE | Admitting: FAMILY MEDICINE
Payer: COMMERCIAL

## 2023-05-22 DIAGNOSIS — E87.5 HYPERKALEMIA: Primary | ICD-10-CM

## 2023-05-22 DIAGNOSIS — C34.91 SQUAMOUS CELL CARCINOMA OF RIGHT LUNG (HCC): ICD-10-CM

## 2023-05-22 DIAGNOSIS — N17.9 ACUTE RENAL FAILURE, UNSPECIFIED ACUTE RENAL FAILURE TYPE (HCC): ICD-10-CM

## 2023-05-22 DIAGNOSIS — T80.219A INFECTION OF VENOUS ACCESS PORT, INITIAL ENCOUNTER: ICD-10-CM

## 2023-05-22 LAB
ALBUMIN SERPL-MCNC: 3.8 G/DL (ref 3.5–5.2)
ALP SERPL-CCNC: 111 U/L (ref 40–129)
ALT SERPL-CCNC: 10 U/L (ref 0–40)
ANION GAP SERPL CALCULATED.3IONS-SCNC: 15 MMOL/L (ref 7–16)
ANION GAP SERPL CALCULATED.3IONS-SCNC: 17 MMOL/L (ref 7–16)
ANISOCYTOSIS: ABNORMAL
AST SERPL-CCNC: 12 U/L (ref 0–39)
BASOPHILS # BLD: 0.03 E9/L (ref 0–0.2)
BASOPHILS NFR BLD: 0.4 % (ref 0–2)
BILIRUB SERPL-MCNC: 0.4 MG/DL (ref 0–1.2)
BUN SERPL-MCNC: 85 MG/DL (ref 6–23)
BUN SERPL-MCNC: 90 MG/DL (ref 6–23)
BURR CELLS: ABNORMAL
CALCIUM SERPL-MCNC: 9.2 MG/DL (ref 8.6–10.2)
CALCIUM SERPL-MCNC: 9.5 MG/DL (ref 8.6–10.2)
CHLORIDE SERPL-SCNC: 96 MMOL/L (ref 98–107)
CHLORIDE SERPL-SCNC: 97 MMOL/L (ref 98–107)
CO2 SERPL-SCNC: 21 MMOL/L (ref 22–29)
CO2 SERPL-SCNC: 22 MMOL/L (ref 22–29)
CREAT SERPL-MCNC: 6.5 MG/DL (ref 0.7–1.2)
CREAT SERPL-MCNC: 8.1 MG/DL (ref 0.7–1.2)
EOSINOPHIL # BLD: 0.01 E9/L (ref 0.05–0.5)
EOSINOPHIL NFR BLD: 0.1 % (ref 0–6)
ERYTHROCYTE [DISTWIDTH] IN BLOOD BY AUTOMATED COUNT: 16 FL (ref 11.5–15)
GLUCOSE SERPL-MCNC: 251 MG/DL (ref 74–99)
GLUCOSE SERPL-MCNC: 298 MG/DL (ref 74–99)
HCT VFR BLD AUTO: 27.7 % (ref 37–54)
HGB BLD-MCNC: 8.7 G/DL (ref 12.5–16.5)
IMM GRANULOCYTES # BLD: 0.1 E9/L
IMM GRANULOCYTES NFR BLD: 1.4 % (ref 0–5)
LYMPHOCYTES # BLD: 0.34 E9/L (ref 1.5–4)
LYMPHOCYTES NFR BLD: 4.9 % (ref 20–42)
MCH RBC QN AUTO: 29.1 PG (ref 26–35)
MCHC RBC AUTO-ENTMCNC: 31.4 % (ref 32–34.5)
MCV RBC AUTO: 92.6 FL (ref 80–99.9)
METER GLUCOSE: 196 MG/DL (ref 74–99)
METER GLUCOSE: 263 MG/DL (ref 74–99)
METER GLUCOSE: 311 MG/DL (ref 74–99)
MONOCYTES # BLD: 0.07 E9/L (ref 0.1–0.95)
MONOCYTES NFR BLD: 1 % (ref 2–12)
NEUTROPHILS # BLD: 6.37 E9/L (ref 1.8–7.3)
NEUTS SEG NFR BLD: 92.2 % (ref 43–80)
OVALOCYTES: ABNORMAL
PLATELET # BLD AUTO: 271 E9/L (ref 130–450)
PMV BLD AUTO: 11.2 FL (ref 7–12)
POIKILOCYTES: ABNORMAL
POLYCHROMASIA: ABNORMAL
POTASSIUM SERPL-SCNC: 6 MMOL/L (ref 3.5–5)
POTASSIUM SERPL-SCNC: 6.2 MMOL/L (ref 3.5–5)
PROT SERPL-MCNC: 7 G/DL (ref 6.4–8.3)
RBC # BLD AUTO: 2.99 E12/L (ref 3.8–5.8)
SODIUM SERPL-SCNC: 133 MMOL/L (ref 132–146)
SODIUM SERPL-SCNC: 135 MMOL/L (ref 132–146)
TEAR DROP CELLS: ABNORMAL
TROPONIN, HIGH SENSITIVITY: 50 NG/L (ref 0–11)
TROPONIN, HIGH SENSITIVITY: 60 NG/L (ref 0–11)
WBC # BLD: 6.9 E9/L (ref 4.5–11.5)

## 2023-05-22 PROCEDURE — 2580000003 HC RX 258: Performed by: EMERGENCY MEDICINE

## 2023-05-22 PROCEDURE — 96375 TX/PRO/DX INJ NEW DRUG ADDON: CPT

## 2023-05-22 PROCEDURE — 82962 GLUCOSE BLOOD TEST: CPT

## 2023-05-22 PROCEDURE — 36415 COLL VENOUS BLD VENIPUNCTURE: CPT

## 2023-05-22 PROCEDURE — 84484 ASSAY OF TROPONIN QUANT: CPT

## 2023-05-22 PROCEDURE — 99285 EMERGENCY DEPT VISIT HI MDM: CPT

## 2023-05-22 PROCEDURE — 80053 COMPREHEN METABOLIC PANEL: CPT

## 2023-05-22 PROCEDURE — 2000000000 HC ICU R&B

## 2023-05-22 PROCEDURE — 80048 BASIC METABOLIC PNL TOTAL CA: CPT

## 2023-05-22 PROCEDURE — 93005 ELECTROCARDIOGRAM TRACING: CPT | Performed by: PHYSICIAN ASSISTANT

## 2023-05-22 PROCEDURE — 6370000000 HC RX 637 (ALT 250 FOR IP): Performed by: EMERGENCY MEDICINE

## 2023-05-22 PROCEDURE — 2500000003 HC RX 250 WO HCPCS: Performed by: EMERGENCY MEDICINE

## 2023-05-22 PROCEDURE — 85025 COMPLETE CBC W/AUTO DIFF WBC: CPT

## 2023-05-22 PROCEDURE — 96365 THER/PROPH/DIAG IV INF INIT: CPT

## 2023-05-22 RX ORDER — DEXTROSE MONOHYDRATE 100 MG/ML
INJECTION, SOLUTION INTRAVENOUS CONTINUOUS PRN
Status: DISCONTINUED | OUTPATIENT
Start: 2023-05-22 | End: 2023-05-24 | Stop reason: SDUPTHER

## 2023-05-22 RX ORDER — 0.9 % SODIUM CHLORIDE 0.9 %
1000 INTRAVENOUS SOLUTION INTRAVENOUS ONCE
Status: COMPLETED | OUTPATIENT
Start: 2023-05-22 | End: 2023-05-23

## 2023-05-22 RX ORDER — CALCIUM CHLORIDE 100 MG/ML
1000 INJECTION INTRAVENOUS; INTRAVENTRICULAR ONCE
Status: DISCONTINUED | OUTPATIENT
Start: 2023-05-22 | End: 2023-05-22 | Stop reason: SDUPTHER

## 2023-05-22 RX ADMIN — SODIUM CHLORIDE 1000 ML: 9 INJECTION, SOLUTION INTRAVENOUS at 22:16

## 2023-05-22 RX ADMIN — SODIUM ZIRCONIUM CYCLOSILICATE 10 G: 10 POWDER, FOR SUSPENSION ORAL at 21:34

## 2023-05-22 RX ADMIN — CALCIUM CHLORIDE 1000 MG: 100 INJECTION INTRAVENOUS; INTRAVENTRICULAR at 21:20

## 2023-05-22 RX ADMIN — DEXTROSE MONOHYDRATE 250 ML: 100 INJECTION, SOLUTION INTRAVENOUS at 21:32

## 2023-05-22 RX ADMIN — INSULIN HUMAN 10 UNITS: 100 INJECTION, SOLUTION PARENTERAL at 21:32

## 2023-05-23 ENCOUNTER — APPOINTMENT (OUTPATIENT)
Dept: ULTRASOUND IMAGING | Age: 63
End: 2023-05-23
Payer: COMMERCIAL

## 2023-05-23 LAB
ALBUMIN SERPL-MCNC: 3.7 G/DL (ref 3.5–5.2)
ALP SERPL-CCNC: 106 U/L (ref 40–129)
ALT SERPL-CCNC: 9 U/L (ref 0–40)
ANION GAP SERPL CALCULATED.3IONS-SCNC: 13 MMOL/L (ref 7–16)
ANION GAP SERPL CALCULATED.3IONS-SCNC: 13 MMOL/L (ref 7–16)
ANION GAP SERPL CALCULATED.3IONS-SCNC: 15 MMOL/L (ref 7–16)
ANISOCYTOSIS: ABNORMAL
AST SERPL-CCNC: 11 U/L (ref 0–39)
BASOPHILS # BLD: 0 E9/L (ref 0–0.2)
BASOPHILS NFR BLD: 0.2 % (ref 0–2)
BILIRUB SERPL-MCNC: 0.4 MG/DL (ref 0–1.2)
BUN SERPL-MCNC: 70 MG/DL (ref 6–23)
BUN SERPL-MCNC: 74 MG/DL (ref 6–23)
BUN SERPL-MCNC: 83 MG/DL (ref 6–23)
BURR CELLS: ABNORMAL
CA-I BLD-SCNC: 1.3 MMOL/L (ref 1.15–1.33)
CALCIUM SERPL-MCNC: 9 MG/DL (ref 8.6–10.2)
CALCIUM SERPL-MCNC: 9.1 MG/DL (ref 8.6–10.2)
CALCIUM SERPL-MCNC: 9.3 MG/DL (ref 8.6–10.2)
CHLORIDE SERPL-SCNC: 100 MMOL/L (ref 98–107)
CHLORIDE SERPL-SCNC: 102 MMOL/L (ref 98–107)
CHLORIDE SERPL-SCNC: 105 MMOL/L (ref 98–107)
CHLORIDE UR-SCNC: 60 MMOL/L
CHLORIDE UR-SCNC: 61 MMOL/L
CO2 SERPL-SCNC: 19 MMOL/L (ref 22–29)
CO2 SERPL-SCNC: 22 MMOL/L (ref 22–29)
CO2 SERPL-SCNC: 22 MMOL/L (ref 22–29)
CREAT SERPL-MCNC: 3.7 MG/DL (ref 0.7–1.2)
CREAT SERPL-MCNC: 4.4 MG/DL (ref 0.7–1.2)
CREAT SERPL-MCNC: 5.7 MG/DL (ref 0.7–1.2)
CREAT UR-MCNC: 74 MG/DL (ref 40–278)
CREAT UR-MCNC: 75 MG/DL (ref 40–278)
CREAT UR-MCNC: 84 MG/DL (ref 40–278)
DOHLE BODIES: ABNORMAL
EKG ATRIAL RATE: 74 BPM
EKG Q-T INTERVAL: 416 MS
EKG QRS DURATION: 130 MS
EKG QTC CALCULATION (BAZETT): 486 MS
EKG R AXIS: -19 DEGREES
EKG T AXIS: 41 DEGREES
EKG VENTRICULAR RATE: 82 BPM
EOSINOPHIL # BLD: 0 E9/L (ref 0.05–0.5)
EOSINOPHIL NFR BLD: 0 % (ref 0–6)
ERYTHROCYTE [DISTWIDTH] IN BLOOD BY AUTOMATED COUNT: 15.5 FL (ref 11.5–15)
ERYTHROCYTE [DISTWIDTH] IN BLOOD BY AUTOMATED COUNT: 15.5 FL (ref 11.5–15)
GLUCOSE SERPL-MCNC: 163 MG/DL (ref 74–99)
GLUCOSE SERPL-MCNC: 213 MG/DL (ref 74–99)
GLUCOSE SERPL-MCNC: 223 MG/DL (ref 74–99)
HCT VFR BLD AUTO: 26.7 % (ref 37–54)
HCT VFR BLD AUTO: 27 % (ref 37–54)
HGB BLD-MCNC: 8.5 G/DL (ref 12.5–16.5)
HGB BLD-MCNC: 8.5 G/DL (ref 12.5–16.5)
LDH SERPL-CCNC: 212 U/L (ref 135–225)
LYMPHOCYTES # BLD: 0.99 E9/L (ref 1.5–4)
LYMPHOCYTES NFR BLD: 5.2 % (ref 20–42)
MAGNESIUM SERPL-MCNC: 1.9 MG/DL (ref 1.6–2.6)
MCH RBC QN AUTO: 29.1 PG (ref 26–35)
MCH RBC QN AUTO: 29.4 PG (ref 26–35)
MCHC RBC AUTO-ENTMCNC: 31.5 % (ref 32–34.5)
MCHC RBC AUTO-ENTMCNC: 31.8 % (ref 32–34.5)
MCV RBC AUTO: 92.4 FL (ref 80–99.9)
MCV RBC AUTO: 92.5 FL (ref 80–99.9)
METER GLUCOSE: 147 MG/DL (ref 74–99)
METER GLUCOSE: 169 MG/DL (ref 74–99)
METER GLUCOSE: 175 MG/DL (ref 74–99)
METER GLUCOSE: 187 MG/DL (ref 74–99)
MONOCYTES # BLD: 0.39 E9/L (ref 0.1–0.95)
MONOCYTES NFR BLD: 1.7 % (ref 2–12)
NEUTROPHILS # BLD: 18.32 E9/L (ref 1.8–7.3)
NEUTS SEG NFR BLD: 93.1 % (ref 43–80)
OVALOCYTES: ABNORMAL
PHOSPHATE SERPL-MCNC: 4.4 MG/DL (ref 2.5–4.5)
PLATELET # BLD AUTO: 241 E9/L (ref 130–450)
PLATELET # BLD AUTO: 287 E9/L (ref 130–450)
PMV BLD AUTO: 11.4 FL (ref 7–12)
PMV BLD AUTO: 11.5 FL (ref 7–12)
POIKILOCYTES: ABNORMAL
POLYCHROMASIA: ABNORMAL
POTASSIUM SERPL-SCNC: 5.1 MMOL/L (ref 3.5–5)
POTASSIUM SERPL-SCNC: 5.2 MMOL/L (ref 3.5–5)
POTASSIUM SERPL-SCNC: 5.9 MMOL/L (ref 3.5–5)
PROT SERPL-MCNC: 7 G/DL (ref 6.4–8.3)
PROT UR-MCNC: 30 MG/DL (ref 0–12)
PROTEIN/CREAT RATIO: 0.4
PROTEIN/CREAT RATIO: 0.4 (ref 0–0.2)
PTH-INTACT SERPL-MCNC: 75 PG/ML (ref 15–65)
RBC # BLD AUTO: 2.89 E12/L (ref 3.8–5.8)
RBC # BLD AUTO: 2.92 E12/L (ref 3.8–5.8)
SODIUM SERPL-SCNC: 135 MMOL/L (ref 132–146)
SODIUM SERPL-SCNC: 136 MMOL/L (ref 132–146)
SODIUM SERPL-SCNC: 140 MMOL/L (ref 132–146)
SODIUM UR-SCNC: 82 MMOL/L
SODIUM UR-SCNC: 86 MMOL/L
TEAR DROP CELLS: ABNORMAL
TSH SERPL-MCNC: 0.37 UIU/ML (ref 0.27–4.2)
URATE SERPL-MCNC: 11.1 MG/DL (ref 3.4–7)
WBC # BLD: 19.7 E9/L (ref 4.5–11.5)
WBC # BLD: 8.4 E9/L (ref 4.5–11.5)

## 2023-05-23 PROCEDURE — 87186 SC STD MICRODIL/AGAR DIL: CPT

## 2023-05-23 PROCEDURE — 85025 COMPLETE CBC W/AUTO DIFF WBC: CPT

## 2023-05-23 PROCEDURE — 94664 DEMO&/EVAL PT USE INHALER: CPT

## 2023-05-23 PROCEDURE — 82436 ASSAY OF URINE CHLORIDE: CPT

## 2023-05-23 PROCEDURE — 2580000003 HC RX 258: Performed by: FAMILY MEDICINE

## 2023-05-23 PROCEDURE — 2000000000 HC ICU R&B

## 2023-05-23 PROCEDURE — 6370000000 HC RX 637 (ALT 250 FOR IP)

## 2023-05-23 PROCEDURE — 93010 ELECTROCARDIOGRAM REPORT: CPT | Performed by: INTERNAL MEDICINE

## 2023-05-23 PROCEDURE — 87040 BLOOD CULTURE FOR BACTERIA: CPT

## 2023-05-23 PROCEDURE — 83615 LACTATE (LD) (LDH) ENZYME: CPT

## 2023-05-23 PROCEDURE — 99223 1ST HOSP IP/OBS HIGH 75: CPT | Performed by: INTERNAL MEDICINE

## 2023-05-23 PROCEDURE — 82330 ASSAY OF CALCIUM: CPT

## 2023-05-23 PROCEDURE — 80053 COMPREHEN METABOLIC PANEL: CPT

## 2023-05-23 PROCEDURE — 83970 ASSAY OF PARATHORMONE: CPT

## 2023-05-23 PROCEDURE — 80048 BASIC METABOLIC PNL TOTAL CA: CPT

## 2023-05-23 PROCEDURE — 6370000000 HC RX 637 (ALT 250 FOR IP): Performed by: FAMILY MEDICINE

## 2023-05-23 PROCEDURE — S5553 INSULIN LONG ACTING 5 U: HCPCS | Performed by: FAMILY MEDICINE

## 2023-05-23 PROCEDURE — 2580000003 HC RX 258

## 2023-05-23 PROCEDURE — 87088 URINE BACTERIA CULTURE: CPT

## 2023-05-23 PROCEDURE — 84550 ASSAY OF BLOOD/URIC ACID: CPT

## 2023-05-23 PROCEDURE — 85027 COMPLETE CBC AUTOMATED: CPT

## 2023-05-23 PROCEDURE — 76770 US EXAM ABDO BACK WALL COMP: CPT

## 2023-05-23 PROCEDURE — 84300 ASSAY OF URINE SODIUM: CPT

## 2023-05-23 PROCEDURE — 87150 DNA/RNA AMPLIFIED PROBE: CPT

## 2023-05-23 PROCEDURE — 6360000002 HC RX W HCPCS: Performed by: FAMILY MEDICINE

## 2023-05-23 PROCEDURE — 84443 ASSAY THYROID STIM HORMONE: CPT

## 2023-05-23 PROCEDURE — 84156 ASSAY OF PROTEIN URINE: CPT

## 2023-05-23 PROCEDURE — 83735 ASSAY OF MAGNESIUM: CPT

## 2023-05-23 PROCEDURE — 84100 ASSAY OF PHOSPHORUS: CPT

## 2023-05-23 PROCEDURE — 82570 ASSAY OF URINE CREATININE: CPT

## 2023-05-23 PROCEDURE — 82962 GLUCOSE BLOOD TEST: CPT

## 2023-05-23 PROCEDURE — 36415 COLL VENOUS BLD VENIPUNCTURE: CPT

## 2023-05-23 PROCEDURE — 94640 AIRWAY INHALATION TREATMENT: CPT

## 2023-05-23 RX ORDER — GUAIFENESIN 400 MG/1
400 TABLET ORAL 4 TIMES DAILY
Status: DISCONTINUED | OUTPATIENT
Start: 2023-05-23 | End: 2023-06-03 | Stop reason: HOSPADM

## 2023-05-23 RX ORDER — METOPROLOL SUCCINATE 25 MG/1
100 TABLET, EXTENDED RELEASE ORAL 2 TIMES DAILY
Status: DISCONTINUED | OUTPATIENT
Start: 2023-05-23 | End: 2023-05-23

## 2023-05-23 RX ORDER — SODIUM CHLORIDE 9 MG/ML
INJECTION, SOLUTION INTRAVENOUS CONTINUOUS
Status: DISCONTINUED | OUTPATIENT
Start: 2023-05-23 | End: 2023-05-27

## 2023-05-23 RX ORDER — ERGOCALCIFEROL 1.25 MG/1
50000 CAPSULE ORAL WEEKLY
Status: DISCONTINUED | OUTPATIENT
Start: 2023-05-29 | End: 2023-06-03 | Stop reason: HOSPADM

## 2023-05-23 RX ORDER — FLUTICASONE PROPIONATE 50 MCG
1 SPRAY, SUSPENSION (ML) NASAL DAILY PRN
Status: ON HOLD | COMMUNITY
End: 2023-06-01 | Stop reason: HOSPADM

## 2023-05-23 RX ORDER — SODIUM CHLORIDE 9 MG/ML
INJECTION, SOLUTION INTRAVENOUS PRN
Status: DISCONTINUED | OUTPATIENT
Start: 2023-05-23 | End: 2023-06-01 | Stop reason: SDUPTHER

## 2023-05-23 RX ORDER — SODIUM CHLORIDE 0.9 % (FLUSH) 0.9 %
5-40 SYRINGE (ML) INJECTION EVERY 12 HOURS SCHEDULED
Status: DISCONTINUED | OUTPATIENT
Start: 2023-05-23 | End: 2023-06-01 | Stop reason: SDUPTHER

## 2023-05-23 RX ORDER — ESCITALOPRAM OXALATE 10 MG/1
10 TABLET ORAL DAILY
Status: DISCONTINUED | OUTPATIENT
Start: 2023-05-23 | End: 2023-06-03 | Stop reason: HOSPADM

## 2023-05-23 RX ORDER — METOPROLOL SUCCINATE 25 MG/1
25 TABLET, EXTENDED RELEASE ORAL NIGHTLY
Status: DISCONTINUED | OUTPATIENT
Start: 2023-05-23 | End: 2023-05-23

## 2023-05-23 RX ORDER — ONDANSETRON 4 MG/1
4 TABLET, ORALLY DISINTEGRATING ORAL EVERY 8 HOURS PRN
Status: DISCONTINUED | OUTPATIENT
Start: 2023-05-23 | End: 2023-06-03 | Stop reason: HOSPADM

## 2023-05-23 RX ORDER — INSULIN LISPRO 100 [IU]/ML
0-8 INJECTION, SOLUTION INTRAVENOUS; SUBCUTANEOUS
Status: DISCONTINUED | OUTPATIENT
Start: 2023-05-23 | End: 2023-06-03 | Stop reason: HOSPADM

## 2023-05-23 RX ORDER — INSULIN GLARGINE-YFGN 100 [IU]/ML
10 INJECTION, SOLUTION SUBCUTANEOUS NIGHTLY
Status: DISCONTINUED | OUTPATIENT
Start: 2023-05-23 | End: 2023-06-03 | Stop reason: HOSPADM

## 2023-05-23 RX ORDER — TAMSULOSIN HYDROCHLORIDE 0.4 MG/1
0.4 CAPSULE ORAL DAILY
Status: DISCONTINUED | OUTPATIENT
Start: 2023-05-23 | End: 2023-06-03 | Stop reason: HOSPADM

## 2023-05-23 RX ORDER — INSULIN LISPRO 100 [IU]/ML
0-4 INJECTION, SOLUTION INTRAVENOUS; SUBCUTANEOUS NIGHTLY
Status: DISCONTINUED | OUTPATIENT
Start: 2023-05-23 | End: 2023-06-03 | Stop reason: HOSPADM

## 2023-05-23 RX ORDER — ALBUTEROL SULFATE 2.5 MG/3ML
2.5 SOLUTION RESPIRATORY (INHALATION) 4 TIMES DAILY PRN
Status: DISCONTINUED | OUTPATIENT
Start: 2023-05-23 | End: 2023-06-03 | Stop reason: HOSPADM

## 2023-05-23 RX ORDER — FLUTICASONE PROPIONATE 50 MCG
1 SPRAY, SUSPENSION (ML) NASAL DAILY
Status: DISCONTINUED | OUTPATIENT
Start: 2023-05-23 | End: 2023-06-03 | Stop reason: HOSPADM

## 2023-05-23 RX ORDER — ACETAMINOPHEN 325 MG/1
650 TABLET ORAL EVERY 6 HOURS PRN
Status: DISCONTINUED | OUTPATIENT
Start: 2023-05-23 | End: 2023-06-03 | Stop reason: HOSPADM

## 2023-05-23 RX ORDER — PACLITAXEL 100 MG/20ML
200 INJECTION, POWDER, LYOPHILIZED, FOR SUSPENSION INTRAVENOUS
Status: ON HOLD | COMMUNITY
End: 2023-06-01 | Stop reason: HOSPADM

## 2023-05-23 RX ORDER — POLYETHYLENE GLYCOL 3350 17 G/17G
17 POWDER, FOR SOLUTION ORAL DAILY PRN
Status: DISCONTINUED | OUTPATIENT
Start: 2023-05-23 | End: 2023-06-03 | Stop reason: HOSPADM

## 2023-05-23 RX ORDER — SODIUM CHLORIDE 0.9 % (FLUSH) 0.9 %
5-40 SYRINGE (ML) INJECTION PRN
Status: DISCONTINUED | OUTPATIENT
Start: 2023-05-23 | End: 2023-06-01 | Stop reason: SDUPTHER

## 2023-05-23 RX ORDER — ROSUVASTATIN CALCIUM 5 MG/1
10 TABLET, COATED ORAL EVERY MORNING
Status: DISCONTINUED | OUTPATIENT
Start: 2023-05-23 | End: 2023-06-03 | Stop reason: HOSPADM

## 2023-05-23 RX ORDER — HYDROCODONE BITARTRATE AND ACETAMINOPHEN 5; 325 MG/1; MG/1
1 TABLET ORAL 2 TIMES DAILY PRN
Status: DISCONTINUED | OUTPATIENT
Start: 2023-05-23 | End: 2023-05-25

## 2023-05-23 RX ORDER — PREGABALIN 75 MG/1
75 CAPSULE ORAL 2 TIMES DAILY
Status: ON HOLD | COMMUNITY
End: 2023-06-01 | Stop reason: HOSPADM

## 2023-05-23 RX ORDER — ONDANSETRON 2 MG/ML
4 INJECTION INTRAMUSCULAR; INTRAVENOUS EVERY 6 HOURS PRN
Status: DISCONTINUED | OUTPATIENT
Start: 2023-05-23 | End: 2023-06-03 | Stop reason: HOSPADM

## 2023-05-23 RX ORDER — ASPIRIN 81 MG/1
81 TABLET ORAL DAILY
Status: DISCONTINUED | OUTPATIENT
Start: 2023-05-23 | End: 2023-06-03 | Stop reason: HOSPADM

## 2023-05-23 RX ORDER — BUDESONIDE 0.25 MG/2ML
500 INHALANT ORAL 2 TIMES DAILY
Status: DISCONTINUED | OUTPATIENT
Start: 2023-05-23 | End: 2023-06-03 | Stop reason: HOSPADM

## 2023-05-23 RX ORDER — ONDANSETRON 4 MG/1
8 TABLET, ORALLY DISINTEGRATING ORAL EVERY 8 HOURS PRN
Status: DISCONTINUED | OUTPATIENT
Start: 2023-05-23 | End: 2023-05-23

## 2023-05-23 RX ORDER — IPRATROPIUM BROMIDE AND ALBUTEROL SULFATE 2.5; .5 MG/3ML; MG/3ML
1 SOLUTION RESPIRATORY (INHALATION) EVERY 4 HOURS
Status: ON HOLD | COMMUNITY
End: 2023-06-01 | Stop reason: HOSPADM

## 2023-05-23 RX ORDER — ACETAMINOPHEN 650 MG/1
650 SUPPOSITORY RECTAL EVERY 6 HOURS PRN
Status: DISCONTINUED | OUTPATIENT
Start: 2023-05-23 | End: 2023-06-03 | Stop reason: HOSPADM

## 2023-05-23 RX ORDER — IPRATROPIUM BROMIDE AND ALBUTEROL SULFATE 2.5; .5 MG/3ML; MG/3ML
3 SOLUTION RESPIRATORY (INHALATION) 4 TIMES DAILY
Status: DISCONTINUED | OUTPATIENT
Start: 2023-05-23 | End: 2023-06-03 | Stop reason: HOSPADM

## 2023-05-23 RX ORDER — ASPIRIN 81 MG/1
81 TABLET ORAL DAILY
Status: ON HOLD | COMMUNITY
End: 2023-06-01 | Stop reason: HOSPADM

## 2023-05-23 RX ORDER — METOPROLOL SUCCINATE 100 MG/1
100 TABLET, EXTENDED RELEASE ORAL DAILY
Status: DISCONTINUED | OUTPATIENT
Start: 2023-05-23 | End: 2023-06-03 | Stop reason: HOSPADM

## 2023-05-23 RX ORDER — ROSUVASTATIN CALCIUM 20 MG/1
20 TABLET, COATED ORAL EVERY MORNING
Status: DISCONTINUED | OUTPATIENT
Start: 2023-05-23 | End: 2023-05-23

## 2023-05-23 RX ADMIN — SODIUM ZIRCONIUM CYCLOSILICATE 10 G: 10 POWDER, FOR SUSPENSION ORAL at 07:57

## 2023-05-23 RX ADMIN — SODIUM CHLORIDE: 9 INJECTION, SOLUTION INTRAVENOUS at 21:38

## 2023-05-23 RX ADMIN — APIXABAN 5 MG: 5 TABLET, FILM COATED ORAL at 21:14

## 2023-05-23 RX ADMIN — IPRATROPIUM BROMIDE AND ALBUTEROL SULFATE 3 ML: .5; 2.5 SOLUTION RESPIRATORY (INHALATION) at 11:56

## 2023-05-23 RX ADMIN — GUAIFENESIN 400 MG: 400 TABLET ORAL at 21:14

## 2023-05-23 RX ADMIN — SODIUM CHLORIDE, PRESERVATIVE FREE 10 ML: 5 INJECTION INTRAVENOUS at 21:14

## 2023-05-23 RX ADMIN — SODIUM ZIRCONIUM CYCLOSILICATE 10 G: 10 POWDER, FOR SUSPENSION ORAL at 21:14

## 2023-05-23 RX ADMIN — SODIUM CHLORIDE: 9 INJECTION, SOLUTION INTRAVENOUS at 02:59

## 2023-05-23 RX ADMIN — METOPROLOL SUCCINATE 100 MG: 100 TABLET, EXTENDED RELEASE ORAL at 07:55

## 2023-05-23 RX ADMIN — BUDESONIDE 500 MCG: 0.25 SUSPENSION RESPIRATORY (INHALATION) at 08:42

## 2023-05-23 RX ADMIN — INSULIN GLARGINE-YFGN 10 UNITS: 100 INJECTION, SOLUTION SUBCUTANEOUS at 21:13

## 2023-05-23 RX ADMIN — ROSUVASTATIN CALCIUM 10 MG: 5 TABLET, FILM COATED ORAL at 07:55

## 2023-05-23 RX ADMIN — METOPROLOL SUCCINATE 125 MG: 100 TABLET, EXTENDED RELEASE ORAL at 21:13

## 2023-05-23 RX ADMIN — ASPIRIN 81 MG: 81 TABLET, COATED ORAL at 07:57

## 2023-05-23 RX ADMIN — IPRATROPIUM BROMIDE AND ALBUTEROL SULFATE 3 ML: .5; 2.5 SOLUTION RESPIRATORY (INHALATION) at 20:37

## 2023-05-23 RX ADMIN — APIXABAN 5 MG: 5 TABLET, FILM COATED ORAL at 07:56

## 2023-05-23 RX ADMIN — BUDESONIDE 500 MCG: 0.25 SUSPENSION RESPIRATORY (INHALATION) at 20:38

## 2023-05-23 RX ADMIN — GUAIFENESIN 400 MG: 400 TABLET ORAL at 07:57

## 2023-05-23 RX ADMIN — TAMSULOSIN HYDROCHLORIDE 0.4 MG: 0.4 CAPSULE ORAL at 07:56

## 2023-05-23 RX ADMIN — ESCITALOPRAM OXALATE 10 MG: 10 TABLET ORAL at 07:55

## 2023-05-23 RX ADMIN — IPRATROPIUM BROMIDE AND ALBUTEROL SULFATE 3 ML: .5; 2.5 SOLUTION RESPIRATORY (INHALATION) at 08:41

## 2023-05-23 ASSESSMENT — PAIN SCALES - GENERAL
PAINLEVEL_OUTOF10: 0

## 2023-05-24 LAB
A BAUMANNII DNA BLD POS QL NAA+NON-PROBE: NOT DETECTED
ALBUMIN SERPL-MCNC: 3.3 G/DL (ref 3.5–5.2)
ALP SERPL-CCNC: 77 U/L (ref 40–129)
ALT SERPL-CCNC: 8 U/L (ref 0–40)
ANION GAP SERPL CALCULATED.3IONS-SCNC: 10 MMOL/L (ref 7–16)
ANION GAP SERPL CALCULATED.3IONS-SCNC: 11 MMOL/L (ref 7–16)
ANION GAP SERPL CALCULATED.3IONS-SCNC: 12 MMOL/L (ref 7–16)
ANION GAP SERPL CALCULATED.3IONS-SCNC: 15 MMOL/L (ref 7–16)
ANISOCYTOSIS: ABNORMAL
AST SERPL-CCNC: 10 U/L (ref 0–39)
BASOPHILS # BLD: 0 E9/L (ref 0–0.2)
BASOPHILS NFR BLD: 0.2 % (ref 0–2)
BILIRUB SERPL-MCNC: 0.3 MG/DL (ref 0–1.2)
BOTTLE TYPE: ABNORMAL
BUN SERPL-MCNC: 53 MG/DL (ref 6–23)
BUN SERPL-MCNC: 54 MG/DL (ref 6–23)
BUN SERPL-MCNC: 59 MG/DL (ref 6–23)
BUN SERPL-MCNC: 61 MG/DL (ref 6–23)
C ALBICANS DNA BLD POS QL NAA+NON-PROBE: NOT DETECTED
C AURIS DNA BLD POS QL NAA+PROBE: NOT DETECTED
C GLABRATA DNA BLD POS QL NAA+NON-PROBE: NOT DETECTED
C KRUSEI DNA BLD POS QL NAA+NON-PROBE: NOT DETECTED
C PARAP DNA BLD POS QL NAA+NON-PROBE: NOT DETECTED
C TROPICLS DNA BLD POS QL NAA+NON-PROBE: NOT DETECTED
CALCIUM SERPL-MCNC: 8.5 MG/DL (ref 8.6–10.2)
CALCIUM SERPL-MCNC: 8.6 MG/DL (ref 8.6–10.2)
CALCIUM SERPL-MCNC: 8.8 MG/DL (ref 8.6–10.2)
CALCIUM SERPL-MCNC: 9.1 MG/DL (ref 8.6–10.2)
CHLORIDE SERPL-SCNC: 107 MMOL/L (ref 98–107)
CHLORIDE SERPL-SCNC: 108 MMOL/L (ref 98–107)
CHLORIDE SERPL-SCNC: 109 MMOL/L (ref 98–107)
CHLORIDE SERPL-SCNC: 111 MMOL/L (ref 98–107)
CO2 SERPL-SCNC: 20 MMOL/L (ref 22–29)
CO2 SERPL-SCNC: 22 MMOL/L (ref 22–29)
CO2 SERPL-SCNC: 22 MMOL/L (ref 22–29)
CO2 SERPL-SCNC: 23 MMOL/L (ref 22–29)
CREAT SERPL-MCNC: 2 MG/DL (ref 0.7–1.2)
CREAT SERPL-MCNC: 2.4 MG/DL (ref 0.7–1.2)
CREAT SERPL-MCNC: 2.8 MG/DL (ref 0.7–1.2)
CREAT SERPL-MCNC: 3.1 MG/DL (ref 0.7–1.2)
CRYPTOCOCCUS NEOFORMANS/GATTII BY PCR: NOT DETECTED
E CLOAC COMP DNA BLD POS NAA+NON-PROBE: NOT DETECTED
E COLI DNA BLD POS QL NAA+NON-PROBE: NOT DETECTED
E FAECALIS DNA BLD POS QL NAA+PROBE: NOT DETECTED
E FAECIUM DNA BLD POS QL NAA+PROBE: NOT DETECTED
ENTEROBACT DNA BLD POS QL NAA+NON-PROBE: NOT DETECTED
ENTEROCOC DNA BLD POS QL NAA+NON-PROBE: NOT DETECTED
EOSINOPHIL # BLD: 0 E9/L (ref 0.05–0.5)
EOSINOPHIL NFR BLD: 0 % (ref 0–6)
ERYTHROCYTE [DISTWIDTH] IN BLOOD BY AUTOMATED COUNT: 15.6 FL (ref 11.5–15)
FERRITIN SERPL-MCNC: 589 NG/ML
GLUCOSE SERPL-MCNC: 102 MG/DL (ref 74–99)
GLUCOSE SERPL-MCNC: 107 MG/DL (ref 74–99)
GLUCOSE SERPL-MCNC: 131 MG/DL (ref 74–99)
GLUCOSE SERPL-MCNC: 178 MG/DL (ref 74–99)
GN BLD CULTURE PNL BLD POS NAA+PROBE: NOT DETECTED
HCT VFR BLD AUTO: 24.3 % (ref 37–54)
HGB BLD-MCNC: 7.4 G/DL (ref 12.5–16.5)
HYPOCHROMIA: ABNORMAL
IRON SATN MFR SERPL: 53 % (ref 20–55)
IRON SERPL-MCNC: 105 MCG/DL (ref 59–158)
K OXYTOCA DNA BLD POS QL NAA+NON-PROBE: NOT DETECTED
K PNEUMON DNA SPEC QL NAA+PROBE: NOT DETECTED
K. AEROGENES DNA SPEC QL NAA+PROBE: NOT DETECTED
L MONOCYTOG DNA BLD POS QL NAA+NON-PROBE: NOT DETECTED
LYMPHOCYTES # BLD: 0.53 E9/L (ref 1.5–4)
LYMPHOCYTES NFR BLD: 1.7 % (ref 20–42)
MAGNESIUM SERPL-MCNC: 1.8 MG/DL (ref 1.6–2.6)
MCH RBC QN AUTO: 28.8 PG (ref 26–35)
MCHC RBC AUTO-ENTMCNC: 30.5 % (ref 32–34.5)
MCV RBC AUTO: 94.6 FL (ref 80–99.9)
MECA BLD POS QL NAA+NON-PROBE: NOT DETECTED
METER GLUCOSE: 103 MG/DL (ref 74–99)
METER GLUCOSE: 122 MG/DL (ref 74–99)
METER GLUCOSE: 172 MG/DL (ref 74–99)
METER GLUCOSE: 184 MG/DL (ref 74–99)
MONOCYTES # BLD: 1.59 E9/L (ref 0.1–0.95)
MONOCYTES NFR BLD: 6.1 % (ref 2–12)
N MEN DNA BLD POS QL NAA+NON-PROBE: NOT DETECTED
NEUTROPHILS # BLD: 24.38 E9/L (ref 1.8–7.3)
NEUTS SEG NFR BLD: 92.2 % (ref 43–80)
ORDER NUMBER: ABNORMAL
OVALOCYTES: ABNORMAL
P AERUGINOSA DNA BLD POS NAA+NON-PROBE: NOT DETECTED
PHOSPHATE SERPL-MCNC: 4.8 MG/DL (ref 2.5–4.5)
PLATELET # BLD AUTO: 262 E9/L (ref 130–450)
PMV BLD AUTO: 11.6 FL (ref 7–12)
POIKILOCYTES: ABNORMAL
POLYCHROMASIA: ABNORMAL
POTASSIUM SERPL-SCNC: 4.5 MMOL/L (ref 3.5–5)
POTASSIUM SERPL-SCNC: 4.8 MMOL/L (ref 3.5–5)
POTASSIUM SERPL-SCNC: 4.8 MMOL/L (ref 3.5–5)
POTASSIUM SERPL-SCNC: 4.9 MMOL/L (ref 3.5–5)
PROT SERPL-MCNC: 6 G/DL (ref 6.4–8.3)
PROTEUS SP DNA BLD POS QL NAA+NON-PROBE: NOT DETECTED
RBC # BLD AUTO: 2.57 E12/L (ref 3.8–5.8)
S AUREUS DNA BLD POS QL NAA+NON-PROBE: NOT DETECTED
S AUREUS+CONS DNA BLD POS NAA+NON-PROBE: DETECTED
S EPIDERMIDIS DNA BLD POS QL NAA+PROBE: DETECTED
S LUGDUNENSIS DNA BLD POS QL NAA+PROBE: NOT DETECTED
S MALTOPH DNA BLD POS QL NAA+PROBE: NOT DETECTED
S MARCESCENS DNA BLD POS NAA+NON-PROBE: NOT DETECTED
S PNEUM DNA BLD POS QL NAA+NON-PROBE: NOT DETECTED
S PYO DNA BLD POS QL NAA+NON-PROBE: NOT DETECTED
SALMONELLA DNA BLD POS QL NAA+PROBE: NOT DETECTED
SODIUM SERPL-SCNC: 141 MMOL/L (ref 132–146)
SODIUM SERPL-SCNC: 142 MMOL/L (ref 132–146)
SODIUM SERPL-SCNC: 143 MMOL/L (ref 132–146)
SODIUM SERPL-SCNC: 144 MMOL/L (ref 132–146)
SOURCE OF BLOOD CULTURE: ABNORMAL
STREPTOCOCCUS AGALACTIAE BY PCR: NOT DETECTED
STREPTOCOCCUS DNA BLD POS NAA+NON-PROBE: NOT DETECTED
TEAR DROP CELLS: ABNORMAL
TIBC SERPL-MCNC: 199 MCG/DL (ref 250–450)
URATE SERPL-MCNC: 7.7 MG/DL (ref 3.4–7)
WBC # BLD: 26.5 E9/L (ref 4.5–11.5)

## 2023-05-24 PROCEDURE — 87040 BLOOD CULTURE FOR BACTERIA: CPT

## 2023-05-24 PROCEDURE — 2580000003 HC RX 258: Performed by: INTERNAL MEDICINE

## 2023-05-24 PROCEDURE — 36415 COLL VENOUS BLD VENIPUNCTURE: CPT

## 2023-05-24 PROCEDURE — S5553 INSULIN LONG ACTING 5 U: HCPCS | Performed by: FAMILY MEDICINE

## 2023-05-24 PROCEDURE — 97530 THERAPEUTIC ACTIVITIES: CPT

## 2023-05-24 PROCEDURE — 6360000002 HC RX W HCPCS: Performed by: FAMILY MEDICINE

## 2023-05-24 PROCEDURE — 6370000000 HC RX 637 (ALT 250 FOR IP)

## 2023-05-24 PROCEDURE — 6360000002 HC RX W HCPCS: Performed by: INTERNAL MEDICINE

## 2023-05-24 PROCEDURE — 80048 BASIC METABOLIC PNL TOTAL CA: CPT

## 2023-05-24 PROCEDURE — 6370000000 HC RX 637 (ALT 250 FOR IP): Performed by: FAMILY MEDICINE

## 2023-05-24 PROCEDURE — 36591 DRAW BLOOD OFF VENOUS DEVICE: CPT

## 2023-05-24 PROCEDURE — 97165 OT EVAL LOW COMPLEX 30 MIN: CPT

## 2023-05-24 PROCEDURE — 2580000003 HC RX 258

## 2023-05-24 PROCEDURE — 82728 ASSAY OF FERRITIN: CPT

## 2023-05-24 PROCEDURE — 84550 ASSAY OF BLOOD/URIC ACID: CPT

## 2023-05-24 PROCEDURE — 2060000000 HC ICU INTERMEDIATE R&B

## 2023-05-24 PROCEDURE — 83550 IRON BINDING TEST: CPT

## 2023-05-24 PROCEDURE — 84100 ASSAY OF PHOSPHORUS: CPT

## 2023-05-24 PROCEDURE — 6370000000 HC RX 637 (ALT 250 FOR IP): Performed by: INTERNAL MEDICINE

## 2023-05-24 PROCEDURE — 83540 ASSAY OF IRON: CPT

## 2023-05-24 PROCEDURE — 83735 ASSAY OF MAGNESIUM: CPT

## 2023-05-24 PROCEDURE — 2580000003 HC RX 258: Performed by: FAMILY MEDICINE

## 2023-05-24 PROCEDURE — 85025 COMPLETE CBC W/AUTO DIFF WBC: CPT

## 2023-05-24 PROCEDURE — 82962 GLUCOSE BLOOD TEST: CPT

## 2023-05-24 PROCEDURE — 99232 SBSQ HOSP IP/OBS MODERATE 35: CPT | Performed by: INTERNAL MEDICINE

## 2023-05-24 PROCEDURE — 94640 AIRWAY INHALATION TREATMENT: CPT

## 2023-05-24 PROCEDURE — 80053 COMPREHEN METABOLIC PANEL: CPT

## 2023-05-24 RX ORDER — MAGNESIUM SULFATE 1 G/100ML
1000 INJECTION INTRAVENOUS ONCE
Status: COMPLETED | OUTPATIENT
Start: 2023-05-24 | End: 2023-05-24

## 2023-05-24 RX ORDER — DEXTROSE MONOHYDRATE 100 MG/ML
INJECTION, SOLUTION INTRAVENOUS CONTINUOUS PRN
Status: DISCONTINUED | OUTPATIENT
Start: 2023-05-24 | End: 2023-06-03 | Stop reason: HOSPADM

## 2023-05-24 RX ADMIN — MAGNESIUM SULFATE HEPTAHYDRATE 1000 MG: 1 INJECTION, SOLUTION INTRAVENOUS at 10:43

## 2023-05-24 RX ADMIN — SODIUM CHLORIDE: 9 INJECTION, SOLUTION INTRAVENOUS at 13:05

## 2023-05-24 RX ADMIN — IPRATROPIUM BROMIDE AND ALBUTEROL SULFATE 3 ML: .5; 2.5 SOLUTION RESPIRATORY (INHALATION) at 16:12

## 2023-05-24 RX ADMIN — PREGABALIN 75 MG: 25 CAPSULE ORAL at 21:05

## 2023-05-24 RX ADMIN — APIXABAN 5 MG: 5 TABLET, FILM COATED ORAL at 21:06

## 2023-05-24 RX ADMIN — ASPIRIN 81 MG: 81 TABLET, COATED ORAL at 08:16

## 2023-05-24 RX ADMIN — BUDESONIDE 500 MCG: 0.25 SUSPENSION RESPIRATORY (INHALATION) at 20:32

## 2023-05-24 RX ADMIN — CEFEPIME 2000 MG: 2 INJECTION, POWDER, FOR SOLUTION INTRAVENOUS at 13:55

## 2023-05-24 RX ADMIN — BUDESONIDE 500 MCG: 0.25 SUSPENSION RESPIRATORY (INHALATION) at 08:44

## 2023-05-24 RX ADMIN — METOPROLOL SUCCINATE 100 MG: 100 TABLET, EXTENDED RELEASE ORAL at 08:15

## 2023-05-24 RX ADMIN — HYDROCODONE BITARTRATE AND ACETAMINOPHEN 1 TABLET: 5; 325 TABLET ORAL at 21:15

## 2023-05-24 RX ADMIN — TAMSULOSIN HYDROCHLORIDE 0.4 MG: 0.4 CAPSULE ORAL at 08:18

## 2023-05-24 RX ADMIN — SODIUM ZIRCONIUM CYCLOSILICATE 10 G: 10 POWDER, FOR SUSPENSION ORAL at 21:06

## 2023-05-24 RX ADMIN — ESCITALOPRAM OXALATE 10 MG: 10 TABLET ORAL at 08:16

## 2023-05-24 RX ADMIN — IPRATROPIUM BROMIDE AND ALBUTEROL SULFATE 3 ML: .5; 2.5 SOLUTION RESPIRATORY (INHALATION) at 20:32

## 2023-05-24 RX ADMIN — METOPROLOL SUCCINATE 125 MG: 100 TABLET, EXTENDED RELEASE ORAL at 21:05

## 2023-05-24 RX ADMIN — ROSUVASTATIN CALCIUM 10 MG: 5 TABLET, FILM COATED ORAL at 08:16

## 2023-05-24 RX ADMIN — IPRATROPIUM BROMIDE AND ALBUTEROL SULFATE 3 ML: .5; 2.5 SOLUTION RESPIRATORY (INHALATION) at 08:44

## 2023-05-24 RX ADMIN — GUAIFENESIN 400 MG: 400 TABLET ORAL at 21:06

## 2023-05-24 RX ADMIN — INSULIN GLARGINE-YFGN 10 UNITS: 100 INJECTION, SOLUTION SUBCUTANEOUS at 21:07

## 2023-05-24 RX ADMIN — SODIUM ZIRCONIUM CYCLOSILICATE 10 G: 10 POWDER, FOR SUSPENSION ORAL at 13:51

## 2023-05-24 RX ADMIN — APIXABAN 5 MG: 5 TABLET, FILM COATED ORAL at 08:16

## 2023-05-24 RX ADMIN — SODIUM ZIRCONIUM CYCLOSILICATE 10 G: 10 POWDER, FOR SUSPENSION ORAL at 08:16

## 2023-05-24 RX ADMIN — SODIUM CHLORIDE, PRESERVATIVE FREE 10 ML: 5 INJECTION INTRAVENOUS at 08:17

## 2023-05-24 RX ADMIN — IPRATROPIUM BROMIDE AND ALBUTEROL SULFATE 3 ML: .5; 2.5 SOLUTION RESPIRATORY (INHALATION) at 12:07

## 2023-05-24 ASSESSMENT — PAIN SCALES - GENERAL
PAINLEVEL_OUTOF10: 0
PAINLEVEL_OUTOF10: 7

## 2023-05-24 ASSESSMENT — PAIN DESCRIPTION - ORIENTATION: ORIENTATION: LEFT;RIGHT

## 2023-05-24 ASSESSMENT — PAIN DESCRIPTION - LOCATION: LOCATION: FOOT

## 2023-05-24 NOTE — CARE COORDINATION
Case Management Assessment  Initial Evaluation    Date/Time of Evaluation: 5/24/2023 10:54 AM  Assessment Completed by: Jayshree Grissom RN    If patient is discharged prior to next notation, then this note serves as note for discharge by case management. Patient Name: Bess Fitzgerald                   YOB: 1960  Diagnosis: Hyperkalemia [E87.5]                   Date / Time: 5/11/2023  1:16 AM    Patient Admission Status: Inpatient   Readmission Risk (Low < 19, Mod (19-27), High > 27): Readmission Risk Score: 26.2    Current PCP: Karen Canales MD  PCP verified by CM? Yes    Chart Reviewed: Yes      History Provided by: Patient  Patient Orientation: Alert and Oriented    Patient Cognition: Alert    Hospitalization in the last 30 days (Readmission):  Yes    If yes, Readmission Assessment in  Navigator will be completed. Advance Directives:      Code Status: Full Code   Patient's Primary Decision Maker is: Legal Next of Kin    Primary Decision Maker: Veronica Youngblood - Brother/Sister - [phone]    Discharge Planning:    Patient lives with: Alone Type of Home: House  Primary Care Giver: Self  Patient Support Systems include: Family Members   Current Financial resources:    Current community resources:    Current services prior to admission: C-pap            Current DME:              Type of Home Care services:  None    ADLS  Prior functional level: Independent in ADLs/IADLs  Current functional level: Independent in ADLs/IADLs    PT AM-PAC: 18 /24  OT AM-PAC: 25 /24    Family can provide assistance at DC: Yes  Would you like Case Management to discuss the discharge plan with any other family members/significant others, and if so, who?  No  Plans to Return to Present Housing: Yes  Other Identified Issues/Barriers to RETURNING to current housing: none  Potential Assistance needed at discharge: N/A            Potential DME:    Patient expects to discharge to: [address] [place] [address] for transportation at

## 2023-05-24 NOTE — CARE COORDINATION
Care Coordination: LOS 1 day. Recent admission 5/1/23-5/15/23 for neutropenic fever, CLL. Discharged home with no needs. Readmitted on 5/23/23 MICU; septic shock, ac. Cystitis hypocalcemia, thrombocytopenia 83/55-77/50. Levo, albumin, steroids, [email or web address], Vimal De Anda. Met with pt at bedside to discuss transition of care upon discharge. Lives alone, 2 story home, no steps. Sister to transport home. Dr Farhad Franco pcp. No hx of DME, or rohan. Declines need for hhc. Ambulatory in [place] with therapy this am. Inquired about cost of wheelchair as likely not covered as he is ambulatory- I spoke to Gaurang from [facility] to confirm as he does not have a non-ambulatory diagnosis. Discussed online purchases and he will look into as well.  No current needs, he has transfer orders    Electronically signed by Regine Huang RN on 5/24/2023 at 10:49 AM

## 2023-05-25 LAB
ABO + RH BLD: NORMAL
ANION GAP SERPL CALCULATED.3IONS-SCNC: 8 MMOL/L (ref 7–16)
ANION GAP SERPL CALCULATED.3IONS-SCNC: 8 MMOL/L (ref 7–16)
BACTERIA UR CULT: NORMAL
BACTERIA UR CULT: NORMAL
BASOPHILS # BLD: 0 E9/L (ref 0–0.2)
BASOPHILS NFR BLD: 0.5 % (ref 0–2)
BLD GP AB SCN SERPL QL: NORMAL
BLOOD BANK DISPENSE STATUS: NORMAL
BLOOD BANK DISPENSE STATUS: NORMAL
BLOOD BANK PRODUCT CODE: NORMAL
BLOOD BANK PRODUCT CODE: NORMAL
BPU ID: NORMAL
BPU ID: NORMAL
BUN SERPL-MCNC: 36 MG/DL (ref 6–23)
BUN SERPL-MCNC: 45 MG/DL (ref 6–23)
CALCIUM SERPL-MCNC: 8.7 MG/DL (ref 8.6–10.2)
CALCIUM SERPL-MCNC: 9.1 MG/DL (ref 8.6–10.2)
CHLORIDE SERPL-SCNC: 108 MMOL/L (ref 98–107)
CHLORIDE SERPL-SCNC: 110 MMOL/L (ref 98–107)
CO2 SERPL-SCNC: 23 MMOL/L (ref 22–29)
CO2 SERPL-SCNC: 23 MMOL/L (ref 22–29)
CREAT SERPL-MCNC: 1.5 MG/DL (ref 0.7–1.2)
CREAT SERPL-MCNC: 1.7 MG/DL (ref 0.7–1.2)
DESCRIPTION BLOOD BANK: NORMAL
DESCRIPTION BLOOD BANK: NORMAL
EOSINOPHIL # BLD: 0 E9/L (ref 0.05–0.5)
EOSINOPHIL NFR BLD: 0.4 % (ref 0–6)
ERYTHROCYTE [DISTWIDTH] IN BLOOD BY AUTOMATED COUNT: 15.8 FL (ref 11.5–15)
GLUCOSE SERPL-MCNC: 129 MG/DL (ref 74–99)
GLUCOSE SERPL-MCNC: 92 MG/DL (ref 74–99)
HCT VFR BLD AUTO: 21.4 % (ref 37–54)
HCT VFR BLD AUTO: 24.6 % (ref 37–54)
HCT VFR BLD AUTO: 26.4 % (ref 37–54)
HGB BLD-MCNC: 6.9 G/DL (ref 12.5–16.5)
HGB BLD-MCNC: 7.5 G/DL (ref 12.5–16.5)
HGB BLD-MCNC: 8.2 G/DL (ref 12.5–16.5)
LYMPHOCYTES # BLD: 1.85 E9/L (ref 1.5–4)
LYMPHOCYTES NFR BLD: 5.2 % (ref 20–42)
MAGNESIUM SERPL-MCNC: 1.5 MG/DL (ref 1.6–2.6)
MCH RBC QN AUTO: 30.3 PG (ref 26–35)
MCHC RBC AUTO-ENTMCNC: 32.2 % (ref 32–34.5)
MCV RBC AUTO: 93.9 FL (ref 80–99.9)
METER GLUCOSE: 113 MG/DL (ref 74–99)
METER GLUCOSE: 127 MG/DL (ref 74–99)
METER GLUCOSE: 134 MG/DL (ref 74–99)
METER GLUCOSE: 89 MG/DL (ref 74–99)
MONOCYTES # BLD: 0.37 E9/L (ref 0.1–0.95)
MONOCYTES NFR BLD: 0.9 % (ref 2–12)
NEUTROPHILS # BLD: 34.78 E9/L (ref 1.8–7.3)
NEUTS SEG NFR BLD: 93.9 % (ref 43–80)
OVALOCYTES: ABNORMAL
PHOSPHATE SERPL-MCNC: 3 MG/DL (ref 2.5–4.5)
PLATELET # BLD AUTO: 233 E9/L (ref 130–450)
PMV BLD AUTO: 11.4 FL (ref 7–12)
POIKILOCYTES: ABNORMAL
POLYCHROMASIA: ABNORMAL
POTASSIUM SERPL-SCNC: 4.8 MMOL/L (ref 3.5–5)
POTASSIUM SERPL-SCNC: 5 MMOL/L (ref 3.5–5)
RBC # BLD AUTO: 2.28 E12/L (ref 3.8–5.8)
SODIUM SERPL-SCNC: 139 MMOL/L (ref 132–146)
SODIUM SERPL-SCNC: 141 MMOL/L (ref 132–146)
WBC # BLD: 37 E9/L (ref 4.5–11.5)

## 2023-05-25 PROCEDURE — 86901 BLOOD TYPING SEROLOGIC RH(D): CPT

## 2023-05-25 PROCEDURE — S5553 INSULIN LONG ACTING 5 U: HCPCS | Performed by: FAMILY MEDICINE

## 2023-05-25 PROCEDURE — 83735 ASSAY OF MAGNESIUM: CPT

## 2023-05-25 PROCEDURE — 36430 TRANSFUSION BLD/BLD COMPNT: CPT

## 2023-05-25 PROCEDURE — 2580000003 HC RX 258

## 2023-05-25 PROCEDURE — 2140000000 HC CCU INTERMEDIATE R&B

## 2023-05-25 PROCEDURE — 82962 GLUCOSE BLOOD TEST: CPT

## 2023-05-25 PROCEDURE — 6370000000 HC RX 637 (ALT 250 FOR IP): Performed by: FAMILY MEDICINE

## 2023-05-25 PROCEDURE — P9016 RBC LEUKOCYTES REDUCED: HCPCS

## 2023-05-25 PROCEDURE — 2580000003 HC RX 258: Performed by: INTERNAL MEDICINE

## 2023-05-25 PROCEDURE — 86850 RBC ANTIBODY SCREEN: CPT

## 2023-05-25 PROCEDURE — 85014 HEMATOCRIT: CPT

## 2023-05-25 PROCEDURE — 85018 HEMOGLOBIN: CPT

## 2023-05-25 PROCEDURE — 85025 COMPLETE CBC W/AUTO DIFF WBC: CPT

## 2023-05-25 PROCEDURE — 6370000000 HC RX 637 (ALT 250 FOR IP): Performed by: INTERNAL MEDICINE

## 2023-05-25 PROCEDURE — 6360000002 HC RX W HCPCS: Performed by: INTERNAL MEDICINE

## 2023-05-25 PROCEDURE — 6360000002 HC RX W HCPCS

## 2023-05-25 PROCEDURE — 2580000003 HC RX 258: Performed by: FAMILY MEDICINE

## 2023-05-25 PROCEDURE — 86900 BLOOD TYPING SEROLOGIC ABO: CPT

## 2023-05-25 PROCEDURE — 6370000000 HC RX 637 (ALT 250 FOR IP)

## 2023-05-25 PROCEDURE — 80048 BASIC METABOLIC PNL TOTAL CA: CPT

## 2023-05-25 PROCEDURE — 94640 AIRWAY INHALATION TREATMENT: CPT

## 2023-05-25 PROCEDURE — 6360000002 HC RX W HCPCS: Performed by: FAMILY MEDICINE

## 2023-05-25 PROCEDURE — 86923 COMPATIBILITY TEST ELECTRIC: CPT

## 2023-05-25 PROCEDURE — 84100 ASSAY OF PHOSPHORUS: CPT

## 2023-05-25 PROCEDURE — 36415 COLL VENOUS BLD VENIPUNCTURE: CPT

## 2023-05-25 RX ORDER — SODIUM CHLORIDE 9 MG/ML
INJECTION, SOLUTION INTRAVENOUS PRN
Status: DISCONTINUED | OUTPATIENT
Start: 2023-05-25 | End: 2023-06-03 | Stop reason: HOSPADM

## 2023-05-25 RX ORDER — DIPHENHYDRAMINE HCL 25 MG
25 TABLET ORAL SEE ADMIN INSTRUCTIONS
Status: COMPLETED | OUTPATIENT
Start: 2023-05-25 | End: 2023-06-02

## 2023-05-25 RX ORDER — MAGNESIUM SULFATE IN WATER 40 MG/ML
2000 INJECTION, SOLUTION INTRAVENOUS ONCE
Status: COMPLETED | OUTPATIENT
Start: 2023-05-25 | End: 2023-05-25

## 2023-05-25 RX ORDER — DEXAMETHASONE SODIUM PHOSPHATE 4 MG/ML
10 INJECTION, SOLUTION INTRA-ARTICULAR; INTRALESIONAL; INTRAMUSCULAR; INTRAVENOUS; SOFT TISSUE SEE ADMIN INSTRUCTIONS
Status: COMPLETED | OUTPATIENT
Start: 2023-05-25 | End: 2023-06-02

## 2023-05-25 RX ORDER — HYDROCODONE BITARTRATE AND ACETAMINOPHEN 5; 325 MG/1; MG/1
1 TABLET ORAL EVERY 6 HOURS PRN
Status: DISCONTINUED | OUTPATIENT
Start: 2023-05-25 | End: 2023-05-27

## 2023-05-25 RX ORDER — ACETAMINOPHEN 325 MG/1
650 TABLET ORAL SEE ADMIN INSTRUCTIONS
Status: COMPLETED | OUTPATIENT
Start: 2023-05-25 | End: 2023-05-31

## 2023-05-25 RX ADMIN — ROSUVASTATIN CALCIUM 10 MG: 5 TABLET, FILM COATED ORAL at 10:01

## 2023-05-25 RX ADMIN — GUAIFENESIN 400 MG: 400 TABLET ORAL at 15:08

## 2023-05-25 RX ADMIN — GUAIFENESIN 400 MG: 400 TABLET ORAL at 21:37

## 2023-05-25 RX ADMIN — IPRATROPIUM BROMIDE AND ALBUTEROL SULFATE 3 ML: .5; 2.5 SOLUTION RESPIRATORY (INHALATION) at 11:29

## 2023-05-25 RX ADMIN — GUAIFENESIN 400 MG: 400 TABLET ORAL at 17:14

## 2023-05-25 RX ADMIN — METOPROLOL SUCCINATE 100 MG: 100 TABLET, EXTENDED RELEASE ORAL at 10:03

## 2023-05-25 RX ADMIN — ACETAMINOPHEN 650 MG: 325 TABLET ORAL at 22:49

## 2023-05-25 RX ADMIN — TAMSULOSIN HYDROCHLORIDE 0.4 MG: 0.4 CAPSULE ORAL at 10:00

## 2023-05-25 RX ADMIN — METOPROLOL SUCCINATE 125 MG: 100 TABLET, EXTENDED RELEASE ORAL at 21:37

## 2023-05-25 RX ADMIN — APIXABAN 5 MG: 5 TABLET, FILM COATED ORAL at 10:00

## 2023-05-25 RX ADMIN — SODIUM CHLORIDE: 9 INJECTION, SOLUTION INTRAVENOUS at 17:32

## 2023-05-25 RX ADMIN — SODIUM ZIRCONIUM CYCLOSILICATE 10 G: 10 POWDER, FOR SUSPENSION ORAL at 10:04

## 2023-05-25 RX ADMIN — SODIUM CHLORIDE: 9 INJECTION, SOLUTION INTRAVENOUS at 03:52

## 2023-05-25 RX ADMIN — ESCITALOPRAM OXALATE 10 MG: 10 TABLET ORAL at 10:03

## 2023-05-25 RX ADMIN — IPRATROPIUM BROMIDE AND ALBUTEROL SULFATE 3 ML: .5; 2.5 SOLUTION RESPIRATORY (INHALATION) at 15:26

## 2023-05-25 RX ADMIN — PREGABALIN 75 MG: 25 CAPSULE ORAL at 21:36

## 2023-05-25 RX ADMIN — ASPIRIN 81 MG: 81 TABLET, COATED ORAL at 10:00

## 2023-05-25 RX ADMIN — FLUTICASONE PROPIONATE 1 SPRAY: 50 SPRAY, METERED NASAL at 10:52

## 2023-05-25 RX ADMIN — BUDESONIDE 500 MCG: 0.25 SUSPENSION RESPIRATORY (INHALATION) at 08:02

## 2023-05-25 RX ADMIN — SODIUM ZIRCONIUM CYCLOSILICATE 10 G: 10 POWDER, FOR SUSPENSION ORAL at 15:09

## 2023-05-25 RX ADMIN — INSULIN GLARGINE-YFGN 10 UNITS: 100 INJECTION, SOLUTION SUBCUTANEOUS at 21:43

## 2023-05-25 RX ADMIN — SODIUM ZIRCONIUM CYCLOSILICATE 10 G: 10 POWDER, FOR SUSPENSION ORAL at 21:42

## 2023-05-25 RX ADMIN — SODIUM CHLORIDE, PRESERVATIVE FREE 10 ML: 5 INJECTION INTRAVENOUS at 10:35

## 2023-05-25 RX ADMIN — HYDROCODONE BITARTRATE AND ACETAMINOPHEN 1 TABLET: 5; 325 TABLET ORAL at 18:47

## 2023-05-25 RX ADMIN — IPRATROPIUM BROMIDE AND ALBUTEROL SULFATE 3 ML: .5; 2.5 SOLUTION RESPIRATORY (INHALATION) at 20:31

## 2023-05-25 RX ADMIN — GUAIFENESIN 400 MG: 400 TABLET ORAL at 10:02

## 2023-05-25 RX ADMIN — CEFEPIME 2000 MG: 2 INJECTION, POWDER, FOR SOLUTION INTRAVENOUS at 17:20

## 2023-05-25 RX ADMIN — PREGABALIN 75 MG: 25 CAPSULE ORAL at 10:02

## 2023-05-25 RX ADMIN — MAGNESIUM SULFATE HEPTAHYDRATE 2000 MG: 40 INJECTION, SOLUTION INTRAVENOUS at 10:37

## 2023-05-25 RX ADMIN — BUDESONIDE 500 MCG: 0.25 SUSPENSION RESPIRATORY (INHALATION) at 20:31

## 2023-05-25 RX ADMIN — IPRATROPIUM BROMIDE AND ALBUTEROL SULFATE 3 ML: .5; 2.5 SOLUTION RESPIRATORY (INHALATION) at 08:02

## 2023-05-25 ASSESSMENT — PAIN SCALES - GENERAL
PAINLEVEL_OUTOF10: 5
PAINLEVEL_OUTOF10: 3
PAINLEVEL_OUTOF10: 3
PAINLEVEL_OUTOF10: 5

## 2023-05-25 ASSESSMENT — PAIN DESCRIPTION - DESCRIPTORS
DESCRIPTORS: ACHING;DISCOMFORT;THROBBING
DESCRIPTORS: ACHING;DISCOMFORT;PRESSURE

## 2023-05-25 ASSESSMENT — PAIN - FUNCTIONAL ASSESSMENT: PAIN_FUNCTIONAL_ASSESSMENT: PREVENTS OR INTERFERES SOME ACTIVE ACTIVITIES AND ADLS

## 2023-05-25 ASSESSMENT — PAIN DESCRIPTION - ORIENTATION
ORIENTATION: MID
ORIENTATION: ANTERIOR

## 2023-05-25 ASSESSMENT — PAIN DESCRIPTION - PAIN TYPE: TYPE: ACUTE PAIN

## 2023-05-25 ASSESSMENT — PAIN DESCRIPTION - LOCATION
LOCATION: HEAD
LOCATION: HEAD

## 2023-05-25 ASSESSMENT — PAIN DESCRIPTION - ONSET: ONSET: ON-GOING

## 2023-05-25 ASSESSMENT — PAIN DESCRIPTION - FREQUENCY: FREQUENCY: INTERMITTENT

## 2023-05-26 LAB
ANION GAP SERPL CALCULATED.3IONS-SCNC: 8 MMOL/L (ref 7–16)
ANION GAP SERPL CALCULATED.3IONS-SCNC: 9 MMOL/L (ref 7–16)
ANISOCYTOSIS: ABNORMAL
BASOPHILS # BLD: 0 E9/L (ref 0–0.2)
BASOPHILS NFR BLD: 0.1 % (ref 0–2)
BUN SERPL-MCNC: 26 MG/DL (ref 6–23)
BUN SERPL-MCNC: 33 MG/DL (ref 6–23)
BURR CELLS: ABNORMAL
CALCIUM SERPL-MCNC: 8.7 MG/DL (ref 8.6–10.2)
CALCIUM SERPL-MCNC: 8.9 MG/DL (ref 8.6–10.2)
CHLORIDE SERPL-SCNC: 105 MMOL/L (ref 98–107)
CHLORIDE SERPL-SCNC: 109 MMOL/L (ref 98–107)
CO2 SERPL-SCNC: 23 MMOL/L (ref 22–29)
CO2 SERPL-SCNC: 25 MMOL/L (ref 22–29)
CREAT SERPL-MCNC: 1.2 MG/DL (ref 0.7–1.2)
CREAT SERPL-MCNC: 1.4 MG/DL (ref 0.7–1.2)
EOSINOPHIL # BLD: 0 E9/L (ref 0.05–0.5)
EOSINOPHIL NFR BLD: 1.4 % (ref 0–6)
ERYTHROCYTE [DISTWIDTH] IN BLOOD BY AUTOMATED COUNT: 17 FL (ref 11.5–15)
GLUCOSE SERPL-MCNC: 178 MG/DL (ref 74–99)
GLUCOSE SERPL-MCNC: 98 MG/DL (ref 74–99)
HCT VFR BLD AUTO: 25.9 % (ref 37–54)
HGB BLD-MCNC: 8.2 G/DL (ref 12.5–16.5)
HYPOCHROMIA: ABNORMAL
LYMPHOCYTES # BLD: 1.64 E9/L (ref 1.5–4)
LYMPHOCYTES NFR BLD: 3.5 % (ref 20–42)
MAGNESIUM SERPL-MCNC: 1.6 MG/DL (ref 1.6–2.6)
MCH RBC QN AUTO: 29 PG (ref 26–35)
MCHC RBC AUTO-ENTMCNC: 31.7 % (ref 32–34.5)
MCV RBC AUTO: 91.5 FL (ref 80–99.9)
METER GLUCOSE: 109 MG/DL (ref 74–99)
METER GLUCOSE: 110 MG/DL (ref 74–99)
METER GLUCOSE: 170 MG/DL (ref 74–99)
METER GLUCOSE: 200 MG/DL (ref 74–99)
MONOCYTES # BLD: 0.82 E9/L (ref 0.1–0.95)
MONOCYTES NFR BLD: 1.7 % (ref 2–12)
NEUTROPHILS # BLD: 38.95 E9/L (ref 1.8–7.3)
NEUTS SEG NFR BLD: 94.8 % (ref 43–80)
OVALOCYTES: ABNORMAL
PHOSPHATE SERPL-MCNC: 2.9 MG/DL (ref 2.5–4.5)
PLATELET # BLD AUTO: 227 E9/L (ref 130–450)
PMV BLD AUTO: 10.8 FL (ref 7–12)
POIKILOCYTES: ABNORMAL
POLYCHROMASIA: ABNORMAL
POTASSIUM SERPL-SCNC: 4.4 MMOL/L (ref 3.5–5)
POTASSIUM SERPL-SCNC: 4.8 MMOL/L (ref 3.5–5)
RBC # BLD AUTO: 2.83 E12/L (ref 3.8–5.8)
SODIUM SERPL-SCNC: 138 MMOL/L (ref 132–146)
SODIUM SERPL-SCNC: 141 MMOL/L (ref 132–146)
TEAR DROP CELLS: ABNORMAL
WBC # BLD: 41 E9/L (ref 4.5–11.5)

## 2023-05-26 PROCEDURE — 82962 GLUCOSE BLOOD TEST: CPT

## 2023-05-26 PROCEDURE — 80048 BASIC METABOLIC PNL TOTAL CA: CPT

## 2023-05-26 PROCEDURE — 36415 COLL VENOUS BLD VENIPUNCTURE: CPT

## 2023-05-26 PROCEDURE — 6360000002 HC RX W HCPCS: Performed by: INTERNAL MEDICINE

## 2023-05-26 PROCEDURE — 36430 TRANSFUSION BLD/BLD COMPNT: CPT

## 2023-05-26 PROCEDURE — 6370000000 HC RX 637 (ALT 250 FOR IP): Performed by: FAMILY MEDICINE

## 2023-05-26 PROCEDURE — 83735 ASSAY OF MAGNESIUM: CPT

## 2023-05-26 PROCEDURE — 6370000000 HC RX 637 (ALT 250 FOR IP): Performed by: INTERNAL MEDICINE

## 2023-05-26 PROCEDURE — 2580000003 HC RX 258

## 2023-05-26 PROCEDURE — 2580000003 HC RX 258: Performed by: INTERNAL MEDICINE

## 2023-05-26 PROCEDURE — 87040 BLOOD CULTURE FOR BACTERIA: CPT

## 2023-05-26 PROCEDURE — S5553 INSULIN LONG ACTING 5 U: HCPCS | Performed by: FAMILY MEDICINE

## 2023-05-26 PROCEDURE — 6370000000 HC RX 637 (ALT 250 FOR IP)

## 2023-05-26 PROCEDURE — 84100 ASSAY OF PHOSPHORUS: CPT

## 2023-05-26 PROCEDURE — 85025 COMPLETE CBC W/AUTO DIFF WBC: CPT

## 2023-05-26 PROCEDURE — 6360000002 HC RX W HCPCS: Performed by: FAMILY MEDICINE

## 2023-05-26 PROCEDURE — 2140000000 HC CCU INTERMEDIATE R&B

## 2023-05-26 PROCEDURE — 94640 AIRWAY INHALATION TREATMENT: CPT

## 2023-05-26 RX ADMIN — SODIUM CHLORIDE: 9 INJECTION, SOLUTION INTRAVENOUS at 14:30

## 2023-05-26 RX ADMIN — ESCITALOPRAM OXALATE 10 MG: 10 TABLET ORAL at 09:06

## 2023-05-26 RX ADMIN — SODIUM CHLORIDE, PRESERVATIVE FREE 10 ML: 5 INJECTION INTRAVENOUS at 09:06

## 2023-05-26 RX ADMIN — SODIUM ZIRCONIUM CYCLOSILICATE 10 G: 10 POWDER, FOR SUSPENSION ORAL at 09:05

## 2023-05-26 RX ADMIN — METOPROLOL SUCCINATE 125 MG: 100 TABLET, EXTENDED RELEASE ORAL at 20:52

## 2023-05-26 RX ADMIN — CEFEPIME 2000 MG: 2 INJECTION, POWDER, FOR SOLUTION INTRAVENOUS at 17:41

## 2023-05-26 RX ADMIN — SODIUM ZIRCONIUM CYCLOSILICATE 10 G: 10 POWDER, FOR SUSPENSION ORAL at 14:31

## 2023-05-26 RX ADMIN — IPRATROPIUM BROMIDE AND ALBUTEROL SULFATE 3 ML: .5; 2.5 SOLUTION RESPIRATORY (INHALATION) at 12:31

## 2023-05-26 RX ADMIN — IPRATROPIUM BROMIDE AND ALBUTEROL SULFATE 3 ML: .5; 2.5 SOLUTION RESPIRATORY (INHALATION) at 15:34

## 2023-05-26 RX ADMIN — HYDROCODONE BITARTRATE AND ACETAMINOPHEN 1 TABLET: 5; 325 TABLET ORAL at 20:53

## 2023-05-26 RX ADMIN — INSULIN GLARGINE-YFGN 10 UNITS: 100 INJECTION, SOLUTION SUBCUTANEOUS at 20:53

## 2023-05-26 RX ADMIN — BUDESONIDE 500 MCG: 0.25 SUSPENSION RESPIRATORY (INHALATION) at 08:10

## 2023-05-26 RX ADMIN — GUAIFENESIN 400 MG: 400 TABLET ORAL at 20:53

## 2023-05-26 RX ADMIN — IPRATROPIUM BROMIDE AND ALBUTEROL SULFATE 3 ML: .5; 2.5 SOLUTION RESPIRATORY (INHALATION) at 19:35

## 2023-05-26 RX ADMIN — CEFEPIME 2000 MG: 2 INJECTION, POWDER, FOR SOLUTION INTRAVENOUS at 02:05

## 2023-05-26 RX ADMIN — CEFEPIME 2000 MG: 2 INJECTION, POWDER, FOR SOLUTION INTRAVENOUS at 11:11

## 2023-05-26 RX ADMIN — VANCOMYCIN HYDROCHLORIDE 2000 MG: 10 INJECTION, POWDER, LYOPHILIZED, FOR SOLUTION INTRAVENOUS at 21:08

## 2023-05-26 RX ADMIN — BUDESONIDE 500 MCG: 0.25 SUSPENSION RESPIRATORY (INHALATION) at 19:35

## 2023-05-26 RX ADMIN — TAMSULOSIN HYDROCHLORIDE 0.4 MG: 0.4 CAPSULE ORAL at 09:05

## 2023-05-26 RX ADMIN — HYDROCODONE BITARTRATE AND ACETAMINOPHEN 1 TABLET: 5; 325 TABLET ORAL at 01:02

## 2023-05-26 RX ADMIN — METOPROLOL SUCCINATE 100 MG: 100 TABLET, EXTENDED RELEASE ORAL at 09:06

## 2023-05-26 RX ADMIN — SODIUM ZIRCONIUM CYCLOSILICATE 10 G: 10 POWDER, FOR SUSPENSION ORAL at 20:53

## 2023-05-26 RX ADMIN — PREGABALIN 75 MG: 25 CAPSULE ORAL at 09:06

## 2023-05-26 RX ADMIN — HYDROCODONE BITARTRATE AND ACETAMINOPHEN 1 TABLET: 5; 325 TABLET ORAL at 09:12

## 2023-05-26 RX ADMIN — GUAIFENESIN 400 MG: 400 TABLET ORAL at 09:05

## 2023-05-26 RX ADMIN — IPRATROPIUM BROMIDE AND ALBUTEROL SULFATE 3 ML: .5; 2.5 SOLUTION RESPIRATORY (INHALATION) at 08:10

## 2023-05-26 RX ADMIN — ROSUVASTATIN CALCIUM 10 MG: 5 TABLET, FILM COATED ORAL at 09:06

## 2023-05-26 RX ADMIN — PREGABALIN 75 MG: 25 CAPSULE ORAL at 20:53

## 2023-05-26 ASSESSMENT — PAIN DESCRIPTION - FREQUENCY: FREQUENCY: INTERMITTENT

## 2023-05-26 ASSESSMENT — PAIN SCALES - GENERAL
PAINLEVEL_OUTOF10: 6
PAINLEVEL_OUTOF10: 5
PAINLEVEL_OUTOF10: 0
PAINLEVEL_OUTOF10: 7

## 2023-05-26 ASSESSMENT — PAIN DESCRIPTION - DESCRIPTORS
DESCRIPTORS: ACHING
DESCRIPTORS: ACHING;PRESSURE;TINGLING
DESCRIPTORS: ACHING;DISCOMFORT;NAGGING

## 2023-05-26 ASSESSMENT — ENCOUNTER SYMPTOMS
RESPIRATORY NEGATIVE: 1
ALLERGIC/IMMUNOLOGIC NEGATIVE: 1
EYES NEGATIVE: 1
GASTROINTESTINAL NEGATIVE: 1

## 2023-05-26 ASSESSMENT — PAIN DESCRIPTION - LOCATION
LOCATION: HEAD
LOCATION: HEAD
LOCATION: HEAD;FOOT

## 2023-05-26 ASSESSMENT — PAIN DESCRIPTION - PAIN TYPE: TYPE: ACUTE PAIN

## 2023-05-26 ASSESSMENT — PAIN DESCRIPTION - ORIENTATION: ORIENTATION: RIGHT;LEFT

## 2023-05-26 ASSESSMENT — PAIN - FUNCTIONAL ASSESSMENT
PAIN_FUNCTIONAL_ASSESSMENT: PREVENTS OR INTERFERES SOME ACTIVE ACTIVITIES AND ADLS
PAIN_FUNCTIONAL_ASSESSMENT: PREVENTS OR INTERFERES SOME ACTIVE ACTIVITIES AND ADLS

## 2023-05-26 ASSESSMENT — PAIN DESCRIPTION - ONSET: ONSET: ON-GOING

## 2023-05-27 ENCOUNTER — APPOINTMENT (OUTPATIENT)
Dept: GENERAL RADIOLOGY | Age: 63
End: 2023-05-27
Payer: COMMERCIAL

## 2023-05-27 LAB
ANISOCYTOSIS: ABNORMAL
BASOPHILS # BLD: 0.13 E9/L (ref 0–0.2)
BASOPHILS NFR BLD: 0.6 % (ref 0–2)
EOSINOPHIL # BLD: 0.75 E9/L (ref 0.05–0.5)
EOSINOPHIL NFR BLD: 3.3 % (ref 0–6)
ERYTHROCYTE [DISTWIDTH] IN BLOOD BY AUTOMATED COUNT: 17.1 FL (ref 11.5–15)
HCT VFR BLD AUTO: 26.7 % (ref 37–54)
HGB BLD-MCNC: 8.1 G/DL (ref 12.5–16.5)
HYPOCHROMIA: ABNORMAL
IMM GRANULOCYTES # BLD: 0.39 E9/L
IMM GRANULOCYTES NFR BLD: 1.7 % (ref 0–5)
LYMPHOCYTES # BLD: 1.42 E9/L (ref 1.5–4)
LYMPHOCYTES NFR BLD: 6.2 % (ref 20–42)
MAGNESIUM SERPL-MCNC: 1.2 MG/DL (ref 1.6–2.6)
MCH RBC QN AUTO: 28.7 PG (ref 26–35)
MCHC RBC AUTO-ENTMCNC: 30.3 % (ref 32–34.5)
MCV RBC AUTO: 94.7 FL (ref 80–99.9)
METER GLUCOSE: 116 MG/DL (ref 74–99)
METER GLUCOSE: 116 MG/DL (ref 74–99)
METER GLUCOSE: 133 MG/DL (ref 74–99)
METER GLUCOSE: 156 MG/DL (ref 74–99)
MONOCYTES # BLD: 0.62 E9/L (ref 0.1–0.95)
MONOCYTES NFR BLD: 2.7 % (ref 2–12)
NEUTROPHILS # BLD: 19.49 E9/L (ref 1.8–7.3)
NEUTS SEG NFR BLD: 85.5 % (ref 43–80)
OVALOCYTES: ABNORMAL
PHOSPHATE SERPL-MCNC: 2.7 MG/DL (ref 2.5–4.5)
PLATELET # BLD AUTO: 220 E9/L (ref 130–450)
PMV BLD AUTO: 10.7 FL (ref 7–12)
POIKILOCYTES: ABNORMAL
POLYCHROMASIA: ABNORMAL
RBC # BLD AUTO: 2.82 E12/L (ref 3.8–5.8)
TEAR DROP CELLS: ABNORMAL
WBC # BLD: 22.8 E9/L (ref 4.5–11.5)

## 2023-05-27 PROCEDURE — 6370000000 HC RX 637 (ALT 250 FOR IP): Performed by: INTERNAL MEDICINE

## 2023-05-27 PROCEDURE — 6360000002 HC RX W HCPCS: Performed by: INTERNAL MEDICINE

## 2023-05-27 PROCEDURE — S5553 INSULIN LONG ACTING 5 U: HCPCS | Performed by: FAMILY MEDICINE

## 2023-05-27 PROCEDURE — 84100 ASSAY OF PHOSPHORUS: CPT

## 2023-05-27 PROCEDURE — 6360000002 HC RX W HCPCS: Performed by: FAMILY MEDICINE

## 2023-05-27 PROCEDURE — 71045 X-RAY EXAM CHEST 1 VIEW: CPT

## 2023-05-27 PROCEDURE — 2140000000 HC CCU INTERMEDIATE R&B

## 2023-05-27 PROCEDURE — 94640 AIRWAY INHALATION TREATMENT: CPT

## 2023-05-27 PROCEDURE — 2580000003 HC RX 258: Performed by: INTERNAL MEDICINE

## 2023-05-27 PROCEDURE — 6370000000 HC RX 637 (ALT 250 FOR IP)

## 2023-05-27 PROCEDURE — 83735 ASSAY OF MAGNESIUM: CPT

## 2023-05-27 PROCEDURE — 6370000000 HC RX 637 (ALT 250 FOR IP): Performed by: FAMILY MEDICINE

## 2023-05-27 PROCEDURE — 82962 GLUCOSE BLOOD TEST: CPT

## 2023-05-27 PROCEDURE — 36415 COLL VENOUS BLD VENIPUNCTURE: CPT

## 2023-05-27 PROCEDURE — 87040 BLOOD CULTURE FOR BACTERIA: CPT

## 2023-05-27 PROCEDURE — 85025 COMPLETE CBC W/AUTO DIFF WBC: CPT

## 2023-05-27 RX ORDER — HYDROCODONE BITARTRATE AND ACETAMINOPHEN 5; 325 MG/1; MG/1
1 TABLET ORAL EVERY 4 HOURS PRN
Status: DISCONTINUED | OUTPATIENT
Start: 2023-05-27 | End: 2023-05-29

## 2023-05-27 RX ORDER — SUMATRIPTAN 25 MG/1
25 TABLET, FILM COATED ORAL ONCE
Status: COMPLETED | OUTPATIENT
Start: 2023-05-28 | End: 2023-05-28

## 2023-05-27 RX ADMIN — GUAIFENESIN 400 MG: 400 TABLET ORAL at 08:26

## 2023-05-27 RX ADMIN — IPRATROPIUM BROMIDE AND ALBUTEROL SULFATE 3 ML: .5; 2.5 SOLUTION RESPIRATORY (INHALATION) at 08:35

## 2023-05-27 RX ADMIN — ROSUVASTATIN CALCIUM 10 MG: 5 TABLET, FILM COATED ORAL at 08:26

## 2023-05-27 RX ADMIN — CEFEPIME 2000 MG: 2 INJECTION, POWDER, FOR SOLUTION INTRAVENOUS at 13:20

## 2023-05-27 RX ADMIN — HYDROCODONE BITARTRATE AND ACETAMINOPHEN 1 TABLET: 5; 325 TABLET ORAL at 02:25

## 2023-05-27 RX ADMIN — ESCITALOPRAM OXALATE 10 MG: 10 TABLET ORAL at 08:26

## 2023-05-27 RX ADMIN — SODIUM ZIRCONIUM CYCLOSILICATE 10 G: 10 POWDER, FOR SUSPENSION ORAL at 08:26

## 2023-05-27 RX ADMIN — IPRATROPIUM BROMIDE AND ALBUTEROL SULFATE 3 ML: .5; 2.5 SOLUTION RESPIRATORY (INHALATION) at 12:22

## 2023-05-27 RX ADMIN — GUAIFENESIN 400 MG: 400 TABLET ORAL at 17:56

## 2023-05-27 RX ADMIN — IPRATROPIUM BROMIDE AND ALBUTEROL SULFATE 3 ML: .5; 2.5 SOLUTION RESPIRATORY (INHALATION) at 16:13

## 2023-05-27 RX ADMIN — TAMSULOSIN HYDROCHLORIDE 0.4 MG: 0.4 CAPSULE ORAL at 08:26

## 2023-05-27 RX ADMIN — HYDROCODONE BITARTRATE AND ACETAMINOPHEN 1 TABLET: 5; 325 TABLET ORAL at 13:22

## 2023-05-27 RX ADMIN — HYDROCODONE BITARTRATE AND ACETAMINOPHEN 1 TABLET: 5; 325 TABLET ORAL at 18:38

## 2023-05-27 RX ADMIN — GUAIFENESIN 400 MG: 400 TABLET ORAL at 21:36

## 2023-05-27 RX ADMIN — ACETAMINOPHEN 650 MG: 325 TABLET ORAL at 15:55

## 2023-05-27 RX ADMIN — SODIUM ZIRCONIUM CYCLOSILICATE 10 G: 10 POWDER, FOR SUSPENSION ORAL at 13:21

## 2023-05-27 RX ADMIN — INSULIN GLARGINE-YFGN 10 UNITS: 100 INJECTION, SOLUTION SUBCUTANEOUS at 21:23

## 2023-05-27 RX ADMIN — CEFEPIME 2000 MG: 2 INJECTION, POWDER, FOR SOLUTION INTRAVENOUS at 21:45

## 2023-05-27 RX ADMIN — ACETAMINOPHEN 650 MG: 325 TABLET ORAL at 07:27

## 2023-05-27 RX ADMIN — CEFEPIME 2000 MG: 2 INJECTION, POWDER, FOR SOLUTION INTRAVENOUS at 02:20

## 2023-05-27 RX ADMIN — GUAIFENESIN 400 MG: 400 TABLET ORAL at 13:21

## 2023-05-27 RX ADMIN — VANCOMYCIN HYDROCHLORIDE 1250 MG: 10 INJECTION, POWDER, LYOPHILIZED, FOR SOLUTION INTRAVENOUS at 23:16

## 2023-05-27 RX ADMIN — BUDESONIDE 500 MCG: 0.25 SUSPENSION RESPIRATORY (INHALATION) at 08:34

## 2023-05-27 RX ADMIN — IPRATROPIUM BROMIDE AND ALBUTEROL SULFATE 3 ML: .5; 2.5 SOLUTION RESPIRATORY (INHALATION) at 20:01

## 2023-05-27 RX ADMIN — VANCOMYCIN HYDROCHLORIDE 1250 MG: 10 INJECTION, POWDER, LYOPHILIZED, FOR SOLUTION INTRAVENOUS at 09:58

## 2023-05-27 RX ADMIN — HYDROCODONE BITARTRATE AND ACETAMINOPHEN 1 TABLET: 5; 325 TABLET ORAL at 23:27

## 2023-05-27 RX ADMIN — HYDROCODONE BITARTRATE AND ACETAMINOPHEN 1 TABLET: 5; 325 TABLET ORAL at 08:26

## 2023-05-27 RX ADMIN — BUDESONIDE 500 MCG: 0.25 SUSPENSION RESPIRATORY (INHALATION) at 20:01

## 2023-05-27 RX ADMIN — ACETAMINOPHEN 650 MG: 325 TABLET ORAL at 23:27

## 2023-05-27 ASSESSMENT — PAIN SCALES - GENERAL
PAINLEVEL_OUTOF10: 5
PAINLEVEL_OUTOF10: 2
PAINLEVEL_OUTOF10: 5
PAINLEVEL_OUTOF10: 8
PAINLEVEL_OUTOF10: 6
PAINLEVEL_OUTOF10: 4
PAINLEVEL_OUTOF10: 5

## 2023-05-27 ASSESSMENT — PAIN DESCRIPTION - LOCATION
LOCATION: HEAD

## 2023-05-27 ASSESSMENT — PAIN DESCRIPTION - DESCRIPTORS
DESCRIPTORS: ACHING;DISCOMFORT
DESCRIPTORS: ACHING;DISCOMFORT;THROBBING
DESCRIPTORS: ACHING;DISCOMFORT

## 2023-05-28 ENCOUNTER — ANESTHESIA EVENT (OUTPATIENT)
Dept: OPERATING ROOM | Age: 63
End: 2023-05-28
Payer: COMMERCIAL

## 2023-05-28 ENCOUNTER — ANESTHESIA (OUTPATIENT)
Dept: OPERATING ROOM | Age: 63
End: 2023-05-28
Payer: COMMERCIAL

## 2023-05-28 PROBLEM — R78.81 BACTEREMIA: Status: ACTIVE | Noted: 2023-05-28

## 2023-05-28 LAB
ANISOCYTOSIS: ABNORMAL
BACTERIA BLD CULT ORG #2: ABNORMAL
BASOPHILS # BLD: 0.15 E9/L (ref 0–0.2)
BASOPHILS NFR BLD: 0.6 % (ref 0–2)
DOHLE BODIES: ABNORMAL
EOSINOPHIL # BLD: 0.88 E9/L (ref 0.05–0.5)
EOSINOPHIL NFR BLD: 3.8 % (ref 0–6)
ERYTHROCYTE [DISTWIDTH] IN BLOOD BY AUTOMATED COUNT: 16.8 FL (ref 11.5–15)
HCT VFR BLD AUTO: 27.8 % (ref 37–54)
HGB BLD-MCNC: 8.5 G/DL (ref 12.5–16.5)
IMM GRANULOCYTES # BLD: 0.58 E9/L
IMM GRANULOCYTES NFR BLD: 2.5 % (ref 0–5)
LYMPHOCYTES # BLD: 1.43 E9/L (ref 1.5–4)
LYMPHOCYTES NFR BLD: 6.1 % (ref 20–42)
MAGNESIUM SERPL-MCNC: 1.3 MG/DL (ref 1.6–2.6)
MCH RBC QN AUTO: 29.1 PG (ref 26–35)
MCHC RBC AUTO-ENTMCNC: 30.6 % (ref 32–34.5)
MCV RBC AUTO: 95.2 FL (ref 80–99.9)
METER GLUCOSE: 111 MG/DL (ref 74–99)
METER GLUCOSE: 113 MG/DL (ref 74–99)
METER GLUCOSE: 114 MG/DL (ref 74–99)
METER GLUCOSE: 115 MG/DL (ref 74–99)
METER GLUCOSE: 131 MG/DL (ref 74–99)
MONOCYTES # BLD: 1.28 E9/L (ref 0.1–0.95)
MONOCYTES NFR BLD: 5.5 % (ref 2–12)
NEUTROPHILS # BLD: 19.05 E9/L (ref 1.8–7.3)
NEUTS SEG NFR BLD: 81.5 % (ref 43–80)
ORGANISM: ABNORMAL
ORGANISM: ABNORMAL
OVALOCYTES: ABNORMAL
PHOSPHATE SERPL-MCNC: 3.2 MG/DL (ref 2.5–4.5)
PLATELET # BLD AUTO: 231 E9/L (ref 130–450)
PMV BLD AUTO: 10.8 FL (ref 7–12)
POIKILOCYTES: ABNORMAL
POLYCHROMASIA: ABNORMAL
RBC # BLD AUTO: 2.92 E12/L (ref 3.8–5.8)
TEAR DROP CELLS: ABNORMAL
TOXIC GRANULATION: ABNORMAL
VANCOMYCIN TROUGH SERPL-MCNC: 13.6 MCG/ML (ref 5–16)
VANCOMYCIN TROUGH SERPL-MCNC: 20.8 MCG/ML (ref 5–16)
WBC # BLD: 23.4 E9/L (ref 4.5–11.5)

## 2023-05-28 PROCEDURE — 2580000003 HC RX 258: Performed by: INTERNAL MEDICINE

## 2023-05-28 PROCEDURE — 6360000002 HC RX W HCPCS: Performed by: FAMILY MEDICINE

## 2023-05-28 PROCEDURE — 83735 ASSAY OF MAGNESIUM: CPT

## 2023-05-28 PROCEDURE — 6370000000 HC RX 637 (ALT 250 FOR IP): Performed by: INTERNAL MEDICINE

## 2023-05-28 PROCEDURE — 85025 COMPLETE CBC W/AUTO DIFF WBC: CPT

## 2023-05-28 PROCEDURE — 6360000002 HC RX W HCPCS: Performed by: INTERNAL MEDICINE

## 2023-05-28 PROCEDURE — 94640 AIRWAY INHALATION TREATMENT: CPT

## 2023-05-28 PROCEDURE — 99221 1ST HOSP IP/OBS SF/LOW 40: CPT | Performed by: SURGERY

## 2023-05-28 PROCEDURE — 2500000003 HC RX 250 WO HCPCS: Performed by: INTERNAL MEDICINE

## 2023-05-28 PROCEDURE — 2580000003 HC RX 258

## 2023-05-28 PROCEDURE — 80202 ASSAY OF VANCOMYCIN: CPT

## 2023-05-28 PROCEDURE — 84100 ASSAY OF PHOSPHORUS: CPT

## 2023-05-28 PROCEDURE — 2700000000 HC OXYGEN THERAPY PER DAY

## 2023-05-28 PROCEDURE — 6370000000 HC RX 637 (ALT 250 FOR IP): Performed by: NURSE PRACTITIONER

## 2023-05-28 PROCEDURE — 82962 GLUCOSE BLOOD TEST: CPT

## 2023-05-28 PROCEDURE — 6370000000 HC RX 637 (ALT 250 FOR IP): Performed by: FAMILY MEDICINE

## 2023-05-28 PROCEDURE — S5553 INSULIN LONG ACTING 5 U: HCPCS | Performed by: FAMILY MEDICINE

## 2023-05-28 PROCEDURE — 36415 COLL VENOUS BLD VENIPUNCTURE: CPT

## 2023-05-28 PROCEDURE — 6360000002 HC RX W HCPCS

## 2023-05-28 PROCEDURE — 2140000000 HC CCU INTERMEDIATE R&B

## 2023-05-28 RX ORDER — HYDROMORPHONE HYDROCHLORIDE 1 MG/ML
0.25 INJECTION, SOLUTION INTRAMUSCULAR; INTRAVENOUS; SUBCUTANEOUS ONCE
Status: COMPLETED | OUTPATIENT
Start: 2023-05-28 | End: 2023-05-28

## 2023-05-28 RX ADMIN — INSULIN GLARGINE-YFGN 10 UNITS: 100 INJECTION, SOLUTION SUBCUTANEOUS at 21:13

## 2023-05-28 RX ADMIN — HYDROCODONE BITARTRATE AND ACETAMINOPHEN 1 TABLET: 5; 325 TABLET ORAL at 17:45

## 2023-05-28 RX ADMIN — CEFEPIME 2000 MG: 2 INJECTION, POWDER, FOR SOLUTION INTRAVENOUS at 22:57

## 2023-05-28 RX ADMIN — CEFEPIME 2000 MG: 2 INJECTION, POWDER, FOR SOLUTION INTRAVENOUS at 13:33

## 2023-05-28 RX ADMIN — HYDROMORPHONE HYDROCHLORIDE 0.25 MG: 1 INJECTION, SOLUTION INTRAMUSCULAR; INTRAVENOUS; SUBCUTANEOUS at 15:28

## 2023-05-28 RX ADMIN — ONDANSETRON 4 MG: 2 INJECTION INTRAMUSCULAR; INTRAVENOUS at 08:52

## 2023-05-28 RX ADMIN — IPRATROPIUM BROMIDE AND ALBUTEROL SULFATE 3 ML: .5; 2.5 SOLUTION RESPIRATORY (INHALATION) at 15:52

## 2023-05-28 RX ADMIN — ROSUVASTATIN CALCIUM 10 MG: 5 TABLET, FILM COATED ORAL at 08:54

## 2023-05-28 RX ADMIN — HYDROCODONE BITARTRATE AND ACETAMINOPHEN 1 TABLET: 5; 325 TABLET ORAL at 03:37

## 2023-05-28 RX ADMIN — VANCOMYCIN HYDROCHLORIDE 1250 MG: 10 INJECTION, POWDER, LYOPHILIZED, FOR SOLUTION INTRAVENOUS at 21:05

## 2023-05-28 RX ADMIN — ONDANSETRON 4 MG: 2 INJECTION INTRAMUSCULAR; INTRAVENOUS at 17:43

## 2023-05-28 RX ADMIN — GUAIFENESIN 400 MG: 400 TABLET ORAL at 21:12

## 2023-05-28 RX ADMIN — GUAIFENESIN 400 MG: 400 TABLET ORAL at 08:54

## 2023-05-28 RX ADMIN — IPRATROPIUM BROMIDE AND ALBUTEROL SULFATE 3 ML: .5; 2.5 SOLUTION RESPIRATORY (INHALATION) at 11:13

## 2023-05-28 RX ADMIN — IPRATROPIUM BROMIDE AND ALBUTEROL SULFATE 3 ML: .5; 2.5 SOLUTION RESPIRATORY (INHALATION) at 19:26

## 2023-05-28 RX ADMIN — HYDROCODONE BITARTRATE AND ACETAMINOPHEN 1 TABLET: 5; 325 TABLET ORAL at 22:33

## 2023-05-28 RX ADMIN — TAMSULOSIN HYDROCHLORIDE 0.4 MG: 0.4 CAPSULE ORAL at 08:54

## 2023-05-28 RX ADMIN — SODIUM CHLORIDE, PRESERVATIVE FREE 10 ML: 5 INJECTION INTRAVENOUS at 08:52

## 2023-05-28 RX ADMIN — HYDROCODONE BITARTRATE AND ACETAMINOPHEN 1 TABLET: 5; 325 TABLET ORAL at 08:59

## 2023-05-28 RX ADMIN — BUDESONIDE 500 MCG: 0.25 SUSPENSION RESPIRATORY (INHALATION) at 19:27

## 2023-05-28 RX ADMIN — SUMATRIPTAN SUCCINATE 25 MG: 25 TABLET ORAL at 00:24

## 2023-05-28 RX ADMIN — IPRATROPIUM BROMIDE AND ALBUTEROL SULFATE 3 ML: .5; 2.5 SOLUTION RESPIRATORY (INHALATION) at 07:28

## 2023-05-28 RX ADMIN — CEFEPIME 2000 MG: 2 INJECTION, POWDER, FOR SOLUTION INTRAVENOUS at 05:42

## 2023-05-28 RX ADMIN — SODIUM ZIRCONIUM CYCLOSILICATE 10 G: 10 POWDER, FOR SUSPENSION ORAL at 08:55

## 2023-05-28 RX ADMIN — ESCITALOPRAM OXALATE 10 MG: 10 TABLET ORAL at 08:54

## 2023-05-28 RX ADMIN — METOPROLOL SUCCINATE 100 MG: 100 TABLET, EXTENDED RELEASE ORAL at 08:54

## 2023-05-28 RX ADMIN — BUDESONIDE 500 MCG: 0.25 SUSPENSION RESPIRATORY (INHALATION) at 07:28

## 2023-05-28 RX ADMIN — SODIUM CHLORIDE, PRESERVATIVE FREE 10 ML: 5 INJECTION INTRAVENOUS at 22:57

## 2023-05-28 RX ADMIN — METOPROLOL SUCCINATE 125 MG: 100 TABLET, EXTENDED RELEASE ORAL at 21:12

## 2023-05-28 ASSESSMENT — PAIN DESCRIPTION - LOCATION
LOCATION: HEAD
LOCATION: HEAD;FOOT
LOCATION: HEAD
LOCATION: FOOT

## 2023-05-28 ASSESSMENT — PAIN DESCRIPTION - DESCRIPTORS
DESCRIPTORS: ACHING
DESCRIPTORS: TINGLING;NUMBNESS;ACHING
DESCRIPTORS: ACHING
DESCRIPTORS: ACHING

## 2023-05-28 ASSESSMENT — PAIN DESCRIPTION - ORIENTATION
ORIENTATION: LEFT;RIGHT
ORIENTATION: RIGHT;LEFT

## 2023-05-28 ASSESSMENT — PAIN SCALES - GENERAL
PAINLEVEL_OUTOF10: 4
PAINLEVEL_OUTOF10: 7
PAINLEVEL_OUTOF10: 7
PAINLEVEL_OUTOF10: 8
PAINLEVEL_OUTOF10: 10
PAINLEVEL_OUTOF10: 8
PAINLEVEL_OUTOF10: 7
PAINLEVEL_OUTOF10: 2

## 2023-05-29 LAB
ALBUMIN SERPL-MCNC: 3 G/DL (ref 3.5–5.2)
ALP SERPL-CCNC: 142 U/L (ref 40–129)
ALT SERPL-CCNC: 9 U/L (ref 0–40)
ANION GAP SERPL CALCULATED.3IONS-SCNC: 12 MMOL/L (ref 7–16)
ANISOCYTOSIS: ABNORMAL
AST SERPL-CCNC: 11 U/L (ref 0–39)
BACTERIA BLD CULT ORG #2: NORMAL
BACTERIA BLD CULT: NORMAL
BASOPHILS # BLD: 0 E9/L (ref 0–0.2)
BASOPHILS NFR BLD: 0.2 % (ref 0–2)
BILIRUB SERPL-MCNC: 0.4 MG/DL (ref 0–1.2)
BUN SERPL-MCNC: 15 MG/DL (ref 6–23)
CALCIUM SERPL-MCNC: 9.2 MG/DL (ref 8.6–10.2)
CHLORIDE SERPL-SCNC: 108 MMOL/L (ref 98–107)
CO2 SERPL-SCNC: 24 MMOL/L (ref 22–29)
CREAT SERPL-MCNC: 1.1 MG/DL (ref 0.7–1.2)
EOSINOPHIL # BLD: 0 E9/L (ref 0.05–0.5)
EOSINOPHIL NFR BLD: 1.5 % (ref 0–6)
ERYTHROCYTE [DISTWIDTH] IN BLOOD BY AUTOMATED COUNT: 16.6 FL (ref 11.5–15)
GLUCOSE SERPL-MCNC: 118 MG/DL (ref 74–99)
HCT VFR BLD AUTO: 27.2 % (ref 37–54)
HGB BLD-MCNC: 8.4 G/DL (ref 12.5–16.5)
LYMPHOCYTES # BLD: 0.8 E9/L (ref 1.5–4)
LYMPHOCYTES NFR BLD: 2.6 % (ref 20–42)
MAGNESIUM SERPL-MCNC: 1.1 MG/DL (ref 1.6–2.6)
MCH RBC QN AUTO: 29 PG (ref 26–35)
MCHC RBC AUTO-ENTMCNC: 30.9 % (ref 32–34.5)
MCV RBC AUTO: 93.8 FL (ref 80–99.9)
METAMYELOCYTES NFR BLD MANUAL: 0.9 % (ref 0–1)
METER GLUCOSE: 111 MG/DL (ref 74–99)
METER GLUCOSE: 117 MG/DL (ref 74–99)
METER GLUCOSE: 122 MG/DL (ref 74–99)
METER GLUCOSE: 126 MG/DL (ref 74–99)
MONOCYTES # BLD: 0.8 E9/L (ref 0.1–0.95)
MONOCYTES NFR BLD: 2.6 % (ref 2–12)
MYELOCYTES NFR BLD MANUAL: 0.9 % (ref 0–0)
NEUTROPHILS # BLD: 25.27 E9/L (ref 1.8–7.3)
NEUTS SEG NFR BLD: 93 % (ref 43–80)
OVALOCYTES: ABNORMAL
PHOSPHATE SERPL-MCNC: 2.8 MG/DL (ref 2.5–4.5)
PLATELET # BLD AUTO: 238 E9/L (ref 130–450)
PMV BLD AUTO: 11.7 FL (ref 7–12)
POIKILOCYTES: ABNORMAL
POLYCHROMASIA: ABNORMAL
POTASSIUM SERPL-SCNC: 4.5 MMOL/L (ref 3.5–5)
PROT SERPL-MCNC: 5.8 G/DL (ref 6.4–8.3)
RBC # BLD AUTO: 2.9 E12/L (ref 3.8–5.8)
SODIUM SERPL-SCNC: 144 MMOL/L (ref 132–146)
TEAR DROP CELLS: ABNORMAL
WBC # BLD: 26.6 E9/L (ref 4.5–11.5)

## 2023-05-29 PROCEDURE — 82962 GLUCOSE BLOOD TEST: CPT

## 2023-05-29 PROCEDURE — 2500000003 HC RX 250 WO HCPCS: Performed by: SURGERY

## 2023-05-29 PROCEDURE — 2580000003 HC RX 258: Performed by: INTERNAL MEDICINE

## 2023-05-29 PROCEDURE — 6360000002 HC RX W HCPCS: Performed by: INTERNAL MEDICINE

## 2023-05-29 PROCEDURE — 6370000000 HC RX 637 (ALT 250 FOR IP): Performed by: FAMILY MEDICINE

## 2023-05-29 PROCEDURE — 6360000002 HC RX W HCPCS

## 2023-05-29 PROCEDURE — 30233N1 TRANSFUSION OF NONAUTOLOGOUS RED BLOOD CELLS INTO PERIPHERAL VEIN, PERCUTANEOUS APPROACH: ICD-10-PCS | Performed by: INTERNAL MEDICINE

## 2023-05-29 PROCEDURE — 87070 CULTURE OTHR SPECIMN AEROBIC: CPT

## 2023-05-29 PROCEDURE — 3700000000 HC ANESTHESIA ATTENDED CARE: Performed by: SURGERY

## 2023-05-29 PROCEDURE — 80053 COMPREHEN METABOLIC PANEL: CPT

## 2023-05-29 PROCEDURE — 2580000003 HC RX 258

## 2023-05-29 PROCEDURE — 36590 REMOVAL TUNNELED CV CATH: CPT | Performed by: SURGERY

## 2023-05-29 PROCEDURE — 2709999900 HC NON-CHARGEABLE SUPPLY: Performed by: SURGERY

## 2023-05-29 PROCEDURE — 6370000000 HC RX 637 (ALT 250 FOR IP): Performed by: INTERNAL MEDICINE

## 2023-05-29 PROCEDURE — 7100000000 HC PACU RECOVERY - FIRST 15 MIN: Performed by: SURGERY

## 2023-05-29 PROCEDURE — 85025 COMPLETE CBC W/AUTO DIFF WBC: CPT

## 2023-05-29 PROCEDURE — 3600000012 HC SURGERY LEVEL 2 ADDTL 15MIN: Performed by: SURGERY

## 2023-05-29 PROCEDURE — 6360000002 HC RX W HCPCS: Performed by: FAMILY MEDICINE

## 2023-05-29 PROCEDURE — 2500000003 HC RX 250 WO HCPCS

## 2023-05-29 PROCEDURE — 2140000000 HC CCU INTERMEDIATE R&B

## 2023-05-29 PROCEDURE — 83735 ASSAY OF MAGNESIUM: CPT

## 2023-05-29 PROCEDURE — 0JPT0WZ REMOVAL OF TOTALLY IMPLANTABLE VASCULAR ACCESS DEVICE FROM TRUNK SUBCUTANEOUS TISSUE AND FASCIA, OPEN APPROACH: ICD-10-PCS | Performed by: INTERNAL MEDICINE

## 2023-05-29 PROCEDURE — 3700000001 HC ADD 15 MINUTES (ANESTHESIA): Performed by: SURGERY

## 2023-05-29 PROCEDURE — 84100 ASSAY OF PHOSPHORUS: CPT

## 2023-05-29 PROCEDURE — 36415 COLL VENOUS BLD VENIPUNCTURE: CPT

## 2023-05-29 PROCEDURE — 6370000000 HC RX 637 (ALT 250 FOR IP)

## 2023-05-29 PROCEDURE — 2700000000 HC OXYGEN THERAPY PER DAY

## 2023-05-29 PROCEDURE — 94640 AIRWAY INHALATION TREATMENT: CPT

## 2023-05-29 PROCEDURE — 87186 SC STD MICRODIL/AGAR DIL: CPT

## 2023-05-29 PROCEDURE — S5553 INSULIN LONG ACTING 5 U: HCPCS | Performed by: FAMILY MEDICINE

## 2023-05-29 PROCEDURE — 3600000002 HC SURGERY LEVEL 2 BASE: Performed by: SURGERY

## 2023-05-29 PROCEDURE — 7100000001 HC PACU RECOVERY - ADDTL 15 MIN: Performed by: SURGERY

## 2023-05-29 RX ORDER — FENTANYL CITRATE 50 UG/ML
INJECTION, SOLUTION INTRAMUSCULAR; INTRAVENOUS PRN
Status: DISCONTINUED | OUTPATIENT
Start: 2023-05-29 | End: 2023-05-29 | Stop reason: SDUPTHER

## 2023-05-29 RX ORDER — ARFORMOTEROL TARTRATE 15 UG/2ML
15 SOLUTION RESPIRATORY (INHALATION) 2 TIMES DAILY
Status: DISCONTINUED | OUTPATIENT
Start: 2023-05-29 | End: 2023-06-03 | Stop reason: HOSPADM

## 2023-05-29 RX ORDER — HYDROMORPHONE HYDROCHLORIDE 1 MG/ML
0.25 INJECTION, SOLUTION INTRAMUSCULAR; INTRAVENOUS; SUBCUTANEOUS EVERY 5 MIN PRN
Status: DISCONTINUED | OUTPATIENT
Start: 2023-05-29 | End: 2023-05-29 | Stop reason: HOSPADM

## 2023-05-29 RX ORDER — PROPOFOL 10 MG/ML
INJECTION, EMULSION INTRAVENOUS CONTINUOUS PRN
Status: DISCONTINUED | OUTPATIENT
Start: 2023-05-29 | End: 2023-05-29 | Stop reason: SDUPTHER

## 2023-05-29 RX ORDER — SODIUM CHLORIDE 0.9 % (FLUSH) 0.9 %
5-40 SYRINGE (ML) INJECTION EVERY 12 HOURS SCHEDULED
Status: DISCONTINUED | OUTPATIENT
Start: 2023-05-29 | End: 2023-05-29 | Stop reason: HOSPADM

## 2023-05-29 RX ORDER — MIDAZOLAM HYDROCHLORIDE 2 MG/2ML
2 INJECTION, SOLUTION INTRAMUSCULAR; INTRAVENOUS
Status: DISCONTINUED | OUTPATIENT
Start: 2023-05-29 | End: 2023-05-29 | Stop reason: HOSPADM

## 2023-05-29 RX ORDER — LABETALOL HYDROCHLORIDE 5 MG/ML
5 INJECTION, SOLUTION INTRAVENOUS
Status: DISCONTINUED | OUTPATIENT
Start: 2023-05-29 | End: 2023-05-29 | Stop reason: HOSPADM

## 2023-05-29 RX ORDER — SODIUM CHLORIDE 9 MG/ML
INJECTION, SOLUTION INTRAVENOUS CONTINUOUS PRN
Status: DISCONTINUED | OUTPATIENT
Start: 2023-05-29 | End: 2023-05-29 | Stop reason: SDUPTHER

## 2023-05-29 RX ORDER — IPRATROPIUM BROMIDE AND ALBUTEROL SULFATE 2.5; .5 MG/3ML; MG/3ML
1 SOLUTION RESPIRATORY (INHALATION)
Status: DISCONTINUED | OUTPATIENT
Start: 2023-05-29 | End: 2023-05-29 | Stop reason: HOSPADM

## 2023-05-29 RX ORDER — ONDANSETRON 2 MG/ML
4 INJECTION INTRAMUSCULAR; INTRAVENOUS
Status: DISCONTINUED | OUTPATIENT
Start: 2023-05-29 | End: 2023-05-29 | Stop reason: HOSPADM

## 2023-05-29 RX ORDER — SODIUM CHLORIDE 0.9 % (FLUSH) 0.9 %
5-40 SYRINGE (ML) INJECTION PRN
Status: DISCONTINUED | OUTPATIENT
Start: 2023-05-29 | End: 2023-05-29 | Stop reason: HOSPADM

## 2023-05-29 RX ORDER — BUPIVACAINE HYDROCHLORIDE AND EPINEPHRINE 2.5; 5 MG/ML; UG/ML
INJECTION, SOLUTION EPIDURAL; INFILTRATION; INTRACAUDAL; PERINEURAL PRN
Status: DISCONTINUED | OUTPATIENT
Start: 2023-05-29 | End: 2023-05-29 | Stop reason: HOSPADM

## 2023-05-29 RX ORDER — ACETAMINOPHEN 325 MG/1
650 TABLET ORAL
Status: DISCONTINUED | OUTPATIENT
Start: 2023-05-29 | End: 2023-05-29 | Stop reason: HOSPADM

## 2023-05-29 RX ORDER — DROPERIDOL 2.5 MG/ML
0.62 INJECTION, SOLUTION INTRAMUSCULAR; INTRAVENOUS
Status: DISCONTINUED | OUTPATIENT
Start: 2023-05-29 | End: 2023-05-29 | Stop reason: HOSPADM

## 2023-05-29 RX ORDER — SODIUM CHLORIDE 9 MG/ML
25 INJECTION, SOLUTION INTRAVENOUS PRN
Status: DISCONTINUED | OUTPATIENT
Start: 2023-05-29 | End: 2023-05-29 | Stop reason: HOSPADM

## 2023-05-29 RX ORDER — HYDROMORPHONE HYDROCHLORIDE 1 MG/ML
0.5 INJECTION, SOLUTION INTRAMUSCULAR; INTRAVENOUS; SUBCUTANEOUS EVERY 5 MIN PRN
Status: DISCONTINUED | OUTPATIENT
Start: 2023-05-29 | End: 2023-05-29 | Stop reason: HOSPADM

## 2023-05-29 RX ORDER — MONTELUKAST SODIUM 4 MG/1
1 TABLET, CHEWABLE ORAL 2 TIMES DAILY WITH MEALS
Status: DISCONTINUED | OUTPATIENT
Start: 2023-05-29 | End: 2023-06-03 | Stop reason: HOSPADM

## 2023-05-29 RX ORDER — DIPHENHYDRAMINE HYDROCHLORIDE 50 MG/ML
12.5 INJECTION INTRAMUSCULAR; INTRAVENOUS
Status: DISCONTINUED | OUTPATIENT
Start: 2023-05-29 | End: 2023-05-29 | Stop reason: HOSPADM

## 2023-05-29 RX ORDER — HYDRALAZINE HYDROCHLORIDE 20 MG/ML
5 INJECTION INTRAMUSCULAR; INTRAVENOUS
Status: DISCONTINUED | OUTPATIENT
Start: 2023-05-29 | End: 2023-05-29 | Stop reason: HOSPADM

## 2023-05-29 RX ORDER — HYDROCODONE BITARTRATE AND ACETAMINOPHEN 5; 325 MG/1; MG/1
1 TABLET ORAL EVERY 6 HOURS PRN
Status: DISCONTINUED | OUTPATIENT
Start: 2023-05-29 | End: 2023-06-03 | Stop reason: HOSPADM

## 2023-05-29 RX ADMIN — METOPROLOL SUCCINATE 100 MG: 100 TABLET, EXTENDED RELEASE ORAL at 08:03

## 2023-05-29 RX ADMIN — BUDESONIDE 500 MCG: 0.25 SUSPENSION RESPIRATORY (INHALATION) at 07:38

## 2023-05-29 RX ADMIN — ONDANSETRON 4 MG: 2 INJECTION INTRAMUSCULAR; INTRAVENOUS at 19:31

## 2023-05-29 RX ADMIN — ONDANSETRON 4 MG: 4 TABLET, ORALLY DISINTEGRATING ORAL at 00:52

## 2023-05-29 RX ADMIN — PREGABALIN 75 MG: 25 CAPSULE ORAL at 22:06

## 2023-05-29 RX ADMIN — FLUTICASONE PROPIONATE 1 SPRAY: 50 SPRAY, METERED NASAL at 09:32

## 2023-05-29 RX ADMIN — IPRATROPIUM BROMIDE AND ALBUTEROL SULFATE 3 ML: .5; 2.5 SOLUTION RESPIRATORY (INHALATION) at 15:43

## 2023-05-29 RX ADMIN — TAMSULOSIN HYDROCHLORIDE 0.4 MG: 0.4 CAPSULE ORAL at 08:03

## 2023-05-29 RX ADMIN — HYDROCODONE BITARTRATE AND ACETAMINOPHEN 1 TABLET: 5; 325 TABLET ORAL at 05:24

## 2023-05-29 RX ADMIN — IPRATROPIUM BROMIDE AND ALBUTEROL SULFATE 3 ML: .5; 2.5 SOLUTION RESPIRATORY (INHALATION) at 12:17

## 2023-05-29 RX ADMIN — ACETAMINOPHEN 650 MG: 325 TABLET ORAL at 19:34

## 2023-05-29 RX ADMIN — FENTANYL CITRATE 100 MCG: 50 INJECTION, SOLUTION INTRAMUSCULAR; INTRAVENOUS at 02:07

## 2023-05-29 RX ADMIN — GUAIFENESIN 400 MG: 400 TABLET ORAL at 22:05

## 2023-05-29 RX ADMIN — SODIUM CHLORIDE, PRESERVATIVE FREE 10 ML: 5 INJECTION INTRAVENOUS at 08:03

## 2023-05-29 RX ADMIN — IPRATROPIUM BROMIDE AND ALBUTEROL SULFATE 3 ML: .5; 2.5 SOLUTION RESPIRATORY (INHALATION) at 20:43

## 2023-05-29 RX ADMIN — IPRATROPIUM BROMIDE AND ALBUTEROL SULFATE 3 ML: .5; 2.5 SOLUTION RESPIRATORY (INHALATION) at 07:39

## 2023-05-29 RX ADMIN — ESCITALOPRAM OXALATE 10 MG: 10 TABLET ORAL at 08:04

## 2023-05-29 RX ADMIN — METOPROLOL SUCCINATE 125 MG: 100 TABLET, EXTENDED RELEASE ORAL at 22:05

## 2023-05-29 RX ADMIN — COLESTIPOL HYDROCHLORIDE 1 G: 1 TABLET, FILM COATED ORAL at 22:06

## 2023-05-29 RX ADMIN — ONDANSETRON 4 MG: 4 TABLET, ORALLY DISINTEGRATING ORAL at 09:16

## 2023-05-29 RX ADMIN — CEFEPIME 2000 MG: 2 INJECTION, POWDER, FOR SOLUTION INTRAVENOUS at 22:05

## 2023-05-29 RX ADMIN — GUAIFENESIN 400 MG: 400 TABLET ORAL at 16:58

## 2023-05-29 RX ADMIN — GUAIFENESIN 400 MG: 400 TABLET ORAL at 13:07

## 2023-05-29 RX ADMIN — SODIUM CHLORIDE: 9 INJECTION, SOLUTION INTRAVENOUS at 02:03

## 2023-05-29 RX ADMIN — HYDROMORPHONE HYDROCHLORIDE 0.5 MG: 1 INJECTION, SOLUTION INTRAMUSCULAR; INTRAVENOUS; SUBCUTANEOUS at 02:51

## 2023-05-29 RX ADMIN — GUAIFENESIN 400 MG: 400 TABLET ORAL at 08:03

## 2023-05-29 RX ADMIN — BUDESONIDE 500 MCG: 0.25 SUSPENSION RESPIRATORY (INHALATION) at 20:43

## 2023-05-29 RX ADMIN — VANCOMYCIN HYDROCHLORIDE 1250 MG: 10 INJECTION, POWDER, LYOPHILIZED, FOR SOLUTION INTRAVENOUS at 19:34

## 2023-05-29 RX ADMIN — HYDROCODONE BITARTRATE AND ACETAMINOPHEN 1 TABLET: 5; 325 TABLET ORAL at 15:13

## 2023-05-29 RX ADMIN — CEFEPIME 2000 MG: 2 INJECTION, POWDER, FOR SOLUTION INTRAVENOUS at 05:27

## 2023-05-29 RX ADMIN — ROSUVASTATIN CALCIUM 10 MG: 5 TABLET, FILM COATED ORAL at 08:03

## 2023-05-29 RX ADMIN — CEFEPIME 2000 MG: 2 INJECTION, POWDER, FOR SOLUTION INTRAVENOUS at 13:10

## 2023-05-29 RX ADMIN — HYDROCODONE BITARTRATE AND ACETAMINOPHEN 1 TABLET: 5; 325 TABLET ORAL at 22:05

## 2023-05-29 RX ADMIN — SODIUM ZIRCONIUM CYCLOSILICATE 10 G: 10 POWDER, FOR SUSPENSION ORAL at 08:03

## 2023-05-29 RX ADMIN — ARFORMOTEROL TARTRATE 15 MCG: 15 SOLUTION RESPIRATORY (INHALATION) at 20:43

## 2023-05-29 RX ADMIN — PROPOFOL 125 MCG/KG/MIN: 10 INJECTION, EMULSION INTRAVENOUS at 02:07

## 2023-05-29 RX ADMIN — ERGOCALCIFEROL 50000 UNITS: 1.25 CAPSULE ORAL at 08:03

## 2023-05-29 RX ADMIN — ALBUTEROL SULFATE 2.5 MG: 2.5 SOLUTION RESPIRATORY (INHALATION) at 04:22

## 2023-05-29 RX ADMIN — PREGABALIN 75 MG: 25 CAPSULE ORAL at 16:58

## 2023-05-29 RX ADMIN — SODIUM CHLORIDE, PRESERVATIVE FREE 10 ML: 5 INJECTION INTRAVENOUS at 22:08

## 2023-05-29 RX ADMIN — HYDROCODONE BITARTRATE AND ACETAMINOPHEN 1 TABLET: 5; 325 TABLET ORAL at 09:16

## 2023-05-29 RX ADMIN — INSULIN GLARGINE-YFGN 10 UNITS: 100 INJECTION, SOLUTION SUBCUTANEOUS at 22:06

## 2023-05-29 ASSESSMENT — PAIN DESCRIPTION - DESCRIPTORS
DESCRIPTORS: ACHING;CRUSHING;GNAWING
DESCRIPTORS: ACHING
DESCRIPTORS: ACHING
DESCRIPTORS: STABBING

## 2023-05-29 ASSESSMENT — PAIN SCALES - GENERAL
PAINLEVEL_OUTOF10: 3
PAINLEVEL_OUTOF10: 6
PAINLEVEL_OUTOF10: 3
PAINLEVEL_OUTOF10: 7
PAINLEVEL_OUTOF10: 3
PAINLEVEL_OUTOF10: 8
PAINLEVEL_OUTOF10: 8
PAINLEVEL_OUTOF10: 7
PAINLEVEL_OUTOF10: 5
PAINLEVEL_OUTOF10: 6
PAINLEVEL_OUTOF10: 2

## 2023-05-29 ASSESSMENT — PAIN - FUNCTIONAL ASSESSMENT
PAIN_FUNCTIONAL_ASSESSMENT: PREVENTS OR INTERFERES WITH ALL ACTIVE AND SOME PASSIVE ACTIVITIES
PAIN_FUNCTIONAL_ASSESSMENT: PREVENTS OR INTERFERES SOME ACTIVE ACTIVITIES AND ADLS

## 2023-05-29 ASSESSMENT — PAIN DESCRIPTION - LOCATION
LOCATION: FOOT
LOCATION: FOOT
LOCATION: HEAD
LOCATION: HEAD
LOCATION: FOOT
LOCATION: HEAD
LOCATION: BACK
LOCATION: HEAD
LOCATION: HEAD

## 2023-05-29 ASSESSMENT — PAIN DESCRIPTION - ORIENTATION
ORIENTATION: RIGHT;LEFT
ORIENTATION: RIGHT;LEFT
ORIENTATION: MID
ORIENTATION: DISTAL
ORIENTATION: RIGHT;LEFT

## 2023-05-29 ASSESSMENT — PAIN DESCRIPTION - PAIN TYPE: TYPE: ACUTE PAIN

## 2023-05-29 ASSESSMENT — PAIN DESCRIPTION - FREQUENCY: FREQUENCY: CONTINUOUS

## 2023-05-29 NOTE — ANESTHESIA POSTPROCEDURE EVALUATION
Department of Anesthesiology  Postprocedure Note    Patient: Sheba Samano  MRN: [id number]  YOB: 1960  Date of evaluation: 5/29/2023      Procedure Summary     Date: 05/29/23 Room / Location: [facility] OR 07 / [facility]    Anesthesia Start: 2245 Anesthesia Stop: 9814    Procedure: PORT REMOVAL (Chest) Diagnosis:       Infection of venous access port, initial encounter      (Infection of venous access port, initial encounter Clau Blevins)    Surgeons: Goyo Rogers MD Responsible Provider: Aga Underwood MD    Anesthesia Type: MAC ASA Status: 3          Anesthesia Type: No value filed.     Rossana Phase I: Rossana Score: 10    Rossana Phase II:        Anesthesia Post Evaluation    Patient location during evaluation: PACU  Patient participation: complete - patient participated  Level of consciousness: awake and alert  Airway patency: patent  Nausea & Vomiting: no nausea and no vomiting  Complications: no  Cardiovascular status: hemodynamically stable  Respiratory status: acceptable  Hydration status: euvolemic

## 2023-05-29 NOTE — PLAN OF CARE
Problem: Chronic Conditions and Co-morbidities  Goal: Patient's chronic conditions and co-morbidity symptoms are monitored and maintained or improved  5/29/2023 0956 by Suri Brasher RN  Outcome: Progressing  5/29/2023 0448 by Jero Kay RN  Outcome: Progressing  Flowsheets (Taken 5/28/2023 2014)  Care Plan - Patient's Chronic Conditions and Co-Morbidity Symptoms are Monitored and Maintained or Improved:   Monitor and assess patient's chronic conditions and comorbid symptoms for stability, deterioration, or improvement   Collaborate with multidisciplinary team to address chronic and comorbid conditions and prevent exacerbation or deterioration     Problem: Discharge Planning  Goal: Discharge to home or other facility with appropriate resources  5/29/2023 0448 by Jero Kay RN  Outcome: Progressing     Problem: Pain  Goal: Verbalizes/displays adequate comfort level or baseline comfort level  5/29/2023 0448 by Jero Kay RN  Outcome: Progressing     Problem: Safety - Adult  Goal: Free from fall injury  5/29/2023 0448 by Jero Kay RN  Outcome: Progressing     Problem: ABCDS Injury Assessment  Goal: Absence of physical injury  5/29/2023 0448 by Jero Kay RN  Outcome: Progressing     Problem: Pain  Goal: Verbalizes/displays adequate comfort level or baseline comfort level  5/29/2023 0448 by Jero Kay RN  Outcome: Progressing

## 2023-05-29 NOTE — OP NOTE
Operative Note      Patient: Chino Henao  YOB: 1960  MRN: [id number]    Date of Procedure: 5/29/2023    Pre-Op Diagnosis Codes:     * Infection of venous access port, initial encounter [T80.219A]    Post-Op Diagnosis: Same       Procedure(s):  PORT REMOVAL    Surgeon(s):  Oumou Leon MD    Assistant:   Resident: Maegan Santa DO    Anesthesia: Monitor Anesthesia Care    Estimated Blood Loss (mL): Minimal    Complications: None    Specimens:   ID Type Source Tests Collected by Time Destination   1 :  Catheter Tip Catheter Tip CULTURE, TIP Oumou Leon MD 5/29/2023 0227        Implants:  * No implants in log *      Drains: * No LDAs found *    Findings: well  encapsulated mediport      BRIEF HISTORY: Chino Henao is a 65 y.o.  male presenting with bacteremia. I discussed \"Removal of mediport\" with the patient including the procedure, benefits, risks and alternatives, and the patient agreed. PROCEDURE IN DETAIL: The patient was taken to the operative suite and was placed on the table in the supine position. MAC anesthesia was administered. The right anterior chest was prepped with Chloraprep and draped in a sterile fashion. After marking the site of the mediport and previous incision, the site was infiltrated using local anesthestic, and the skin was opened with a #15-blade scalpel through the site of previous incision. The incision was carried down through the dermis and subcutaneous tissue using the scalpel and electrocautery until the Mediport was visualized. The electrocautery was used to excise the Mediport from the tissue and fibrous capsule. Scissors were used to remove the anchoring sutures attaching the Mediport to the tissue. Along the way, any bleeding points were cauterized. The mediport and catheter were removed. A \"U-stitch\" using 3-0 Vicryl suture was placed at the site of the catheter tunnel for hemostasis.   Both Mediport and Catheter were

## 2023-05-29 NOTE — ANESTHESIA PRE PROCEDURE
Department of Anesthesiology  Preprocedure Note       Name:  Brandi Valladares   Age:  65 y.o.  :  1960                                          MRN:  [id number]         Date:  2023      Surgeon: Norman Real):  Quinten De La Paz MD    Procedure: Procedure(s):  PORT REMOVAL    Medications prior to admission:   Prior to Admission medications    Medication Sig Start Date End Date Taking? Authorizing Provider   aspirin 81 MG EC tablet Take 1 tablet by mouth daily    Historical Provider, MD   fluticasone (FLONASE) 50 MCG/ACT nasal spray 1 spray by Nasal route daily as needed for Rhinitis    Historical Provider, MD   ipratropium 0.5 mg-albuterol 2.5 mg (DUONEB) 0.5-2.5 (3) MG/3ML SOLN nebulizer solution Take 3 mLs by nebulization every 4 hours    Historical Provider, MD   sodium chloride 0.9 % SOLN 30 mL with fosnetupitant-palonosetron 235-0.25 MG/20ML SOLN 20 mL Infuse 0.25 mLs intravenously every 21 days    Historical Provider, MD   PACLitaxel-protein bound (ABRAXANE) 100 MG chemo IVPB Infuse 40 mLs intravenously every 21 days    Historical Provider, MD   pregabalin (LYRICA) 75 MG capsule Take 1 capsule by mouth 2 times daily. Historical Provider, MD   budesonide (PULMICORT) 0.25 MG/2ML nebulizer suspension Take 4 mLs by nebulization 2 times daily 23   Perez Ornelas MD   sodium zirconium cyclosilicate (LOKELMA) 10 g PACK oral suspension Take 10 g by mouth in the morning and 10 g in the evening. 23   Perez Ornelas MD   escitalopram (LEXAPRO) 10 MG tablet Take 1 tablet by mouth daily 23   Historical Provider, MD   HYDROcodone-acetaminophen 5-300 MG TABS Take 1 tablet by mouth 2 times daily as needed.  23   Historical Provider, MD   tamsulosin (FLOMAX) 0.4 MG capsule Take 1 capsule by mouth daily 4/10/23   Fausto Cortes DO   apixaban (ELIQUIS) 5 MG TABS tablet Take 1 tablet by mouth 2 times daily    Historical Provider, MD   lisinopril (PRINIVIL;ZESTRIL) 5 MG tablet Take 1

## 2023-05-29 NOTE — PLAN OF CARE
Problem: Chronic Conditions and Co-morbidities  Goal: Patient's chronic conditions and co-morbidity symptoms are monitored and maintained or improved  Outcome: Progressing  Flowsheets (Taken 5/28/2023 2014)  Care Plan - Patient's Chronic Conditions and Co-Morbidity Symptoms are Monitored and Maintained or Improved:   Monitor and assess patient's chronic conditions and comorbid symptoms for stability, deterioration, or improvement   Collaborate with multidisciplinary team to address chronic and comorbid conditions and prevent exacerbation or deterioration     Problem: Discharge Planning  Goal: Discharge to home or other facility with appropriate resources  Outcome: Progressing     Problem: Pain  Goal: Verbalizes/displays adequate comfort level or baseline comfort level  Outcome: Progressing     Problem: Safety - Adult  Goal: Free from fall injury  Outcome: Progressing     Problem: ABCDS Injury Assessment  Goal: Absence of physical injury  Outcome: Progressing

## 2023-05-29 NOTE — PLAN OF CARE
Problem: Discharge Planning  Goal: Discharge to home or other facility with appropriate resources  5/29/2023 0448 by Jero Kay RN  Outcome: Progressing     Problem: Pain  Goal: Verbalizes/displays adequate comfort level or baseline comfort level  5/29/2023 0448 by Jero Kay RN  Outcome: Progressing

## 2023-05-30 ENCOUNTER — ANESTHESIA EVENT (OUTPATIENT)
Dept: CARDIAC CATH/INVASIVE PROCEDURES | Age: 63
End: 2023-05-30
Payer: COMMERCIAL

## 2023-05-30 ENCOUNTER — APPOINTMENT (OUTPATIENT)
Dept: CARDIAC CATH/INVASIVE PROCEDURES | Age: 63
End: 2023-05-30
Payer: COMMERCIAL

## 2023-05-30 ENCOUNTER — ANESTHESIA (OUTPATIENT)
Dept: CARDIAC CATH/INVASIVE PROCEDURES | Age: 63
End: 2023-05-30
Payer: COMMERCIAL

## 2023-05-30 LAB
ANISOCYTOSIS: ABNORMAL
BASOPHILS # BLD: 0 E9/L (ref 0–0.2)
BASOPHILS NFR BLD: 0.7 % (ref 0–2)
CREAT SERPL-MCNC: 1.2 MG/DL (ref 0.7–1.2)
EOSINOPHIL # BLD: 0.8 E9/L (ref 0.05–0.5)
EOSINOPHIL NFR BLD: 3.5 % (ref 0–6)
ERYTHROCYTE [DISTWIDTH] IN BLOOD BY AUTOMATED COUNT: 16.6 FL (ref 11.5–15)
HCT VFR BLD AUTO: 26.3 % (ref 37–54)
HGB BLD-MCNC: 8 G/DL (ref 12.5–16.5)
HYPOCHROMIA: ABNORMAL
LV EF: 50 %
LVEF MODALITY: NORMAL
LYMPHOCYTES # BLD: 0.69 E9/L (ref 1.5–4)
LYMPHOCYTES NFR BLD: 3.4 % (ref 20–42)
MAGNESIUM SERPL-MCNC: 1.3 MG/DL (ref 1.6–2.6)
MCH RBC QN AUTO: 29 PG (ref 26–35)
MCHC RBC AUTO-ENTMCNC: 30.4 % (ref 32–34.5)
MCV RBC AUTO: 95.3 FL (ref 80–99.9)
METAMYELOCYTES NFR BLD MANUAL: 0.9 % (ref 0–1)
METER GLUCOSE: 100 MG/DL (ref 74–99)
METER GLUCOSE: 120 MG/DL (ref 74–99)
METER GLUCOSE: 94 MG/DL (ref 74–99)
MONOCYTES # BLD: 0 E9/L (ref 0.1–0.95)
MONOCYTES NFR BLD: 6.4 % (ref 2–12)
MYELOCYTES NFR BLD MANUAL: 1.7 % (ref 0–0)
NEUTROPHILS # BLD: 21.3 E9/L (ref 1.8–7.3)
NEUTS SEG NFR BLD: 90.5 % (ref 43–80)
OVALOCYTES: ABNORMAL
PHOSPHATE SERPL-MCNC: 3.3 MG/DL (ref 2.5–4.5)
PLATELET # BLD AUTO: 222 E9/L (ref 130–450)
PMV BLD AUTO: 11.7 FL (ref 7–12)
POIKILOCYTES: ABNORMAL
POLYCHROMASIA: ABNORMAL
RBC # BLD AUTO: 2.76 E12/L (ref 3.8–5.8)
TEAR DROP CELLS: ABNORMAL
WBC # BLD: 22.9 E9/L (ref 4.5–11.5)

## 2023-05-30 PROCEDURE — 2140000000 HC CCU INTERMEDIATE R&B

## 2023-05-30 PROCEDURE — 6370000000 HC RX 637 (ALT 250 FOR IP): Performed by: INTERNAL MEDICINE

## 2023-05-30 PROCEDURE — 6360000002 HC RX W HCPCS: Performed by: NURSE ANESTHETIST, CERTIFIED REGISTERED

## 2023-05-30 PROCEDURE — 84100 ASSAY OF PHOSPHORUS: CPT

## 2023-05-30 PROCEDURE — 93325 DOPPLER ECHO COLOR FLOW MAPG: CPT

## 2023-05-30 PROCEDURE — 2580000003 HC RX 258: Performed by: INTERNAL MEDICINE

## 2023-05-30 PROCEDURE — 2700000000 HC OXYGEN THERAPY PER DAY

## 2023-05-30 PROCEDURE — 82962 GLUCOSE BLOOD TEST: CPT

## 2023-05-30 PROCEDURE — 6360000002 HC RX W HCPCS: Performed by: INTERNAL MEDICINE

## 2023-05-30 PROCEDURE — 36415 COLL VENOUS BLD VENIPUNCTURE: CPT

## 2023-05-30 PROCEDURE — 93312 ECHO TRANSESOPHAGEAL: CPT

## 2023-05-30 PROCEDURE — 99222 1ST HOSP IP/OBS MODERATE 55: CPT | Performed by: NURSE PRACTITIONER

## 2023-05-30 PROCEDURE — 94640 AIRWAY INHALATION TREATMENT: CPT

## 2023-05-30 PROCEDURE — 2580000003 HC RX 258: Performed by: NURSE ANESTHETIST, CERTIFIED REGISTERED

## 2023-05-30 PROCEDURE — 6370000000 HC RX 637 (ALT 250 FOR IP): Performed by: FAMILY MEDICINE

## 2023-05-30 PROCEDURE — S5553 INSULIN LONG ACTING 5 U: HCPCS | Performed by: FAMILY MEDICINE

## 2023-05-30 PROCEDURE — 6370000000 HC RX 637 (ALT 250 FOR IP)

## 2023-05-30 PROCEDURE — 82565 ASSAY OF CREATININE: CPT

## 2023-05-30 PROCEDURE — 83735 ASSAY OF MAGNESIUM: CPT

## 2023-05-30 PROCEDURE — 3700000000 HC ANESTHESIA ATTENDED CARE

## 2023-05-30 PROCEDURE — 6360000002 HC RX W HCPCS: Performed by: FAMILY MEDICINE

## 2023-05-30 PROCEDURE — 2580000003 HC RX 258

## 2023-05-30 PROCEDURE — 85025 COMPLETE CBC W/AUTO DIFF WBC: CPT

## 2023-05-30 PROCEDURE — 2709999900 HC NON-CHARGEABLE SUPPLY

## 2023-05-30 PROCEDURE — 3700000001 HC ADD 15 MINUTES (ANESTHESIA)

## 2023-05-30 PROCEDURE — 93321 DOPPLER ECHO F-UP/LMTD STD: CPT

## 2023-05-30 RX ORDER — SODIUM CHLORIDE 9 MG/ML
INJECTION, SOLUTION INTRAVENOUS CONTINUOUS PRN
Status: DISCONTINUED | OUTPATIENT
Start: 2023-05-30 | End: 2023-05-30 | Stop reason: SDUPTHER

## 2023-05-30 RX ORDER — LANOLIN ALCOHOL/MO/W.PET/CERES
600 CREAM (GRAM) TOPICAL 2 TIMES DAILY
Status: DISCONTINUED | OUTPATIENT
Start: 2023-05-30 | End: 2023-05-31

## 2023-05-30 RX ORDER — PROPOFOL 10 MG/ML
INJECTION, EMULSION INTRAVENOUS PRN
Status: DISCONTINUED | OUTPATIENT
Start: 2023-05-30 | End: 2023-05-30 | Stop reason: SDUPTHER

## 2023-05-30 RX ADMIN — GUAIFENESIN 400 MG: 400 TABLET ORAL at 13:58

## 2023-05-30 RX ADMIN — INSULIN GLARGINE-YFGN 10 UNITS: 100 INJECTION, SOLUTION SUBCUTANEOUS at 21:01

## 2023-05-30 RX ADMIN — GUAIFENESIN 400 MG: 400 TABLET ORAL at 09:17

## 2023-05-30 RX ADMIN — ARFORMOTEROL TARTRATE 15 MCG: 15 SOLUTION RESPIRATORY (INHALATION) at 19:03

## 2023-05-30 RX ADMIN — ACETAMINOPHEN 650 MG: 325 TABLET ORAL at 02:28

## 2023-05-30 RX ADMIN — VANCOMYCIN HYDROCHLORIDE 1250 MG: 10 INJECTION, POWDER, LYOPHILIZED, FOR SOLUTION INTRAVENOUS at 13:22

## 2023-05-30 RX ADMIN — CEFEPIME 2000 MG: 2 INJECTION, POWDER, FOR SOLUTION INTRAVENOUS at 05:30

## 2023-05-30 RX ADMIN — TAMSULOSIN HYDROCHLORIDE 0.4 MG: 0.4 CAPSULE ORAL at 09:16

## 2023-05-30 RX ADMIN — IPRATROPIUM BROMIDE AND ALBUTEROL SULFATE 3 ML: .5; 2.5 SOLUTION RESPIRATORY (INHALATION) at 19:03

## 2023-05-30 RX ADMIN — COLESTIPOL HYDROCHLORIDE 1 G: 1 TABLET, FILM COATED ORAL at 16:57

## 2023-05-30 RX ADMIN — ROSUVASTATIN CALCIUM 10 MG: 5 TABLET, FILM COATED ORAL at 09:17

## 2023-05-30 RX ADMIN — PREGABALIN 75 MG: 25 CAPSULE ORAL at 09:17

## 2023-05-30 RX ADMIN — BUDESONIDE 500 MCG: 0.25 SUSPENSION RESPIRATORY (INHALATION) at 19:03

## 2023-05-30 RX ADMIN — SODIUM CHLORIDE: 9 INJECTION, SOLUTION INTRAVENOUS at 11:56

## 2023-05-30 RX ADMIN — IPRATROPIUM BROMIDE AND ALBUTEROL SULFATE 3 ML: .5; 2.5 SOLUTION RESPIRATORY (INHALATION) at 15:30

## 2023-05-30 RX ADMIN — SODIUM ZIRCONIUM CYCLOSILICATE 10 G: 10 POWDER, FOR SUSPENSION ORAL at 13:59

## 2023-05-30 RX ADMIN — PROPOFOL 150 MG: 10 INJECTION, EMULSION INTRAVENOUS at 12:13

## 2023-05-30 RX ADMIN — IPRATROPIUM BROMIDE AND ALBUTEROL SULFATE 3 ML: .5; 2.5 SOLUTION RESPIRATORY (INHALATION) at 09:38

## 2023-05-30 RX ADMIN — GUAIFENESIN 400 MG: 400 TABLET ORAL at 16:57

## 2023-05-30 RX ADMIN — HYDROCODONE BITARTRATE AND ACETAMINOPHEN 1 TABLET: 5; 325 TABLET ORAL at 21:01

## 2023-05-30 RX ADMIN — PREGABALIN 75 MG: 25 CAPSULE ORAL at 13:58

## 2023-05-30 RX ADMIN — ESCITALOPRAM OXALATE 10 MG: 10 TABLET ORAL at 09:17

## 2023-05-30 RX ADMIN — PREGABALIN 75 MG: 25 CAPSULE ORAL at 21:01

## 2023-05-30 RX ADMIN — METOPROLOL SUCCINATE 125 MG: 100 TABLET, EXTENDED RELEASE ORAL at 21:01

## 2023-05-30 RX ADMIN — Medication 600 MG: at 09:17

## 2023-05-30 RX ADMIN — ARFORMOTEROL TARTRATE 15 MCG: 15 SOLUTION RESPIRATORY (INHALATION) at 09:39

## 2023-05-30 RX ADMIN — IPRATROPIUM BROMIDE AND ALBUTEROL SULFATE 3 ML: .5; 2.5 SOLUTION RESPIRATORY (INHALATION) at 13:15

## 2023-05-30 RX ADMIN — GUAIFENESIN 400 MG: 400 TABLET ORAL at 21:01

## 2023-05-30 RX ADMIN — CEFEPIME 2000 MG: 2 INJECTION, POWDER, FOR SOLUTION INTRAVENOUS at 15:27

## 2023-05-30 RX ADMIN — COLESTIPOL HYDROCHLORIDE 1 G: 1 TABLET, FILM COATED ORAL at 09:00

## 2023-05-30 RX ADMIN — HYDROCODONE BITARTRATE AND ACETAMINOPHEN 1 TABLET: 5; 325 TABLET ORAL at 13:58

## 2023-05-30 RX ADMIN — ACETAMINOPHEN 650 MG: 325 TABLET ORAL at 16:56

## 2023-05-30 RX ADMIN — SODIUM CHLORIDE, PRESERVATIVE FREE 10 ML: 5 INJECTION INTRAVENOUS at 21:02

## 2023-05-30 RX ADMIN — HYDROCODONE BITARTRATE AND ACETAMINOPHEN 1 TABLET: 5; 325 TABLET ORAL at 05:37

## 2023-05-30 RX ADMIN — Medication 600 MG: at 21:01

## 2023-05-30 RX ADMIN — BUDESONIDE 500 MCG: 0.25 SUSPENSION RESPIRATORY (INHALATION) at 09:40

## 2023-05-30 ASSESSMENT — PAIN DESCRIPTION - LOCATION
LOCATION: FOOT
LOCATION: HEAD;NECK
LOCATION: NECK;HEAD

## 2023-05-30 ASSESSMENT — PAIN DESCRIPTION - DESCRIPTORS
DESCRIPTORS: ACHING

## 2023-05-30 ASSESSMENT — PAIN SCALES - GENERAL
PAINLEVEL_OUTOF10: 8
PAINLEVEL_OUTOF10: 7
PAINLEVEL_OUTOF10: 0
PAINLEVEL_OUTOF10: 7
PAINLEVEL_OUTOF10: 2
PAINLEVEL_OUTOF10: 3
PAINLEVEL_OUTOF10: 6

## 2023-05-30 ASSESSMENT — PAIN DESCRIPTION - ORIENTATION
ORIENTATION: RIGHT;LEFT

## 2023-05-30 NOTE — PLAN OF CARE
Patient is scheduled for MAZIN today and CVL. No need for full cardiology consultation at this time. Please call any question/concerns. Thank you.     Electronically signed by SIENNA Mosqueda CNP on 5/30/2023 at 7:57 AM

## 2023-05-30 NOTE — ANESTHESIA POSTPROCEDURE EVALUATION
Department of Anesthesiology  Postprocedure Note    Patient: Danielito Walters  MRN: [id number]  YOB: 1960  Date of evaluation: 5/30/2023      Procedure Summary     Date: 05/30/23 Room / Location: [facility]; [facility]    Anesthesia Start: 8419 Anesthesia Stop: 1226    Procedure: SEY MAZIN Diagnosis:     Scheduled Providers:  Responsible Provider: Parviz Bah MD    Anesthesia Type: MAC ASA Status: 3          Anesthesia Type: No value filed.     Rossana Phase I: Rossana Score: 10    Rossana Phase II:        Anesthesia Post Evaluation    Patient location during evaluation: PACU  Patient participation: complete - patient participated  Level of consciousness: awake  Pain score: 0  Airway patency: patent  Nausea & Vomiting: no nausea  Complications: no  Cardiovascular status: hemodynamically stable  Respiratory status: acceptable  Hydration status: stable

## 2023-05-30 NOTE — PLAN OF CARE
Problem: Chronic Conditions and Co-morbidities  Goal: Patient's chronic conditions and co-morbidity symptoms are monitored and maintained or improved  5/30/2023 1002 by Faviola Healy RN  Outcome: Progressing  5/30/2023 0443 by Moises Brown RN  Outcome: Progressing  Flowsheets (Taken 5/29/2023 1930)  Care Plan - Patient's Chronic Conditions and Co-Morbidity Symptoms are Monitored and Maintained or Improved:   Monitor and assess patient's chronic conditions and comorbid symptoms for stability, deterioration, or improvement   Collaborate with multidisciplinary team to address chronic and comorbid conditions and prevent exacerbation or deterioration     Problem: Discharge Planning  Goal: Discharge to home or other facility with appropriate resources  5/30/2023 1002 by Faviola Healy RN  Outcome: Progressing  5/30/2023 0443 by Moises Brown RN  Outcome: Progressing  Flowsheets (Taken 5/29/2023 1930)  Discharge to home or other facility with appropriate resources:   Identify barriers to discharge with patient and caregiver   Arrange for needed discharge resources and transportation as appropriate     Problem: Pain  Goal: Verbalizes/displays adequate comfort level or baseline comfort level  5/30/2023 1002 by Faviola Healy RN  Outcome: Progressing  5/30/2023 0443 by Moises Brown RN  Outcome: Progressing     Problem: Safety - Adult  Goal: Free from fall injury  5/30/2023 1002 by Faviola Healy RN  Outcome: Progressing  5/30/2023 0443 by Moises Brown RN  Outcome: Progressing

## 2023-05-30 NOTE — PLAN OF CARE
Problem: Chronic Conditions and Co-morbidities  Goal: Patient's chronic conditions and co-morbidity symptoms are monitored and maintained or improved  Outcome: Progressing  Flowsheets (Taken 5/29/2023 1930)  Care Plan - Patient's Chronic Conditions and Co-Morbidity Symptoms are Monitored and Maintained or Improved:   Monitor and assess patient's chronic conditions and comorbid symptoms for stability, deterioration, or improvement   Collaborate with multidisciplinary team to address chronic and comorbid conditions and prevent exacerbation or deterioration     Problem: Discharge Planning  Goal: Discharge to home or other facility with appropriate resources  Outcome: Progressing  Flowsheets (Taken 5/29/2023 1930)  Discharge to home or other facility with appropriate resources:   Identify barriers to discharge with patient and caregiver   Arrange for needed discharge resources and transportation as appropriate     Problem: Pain  Goal: Verbalizes/displays adequate comfort level or baseline comfort level  Outcome: Progressing     Problem: Safety - Adult  Goal: Free from fall injury  Outcome: Progressing     Problem: ABCDS Injury Assessment  Goal: Absence of physical injury  Outcome: Progressing

## 2023-05-30 NOTE — OP NOTE
Operative Note      Patient: Ankush Montes  YOB: 1960  MRN: [id number]    Date of Procedure: 5/30/2023    Preprocedure diagnosis:  Bacteremia  Procedures: MAZIN             Primary procedure  Written informed consent was obtained, the MAZIN was performed under stable fasting condition. The patient was set up for monitoring of surface EKG and pulse oximetry continuously. Blood pressure was monitored and with automatic cuff measurements. The procedure was performed while patient was intubated and sedated. Full MAZIN report to follow. Evidence of a mobile echodensity seen attached to the superior aspect of the left atrium. Suggestive of thrombus vs mass. Suggest further imaging with CT/ cardiac MRI. Complication: None     EBL : 0 ml.    Patient was monitored    Electronically signed by Barbara Lake MD on 5/30/2023 at 1:17 PM

## 2023-05-30 NOTE — ANESTHESIA PRE PROCEDURE
Department of Anesthesiology  Preprocedure Note       Name:  Bebe Perez   Age:  65 y.o.  :  1960                                          MRN:  [id number]         Date:  2023      Surgeon: * Surgery not found *    Procedure: MAZIN    Medications prior to admission:   Prior to Admission medications    Medication Sig Start Date End Date Taking? Authorizing Provider   aspirin 81 MG EC tablet Take 1 tablet by mouth daily    Historical Provider, MD   fluticasone (FLONASE) 50 MCG/ACT nasal spray 1 spray by Nasal route daily as needed for Rhinitis    Historical Provider, MD   ipratropium 0.5 mg-albuterol 2.5 mg (DUONEB) 0.5-2.5 (3) MG/3ML SOLN nebulizer solution Take 3 mLs by nebulization every 4 hours    Historical Provider, MD   sodium chloride 0.9 % SOLN 30 mL with fosnetupitant-palonosetron 235-0.25 MG/20ML SOLN 20 mL Infuse 0.25 mLs intravenously every 21 days    Historical Provider, MD   PACLitaxel-protein bound (ABRAXANE) 100 MG chemo IVPB Infuse 40 mLs intravenously every 21 days    Historical Provider, MD   pregabalin (LYRICA) 75 MG capsule Take 1 capsule by mouth 2 times daily. Historical Provider, MD   budesonide (PULMICORT) 0.25 MG/2ML nebulizer suspension Take 4 mLs by nebulization 2 times daily 23   Chandan Pearson MD   sodium zirconium cyclosilicate (LOKELMA) 10 g PACK oral suspension Take 10 g by mouth in the morning and 10 g in the evening. 23   Chandan Pearson MD   escitalopram (LEXAPRO) 10 MG tablet Take 1 tablet by mouth daily 23   Historical Provider, MD   HYDROcodone-acetaminophen 5-300 MG TABS Take 1 tablet by mouth 2 times daily as needed.  23   Historical Provider, MD   tamsulosin (FLOMAX) 0.4 MG capsule Take 1 capsule by mouth daily 4/10/23   Sly Cortes DO   apixaban (ELIQUIS) 5 MG TABS tablet Take 1 tablet by mouth 2 times daily    Historical Provider, MD   lisinopril (PRINIVIL;ZESTRIL) 5 MG tablet Take 1 tablet by mouth every morning

## 2023-05-31 PROBLEM — R57.9 SHOCK (HCC): Status: ACTIVE | Noted: 2023-01-01

## 2023-05-31 LAB
BACTERIA BLD CULT ORG #2: NORMAL
BACTERIA BLD CULT: NORMAL
BACTERIA CATH TIP CULT: ABNORMAL
BACTERIA CATH TIP CULT: ABNORMAL
BASOPHILS # BLD: 0 E9/L (ref 0–0.2)
BASOPHILS NFR BLD: 0.2 % (ref 0–2)
EOSINOPHIL # BLD: 0.55 E9/L (ref 0.05–0.5)
EOSINOPHIL NFR BLD: 2.6 % (ref 0–6)
ERYTHROCYTE [DISTWIDTH] IN BLOOD BY AUTOMATED COUNT: 16.3 FL (ref 11.5–15)
HCT VFR BLD AUTO: 26.6 % (ref 37–54)
HGB BLD-MCNC: 8.2 G/DL (ref 12.5–16.5)
LYMPHOCYTES # BLD: 1.26 E9/L (ref 1.5–4)
LYMPHOCYTES NFR BLD: 6 % (ref 20–42)
MAGNESIUM SERPL-MCNC: 1.5 MG/DL (ref 1.6–2.6)
MCH RBC QN AUTO: 29 PG (ref 26–35)
MCHC RBC AUTO-ENTMCNC: 30.8 % (ref 32–34.5)
MCV RBC AUTO: 94 FL (ref 80–99.9)
METAMYELOCYTES NFR BLD MANUAL: 1.7 % (ref 0–1)
METER GLUCOSE: 116 MG/DL (ref 74–99)
METER GLUCOSE: 127 MG/DL (ref 74–99)
METER GLUCOSE: 129 MG/DL (ref 74–99)
METER GLUCOSE: 149 MG/DL (ref 74–99)
MONOCYTES # BLD: 0.84 E9/L (ref 0.1–0.95)
MONOCYTES NFR BLD: 4.3 % (ref 2–12)
NEUTROPHILS # BLD: 18.06 E9/L (ref 1.8–7.3)
NEUTS SEG NFR BLD: 84.5 % (ref 43–80)
ORGANISM: ABNORMAL
ORGANISM: ABNORMAL
PHOSPHATE SERPL-MCNC: 4 MG/DL (ref 2.5–4.5)
PLATELET # BLD AUTO: 195 E9/L (ref 130–450)
PMV BLD AUTO: 11.8 FL (ref 7–12)
POIKILOCYTES: ABNORMAL
POLYCHROMASIA: ABNORMAL
PROMYELOCYTES NFR BLD MANUAL: 0.9 % (ref 0–0)
RBC # BLD AUTO: 2.83 E12/L (ref 3.8–5.8)
SCHISTOCYTES: ABNORMAL
TEAR DROP CELLS: ABNORMAL
TOXIC GRANULATION: ABNORMAL
WBC # BLD: 21 E9/L (ref 4.5–11.5)

## 2023-05-31 PROCEDURE — 6360000002 HC RX W HCPCS: Performed by: FAMILY MEDICINE

## 2023-05-31 PROCEDURE — 82962 GLUCOSE BLOOD TEST: CPT

## 2023-05-31 PROCEDURE — 2140000000 HC CCU INTERMEDIATE R&B

## 2023-05-31 PROCEDURE — 6360000002 HC RX W HCPCS: Performed by: INTERNAL MEDICINE

## 2023-05-31 PROCEDURE — 2580000003 HC RX 258: Performed by: INTERNAL MEDICINE

## 2023-05-31 PROCEDURE — 84100 ASSAY OF PHOSPHORUS: CPT

## 2023-05-31 PROCEDURE — 97530 THERAPEUTIC ACTIVITIES: CPT

## 2023-05-31 PROCEDURE — 2580000003 HC RX 258

## 2023-05-31 PROCEDURE — 97161 PT EVAL LOW COMPLEX 20 MIN: CPT

## 2023-05-31 PROCEDURE — 99223 1ST HOSP IP/OBS HIGH 75: CPT | Performed by: INTERNAL MEDICINE

## 2023-05-31 PROCEDURE — 99222 1ST HOSP IP/OBS MODERATE 55: CPT | Performed by: THORACIC SURGERY (CARDIOTHORACIC VASCULAR SURGERY)

## 2023-05-31 PROCEDURE — 99231 SBSQ HOSP IP/OBS SF/LOW 25: CPT | Performed by: NURSE PRACTITIONER

## 2023-05-31 PROCEDURE — 6370000000 HC RX 637 (ALT 250 FOR IP)

## 2023-05-31 PROCEDURE — 85025 COMPLETE CBC W/AUTO DIFF WBC: CPT

## 2023-05-31 PROCEDURE — 94640 AIRWAY INHALATION TREATMENT: CPT

## 2023-05-31 PROCEDURE — 6370000000 HC RX 637 (ALT 250 FOR IP): Performed by: INTERNAL MEDICINE

## 2023-05-31 PROCEDURE — S5553 INSULIN LONG ACTING 5 U: HCPCS | Performed by: FAMILY MEDICINE

## 2023-05-31 PROCEDURE — 36415 COLL VENOUS BLD VENIPUNCTURE: CPT

## 2023-05-31 PROCEDURE — 6360000002 HC RX W HCPCS

## 2023-05-31 PROCEDURE — 6370000000 HC RX 637 (ALT 250 FOR IP): Performed by: NURSE PRACTITIONER

## 2023-05-31 PROCEDURE — 6370000000 HC RX 637 (ALT 250 FOR IP): Performed by: FAMILY MEDICINE

## 2023-05-31 PROCEDURE — 83735 ASSAY OF MAGNESIUM: CPT

## 2023-05-31 PROCEDURE — 2700000000 HC OXYGEN THERAPY PER DAY

## 2023-05-31 RX ORDER — CYCLOBENZAPRINE HCL 5 MG
5 TABLET ORAL 3 TIMES DAILY PRN
Status: DISCONTINUED | OUTPATIENT
Start: 2023-05-31 | End: 2023-06-03 | Stop reason: HOSPADM

## 2023-05-31 RX ORDER — LANOLIN ALCOHOL/MO/W.PET/CERES
600 CREAM (GRAM) TOPICAL 3 TIMES DAILY
Status: DISCONTINUED | OUTPATIENT
Start: 2023-05-31 | End: 2023-06-03 | Stop reason: HOSPADM

## 2023-05-31 RX ORDER — MAGNESIUM SULFATE IN WATER 40 MG/ML
2000 INJECTION, SOLUTION INTRAVENOUS ONCE
Status: COMPLETED | OUTPATIENT
Start: 2023-05-31 | End: 2023-05-31

## 2023-05-31 RX ADMIN — SODIUM CHLORIDE, PRESERVATIVE FREE 10 ML: 5 INJECTION INTRAVENOUS at 21:10

## 2023-05-31 RX ADMIN — PREGABALIN 75 MG: 25 CAPSULE ORAL at 14:48

## 2023-05-31 RX ADMIN — COLESTIPOL HYDROCHLORIDE 1 G: 1 TABLET, FILM COATED ORAL at 17:37

## 2023-05-31 RX ADMIN — IPRATROPIUM BROMIDE AND ALBUTEROL SULFATE 3 ML: .5; 2.5 SOLUTION RESPIRATORY (INHALATION) at 19:59

## 2023-05-31 RX ADMIN — PREGABALIN 75 MG: 25 CAPSULE ORAL at 21:08

## 2023-05-31 RX ADMIN — GUAIFENESIN 400 MG: 400 TABLET ORAL at 21:10

## 2023-05-31 RX ADMIN — HYDROCODONE BITARTRATE AND ACETAMINOPHEN 1 TABLET: 5; 325 TABLET ORAL at 14:48

## 2023-05-31 RX ADMIN — BUDESONIDE 500 MCG: 0.25 SUSPENSION RESPIRATORY (INHALATION) at 07:37

## 2023-05-31 RX ADMIN — ARFORMOTEROL TARTRATE 15 MCG: 15 SOLUTION RESPIRATORY (INHALATION) at 07:37

## 2023-05-31 RX ADMIN — IPRATROPIUM BROMIDE AND ALBUTEROL SULFATE 3 ML: .5; 2.5 SOLUTION RESPIRATORY (INHALATION) at 11:15

## 2023-05-31 RX ADMIN — GUAIFENESIN 400 MG: 400 TABLET ORAL at 08:22

## 2023-05-31 RX ADMIN — CEFEPIME 2000 MG: 2 INJECTION, POWDER, FOR SOLUTION INTRAVENOUS at 00:16

## 2023-05-31 RX ADMIN — TAMSULOSIN HYDROCHLORIDE 0.4 MG: 0.4 CAPSULE ORAL at 08:22

## 2023-05-31 RX ADMIN — ACETAMINOPHEN 650 MG: 325 TABLET ORAL at 08:27

## 2023-05-31 RX ADMIN — VANCOMYCIN HYDROCHLORIDE 1250 MG: 10 INJECTION, POWDER, LYOPHILIZED, FOR SOLUTION INTRAVENOUS at 05:46

## 2023-05-31 RX ADMIN — Medication 600 MG: at 08:22

## 2023-05-31 RX ADMIN — BUDESONIDE 500 MCG: 0.25 SUSPENSION RESPIRATORY (INHALATION) at 19:59

## 2023-05-31 RX ADMIN — INSULIN GLARGINE-YFGN 10 UNITS: 100 INJECTION, SOLUTION SUBCUTANEOUS at 21:07

## 2023-05-31 RX ADMIN — MAGNESIUM SULFATE HEPTAHYDRATE 2000 MG: 40 INJECTION, SOLUTION INTRAVENOUS at 14:46

## 2023-05-31 RX ADMIN — ARFORMOTEROL TARTRATE 15 MCG: 15 SOLUTION RESPIRATORY (INHALATION) at 19:59

## 2023-05-31 RX ADMIN — COLESTIPOL HYDROCHLORIDE 1 G: 1 TABLET, FILM COATED ORAL at 09:38

## 2023-05-31 RX ADMIN — ROSUVASTATIN CALCIUM 10 MG: 5 TABLET, FILM COATED ORAL at 08:23

## 2023-05-31 RX ADMIN — ACETAMINOPHEN 650 MG: 325 TABLET ORAL at 00:16

## 2023-05-31 RX ADMIN — CYCLOBENZAPRINE 5 MG: 10 TABLET, FILM COATED ORAL at 13:05

## 2023-05-31 RX ADMIN — CEFEPIME 2000 MG: 2 INJECTION, POWDER, FOR SOLUTION INTRAVENOUS at 08:30

## 2023-05-31 RX ADMIN — ESCITALOPRAM OXALATE 10 MG: 10 TABLET ORAL at 08:23

## 2023-05-31 RX ADMIN — APIXABAN 5 MG: 5 TABLET, FILM COATED ORAL at 21:10

## 2023-05-31 RX ADMIN — METOPROLOL SUCCINATE 125 MG: 100 TABLET, EXTENDED RELEASE ORAL at 21:07

## 2023-05-31 RX ADMIN — Medication 600 MG: at 14:43

## 2023-05-31 RX ADMIN — SODIUM CHLORIDE, PRESERVATIVE FREE 10 ML: 5 INJECTION INTRAVENOUS at 08:23

## 2023-05-31 RX ADMIN — HYDROCODONE BITARTRATE AND ACETAMINOPHEN 1 TABLET: 5; 325 TABLET ORAL at 05:47

## 2023-05-31 RX ADMIN — PREGABALIN 75 MG: 25 CAPSULE ORAL at 08:22

## 2023-05-31 RX ADMIN — HYDROCODONE BITARTRATE AND ACETAMINOPHEN 1 TABLET: 5; 325 TABLET ORAL at 21:08

## 2023-05-31 RX ADMIN — SODIUM ZIRCONIUM CYCLOSILICATE 10 G: 10 POWDER, FOR SUSPENSION ORAL at 09:38

## 2023-05-31 RX ADMIN — IPRATROPIUM BROMIDE AND ALBUTEROL SULFATE 3 ML: .5; 2.5 SOLUTION RESPIRATORY (INHALATION) at 07:37

## 2023-05-31 RX ADMIN — ONDANSETRON 4 MG: 2 INJECTION INTRAMUSCULAR; INTRAVENOUS at 05:57

## 2023-05-31 RX ADMIN — APIXABAN 5 MG: 5 TABLET, FILM COATED ORAL at 09:38

## 2023-05-31 RX ADMIN — METOPROLOL SUCCINATE 100 MG: 100 TABLET, EXTENDED RELEASE ORAL at 08:23

## 2023-05-31 RX ADMIN — Medication 600 MG: at 21:09

## 2023-05-31 RX ADMIN — VANCOMYCIN HYDROCHLORIDE 1750 MG: 10 INJECTION, POWDER, LYOPHILIZED, FOR SOLUTION INTRAVENOUS at 20:10

## 2023-05-31 RX ADMIN — GUAIFENESIN 400 MG: 400 TABLET ORAL at 14:43

## 2023-05-31 RX ADMIN — GUAIFENESIN 400 MG: 400 TABLET ORAL at 16:46

## 2023-05-31 ASSESSMENT — PAIN DESCRIPTION - LOCATION
LOCATION: HEAD
LOCATION: NECK;HEAD
LOCATION: HEAD;NECK
LOCATION: HEAD;NECK
LOCATION: HEAD

## 2023-05-31 ASSESSMENT — PAIN SCALES - GENERAL
PAINLEVEL_OUTOF10: 6
PAINLEVEL_OUTOF10: 1
PAINLEVEL_OUTOF10: 7
PAINLEVEL_OUTOF10: 5
PAINLEVEL_OUTOF10: 1
PAINLEVEL_OUTOF10: 7
PAINLEVEL_OUTOF10: 5
PAINLEVEL_OUTOF10: 5
PAINLEVEL_OUTOF10: 7

## 2023-05-31 ASSESSMENT — PAIN DESCRIPTION - DESCRIPTORS
DESCRIPTORS: DISCOMFORT;ACHING
DESCRIPTORS: ACHING
DESCRIPTORS: ACHING;DISCOMFORT
DESCRIPTORS: THROBBING

## 2023-05-31 ASSESSMENT — PAIN DESCRIPTION - ORIENTATION
ORIENTATION: LEFT
ORIENTATION: LEFT

## 2023-05-31 NOTE — CONSULTS
CTS Consult    Patient name: Sandhya De Leon    Reason for consult: LA mass    Primary Care Physician: Tonio Viveros MD    Date of service: 5/31/2023    Chief Complaint: Abnormal labs    HPI: 65year old man known to me in the sense that I saw him in consult in my office for lung cancer. We did a lung perfusion scan and a room air ABG and decided based on the fact that the lesion was in his lower lobe and the perfusion to this lobe was significant that he would not tolerate a lobectomy. He was referred for and received SBRT. He now has recurrence and is on Chemo. He has had several admission for ARF and abnormal labs. He has had recurrent bacteremia and thus got a MAZIN showing an LA mass vs thrombus. Currently he denies CP, SOB, N/V, F/C, orthopnea, PND and sycnope. Allergies: Allergies   Allergen Reactions    Zocor [Simvastatin - High Dose] Other (See Comments)     RHABDOMYOLYSIS. PT TOLERATES CRESTOR.        Home medications:    Current Facility-Administered Medications   Medication Dose Route Frequency Provider Last Rate Last Admin    magnesium oxide (MAG-OX) tablet 600 mg  600 mg Oral TID Tonio Viveros MD   600 mg at 05/31/23 1443    cyclobenzaprine (FLEXERIL) tablet 5 mg  5 mg Oral TID PRN Wonda Stains, APRN - CNP   5 mg at 05/31/23 1305    vancomycin (VANCOCIN) 1,750 mg in sodium chloride 0.9 % 500 mL IVPB  1,750 mg IntraVENous Q24H Alen Rose, [facility]        arformoterol tartrate (BROVANA) nebulizer solution 15 mcg  15 mcg Nebulization BID Keyon Pimentel MD   15 mcg at 05/31/23 0737    HYDROcodone-acetaminophen (NORCO) 5-325 MG per tablet 1 tablet  1 tablet Oral Q6H PRN Tonio Viveros MD   1 tablet at 05/31/23 1448    pregabalin (LYRICA) capsule 75 mg  75 mg Oral TID Tonio Viveros MD   75 mg at 05/31/23 1448    colestipol (COLESTID) tablet 1 g  1 g Oral BID WC Tonio Viveros MD   1 g at 05/31/23 1737    perflutren lipid microspheres (DEFINITY) injection 1.5 mL  1.5
Isabela Garcia  1960  Date of Service: 5/31/2023    Reason for Consultation: We were asked to see Isabela Garcia by Dr. Leo Briscoe MD  regarding coronary artery disease, persistent atrial fibrillation, aortic regurgitation. History of Chief Complaint: This is a 65 y.o. male known to our group through myself. He was seen in consultation yesterday evening. Note is being done today. They have a history of persistent atrial fibrillation, coronary artery disease, aortic regurgitation. Rylee Kelly He is undergoing chemotherapy for lung cancer and he has recurring anemia from this. He recently was admitted with severe anemia and required a transfusion. He was then his oncologist office for chemotherapy again and was found to have acute on chronic renal insufficiency and hyperkalemia and was therefore admitted to the hospital.  He denies any chest discomfort, dyspnea on exertion, orthopnea/PND. He denies any palpitations, or syncope, or near syncope. Since his admission, he has had recurring bacteremia. REVIEW OF SYSTEMS:   Heart: as above   Lungs: as above   Eyes: denies changes in vision or discharge. Ears: denies changes in hearing or pain. Nose: denies epistaxis or masses   Throat: denies sore throat or trouble swallowing. Neuro: denies numbness, tingling, tremors. Skin: denies rashes or itching. : denies hematuria, dysuria   GI: denies vomiting, diarrhea   Psych: denies mood changed, anxiety, depression.     CURRENT MEDICATIONS:  Current Facility-Administered Medications   Medication Dose Route Frequency Provider Last Rate Last Admin    magnesium oxide (MAG-OX) tablet 600 mg  600 mg Oral BID Leo Briscoe MD   600 mg at 05/30/23 2101    vancomycin (VANCOCIN) 1,250 mg in sodium chloride 0.9 % 250 mL IVPB  1,250 mg IntraVENous Q18H Cynthia Hurst .7 mL/hr at 05/31/23 0546 1,250 mg at 05/31/23 0546    arformoterol tartrate (BROVANA) nebulizer solution 15 mcg  15 mcg
determined  Referrals to: none today    Thank you for the opportunity to participate in the care of Sammy Wolf. Allen Fernandez, APRN - CNP   Palliative Medicine     SUBJECTIVE:     Details of Conversation: Chart reviewed and met with the patient and his sister Prateek Morton at the bedside. I introduced palliative medicine and we had discussion about goals of care and CODE STATUS. We discussed the difference between palliative care and hospice. The patient and his sister both expressed that at this time he wants to continue with his oncology treatments and all other aggressive medical management. He states that he would want dialysis if that was needed. We discussed outpatient palliative care. The patient expressed that his symptoms are well controlled at this time and he denied a need for outpatient palliative care services. He is willing to get information from hospice, but is not interested in pursuing hospice at this time. We discussed CODE STATUS and I explained with his comorbidities the poor chance for meaningful recovery after an arrest.  He wishes for CODE STATUS to remain a full code. He does not believe that he has ever completed a living will or HPOA. He would be interested in completing these documents.     Prognosis: Guarded    OBJECTIVE:     BP 97/65   Pulse 80   Temp 97.9 °F (36.6 °C) (Oral)   Resp 16   Ht 5' 11\" (1.803 m)   Wt 298 lb 14.4 oz (135.6 kg)   SpO2 99%   BMI 41.69 kg/m²     Physical Examination:  Gen: NAD, awake, alert   Lungs: respirations easy and not labored  Abdomen: soft, non-tender  Extremities: no clubbing, cyanosis or edema, moving all extremities    Skin: warm, dry without rashes  Neuro: awake, alert, oriented x 3    Objective data reviewed: labs, images, records, medication use, vitals, and chart    Time/Communication  Greater than 50% of time spent, total 55 minutes in counseling and coordination of care at the bedside regarding goals of care and symptom

## 2023-05-31 NOTE — PLAN OF CARE
Cardiac MRI held at this time 2/2 patient unable to hold his breath for greater than 9 seconds. For proper imaging patient needs to be able to hold breath for at least 15 seconds. Dr. Conte Marrow updated.     Electronically signed by SIENNA Hicks CNP on 5/31/2023 at 10:47 AM

## 2023-05-31 NOTE — PLAN OF CARE
Problem: Chronic Conditions and Co-morbidities  Goal: Patient's chronic conditions and co-morbidity symptoms are monitored and maintained or improved  5/31/2023 1059 by Jamari Saunders RN  Outcome: Progressing  5/31/2023 0422 by Sergio Starkey RN  Outcome: Progressing     Problem: Discharge Planning  Goal: Discharge to home or other facility with appropriate resources  5/31/2023 1059 by Jamari Saunders RN  Outcome: Progressing  5/31/2023 0422 by Sergio Starkey RN  Outcome: Progressing     Problem: Pain  Goal: Verbalizes/displays adequate comfort level or baseline comfort level  5/31/2023 1059 by Jamari Saunders RN  Outcome: Progressing  5/31/2023 0422 by Sergio Starkey RN  Outcome: Progressing     Problem: Safety - Adult  Goal: Free from fall injury  5/31/2023 1059 by Jamari Saunders RN  Outcome: Progressing  5/31/2023 0422 by Sergio Starkey RN  Outcome: Progressing

## 2023-05-31 NOTE — CARE COORDINATION
MAZIN noted mobile echodensity in the left atrium, mass versus thrombus, CT surgery consulted. Patient continues on IV Vancomycin, cefepime stopped; ID following. Patient was pending a cardiac MRI; currently on hold due to patient's breathing. Plan is home, no needs and family to transport.     Electronically signed by BISI Sexton on 5/31/2023 at 4:18 PM

## 2023-06-01 ENCOUNTER — APPOINTMENT (OUTPATIENT)
Dept: GENERAL RADIOLOGY | Age: 63
End: 2023-06-01
Payer: COMMERCIAL

## 2023-06-01 LAB
ANION GAP SERPL CALCULATED.3IONS-SCNC: 7 MMOL/L (ref 7–16)
ANISOCYTOSIS: ABNORMAL
BACTERIA BLD CULT ORG #2: NORMAL
BACTERIA BLD CULT: NORMAL
BASOPHILS # BLD: 0 E9/L (ref 0–0.2)
BASOPHILS NFR BLD: 0.7 % (ref 0–2)
BUN SERPL-MCNC: 20 MG/DL (ref 6–23)
CALCIUM SERPL-MCNC: 9 MG/DL (ref 8.6–10.2)
CHLORIDE SERPL-SCNC: 105 MMOL/L (ref 98–107)
CO2 SERPL-SCNC: 27 MMOL/L (ref 22–29)
CREAT SERPL-MCNC: 1.3 MG/DL (ref 0.7–1.2)
EOSINOPHIL # BLD: 0.49 E9/L (ref 0.05–0.5)
EOSINOPHIL NFR BLD: 2.6 % (ref 0–6)
ERYTHROCYTE [DISTWIDTH] IN BLOOD BY AUTOMATED COUNT: 16.3 FL (ref 11.5–15)
GLUCOSE SERPL-MCNC: 158 MG/DL (ref 74–99)
HCT VFR BLD AUTO: 28.5 % (ref 37–54)
HGB BLD-MCNC: 8.4 G/DL (ref 12.5–16.5)
HYPOCHROMIA: ABNORMAL
LYMPHOCYTES # BLD: 0.56 E9/L (ref 1.5–4)
LYMPHOCYTES NFR BLD: 2.6 % (ref 20–42)
MAGNESIUM SERPL-MCNC: 2.1 MG/DL (ref 1.6–2.6)
MCH RBC QN AUTO: 28.6 PG (ref 26–35)
MCHC RBC AUTO-ENTMCNC: 29.5 % (ref 32–34.5)
MCV RBC AUTO: 96.9 FL (ref 80–99.9)
METER GLUCOSE: 110 MG/DL (ref 74–99)
METER GLUCOSE: 153 MG/DL (ref 74–99)
METER GLUCOSE: 176 MG/DL (ref 74–99)
METER GLUCOSE: 191 MG/DL (ref 74–99)
MONOCYTES # BLD: 1.13 E9/L (ref 0.1–0.95)
MONOCYTES NFR BLD: 6.1 % (ref 2–12)
MYELOCYTES NFR BLD MANUAL: 0.9 % (ref 0–0)
NEUTROPHILS # BLD: 16.73 E9/L (ref 1.8–7.3)
NEUTS SEG NFR BLD: 87.8 % (ref 43–80)
OVALOCYTES: ABNORMAL
PHOSPHATE SERPL-MCNC: 3.7 MG/DL (ref 2.5–4.5)
PLATELET # BLD AUTO: 197 E9/L (ref 130–450)
PMV BLD AUTO: 11.2 FL (ref 7–12)
POIKILOCYTES: ABNORMAL
POLYCHROMASIA: ABNORMAL
POTASSIUM SERPL-SCNC: 3.5 MMOL/L (ref 3.5–5)
RBC # BLD AUTO: 2.94 E12/L (ref 3.8–5.8)
SODIUM SERPL-SCNC: 139 MMOL/L (ref 132–146)
TEAR DROP CELLS: ABNORMAL
WBC # BLD: 18.8 E9/L (ref 4.5–11.5)

## 2023-06-01 PROCEDURE — 2580000003 HC RX 258: Performed by: INTERNAL MEDICINE

## 2023-06-01 PROCEDURE — 82962 GLUCOSE BLOOD TEST: CPT

## 2023-06-01 PROCEDURE — 94640 AIRWAY INHALATION TREATMENT: CPT

## 2023-06-01 PROCEDURE — 84100 ASSAY OF PHOSPHORUS: CPT

## 2023-06-01 PROCEDURE — 6370000000 HC RX 637 (ALT 250 FOR IP)

## 2023-06-01 PROCEDURE — 36569 INSJ PICC 5 YR+ W/O IMAGING: CPT

## 2023-06-01 PROCEDURE — 02HV33Z INSERTION OF INFUSION DEVICE INTO SUPERIOR VENA CAVA, PERCUTANEOUS APPROACH: ICD-10-PCS | Performed by: FAMILY MEDICINE

## 2023-06-01 PROCEDURE — 2140000000 HC CCU INTERMEDIATE R&B

## 2023-06-01 PROCEDURE — 6370000000 HC RX 637 (ALT 250 FOR IP): Performed by: INTERNAL MEDICINE

## 2023-06-01 PROCEDURE — 36415 COLL VENOUS BLD VENIPUNCTURE: CPT

## 2023-06-01 PROCEDURE — 6360000002 HC RX W HCPCS: Performed by: INTERNAL MEDICINE

## 2023-06-01 PROCEDURE — 2500000003 HC RX 250 WO HCPCS: Performed by: INTERNAL MEDICINE

## 2023-06-01 PROCEDURE — S5553 INSULIN LONG ACTING 5 U: HCPCS | Performed by: FAMILY MEDICINE

## 2023-06-01 PROCEDURE — 83735 ASSAY OF MAGNESIUM: CPT

## 2023-06-01 PROCEDURE — 80048 BASIC METABOLIC PNL TOTAL CA: CPT

## 2023-06-01 PROCEDURE — 99231 SBSQ HOSP IP/OBS SF/LOW 25: CPT | Performed by: NURSE PRACTITIONER

## 2023-06-01 PROCEDURE — 6370000000 HC RX 637 (ALT 250 FOR IP): Performed by: FAMILY MEDICINE

## 2023-06-01 PROCEDURE — 2580000003 HC RX 258

## 2023-06-01 PROCEDURE — 85025 COMPLETE CBC W/AUTO DIFF WBC: CPT

## 2023-06-01 PROCEDURE — C1751 CATH, INF, PER/CENT/MIDLINE: HCPCS

## 2023-06-01 PROCEDURE — 6360000002 HC RX W HCPCS: Performed by: FAMILY MEDICINE

## 2023-06-01 PROCEDURE — 71045 X-RAY EXAM CHEST 1 VIEW: CPT

## 2023-06-01 PROCEDURE — 6370000000 HC RX 637 (ALT 250 FOR IP): Performed by: NURSE PRACTITIONER

## 2023-06-01 PROCEDURE — 99231 SBSQ HOSP IP/OBS SF/LOW 25: CPT | Performed by: INTERNAL MEDICINE

## 2023-06-01 PROCEDURE — 2700000000 HC OXYGEN THERAPY PER DAY

## 2023-06-01 PROCEDURE — 76937 US GUIDE VASCULAR ACCESS: CPT

## 2023-06-01 RX ORDER — HEPARIN SODIUM (PORCINE) LOCK FLUSH IV SOLN 100 UNIT/ML 100 UNIT/ML
3 SOLUTION INTRAVENOUS PRN
Status: DISCONTINUED | OUTPATIENT
Start: 2023-06-01 | End: 2023-06-03 | Stop reason: HOSPADM

## 2023-06-01 RX ORDER — CYCLOBENZAPRINE HCL 5 MG
10 TABLET ORAL 3 TIMES DAILY PRN
Qty: 90 TABLET | Refills: 0 | Status: SHIPPED | OUTPATIENT
Start: 2023-06-01 | End: 2023-06-11

## 2023-06-01 RX ORDER — LIDOCAINE HYDROCHLORIDE 10 MG/ML
5 INJECTION, SOLUTION EPIDURAL; INFILTRATION; INTRACAUDAL; PERINEURAL ONCE
Status: COMPLETED | OUTPATIENT
Start: 2023-06-01 | End: 2023-06-01

## 2023-06-01 RX ORDER — PREGABALIN 75 MG/1
75 CAPSULE ORAL 3 TIMES DAILY
Qty: 90 CAPSULE | Refills: 0 | Status: SHIPPED | OUTPATIENT
Start: 2023-06-01 | End: 2023-06-02 | Stop reason: SDUPTHER

## 2023-06-01 RX ORDER — SODIUM CHLORIDE 0.9 % (FLUSH) 0.9 %
5-40 SYRINGE (ML) INJECTION EVERY 12 HOURS SCHEDULED
Status: DISCONTINUED | OUTPATIENT
Start: 2023-06-01 | End: 2023-06-03 | Stop reason: HOSPADM

## 2023-06-01 RX ORDER — INSULIN LISPRO 100 [IU]/ML
0-8 INJECTION, SOLUTION INTRAVENOUS; SUBCUTANEOUS
Qty: 100 ML | Refills: 0 | Status: SHIPPED | OUTPATIENT
Start: 2023-06-01

## 2023-06-01 RX ORDER — GUAIFENESIN 400 MG/1
400 TABLET ORAL 4 TIMES DAILY
Qty: 56 TABLET | Refills: 0 | Status: SHIPPED | OUTPATIENT
Start: 2023-06-01

## 2023-06-01 RX ORDER — ARFORMOTEROL TARTRATE 15 UG/2ML
15 SOLUTION RESPIRATORY (INHALATION) 2 TIMES DAILY
Qty: 120 ML | Refills: 0 | Status: SHIPPED | OUTPATIENT
Start: 2023-06-01

## 2023-06-01 RX ORDER — CEFAZOLIN SODIUM 1 G/3ML
1000 INJECTION, POWDER, FOR SOLUTION INTRAMUSCULAR; INTRAVENOUS EVERY 8 HOURS
Qty: 60 EACH | Refills: 0 | Status: SHIPPED | OUTPATIENT
Start: 2023-06-01 | End: 2023-06-21

## 2023-06-01 RX ORDER — HYDROCODONE BITARTRATE AND ACETAMINOPHEN 5; 325 MG/1; MG/1
1 TABLET ORAL EVERY 6 HOURS PRN
Qty: 120 TABLET | Refills: 0 | Status: SHIPPED | OUTPATIENT
Start: 2023-06-01 | End: 2023-06-02 | Stop reason: SDUPTHER

## 2023-06-01 RX ORDER — METOPROLOL SUCCINATE 100 MG/1
100 TABLET, EXTENDED RELEASE ORAL DAILY
Qty: 30 TABLET | Refills: 0 | Status: SHIPPED | OUTPATIENT
Start: 2023-06-02

## 2023-06-01 RX ORDER — IPRATROPIUM BROMIDE AND ALBUTEROL SULFATE 2.5; .5 MG/3ML; MG/3ML
3 SOLUTION RESPIRATORY (INHALATION) 4 TIMES DAILY
Qty: 360 ML | Refills: 2 | Status: SHIPPED | OUTPATIENT
Start: 2023-06-01

## 2023-06-01 RX ORDER — SODIUM CHLORIDE 0.9 % (FLUSH) 0.9 %
5-40 SYRINGE (ML) INJECTION PRN
Status: DISCONTINUED | OUTPATIENT
Start: 2023-06-01 | End: 2023-06-03 | Stop reason: HOSPADM

## 2023-06-01 RX ORDER — INSULIN LISPRO 100 [IU]/ML
0-4 INJECTION, SOLUTION INTRAVENOUS; SUBCUTANEOUS NIGHTLY
Qty: 100 ML | Refills: 0 | Status: SHIPPED | OUTPATIENT
Start: 2023-06-01

## 2023-06-01 RX ORDER — FLUTICASONE PROPIONATE 50 MCG
1 SPRAY, SUSPENSION (ML) NASAL DAILY
Qty: 16 G | Refills: 3 | Status: SHIPPED | OUTPATIENT
Start: 2023-06-01

## 2023-06-01 RX ORDER — ALBUTEROL SULFATE 2.5 MG/3ML
2.5 SOLUTION RESPIRATORY (INHALATION) 4 TIMES DAILY PRN
Qty: 120 EACH | Refills: 0 | Status: SHIPPED | OUTPATIENT
Start: 2023-06-01

## 2023-06-01 RX ORDER — INSULIN GLARGINE-YFGN 100 [IU]/ML
10 INJECTION, SOLUTION SUBCUTANEOUS NIGHTLY
Qty: 100 ML | Refills: 0 | Status: SHIPPED | OUTPATIENT
Start: 2023-06-01

## 2023-06-01 RX ORDER — HEPARIN SODIUM (PORCINE) LOCK FLUSH IV SOLN 100 UNIT/ML 100 UNIT/ML
3 SOLUTION INTRAVENOUS EVERY 12 HOURS SCHEDULED
Status: DISCONTINUED | OUTPATIENT
Start: 2023-06-01 | End: 2023-06-03 | Stop reason: HOSPADM

## 2023-06-01 RX ORDER — SODIUM CHLORIDE 9 MG/ML
INJECTION, SOLUTION INTRAVENOUS PRN
Status: DISCONTINUED | OUTPATIENT
Start: 2023-06-01 | End: 2023-06-03 | Stop reason: HOSPADM

## 2023-06-01 RX ORDER — MONTELUKAST SODIUM 4 MG/1
1 TABLET, CHEWABLE ORAL 2 TIMES DAILY WITH MEALS
Qty: 60 TABLET | Refills: 0 | Status: SHIPPED | OUTPATIENT
Start: 2023-06-01

## 2023-06-01 RX ORDER — METOPROLOL SUCCINATE 25 MG/1
125 TABLET, EXTENDED RELEASE ORAL NIGHTLY
Qty: 30 TABLET | Refills: 0 | Status: SHIPPED | OUTPATIENT
Start: 2023-06-01

## 2023-06-01 RX ADMIN — SODIUM CHLORIDE, PRESERVATIVE FREE 300 UNITS: 5 INJECTION INTRAVENOUS at 14:00

## 2023-06-01 RX ADMIN — COLESTIPOL HYDROCHLORIDE 1 G: 1 TABLET, FILM COATED ORAL at 17:22

## 2023-06-01 RX ADMIN — ESCITALOPRAM OXALATE 10 MG: 10 TABLET ORAL at 10:18

## 2023-06-01 RX ADMIN — IPRATROPIUM BROMIDE AND ALBUTEROL SULFATE 3 ML: .5; 2.5 SOLUTION RESPIRATORY (INHALATION) at 09:41

## 2023-06-01 RX ADMIN — SODIUM CHLORIDE, PRESERVATIVE FREE 300 UNITS: 5 INJECTION INTRAVENOUS at 21:20

## 2023-06-01 RX ADMIN — LIDOCAINE HYDROCHLORIDE 5 ML: 10 INJECTION, SOLUTION EPIDURAL; INFILTRATION; INTRACAUDAL; PERINEURAL at 14:00

## 2023-06-01 RX ADMIN — SODIUM CHLORIDE, PRESERVATIVE FREE 10 ML: 5 INJECTION INTRAVENOUS at 10:21

## 2023-06-01 RX ADMIN — HYDROCODONE BITARTRATE AND ACETAMINOPHEN 1 TABLET: 5; 325 TABLET ORAL at 04:04

## 2023-06-01 RX ADMIN — HYDROCODONE BITARTRATE AND ACETAMINOPHEN 1 TABLET: 5; 325 TABLET ORAL at 21:18

## 2023-06-01 RX ADMIN — COLESTIPOL HYDROCHLORIDE 1 G: 1 TABLET, FILM COATED ORAL at 10:20

## 2023-06-01 RX ADMIN — SODIUM CHLORIDE, PRESERVATIVE FREE 10 ML: 5 INJECTION INTRAVENOUS at 13:04

## 2023-06-01 RX ADMIN — METOPROLOL SUCCINATE 125 MG: 100 TABLET, EXTENDED RELEASE ORAL at 21:19

## 2023-06-01 RX ADMIN — Medication 600 MG: at 16:43

## 2023-06-01 RX ADMIN — GUAIFENESIN 400 MG: 400 TABLET ORAL at 21:19

## 2023-06-01 RX ADMIN — CEFAZOLIN 2000 MG: 2 INJECTION, POWDER, FOR SOLUTION INTRAMUSCULAR; INTRAVENOUS at 21:15

## 2023-06-01 RX ADMIN — PREGABALIN 75 MG: 25 CAPSULE ORAL at 16:39

## 2023-06-01 RX ADMIN — IPRATROPIUM BROMIDE AND ALBUTEROL SULFATE 3 ML: .5; 2.5 SOLUTION RESPIRATORY (INHALATION) at 12:41

## 2023-06-01 RX ADMIN — ACETAMINOPHEN 650 MG: 325 TABLET ORAL at 16:42

## 2023-06-01 RX ADMIN — APIXABAN 5 MG: 5 TABLET, FILM COATED ORAL at 21:19

## 2023-06-01 RX ADMIN — INSULIN GLARGINE-YFGN 10 UNITS: 100 INJECTION, SOLUTION SUBCUTANEOUS at 21:20

## 2023-06-01 RX ADMIN — GUAIFENESIN 400 MG: 400 TABLET ORAL at 10:18

## 2023-06-01 RX ADMIN — APIXABAN 5 MG: 5 TABLET, FILM COATED ORAL at 10:13

## 2023-06-01 RX ADMIN — CYCLOBENZAPRINE 5 MG: 10 TABLET, FILM COATED ORAL at 10:16

## 2023-06-01 RX ADMIN — IPRATROPIUM BROMIDE AND ALBUTEROL SULFATE 3 ML: .5; 2.5 SOLUTION RESPIRATORY (INHALATION) at 16:25

## 2023-06-01 RX ADMIN — Medication 600 MG: at 21:19

## 2023-06-01 RX ADMIN — SODIUM CHLORIDE, PRESERVATIVE FREE 10 ML: 5 INJECTION INTRAVENOUS at 21:20

## 2023-06-01 RX ADMIN — GUAIFENESIN 400 MG: 400 TABLET ORAL at 16:39

## 2023-06-01 RX ADMIN — METOPROLOL SUCCINATE 100 MG: 100 TABLET, EXTENDED RELEASE ORAL at 10:19

## 2023-06-01 RX ADMIN — TAMSULOSIN HYDROCHLORIDE 0.4 MG: 0.4 CAPSULE ORAL at 10:19

## 2023-06-01 RX ADMIN — BUDESONIDE 500 MCG: 0.25 SUSPENSION RESPIRATORY (INHALATION) at 09:43

## 2023-06-01 RX ADMIN — ARFORMOTEROL TARTRATE 15 MCG: 15 SOLUTION RESPIRATORY (INHALATION) at 09:42

## 2023-06-01 RX ADMIN — PREGABALIN 75 MG: 25 CAPSULE ORAL at 10:18

## 2023-06-01 RX ADMIN — HYDROCODONE BITARTRATE AND ACETAMINOPHEN 1 TABLET: 5; 325 TABLET ORAL at 10:17

## 2023-06-01 RX ADMIN — IPRATROPIUM BROMIDE AND ALBUTEROL SULFATE 3 ML: .5; 2.5 SOLUTION RESPIRATORY (INHALATION) at 21:31

## 2023-06-01 RX ADMIN — PREGABALIN 75 MG: 25 CAPSULE ORAL at 21:18

## 2023-06-01 RX ADMIN — ROSUVASTATIN CALCIUM 10 MG: 5 TABLET, FILM COATED ORAL at 10:20

## 2023-06-01 RX ADMIN — BUDESONIDE 500 MCG: 0.25 SUSPENSION RESPIRATORY (INHALATION) at 21:32

## 2023-06-01 RX ADMIN — Medication 600 MG: at 10:13

## 2023-06-01 RX ADMIN — ARFORMOTEROL TARTRATE 15 MCG: 15 SOLUTION RESPIRATORY (INHALATION) at 21:31

## 2023-06-01 RX ADMIN — SODIUM ZIRCONIUM CYCLOSILICATE 10 G: 10 POWDER, FOR SUSPENSION ORAL at 10:21

## 2023-06-01 RX ADMIN — CEFAZOLIN 2000 MG: 2 INJECTION, POWDER, FOR SOLUTION INTRAMUSCULAR; INTRAVENOUS at 13:03

## 2023-06-01 ASSESSMENT — PAIN SCALES - GENERAL
PAINLEVEL_OUTOF10: 8
PAINLEVEL_OUTOF10: 6
PAINLEVEL_OUTOF10: 6
PAINLEVEL_OUTOF10: 7
PAINLEVEL_OUTOF10: 0
PAINLEVEL_OUTOF10: 6

## 2023-06-01 ASSESSMENT — PAIN DESCRIPTION - ORIENTATION
ORIENTATION: LEFT

## 2023-06-01 ASSESSMENT — PAIN DESCRIPTION - LOCATION
LOCATION: HEAD
LOCATION: HEAD
LOCATION: BACK
LOCATION: BACK;NECK;HEAD

## 2023-06-01 ASSESSMENT — PAIN DESCRIPTION - ONSET: ONSET: ON-GOING

## 2023-06-01 ASSESSMENT — PAIN DESCRIPTION - FREQUENCY: FREQUENCY: CONTINUOUS

## 2023-06-01 ASSESSMENT — PAIN DESCRIPTION - DESCRIPTORS: DESCRIPTORS: DULL;NAGGING;THROBBING

## 2023-06-01 ASSESSMENT — PAIN DESCRIPTION - PAIN TYPE: TYPE: ACUTE PAIN

## 2023-06-01 NOTE — CARE COORDINATION
6/1:  Update CM Note:  Pt presented to the ER for abnormal labs from home. Pt is pending dc after PICC placement & [facility] pricing IV ATB's  Per Dr. Citlalli Hastings pt is pending dc after Picc placement. Cm sent prefect serve to IV team.  Cm spoke with pt bedside to discuss CM role & dc planning. Pt states his dc plan is to return home with family to transport. Per pt his family including his sister can help with IV ATB's at home. Cm explained that he will need HHC on dc for IV atbs & education. Pt has no HHC preferences & declined a list.  Cm sent IV scripts to [facility] who advise pt has no out of pocket expense. [facility] was unable to accept & is booked out until 6/10, [facility] unable to take until 6/5. CM sent referral to [facility] who was able to accept on Saturday. Will need HHC order for Nursing. [facility]/OLES[facility] will continue to follow for dc planning. Electronically signed by Ravin Agudelo RN on 6/1/2023 at 12:32 PM    The Plan for Transition of Care is related to the following treatment goals: [facility] AT [facility]    The Patient and/or patient representative was provided with a choice of provider and agrees   with the discharge plan. [x] Yes [] No    Freedom of choice list was provided with basic dialogue that supports the patient's individualized plan of care/goals, treatment preferences and shares the quality data associated with the providers.  [x] Yes [] No

## 2023-06-01 NOTE — DISCHARGE SUMMARY
Physician Discharge Summary     Patient ID:  Gerhardt Bonito  [id number]  65 y.o.  1960    Admit date: 5/22/2023    Discharge date and time: 6/1/23    Admission Diagnoses:   Patient Active Problem List   Diagnosis    CAD (coronary artery disease)    Primary hypertension    Gastro-esophageal reflux disease without esophagitis    COPD (chronic obstructive pulmonary disease) (HCC)    Chest pain    Unspecified atrial fibrillation (HCC)    Malignant neoplasm of lung (HCC)    Acute respiratory failure with hypoxia (HCC)    Hyperlipidemia, unspecified    PAF (paroxysmal atrial fibrillation) (HCC)    Pleural effusion on right    Obstructive sleep apnea    Former smoker    Chronic diastolic (congestive) heart failure (HCC)    Acute renal failure (HCC)    Hyperkalemia    Ischemic cardiomyopathy    Squamous cell carcinoma of right lung (HCC)    Morbid obesity ([address]    Palliative care by specialist    Acute renal failure (ARF) ([address]    Bacteremia    Shock ([address]       Discharge Diagnoses:   Patient Active Problem List   Diagnosis    CAD (coronary artery disease)    Primary hypertension    Gastro-esophageal reflux disease without esophagitis    COPD (chronic obstructive pulmonary disease) (HCC)    Chest pain    Unspecified atrial fibrillation ([address]    Malignant neoplasm of lung (HCC)    Acute respiratory failure with hypoxia (HCC)    Hyperlipidemia, unspecified    PAF (paroxysmal atrial fibrillation) (HCC)    Pleural effusion on right    Obstructive sleep apnea    Former smoker    Chronic diastolic (congestive) heart failure (HCC)    Acute renal failure (HCC)    Hyperkalemia    Ischemic cardiomyopathy    Squamous cell carcinoma of right lung (HCC)    Morbid obesity ([address]    Palliative care by specialist    Acute renal failure (ARF) ([address]    Bacteremia    Shock ([address]       Consults: cardiology, pulmonary/intensive care, ID, nephrology, hematology/oncology, and CTS surgery    HPI: 64 y/o WM w lung CA readmitted w recurrent ARF

## 2023-06-01 NOTE — DISCHARGE INSTRUCTIONS
[facility]  [address] [facility], [address], [address]  ([address])  [address] [facility] [address]  [place], [address] [employer] [place]  Phone [phone]   Fax [phone]        Fabrice Macdonald. Maryuri Franco MD, MD Tressa Reynoso MD Linn Chasten, MD Munir P. Sharleen Miners, MD Jenet Bays, RN, CNP      Rebeca Cano RN, CNS      MD Jay Chavez MD     STANDING ORDERS (ID Protocol)     Visiting nurses are to write the Primary Care Physician and their own call back number on all laboratory requisition forms. Abnormal lab values are called to the physician by the nurse and NOT by the laboratory. Fax all labs to the office in a timely manner, during office hours. All faxes should include nurses name and call back number. Vascular Access Devices or VADs (TLC, PICC, Midline, etc) will be replaced as necessary. Draw all blood work from VADs, except for drug levels. If unable to access a VAD, insert a peripheral catheter temporarily. Contact the Primary Care Physician or [facility] office for surgical referral.  Use tPA (Finis Relic) as per agency protocol to restore patency of VAD. Remove VAD upon completion of IV antibiotics, unless otherwise specified by the ordering physician. If VAD cannot be removed, schedule appointment at office for removal.  Notify ordering physician or office if patient requires admission to the hospital with reason for admission. Discontinue all blood work upon completion of IV antibiotics, unless otherwise specified by ordering physician. Notify ordering physician if the patient does not receive the scheduled antibiotic for 24 hours or more. The Pharmacy and [address] [place] [address] may adjust the timing of the infusion and blood work to accommodate the patients home care conditions.   When PICC or VAD is

## 2023-06-01 NOTE — DISCHARGE INSTR - COC
Continuity of Care Form    Patient Name: Chauncey Tanner   :  1960  MRN:  [id number]    Admit date:  2023  Discharge date:  ***    Code Status Order: Full Code   Advance Directives:     Admitting Physician:  Desi Louis MD  PCP: Desi Louis MD    Discharging Nurse: [facility] Unit/Room#: 1612/1171-S  Discharging Unit Phone Number: ***    Emergency Contact:   Extended Emergency Contact Information  Primary Emergency Contact: Daphnie Rivera  Address: [address] [place] [address] Phone: [phone]  Mobile Phone: [phone]  Relation: Brother/Sister    Past Surgical History:  Past Surgical History:   Procedure Laterality Date    BRONCHOSCOPY N/A 2020    BRONCHOSCOPY  NAVIGATIONAL performed by Hailey Salamanca DO at [address] [place] [address]  2020    BRONCHOSCOPY/TRANSBRONCHIAL NEEDLE BIOPSY performed by Hailey Salamanca DO at [address] [place] [address]  2020    BRONCHOSCOPY BIOPSY BRONCHUS performed by Hailey Salamanca DO at [address] [place] [address]  2020    BRONCHOSCOPY BRUSHINGS performed by Hailey Salamanca DO at [address] [place] [address]  2020    BRONCHOSCOPY W/EBUS FNA performed by Hailey Salamanca DO at [address] [place] [address]  2020    BRONCHOSCOPY ALVEOLAR LAVAGE performed by Hailey Salamanca DO at [place] [address]  2021    successful   Dr Nubia Eller  2014    3.5/18 Resolute to Mid-Cx    CORONARY ARTERY BYPASS GRAFT  2007    DIAGNOSTIC CARDIAC CATH LAB PROCEDURE  2007    ECHO COMPL W DOP COLOR FLOW  2012         PORT SURGERY N/A 2022    MEDI PORT INSERTION performed by Randolph Stevens MD at [facility] N/A 2023    PORT REMOVAL performed by Lyda Canavan, MD at [address] [place] [address]    TRANSESOPHAGEAL ECHOCARDIOGRAM  2020    julio césar

## 2023-06-02 VITALS
BODY MASS INDEX: 41.75 KG/M2 | RESPIRATION RATE: 18 BRPM | DIASTOLIC BLOOD PRESSURE: 68 MMHG | TEMPERATURE: 97.5 F | SYSTOLIC BLOOD PRESSURE: 118 MMHG | HEIGHT: 71 IN | OXYGEN SATURATION: 96 % | WEIGHT: 298.2 LBS | HEART RATE: 97 BPM

## 2023-06-02 PROBLEM — Z71.89 GOALS OF CARE, COUNSELING/DISCUSSION: Status: ACTIVE | Noted: 2023-06-02

## 2023-06-02 LAB
ANISOCYTOSIS: ABNORMAL
BASOPHILS # BLD: 0 E9/L (ref 0–0.2)
BASOPHILS NFR BLD: 0.3 % (ref 0–2)
EOSINOPHIL # BLD: 0.37 E9/L (ref 0.05–0.5)
EOSINOPHIL NFR BLD: 1.7 % (ref 0–6)
ERYTHROCYTE [DISTWIDTH] IN BLOOD BY AUTOMATED COUNT: 16 FL (ref 11.5–15)
HCT VFR BLD AUTO: 28.4 % (ref 37–54)
HGB BLD-MCNC: 8.5 G/DL (ref 12.5–16.5)
LYMPHOCYTES # BLD: 1.31 E9/L (ref 1.5–4)
LYMPHOCYTES NFR BLD: 6.1 % (ref 20–42)
MAGNESIUM SERPL-MCNC: 2.1 MG/DL (ref 1.6–2.6)
MCH RBC QN AUTO: 28.7 PG (ref 26–35)
MCHC RBC AUTO-ENTMCNC: 29.9 % (ref 32–34.5)
MCV RBC AUTO: 95.9 FL (ref 80–99.9)
METER GLUCOSE: 153 MG/DL (ref 74–99)
METER GLUCOSE: 211 MG/DL (ref 74–99)
MONOCYTES # BLD: 0.66 E9/L (ref 0.1–0.95)
MONOCYTES NFR BLD: 2.6 % (ref 2–12)
NEUTROPHILS # BLD: 19.71 E9/L (ref 1.8–7.3)
NEUTS SEG NFR BLD: 89.6 % (ref 43–80)
OVALOCYTES: ABNORMAL
PHOSPHATE SERPL-MCNC: 3.5 MG/DL (ref 2.5–4.5)
PLATELET # BLD AUTO: 203 E9/L (ref 130–450)
PMV BLD AUTO: 11.8 FL (ref 7–12)
POIKILOCYTES: ABNORMAL
POLYCHROMASIA: ABNORMAL
RBC # BLD AUTO: 2.96 E12/L (ref 3.8–5.8)
WBC # BLD: 21.9 E9/L (ref 4.5–11.5)

## 2023-06-02 PROCEDURE — 84100 ASSAY OF PHOSPHORUS: CPT

## 2023-06-02 PROCEDURE — S5553 INSULIN LONG ACTING 5 U: HCPCS | Performed by: FAMILY MEDICINE

## 2023-06-02 PROCEDURE — 6360000002 HC RX W HCPCS: Performed by: FAMILY MEDICINE

## 2023-06-02 PROCEDURE — 85025 COMPLETE CBC W/AUTO DIFF WBC: CPT

## 2023-06-02 PROCEDURE — 6360000002 HC RX W HCPCS: Performed by: NURSE PRACTITIONER

## 2023-06-02 PROCEDURE — 2140000000 HC CCU INTERMEDIATE R&B

## 2023-06-02 PROCEDURE — 6370000000 HC RX 637 (ALT 250 FOR IP): Performed by: NURSE PRACTITIONER

## 2023-06-02 PROCEDURE — 6360000002 HC RX W HCPCS: Performed by: INTERNAL MEDICINE

## 2023-06-02 PROCEDURE — 2700000000 HC OXYGEN THERAPY PER DAY

## 2023-06-02 PROCEDURE — 94640 AIRWAY INHALATION TREATMENT: CPT

## 2023-06-02 PROCEDURE — 6370000000 HC RX 637 (ALT 250 FOR IP)

## 2023-06-02 PROCEDURE — 83735 ASSAY OF MAGNESIUM: CPT

## 2023-06-02 PROCEDURE — 82962 GLUCOSE BLOOD TEST: CPT

## 2023-06-02 PROCEDURE — 6370000000 HC RX 637 (ALT 250 FOR IP): Performed by: FAMILY MEDICINE

## 2023-06-02 PROCEDURE — 6370000000 HC RX 637 (ALT 250 FOR IP): Performed by: INTERNAL MEDICINE

## 2023-06-02 PROCEDURE — 36415 COLL VENOUS BLD VENIPUNCTURE: CPT

## 2023-06-02 PROCEDURE — 99231 SBSQ HOSP IP/OBS SF/LOW 25: CPT | Performed by: CLINICAL NURSE SPECIALIST

## 2023-06-02 PROCEDURE — 2580000003 HC RX 258: Performed by: INTERNAL MEDICINE

## 2023-06-02 RX ORDER — HYDROCODONE BITARTRATE AND ACETAMINOPHEN 5; 325 MG/1; MG/1
1 TABLET ORAL EVERY 6 HOURS PRN
Qty: 56 TABLET | Refills: 0 | Status: SHIPPED | OUTPATIENT
Start: 2023-06-02 | End: 2023-06-16

## 2023-06-02 RX ORDER — PREGABALIN 75 MG/1
75 CAPSULE ORAL 3 TIMES DAILY
Qty: 45 CAPSULE | Refills: 0 | Status: SHIPPED | OUTPATIENT
Start: 2023-06-02 | End: 2023-06-17

## 2023-06-02 RX ADMIN — DIPHENHYDRAMINE HYDROCHLORIDE 25 MG: 25 TABLET ORAL at 09:15

## 2023-06-02 RX ADMIN — SODIUM ZIRCONIUM CYCLOSILICATE 10 G: 10 POWDER, FOR SUSPENSION ORAL at 09:21

## 2023-06-02 RX ADMIN — METOPROLOL SUCCINATE 125 MG: 100 TABLET, EXTENDED RELEASE ORAL at 21:18

## 2023-06-02 RX ADMIN — DEXAMETHASONE SODIUM PHOSPHATE 10 MG: 4 INJECTION, SOLUTION INTRAMUSCULAR; INTRAVENOUS at 09:16

## 2023-06-02 RX ADMIN — ROSUVASTATIN CALCIUM 10 MG: 5 TABLET, FILM COATED ORAL at 09:16

## 2023-06-02 RX ADMIN — HYDROCODONE BITARTRATE AND ACETAMINOPHEN 1 TABLET: 5; 325 TABLET ORAL at 16:51

## 2023-06-02 RX ADMIN — BUDESONIDE 500 MCG: 0.25 SUSPENSION RESPIRATORY (INHALATION) at 19:28

## 2023-06-02 RX ADMIN — GUAIFENESIN 400 MG: 400 TABLET ORAL at 21:06

## 2023-06-02 RX ADMIN — IPRATROPIUM BROMIDE AND ALBUTEROL SULFATE 3 ML: .5; 2.5 SOLUTION RESPIRATORY (INHALATION) at 19:28

## 2023-06-02 RX ADMIN — CEFAZOLIN 2000 MG: 2 INJECTION, POWDER, FOR SOLUTION INTRAMUSCULAR; INTRAVENOUS at 04:32

## 2023-06-02 RX ADMIN — INSULIN GLARGINE-YFGN 10 UNITS: 100 INJECTION, SOLUTION SUBCUTANEOUS at 21:07

## 2023-06-02 RX ADMIN — PREGABALIN 75 MG: 25 CAPSULE ORAL at 09:14

## 2023-06-02 RX ADMIN — HYDROCODONE BITARTRATE AND ACETAMINOPHEN 1 TABLET: 5; 325 TABLET ORAL at 03:45

## 2023-06-02 RX ADMIN — PREGABALIN 75 MG: 25 CAPSULE ORAL at 21:06

## 2023-06-02 RX ADMIN — IPRATROPIUM BROMIDE AND ALBUTEROL SULFATE 3 ML: .5; 2.5 SOLUTION RESPIRATORY (INHALATION) at 15:35

## 2023-06-02 RX ADMIN — GUAIFENESIN 400 MG: 400 TABLET ORAL at 13:42

## 2023-06-02 RX ADMIN — CEFAZOLIN 2000 MG: 2 INJECTION, POWDER, FOR SOLUTION INTRAMUSCULAR; INTRAVENOUS at 21:07

## 2023-06-02 RX ADMIN — COLESTIPOL HYDROCHLORIDE 1 G: 1 TABLET, FILM COATED ORAL at 16:51

## 2023-06-02 RX ADMIN — COLESTIPOL HYDROCHLORIDE 1 G: 1 TABLET, FILM COATED ORAL at 09:17

## 2023-06-02 RX ADMIN — ARFORMOTEROL TARTRATE 15 MCG: 15 SOLUTION RESPIRATORY (INHALATION) at 19:28

## 2023-06-02 RX ADMIN — ACETAMINOPHEN 650 MG: 325 TABLET ORAL at 09:36

## 2023-06-02 RX ADMIN — Medication 600 MG: at 21:06

## 2023-06-02 RX ADMIN — ARFORMOTEROL TARTRATE 15 MCG: 15 SOLUTION RESPIRATORY (INHALATION) at 08:49

## 2023-06-02 RX ADMIN — CYCLOBENZAPRINE 5 MG: 10 TABLET, FILM COATED ORAL at 09:19

## 2023-06-02 RX ADMIN — PREGABALIN 75 MG: 25 CAPSULE ORAL at 13:42

## 2023-06-02 RX ADMIN — GUAIFENESIN 400 MG: 400 TABLET ORAL at 16:51

## 2023-06-02 RX ADMIN — APIXABAN 5 MG: 5 TABLET, FILM COATED ORAL at 21:06

## 2023-06-02 RX ADMIN — APIXABAN 5 MG: 5 TABLET, FILM COATED ORAL at 09:16

## 2023-06-02 RX ADMIN — SODIUM CHLORIDE, PRESERVATIVE FREE 300 UNITS: 5 INJECTION INTRAVENOUS at 09:13

## 2023-06-02 RX ADMIN — IPRATROPIUM BROMIDE AND ALBUTEROL SULFATE 3 ML: .5; 2.5 SOLUTION RESPIRATORY (INHALATION) at 12:43

## 2023-06-02 RX ADMIN — INSULIN LISPRO 2 UNITS: 100 INJECTION, SOLUTION INTRAVENOUS; SUBCUTANEOUS at 16:51

## 2023-06-02 RX ADMIN — Medication 600 MG: at 13:41

## 2023-06-02 RX ADMIN — IPRATROPIUM BROMIDE AND ALBUTEROL SULFATE 3 ML: .5; 2.5 SOLUTION RESPIRATORY (INHALATION) at 08:48

## 2023-06-02 RX ADMIN — SODIUM CHLORIDE, PRESERVATIVE FREE 10 ML: 5 INJECTION INTRAVENOUS at 09:22

## 2023-06-02 RX ADMIN — GUAIFENESIN 400 MG: 400 TABLET ORAL at 09:16

## 2023-06-02 RX ADMIN — METOPROLOL SUCCINATE 100 MG: 100 TABLET, EXTENDED RELEASE ORAL at 09:14

## 2023-06-02 RX ADMIN — Medication 600 MG: at 09:14

## 2023-06-02 RX ADMIN — TAMSULOSIN HYDROCHLORIDE 0.4 MG: 0.4 CAPSULE ORAL at 09:15

## 2023-06-02 RX ADMIN — ESCITALOPRAM OXALATE 10 MG: 10 TABLET ORAL at 09:16

## 2023-06-02 RX ADMIN — BUDESONIDE 500 MCG: 0.25 SUSPENSION RESPIRATORY (INHALATION) at 08:49

## 2023-06-02 RX ADMIN — CEFAZOLIN 2000 MG: 2 INJECTION, POWDER, FOR SOLUTION INTRAMUSCULAR; INTRAVENOUS at 10:36

## 2023-06-02 ASSESSMENT — PAIN SCALES - GENERAL
PAINLEVEL_OUTOF10: 5
PAINLEVEL_OUTOF10: 0
PAINLEVEL_OUTOF10: 6
PAINLEVEL_OUTOF10: 0
PAINLEVEL_OUTOF10: 3
PAINLEVEL_OUTOF10: 0
PAINLEVEL_OUTOF10: 6

## 2023-06-02 ASSESSMENT — PAIN DESCRIPTION - ORIENTATION: ORIENTATION: LEFT

## 2023-06-02 ASSESSMENT — PAIN DESCRIPTION - LOCATION
LOCATION: HEAD
LOCATION: HEAD;NECK

## 2023-06-02 ASSESSMENT — PAIN DESCRIPTION - DESCRIPTORS
DESCRIPTORS: ACHING

## 2023-06-02 NOTE — CARE COORDINATION
Plan is for patient to discharge home today. Spoke with Danita Davila at [facility], she confirmed there is no cost to the patient for IV abx and will deliver everything to the patient later today. Spoke with Abdiel at [facility] care, start of care will be 6/3; need home care orders for skilled nursing for IV abx. Met with patient at bedside and provided update regarding discharge. Plan is for patient to receive his last dose of IV abx at 7:30p tonight and family will transport home; all questions answered. Confirmed with patient, he is on 2L NC at baseline, has a concentrator and portable tanks at home [facility]). Perfect serve message sent to attending; checking for discharge.      Electronically signed by BISI Nunes on 6/2/2023 at 11:43 AM

## 2023-06-02 NOTE — CARE COORDINATION
Called ID office; left message regarding home care order needed for administration of IV abx.     Electronically signed by Gerardo Vasquez on 6/2/2023 at 2:00 PM

## 2023-06-02 NOTE — PROGRESS NOTES
[address] [place] [address] [facility]  [facility]  Progress Note    SUBJECTIVE:  Chief Complaint   Patient presents with    Abnormal Lab     Sent in by oncology for Cr 9.37     Patient is tolerating medications. No reported adverse drug reactions. No nausea, vomiting, diarrhea. Laying in bed, awake, has a headache and states his neuropathy is bothering him   Asking when he can have a mediport put back in again  No fevers overnight     Review of systems:  As stated above in the chief complaint, otherwise negative.     Medications:  Scheduled Meds:   magnesium sulfate  2,000 mg IntraVENous Once    magnesium oxide  600 mg Oral TID    vancomycin  1,250 mg IntraVENous Q18H    arformoterol tartrate  15 mcg Nebulization BID    pregabalin  75 mg Oral TID    colestipol  1 g Oral BID WC    sodium zirconium cyclosilicate  10 g Oral Daily    diphenhydrAMINE  25 mg Oral See Admin Instructions    dexamethasone  10 mg IntraVENous See Admin Instructions    cefepime  2,000 mg IntraVENous Q8H    apixaban  5 mg Oral BID    [Held by provider] aspirin  81 mg Oral Daily    budesonide  500 mcg Nebulization BID    escitalopram  10 mg Oral Daily    fluticasone  1 spray Each Nostril Daily    guaiFENesin  400 mg Oral 4x daily    ipratropium 0.5 mg-albuterol 2.5 mg  3 mL Inhalation 4x daily    tamsulosin  0.4 mg Oral Daily    vitamin D  50,000 Units Oral Weekly    insulin glargine  10 Units SubCUTAneous Nightly    insulin lispro  0-8 Units SubCUTAneous TID WC    insulin lispro  0-4 Units SubCUTAneous Nightly    metoprolol succinate  100 mg Oral Daily    metoprolol succinate  125 mg Oral Nightly    rosuvastatin  10 mg Oral QAM    sodium chloride flush  5-40 mL IntraVENous 2 times per day     Continuous Infusions:   sodium chloride      sodium chloride      dextrose      sodium chloride       PRN Meds:HYDROcodone 5 mg - acetaminophen, perflutren lipid microspheres, perflutren lipid microspheres, sodium chloride, sodium chloride, glucose,
[address] [place] [address] [facility]  KELVINIDA  Progress Note    SUBJECTIVE:  Chief Complaint   Patient presents with    Abnormal Lab     Sent in by oncology for Cr 9.37     Patient is tolerating medications. No reported adverse drug reactions. No nausea, vomiting, diarrhea. Laying in bed, awake, has a headache from right eye to ear that is throbbing   No fevers overnight     Review of systems:  As stated above in the chief complaint, otherwise negative.     Medications:  Scheduled Meds:   sodium chloride flush  5-40 mL IntraVENous 2 times per day    heparin flush  3 mL IntraVENous 2 times per day    ceFAZolin  2,000 mg IntraVENous Q8H    sodium zirconium cyclosilicate  10 g Oral Q MWF    magnesium oxide  600 mg Oral TID    arformoterol tartrate  15 mcg Nebulization BID    pregabalin  75 mg Oral TID    colestipol  1 g Oral BID WC    diphenhydrAMINE  25 mg Oral See Admin Instructions    dexamethasone  10 mg IntraVENous See Admin Instructions    apixaban  5 mg Oral BID    [Held by provider] aspirin  81 mg Oral Daily    budesonide  500 mcg Nebulization BID    escitalopram  10 mg Oral Daily    fluticasone  1 spray Each Nostril Daily    guaiFENesin  400 mg Oral 4x daily    ipratropium 0.5 mg-albuterol 2.5 mg  3 mL Inhalation 4x daily    tamsulosin  0.4 mg Oral Daily    vitamin D  50,000 Units Oral Weekly    insulin glargine  10 Units SubCUTAneous Nightly    insulin lispro  0-8 Units SubCUTAneous TID WC    insulin lispro  0-4 Units SubCUTAneous Nightly    metoprolol succinate  100 mg Oral Daily    metoprolol succinate  125 mg Oral Nightly    rosuvastatin  10 mg Oral QAM     Continuous Infusions:   sodium chloride      sodium chloride      sodium chloride      dextrose       PRN Meds:sodium chloride flush, sodium chloride, heparin flush, cyclobenzaprine, HYDROcodone 5 mg - acetaminophen, perflutren lipid microspheres, perflutren lipid microspheres, sodium chloride, sodium chloride, glucose, dextrose bolus **OR**
[address] [place] [address] [facility]  KELVINIDA  Progress Note    SUBJECTIVE:  Chief Complaint   Patient presents with    Abnormal Lab     Sent in by oncology for Cr 9.37     Patient is tolerating medications. No reported adverse drug reactions. No nausea, vomiting, diarrhea. Laying in bed, resting  S/p OR this AM for mediport removal  No fevers overnight     Review of systems:  As stated above in the chief complaint, otherwise negative.     Medications:  Scheduled Meds:   magnesium oxide  600 mg Oral BID    vancomycin  1,250 mg IntraVENous Q18H    arformoterol tartrate  15 mcg Nebulization BID    pregabalin  75 mg Oral TID    colestipol  1 g Oral BID WC    sodium zirconium cyclosilicate  10 g Oral Daily    acetaminophen  650 mg Oral See Admin Instructions    diphenhydrAMINE  25 mg Oral See Admin Instructions    dexamethasone  10 mg IntraVENous See Admin Instructions    cefepime  2,000 mg IntraVENous Q8H    [Held by provider] apixaban  5 mg Oral BID    [Held by provider] aspirin  81 mg Oral Daily    budesonide  500 mcg Nebulization BID    escitalopram  10 mg Oral Daily    fluticasone  1 spray Each Nostril Daily    guaiFENesin  400 mg Oral 4x daily    ipratropium 0.5 mg-albuterol 2.5 mg  3 mL Inhalation 4x daily    tamsulosin  0.4 mg Oral Daily    vitamin D  50,000 Units Oral Weekly    insulin glargine  10 Units SubCUTAneous Nightly    insulin lispro  0-8 Units SubCUTAneous TID     insulin lispro  0-4 Units SubCUTAneous Nightly    metoprolol succinate  100 mg Oral Daily    metoprolol succinate  125 mg Oral Nightly    rosuvastatin  10 mg Oral QAM    sodium chloride flush  5-40 mL IntraVENous 2 times per day     Continuous Infusions:   sodium chloride      sodium chloride      dextrose      sodium chloride       PRN Meds:HYDROcodone 5 mg - acetaminophen, perflutren lipid microspheres, perflutren lipid microspheres, sodium chloride, sodium chloride, glucose, dextrose bolus **OR** dextrose bolus, glucagon (rDNA),
Abraham Do M.D.,[facility] FTRISHOFRANK, Rachel Cuba M.D. Perez Mendez M.D. Albin Flores D.O. Daily Pulmonary Progress Note    Patient:  Leatha Kaur 65 y.o. male MRN: [id number]     Date of Service: 6/1/2023      Synopsis     We are following patient for ANTONIO, COPD, recent pneumonia, lung cancer, sepsis    \"CC\" sleep apnea    Code status: Full code      Subjective      Patient was seen and examined lying in bed. He is currently on 2 L oxygen with a saturation of 97%. He still does not have his CPAP from home, but he is being discharged. He is not a surgical candidate per CTS for lung resection or for thrombus removal. He will be treated at home with IV Ancef for bacteremia. Review of Systems:  Constitutional: Denies fever, weight loss, night sweats, and fatigue  Skin: Denies pigmentation, dark lesions, and rashes   HEENT: Denies hearing loss, tinnitus, ear drainage, epistaxis, sore throat, and hoarseness. Cardiovascular: Denies palpitations, chest pain, and chest pressure. Respiratory: Denies cough, dyspnea at rest, hemoptysis, apnea, and choking.   Gastrointestinal: Denies nausea, vomiting, poor appetite, diarrhea, heartburn or reflux  Genitourinary: Denies dysuria, frequency, urgency or hematuria  Musculoskeletal: Denies myalgias, muscle weakness, and bone pain  Neurological: Denies dizziness, vertigo, headache, and focal weakness  Psychological: Denies anxiety and depression  Endocrine: Denies heat intolerance and cold intolerance  Hematopoietic/Lymphatic: Denies bleeding problems and blood transfusions    24-hour events:  none    Objective   Vitals: BP (!) 88/56   Pulse 87   Temp 98.2 °F (36.8 °C) (Oral)   Resp 18   Ht 5' 11\" (1.803 m)   Wt 298 lb 14.4 oz (135.6 kg)   SpO2 97%   BMI 41.69 kg/m²     I/O:    Intake/Output Summary (Last 24 hours) at 6/1/2023 1359  Last data filed at 6/1/2023 1304  Gross per 24 hour   Intake 260 ml   Output 0 ml
[facility] MD Alfa Gonzales MD Clarissa Brandy, MD Casimer Sear, CHELLE Reis, CHUCK Barber CNP  Progress Note    6/2/2023    SUBJECTIVE:   5/24: Seen in the ICU. No acute complaints. He is on room air. Appetite is good. He denies dyspnea, chest pain, or palpitations. 1250 cc UOP.    5/35: Seen while sitting up in bed, eating breakfast. Feeling well today. No acute complains. Denies chest pain, dyspnea, or palpitations. He is on room air. UOP stable. 5/26 charts reviewed comfortable    6/2: Seen while laying in bed. Possible discharge later this afternoon. No acute complaints. Appetite is good. Denies chest pain, dyspnea, or palpitations. PROBLEM LIST:    Principal Problem:    Acute renal failure (ARF) (HCC)  Active Problems:    Bacteremia    Goals of care, counseling/discussion  Resolved Problems:    * No resolved hospital problems. *         DIET:    ADULT ORAL NUTRITION SUPPLEMENT; Breakfast, Dinner; Diabetic Oral Supplement  ADULT ORAL NUTRITION SUPPLEMENT; Lunch; Other Oral Supplement; Gelatein  ADULT DIET; Regular; 3 carb choices (45 gm/meal); Low Fat/Low Chol/High Fiber/KEVIN; Low Sodium (2 gm); Low Potassium (Less than 3000 mg/day); Low Phosphorus (Less than 1000 mg);  Less than 60 gm     MEDS (scheduled):    sodium chloride flush  5-40 mL IntraVENous 2 times per day    heparin flush  3 mL IntraVENous 2 times per day    ceFAZolin  2,000 mg IntraVENous Q8H    sodium zirconium cyclosilicate  10 g Oral Q MWF    magnesium oxide  600 mg Oral TID    arformoterol tartrate  15 mcg Nebulization BID    pregabalin  75 mg Oral TID    colestipol  1 g Oral BID WC    apixaban  5 mg Oral BID    [Held by provider] aspirin  81 mg Oral Daily    budesonide  500 mcg Nebulization BID    escitalopram  10 mg Oral Daily    fluticasone  1 spray Each Nostril Daily    guaiFENesin  400 mg Oral 4x daily    ipratropium 0.5 mg-albuterol 2.5 mg  3 mL Inhalation 4x daily
Call to Dr. Brooke Ward to confirm he is ok with resuming Eliquis. Per Dr. Brooke Ward- ok with resuming Eliquis from his point of view.     Thomas Rivera RN
Cardiology reconsulted for Dr. Mary Kate Henao. Perfect serve to Bautista Kris to notify of new consult.     Puja Mccullough RN'
Caroline Martinez M.D.,[facility]  Bebo Helm D.O., F.A.C.O.I., Edis Thompson M.D. Nolan Salcedo M.D. Teresa Johnson D.O. Daily Pulmonary Progress Note    Patient:  Gerhardt Bonito 65 y.o. male MRN: [id number]     Date of Service: 6/2/2023      Synopsis     We are following patient for ANTONIO, COPD, recent pneumonia, lung cancer, sepsis    \"CC\" sleep apnea    Code status: Full code      Subjective      Patient was seen and examined lying in bed. He is currently on 2 L oxygen with a saturation of 97%. He got his PICC line placed and is anticipating discharge. He will be treated at home with IV Ancef for bacteremia he will resume CPAP therapy at home. Review of Systems:  Constitutional: Denies fever, weight loss, night sweats, and fatigue  Skin: Denies pigmentation, dark lesions, and rashes   HEENT: Denies hearing loss, tinnitus, ear drainage, epistaxis, sore throat, and hoarseness. Cardiovascular: Denies palpitations, chest pain, and chest pressure. Respiratory: Denies cough, dyspnea at rest, hemoptysis, apnea, and choking.   Gastrointestinal: Denies nausea, vomiting, poor appetite, diarrhea, heartburn or reflux  Genitourinary: Denies dysuria, frequency, urgency or hematuria  Musculoskeletal: Denies myalgias, muscle weakness, and bone pain  Neurological: Denies dizziness, vertigo, headache, and focal weakness  Psychological: Denies anxiety and depression  Endocrine: Denies heat intolerance and cold intolerance  Hematopoietic/Lymphatic: Denies bleeding problems and blood transfusions    24-hour events:  none    Objective   Vitals: /84   Pulse 76   Temp 98.1 °F (36.7 °C) (Oral)   Resp 19   Ht 5' 11\" (1.803 m)   Wt 298 lb 3.2 oz (135.3 kg)   SpO2 98%   BMI 41.59 kg/m²     I/O:    Intake/Output Summary (Last 24 hours) at 6/2/2023 1310  Last data filed at 6/1/2023 1440  Gross per 24 hour   Intake 360 ml   Output --   Net 360 ml           CURRENT MEDS :  Scheduled Meds:   sodium
Chg double lumen picc Placement 6/1/2023    Product number: [id number]   Lot Number: [id number]      Ultrasound: yes   Right Brachial vein:                Upper Arm Circumference: (CM) 38cm    Size:(FR)/GUAGE 5.5fr/42cm    Exposed Length: (CM) 5cm    Internal Length: (CM) 37cm   Cut: (CM) 13cm   Vein Measurement: 0.62cm    Ken Cisneros RN  6/1/2023  2:01 PM  Chest xray, a-fib
Comprehensive Nutrition Assessment    Type and Reason for Visit:  Initial (LOS)    Nutrition Recommendations/Plan:   Recommend and start Glucerna supplement BID and Gelatein supplement daily to help meet increased nutritional needs. Malnutrition Assessment:  Malnutrition Status: At risk for malnutrition (Comment) (05/30/23 1036)    Context:  Acute Illness     Findings of the 6 clinical characteristics of malnutrition:  Energy Intake:  75% or less of estimated energy requirements for 7 or more days  Weight Loss:  Unable to assess (d/t possible fluid shifts ; hx of CHF)     Body Fat Loss:  Unable to assess     Muscle Mass Loss:  Unable to assess    Fluid Accumulation:  No significant fluid accumulation     Strength:  Not Performed    Nutrition Assessment:    Patients po intake has been a little sporadic, averaging 50-75% of meals served ; adm w/ abnormal labs (hyperkalemia upon adm ; potassium WNL at this time) ; ATUL on CKD ; hx of lung CA (s/p SBRT ; noted recent chemotherapy) ; planned MAZIN ; noted staph bacteremia (s/p port removal on 5/29) ; hx of DM/COPD/CAD/CHF ; Hospice consulted ; will start ONS    Nutrition Related Findings:    I&Os WNL, 1+ edema, A&O x 4, active BS, rounded abd, loose stools, redness to buttocks, obesity ; Wound Type: Surgical Incision (Incision x 1)       Current Nutrition Intake & Therapies:    Average Meal Intake: 51-75%     Diet NPO Exceptions are: Sips of Water with Meds    Anthropometric Measures:  Height: 5' 11\" (180.3 cm)  Ideal Body Weight (IBW): 172 lbs (78 kg)    Admission Body Weight: 290 lb (131.5 kg) (5/24, bedscale)  Current Body Weight: 290 lb (131.5 kg) (5/24, bedscale ; adm weight), 168.6 % IBW.     Current BMI (kg/m2): 40.5  Usual Body Weight:  (UTO ; EMR shows past weights of 332# actual on 4/3/23 and 288# actual on 12/6/22)                       BMI Categories: Obese Class 3 (BMI 40.0 or greater)    Estimated Daily Nutrient Needs:  Energy Requirements Based On:
Elissa Stahl M.D.,[facility]  Emily Falk D.O., F.A.C.O.I., Ju Colbert M.D. Anita Ren M.D. Fabien Hicks D.O. Daily Pulmonary Progress Note    Patient:  Hoa Blancas 65 y.o. male MRN: [id number]     Date of Service: 5/29/2023      Synopsis     We are following patient for     \"CC\"     Code status:      Subjective      Patient was seen and examined. lying in bed asleep snoring loudly. Woke him up, he states he still feels sick and tired overall. He also complains of a headache that has been going on for 4 days. His port was removed this a.m. secondary to bacteremia. Review of Systems:  Constitutional: positive for fatigue and weakness  Skin: Denies pigmentation, dark lesions, and rashes   HEENT: Denies hearing loss, tinnitus, ear drainage, epistaxis, sore throat, and hoarseness. Cardiovascular: Denies palpitations, chest pain, and chest pressure. Respiratory: Denies cough, dyspnea at rest, hemoptysis, apnea, and choking.   Gastrointestinal: Denies nausea, vomiting, poor appetite, diarrhea, heartburn or reflux  Genitourinary: Denies dysuria, frequency, urgency or hematuria  Musculoskeletal: Denies myalgias, muscle weakness, and bone pain  Neurological: Denies dizziness, vertigo, headache, and focal weakness  Psychological: Denies anxiety and depression  Endocrine: Denies heat intolerance and cold intolerance  Hematopoietic/Lymphatic: Denies bleeding problems and blood transfusions    24-hour events:      Objective   Vitals: /60   Pulse 90   Temp 98.2 °F (36.8 °C) (Oral)   Resp 18   Ht 5' 11\" (1.803 m)   Wt 291 lb 1 oz (132 kg)   SpO2 99%   BMI 40.59 kg/m²     I/O:    Intake/Output Summary (Last 24 hours) at 5/29/2023 0910  Last data filed at 5/29/2023 0300  Gross per 24 hour   Intake 450 ml   Output 436 ml   Net 14 ml                        CURRENT MEDS :  Scheduled Meds:   vancomycin  1,250 mg IntraVENous Q18H    sodium zirconium cyclosilicate  10 g Oral
GENERAL SURGERY  DAILY PROGRESS NOTE  5/29/2023  Chief Complaint   Patient presents with    Abnormal Lab     Sent in by oncology for Cr 9.37         Subjective:  S/p removal of mediport. States feeling fine postop. C/o headache but minimal pain at surgical site. Denies nausea, emesis. No fevers    Objective:  BP (!) 145/96   Pulse (!) 108   Temp 97.2 °F (36.2 °C) (Oral)   Resp 14   Ht 5' 11\" (1.803 m)   Wt 291 lb 1 oz (132 kg)   SpO2 98%   BMI 40.59 kg/m²     GENERAL:  Laying in bed, awake, alert, cooperative, no apparent distress  HEAD: Normocephalic, atraumatic  EYES: No sclera icterus, pupils equal  CHEST:  right anterior chest incision is c/d/i with dermabond. No hematoma or ecchymosis  LUNGS:  No increased work of breathing, on 3L O2  CARDIOVASCULAR:  regular rate  ABDOMEN:  Soft, non-tender, non-distended  EXTREMITIES: No edema or swelling  SKIN: Warm and dry, no rashes or lesions    Assessment/Plan:  65 y.o. male with staph epidermidis bacteremia s/p 5/29 removal of mediport    IV antibiotics per ID. On cefepime, vanc  Port was sent for culture  [facility] to resume Eliquis tomorrow, 5/30    Please call with questions, problems or concerns.    If patient should need port in the future, can be done as an outpatient when cleared by ID for new access    Electronically signed by Pippa Freire DO on 5/29/2023 at 6:48 AM
HOSPICE [facility]     Liaison Information Visit Note              Patient Name: Oseas Pepper      Code Status Order: Full Code   Allergies:  Zocor [simvastatin - high dose]    Meeting held with patient at bedside. Hospice consult for information only noted in Palliative and CM note. Chart reviewed. Met patient at bedside. Information provided about Hospice philosophy and services. The hospice benefit and philosophy were explained including that hospice is end of life care in which, per Medicare, a patient has a terminal diagnosis that life expectancy would be 6 months or less. Hospice care is a service that is covered by most insurance plans. Explained hospice services at home, at [facility] with room/board private pay unless patient has Medicaid and the [facility]. Explained that once in hospice care, all aggressive treatments would be stopped and allow nature to takes its course with focus on comfort care for the patient. The patient stated \"your not what I want\". \"I'm not ready for your services\". The patient stated his plan after hospitalization is to go home with some assistance at home such as \"home jaylen care services\". Patient was appreciative for information. HOTV will sign off. Please re consult if need arises. Updates provided to bedside nurse. Discharge Plan:  Discharge Disposition; Information only consult. HOTV plan: Information only consult. HOTV to sign off. Please re consult if need arises. [facility] [phone] with any questions. Patient not currently under the care of hospice.     Electronically signed by Hosey Hashimoto, RN on 5/30/2023 at 5:58 PM
IV team notified of need for PICC line and that it is pending discharge.
LSW met with pt to follow up advance directive discussion. Pt requests forms be left with him to complete on his own. Reviewed forms with pt and verified next of kin listed is his sister Dolly Lanes. He is  with no children. Pt states his family is supportive and can assist as needed.
MAZIN scheduled for 230pm.  NPO effective now.
MRI cardiac is being canceled due to patient condition/body habitus- per RN and EP. RN will cancel order.
Message sent to Dr. Raeann Gonzalez via perfect serve to update him on home health care unable to see patient until Saturday. Plan for patient will discharge tomorrow after pm dose of antibiotic.    Yoana Cates RN
Met with pt and sister re: hospice consult. Per pt and sister they are interested in Hospice consult for information purposes at this time. They have no preference and are agreeable to [facility] for information on services and coverage.
New consult sent to cardiology.
New consult sent to palliative care.
Palliative Care Department  [phone]  Palliative Care Progress Note  Provider SIENNA Zapata CNP      PATIENT: Fam Arguello  : 1960  MRN: [id number]  ADMISSION DATE: 2023  7:54 PM  Referring Provider: Feroz Monaco MD    Palliative Medicine was consulted on hospital day 1 for assistance with Goals of care, Code Status Discussion, Family support, Symptom management     HPI:     Clinical Summary:Lm Mayfield is a 65 y.o. y/o male with a history of CAD, hypertension, COPD, atrial fibrillation on Eliquis, malignant neoplasm of lung on chemotherapy, CHF  who presented to [facility] on 2023 with abnormal labs. His potassium was 6.2, creatinine of 8.1 and BUN of 90. He was admitted to the ICU for further medical management. Patient now on telemetry. He was found to have positive blood cultures and had his Mediport removed on . Plan for MAZIN. Palliative consulted for goals of care, code status discussion, family support, and symptom management. ASSESSMENT/PLAN:     Pertinent Hospital Diagnoses     Acute kidney injury  Lung Cancer  Hyperkalemia  Bacteremia    Symptom management    Chemotherapy-induced neuropathy/headaches   -Norco 5/325 mg every 6 hours as needed   -lyrica 75 mg 3 times daily    -Start Flexeril 5 mg every 12 hours as needed    Nausea   -zofran 4 mg every 6 hours as needed    Palliative Care Encounter / Counseling Regarding Goals of Care  Please see detailed goals of care discussion as below  At this time, Fam Arguello, Does have capacity for medical decision-making. Capacity is time limited and situation/question specific  During encounter no surrogate medical decision-makers  Outcome of goals of care meeting: Continue with current medical management and CODE STATUS as a full code.   Code status Full Code  Advanced Directives: no POA or living will in [facility]  Surrogate/Legal NOK:  Nilson Mohan (sister) [phone]    Spiritual assessment: no
Palliative Care Department  [phone]  Palliative Care Progress Note  Provider SIENNA Hewitt - CNS      PATIENT: Deonna Liu  : 1960  MRN: [id number]  ADMISSION DATE: 2023  7:54 PM  Referring Provider: Babita Childress MD    Palliative Medicine was consulted on hospital day 1 for assistance with Goals of care, Code Status Discussion, Family support, Symptom management     HPI:     Clinical Summary:Lm Cantor is a 65 y.o. y/o male with a history of CAD, hypertension, COPD, atrial fibrillation on Eliquis, malignant neoplasm of lung on chemotherapy, CHF  who presented to [address] [place] [address] on 2023 with abnormal labs. His potassium was 6.2, creatinine of 8.1 and BUN of 90. He was admitted to the ICU for further medical management. Patient now on telemetry. He was found to have positive blood cultures and had his Mediport removed on . Plan for MAZIN. Palliative consulted for goals of care, code status discussion, family support, and symptom management. ASSESSMENT/PLAN:     Pertinent Hospital Diagnoses   Acute kidney injury  Lung Cancer  Hyperkalemia  Bacteremia    Symptom management    Chemotherapy-induced neuropathy/headaches   -Norco 5/325 mg every 6 hours as needed   -lyrica 75 mg 3 times daily    -Flexeril 5 mg every 12 hours as needed    Nausea   -Zofran 4 mg every 6 hours as needed    Palliative Care Encounter / Counseling Regarding Goals of Care  Please see detailed goals of care discussion as below  At this time, Deonna Liu, Does have capacity for medical decision-making. Capacity is time limited and situation/question specific  During encounter no surrogate medical decision-makers  Outcome of goals of care meeting:   Continue with current medical management  Continue full code.   Plan for discharge  to go home with [facility]   Code status Full Code  Advanced Directives: no POA or living will in [facility]  Surrogate/Legal NOK:  Brandi Stover (sister)
Palliative Care Department  [phone]  Palliative Care Progress Note  Provider SIENNA Dave CNP      PATIENT: Ankush Montes  : 1960  MRN: [id number]  ADMISSION DATE: 2023  7:54 PM  Referring Provider: Paolo Henderson MD    Palliative Medicine was consulted on hospital day 1 for assistance with Goals of care, Code Status Discussion, Family support, Symptom management     HPI:     Clinical Summary:Lm Prakash is a 65 y.o. y/o male with a history of CAD, hypertension, COPD, atrial fibrillation on Eliquis, malignant neoplasm of lung on chemotherapy, CHF  who presented to [facility] on 2023 with abnormal labs. His potassium was 6.2, creatinine of 8.1 and BUN of 90. He was admitted to the ICU for further medical management. Patient now on telemetry. He was found to have positive blood cultures and had his Mediport removed on . Plan for MAZIN. Palliative consulted for goals of care, code status discussion, family support, and symptom management. ASSESSMENT/PLAN:     Pertinent Hospital Diagnoses     Acute kidney injury  Lung Cancer  Hyperkalemia  Bacteremia    Symptom management    Chemotherapy-induced neuropathy/headaches   -Norco 5/325 mg every 6 hours as needed   -lyrica 75 mg 3 times daily    -Flexeril 5 mg every 12 hours as needed    Nausea   -zofran 4 mg every 6 hours as needed    Palliative Care Encounter / Counseling Regarding Goals of Care  Please see detailed goals of care discussion as below  At this time, Ankush Montes, Does have capacity for medical decision-making. Capacity is time limited and situation/question specific  During encounter no surrogate medical decision-makers  Outcome of goals of care meeting: Continue with current medical management and CODE STATUS as a full code.   Code status Full Code  Advanced Directives: no POA or living will in [facility]  Surrogate/Legal NOK:  Lillian Reece (sister) [phone]    Spiritual assessment: no spiritual
Patient explained that he does not wear the Bipap machine here at the hospital because it doesn't fit right or feel like his home unit. The patient did explain that he wears a home bipap but he does not wear any bipap here at night.
Patient states that he did no bring his home machine he says he is going home tomorrow so he doesn't wish to wear ours
Perfect serve to Nicki with Dr. Lucio Roger about canceling MRI order. Per Nicki he will reach out to Dr. Lucio Roger about canceling the order.     Maicol Durbin RN
Perfect serve to PITO Caceres, with Dr. Mayte De Oliveira to see if pt was ok to go to cardiac MRI. Patient had mentioned that Dr. Mayte De Oliveira said he would not be able to do the cardiac MRI due to atrial fibrillation. Ellie Lock NP, to reach out to Dr. Mayte De Oliveira to see if pt would be able to do a cardiac MRI. Ellie Lock at bedside to see if pt is able to hold his breath for the cardiac MRI. Pt only able to hold breath for 5 seconds. Nicki to update Dr. Mayte De Oliveira and plan for new testing.     Daren Harper RN
Pharmacy Consultation Note  (Antibiotic Dosing and Monitoring)    Initial consult date: 5/26/23  Consulting physician/provider: Dr. Ajay Smalls  Drug: Vancomycin  Indication: CLABSI/bloodstream infection (Staph epi)    Age/  Gender Height Weight IBW  Allergy Information   62 y.o./male 5' 11\" (180.3 cm) 290 lb (131.5 kg)     Ideal body weight: 75.3 kg (166 lb 0.1 oz)  Adjusted ideal body weight: 99.4 kg (219 lb 2.6 oz)   Zocor [simvastatin - high dose]      Renal Function:  Recent Labs     05/29/23  1050 05/30/23  0446   BUN 15  --    CREATININE 1.1 1.2         Intake/Output Summary (Last 24 hours) at 5/30/2023 1615  Last data filed at 5/30/2023 1411  Gross per 24 hour   Intake 50 ml   Output --   Net 50 ml         Vancomycin Monitoring:  Trough:    Recent Labs     05/28/23  0900 05/28/23  1836   VANCOTROUGH 20.8* 13.6       Random:  No results for input(s): VANCORANDOM in the last 72 hours. Recent Labs     05/27/23  2014   BLOODCULT2 24 Hours no growth          Historical Cultures:  Organism   Date Value Ref Range Status   05/23/2023 Staphylococcus epidermidis (A)  Final     Recent Labs     05/27/23  2013   BC 24 Hours no growth         Vancomycin Administration Times:  Recent vancomycin administrations                     vancomycin (VANCOCIN) 1,250 mg in sodium chloride 0.9 % 250 mL IVPB (mg) 1,250 mg New Bag 05/30/23 1322     1,250 mg New Bag 05/29/23 1934    vancomycin (VANCOCIN) 1,250 mg in sodium chloride 0.9 % 250 mL IVPB (mg) 1,250 mg New Bag 05/28/23 2105     1,250 mg New Bag 05/27/23 2316                  Assessment:  Patient is a 65 y.o. male who has been initiated on vancomycin for bacteremia. Estimated Creatinine Clearance: 91 mL/min (based on SCr of 1.2 mg/dL). 5/30: day #5 vanco; s/p Mediport removal on 5/29 & MAZIN today. Mediport tip Cx pending. Cr = 1.2 today. Plan:  Continue vancomycin 1250 mg q18h. Monitor renal function/UO.     Toribio Quintero PharmD  5/30/2023  4:19 PM  
Pharmacy Consultation Note  (Antibiotic Dosing and Monitoring)    Initial consult date: 5/26/23  Consulting physician/provider: Dr. Ha De La Cruz  Drug: Vancomycin  Indication: CLABSI/bloodstream infection (Staph epi)    Age/  Gender Height Weight IBW  Allergy Information   62 y.o./male 5' 11\" (180.3 cm) 290 lb (131.5 kg)     Ideal body weight: 75.3 kg (166 lb 0.1 oz)  Adjusted ideal body weight: 99.4 kg (219 lb 2.6 oz)   Zocor [simvastatin - high dose]      Renal Function:  Recent Labs     05/29/23  1050 05/30/23  0446   BUN 15  --    CREATININE 1.1 1.2         Intake/Output Summary (Last 24 hours) at 5/31/2023 1330  Last data filed at 5/31/2023 1153  Gross per 24 hour   Intake 240 ml   Output 0 ml   Net 240 ml         Vancomycin Monitoring:  Trough:    Recent Labs     05/28/23  1836   VANCOTROUGH 13.6       Random:  No results for input(s): VANCORANDOM in the last 72 hours. No results for input(s): Kiran Phelps in the last 72 hours. Historical Cultures:  Organism   Date Value Ref Range Status   05/29/2023 Staphylococcus epidermidis (A)  Final   05/29/2023 Staphylococcus epidermidis (A)  Final     No results for input(s): BC in the last 72 hours. Vancomycin Administration Times:  Recent vancomycin administrations                     vancomycin (VANCOCIN) 1,250 mg in sodium chloride 0.9 % 250 mL IVPB (mg) 1,250 mg New Bag 05/31/23 0546     1,250 mg New Bag 05/30/23 1322     1,250 mg New Bag 05/29/23 1934    vancomycin (VANCOCIN) 1,250 mg in sodium chloride 0.9 % 250 mL IVPB (mg) 1,250 mg New Bag 05/28/23 2105                  Assessment:  Patient is a 65 y.o. male who has been initiated on vancomycin for bacteremia. Estimated Creatinine Clearance: 91 mL/min (based on SCr of 1.2 mg/dL). 5/30: day #5 vanco; s/p Mediport removal on 5/29 & MAZIN today. Mediport tip Cx pending. Cr = 1.2 today. 5/31: day #6 vanco.  Mediport tip Cx growing 2 isolates of Staph epi.       Plan:  Change vancomycin to 1750 mg q24h in
Pharmacy Consultation Note  (Antibiotic Dosing and Monitoring)    Initial consult date: 5/26/23  Consulting physician/provider: Dr. Moris Marshall  Drug: Vancomycin  Indication: CLABSI/bloodstream infection (Staph epi)    Age/  Gender Height Weight IBW  Allergy Information   62 y.o./male 5' 11\" (180.3 cm) 290 lb (131.5 kg)     Ideal body weight: 75.3 kg (166 lb 0.1 oz)  Adjusted ideal body weight: 99.4 kg (219 lb 2.6 oz)   Zocor [simvastatin - high dose]      Renal Function:  Recent Labs     05/29/23  1050 05/30/23  0446   BUN 15  --    CREATININE 1.1 1.2         Intake/Output Summary (Last 24 hours) at 6/1/2023 1000  Last data filed at 5/31/2023 1448  Gross per 24 hour   Intake 360 ml   Output 0 ml   Net 360 ml         Vancomycin Monitoring:  Trough:  No results for input(s): VANCOTROUGH in the last 72 hours. Random:  No results for input(s): VANCORANDOM in the last 72 hours. No results for input(s): Catalina Rachel in the last 72 hours. Historical Cultures:  Organism   Date Value Ref Range Status   05/29/2023 Staphylococcus epidermidis (A)  Final   05/29/2023 Staphylococcus epidermidis (A)  Final     No results for input(s): BC in the last 72 hours. Vancomycin Administration Times:    Recent vancomycin administrations                     vancomycin (VANCOCIN) 1,750 mg in sodium chloride 0.9 % 500 mL IVPB (mg) 1,750 mg New Bag 05/31/23 2010    vancomycin (VANCOCIN) 1,250 mg in sodium chloride 0.9 % 250 mL IVPB (mg) 1,250 mg New Bag 05/31/23 0546     1,250 mg New Bag 05/30/23 1322     1,250 mg New Bag 05/29/23 1934                  Assessment:  Patient is a 65 y.o. male who has been initiated on vancomycin for bacteremia. Estimated Creatinine Clearance: 91 mL/min (based on SCr of 1.2 mg/dL). 5/30: day #5 vanco; s/p Mediport removal on 5/29 & MAZIN today. Mediport tip Cx pending. Cr = 1.2 today.   5/31: day #6 vanco.  Mediport tip Cx growing 2 isolates of Staph epi.    6/1: day #7 vanco    Plan:  Continue
Pharmacy Consultation Note  (Antibiotic Dosing and Monitoring)    Initial consult date: 5/26/23  Consulting physician/provider: Dr. Nuno Hays  Drug: Vancomycin  Indication: bloodstream infection    Age/  Gender Height Weight IBW  Allergy Information   62 y.o./male 5' 11\" (180.3 cm) 290 lb (131.5 kg)     Ideal body weight: 75.3 kg (166 lb 0.1 oz)  Adjusted ideal body weight: 98 kg (216 lb 0.5 oz)   Zocor [simvastatin - high dose]      Renal Function:  Recent Labs     05/26/23  2151   BUN 26*   CREATININE 1.2         Intake/Output Summary (Last 24 hours) at 5/29/2023 0832  Last data filed at 5/29/2023 0300  Gross per 24 hour   Intake 450 ml   Output 436 ml   Net 14 ml         Vancomycin Monitoring:  Trough:    Recent Labs     05/28/23  0900 05/28/23  1836   VANCOTROUGH 20.8* 13.6     Random:  No results for input(s): VANCORANDOM in the last 72 hours. Recent Labs     05/27/23 2014   BLOODCULT2 24 Hours no growth          Historical Cultures:  Organism   Date Value Ref Range Status   05/23/2023 Staphylococcus epidermidis (A)  Final     Recent Labs     05/27/23  2013   BC 24 Hours no growth         Vancomycin Administration Times:  Recent vancomycin administrations        No vancomycin IV orders with administrations found. Assessment:  Patient is a 65 y.o. male who has been initiated on vancomycin  Estimated Creatinine Clearance: 90 mL/min (based on SCr of 1.2 mg/dL).     Plan:  Adjust Vancomycin to 1250 mg IV q18h - repeat creatinine ordered for tomorrow AM  Will await ID's recommendations for de-escalation    Thank you for the consult,   Luis A Walker, PharmD, BCPS, BCCCP 5/29/2023 8:32 AM
Placed gold nechlass with a metal on patient per his request.
Renal Dose Adjustment Policy (Generic)     This patient is on medication that requires renal, weight, and/or indication dose adjustment. Date Body Weight IBW  Adjusted BW SCr  CrCl Dialysis status   6/1/2023 298 lb 14.4 oz (135.6 kg) Ideal body weight: 75.3 kg (166 lb 0.1 oz)  Adjusted ideal body weight: 99.4 kg (219 lb 2.6 oz) Serum creatinine: 1.2 mg/dL 05/30/23 0446  Estimated creatinine clearance: 91 mL/min N/a       Pharmacy has dose-adjusted the following medication(s):    Date Previous Order Adjusted Order   6/1/2023 Cefazolin 1000 mg q8h Cefazolin 2000 mg q8h       These changes were made per protocol according to the [facility]   Automatic Renal Dose Adjustment Policy. *Please note this dose may need readjusted if patient's condition changes. Please contact pharmacy with any questions regarding these changes.     Екатерина Payan, [address] [facility]  6/1/2023  11:11 AM
Spoke with Dr. Gabriel Baker while on unit regarding pt continued lokelma and no BMP since 5/29/23.  Dr. Gabriel Baker states okay to obtain BMP
Subjective:   MAZIN noted, culture tip staph epi also, cardiac MRI ordered but now for CTS consult, refused hospice, palliative consulted as well    Objective:    /74   Pulse 86   Temp 98 °F (36.7 °C) (Oral)   Resp 18   Ht 5' 11\" (1.803 m)   Wt 298 lb 14.4 oz (135.6 kg)   SpO2 98%   BMI 41.69 kg/m²     Gen: asleep, No acute distress, color improved, moribidly obese, still apneic not wearing cpap/bipap still ordered mult times! HEENT: NCAT, PERRLA, EOMI  Neck: No JVD or bruit  Chest: Symmetric. Nonlabored, s/p mediport removal  Heart:  irreg S1 S2.  Lungs:  CTAB. No wheezes, rales or rhonchi  Abd: obese, Soft, NT, ND. Positive bowel sounds. No rebound or guarding   Extrem:  1 plus edema  Neuro: 2-12 intact.  No focal deficits    CBC:   Lab Results   Component Value Date/Time    WBC 21.0 05/31/2023 04:46 AM    RBC 2.83 05/31/2023 04:46 AM    HGB 8.2 05/31/2023 04:46 AM    HCT 26.6 05/31/2023 04:46 AM    MCV 94.0 05/31/2023 04:46 AM    MCH 29.0 05/31/2023 04:46 AM    MCHC 30.8 05/31/2023 04:46 AM    RDW 16.3 05/31/2023 04:46 AM     05/31/2023 04:46 AM    MPV 11.8 05/31/2023 04:46 AM     CMP:    Lab Results   Component Value Date/Time     05/29/2023 10:50 AM    K 4.5 05/29/2023 10:50 AM    K 6.5 05/10/2023 06:00 PM     05/29/2023 10:50 AM    CO2 24 05/29/2023 10:50 AM    BUN 15 05/29/2023 10:50 AM    CREATININE 1.2 05/30/2023 04:46 AM    GFRAA >60 07/22/2022 02:38 PM    LABGLOM >60 05/30/2023 04:46 AM    GLUCOSE 118 05/29/2023 10:50 AM    GLUCOSE 129 06/03/2011 02:16 PM    PROT 5.8 05/29/2023 10:50 AM    LABALBU 3.0 05/29/2023 10:50 AM    LABALBU 4.7 06/03/2011 02:16 PM    CALCIUM 9.2 05/29/2023 10:50 AM    BILITOT 0.4 05/29/2023 10:50 AM    ALKPHOS 142 05/29/2023 10:50 AM    AST 11 05/29/2023 10:50 AM    ALT 9 05/29/2023 10:50 AM        Assessment:    Patient Active Problem List   Diagnosis    CAD (coronary artery disease)    Primary hypertension    Gastro-esophageal reflux disease
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 HEENT: NCAT, PERRLA, EOMI  Neck: No JVD or bruit  Chest: Symmetric. Nonlabored, s/p mediport removal  Heart:  irreg S1 S2.  Lungs:  CTAB. No wheezes, rales or rhonchi  Abd: obese, Soft, NT, ND. Positive bowel sounds. No rebound or guarding   Extrem:  1 plus edema  Neuro: 2-12 intact.  No focal deficits    CBC:   Lab Results   Component Value Date/Time    WBC 18.8 06/01/2023 04:45 AM    RBC 2.94 06/01/2023 04:45 AM    HGB 8.4 06/01/2023 04:45 AM    HCT 28.5 06/01/2023 04:45 AM    MCV 96.9 06/01/2023 04:45 AM    MCH 28.6 06/01/2023 04:45 AM    MCHC 29.5 06/01/2023 04:45 AM    RDW 16.3 06/01/2023 04:45 AM     06/01/2023 04:45 AM    MPV 11.2 06/01/2023 04:45 AM     CMP:    Lab Results   Component Value Date/Time     06/01/2023 06:01 PM    K 3.5 06/01/2023 06:01 PM    K 6.5 05/10/2023 06:00 PM     06/01/2023 06:01 PM    CO2 27 06/01/2023 06:01 PM    BUN 20 06/01/2023 06:01 PM    CREATININE 1.3 06/01/2023 06:01 PM    GFRAA >60 07/22/2022 02:38 PM    LABGLOM >60 06/01/2023 06:01 PM    GLUCOSE 158 06/01/2023 06:01 PM    GLUCOSE 129 06/03/2011 02:16 PM    PROT 5.8 05/29/2023 10:50 AM    LABALBU 3.0 05/29/2023 10:50 AM    LABALBU 4.7 06/03/2011 02:16 PM    CALCIUM 9.0 06/01/2023 06:01 PM    BILITOT 0.4 05/29/2023 10:50 AM    ALKPHOS 142 05/29/2023 10:50 AM    AST 11 05/29/2023 10:50 AM    ALT 9 05/29/2023 10:50 AM        Assessment:    Patient Active Problem
Subjective:   Pt awake, mult family at bedside. C/o diarrhea and why still NPO, frontal HA also, not getting [place] and Lyrica as ordered, still not using cpap waiting from home doesn't like cpap here??    Objective:    /60   Pulse 90   Temp 98.2 °F (36.8 °C) (Oral)   Resp 20   Ht 5' 11\" (1.803 m)   Wt 291 lb 1 oz (132 kg)   SpO2 99%   BMI 40.59 kg/m²     Gen: asleep, No acute distress, color improved, moribidly obese,   HEENT: NCAT, PERRLA, EOMI  Neck: No JVD or bruit  Chest: Symmetric. Nonlabored, s/p mediport removal  Heart:  irreg S1 S2.  Lungs:  CTAB. No wheezes, rales or rhonchi  Abd: obese, Soft, NT, ND. Positive bowel sounds. No rebound or guarding   Extrem:  1 plus edema  Neuro: 2-12 intact.  No focal deficits    CBC:   Lab Results   Component Value Date/Time    WBC 26.6 05/29/2023 06:16 AM    RBC 2.90 05/29/2023 06:16 AM    HGB 8.4 05/29/2023 06:16 AM    HCT 27.2 05/29/2023 06:16 AM    MCV 93.8 05/29/2023 06:16 AM    MCH 29.0 05/29/2023 06:16 AM    MCHC 30.9 05/29/2023 06:16 AM    RDW 16.6 05/29/2023 06:16 AM     05/29/2023 06:16 AM    MPV 11.7 05/29/2023 06:16 AM     CMP:    Lab Results   Component Value Date/Time     05/29/2023 10:50 AM    K 4.5 05/29/2023 10:50 AM    K 6.5 05/10/2023 06:00 PM     05/29/2023 10:50 AM    CO2 24 05/29/2023 10:50 AM    BUN 15 05/29/2023 10:50 AM    CREATININE 1.1 05/29/2023 10:50 AM    GFRAA >60 07/22/2022 02:38 PM    LABGLOM >60 05/29/2023 10:50 AM    GLUCOSE 118 05/29/2023 10:50 AM    GLUCOSE 129 06/03/2011 02:16 PM    PROT 5.8 05/29/2023 10:50 AM    LABALBU 3.0 05/29/2023 10:50 AM    LABALBU 4.7 06/03/2011 02:16 PM    CALCIUM 9.2 05/29/2023 10:50 AM    BILITOT 0.4 05/29/2023 10:50 AM    ALKPHOS 142 05/29/2023 10:50 AM    AST 11 05/29/2023 10:50 AM    ALT 9 05/29/2023 10:50 AM        Assessment:    Patient Active Problem List   Diagnosis    CAD (coronary artery disease)    Primary hypertension    Gastro-esophageal reflux disease without
There was a consult to cardiology placed, the reason for the consult was status of MAZIN requested by ID on Friday. I consulted cardiology, cardiology stated \"We should not have been consulted, who ever wants this MAZIN needs to put the order in for it. \" Attending Malachi Estes said I need to reach out to ID (Doctor Rolando Hatchet) to make sure she still wants MAZIN and 2D echo. I called and notified ID, to clarify if they still want those tests. Waiting to hear back.
disease) ([facility]    Chest pain    Unspecified atrial fibrillation ([address]    Malignant neoplasm of lung ([facility]    Acute respiratory failure with hypoxia (HCC)    Hyperlipidemia, unspecified    PAF (paroxysmal atrial fibrillation) (HCC)    Pleural effusion on right    Obstructive sleep apnea    Former smoker    Chronic diastolic (congestive) heart failure (HCC)    Acute renal failure (HCC)    Hyperkalemia    Ischemic cardiomyopathy    Squamous cell carcinoma of right lung (HCC)    Morbid obesity ([facility]    Palliative care by specialist    Acute renal failure (ARF) ([facility] [facility]    Bacteremia       Plan:    IV abx per ID, Mediport removed, cardiac CT or MRI, BP and anitcoag per Cardio, transfuse if hgb drops below 8, advised palliative care and consider hospice as well ysandra Brice MD, MD, FAAFP  1:39 PM  5/30/2023
05/24/2023 04:12 AM    LABALBU 4.7 06/03/2011 02:16 PM    CALCIUM 8.9 05/26/2023 09:51 PM    BILITOT 0.3 05/24/2023 04:12 AM    ALKPHOS 77 05/24/2023 04:12 AM    AST 10 05/24/2023 04:12 AM    ALT 8 05/24/2023 04:12 AM     Lab Results   Component Value Date    CRP 2.8 (H) 04/04/2023     Lab Results   Component Value Date    SEDRATE 65 (H) 04/04/2023     Radiology:      Microbiology:   Blood Culture 5/23: staph epi  Urine Culture 5/23: negative   Blood Culture 5/24: negative so far   Blood Culture 5/26: negative so far  Blood Culture 5/27: negative so far  Tip Culture 5/29: pending     ASSESSMENT:  Staph epidermidis bacteremia (2/4 positive) - s/p mediport removal 5/29  ATUL  RLL squamous cell CA  s/p SBRT  Peripheral neuroathy  Copd   Atrial fibrillation    PLAN:  Continue IV Vancomycin (PTD) and IV Cefepime 2g q8 for now   S/p mediport removal today will follow tip culture  TTE pending   Check final cultures  Monitor labs  Will continue to follow     Tamir Gilliland, APRN - CNP  11:07 AM  5/29/2023   Pt seen and examined. Above discussed agree with advanced practice nurse. Labs, cultures, and radiographs reviewed. Face to Face encounter occurred. Changes made as necessary.      Mely Cheung MD
10:50 AM    GLUCOSE 129 06/03/2011 02:16 PM    PROT 5.8 05/29/2023 10:50 AM    LABALBU 3.0 05/29/2023 10:50 AM    LABALBU 4.7 06/03/2011 02:16 PM    CALCIUM 9.2 05/29/2023 10:50 AM    BILITOT 0.4 05/29/2023 10:50 AM    ALKPHOS 142 05/29/2023 10:50 AM    AST 11 05/29/2023 10:50 AM    ALT 9 05/29/2023 10:50 AM     Lab Results   Component Value Date    CRP 2.8 (H) 04/04/2023     Lab Results   Component Value Date    SEDRATE 65 (H) 04/04/2023     Radiology:      Microbiology:   Blood Culture 5/23: staph epi (oxacillin sensitive)   Urine Culture 5/23: negative   Blood Culture 5/24: negative   Blood Culture 5/26: negative so far  Blood Culture 5/27: negative so far  Tip Culture 5/29: staph epi    ASSESSMENT:  Staph epidermidis bacteremia (2/4 positive) - s/p mediport removal 5/29  ATUL  RLL squamous cell CA  s/p SBRT  Peripheral neuroathy  Copd   Atrial fibrillation    PLAN:  Continue IV Vancomycin (PTD)   Check final cultures - mediport tip culture growing staph epi  MAZIN noted - Mobile echodensity in the left atrium, mass versus thrombus  Seen by CTS not a surgical candidate  Monitor labs  Will continue to follow   Dr Lizett Herrera note noted    no surgery at this time  Will treat for four weeks with iv ancef  Talked to Dr. Becka Dorado from oncology this morning and we are aligned. Talked to  as well   And reconciled his meds     Virginia Rich, APRN - CNP  7:40 AM  6/1/2023 Pt seen and examined. Above discussed agree with advanced practice nurse. Labs, cultures, and radiographs reviewed. Face to Face encounter occurred. Changes made as necessary.      Jami Manning MD
session. Ambulated to bed; nasal cannula donned. Ambulated with decreased speed and steadiness. Patient educated on importance of keeping nasal cannula donned; expressed understanding. Patient left in semi-Wall's position with call light in reach. Patient would benefit from continued skilled PT services to address functional deficits and prevent deconditioning. Treatment:  Patient practiced and was instructed in the following treatment:    Bed mobility - performed without physical assistance  Functional transfers - performed without physical assistance  Ambulation - performed without physical assistance  Skilled monitoring of vitals    Pt's/ family goals   1. None stated    Prognosis is good for reaching above PT goals. Patient and or family understand(s) diagnosis, prognosis, and plan of care. Yes    PHYSICAL THERAPY PLAN OF CARE:    PT POC is established based on physician order and patient diagnosis     Referring provider/PT Order:    05/24/23 0845  PT eval and treat        Ordered by: Isidoro So MD    Diagnosis:  Hyperkalemia [E87.5]  Acute renal failure (ARF) ([address] [N17.9]  Acute renal failure, unspecified acute renal failure type ([address] [N17.9]  Specific instructions for next treatment:  Continue to facilitate safe performance of functional mobility; progress as appropriate.     Current Treatment Recommendations:     [x] Strengthening to improve independence with functional mobility   [x] ROM to improve independence with functional mobility   [x] Balance Training to improve static/dynamic balance and to reduce fall risk  [x] Endurance Training to improve activity tolerance during functional mobility   [x] Transfer Training to improve safety and independence with all functional transfers   [x] Gait Training to improve gait mechanics, endurance and assess need for appropriate assistive device  [x] Stair Training in preparation for safe discharge home and/or into the community   [x]
05/30/2023 04:46 AM    LABALBU 3.0 05/29/2023 10:50 AM    LABALBU 4.7 06/03/2011 02:16 PM    CALCIUM 9.2 05/29/2023 10:50 AM    GFRAA >60 07/22/2022 02:38 PM    LABGLOM >60 05/30/2023 04:46 AM     Lab Results   Component Value Date/Time    PROTIME 13.4 04/03/2023 12:55 PM    INR 1.2 04/03/2023 12:55 PM          Assessment:    ATUL on CKD  Hyperkalemia  Right lower lobe SCC s/p SBRT with recurrence   ANTONIO treated with BiPAP  COPD  Staph epidermidis bacteremia       Plan:   Oxygen therapy as needed to keep saturations greater than 90%, 2L  BiPAP therapy at night and as needed 18/15 cm water pressure, ok to use home machine  Budesonide via nebulizer twice daily  Flonase daily  Albuterol as needed  Oncology following  Nephrology following  Mediport removed 5/29  Antibiotics per ID:  Vanc  MAZIN completed, see above  Cardiac MRI not completed, d/t size    This plan of care was reviewed in collaboration with Dr. Vy Hearn  Electronically signed by SIENNA Urena CNP on 5/31/2023 at 12:42 PM    I personally saw, examined, and cared for the patient. I spoke with bedside nursing, therapists and consultants. The case was discussed in detail and plans for care were established. Review of CNP documentation was conducted and revisions were made as appropriate.  I agree with the above documented exam, problem list and plan of care with the following additions:    MAZIN mass vs thrombus in the L atrium - defer evaluation to cardiology  Nocturnal BiPAP  Abx per ID    Arjun Peguero MD
5/29  Antibiotics per ID: Cefepime, Vanc  MAZIN and CVL planned for today. This plan of care was reviewed in collaboration with Dr. Sia Perez  Electronically signed by SIENNA Samuels CNP on 5/30/2023 at 1:00 PM    I personally saw, examined, and cared for the patient. I spoke with bedside nursing, therapists and consultants. The case was discussed in detail and plans for care were established. Review of CNP documentation was conducted and revisions were made as appropriate. I agree with the above documented exam, problem list and plan of care.     Chato Moreau MD
cancer  COPD  Acute on chronic renal insufficiency  Hypertension  Prior alcohol abuse  Anemia as described above. No further cardiac recommendations or testing is planned. Cardiology will sign off. Note: This report was completed using computerized voice recognition software. Every effort has been made to ensure accuracy, however; and invert and computerized transcription errors may be present.
kidney measures 13.0 cm  in length. Kidneys demonstrate normal cortical echogenicity. No evidence of hydronephrosis or intrarenal stones. Bladder: Unremarkable appearance of the bladder. Unremarkable ultrasound of the kidneys and urinary bladder. ASSESSMENT/PLAN :  61-year-old male   - RLL squamous cell carcinoma. He is s/p SBRT with Dr. Michoacano Womack. PDL1 90% + and 3+. He had recurrent disease, and is s/p Opdivo/Yervoy as well as s/p Gilotrif. S/P Carbo/Abraxane. Giving only Abraxane every other week now due to recurrent cytopenias. S/p cycle 12 on 5/22/23. Continue GCSF. Patient now with multiple admissions for hyperkalemia and ATUL. -Acute renal failure: Nephrology on board. Creatinine has improved from 8-2.4 with hydration. Renal ultrasound was ordered showed unremarkable US of the kidneys and urinary bladder.  -Renal failure unlikely to be due to Abraxane. Most recent scans from last month showed continued control of disease with current chemotherapy.  -On cefepime for staph epi bacteremia.  -Leukocytosis due to recent Neulasta.  -We will continue to follow. 5/25/23  - RLL squamous cell carcinoma with recurrent disease. Giving only Abraxane every other week now due to recurrent cytopenias. S/p cycle 12 on 5/22/23. Continue GCSF.   - CBC with WBC 37, Hgb 6.9, platelets WNL. 1 unit of pRBC's ordered. Improved to 7.5  - Continue to trend CBC and transfuse for hgb <7.   - Now out of ICU  - Nephrology following for acute renal failure. Cr has much improved from 8 to 1.7 today with hydration  - Renal US as above unremarkable  - Renal failure unlikely to be due to Abraxane. No DR required even for ESRD patients as primarily metabolized in liver.  Most recent scans from last month showed continued control of disease with current chemotherapy  - On cefepime for staph epi bacteremia  - Leukocytosis due to recent Neulasta  - Will set up outpatient hydration in between therapies  - We will continue to

## 2023-06-03 NOTE — PLAN OF CARE
Problem: Chronic Conditions and Co-morbidities  Goal: Patient's chronic conditions and co-morbidity symptoms are monitored and maintained or improved  6/2/2023 2314 by Myron Marr RN  Outcome: Completed  6/2/2023 1316 by Sulaiman Rodrigez RN  Outcome: Progressing     Problem: Discharge Planning  Goal: Discharge to home or other facility with appropriate resources  6/2/2023 2314 by Myron Marr RN  Outcome: Completed  6/2/2023 1316 by Sulaiman Rodrigez RN  Outcome: Progressing     Problem: Pain  Goal: Verbalizes/displays adequate comfort level or baseline comfort level  6/2/2023 2314 by Myron Marr RN  Outcome: Completed  6/2/2023 1316 by Sulaiman Rodrigez RN  Outcome: Progressing     Problem: Safety - Adult  Goal: Free from fall injury  6/2/2023 2314 by Myron Marr RN  Outcome: Completed  6/2/2023 1316 by Sulaiman Rodrigez RN  Outcome: Progressing     Problem: ABCDS Injury Assessment  Goal: Absence of physical injury  6/2/2023 2314 by Myron Marr RN  Outcome: Completed  6/2/2023 1316 by Sulaiman Rodrigez RN  Outcome: Progressing     Problem: Nutrition Deficit:  Goal: Optimize nutritional status  6/2/2023 2314 by Myron Marr RN  Outcome: Completed  6/2/2023 1316 by Sulaiman Rodrigez RN  Outcome: Progressing

## 2023-06-20 ENCOUNTER — APPOINTMENT (OUTPATIENT)
Dept: GENERAL RADIOLOGY | Age: 63
DRG: 291 | End: 2023-06-20
Payer: COMMERCIAL

## 2023-06-20 ENCOUNTER — HOSPITAL ENCOUNTER (INPATIENT)
Age: 63
LOS: 8 days | Discharge: HOSPICE | DRG: 291 | End: 2023-06-28
Attending: EMERGENCY MEDICINE | Admitting: FAMILY MEDICINE
Payer: COMMERCIAL

## 2023-06-20 ENCOUNTER — APPOINTMENT (OUTPATIENT)
Dept: CT IMAGING | Age: 63
DRG: 291 | End: 2023-06-20
Payer: COMMERCIAL

## 2023-06-20 DIAGNOSIS — I50.9 ACUTE CONGESTIVE HEART FAILURE, UNSPECIFIED HEART FAILURE TYPE (HCC): ICD-10-CM

## 2023-06-20 DIAGNOSIS — J90 PLEURAL EFFUSION: Primary | ICD-10-CM

## 2023-06-20 PROBLEM — R09.02 HYPOXIA: Status: ACTIVE | Noted: 2023-01-01

## 2023-06-20 LAB
ABO + RH BLD: NORMAL
ALBUMIN SERPL-MCNC: 3.4 G/DL (ref 3.5–5.2)
ALP SERPL-CCNC: 102 U/L (ref 40–129)
ALT SERPL-CCNC: <5 U/L (ref 0–40)
AMMONIA PLAS-SCNC: 20 UMOL/L (ref 16–60)
ANION GAP SERPL CALCULATED.3IONS-SCNC: 13 MMOL/L (ref 7–16)
APTT BLD: 85.9 SEC (ref 24.5–35.1)
AST SERPL-CCNC: 11 U/L (ref 0–39)
B.E.: -4.5 MMOL/L (ref -3–3)
BASOPHILS # BLD: 0.04 E9/L (ref 0–0.2)
BASOPHILS NFR BLD: 0.4 % (ref 0–2)
BILIRUB SERPL-MCNC: 0.5 MG/DL (ref 0–1.2)
BLD GP AB SCN SERPL QL: NORMAL
BLOOD BANK DISPENSE STATUS: NORMAL
BLOOD BANK PRODUCT CODE: NORMAL
BNP BLD-MCNC: ABNORMAL PG/ML (ref 0–125)
BPU ID: NORMAL
BUN SERPL-MCNC: 13 MG/DL (ref 6–23)
CALCIUM SERPL-MCNC: 8.4 MG/DL (ref 8.6–10.2)
CHLORIDE SERPL-SCNC: 104 MMOL/L (ref 98–107)
CHOLESTEROL, TOTAL: 104 MG/DL (ref 0–199)
CHP ED QC CHECK: NORMAL
CO2 SERPL-SCNC: 25 MMOL/L (ref 22–29)
COHB: 0.9 % (ref 0–1.5)
CREAT SERPL-MCNC: 1.1 MG/DL (ref 0.7–1.2)
CRITICAL: ABNORMAL
DATE ANALYZED: ABNORMAL
DATE OF COLLECTION: ABNORMAL
DESCRIPTION BLOOD BANK: NORMAL
EOSINOPHIL # BLD: 0.23 E9/L (ref 0.05–0.5)
EOSINOPHIL NFR BLD: 2.2 % (ref 0–6)
ERYTHROCYTE [DISTWIDTH] IN BLOOD BY AUTOMATED COUNT: 16.6 FL (ref 11.5–15)
FERRITIN SERPL-MCNC: 5337 NG/ML
GLUCOSE BLD-MCNC: 132 MG/DL
GLUCOSE BLD-MCNC: 152 MG/DL
GLUCOSE BLD-MCNC: 171 MG/DL
GLUCOSE SERPL-MCNC: 122 MG/DL (ref 74–99)
HCO3: 20.2 MMOL/L (ref 22–26)
HCT VFR BLD AUTO: 25.5 % (ref 37–54)
HCT VFR BLD AUTO: 31.1 % (ref 37–54)
HDLC SERPL-MCNC: 36 MG/DL
HGB BLD-MCNC: 7.4 G/DL (ref 12.5–16.5)
HGB BLD-MCNC: 9.3 G/DL (ref 12.5–16.5)
HHB: 1.5 % (ref 0–5)
IMM GRANULOCYTES # BLD: 0.06 E9/L
IMM GRANULOCYTES NFR BLD: 0.6 % (ref 0–5)
INR BLD: 2.2
IRON SATN MFR SERPL: 16 % (ref 20–55)
IRON SERPL-MCNC: 30 MCG/DL (ref 59–158)
LAB: ABNORMAL
LDLC SERPL CALC-MCNC: 52 MG/DL (ref 0–99)
LYMPHOCYTES # BLD: 0.87 E9/L (ref 1.5–4)
LYMPHOCYTES NFR BLD: 8.3 % (ref 20–42)
Lab: ABNORMAL
MCH RBC QN AUTO: 27.4 PG (ref 26–35)
MCHC RBC AUTO-ENTMCNC: 29 % (ref 32–34.5)
MCV RBC AUTO: 94.4 FL (ref 80–99.9)
METER GLUCOSE: 132 MG/DL (ref 74–99)
METER GLUCOSE: 152 MG/DL (ref 74–99)
METER GLUCOSE: 171 MG/DL (ref 74–99)
METHB: 0.3 % (ref 0–1.5)
MODE: ABNORMAL
MONOCYTES # BLD: 0.7 E9/L (ref 0.1–0.95)
MONOCYTES NFR BLD: 6.7 % (ref 2–12)
NEUTROPHILS # BLD: 8.61 E9/L (ref 1.8–7.3)
NEUTS SEG NFR BLD: 81.8 % (ref 43–80)
O2 CONTENT: 15.6 ML/DL
O2 SATURATION: 98.5 % (ref 92–98.5)
O2HB: 97.3 % (ref 94–97)
OPERATOR ID: ABNORMAL
PATIENT TEMP: 37 C
PCO2: 35.6 MMHG (ref 35–45)
PH BLOOD GAS: 7.37 (ref 7.35–7.45)
PLATELET # BLD AUTO: 184 E9/L (ref 130–450)
PMV BLD AUTO: 9.9 FL (ref 7–12)
PO2: 136.5 MMHG (ref 75–100)
POTASSIUM SERPL-SCNC: 3.7 MMOL/L (ref 3.5–5)
PROT SERPL-MCNC: 6.3 G/DL (ref 6.4–8.3)
PROTHROMBIN TIME: 24.2 SEC (ref 9.3–12.4)
RBC # BLD AUTO: 2.7 E12/L (ref 3.8–5.8)
SODIUM SERPL-SCNC: 142 MMOL/L (ref 132–146)
SOURCE, BLOOD GAS: ABNORMAL
THB: 11.2 G/DL (ref 11.5–16.5)
TIBC SERPL-MCNC: 184 MCG/DL (ref 250–450)
TIME ANALYZED: 1906
TRIGL SERPL-MCNC: 79 MG/DL (ref 0–149)
TROPONIN, HIGH SENSITIVITY: 27 NG/L (ref 0–11)
TROPONIN, HIGH SENSITIVITY: 28 NG/L (ref 0–11)
TSH SERPL-MCNC: 1.23 UIU/ML (ref 0.27–4.2)
VLDLC SERPL CALC-MCNC: 16 MG/DL
WBC # BLD: 10.5 E9/L (ref 4.5–11.5)

## 2023-06-20 PROCEDURE — 2700000000 HC OXYGEN THERAPY PER DAY

## 2023-06-20 PROCEDURE — 6370000000 HC RX 637 (ALT 250 FOR IP): Performed by: EMERGENCY MEDICINE

## 2023-06-20 PROCEDURE — 99285 EMERGENCY DEPT VISIT HI MDM: CPT

## 2023-06-20 PROCEDURE — 30233N1 TRANSFUSION OF NONAUTOLOGOUS RED BLOOD CELLS INTO PERIPHERAL VEIN, PERCUTANEOUS APPROACH: ICD-10-PCS | Performed by: INTERNAL MEDICINE

## 2023-06-20 PROCEDURE — 82962 GLUCOSE BLOOD TEST: CPT

## 2023-06-20 PROCEDURE — 2000000000 HC ICU R&B

## 2023-06-20 PROCEDURE — 94664 DEMO&/EVAL PT USE INHALER: CPT

## 2023-06-20 PROCEDURE — 2580000003 HC RX 258: Performed by: STUDENT IN AN ORGANIZED HEALTH CARE EDUCATION/TRAINING PROGRAM

## 2023-06-20 PROCEDURE — 85014 HEMATOCRIT: CPT

## 2023-06-20 PROCEDURE — 80053 COMPREHEN METABOLIC PANEL: CPT

## 2023-06-20 PROCEDURE — 6360000002 HC RX W HCPCS: Performed by: STUDENT IN AN ORGANIZED HEALTH CARE EDUCATION/TRAINING PROGRAM

## 2023-06-20 PROCEDURE — 2580000003 HC RX 258: Performed by: INTERNAL MEDICINE

## 2023-06-20 PROCEDURE — 87040 BLOOD CULTURE FOR BACTERIA: CPT

## 2023-06-20 PROCEDURE — 84484 ASSAY OF TROPONIN QUANT: CPT

## 2023-06-20 PROCEDURE — 83550 IRON BINDING TEST: CPT

## 2023-06-20 PROCEDURE — 94640 AIRWAY INHALATION TREATMENT: CPT

## 2023-06-20 PROCEDURE — 82140 ASSAY OF AMMONIA: CPT

## 2023-06-20 PROCEDURE — 6370000000 HC RX 637 (ALT 250 FOR IP): Performed by: FAMILY MEDICINE

## 2023-06-20 PROCEDURE — 85730 THROMBOPLASTIN TIME PARTIAL: CPT

## 2023-06-20 PROCEDURE — 86900 BLOOD TYPING SEROLOGIC ABO: CPT

## 2023-06-20 PROCEDURE — 85610 PROTHROMBIN TIME: CPT

## 2023-06-20 PROCEDURE — 96374 THER/PROPH/DIAG INJ IV PUSH: CPT

## 2023-06-20 PROCEDURE — 86850 RBC ANTIBODY SCREEN: CPT

## 2023-06-20 PROCEDURE — 85025 COMPLETE CBC W/AUTO DIFF WBC: CPT

## 2023-06-20 PROCEDURE — 71045 X-RAY EXAM CHEST 1 VIEW: CPT

## 2023-06-20 PROCEDURE — 86923 COMPATIBILITY TEST ELECTRIC: CPT

## 2023-06-20 PROCEDURE — 80061 LIPID PANEL: CPT

## 2023-06-20 PROCEDURE — 6360000002 HC RX W HCPCS: Performed by: FAMILY MEDICINE

## 2023-06-20 PROCEDURE — 93005 ELECTROCARDIOGRAM TRACING: CPT | Performed by: EMERGENCY MEDICINE

## 2023-06-20 PROCEDURE — 70450 CT HEAD/BRAIN W/O DYE: CPT

## 2023-06-20 PROCEDURE — 84443 ASSAY THYROID STIM HORMONE: CPT

## 2023-06-20 PROCEDURE — 6370000000 HC RX 637 (ALT 250 FOR IP): Performed by: STUDENT IN AN ORGANIZED HEALTH CARE EDUCATION/TRAINING PROGRAM

## 2023-06-20 PROCEDURE — 83880 ASSAY OF NATRIURETIC PEPTIDE: CPT

## 2023-06-20 PROCEDURE — 82805 BLOOD GASES W/O2 SATURATION: CPT

## 2023-06-20 PROCEDURE — 86901 BLOOD TYPING SEROLOGIC RH(D): CPT

## 2023-06-20 PROCEDURE — 99291 CRITICAL CARE FIRST HOUR: CPT | Performed by: INTERNAL MEDICINE

## 2023-06-20 PROCEDURE — 85018 HEMOGLOBIN: CPT

## 2023-06-20 PROCEDURE — 6360000002 HC RX W HCPCS: Performed by: INTERNAL MEDICINE

## 2023-06-20 PROCEDURE — P9016 RBC LEUKOCYTES REDUCED: HCPCS

## 2023-06-20 PROCEDURE — 83540 ASSAY OF IRON: CPT

## 2023-06-20 PROCEDURE — 87088 URINE BACTERIA CULTURE: CPT

## 2023-06-20 PROCEDURE — 82728 ASSAY OF FERRITIN: CPT

## 2023-06-20 RX ORDER — IPRATROPIUM BROMIDE AND ALBUTEROL SULFATE 2.5; .5 MG/3ML; MG/3ML
3 SOLUTION RESPIRATORY (INHALATION) ONCE
Status: COMPLETED | OUTPATIENT
Start: 2023-06-20 | End: 2023-06-20

## 2023-06-20 RX ORDER — ERGOCALCIFEROL 1.25 MG/1
50000 CAPSULE ORAL WEEKLY
Status: DISCONTINUED | OUTPATIENT
Start: 2023-06-26 | End: 2023-06-28

## 2023-06-20 RX ORDER — CEFAZOLIN SODIUM 1 G/3ML
1000 INJECTION, POWDER, FOR SOLUTION INTRAMUSCULAR; INTRAVENOUS EVERY 8 HOURS
Status: DISCONTINUED | OUTPATIENT
Start: 2023-06-20 | End: 2023-06-20 | Stop reason: SDUPTHER

## 2023-06-20 RX ORDER — BUDESONIDE 0.25 MG/2ML
500 INHALANT ORAL 2 TIMES DAILY
Status: DISCONTINUED | OUTPATIENT
Start: 2023-06-20 | End: 2023-06-28 | Stop reason: HOSPADM

## 2023-06-20 RX ORDER — SODIUM CHLORIDE 9 MG/ML
INJECTION, SOLUTION INTRAVENOUS PRN
Status: DISCONTINUED | OUTPATIENT
Start: 2023-06-20 | End: 2023-06-22

## 2023-06-20 RX ORDER — METOPROLOL SUCCINATE 50 MG/1
125 TABLET, EXTENDED RELEASE ORAL NIGHTLY
Status: DISCONTINUED | OUTPATIENT
Start: 2023-06-20 | End: 2023-06-28

## 2023-06-20 RX ORDER — INSULIN LISPRO 100 [IU]/ML
0-4 INJECTION, SOLUTION INTRAVENOUS; SUBCUTANEOUS NIGHTLY
Status: DISCONTINUED | OUTPATIENT
Start: 2023-06-20 | End: 2023-06-27

## 2023-06-20 RX ORDER — ESCITALOPRAM OXALATE 10 MG/1
10 TABLET ORAL DAILY
Status: DISCONTINUED | OUTPATIENT
Start: 2023-06-20 | End: 2023-06-28

## 2023-06-20 RX ORDER — FERROUS SULFATE 325(65) MG
325 TABLET ORAL 2 TIMES DAILY WITH MEALS
Status: DISCONTINUED | OUTPATIENT
Start: 2023-06-20 | End: 2023-06-28

## 2023-06-20 RX ORDER — MONTELUKAST SODIUM 4 MG/1
1 TABLET, CHEWABLE ORAL 2 TIMES DAILY WITH MEALS
Status: DISCONTINUED | OUTPATIENT
Start: 2023-06-20 | End: 2023-06-21

## 2023-06-20 RX ORDER — FUROSEMIDE 10 MG/ML
40 INJECTION INTRAMUSCULAR; INTRAVENOUS ONCE
Status: COMPLETED | OUTPATIENT
Start: 2023-06-20 | End: 2023-06-20

## 2023-06-20 RX ORDER — ARFORMOTEROL TARTRATE 15 UG/2ML
15 SOLUTION RESPIRATORY (INHALATION) 2 TIMES DAILY
Status: DISCONTINUED | OUTPATIENT
Start: 2023-06-20 | End: 2023-06-28 | Stop reason: HOSPADM

## 2023-06-20 RX ORDER — TAMSULOSIN HYDROCHLORIDE 0.4 MG/1
0.4 CAPSULE ORAL DAILY
Status: DISCONTINUED | OUTPATIENT
Start: 2023-06-20 | End: 2023-06-28

## 2023-06-20 RX ORDER — FUROSEMIDE 10 MG/ML
20 INJECTION INTRAMUSCULAR; INTRAVENOUS ONCE
Status: COMPLETED | OUTPATIENT
Start: 2023-06-20 | End: 2023-06-20

## 2023-06-20 RX ORDER — PREGABALIN 75 MG/1
75 CAPSULE ORAL 3 TIMES DAILY
Status: DISCONTINUED | OUTPATIENT
Start: 2023-06-20 | End: 2023-06-28

## 2023-06-20 RX ORDER — IPRATROPIUM BROMIDE AND ALBUTEROL SULFATE 2.5; .5 MG/3ML; MG/3ML
1 SOLUTION RESPIRATORY (INHALATION) 4 TIMES DAILY
Status: DISCONTINUED | OUTPATIENT
Start: 2023-06-20 | End: 2023-06-28

## 2023-06-20 RX ORDER — FLUTICASONE PROPIONATE 50 MCG
1 SPRAY, SUSPENSION (ML) NASAL DAILY
Status: DISCONTINUED | OUTPATIENT
Start: 2023-06-20 | End: 2023-06-28

## 2023-06-20 RX ORDER — LEVOFLOXACIN 5 MG/ML
750 INJECTION, SOLUTION INTRAVENOUS EVERY 24 HOURS
Status: DISCONTINUED | OUTPATIENT
Start: 2023-06-20 | End: 2023-06-22

## 2023-06-20 RX ORDER — INSULIN LISPRO 100 [IU]/ML
0-8 INJECTION, SOLUTION INTRAVENOUS; SUBCUTANEOUS
Status: DISCONTINUED | OUTPATIENT
Start: 2023-06-20 | End: 2023-06-27

## 2023-06-20 RX ORDER — ONDANSETRON 4 MG/1
8 TABLET, ORALLY DISINTEGRATING ORAL EVERY 8 HOURS PRN
Status: DISCONTINUED | OUTPATIENT
Start: 2023-06-20 | End: 2023-06-27

## 2023-06-20 RX ORDER — METOPROLOL SUCCINATE 50 MG/1
100 TABLET, EXTENDED RELEASE ORAL DAILY
Status: DISCONTINUED | OUTPATIENT
Start: 2023-06-20 | End: 2023-06-28

## 2023-06-20 RX ORDER — ACETAMINOPHEN 325 MG/1
650 TABLET ORAL ONCE
Status: COMPLETED | OUTPATIENT
Start: 2023-06-20 | End: 2023-06-20

## 2023-06-20 RX ORDER — GUAIFENESIN 400 MG/1
400 TABLET ORAL 4 TIMES DAILY
Status: DISCONTINUED | OUTPATIENT
Start: 2023-06-20 | End: 2023-06-28

## 2023-06-20 RX ORDER — ROSUVASTATIN CALCIUM 20 MG/1
20 TABLET, COATED ORAL EVERY MORNING
Status: DISCONTINUED | OUTPATIENT
Start: 2023-06-21 | End: 2023-06-21

## 2023-06-20 RX ORDER — INSULIN GLARGINE-YFGN 100 [IU]/ML
10 INJECTION, SOLUTION SUBCUTANEOUS NIGHTLY
Status: DISCONTINUED | OUTPATIENT
Start: 2023-06-20 | End: 2023-06-21

## 2023-06-20 RX ORDER — ALBUTEROL SULFATE 2.5 MG/3ML
2.5 SOLUTION RESPIRATORY (INHALATION) 4 TIMES DAILY PRN
Status: DISCONTINUED | OUTPATIENT
Start: 2023-06-20 | End: 2023-06-28 | Stop reason: HOSPADM

## 2023-06-20 RX ADMIN — FUROSEMIDE 40 MG: 10 INJECTION, SOLUTION INTRAMUSCULAR; INTRAVENOUS at 17:39

## 2023-06-20 RX ADMIN — WATER 60 MG: 1 INJECTION INTRAMUSCULAR; INTRAVENOUS; SUBCUTANEOUS at 10:55

## 2023-06-20 RX ADMIN — IPRATROPIUM BROMIDE AND ALBUTEROL SULFATE 1 DOSE: .5; 2.5 SOLUTION RESPIRATORY (INHALATION) at 16:44

## 2023-06-20 RX ADMIN — METOPROLOL SUCCINATE 125 MG: 100 TABLET, EXTENDED RELEASE ORAL at 22:38

## 2023-06-20 RX ADMIN — SODIUM ZIRCONIUM CYCLOSILICATE 10 G: 10 POWDER, FOR SUSPENSION ORAL at 17:40

## 2023-06-20 RX ADMIN — GUAIFENESIN 400 MG: 400 TABLET ORAL at 22:38

## 2023-06-20 RX ADMIN — FERROUS SULFATE TAB 325 MG (65 MG ELEMENTAL FE) 325 MG: 325 (65 FE) TAB at 17:40

## 2023-06-20 RX ADMIN — TAMSULOSIN HYDROCHLORIDE 0.4 MG: 0.4 CAPSULE ORAL at 17:40

## 2023-06-20 RX ADMIN — GUAIFENESIN 400 MG: 400 TABLET ORAL at 17:39

## 2023-06-20 RX ADMIN — PREGABALIN 75 MG: 75 CAPSULE ORAL at 22:37

## 2023-06-20 RX ADMIN — IPRATROPIUM BROMIDE AND ALBUTEROL SULFATE 1 DOSE: .5; 2.5 SOLUTION RESPIRATORY (INHALATION) at 19:45

## 2023-06-20 RX ADMIN — LEVOFLOXACIN 750 MG: 5 INJECTION, SOLUTION INTRAVENOUS at 17:52

## 2023-06-20 RX ADMIN — METOPROLOL SUCCINATE 100 MG: 100 TABLET, EXTENDED RELEASE ORAL at 17:45

## 2023-06-20 RX ADMIN — FUROSEMIDE 20 MG: 10 INJECTION, SOLUTION INTRAMUSCULAR; INTRAVENOUS at 12:25

## 2023-06-20 RX ADMIN — ARFORMOTEROL TARTRATE 15 MCG: 15 SOLUTION RESPIRATORY (INHALATION) at 19:45

## 2023-06-20 RX ADMIN — BUDESONIDE 500 MCG: 0.25 SUSPENSION RESPIRATORY (INHALATION) at 19:45

## 2023-06-20 RX ADMIN — IPRATROPIUM BROMIDE AND ALBUTEROL SULFATE 3 DOSE: 2.5; .5 SOLUTION RESPIRATORY (INHALATION) at 09:15

## 2023-06-20 RX ADMIN — ACETAMINOPHEN 650 MG: 325 TABLET ORAL at 12:25

## 2023-06-20 RX ADMIN — ESCITALOPRAM OXALATE 10 MG: 10 TABLET ORAL at 17:45

## 2023-06-20 RX ADMIN — PREGABALIN 75 MG: 75 CAPSULE ORAL at 17:39

## 2023-06-20 RX ADMIN — CEFAZOLIN 2000 MG: 2 INJECTION, POWDER, FOR SOLUTION INTRAMUSCULAR; INTRAVENOUS at 17:40

## 2023-06-20 ASSESSMENT — ENCOUNTER SYMPTOMS
ALLERGIC/IMMUNOLOGIC NEGATIVE: 1
GASTROINTESTINAL NEGATIVE: 1
EYES NEGATIVE: 1
SHORTNESS OF BREATH: 1

## 2023-06-20 ASSESSMENT — PAIN - FUNCTIONAL ASSESSMENT: PAIN_FUNCTIONAL_ASSESSMENT: NONE - DENIES PAIN

## 2023-06-20 ASSESSMENT — PAIN SCALES - GENERAL: PAINLEVEL_OUTOF10: 3

## 2023-06-21 ENCOUNTER — APPOINTMENT (OUTPATIENT)
Dept: CT IMAGING | Age: 63
DRG: 291 | End: 2023-06-21
Payer: COMMERCIAL

## 2023-06-21 ENCOUNTER — APPOINTMENT (OUTPATIENT)
Dept: ULTRASOUND IMAGING | Age: 63
DRG: 291 | End: 2023-06-21
Payer: COMMERCIAL

## 2023-06-21 LAB
ALBUMIN SERPL-MCNC: 3.5 G/DL (ref 3.5–5.2)
ALP SERPL-CCNC: 102 U/L (ref 40–129)
ALT SERPL-CCNC: 153 U/L (ref 0–40)
ANION GAP SERPL CALCULATED.3IONS-SCNC: 14 MMOL/L (ref 7–16)
AST SERPL-CCNC: 857 U/L (ref 0–39)
B PARAP IS1001 DNA NPH QL NAA+NON-PROBE: NOT DETECTED
B PERT.PT PRMT NPH QL NAA+NON-PROBE: NOT DETECTED
BASOPHILS # BLD: 0.02 E9/L (ref 0–0.2)
BASOPHILS NFR BLD: 0.1 % (ref 0–2)
BILIRUB SERPL-MCNC: 0.8 MG/DL (ref 0–1.2)
BNP BLD-MCNC: ABNORMAL PG/ML (ref 0–125)
BUN SERPL-MCNC: 22 MG/DL (ref 6–23)
C PNEUM DNA NPH QL NAA+NON-PROBE: NOT DETECTED
CALCIUM SERPL-MCNC: 8.5 MG/DL (ref 8.6–10.2)
CHLORIDE SERPL-SCNC: 98 MMOL/L (ref 98–107)
CK SERPL-CCNC: 385 U/L (ref 20–200)
CO2 SERPL-SCNC: 25 MMOL/L (ref 22–29)
CREAT SERPL-MCNC: 1.4 MG/DL (ref 0.7–1.2)
EOSINOPHIL # BLD: 0 E9/L (ref 0.05–0.5)
EOSINOPHIL NFR BLD: 0 % (ref 0–6)
ERYTHROCYTE [DISTWIDTH] IN BLOOD BY AUTOMATED COUNT: 16.2 FL (ref 11.5–15)
FLUAV RNA NPH QL NAA+NON-PROBE: NOT DETECTED
FLUBV RNA NPH QL NAA+NON-PROBE: NOT DETECTED
GLUCOSE SERPL-MCNC: 172 MG/DL (ref 74–99)
HADV DNA NPH QL NAA+NON-PROBE: NOT DETECTED
HCOV 229E RNA NPH QL NAA+NON-PROBE: NOT DETECTED
HCOV HKU1 RNA NPH QL NAA+NON-PROBE: NOT DETECTED
HCOV NL63 RNA NPH QL NAA+NON-PROBE: NOT DETECTED
HCOV OC43 RNA NPH QL NAA+NON-PROBE: NOT DETECTED
HCT VFR BLD AUTO: 28.8 % (ref 37–54)
HGB BLD-MCNC: 8.6 G/DL (ref 12.5–16.5)
HMPV RNA NPH QL NAA+NON-PROBE: NOT DETECTED
HPIV1 RNA NPH QL NAA+NON-PROBE: NOT DETECTED
HPIV2 RNA NPH QL NAA+NON-PROBE: NOT DETECTED
HPIV3 RNA NPH QL NAA+NON-PROBE: NOT DETECTED
HPIV4 RNA NPH QL NAA+NON-PROBE: NOT DETECTED
IMM GRANULOCYTES # BLD: 0.2 E9/L
IMM GRANULOCYTES NFR BLD: 1.2 % (ref 0–5)
LV EF: 40 %
LVEF MODALITY: NORMAL
LYMPHOCYTES # BLD: 0.91 E9/L (ref 1.5–4)
LYMPHOCYTES NFR BLD: 5.4 % (ref 20–42)
M PNEUMO DNA NPH QL NAA+NON-PROBE: NOT DETECTED
MCH RBC QN AUTO: 27.7 PG (ref 26–35)
MCHC RBC AUTO-ENTMCNC: 29.9 % (ref 32–34.5)
MCV RBC AUTO: 92.6 FL (ref 80–99.9)
METER GLUCOSE: 128 MG/DL (ref 74–99)
METER GLUCOSE: 155 MG/DL (ref 74–99)
METER GLUCOSE: 160 MG/DL (ref 74–99)
METER GLUCOSE: 164 MG/DL (ref 74–99)
MONOCYTES # BLD: 1.06 E9/L (ref 0.1–0.95)
MONOCYTES NFR BLD: 6.3 % (ref 2–12)
NEUTROPHILS # BLD: 14.75 E9/L (ref 1.8–7.3)
NEUTS SEG NFR BLD: 87 % (ref 43–80)
PLATELET # BLD AUTO: 266 E9/L (ref 130–450)
PMV BLD AUTO: 10.2 FL (ref 7–12)
POTASSIUM SERPL-SCNC: 4.7 MMOL/L (ref 3.5–5)
PROT SERPL-MCNC: 6.4 G/DL (ref 6.4–8.3)
RBC # BLD AUTO: 3.11 E12/L (ref 3.8–5.8)
RSV RNA NPH QL NAA+NON-PROBE: NOT DETECTED
RV+EV RNA NPH QL NAA+NON-PROBE: NOT DETECTED
SARS-COV-2 RNA NPH QL NAA+NON-PROBE: NOT DETECTED
SODIUM SERPL-SCNC: 137 MMOL/L (ref 132–146)
WBC # BLD: 16.9 E9/L (ref 4.5–11.5)

## 2023-06-21 PROCEDURE — 6360000002 HC RX W HCPCS: Performed by: INTERNAL MEDICINE

## 2023-06-21 PROCEDURE — 76705 ECHO EXAM OF ABDOMEN: CPT

## 2023-06-21 PROCEDURE — 6370000000 HC RX 637 (ALT 250 FOR IP): Performed by: FAMILY MEDICINE

## 2023-06-21 PROCEDURE — 99222 1ST HOSP IP/OBS MODERATE 55: CPT

## 2023-06-21 PROCEDURE — 6360000002 HC RX W HCPCS: Performed by: FAMILY MEDICINE

## 2023-06-21 PROCEDURE — 94640 AIRWAY INHALATION TREATMENT: CPT

## 2023-06-21 PROCEDURE — S5553 INSULIN LONG ACTING 5 U: HCPCS | Performed by: FAMILY MEDICINE

## 2023-06-21 PROCEDURE — 83880 ASSAY OF NATRIURETIC PEPTIDE: CPT

## 2023-06-21 PROCEDURE — 80053 COMPREHEN METABOLIC PANEL: CPT

## 2023-06-21 PROCEDURE — 6370000000 HC RX 637 (ALT 250 FOR IP): Performed by: INTERNAL MEDICINE

## 2023-06-21 PROCEDURE — 2580000003 HC RX 258: Performed by: INTERNAL MEDICINE

## 2023-06-21 PROCEDURE — 82962 GLUCOSE BLOOD TEST: CPT

## 2023-06-21 PROCEDURE — 97530 THERAPEUTIC ACTIVITIES: CPT

## 2023-06-21 PROCEDURE — 74176 CT ABD & PELVIS W/O CONTRAST: CPT

## 2023-06-21 PROCEDURE — 6370000000 HC RX 637 (ALT 250 FOR IP)

## 2023-06-21 PROCEDURE — 2700000000 HC OXYGEN THERAPY PER DAY

## 2023-06-21 PROCEDURE — 36415 COLL VENOUS BLD VENIPUNCTURE: CPT

## 2023-06-21 PROCEDURE — 97162 PT EVAL MOD COMPLEX 30 MIN: CPT

## 2023-06-21 PROCEDURE — 93308 TTE F-UP OR LMTD: CPT

## 2023-06-21 PROCEDURE — 99291 CRITICAL CARE FIRST HOUR: CPT | Performed by: INTERNAL MEDICINE

## 2023-06-21 PROCEDURE — 2000000000 HC ICU R&B

## 2023-06-21 PROCEDURE — 6360000004 HC RX CONTRAST MEDICATION: Performed by: INTERNAL MEDICINE

## 2023-06-21 PROCEDURE — 76604 US EXAM CHEST: CPT

## 2023-06-21 PROCEDURE — 85025 COMPLETE CBC W/AUTO DIFF WBC: CPT

## 2023-06-21 PROCEDURE — 0202U NFCT DS 22 TRGT SARS-COV-2: CPT

## 2023-06-21 PROCEDURE — 99223 1ST HOSP IP/OBS HIGH 75: CPT | Performed by: INTERNAL MEDICINE

## 2023-06-21 PROCEDURE — 51702 INSERT TEMP BLADDER CATH: CPT

## 2023-06-21 PROCEDURE — 82550 ASSAY OF CK (CPK): CPT

## 2023-06-21 RX ORDER — HYDRALAZINE HYDROCHLORIDE 10 MG/1
10 TABLET, FILM COATED ORAL EVERY 8 HOURS SCHEDULED
Status: DISCONTINUED | OUTPATIENT
Start: 2023-06-21 | End: 2023-06-22

## 2023-06-21 RX ORDER — INSULIN GLARGINE-YFGN 100 [IU]/ML
15 INJECTION, SOLUTION SUBCUTANEOUS NIGHTLY
Status: DISCONTINUED | OUTPATIENT
Start: 2023-06-21 | End: 2023-06-28

## 2023-06-21 RX ORDER — FUROSEMIDE 10 MG/ML
40 INJECTION INTRAMUSCULAR; INTRAVENOUS 2 TIMES DAILY
Status: DISCONTINUED | OUTPATIENT
Start: 2023-06-21 | End: 2023-06-21

## 2023-06-21 RX ORDER — FERROUS SULFATE 325(65) MG
325 TABLET ORAL
Status: CANCELLED | OUTPATIENT
Start: 2023-06-21

## 2023-06-21 RX ORDER — PANTOPRAZOLE SODIUM 40 MG/1
40 TABLET, DELAYED RELEASE ORAL
Status: DISCONTINUED | OUTPATIENT
Start: 2023-06-21 | End: 2023-06-27

## 2023-06-21 RX ADMIN — CEFAZOLIN 2000 MG: 2 INJECTION, POWDER, FOR SOLUTION INTRAMUSCULAR; INTRAVENOUS at 04:16

## 2023-06-21 RX ADMIN — IPRATROPIUM BROMIDE AND ALBUTEROL SULFATE 1 DOSE: .5; 2.5 SOLUTION RESPIRATORY (INHALATION) at 11:46

## 2023-06-21 RX ADMIN — PREGABALIN 75 MG: 75 CAPSULE ORAL at 13:55

## 2023-06-21 RX ADMIN — PREGABALIN 75 MG: 75 CAPSULE ORAL at 09:49

## 2023-06-21 RX ADMIN — PREGABALIN 75 MG: 75 CAPSULE ORAL at 20:31

## 2023-06-21 RX ADMIN — APIXABAN 5 MG: 5 TABLET, FILM COATED ORAL at 10:42

## 2023-06-21 RX ADMIN — LEVOFLOXACIN 750 MG: 5 INJECTION, SOLUTION INTRAVENOUS at 15:12

## 2023-06-21 RX ADMIN — APIXABAN 5 MG: 5 TABLET, FILM COATED ORAL at 20:31

## 2023-06-21 RX ADMIN — GUAIFENESIN 400 MG: 400 TABLET ORAL at 09:49

## 2023-06-21 RX ADMIN — FERROUS SULFATE TAB 325 MG (65 MG ELEMENTAL FE) 325 MG: 325 (65 FE) TAB at 16:43

## 2023-06-21 RX ADMIN — FERROUS SULFATE TAB 325 MG (65 MG ELEMENTAL FE) 325 MG: 325 (65 FE) TAB at 10:20

## 2023-06-21 RX ADMIN — CEFAZOLIN 2000 MG: 2 INJECTION, POWDER, FOR SOLUTION INTRAMUSCULAR; INTRAVENOUS at 14:08

## 2023-06-21 RX ADMIN — TAMSULOSIN HYDROCHLORIDE 0.4 MG: 0.4 CAPSULE ORAL at 10:12

## 2023-06-21 RX ADMIN — GUAIFENESIN 400 MG: 400 TABLET ORAL at 20:32

## 2023-06-21 RX ADMIN — INSULIN GLARGINE-YFGN 15 UNITS: 100 INJECTION, SOLUTION SUBCUTANEOUS at 20:32

## 2023-06-21 RX ADMIN — ARFORMOTEROL TARTRATE 15 MCG: 15 SOLUTION RESPIRATORY (INHALATION) at 20:06

## 2023-06-21 RX ADMIN — CEFAZOLIN 2000 MG: 2 INJECTION, POWDER, FOR SOLUTION INTRAMUSCULAR; INTRAVENOUS at 23:31

## 2023-06-21 RX ADMIN — IPRATROPIUM BROMIDE AND ALBUTEROL SULFATE 1 DOSE: .5; 2.5 SOLUTION RESPIRATORY (INHALATION) at 08:11

## 2023-06-21 RX ADMIN — ESCITALOPRAM OXALATE 10 MG: 10 TABLET ORAL at 09:49

## 2023-06-21 RX ADMIN — METOPROLOL SUCCINATE 125 MG: 100 TABLET, EXTENDED RELEASE ORAL at 20:32

## 2023-06-21 RX ADMIN — IPRATROPIUM BROMIDE AND ALBUTEROL SULFATE 1 DOSE: .5; 2.5 SOLUTION RESPIRATORY (INHALATION) at 20:06

## 2023-06-21 RX ADMIN — SODIUM ZIRCONIUM CYCLOSILICATE 10 G: 10 POWDER, FOR SUSPENSION ORAL at 10:12

## 2023-06-21 RX ADMIN — FUROSEMIDE 7.5 MG/HR: 10 INJECTION, SOLUTION INTRAMUSCULAR; INTRAVENOUS at 15:54

## 2023-06-21 RX ADMIN — METOPROLOL SUCCINATE 100 MG: 100 TABLET, EXTENDED RELEASE ORAL at 09:49

## 2023-06-21 RX ADMIN — PANTOPRAZOLE SODIUM 40 MG: 40 TABLET, DELAYED RELEASE ORAL at 10:12

## 2023-06-21 RX ADMIN — GUAIFENESIN 400 MG: 400 TABLET ORAL at 13:55

## 2023-06-21 RX ADMIN — FLUTICASONE PROPIONATE 1 SPRAY: 50 SPRAY, METERED NASAL at 11:25

## 2023-06-21 RX ADMIN — HYDRALAZINE HYDROCHLORIDE 10 MG: 10 TABLET, FILM COATED ORAL at 16:43

## 2023-06-21 RX ADMIN — PERFLUTREN 1.5 ML: 6.52 INJECTION, SUSPENSION INTRAVENOUS at 13:52

## 2023-06-21 RX ADMIN — IPRATROPIUM BROMIDE AND ALBUTEROL SULFATE 1 DOSE: .5; 2.5 SOLUTION RESPIRATORY (INHALATION) at 16:09

## 2023-06-21 RX ADMIN — HYDRALAZINE HYDROCHLORIDE 10 MG: 10 TABLET, FILM COATED ORAL at 20:32

## 2023-06-21 RX ADMIN — ARFORMOTEROL TARTRATE 15 MCG: 15 SOLUTION RESPIRATORY (INHALATION) at 08:11

## 2023-06-21 RX ADMIN — BUDESONIDE 500 MCG: 0.25 SUSPENSION RESPIRATORY (INHALATION) at 20:07

## 2023-06-21 RX ADMIN — BUDESONIDE 500 MCG: 0.25 SUSPENSION RESPIRATORY (INHALATION) at 08:11

## 2023-06-21 RX ADMIN — GUAIFENESIN 400 MG: 400 TABLET ORAL at 16:43

## 2023-06-21 RX ADMIN — CEFAZOLIN 2000 MG: 2 INJECTION, POWDER, FOR SOLUTION INTRAMUSCULAR; INTRAVENOUS at 10:12

## 2023-06-21 ASSESSMENT — PAIN DESCRIPTION - ORIENTATION: ORIENTATION: LEFT

## 2023-06-21 ASSESSMENT — PAIN DESCRIPTION - LOCATION: LOCATION: HEAD

## 2023-06-21 ASSESSMENT — ENCOUNTER SYMPTOMS
EYES NEGATIVE: 1
NAUSEA: 0
COUGH: 1
VOMITING: 0
SHORTNESS OF BREATH: 1
COLOR CHANGE: 0
ABDOMINAL PAIN: 0

## 2023-06-21 ASSESSMENT — PAIN SCALES - GENERAL
PAINLEVEL_OUTOF10: 0
PAINLEVEL_OUTOF10: 0
PAINLEVEL_OUTOF10: 5

## 2023-06-21 ASSESSMENT — PAIN DESCRIPTION - ONSET: ONSET: ON-GOING

## 2023-06-21 ASSESSMENT — PAIN DESCRIPTION - FREQUENCY: FREQUENCY: CONTINUOUS

## 2023-06-21 ASSESSMENT — PAIN DESCRIPTION - PAIN TYPE: TYPE: ACUTE PAIN

## 2023-06-21 ASSESSMENT — PAIN DESCRIPTION - DESCRIPTORS: DESCRIPTORS: DISCOMFORT

## 2023-06-21 ASSESSMENT — PAIN - FUNCTIONAL ASSESSMENT: PAIN_FUNCTIONAL_ASSESSMENT: ACTIVITIES ARE NOT PREVENTED

## 2023-06-22 ENCOUNTER — APPOINTMENT (OUTPATIENT)
Dept: GENERAL RADIOLOGY | Age: 63
DRG: 291 | End: 2023-06-22
Payer: COMMERCIAL

## 2023-06-22 LAB
ALBUMIN SERPL-MCNC: 2.8 G/DL (ref 3.5–5.2)
ALP SERPL-CCNC: 85 U/L (ref 40–129)
ALT SERPL-CCNC: 85 U/L (ref 0–40)
ANION GAP SERPL CALCULATED.3IONS-SCNC: 14 MMOL/L (ref 7–16)
ANISOCYTOSIS: ABNORMAL
AST SERPL-CCNC: 396 U/L (ref 0–39)
BASOPHILS # BLD: 0 E9/L (ref 0–0.2)
BASOPHILS NFR BLD: 0.3 % (ref 0–2)
BILIRUB SERPL-MCNC: 0.4 MG/DL (ref 0–1.2)
BNP BLD-MCNC: ABNORMAL PG/ML (ref 0–125)
BUN SERPL-MCNC: 33 MG/DL (ref 6–23)
BURR CELLS: ABNORMAL
CA-I BLD-SCNC: 1.08 MMOL/L (ref 1.15–1.33)
CALCIUM SERPL-MCNC: 7.7 MG/DL (ref 8.6–10.2)
CHLORIDE SERPL-SCNC: 98 MMOL/L (ref 98–107)
CO2 SERPL-SCNC: 27 MMOL/L (ref 22–29)
CREAT SERPL-MCNC: 1.5 MG/DL (ref 0.7–1.2)
EKG ATRIAL RATE: 107 BPM
EKG Q-T INTERVAL: 394 MS
EKG QRS DURATION: 120 MS
EKG QTC CALCULATION (BAZETT): 508 MS
EKG R AXIS: 9 DEGREES
EKG T AXIS: 36 DEGREES
EKG VENTRICULAR RATE: 100 BPM
EOSINOPHIL # BLD: 0 E9/L (ref 0.05–0.5)
EOSINOPHIL NFR BLD: 0 % (ref 0–6)
ERYTHROCYTE [DISTWIDTH] IN BLOOD BY AUTOMATED COUNT: 16.3 FL (ref 11.5–15)
GLUCOSE SERPL-MCNC: 119 MG/DL (ref 74–99)
HCT VFR BLD AUTO: 28 % (ref 37–54)
HGB BLD-MCNC: 8.7 G/DL (ref 12.5–16.5)
LYMPHOCYTES # BLD: 0.98 E9/L (ref 1.5–4)
LYMPHOCYTES NFR BLD: 4.4 % (ref 20–42)
MAGNESIUM SERPL-MCNC: 1.3 MG/DL (ref 1.6–2.6)
MCH RBC QN AUTO: 28 PG (ref 26–35)
MCHC RBC AUTO-ENTMCNC: 31.1 % (ref 32–34.5)
MCV RBC AUTO: 90 FL (ref 80–99.9)
METER GLUCOSE: 100 MG/DL (ref 74–99)
METER GLUCOSE: 116 MG/DL (ref 74–99)
METER GLUCOSE: 125 MG/DL (ref 74–99)
METER GLUCOSE: 156 MG/DL (ref 74–99)
MONOCYTES # BLD: 0.74 E9/L (ref 0.1–0.95)
MONOCYTES NFR BLD: 2.6 % (ref 2–12)
NEUTROPHILS # BLD: 22.88 E9/L (ref 1.8–7.3)
NEUTS SEG NFR BLD: 93 % (ref 43–80)
NRBC BLD-RTO: 1.7 /100 WBC
OVALOCYTES: ABNORMAL
PHOSPHATE SERPL-MCNC: 4.1 MG/DL (ref 2.5–4.5)
PLATELET # BLD AUTO: 243 E9/L (ref 130–450)
PMV BLD AUTO: 11.2 FL (ref 7–12)
POIKILOCYTES: ABNORMAL
POLYCHROMASIA: ABNORMAL
POTASSIUM SERPL-SCNC: 3.8 MMOL/L (ref 3.5–5)
PROT SERPL-MCNC: 5.6 G/DL (ref 6.4–8.3)
RBC # BLD AUTO: 3.11 E12/L (ref 3.8–5.8)
SODIUM SERPL-SCNC: 139 MMOL/L (ref 132–146)
TEAR DROP CELLS: ABNORMAL
WBC # BLD: 24.6 E9/L (ref 4.5–11.5)

## 2023-06-22 PROCEDURE — 97530 THERAPEUTIC ACTIVITIES: CPT

## 2023-06-22 PROCEDURE — 6360000002 HC RX W HCPCS: Performed by: INTERNAL MEDICINE

## 2023-06-22 PROCEDURE — 6360000002 HC RX W HCPCS: Performed by: FAMILY MEDICINE

## 2023-06-22 PROCEDURE — 2580000003 HC RX 258: Performed by: INTERNAL MEDICINE

## 2023-06-22 PROCEDURE — 99291 CRITICAL CARE FIRST HOUR: CPT | Performed by: INTERNAL MEDICINE

## 2023-06-22 PROCEDURE — 6370000000 HC RX 637 (ALT 250 FOR IP): Performed by: FAMILY MEDICINE

## 2023-06-22 PROCEDURE — 83880 ASSAY OF NATRIURETIC PEPTIDE: CPT

## 2023-06-22 PROCEDURE — 2700000000 HC OXYGEN THERAPY PER DAY

## 2023-06-22 PROCEDURE — 85025 COMPLETE CBC W/AUTO DIFF WBC: CPT

## 2023-06-22 PROCEDURE — 83735 ASSAY OF MAGNESIUM: CPT

## 2023-06-22 PROCEDURE — 36592 COLLECT BLOOD FROM PICC: CPT

## 2023-06-22 PROCEDURE — 80053 COMPREHEN METABOLIC PANEL: CPT

## 2023-06-22 PROCEDURE — 6360000002 HC RX W HCPCS

## 2023-06-22 PROCEDURE — 84100 ASSAY OF PHOSPHORUS: CPT

## 2023-06-22 PROCEDURE — 97166 OT EVAL MOD COMPLEX 45 MIN: CPT

## 2023-06-22 PROCEDURE — 6370000000 HC RX 637 (ALT 250 FOR IP)

## 2023-06-22 PROCEDURE — 99232 SBSQ HOSP IP/OBS MODERATE 35: CPT | Performed by: INTERNAL MEDICINE

## 2023-06-22 PROCEDURE — 1200000000 HC SEMI PRIVATE

## 2023-06-22 PROCEDURE — 94640 AIRWAY INHALATION TREATMENT: CPT

## 2023-06-22 PROCEDURE — 82962 GLUCOSE BLOOD TEST: CPT

## 2023-06-22 PROCEDURE — 6370000000 HC RX 637 (ALT 250 FOR IP): Performed by: INTERNAL MEDICINE

## 2023-06-22 PROCEDURE — 82330 ASSAY OF CALCIUM: CPT

## 2023-06-22 PROCEDURE — 93010 ELECTROCARDIOGRAM REPORT: CPT | Performed by: INTERNAL MEDICINE

## 2023-06-22 PROCEDURE — S5553 INSULIN LONG ACTING 5 U: HCPCS | Performed by: FAMILY MEDICINE

## 2023-06-22 PROCEDURE — 71045 X-RAY EXAM CHEST 1 VIEW: CPT

## 2023-06-22 RX ORDER — POTASSIUM CHLORIDE 20 MEQ/1
40 TABLET, EXTENDED RELEASE ORAL ONCE
Status: COMPLETED | OUTPATIENT
Start: 2023-06-22 | End: 2023-06-22

## 2023-06-22 RX ORDER — ISOSORBIDE MONONITRATE 30 MG/1
30 TABLET, EXTENDED RELEASE ORAL DAILY
Status: DISCONTINUED | OUTPATIENT
Start: 2023-06-22 | End: 2023-06-28

## 2023-06-22 RX ORDER — POTASSIUM CHLORIDE 20 MEQ/1
40 TABLET, EXTENDED RELEASE ORAL
Status: DISCONTINUED | OUTPATIENT
Start: 2023-06-22 | End: 2023-06-22

## 2023-06-22 RX ORDER — ACETAMINOPHEN 325 MG/1
650 TABLET ORAL EVERY 4 HOURS PRN
Status: DISCONTINUED | OUTPATIENT
Start: 2023-06-22 | End: 2023-06-27

## 2023-06-22 RX ORDER — HYDRALAZINE HYDROCHLORIDE 25 MG/1
25 TABLET, FILM COATED ORAL EVERY 8 HOURS SCHEDULED
Status: DISCONTINUED | OUTPATIENT
Start: 2023-06-22 | End: 2023-06-28

## 2023-06-22 RX ORDER — MAGNESIUM SULFATE IN WATER 40 MG/ML
2000 INJECTION, SOLUTION INTRAVENOUS ONCE
Status: COMPLETED | OUTPATIENT
Start: 2023-06-22 | End: 2023-06-22

## 2023-06-22 RX ORDER — LEVOFLOXACIN 5 MG/ML
750 INJECTION, SOLUTION INTRAVENOUS
Status: DISCONTINUED | OUTPATIENT
Start: 2023-06-23 | End: 2023-06-22

## 2023-06-22 RX ORDER — FUROSEMIDE 10 MG/ML
40 INJECTION INTRAMUSCULAR; INTRAVENOUS DAILY
Status: DISCONTINUED | OUTPATIENT
Start: 2023-06-22 | End: 2023-06-22

## 2023-06-22 RX ORDER — FUROSEMIDE 40 MG/1
40 TABLET ORAL DAILY
Status: DISCONTINUED | OUTPATIENT
Start: 2023-06-23 | End: 2023-06-28

## 2023-06-22 RX ADMIN — METOPROLOL SUCCINATE 100 MG: 100 TABLET, EXTENDED RELEASE ORAL at 08:47

## 2023-06-22 RX ADMIN — TAMSULOSIN HYDROCHLORIDE 0.4 MG: 0.4 CAPSULE ORAL at 09:02

## 2023-06-22 RX ADMIN — PREGABALIN 75 MG: 75 CAPSULE ORAL at 20:00

## 2023-06-22 RX ADMIN — APIXABAN 5 MG: 5 TABLET, FILM COATED ORAL at 08:46

## 2023-06-22 RX ADMIN — CEFAZOLIN 2000 MG: 2 INJECTION, POWDER, FOR SOLUTION INTRAMUSCULAR; INTRAVENOUS at 16:19

## 2023-06-22 RX ADMIN — MAGNESIUM SULFATE HEPTAHYDRATE 2000 MG: 40 INJECTION, SOLUTION INTRAVENOUS at 06:56

## 2023-06-22 RX ADMIN — PREGABALIN 75 MG: 75 CAPSULE ORAL at 08:47

## 2023-06-22 RX ADMIN — ARFORMOTEROL TARTRATE 15 MCG: 15 SOLUTION RESPIRATORY (INHALATION) at 09:12

## 2023-06-22 RX ADMIN — IPRATROPIUM BROMIDE AND ALBUTEROL SULFATE 1 DOSE: .5; 2.5 SOLUTION RESPIRATORY (INHALATION) at 12:36

## 2023-06-22 RX ADMIN — GUAIFENESIN 400 MG: 400 TABLET ORAL at 16:09

## 2023-06-22 RX ADMIN — PREGABALIN 75 MG: 75 CAPSULE ORAL at 13:13

## 2023-06-22 RX ADMIN — POTASSIUM BICARBONATE 40 MEQ: 782 TABLET, EFFERVESCENT ORAL at 13:13

## 2023-06-22 RX ADMIN — MUPIROCIN: 20 OINTMENT TOPICAL at 13:28

## 2023-06-22 RX ADMIN — ACETAMINOPHEN 650 MG: 325 TABLET ORAL at 20:00

## 2023-06-22 RX ADMIN — ISOSORBIDE MONONITRATE 30 MG: 30 TABLET, EXTENDED RELEASE ORAL at 13:13

## 2023-06-22 RX ADMIN — BUDESONIDE 500 MCG: 0.25 SUSPENSION RESPIRATORY (INHALATION) at 20:26

## 2023-06-22 RX ADMIN — FLUTICASONE PROPIONATE 1 SPRAY: 50 SPRAY, METERED NASAL at 08:47

## 2023-06-22 RX ADMIN — CEFAZOLIN 2000 MG: 2 INJECTION, POWDER, FOR SOLUTION INTRAMUSCULAR; INTRAVENOUS at 23:50

## 2023-06-22 RX ADMIN — CEFAZOLIN 2000 MG: 2 INJECTION, POWDER, FOR SOLUTION INTRAMUSCULAR; INTRAVENOUS at 06:54

## 2023-06-22 RX ADMIN — IPRATROPIUM BROMIDE AND ALBUTEROL SULFATE 1 DOSE: .5; 2.5 SOLUTION RESPIRATORY (INHALATION) at 09:12

## 2023-06-22 RX ADMIN — GUAIFENESIN 400 MG: 400 TABLET ORAL at 08:47

## 2023-06-22 RX ADMIN — FERROUS SULFATE TAB 325 MG (65 MG ELEMENTAL FE) 325 MG: 325 (65 FE) TAB at 16:09

## 2023-06-22 RX ADMIN — GUAIFENESIN 400 MG: 400 TABLET ORAL at 13:13

## 2023-06-22 RX ADMIN — HYDRALAZINE HYDROCHLORIDE 25 MG: 25 TABLET, FILM COATED ORAL at 13:13

## 2023-06-22 RX ADMIN — ACETAMINOPHEN 650 MG: 325 TABLET ORAL at 16:09

## 2023-06-22 RX ADMIN — POTASSIUM CHLORIDE 40 MEQ: 1500 TABLET, EXTENDED RELEASE ORAL at 10:06

## 2023-06-22 RX ADMIN — FERROUS SULFATE TAB 325 MG (65 MG ELEMENTAL FE) 325 MG: 325 (65 FE) TAB at 08:46

## 2023-06-22 RX ADMIN — BUDESONIDE 500 MCG: 0.25 SUSPENSION RESPIRATORY (INHALATION) at 09:12

## 2023-06-22 RX ADMIN — METOPROLOL SUCCINATE 125 MG: 100 TABLET, EXTENDED RELEASE ORAL at 20:01

## 2023-06-22 RX ADMIN — INSULIN GLARGINE-YFGN 15 UNITS: 100 INJECTION, SOLUTION SUBCUTANEOUS at 20:00

## 2023-06-22 RX ADMIN — MUPIROCIN: 20 OINTMENT TOPICAL at 20:02

## 2023-06-22 RX ADMIN — ESCITALOPRAM OXALATE 10 MG: 10 TABLET ORAL at 08:47

## 2023-06-22 RX ADMIN — FUROSEMIDE 40 MG: 10 INJECTION, SOLUTION INTRAMUSCULAR; INTRAVENOUS at 10:10

## 2023-06-22 RX ADMIN — FUROSEMIDE 7.5 MG/HR: 10 INJECTION, SOLUTION INTRAMUSCULAR; INTRAVENOUS at 04:08

## 2023-06-22 RX ADMIN — ARFORMOTEROL TARTRATE 15 MCG: 15 SOLUTION RESPIRATORY (INHALATION) at 20:26

## 2023-06-22 RX ADMIN — GUAIFENESIN 400 MG: 400 TABLET ORAL at 20:00

## 2023-06-22 RX ADMIN — IPRATROPIUM BROMIDE AND ALBUTEROL SULFATE 1 DOSE: .5; 2.5 SOLUTION RESPIRATORY (INHALATION) at 20:26

## 2023-06-22 RX ADMIN — IPRATROPIUM BROMIDE AND ALBUTEROL SULFATE 1 DOSE: .5; 2.5 SOLUTION RESPIRATORY (INHALATION) at 16:50

## 2023-06-22 ASSESSMENT — PAIN DESCRIPTION - PAIN TYPE: TYPE: ACUTE PAIN

## 2023-06-22 ASSESSMENT — PAIN SCALES - GENERAL
PAINLEVEL_OUTOF10: 0
PAINLEVEL_OUTOF10: 5
PAINLEVEL_OUTOF10: 3
PAINLEVEL_OUTOF10: 0

## 2023-06-22 ASSESSMENT — PAIN DESCRIPTION - LOCATION: LOCATION: HEAD

## 2023-06-22 ASSESSMENT — PAIN - FUNCTIONAL ASSESSMENT: PAIN_FUNCTIONAL_ASSESSMENT: ACTIVITIES ARE NOT PREVENTED

## 2023-06-22 ASSESSMENT — PAIN DESCRIPTION - ORIENTATION: ORIENTATION: MID

## 2023-06-22 ASSESSMENT — PAIN DESCRIPTION - FREQUENCY: FREQUENCY: INTERMITTENT

## 2023-06-22 ASSESSMENT — PAIN DESCRIPTION - DESCRIPTORS: DESCRIPTORS: ACHING

## 2023-06-22 ASSESSMENT — PAIN DESCRIPTION - ONSET: ONSET: GRADUAL

## 2023-06-23 ENCOUNTER — APPOINTMENT (OUTPATIENT)
Dept: GENERAL RADIOLOGY | Age: 63
DRG: 291 | End: 2023-06-23
Payer: COMMERCIAL

## 2023-06-23 ENCOUNTER — APPOINTMENT (OUTPATIENT)
Dept: CT IMAGING | Age: 63
DRG: 291 | End: 2023-06-23
Attending: PSYCHIATRY & NEUROLOGY
Payer: COMMERCIAL

## 2023-06-23 LAB
ALBUMIN SERPL-MCNC: 2.8 G/DL (ref 3.5–5.2)
ALP SERPL-CCNC: 98 U/L (ref 40–129)
ALT SERPL-CCNC: 27 U/L (ref 0–40)
ANION GAP SERPL CALCULATED.3IONS-SCNC: 15 MMOL/L (ref 7–16)
ANISOCYTOSIS: ABNORMAL
AST SERPL-CCNC: 129 U/L (ref 0–39)
BACTERIA UR CULT: NORMAL
BASOPHILS # BLD: 0.06 E9/L (ref 0–0.2)
BASOPHILS NFR BLD: 0.3 % (ref 0–2)
BILIRUB SERPL-MCNC: 0.5 MG/DL (ref 0–1.2)
BNP BLD-MCNC: ABNORMAL PG/ML (ref 0–125)
BUN SERPL-MCNC: 37 MG/DL (ref 6–23)
CA-I BLD-SCNC: 1.06 MMOL/L (ref 1.15–1.33)
CALCIUM SERPL-MCNC: 7.9 MG/DL (ref 8.6–10.2)
CHLORIDE SERPL-SCNC: 98 MMOL/L (ref 98–107)
CO2 SERPL-SCNC: 28 MMOL/L (ref 22–29)
CREAT SERPL-MCNC: 1.7 MG/DL (ref 0.7–1.2)
EOSINOPHIL # BLD: 0.11 E9/L (ref 0.05–0.5)
EOSINOPHIL NFR BLD: 0.5 % (ref 0–6)
ERYTHROCYTE [DISTWIDTH] IN BLOOD BY AUTOMATED COUNT: 16.8 FL (ref 11.5–15)
GLUCOSE SERPL-MCNC: 131 MG/DL (ref 74–99)
HCT VFR BLD AUTO: 32.1 % (ref 37–54)
HGB BLD-MCNC: 9.7 G/DL (ref 12.5–16.5)
IMM GRANULOCYTES # BLD: 0.37 E9/L
IMM GRANULOCYTES NFR BLD: 1.6 % (ref 0–5)
LYMPHOCYTES # BLD: 1.09 E9/L (ref 1.5–4)
LYMPHOCYTES NFR BLD: 4.8 % (ref 20–42)
MAGNESIUM SERPL-MCNC: 1.4 MG/DL (ref 1.6–2.6)
MCH RBC QN AUTO: 27.8 PG (ref 26–35)
MCHC RBC AUTO-ENTMCNC: 30.2 % (ref 32–34.5)
MCV RBC AUTO: 92 FL (ref 80–99.9)
METER GLUCOSE: 111 MG/DL (ref 74–99)
METER GLUCOSE: 114 MG/DL (ref 74–99)
METER GLUCOSE: 130 MG/DL (ref 74–99)
MONOCYTES # BLD: 1.74 E9/L (ref 0.1–0.95)
MONOCYTES NFR BLD: 7.6 % (ref 2–12)
NEUTROPHILS # BLD: 19.43 E9/L (ref 1.8–7.3)
NEUTS SEG NFR BLD: 85.2 % (ref 43–80)
OVALOCYTES: ABNORMAL
PHOSPHATE SERPL-MCNC: 3.4 MG/DL (ref 2.5–4.5)
PLATELET # BLD AUTO: 243 E9/L (ref 130–450)
PMV BLD AUTO: 11.4 FL (ref 7–12)
POIKILOCYTES: ABNORMAL
POLYCHROMASIA: ABNORMAL
POTASSIUM SERPL-SCNC: 3.6 MMOL/L (ref 3.5–5)
PROT SERPL-MCNC: 5.5 G/DL (ref 6.4–8.3)
RBC # BLD AUTO: 3.49 E12/L (ref 3.8–5.8)
SCHISTOCYTES: ABNORMAL
SODIUM SERPL-SCNC: 141 MMOL/L (ref 132–146)
T3 SERPL-MCNC: 77.47 NG/DL (ref 80–200)
T4 FREE SERPL-MCNC: 1.54 NG/DL (ref 0.93–1.7)
T4 SERPL-MCNC: 8 MCG/DL (ref 4.5–11.7)
TEAR DROP CELLS: ABNORMAL
WBC # BLD: 22.8 E9/L (ref 4.5–11.5)

## 2023-06-23 PROCEDURE — S5553 INSULIN LONG ACTING 5 U: HCPCS | Performed by: FAMILY MEDICINE

## 2023-06-23 PROCEDURE — 2700000000 HC OXYGEN THERAPY PER DAY

## 2023-06-23 PROCEDURE — 82962 GLUCOSE BLOOD TEST: CPT

## 2023-06-23 PROCEDURE — 85025 COMPLETE CBC W/AUTO DIFF WBC: CPT

## 2023-06-23 PROCEDURE — 84100 ASSAY OF PHOSPHORUS: CPT

## 2023-06-23 PROCEDURE — 6360000002 HC RX W HCPCS: Performed by: INTERNAL MEDICINE

## 2023-06-23 PROCEDURE — 6370000000 HC RX 637 (ALT 250 FOR IP): Performed by: FAMILY MEDICINE

## 2023-06-23 PROCEDURE — 6370000000 HC RX 637 (ALT 250 FOR IP): Performed by: INTERNAL MEDICINE

## 2023-06-23 PROCEDURE — 6360000004 HC RX CONTRAST MEDICATION: Performed by: RADIOLOGY

## 2023-06-23 PROCEDURE — 36415 COLL VENOUS BLD VENIPUNCTURE: CPT

## 2023-06-23 PROCEDURE — 70498 CT ANGIOGRAPHY NECK: CPT

## 2023-06-23 PROCEDURE — 80053 COMPREHEN METABOLIC PANEL: CPT

## 2023-06-23 PROCEDURE — 71045 X-RAY EXAM CHEST 1 VIEW: CPT

## 2023-06-23 PROCEDURE — 6360000002 HC RX W HCPCS: Performed by: FAMILY MEDICINE

## 2023-06-23 PROCEDURE — 82330 ASSAY OF CALCIUM: CPT

## 2023-06-23 PROCEDURE — 2580000003 HC RX 258: Performed by: INTERNAL MEDICINE

## 2023-06-23 PROCEDURE — 86376 MICROSOMAL ANTIBODY EACH: CPT

## 2023-06-23 PROCEDURE — 6360000002 HC RX W HCPCS

## 2023-06-23 PROCEDURE — 94640 AIRWAY INHALATION TREATMENT: CPT

## 2023-06-23 PROCEDURE — 70496 CT ANGIOGRAPHY HEAD: CPT

## 2023-06-23 PROCEDURE — 99232 SBSQ HOSP IP/OBS MODERATE 35: CPT | Performed by: INTERNAL MEDICINE

## 2023-06-23 PROCEDURE — 83880 ASSAY OF NATRIURETIC PEPTIDE: CPT

## 2023-06-23 PROCEDURE — 84481 FREE ASSAY (FT-3): CPT

## 2023-06-23 PROCEDURE — 6370000000 HC RX 637 (ALT 250 FOR IP)

## 2023-06-23 PROCEDURE — 99232 SBSQ HOSP IP/OBS MODERATE 35: CPT

## 2023-06-23 PROCEDURE — 1200000000 HC SEMI PRIVATE

## 2023-06-23 PROCEDURE — 83735 ASSAY OF MAGNESIUM: CPT

## 2023-06-23 PROCEDURE — 84439 ASSAY OF FREE THYROXINE: CPT

## 2023-06-23 RX ORDER — MAGNESIUM SULFATE IN WATER 40 MG/ML
4000 INJECTION, SOLUTION INTRAVENOUS ONCE
Status: COMPLETED | OUTPATIENT
Start: 2023-06-23 | End: 2023-06-23

## 2023-06-23 RX ORDER — POTASSIUM CHLORIDE 20 MEQ/1
20 TABLET, EXTENDED RELEASE ORAL ONCE
Status: COMPLETED | OUTPATIENT
Start: 2023-06-23 | End: 2023-06-23

## 2023-06-23 RX ORDER — MAGNESIUM SULFATE IN WATER 40 MG/ML
2000 INJECTION, SOLUTION INTRAVENOUS ONCE
Status: COMPLETED | OUTPATIENT
Start: 2023-06-23 | End: 2023-06-23

## 2023-06-23 RX ORDER — ENOXAPARIN SODIUM 150 MG/ML
1 INJECTION SUBCUTANEOUS 2 TIMES DAILY
Status: DISCONTINUED | OUTPATIENT
Start: 2023-06-23 | End: 2023-06-27

## 2023-06-23 RX ORDER — LANOLIN ALCOHOL/MO/W.PET/CERES
400 CREAM (GRAM) TOPICAL 2 TIMES DAILY
Status: DISCONTINUED | OUTPATIENT
Start: 2023-06-23 | End: 2023-06-28

## 2023-06-23 RX ADMIN — MUPIROCIN: 20 OINTMENT TOPICAL at 10:13

## 2023-06-23 RX ADMIN — FERROUS SULFATE TAB 325 MG (65 MG ELEMENTAL FE) 325 MG: 325 (65 FE) TAB at 10:08

## 2023-06-23 RX ADMIN — MUPIROCIN: 20 OINTMENT TOPICAL at 20:57

## 2023-06-23 RX ADMIN — IPRATROPIUM BROMIDE AND ALBUTEROL SULFATE 1 DOSE: .5; 2.5 SOLUTION RESPIRATORY (INHALATION) at 19:18

## 2023-06-23 RX ADMIN — GUAIFENESIN 400 MG: 400 TABLET ORAL at 12:38

## 2023-06-23 RX ADMIN — Medication 400 MG: at 10:25

## 2023-06-23 RX ADMIN — FERROUS SULFATE TAB 325 MG (65 MG ELEMENTAL FE) 325 MG: 325 (65 FE) TAB at 16:59

## 2023-06-23 RX ADMIN — IPRATROPIUM BROMIDE AND ALBUTEROL SULFATE 1 DOSE: .5; 2.5 SOLUTION RESPIRATORY (INHALATION) at 12:31

## 2023-06-23 RX ADMIN — PREGABALIN 75 MG: 75 CAPSULE ORAL at 14:54

## 2023-06-23 RX ADMIN — TAMSULOSIN HYDROCHLORIDE 0.4 MG: 0.4 CAPSULE ORAL at 10:08

## 2023-06-23 RX ADMIN — ARFORMOTEROL TARTRATE 15 MCG: 15 SOLUTION RESPIRATORY (INHALATION) at 08:58

## 2023-06-23 RX ADMIN — CALCIUM CARBONATE-VITAMIN D TAB 500 MG-200 UNIT 1 TABLET: 500-200 TAB at 21:00

## 2023-06-23 RX ADMIN — ACETAMINOPHEN 650 MG: 325 TABLET ORAL at 16:58

## 2023-06-23 RX ADMIN — BUDESONIDE 500 MCG: 0.25 SUSPENSION RESPIRATORY (INHALATION) at 19:18

## 2023-06-23 RX ADMIN — BUDESONIDE 500 MCG: 0.25 SUSPENSION RESPIRATORY (INHALATION) at 08:59

## 2023-06-23 RX ADMIN — FUROSEMIDE 40 MG: 40 TABLET ORAL at 10:03

## 2023-06-23 RX ADMIN — IOPAMIDOL 60 ML: 755 INJECTION, SOLUTION INTRAVENOUS at 08:33

## 2023-06-23 RX ADMIN — ESCITALOPRAM OXALATE 10 MG: 10 TABLET ORAL at 10:07

## 2023-06-23 RX ADMIN — PANTOPRAZOLE SODIUM 40 MG: 40 TABLET, DELAYED RELEASE ORAL at 06:51

## 2023-06-23 RX ADMIN — GUAIFENESIN 400 MG: 400 TABLET ORAL at 21:01

## 2023-06-23 RX ADMIN — MAGNESIUM SULFATE HEPTAHYDRATE 4000 MG: 40 INJECTION, SOLUTION INTRAVENOUS at 17:23

## 2023-06-23 RX ADMIN — ARFORMOTEROL TARTRATE 15 MCG: 15 SOLUTION RESPIRATORY (INHALATION) at 19:18

## 2023-06-23 RX ADMIN — CEFAZOLIN 2000 MG: 2 INJECTION, POWDER, FOR SOLUTION INTRAMUSCULAR; INTRAVENOUS at 23:34

## 2023-06-23 RX ADMIN — PREGABALIN 75 MG: 75 CAPSULE ORAL at 21:01

## 2023-06-23 RX ADMIN — CALCIUM CARBONATE-VITAMIN D TAB 500 MG-200 UNIT 1 TABLET: 500-200 TAB at 12:38

## 2023-06-23 RX ADMIN — METOPROLOL SUCCINATE 100 MG: 100 TABLET, EXTENDED RELEASE ORAL at 10:03

## 2023-06-23 RX ADMIN — ISOSORBIDE MONONITRATE 30 MG: 30 TABLET, EXTENDED RELEASE ORAL at 10:03

## 2023-06-23 RX ADMIN — GUAIFENESIN 400 MG: 400 TABLET ORAL at 16:59

## 2023-06-23 RX ADMIN — IPRATROPIUM BROMIDE AND ALBUTEROL SULFATE 1 DOSE: .5; 2.5 SOLUTION RESPIRATORY (INHALATION) at 15:46

## 2023-06-23 RX ADMIN — INSULIN GLARGINE-YFGN 15 UNITS: 100 INJECTION, SOLUTION SUBCUTANEOUS at 21:08

## 2023-06-23 RX ADMIN — Medication 400 MG: at 21:05

## 2023-06-23 RX ADMIN — ACETAMINOPHEN 650 MG: 325 TABLET ORAL at 12:37

## 2023-06-23 RX ADMIN — PREGABALIN 75 MG: 75 CAPSULE ORAL at 10:08

## 2023-06-23 RX ADMIN — GUAIFENESIN 400 MG: 400 TABLET ORAL at 10:07

## 2023-06-23 RX ADMIN — CEFAZOLIN 2000 MG: 2 INJECTION, POWDER, FOR SOLUTION INTRAMUSCULAR; INTRAVENOUS at 06:52

## 2023-06-23 RX ADMIN — POTASSIUM BICARBONATE 40 MEQ: 782 TABLET, EFFERVESCENT ORAL at 12:37

## 2023-06-23 RX ADMIN — IPRATROPIUM BROMIDE AND ALBUTEROL SULFATE 1 DOSE: .5; 2.5 SOLUTION RESPIRATORY (INHALATION) at 08:57

## 2023-06-23 RX ADMIN — CEFAZOLIN 2000 MG: 2 INJECTION, POWDER, FOR SOLUTION INTRAMUSCULAR; INTRAVENOUS at 14:54

## 2023-06-23 RX ADMIN — HYDRALAZINE HYDROCHLORIDE 25 MG: 25 TABLET, FILM COATED ORAL at 06:58

## 2023-06-23 RX ADMIN — POTASSIUM CHLORIDE 20 MEQ: 1500 TABLET, EXTENDED RELEASE ORAL at 14:54

## 2023-06-23 RX ADMIN — METOPROLOL SUCCINATE 125 MG: 100 TABLET, EXTENDED RELEASE ORAL at 21:00

## 2023-06-23 RX ADMIN — MAGNESIUM SULFATE HEPTAHYDRATE 2000 MG: 40 INJECTION, SOLUTION INTRAVENOUS at 10:30

## 2023-06-23 RX ADMIN — FLUTICASONE PROPIONATE 1 SPRAY: 50 SPRAY, METERED NASAL at 10:13

## 2023-06-23 ASSESSMENT — PAIN DESCRIPTION - DESCRIPTORS
DESCRIPTORS: THROBBING

## 2023-06-23 ASSESSMENT — PAIN SCALES - GENERAL
PAINLEVEL_OUTOF10: 0
PAINLEVEL_OUTOF10: 4
PAINLEVEL_OUTOF10: 6
PAINLEVEL_OUTOF10: 0
PAINLEVEL_OUTOF10: 6

## 2023-06-23 ASSESSMENT — PAIN DESCRIPTION - ORIENTATION
ORIENTATION: MID
ORIENTATION: MID

## 2023-06-23 ASSESSMENT — PAIN DESCRIPTION - LOCATION
LOCATION: HEAD

## 2023-06-23 ASSESSMENT — PAIN - FUNCTIONAL ASSESSMENT: PAIN_FUNCTIONAL_ASSESSMENT: ACTIVITIES ARE NOT PREVENTED

## 2023-06-23 ASSESSMENT — PAIN DESCRIPTION - PAIN TYPE: TYPE: ACUTE PAIN

## 2023-06-23 ASSESSMENT — PAIN DESCRIPTION - FREQUENCY: FREQUENCY: CONTINUOUS

## 2023-06-23 ASSESSMENT — PAIN DESCRIPTION - ONSET: ONSET: GRADUAL

## 2023-06-24 ENCOUNTER — APPOINTMENT (OUTPATIENT)
Dept: ULTRASOUND IMAGING | Age: 63
DRG: 291 | End: 2023-06-24
Payer: COMMERCIAL

## 2023-06-24 ENCOUNTER — APPOINTMENT (OUTPATIENT)
Dept: CT IMAGING | Age: 63
DRG: 291 | End: 2023-06-24
Payer: COMMERCIAL

## 2023-06-24 PROBLEM — I65.03 VERTEBRAL ARTERY STENOSIS, ASYMPTOMATIC, BILATERAL: Status: ACTIVE | Noted: 2023-01-01

## 2023-06-24 LAB
ANION GAP SERPL CALCULATED.3IONS-SCNC: 8 MMOL/L (ref 7–16)
BUN SERPL-MCNC: 32 MG/DL (ref 6–23)
CALCIUM SERPL-MCNC: 8.5 MG/DL (ref 8.6–10.2)
CHLORIDE SERPL-SCNC: 95 MMOL/L (ref 98–107)
CO2 SERPL-SCNC: 33 MMOL/L (ref 22–29)
CREAT SERPL-MCNC: 1.3 MG/DL (ref 0.7–1.2)
GLUCOSE SERPL-MCNC: 138 MG/DL (ref 74–99)
METER GLUCOSE: 138 MG/DL (ref 74–99)
METER GLUCOSE: 144 MG/DL (ref 74–99)
METER GLUCOSE: 144 MG/DL (ref 74–99)
POTASSIUM SERPL-SCNC: 4.3 MMOL/L (ref 3.5–5)
SODIUM SERPL-SCNC: 136 MMOL/L (ref 132–146)

## 2023-06-24 PROCEDURE — 6370000000 HC RX 637 (ALT 250 FOR IP): Performed by: FAMILY MEDICINE

## 2023-06-24 PROCEDURE — 2700000000 HC OXYGEN THERAPY PER DAY

## 2023-06-24 PROCEDURE — 94640 AIRWAY INHALATION TREATMENT: CPT

## 2023-06-24 PROCEDURE — 72125 CT NECK SPINE W/O DYE: CPT

## 2023-06-24 PROCEDURE — 6370000000 HC RX 637 (ALT 250 FOR IP): Performed by: INTERNAL MEDICINE

## 2023-06-24 PROCEDURE — 76536 US EXAM OF HEAD AND NECK: CPT

## 2023-06-24 PROCEDURE — 99232 SBSQ HOSP IP/OBS MODERATE 35: CPT | Performed by: INTERNAL MEDICINE

## 2023-06-24 PROCEDURE — 6360000002 HC RX W HCPCS: Performed by: INTERNAL MEDICINE

## 2023-06-24 PROCEDURE — 6360000002 HC RX W HCPCS

## 2023-06-24 PROCEDURE — 2580000003 HC RX 258: Performed by: INTERNAL MEDICINE

## 2023-06-24 PROCEDURE — S5553 INSULIN LONG ACTING 5 U: HCPCS | Performed by: FAMILY MEDICINE

## 2023-06-24 PROCEDURE — 80048 BASIC METABOLIC PNL TOTAL CA: CPT

## 2023-06-24 PROCEDURE — 6370000000 HC RX 637 (ALT 250 FOR IP)

## 2023-06-24 PROCEDURE — 6360000002 HC RX W HCPCS: Performed by: FAMILY MEDICINE

## 2023-06-24 PROCEDURE — 99223 1ST HOSP IP/OBS HIGH 75: CPT | Performed by: SURGERY

## 2023-06-24 PROCEDURE — 1200000000 HC SEMI PRIVATE

## 2023-06-24 PROCEDURE — 36415 COLL VENOUS BLD VENIPUNCTURE: CPT

## 2023-06-24 PROCEDURE — 82962 GLUCOSE BLOOD TEST: CPT

## 2023-06-24 RX ORDER — FUROSEMIDE 10 MG/ML
60 INJECTION INTRAMUSCULAR; INTRAVENOUS 2 TIMES DAILY
Status: COMPLETED | OUTPATIENT
Start: 2023-06-24 | End: 2023-06-25

## 2023-06-24 RX ORDER — DEXTROSE MONOHYDRATE 100 MG/ML
INJECTION, SOLUTION INTRAVENOUS CONTINUOUS PRN
Status: DISCONTINUED | OUTPATIENT
Start: 2023-06-24 | End: 2023-06-28 | Stop reason: HOSPADM

## 2023-06-24 RX ADMIN — GUAIFENESIN 400 MG: 400 TABLET ORAL at 13:27

## 2023-06-24 RX ADMIN — ARFORMOTEROL TARTRATE 15 MCG: 15 SOLUTION RESPIRATORY (INHALATION) at 20:53

## 2023-06-24 RX ADMIN — PREGABALIN 75 MG: 75 CAPSULE ORAL at 08:28

## 2023-06-24 RX ADMIN — ENOXAPARIN SODIUM 135 MG: 150 INJECTION SUBCUTANEOUS at 20:39

## 2023-06-24 RX ADMIN — GUAIFENESIN 400 MG: 400 TABLET ORAL at 20:34

## 2023-06-24 RX ADMIN — FLUTICASONE PROPIONATE 1 SPRAY: 50 SPRAY, METERED NASAL at 08:32

## 2023-06-24 RX ADMIN — ESCITALOPRAM OXALATE 10 MG: 10 TABLET ORAL at 08:29

## 2023-06-24 RX ADMIN — MUPIROCIN: 20 OINTMENT TOPICAL at 08:31

## 2023-06-24 RX ADMIN — TAMSULOSIN HYDROCHLORIDE 0.4 MG: 0.4 CAPSULE ORAL at 08:30

## 2023-06-24 RX ADMIN — IPRATROPIUM BROMIDE AND ALBUTEROL SULFATE 1 DOSE: .5; 2.5 SOLUTION RESPIRATORY (INHALATION) at 08:21

## 2023-06-24 RX ADMIN — GUAIFENESIN 400 MG: 400 TABLET ORAL at 15:55

## 2023-06-24 RX ADMIN — POTASSIUM BICARBONATE 40 MEQ: 782 TABLET, EFFERVESCENT ORAL at 08:30

## 2023-06-24 RX ADMIN — FERROUS SULFATE TAB 325 MG (65 MG ELEMENTAL FE) 325 MG: 325 (65 FE) TAB at 15:55

## 2023-06-24 RX ADMIN — CEFAZOLIN 2000 MG: 2 INJECTION, POWDER, FOR SOLUTION INTRAMUSCULAR; INTRAVENOUS at 15:51

## 2023-06-24 RX ADMIN — HYDRALAZINE HYDROCHLORIDE 25 MG: 25 TABLET, FILM COATED ORAL at 06:23

## 2023-06-24 RX ADMIN — IPRATROPIUM BROMIDE AND ALBUTEROL SULFATE 1 DOSE: .5; 2.5 SOLUTION RESPIRATORY (INHALATION) at 16:43

## 2023-06-24 RX ADMIN — HYDRALAZINE HYDROCHLORIDE 25 MG: 25 TABLET, FILM COATED ORAL at 13:27

## 2023-06-24 RX ADMIN — METOPROLOL SUCCINATE 125 MG: 100 TABLET, EXTENDED RELEASE ORAL at 20:34

## 2023-06-24 RX ADMIN — FERROUS SULFATE TAB 325 MG (65 MG ELEMENTAL FE) 325 MG: 325 (65 FE) TAB at 08:29

## 2023-06-24 RX ADMIN — BUDESONIDE 500 MCG: 0.25 SUSPENSION RESPIRATORY (INHALATION) at 20:54

## 2023-06-24 RX ADMIN — CEFAZOLIN 2000 MG: 2 INJECTION, POWDER, FOR SOLUTION INTRAMUSCULAR; INTRAVENOUS at 23:30

## 2023-06-24 RX ADMIN — BUDESONIDE 500 MCG: 0.25 SUSPENSION RESPIRATORY (INHALATION) at 08:22

## 2023-06-24 RX ADMIN — Medication 400 MG: at 08:29

## 2023-06-24 RX ADMIN — ARFORMOTEROL TARTRATE 15 MCG: 15 SOLUTION RESPIRATORY (INHALATION) at 08:21

## 2023-06-24 RX ADMIN — CALCIUM CARBONATE-VITAMIN D TAB 500 MG-200 UNIT 1 TABLET: 500-200 TAB at 08:28

## 2023-06-24 RX ADMIN — PANTOPRAZOLE SODIUM 40 MG: 40 TABLET, DELAYED RELEASE ORAL at 06:23

## 2023-06-24 RX ADMIN — IPRATROPIUM BROMIDE AND ALBUTEROL SULFATE 1 DOSE: .5; 2.5 SOLUTION RESPIRATORY (INHALATION) at 20:52

## 2023-06-24 RX ADMIN — MUPIROCIN: 20 OINTMENT TOPICAL at 20:34

## 2023-06-24 RX ADMIN — GUAIFENESIN 400 MG: 400 TABLET ORAL at 08:29

## 2023-06-24 RX ADMIN — CALCIUM CARBONATE-VITAMIN D TAB 500 MG-200 UNIT 1 TABLET: 500-200 TAB at 20:33

## 2023-06-24 RX ADMIN — Medication 400 MG: at 20:34

## 2023-06-24 RX ADMIN — INSULIN GLARGINE-YFGN 15 UNITS: 100 INJECTION, SOLUTION SUBCUTANEOUS at 20:34

## 2023-06-24 RX ADMIN — PREGABALIN 75 MG: 75 CAPSULE ORAL at 20:33

## 2023-06-24 RX ADMIN — ISOSORBIDE MONONITRATE 30 MG: 30 TABLET, EXTENDED RELEASE ORAL at 08:30

## 2023-06-24 RX ADMIN — PREGABALIN 75 MG: 75 CAPSULE ORAL at 13:27

## 2023-06-24 RX ADMIN — FUROSEMIDE 60 MG: 10 INJECTION, SOLUTION INTRAMUSCULAR; INTRAVENOUS at 15:55

## 2023-06-24 RX ADMIN — CALCIUM CARBONATE-VITAMIN D TAB 500 MG-200 UNIT 1 TABLET: 500-200 TAB at 15:54

## 2023-06-24 RX ADMIN — HYDRALAZINE HYDROCHLORIDE 25 MG: 25 TABLET, FILM COATED ORAL at 20:33

## 2023-06-24 RX ADMIN — ENOXAPARIN SODIUM 135 MG: 150 INJECTION SUBCUTANEOUS at 08:31

## 2023-06-24 RX ADMIN — FUROSEMIDE 40 MG: 40 TABLET ORAL at 08:29

## 2023-06-24 RX ADMIN — CEFAZOLIN 2000 MG: 2 INJECTION, POWDER, FOR SOLUTION INTRAMUSCULAR; INTRAVENOUS at 06:23

## 2023-06-24 RX ADMIN — METOPROLOL SUCCINATE 100 MG: 100 TABLET, EXTENDED RELEASE ORAL at 08:29

## 2023-06-24 ASSESSMENT — PAIN DESCRIPTION - PAIN TYPE: TYPE: ACUTE PAIN

## 2023-06-24 ASSESSMENT — PAIN - FUNCTIONAL ASSESSMENT: PAIN_FUNCTIONAL_ASSESSMENT: ACTIVITIES ARE NOT PREVENTED

## 2023-06-24 ASSESSMENT — PAIN SCALES - GENERAL
PAINLEVEL_OUTOF10: 0
PAINLEVEL_OUTOF10: 0
PAINLEVEL_OUTOF10: 6

## 2023-06-24 ASSESSMENT — PAIN DESCRIPTION - LOCATION: LOCATION: HEAD

## 2023-06-24 ASSESSMENT — PAIN DESCRIPTION - DESCRIPTORS: DESCRIPTORS: ACHING;DISCOMFORT;THROBBING

## 2023-06-24 ASSESSMENT — PAIN DESCRIPTION - ORIENTATION: ORIENTATION: MID

## 2023-06-25 ENCOUNTER — APPOINTMENT (OUTPATIENT)
Dept: MRI IMAGING | Age: 63
DRG: 291 | End: 2023-06-25
Payer: COMMERCIAL

## 2023-06-25 PROBLEM — R27.0 ATAXIA: Status: ACTIVE | Noted: 2023-06-25

## 2023-06-25 PROBLEM — R53.1 RIGHT SIDED WEAKNESS: Status: ACTIVE | Noted: 2023-01-01

## 2023-06-25 PROBLEM — R47.1 DYSARTHRIA: Status: ACTIVE | Noted: 2023-06-25

## 2023-06-25 LAB
AMMONIA PLAS-SCNC: 11 UMOL/L (ref 16–60)
ANION GAP SERPL CALCULATED.3IONS-SCNC: 9 MMOL/L (ref 7–16)
BACTERIA BLD CULT ORG #2: NORMAL
BACTERIA BLD CULT: NORMAL
BUN SERPL-MCNC: 35 MG/DL (ref 6–23)
CALCIUM SERPL-MCNC: 8.4 MG/DL (ref 8.6–10.2)
CHLORIDE SERPL-SCNC: 95 MMOL/L (ref 98–107)
CO2 SERPL-SCNC: 32 MMOL/L (ref 22–29)
CREAT SERPL-MCNC: 1.4 MG/DL (ref 0.7–1.2)
GLUCOSE SERPL-MCNC: 112 MG/DL (ref 74–99)
METER GLUCOSE: 112 MG/DL (ref 74–99)
METER GLUCOSE: 121 MG/DL (ref 74–99)
METER GLUCOSE: 92 MG/DL (ref 74–99)
METER GLUCOSE: 97 MG/DL (ref 74–99)
POTASSIUM SERPL-SCNC: 3.8 MMOL/L (ref 3.5–5)
SODIUM SERPL-SCNC: 136 MMOL/L (ref 132–146)

## 2023-06-25 PROCEDURE — 6370000000 HC RX 637 (ALT 250 FOR IP): Performed by: INTERNAL MEDICINE

## 2023-06-25 PROCEDURE — 6360000002 HC RX W HCPCS: Performed by: INTERNAL MEDICINE

## 2023-06-25 PROCEDURE — 6360000002 HC RX W HCPCS: Performed by: NURSE PRACTITIONER

## 2023-06-25 PROCEDURE — 36415 COLL VENOUS BLD VENIPUNCTURE: CPT

## 2023-06-25 PROCEDURE — A9577 INJ MULTIHANCE: HCPCS | Performed by: RADIOLOGY

## 2023-06-25 PROCEDURE — 6360000002 HC RX W HCPCS

## 2023-06-25 PROCEDURE — 1200000000 HC SEMI PRIVATE

## 2023-06-25 PROCEDURE — 94640 AIRWAY INHALATION TREATMENT: CPT

## 2023-06-25 PROCEDURE — 6370000000 HC RX 637 (ALT 250 FOR IP): Performed by: FAMILY MEDICINE

## 2023-06-25 PROCEDURE — 70553 MRI BRAIN STEM W/O & W/DYE: CPT

## 2023-06-25 PROCEDURE — 82140 ASSAY OF AMMONIA: CPT

## 2023-06-25 PROCEDURE — 6360000004 HC RX CONTRAST MEDICATION: Performed by: RADIOLOGY

## 2023-06-25 PROCEDURE — 99232 SBSQ HOSP IP/OBS MODERATE 35: CPT | Performed by: NURSE PRACTITIONER

## 2023-06-25 PROCEDURE — 6360000002 HC RX W HCPCS: Performed by: FAMILY MEDICINE

## 2023-06-25 PROCEDURE — 82962 GLUCOSE BLOOD TEST: CPT

## 2023-06-25 PROCEDURE — 80048 BASIC METABOLIC PNL TOTAL CA: CPT

## 2023-06-25 PROCEDURE — 6370000000 HC RX 637 (ALT 250 FOR IP)

## 2023-06-25 PROCEDURE — 2580000003 HC RX 258: Performed by: INTERNAL MEDICINE

## 2023-06-25 PROCEDURE — 2700000000 HC OXYGEN THERAPY PER DAY

## 2023-06-25 PROCEDURE — 2500000003 HC RX 250 WO HCPCS: Performed by: NURSE PRACTITIONER

## 2023-06-25 RX ORDER — HEPARIN SODIUM 100 [USP'U]/ML
300 INJECTION, SOLUTION INTRAVENOUS PRN
Status: DISCONTINUED | OUTPATIENT
Start: 2023-06-25 | End: 2023-06-28 | Stop reason: HOSPADM

## 2023-06-25 RX ORDER — HYDRALAZINE HYDROCHLORIDE 20 MG/ML
5 INJECTION INTRAMUSCULAR; INTRAVENOUS EVERY 6 HOURS PRN
Status: DISCONTINUED | OUTPATIENT
Start: 2023-06-25 | End: 2023-06-27

## 2023-06-25 RX ORDER — LORAZEPAM 2 MG/ML
2 INJECTION INTRAMUSCULAR ONCE
Status: COMPLETED | OUTPATIENT
Start: 2023-06-25 | End: 2023-06-25

## 2023-06-25 RX ORDER — HYDRALAZINE HYDROCHLORIDE 20 MG/ML
5 INJECTION INTRAMUSCULAR; INTRAVENOUS EVERY 6 HOURS PRN
Status: DISCONTINUED | OUTPATIENT
Start: 2023-06-25 | End: 2023-06-25

## 2023-06-25 RX ORDER — METOPROLOL TARTRATE 5 MG/5ML
2.5 INJECTION INTRAVENOUS EVERY 6 HOURS
Status: DISCONTINUED | OUTPATIENT
Start: 2023-06-25 | End: 2023-06-26

## 2023-06-25 RX ADMIN — CALCIUM CARBONATE-VITAMIN D TAB 500 MG-200 UNIT 1 TABLET: 500-200 TAB at 14:11

## 2023-06-25 RX ADMIN — FERROUS SULFATE TAB 325 MG (65 MG ELEMENTAL FE) 325 MG: 325 (65 FE) TAB at 16:59

## 2023-06-25 RX ADMIN — GUAIFENESIN 400 MG: 400 TABLET ORAL at 13:48

## 2023-06-25 RX ADMIN — IPRATROPIUM BROMIDE AND ALBUTEROL SULFATE 1 DOSE: .5; 2.5 SOLUTION RESPIRATORY (INHALATION) at 20:34

## 2023-06-25 RX ADMIN — MUPIROCIN: 20 OINTMENT TOPICAL at 08:41

## 2023-06-25 RX ADMIN — Medication 400 MG: at 08:39

## 2023-06-25 RX ADMIN — PANTOPRAZOLE SODIUM 40 MG: 40 TABLET, DELAYED RELEASE ORAL at 05:07

## 2023-06-25 RX ADMIN — PREGABALIN 75 MG: 75 CAPSULE ORAL at 08:39

## 2023-06-25 RX ADMIN — BUDESONIDE 500 MCG: 0.25 SUSPENSION RESPIRATORY (INHALATION) at 07:49

## 2023-06-25 RX ADMIN — ACETAMINOPHEN 650 MG: 325 TABLET ORAL at 08:59

## 2023-06-25 RX ADMIN — ISOSORBIDE MONONITRATE 30 MG: 30 TABLET, EXTENDED RELEASE ORAL at 08:38

## 2023-06-25 RX ADMIN — ARFORMOTEROL TARTRATE 15 MCG: 15 SOLUTION RESPIRATORY (INHALATION) at 07:49

## 2023-06-25 RX ADMIN — TAMSULOSIN HYDROCHLORIDE 0.4 MG: 0.4 CAPSULE ORAL at 08:38

## 2023-06-25 RX ADMIN — PREGABALIN 75 MG: 75 CAPSULE ORAL at 13:48

## 2023-06-25 RX ADMIN — BUDESONIDE 500 MCG: 0.25 SUSPENSION RESPIRATORY (INHALATION) at 20:36

## 2023-06-25 RX ADMIN — FLUTICASONE PROPIONATE 1 SPRAY: 50 SPRAY, METERED NASAL at 08:41

## 2023-06-25 RX ADMIN — IPRATROPIUM BROMIDE AND ALBUTEROL SULFATE 1 DOSE: .5; 2.5 SOLUTION RESPIRATORY (INHALATION) at 16:28

## 2023-06-25 RX ADMIN — MUPIROCIN: 20 OINTMENT TOPICAL at 21:34

## 2023-06-25 RX ADMIN — GUAIFENESIN 400 MG: 400 TABLET ORAL at 16:59

## 2023-06-25 RX ADMIN — FUROSEMIDE 60 MG: 10 INJECTION, SOLUTION INTRAMUSCULAR; INTRAVENOUS at 08:41

## 2023-06-25 RX ADMIN — METOPROLOL TARTRATE 2.5 MG: 1 INJECTION, SOLUTION INTRAVENOUS at 21:33

## 2023-06-25 RX ADMIN — IPRATROPIUM BROMIDE AND ALBUTEROL SULFATE 1 DOSE: .5; 2.5 SOLUTION RESPIRATORY (INHALATION) at 11:10

## 2023-06-25 RX ADMIN — FERROUS SULFATE TAB 325 MG (65 MG ELEMENTAL FE) 325 MG: 325 (65 FE) TAB at 08:38

## 2023-06-25 RX ADMIN — METOPROLOL SUCCINATE 100 MG: 100 TABLET, EXTENDED RELEASE ORAL at 08:39

## 2023-06-25 RX ADMIN — POTASSIUM BICARBONATE 40 MEQ: 782 TABLET, EFFERVESCENT ORAL at 08:40

## 2023-06-25 RX ADMIN — LORAZEPAM 2 MG: 2 INJECTION INTRAMUSCULAR; INTRAVENOUS at 13:48

## 2023-06-25 RX ADMIN — GUAIFENESIN 400 MG: 400 TABLET ORAL at 08:38

## 2023-06-25 RX ADMIN — CEFAZOLIN 2000 MG: 2 INJECTION, POWDER, FOR SOLUTION INTRAMUSCULAR; INTRAVENOUS at 07:05

## 2023-06-25 RX ADMIN — ARFORMOTEROL TARTRATE 15 MCG: 15 SOLUTION RESPIRATORY (INHALATION) at 20:35

## 2023-06-25 RX ADMIN — FUROSEMIDE 60 MG: 10 INJECTION, SOLUTION INTRAMUSCULAR; INTRAVENOUS at 16:59

## 2023-06-25 RX ADMIN — ESCITALOPRAM OXALATE 10 MG: 10 TABLET ORAL at 08:38

## 2023-06-25 RX ADMIN — HYDRALAZINE HYDROCHLORIDE 25 MG: 25 TABLET, FILM COATED ORAL at 13:48

## 2023-06-25 RX ADMIN — IPRATROPIUM BROMIDE AND ALBUTEROL SULFATE 1 DOSE: .5; 2.5 SOLUTION RESPIRATORY (INHALATION) at 07:49

## 2023-06-25 RX ADMIN — ENOXAPARIN SODIUM 135 MG: 150 INJECTION SUBCUTANEOUS at 08:40

## 2023-06-25 RX ADMIN — GADOBENATE DIMEGLUMINE 20 ML: 529 INJECTION, SOLUTION INTRAVENOUS at 15:53

## 2023-06-25 RX ADMIN — HYDRALAZINE HYDROCHLORIDE 25 MG: 25 TABLET, FILM COATED ORAL at 05:07

## 2023-06-25 RX ADMIN — ENOXAPARIN SODIUM 135 MG: 150 INJECTION SUBCUTANEOUS at 21:33

## 2023-06-25 RX ADMIN — CEFAZOLIN 2000 MG: 2 INJECTION, POWDER, FOR SOLUTION INTRAMUSCULAR; INTRAVENOUS at 23:37

## 2023-06-25 RX ADMIN — CEFAZOLIN 2000 MG: 2 INJECTION, POWDER, FOR SOLUTION INTRAMUSCULAR; INTRAVENOUS at 13:47

## 2023-06-25 RX ADMIN — CALCIUM CARBONATE-VITAMIN D TAB 500 MG-200 UNIT 1 TABLET: 500-200 TAB at 08:41

## 2023-06-25 ASSESSMENT — PAIN SCALES - GENERAL
PAINLEVEL_OUTOF10: 5
PAINLEVEL_OUTOF10: 5

## 2023-06-25 ASSESSMENT — PAIN DESCRIPTION - DESCRIPTORS
DESCRIPTORS: ACHING;DISCOMFORT
DESCRIPTORS: POUNDING;OTHER (COMMENT)

## 2023-06-25 ASSESSMENT — PAIN DESCRIPTION - FREQUENCY: FREQUENCY: CONTINUOUS

## 2023-06-25 ASSESSMENT — PAIN DESCRIPTION - LOCATION
LOCATION: HEAD
LOCATION: BACK

## 2023-06-25 ASSESSMENT — PAIN DESCRIPTION - ORIENTATION: ORIENTATION: LOWER

## 2023-06-25 ASSESSMENT — PAIN - FUNCTIONAL ASSESSMENT: PAIN_FUNCTIONAL_ASSESSMENT: PREVENTS OR INTERFERES SOME ACTIVE ACTIVITIES AND ADLS

## 2023-06-25 ASSESSMENT — PAIN DESCRIPTION - PAIN TYPE: TYPE: ACUTE PAIN

## 2023-06-26 ENCOUNTER — APPOINTMENT (OUTPATIENT)
Dept: GENERAL RADIOLOGY | Age: 63
DRG: 291 | End: 2023-06-26
Payer: COMMERCIAL

## 2023-06-26 ENCOUNTER — APPOINTMENT (OUTPATIENT)
Dept: ULTRASOUND IMAGING | Age: 63
DRG: 291 | End: 2023-06-26
Payer: COMMERCIAL

## 2023-06-26 LAB
ALBUMIN SERPL-MCNC: 2.9 G/DL (ref 3.5–5.2)
ALP SERPL-CCNC: 136 U/L (ref 40–129)
ALT SERPL-CCNC: <5 U/L (ref 0–40)
ANION GAP SERPL CALCULATED.3IONS-SCNC: 11 MMOL/L (ref 7–16)
ANISOCYTOSIS: ABNORMAL
AST SERPL-CCNC: 17 U/L (ref 0–39)
BASOPHILS # BLD: 0 E9/L (ref 0–0.2)
BASOPHILS NFR BLD: 0.3 % (ref 0–2)
BILIRUB SERPL-MCNC: 0.6 MG/DL (ref 0–1.2)
BNP BLD-MCNC: 6329 PG/ML (ref 0–125)
BUN SERPL-MCNC: 33 MG/DL (ref 6–23)
CA-I BLD-SCNC: 1.13 MMOL/L (ref 1.15–1.33)
CALCIUM SERPL-MCNC: 8.9 MG/DL (ref 8.6–10.2)
CHLORIDE SERPL-SCNC: 96 MMOL/L (ref 98–107)
CO2 SERPL-SCNC: 32 MMOL/L (ref 22–29)
CREAT SERPL-MCNC: 1.4 MG/DL (ref 0.7–1.2)
EKG ATRIAL RATE: 129 BPM
EKG Q-T INTERVAL: 294 MS
EKG QRS DURATION: 126 MS
EKG QTC CALCULATION (BAZETT): 455 MS
EKG R AXIS: 88 DEGREES
EKG T AXIS: 106 DEGREES
EKG VENTRICULAR RATE: 144 BPM
EOSINOPHIL # BLD: 0.21 E9/L (ref 0.05–0.5)
EOSINOPHIL NFR BLD: 0.9 % (ref 0–6)
ERYTHROCYTE [DISTWIDTH] IN BLOOD BY AUTOMATED COUNT: 16.7 FL (ref 11.5–15)
GLUCOSE SERPL-MCNC: 92 MG/DL (ref 74–99)
HBA1C MFR BLD: 5.8 % (ref 4–5.6)
HCT VFR BLD AUTO: 31.1 % (ref 37–54)
HGB BLD-MCNC: 9.1 G/DL (ref 12.5–16.5)
HYPOCHROMIA: ABNORMAL
LYMPHOCYTES # BLD: 0.94 E9/L (ref 1.5–4)
LYMPHOCYTES NFR BLD: 4.3 % (ref 20–42)
MAGNESIUM SERPL-MCNC: 1.8 MG/DL (ref 1.6–2.6)
MCH RBC QN AUTO: 27 PG (ref 26–35)
MCHC RBC AUTO-ENTMCNC: 29.3 % (ref 32–34.5)
MCV RBC AUTO: 92.3 FL (ref 80–99.9)
METER GLUCOSE: 103 MG/DL (ref 74–99)
METER GLUCOSE: 104 MG/DL (ref 74–99)
METER GLUCOSE: 108 MG/DL (ref 74–99)
METER GLUCOSE: 114 MG/DL (ref 74–99)
METER GLUCOSE: 91 MG/DL (ref 74–99)
MONOCYTES # BLD: 2.11 E9/L (ref 0.1–0.95)
MONOCYTES NFR BLD: 8.6 % (ref 2–12)
NEUTROPHILS # BLD: 20.12 E9/L (ref 1.8–7.3)
NEUTS SEG NFR BLD: 86.2 % (ref 43–80)
OVALOCYTES: ABNORMAL
PHOSPHATE SERPL-MCNC: 4 MG/DL (ref 2.5–4.5)
PLATELET # BLD AUTO: 238 E9/L (ref 130–450)
PMV BLD AUTO: 10.7 FL (ref 7–12)
POIKILOCYTES: ABNORMAL
POLYCHROMASIA: ABNORMAL
POTASSIUM SERPL-SCNC: 4.4 MMOL/L (ref 3.5–5)
PROT SERPL-MCNC: 6.1 G/DL (ref 6.4–8.3)
RBC # BLD AUTO: 3.37 E12/L (ref 3.8–5.8)
SODIUM SERPL-SCNC: 139 MMOL/L (ref 132–146)
TARGET CELLS: ABNORMAL
WBC # BLD: 23.4 E9/L (ref 4.5–11.5)

## 2023-06-26 PROCEDURE — 93005 ELECTROCARDIOGRAM TRACING: CPT | Performed by: NURSE PRACTITIONER

## 2023-06-26 PROCEDURE — 2580000003 HC RX 258: Performed by: INTERNAL MEDICINE

## 2023-06-26 PROCEDURE — 2500000003 HC RX 250 WO HCPCS: Performed by: NURSE PRACTITIONER

## 2023-06-26 PROCEDURE — 36415 COLL VENOUS BLD VENIPUNCTURE: CPT

## 2023-06-26 PROCEDURE — 83880 ASSAY OF NATRIURETIC PEPTIDE: CPT

## 2023-06-26 PROCEDURE — 1200000000 HC SEMI PRIVATE

## 2023-06-26 PROCEDURE — 92526 ORAL FUNCTION THERAPY: CPT

## 2023-06-26 PROCEDURE — 93010 ELECTROCARDIOGRAM REPORT: CPT | Performed by: INTERNAL MEDICINE

## 2023-06-26 PROCEDURE — 6360000002 HC RX W HCPCS: Performed by: INTERNAL MEDICINE

## 2023-06-26 PROCEDURE — 97530 THERAPEUTIC ACTIVITIES: CPT

## 2023-06-26 PROCEDURE — 93308 TTE F-UP OR LMTD: CPT

## 2023-06-26 PROCEDURE — 99233 SBSQ HOSP IP/OBS HIGH 50: CPT | Performed by: NURSE PRACTITIONER

## 2023-06-26 PROCEDURE — 76604 US EXAM CHEST: CPT

## 2023-06-26 PROCEDURE — 6370000000 HC RX 637 (ALT 250 FOR IP): Performed by: NURSE PRACTITIONER

## 2023-06-26 PROCEDURE — 6370000000 HC RX 637 (ALT 250 FOR IP): Performed by: FAMILY MEDICINE

## 2023-06-26 PROCEDURE — 97535 SELF CARE MNGMENT TRAINING: CPT

## 2023-06-26 PROCEDURE — 82962 GLUCOSE BLOOD TEST: CPT

## 2023-06-26 PROCEDURE — 94640 AIRWAY INHALATION TREATMENT: CPT

## 2023-06-26 PROCEDURE — 6360000002 HC RX W HCPCS

## 2023-06-26 PROCEDURE — S5553 INSULIN LONG ACTING 5 U: HCPCS | Performed by: FAMILY MEDICINE

## 2023-06-26 PROCEDURE — 84100 ASSAY OF PHOSPHORUS: CPT

## 2023-06-26 PROCEDURE — 92610 EVALUATE SWALLOWING FUNCTION: CPT

## 2023-06-26 PROCEDURE — 6370000000 HC RX 637 (ALT 250 FOR IP): Performed by: INTERNAL MEDICINE

## 2023-06-26 PROCEDURE — 82330 ASSAY OF CALCIUM: CPT

## 2023-06-26 PROCEDURE — 83036 HEMOGLOBIN GLYCOSYLATED A1C: CPT

## 2023-06-26 PROCEDURE — 2700000000 HC OXYGEN THERAPY PER DAY

## 2023-06-26 PROCEDURE — 80053 COMPREHEN METABOLIC PANEL: CPT

## 2023-06-26 PROCEDURE — 71045 X-RAY EXAM CHEST 1 VIEW: CPT

## 2023-06-26 PROCEDURE — 85025 COMPLETE CBC W/AUTO DIFF WBC: CPT

## 2023-06-26 PROCEDURE — 83735 ASSAY OF MAGNESIUM: CPT

## 2023-06-26 PROCEDURE — 6360000002 HC RX W HCPCS: Performed by: FAMILY MEDICINE

## 2023-06-26 RX ORDER — FUROSEMIDE 10 MG/ML
60 INJECTION INTRAMUSCULAR; INTRAVENOUS 2 TIMES DAILY
Status: DISCONTINUED | OUTPATIENT
Start: 2023-06-26 | End: 2023-06-27

## 2023-06-26 RX ORDER — DIGOXIN 0.25 MG/ML
250 INJECTION INTRAMUSCULAR; INTRAVENOUS ONCE
Status: COMPLETED | OUTPATIENT
Start: 2023-06-26 | End: 2023-06-26

## 2023-06-26 RX ORDER — METOPROLOL TARTRATE 5 MG/5ML
5 INJECTION INTRAVENOUS EVERY 6 HOURS
Status: DISCONTINUED | OUTPATIENT
Start: 2023-06-26 | End: 2023-06-28

## 2023-06-26 RX ORDER — ATORVASTATIN CALCIUM 20 MG/1
20 TABLET, FILM COATED ORAL NIGHTLY
Status: DISCONTINUED | OUTPATIENT
Start: 2023-06-26 | End: 2023-06-28

## 2023-06-26 RX ORDER — DIGOXIN 0.25 MG/ML
250 INJECTION INTRAMUSCULAR; INTRAVENOUS DAILY
Status: DISCONTINUED | OUTPATIENT
Start: 2023-06-26 | End: 2023-06-26

## 2023-06-26 RX ADMIN — ARFORMOTEROL TARTRATE 15 MCG: 15 SOLUTION RESPIRATORY (INHALATION) at 07:36

## 2023-06-26 RX ADMIN — BUDESONIDE 500 MCG: 0.25 SUSPENSION RESPIRATORY (INHALATION) at 19:00

## 2023-06-26 RX ADMIN — GUAIFENESIN 400 MG: 400 TABLET ORAL at 17:29

## 2023-06-26 RX ADMIN — ARFORMOTEROL TARTRATE 15 MCG: 15 SOLUTION RESPIRATORY (INHALATION) at 18:59

## 2023-06-26 RX ADMIN — CEFAZOLIN 2000 MG: 2 INJECTION, POWDER, FOR SOLUTION INTRAMUSCULAR; INTRAVENOUS at 06:58

## 2023-06-26 RX ADMIN — METOPROLOL TARTRATE 5 MG: 1 INJECTION, SOLUTION INTRAVENOUS at 16:21

## 2023-06-26 RX ADMIN — ATORVASTATIN CALCIUM 20 MG: 20 TABLET, FILM COATED ORAL at 22:19

## 2023-06-26 RX ADMIN — GUAIFENESIN 400 MG: 400 TABLET ORAL at 22:19

## 2023-06-26 RX ADMIN — CEFAZOLIN 2000 MG: 2 INJECTION, POWDER, FOR SOLUTION INTRAMUSCULAR; INTRAVENOUS at 15:03

## 2023-06-26 RX ADMIN — FUROSEMIDE 60 MG: 10 INJECTION, SOLUTION INTRAMUSCULAR; INTRAVENOUS at 17:28

## 2023-06-26 RX ADMIN — PREGABALIN 75 MG: 75 CAPSULE ORAL at 22:19

## 2023-06-26 RX ADMIN — ENOXAPARIN SODIUM 135 MG: 150 INJECTION SUBCUTANEOUS at 08:04

## 2023-06-26 RX ADMIN — DIGOXIN 250 MCG: 0.25 INJECTION INTRAMUSCULAR; INTRAVENOUS at 04:23

## 2023-06-26 RX ADMIN — HYDRALAZINE HYDROCHLORIDE 25 MG: 25 TABLET, FILM COATED ORAL at 22:19

## 2023-06-26 RX ADMIN — Medication 400 MG: at 22:19

## 2023-06-26 RX ADMIN — IPRATROPIUM BROMIDE AND ALBUTEROL SULFATE 1 DOSE: .5; 2.5 SOLUTION RESPIRATORY (INHALATION) at 07:36

## 2023-06-26 RX ADMIN — IPRATROPIUM BROMIDE AND ALBUTEROL SULFATE 1 DOSE: .5; 2.5 SOLUTION RESPIRATORY (INHALATION) at 18:59

## 2023-06-26 RX ADMIN — CEFAZOLIN 2000 MG: 2 INJECTION, POWDER, FOR SOLUTION INTRAMUSCULAR; INTRAVENOUS at 22:20

## 2023-06-26 RX ADMIN — CALCIUM CARBONATE-VITAMIN D TAB 500 MG-200 UNIT 1 TABLET: 500-200 TAB at 22:38

## 2023-06-26 RX ADMIN — FLUTICASONE PROPIONATE 1 SPRAY: 50 SPRAY, METERED NASAL at 08:04

## 2023-06-26 RX ADMIN — IPRATROPIUM BROMIDE AND ALBUTEROL SULFATE 1 DOSE: .5; 2.5 SOLUTION RESPIRATORY (INHALATION) at 15:26

## 2023-06-26 RX ADMIN — ENOXAPARIN SODIUM 135 MG: 150 INJECTION SUBCUTANEOUS at 22:38

## 2023-06-26 RX ADMIN — METOPROLOL TARTRATE 5 MG: 1 INJECTION, SOLUTION INTRAVENOUS at 22:20

## 2023-06-26 RX ADMIN — INSULIN GLARGINE-YFGN 15 UNITS: 100 INJECTION, SOLUTION SUBCUTANEOUS at 22:19

## 2023-06-26 RX ADMIN — FERROUS SULFATE TAB 325 MG (65 MG ELEMENTAL FE) 325 MG: 325 (65 FE) TAB at 17:30

## 2023-06-26 RX ADMIN — BUDESONIDE 500 MCG: 0.25 SUSPENSION RESPIRATORY (INHALATION) at 07:36

## 2023-06-26 RX ADMIN — IPRATROPIUM BROMIDE AND ALBUTEROL SULFATE 1 DOSE: .5; 2.5 SOLUTION RESPIRATORY (INHALATION) at 12:06

## 2023-06-26 RX ADMIN — MUPIROCIN: 20 OINTMENT TOPICAL at 22:39

## 2023-06-26 RX ADMIN — METOPROLOL TARTRATE 5 MG: 1 INJECTION, SOLUTION INTRAVENOUS at 10:32

## 2023-06-26 RX ADMIN — METOPROLOL TARTRATE 5 MG: 1 INJECTION, SOLUTION INTRAVENOUS at 03:01

## 2023-06-26 RX ADMIN — MUPIROCIN: 20 OINTMENT TOPICAL at 08:05

## 2023-06-26 ASSESSMENT — PAIN SCALES - GENERAL: PAINLEVEL_OUTOF10: 0

## 2023-06-27 ENCOUNTER — APPOINTMENT (OUTPATIENT)
Dept: CT IMAGING | Age: 63
DRG: 291 | End: 2023-06-27
Payer: COMMERCIAL

## 2023-06-27 ENCOUNTER — HOSPITAL ENCOUNTER (OUTPATIENT)
Dept: RADIATION ONCOLOGY | Age: 63
Discharge: HOME | End: 2023-06-27
Payer: COMMERCIAL

## 2023-06-27 PROBLEM — I62.9 ACUTE SPONTANEOUS INTRAPARENCHYMAL INTRACRANIAL HEMORRHAGE (HCC): Status: ACTIVE | Noted: 2023-01-01

## 2023-06-27 LAB
ABO + RH BLD: NORMAL
ABO + RH BLD: NORMAL
B.E.: 7.8 MMOL/L (ref -3–3)
BLD GP AB SCN SERPL QL: NORMAL
COHB: 0.8 % (ref 0–1.5)
CRITICAL: ABNORMAL
DATE ANALYZED: ABNORMAL
DATE OF COLLECTION: ABNORMAL
HCO3: 31.4 MMOL/L (ref 22–26)
HHB: 4.2 % (ref 0–5)
LAB: ABNORMAL
Lab: ABNORMAL
METER GLUCOSE: 108 MG/DL (ref 74–99)
METER GLUCOSE: 120 MG/DL (ref 74–99)
METER GLUCOSE: 125 MG/DL (ref 74–99)
METER GLUCOSE: 152 MG/DL (ref 74–99)
METER GLUCOSE: 184 MG/DL (ref 74–99)
METHB: 0.3 % (ref 0–1.5)
MODE: ABNORMAL
O2 CONTENT: 14.2 ML/DL
O2 SATURATION: 95.8 % (ref 92–98.5)
O2HB: 94.7 % (ref 94–97)
OPERATOR ID: 421
PATIENT TEMP: 37 C
PCO2: 40.4 MMHG (ref 35–45)
PH BLOOD GAS: 7.51 (ref 7.35–7.45)
PO2: 77 MMHG (ref 75–100)
SODIUM SERPL-SCNC: 143 MMOL/L (ref 132–146)
SOURCE, BLOOD GAS: ABNORMAL
THB: 10.6 G/DL (ref 11.5–16.5)
TIME ANALYZED: 2235

## 2023-06-27 PROCEDURE — 70450 CT HEAD/BRAIN W/O DYE: CPT

## 2023-06-27 PROCEDURE — 6370000000 HC RX 637 (ALT 250 FOR IP): Performed by: FAMILY MEDICINE

## 2023-06-27 PROCEDURE — 6360000002 HC RX W HCPCS

## 2023-06-27 PROCEDURE — 86850 RBC ANTIBODY SCREEN: CPT

## 2023-06-27 PROCEDURE — 36415 COLL VENOUS BLD VENIPUNCTURE: CPT

## 2023-06-27 PROCEDURE — 99291 CRITICAL CARE FIRST HOUR: CPT | Performed by: NURSE PRACTITIONER

## 2023-06-27 PROCEDURE — 6370000000 HC RX 637 (ALT 250 FOR IP)

## 2023-06-27 PROCEDURE — 6360000002 HC RX W HCPCS: Performed by: INTERNAL MEDICINE

## 2023-06-27 PROCEDURE — 2700000000 HC OXYGEN THERAPY PER DAY

## 2023-06-27 PROCEDURE — 87081 CULTURE SCREEN ONLY: CPT

## 2023-06-27 PROCEDURE — 82962 GLUCOSE BLOOD TEST: CPT

## 2023-06-27 PROCEDURE — 86900 BLOOD TYPING SEROLOGIC ABO: CPT

## 2023-06-27 PROCEDURE — 70496 CT ANGIOGRAPHY HEAD: CPT

## 2023-06-27 PROCEDURE — 2580000003 HC RX 258: Performed by: STUDENT IN AN ORGANIZED HEALTH CARE EDUCATION/TRAINING PROGRAM

## 2023-06-27 PROCEDURE — A4216 STERILE WATER/SALINE, 10 ML: HCPCS | Performed by: STUDENT IN AN ORGANIZED HEALTH CARE EDUCATION/TRAINING PROGRAM

## 2023-06-27 PROCEDURE — 6370000000 HC RX 637 (ALT 250 FOR IP): Performed by: INTERNAL MEDICINE

## 2023-06-27 PROCEDURE — 6360000002 HC RX W HCPCS: Performed by: NURSE PRACTITIONER

## 2023-06-27 PROCEDURE — 84295 ASSAY OF SERUM SODIUM: CPT

## 2023-06-27 PROCEDURE — 6360000002 HC RX W HCPCS: Performed by: FAMILY MEDICINE

## 2023-06-27 PROCEDURE — 2580000003 HC RX 258: Performed by: INTERNAL MEDICINE

## 2023-06-27 PROCEDURE — 99222 1ST HOSP IP/OBS MODERATE 55: CPT | Performed by: SPECIALIST

## 2023-06-27 PROCEDURE — 6360000004 HC RX CONTRAST MEDICATION: Performed by: RADIOLOGY

## 2023-06-27 PROCEDURE — 2500000003 HC RX 250 WO HCPCS: Performed by: NURSE PRACTITIONER

## 2023-06-27 PROCEDURE — 6360000002 HC RX W HCPCS: Performed by: PSYCHIATRY & NEUROLOGY

## 2023-06-27 PROCEDURE — 2580000003 HC RX 258: Performed by: NURSE PRACTITIONER

## 2023-06-27 PROCEDURE — 94640 AIRWAY INHALATION TREATMENT: CPT

## 2023-06-27 PROCEDURE — 6360000002 HC RX W HCPCS: Performed by: STUDENT IN AN ORGANIZED HEALTH CARE EDUCATION/TRAINING PROGRAM

## 2023-06-27 PROCEDURE — 82805 BLOOD GASES W/O2 SATURATION: CPT

## 2023-06-27 PROCEDURE — 2580000003 HC RX 258

## 2023-06-27 PROCEDURE — 2500000003 HC RX 250 WO HCPCS

## 2023-06-27 PROCEDURE — 2000000000 HC ICU R&B

## 2023-06-27 PROCEDURE — 2580000003 HC RX 258: Performed by: PSYCHIATRY & NEUROLOGY

## 2023-06-27 PROCEDURE — C9113 INJ PANTOPRAZOLE SODIUM, VIA: HCPCS | Performed by: STUDENT IN AN ORGANIZED HEALTH CARE EDUCATION/TRAINING PROGRAM

## 2023-06-27 PROCEDURE — 36592 COLLECT BLOOD FROM PICC: CPT

## 2023-06-27 PROCEDURE — 86901 BLOOD TYPING SEROLOGIC RH(D): CPT

## 2023-06-27 RX ORDER — MIDAZOLAM HYDROCHLORIDE 2 MG/2ML
1 INJECTION, SOLUTION INTRAMUSCULAR; INTRAVENOUS ONCE
Status: COMPLETED | OUTPATIENT
Start: 2023-06-27 | End: 2023-06-27

## 2023-06-27 RX ORDER — LEVETIRACETAM 500 MG/5ML
1000 INJECTION, SOLUTION, CONCENTRATE INTRAVENOUS EVERY 12 HOURS
Status: DISCONTINUED | OUTPATIENT
Start: 2023-06-27 | End: 2023-06-28

## 2023-06-27 RX ORDER — HYDRALAZINE HYDROCHLORIDE 20 MG/ML
5 INJECTION INTRAMUSCULAR; INTRAVENOUS EVERY 10 MIN PRN
Status: DISCONTINUED | OUTPATIENT
Start: 2023-06-27 | End: 2023-06-28 | Stop reason: HOSPADM

## 2023-06-27 RX ORDER — LABETALOL HYDROCHLORIDE 5 MG/ML
10 INJECTION, SOLUTION INTRAVENOUS EVERY 30 MIN PRN
Status: DISCONTINUED | OUTPATIENT
Start: 2023-06-27 | End: 2023-06-28 | Stop reason: HOSPADM

## 2023-06-27 RX ORDER — DEXAMETHASONE SODIUM PHOSPHATE 10 MG/ML
6 INJECTION INTRAMUSCULAR; INTRAVENOUS EVERY 6 HOURS
Status: DISCONTINUED | OUTPATIENT
Start: 2023-06-27 | End: 2023-06-28

## 2023-06-27 RX ORDER — ACETAMINOPHEN 650 MG/1
650 SUPPOSITORY RECTAL EVERY 4 HOURS PRN
Status: DISCONTINUED | OUTPATIENT
Start: 2023-06-27 | End: 2023-06-28 | Stop reason: HOSPADM

## 2023-06-27 RX ORDER — INSULIN LISPRO 100 [IU]/ML
0-4 INJECTION, SOLUTION INTRAVENOUS; SUBCUTANEOUS EVERY 4 HOURS
Status: DISCONTINUED | OUTPATIENT
Start: 2023-06-27 | End: 2023-06-28

## 2023-06-27 RX ORDER — DILTIAZEM HYDROCHLORIDE 5 MG/ML
10 INJECTION INTRAVENOUS ONCE
Status: COMPLETED | OUTPATIENT
Start: 2023-06-27 | End: 2023-06-27

## 2023-06-27 RX ORDER — LORAZEPAM 2 MG/ML
INJECTION INTRAMUSCULAR
Status: COMPLETED
Start: 2023-06-27 | End: 2023-06-27

## 2023-06-27 RX ORDER — FENTANYL CITRATE 50 UG/ML
25 INJECTION, SOLUTION INTRAMUSCULAR; INTRAVENOUS ONCE
Status: COMPLETED | OUTPATIENT
Start: 2023-06-27 | End: 2023-06-27

## 2023-06-27 RX ORDER — MIDAZOLAM HYDROCHLORIDE 2 MG/2ML
1 INJECTION, SOLUTION INTRAMUSCULAR; INTRAVENOUS SEE ADMIN INSTRUCTIONS
Status: DISCONTINUED | OUTPATIENT
Start: 2023-06-27 | End: 2023-06-28

## 2023-06-27 RX ORDER — 3% SODIUM CHLORIDE 3 G/100ML
25 INJECTION, SOLUTION INTRAVENOUS CONTINUOUS
Status: DISCONTINUED | OUTPATIENT
Start: 2023-06-27 | End: 2023-06-28

## 2023-06-27 RX ORDER — LORAZEPAM 2 MG/ML
1 INJECTION INTRAMUSCULAR ONCE
Status: COMPLETED | OUTPATIENT
Start: 2023-06-27 | End: 2023-06-27

## 2023-06-27 RX ORDER — SODIUM CHLORIDE 9 MG/ML
INJECTION, SOLUTION INTRAVENOUS CONTINUOUS
Status: DISCONTINUED | OUTPATIENT
Start: 2023-06-27 | End: 2023-06-28

## 2023-06-27 RX ORDER — ONDANSETRON 2 MG/ML
4 INJECTION INTRAMUSCULAR; INTRAVENOUS EVERY 6 HOURS PRN
Status: DISCONTINUED | OUTPATIENT
Start: 2023-06-27 | End: 2023-06-28 | Stop reason: HOSPADM

## 2023-06-27 RX ORDER — LEVETIRACETAM 500 MG/5ML
INJECTION, SOLUTION, CONCENTRATE INTRAVENOUS
Status: COMPLETED | OUTPATIENT
Start: 2023-06-27 | End: 2023-06-27

## 2023-06-27 RX ORDER — LEVETIRACETAM 500 MG/5ML
INJECTION, SOLUTION, CONCENTRATE INTRAVENOUS
Status: DISPENSED
Start: 2023-06-27 | End: 2023-06-27

## 2023-06-27 RX ADMIN — CALCIUM CARBONATE-VITAMIN D TAB 500 MG-200 UNIT 1 TABLET: 500-200 TAB at 08:36

## 2023-06-27 RX ADMIN — ISOSORBIDE MONONITRATE 30 MG: 30 TABLET, EXTENDED RELEASE ORAL at 08:37

## 2023-06-27 RX ADMIN — Medication 400 MG: at 08:37

## 2023-06-27 RX ADMIN — SODIUM CHLORIDE 40 MG: 9 INJECTION INTRAMUSCULAR; INTRAVENOUS; SUBCUTANEOUS at 20:11

## 2023-06-27 RX ADMIN — CEFAZOLIN 2000 MG: 2 INJECTION, POWDER, FOR SOLUTION INTRAMUSCULAR; INTRAVENOUS at 08:41

## 2023-06-27 RX ADMIN — PREGABALIN 75 MG: 75 CAPSULE ORAL at 08:36

## 2023-06-27 RX ADMIN — METOPROLOL TARTRATE 5 MG: 1 INJECTION, SOLUTION INTRAVENOUS at 22:41

## 2023-06-27 RX ADMIN — FUROSEMIDE 60 MG: 10 INJECTION, SOLUTION INTRAMUSCULAR; INTRAVENOUS at 08:40

## 2023-06-27 RX ADMIN — SODIUM CHLORIDE 2.5 MG/HR: 900 INJECTION, SOLUTION INTRAVENOUS at 13:45

## 2023-06-27 RX ADMIN — METOPROLOL TARTRATE 5 MG: 1 INJECTION, SOLUTION INTRAVENOUS at 16:46

## 2023-06-27 RX ADMIN — BUDESONIDE 500 MCG: 0.25 SUSPENSION RESPIRATORY (INHALATION) at 07:50

## 2023-06-27 RX ADMIN — FERROUS SULFATE TAB 325 MG (65 MG ELEMENTAL FE) 325 MG: 325 (65 FE) TAB at 08:37

## 2023-06-27 RX ADMIN — POTASSIUM BICARBONATE 40 MEQ: 782 TABLET, EFFERVESCENT ORAL at 08:48

## 2023-06-27 RX ADMIN — LEVETIRACETAM 1000 MG: 100 INJECTION INTRAVENOUS at 18:32

## 2023-06-27 RX ADMIN — ESCITALOPRAM OXALATE 10 MG: 10 TABLET ORAL at 08:36

## 2023-06-27 RX ADMIN — TAMSULOSIN HYDROCHLORIDE 0.4 MG: 0.4 CAPSULE ORAL at 08:37

## 2023-06-27 RX ADMIN — HYDRALAZINE HYDROCHLORIDE 25 MG: 25 TABLET, FILM COATED ORAL at 05:48

## 2023-06-27 RX ADMIN — DILTIAZEM HYDROCHLORIDE 10 MG: 5 INJECTION INTRAVENOUS at 13:41

## 2023-06-27 RX ADMIN — LORAZEPAM 1 MG: 2 INJECTION INTRAMUSCULAR; INTRAVENOUS at 15:22

## 2023-06-27 RX ADMIN — DEXAMETHASONE SODIUM PHOSPHATE 6 MG: 10 INJECTION INTRAMUSCULAR; INTRAVENOUS at 18:33

## 2023-06-27 RX ADMIN — ARFORMOTEROL TARTRATE 15 MCG: 15 SOLUTION RESPIRATORY (INHALATION) at 07:49

## 2023-06-27 RX ADMIN — LEVETIRACETAM 2 G: 500 INJECTION, SOLUTION, CONCENTRATE INTRAVENOUS at 09:27

## 2023-06-27 RX ADMIN — ONDANSETRON 4 MG: 2 INJECTION INTRAMUSCULAR; INTRAVENOUS at 11:21

## 2023-06-27 RX ADMIN — MIDAZOLAM 1 MG: 1 INJECTION INTRAMUSCULAR; INTRAVENOUS at 16:37

## 2023-06-27 RX ADMIN — IPRATROPIUM BROMIDE AND ALBUTEROL SULFATE 1 DOSE: .5; 2.5 SOLUTION RESPIRATORY (INHALATION) at 07:49

## 2023-06-27 RX ADMIN — SODIUM CHLORIDE 15 MG/HR: 900 INJECTION, SOLUTION INTRAVENOUS at 20:11

## 2023-06-27 RX ADMIN — PANTOPRAZOLE SODIUM 40 MG: 40 TABLET, DELAYED RELEASE ORAL at 05:48

## 2023-06-27 RX ADMIN — GUAIFENESIN 400 MG: 400 TABLET ORAL at 08:37

## 2023-06-27 RX ADMIN — LORAZEPAM 1 MG: 2 INJECTION INTRAMUSCULAR at 15:22

## 2023-06-27 RX ADMIN — IOPAMIDOL 75 ML: 755 INJECTION, SOLUTION INTRAVENOUS at 23:53

## 2023-06-27 RX ADMIN — DEXAMETHASONE SODIUM PHOSPHATE 6 MG: 10 INJECTION INTRAMUSCULAR; INTRAVENOUS at 12:10

## 2023-06-27 RX ADMIN — METOPROLOL TARTRATE 5 MG: 1 INJECTION, SOLUTION INTRAVENOUS at 05:48

## 2023-06-27 RX ADMIN — CEFAZOLIN 2000 MG: 2 INJECTION, POWDER, FOR SOLUTION INTRAMUSCULAR; INTRAVENOUS at 17:30

## 2023-06-27 RX ADMIN — MUPIROCIN: 20 OINTMENT TOPICAL at 20:12

## 2023-06-27 RX ADMIN — PROTAMINE SULFATE 50 MG: 10 INJECTION, SOLUTION INTRAVENOUS at 12:07

## 2023-06-27 RX ADMIN — SODIUM CHLORIDE: 9 INJECTION, SOLUTION INTRAVENOUS at 15:24

## 2023-06-27 RX ADMIN — SODIUM CHLORIDE 25 ML/HR: 3 INJECTION, SOLUTION INTRAVENOUS at 21:00

## 2023-06-27 RX ADMIN — FENTANYL CITRATE 25 MCG: 50 INJECTION, SOLUTION INTRAMUSCULAR; INTRAVENOUS at 14:00

## 2023-06-28 ENCOUNTER — APPOINTMENT (OUTPATIENT)
Dept: CT IMAGING | Age: 63
DRG: 291 | End: 2023-06-28
Payer: COMMERCIAL

## 2023-06-28 VITALS
RESPIRATION RATE: 25 BRPM | OXYGEN SATURATION: 89 % | DIASTOLIC BLOOD PRESSURE: 76 MMHG | SYSTOLIC BLOOD PRESSURE: 147 MMHG | HEIGHT: 71 IN | BODY MASS INDEX: 38.92 KG/M2 | TEMPERATURE: 98 F | WEIGHT: 278 LBS | HEART RATE: 124 BPM

## 2023-06-28 PROBLEM — Z51.5 PALLIATIVE CARE ENCOUNTER: Status: ACTIVE | Noted: 2023-06-28

## 2023-06-28 PROBLEM — R56.9 NEW ONSET SEIZURE (HCC): Status: ACTIVE | Noted: 2023-01-01

## 2023-06-28 PROBLEM — I63.413 CEREBROVASCULAR ACCIDENT (CVA) DUE TO BILATERAL EMBOLISM OF MIDDLE CEREBRAL ARTERIES (HCC): Status: ACTIVE | Noted: 2023-06-28

## 2023-06-28 LAB
ALBUMIN SERPL-MCNC: 2.7 G/DL (ref 3.5–5.2)
ALP SERPL-CCNC: 123 U/L (ref 40–129)
ALT SERPL-CCNC: <5 U/L (ref 0–40)
ANGLE (CLOT STRENGTH): 79.1 DEGREE (ref 59–74)
ANION GAP SERPL CALCULATED.3IONS-SCNC: 15 MMOL/L (ref 7–16)
AST SERPL-CCNC: 11 U/L (ref 0–39)
BASOPHILS # BLD: 0.05 E9/L (ref 0–0.2)
BASOPHILS NFR BLD: 0.3 % (ref 0–2)
BILIRUB SERPL-MCNC: 0.4 MG/DL (ref 0–1.2)
BNP BLD-MCNC: 9927 PG/ML (ref 0–125)
BUN SERPL-MCNC: 34 MG/DL (ref 6–23)
CA-I BLD-SCNC: 1.13 MMOL/L (ref 1.15–1.33)
CALCIUM SERPL-MCNC: 8.8 MG/DL (ref 8.6–10.2)
CHLORIDE SERPL-SCNC: 96 MMOL/L (ref 98–107)
CO2 SERPL-SCNC: 32 MMOL/L (ref 22–29)
CREAT SERPL-MCNC: 1.6 MG/DL (ref 0.7–1.2)
EOSINOPHIL # BLD: 0 E9/L (ref 0.05–0.5)
EOSINOPHIL NFR BLD: 0 % (ref 0–6)
ERYTHROCYTE [DISTWIDTH] IN BLOOD BY AUTOMATED COUNT: 17.1 FL (ref 11.5–15)
G-TEG: 21.5 K D/SC (ref 4.5–11)
GLUCOSE SERPL-MCNC: 166 MG/DL (ref 74–99)
HCT VFR BLD AUTO: 28.3 % (ref 37–54)
HGB BLD-MCNC: 8.3 G/DL (ref 12.5–16.5)
IMM GRANULOCYTES # BLD: 0.37 E9/L
IMM GRANULOCYTES NFR BLD: 2 % (ref 0–5)
K (CLOTTING TIME): 0.8 MIN (ref 1–3)
LYMPHOCYTES # BLD: 0.78 E9/L (ref 1.5–4)
LYMPHOCYTES NFR BLD: 4.2 % (ref 20–42)
MA (MAX AMPLITUDE): 81.1 MM (ref 50–70)
MAGNESIUM SERPL-MCNC: 1.9 MG/DL (ref 1.6–2.6)
MCH RBC QN AUTO: 26.9 PG (ref 26–35)
MCHC RBC AUTO-ENTMCNC: 29.3 % (ref 32–34.5)
MCV RBC AUTO: 91.6 FL (ref 80–99.9)
METER GLUCOSE: 178 MG/DL (ref 74–99)
METER GLUCOSE: 185 MG/DL (ref 74–99)
MONOCYTES # BLD: 0.69 E9/L (ref 0.1–0.95)
MONOCYTES NFR BLD: 3.7 % (ref 2–12)
MRSA SPEC QL CULT: NORMAL
NEUTROPHILS # BLD: 16.7 E9/L (ref 1.8–7.3)
NEUTS SEG NFR BLD: 89.8 % (ref 43–80)
PHOSPHATE SERPL-MCNC: 4.3 MG/DL (ref 2.5–4.5)
PLATELET # BLD AUTO: 280 E9/L (ref 130–450)
PMV BLD AUTO: 11.2 FL (ref 7–12)
POTASSIUM SERPL-SCNC: 4.8 MMOL/L (ref 3.5–5)
PROT SERPL-MCNC: 6.1 G/DL (ref 6.4–8.3)
R (REACTION TIME): 4.7 MIN (ref 5–10)
RBC # BLD AUTO: 3.09 E12/L (ref 3.8–5.8)
SODIUM SERPL-SCNC: 139 MMOL/L (ref 132–146)
SODIUM SERPL-SCNC: 142 MMOL/L (ref 132–146)
SODIUM SERPL-SCNC: 143 MMOL/L (ref 132–146)
SODIUM SERPL-SCNC: 145 MMOL/L (ref 132–146)
WBC # BLD: 18.6 E9/L (ref 4.5–11.5)

## 2023-06-28 PROCEDURE — 84100 ASSAY OF PHOSPHORUS: CPT

## 2023-06-28 PROCEDURE — 36592 COLLECT BLOOD FROM PICC: CPT

## 2023-06-28 PROCEDURE — 2580000003 HC RX 258: Performed by: NURSE PRACTITIONER

## 2023-06-28 PROCEDURE — 2580000003 HC RX 258: Performed by: STUDENT IN AN ORGANIZED HEALTH CARE EDUCATION/TRAINING PROGRAM

## 2023-06-28 PROCEDURE — 85576 BLOOD PLATELET AGGREGATION: CPT

## 2023-06-28 PROCEDURE — 99233 SBSQ HOSP IP/OBS HIGH 50: CPT | Performed by: NURSE PRACTITIONER

## 2023-06-28 PROCEDURE — 83735 ASSAY OF MAGNESIUM: CPT

## 2023-06-28 PROCEDURE — 2500000003 HC RX 250 WO HCPCS: Performed by: NURSE PRACTITIONER

## 2023-06-28 PROCEDURE — 6360000002 HC RX W HCPCS: Performed by: INTERNAL MEDICINE

## 2023-06-28 PROCEDURE — 2580000003 HC RX 258

## 2023-06-28 PROCEDURE — 82962 GLUCOSE BLOOD TEST: CPT

## 2023-06-28 PROCEDURE — 6370000000 HC RX 637 (ALT 250 FOR IP): Performed by: NURSE PRACTITIONER

## 2023-06-28 PROCEDURE — 2580000003 HC RX 258: Performed by: INTERNAL MEDICINE

## 2023-06-28 PROCEDURE — 36415 COLL VENOUS BLD VENIPUNCTURE: CPT

## 2023-06-28 PROCEDURE — 6360000002 HC RX W HCPCS: Performed by: STUDENT IN AN ORGANIZED HEALTH CARE EDUCATION/TRAINING PROGRAM

## 2023-06-28 PROCEDURE — 80053 COMPREHEN METABOLIC PANEL: CPT

## 2023-06-28 PROCEDURE — 85347 COAGULATION TIME ACTIVATED: CPT

## 2023-06-28 PROCEDURE — 6360000002 HC RX W HCPCS: Performed by: NURSE PRACTITIONER

## 2023-06-28 PROCEDURE — 94640 AIRWAY INHALATION TREATMENT: CPT

## 2023-06-28 PROCEDURE — 99232 SBSQ HOSP IP/OBS MODERATE 35: CPT | Performed by: CLINICAL NURSE SPECIALIST

## 2023-06-28 PROCEDURE — 83880 ASSAY OF NATRIURETIC PEPTIDE: CPT

## 2023-06-28 PROCEDURE — 70450 CT HEAD/BRAIN W/O DYE: CPT

## 2023-06-28 PROCEDURE — 2700000000 HC OXYGEN THERAPY PER DAY

## 2023-06-28 PROCEDURE — 82330 ASSAY OF CALCIUM: CPT

## 2023-06-28 PROCEDURE — 2500000003 HC RX 250 WO HCPCS

## 2023-06-28 PROCEDURE — 84295 ASSAY OF SERUM SODIUM: CPT

## 2023-06-28 PROCEDURE — 85025 COMPLETE CBC W/AUTO DIFF WBC: CPT

## 2023-06-28 PROCEDURE — 6370000000 HC RX 637 (ALT 250 FOR IP): Performed by: FAMILY MEDICINE

## 2023-06-28 PROCEDURE — 85384 FIBRINOGEN ACTIVITY: CPT

## 2023-06-28 PROCEDURE — A4216 STERILE WATER/SALINE, 10 ML: HCPCS | Performed by: STUDENT IN AN ORGANIZED HEALTH CARE EDUCATION/TRAINING PROGRAM

## 2023-06-28 PROCEDURE — 6360000002 HC RX W HCPCS: Performed by: FAMILY MEDICINE

## 2023-06-28 PROCEDURE — C9113 INJ PANTOPRAZOLE SODIUM, VIA: HCPCS | Performed by: STUDENT IN AN ORGANIZED HEALTH CARE EDUCATION/TRAINING PROGRAM

## 2023-06-28 RX ORDER — MAGNESIUM SULFATE IN WATER 40 MG/ML
2000 INJECTION, SOLUTION INTRAVENOUS ONCE
Status: COMPLETED | OUTPATIENT
Start: 2023-06-28 | End: 2023-06-28

## 2023-06-28 RX ORDER — FENTANYL CITRATE 50 UG/ML
50 INJECTION, SOLUTION INTRAMUSCULAR; INTRAVENOUS
Status: DISCONTINUED | OUTPATIENT
Start: 2023-06-28 | End: 2023-06-28 | Stop reason: HOSPADM

## 2023-06-28 RX ORDER — FENTANYL CITRATE 50 UG/ML
25 INJECTION, SOLUTION INTRAMUSCULAR; INTRAVENOUS
Status: DISCONTINUED | OUTPATIENT
Start: 2023-06-28 | End: 2023-06-28 | Stop reason: HOSPADM

## 2023-06-28 RX ADMIN — DEXAMETHASONE SODIUM PHOSPHATE 6 MG: 10 INJECTION INTRAMUSCULAR; INTRAVENOUS at 00:22

## 2023-06-28 RX ADMIN — ARFORMOTEROL TARTRATE 15 MCG: 15 SOLUTION RESPIRATORY (INHALATION) at 09:39

## 2023-06-28 RX ADMIN — MUPIROCIN: 20 OINTMENT TOPICAL at 07:58

## 2023-06-28 RX ADMIN — ACETAMINOPHEN 650 MG: 650 SUPPOSITORY RECTAL at 00:16

## 2023-06-28 RX ADMIN — SODIUM CHLORIDE 40 MG: 9 INJECTION INTRAMUSCULAR; INTRAVENOUS; SUBCUTANEOUS at 07:57

## 2023-06-28 RX ADMIN — IPRATROPIUM BROMIDE AND ALBUTEROL SULFATE 1 DOSE: .5; 2.5 SOLUTION RESPIRATORY (INHALATION) at 09:40

## 2023-06-28 RX ADMIN — FENTANYL CITRATE 50 MCG: 0.05 INJECTION, SOLUTION INTRAMUSCULAR; INTRAVENOUS at 13:42

## 2023-06-28 RX ADMIN — FENTANYL CITRATE 50 MCG: 0.05 INJECTION, SOLUTION INTRAMUSCULAR; INTRAVENOUS at 11:34

## 2023-06-28 RX ADMIN — CALCIUM GLUCONATE 2000 MG: 98 INJECTION, SOLUTION INTRAVENOUS at 10:24

## 2023-06-28 RX ADMIN — FENTANYL CITRATE 50 MCG: 0.05 INJECTION, SOLUTION INTRAMUSCULAR; INTRAVENOUS at 10:33

## 2023-06-28 RX ADMIN — FENTANYL CITRATE 50 MCG: 0.05 INJECTION, SOLUTION INTRAMUSCULAR; INTRAVENOUS at 12:39

## 2023-06-28 RX ADMIN — FENTANYL CITRATE 50 MCG: 0.05 INJECTION, SOLUTION INTRAMUSCULAR; INTRAVENOUS at 16:57

## 2023-06-28 RX ADMIN — FENTANYL CITRATE 25 MCG: 50 INJECTION, SOLUTION INTRAMUSCULAR; INTRAVENOUS at 09:33

## 2023-06-28 RX ADMIN — LEVETIRACETAM 1000 MG: 100 INJECTION INTRAVENOUS at 06:47

## 2023-06-28 RX ADMIN — METOPROLOL TARTRATE 5 MG: 1 INJECTION, SOLUTION INTRAVENOUS at 04:23

## 2023-06-28 RX ADMIN — SODIUM CHLORIDE 15 MG/HR: 900 INJECTION, SOLUTION INTRAVENOUS at 02:43

## 2023-06-28 RX ADMIN — DEXAMETHASONE SODIUM PHOSPHATE 6 MG: 10 INJECTION INTRAMUSCULAR; INTRAVENOUS at 06:47

## 2023-06-28 RX ADMIN — CEFAZOLIN 2000 MG: 2 INJECTION, POWDER, FOR SOLUTION INTRAMUSCULAR; INTRAVENOUS at 06:47

## 2023-06-28 RX ADMIN — BUDESONIDE 500 MCG: 0.25 SUSPENSION RESPIRATORY (INHALATION) at 09:40

## 2023-06-28 RX ADMIN — HYDRALAZINE HYDROCHLORIDE 5 MG: 20 INJECTION INTRAMUSCULAR; INTRAVENOUS at 14:37

## 2023-06-28 RX ADMIN — METOPROLOL TARTRATE 5 MG: 1 INJECTION, SOLUTION INTRAVENOUS at 09:30

## 2023-06-28 RX ADMIN — SODIUM CHLORIDE: 9 INJECTION, SOLUTION INTRAVENOUS at 08:24

## 2023-06-28 RX ADMIN — MAGNESIUM SULFATE HEPTAHYDRATE 2000 MG: 40 INJECTION, SOLUTION INTRAVENOUS at 10:07

## 2023-06-28 RX ADMIN — FENTANYL CITRATE 50 MCG: 0.05 INJECTION, SOLUTION INTRAMUSCULAR; INTRAVENOUS at 14:33

## 2023-06-28 RX ADMIN — CEFAZOLIN 2000 MG: 2 INJECTION, POWDER, FOR SOLUTION INTRAMUSCULAR; INTRAVENOUS at 00:23

## 2023-06-28 RX ADMIN — FENTANYL CITRATE 50 MCG: 0.05 INJECTION, SOLUTION INTRAMUSCULAR; INTRAVENOUS at 15:40

## 2023-06-28 ASSESSMENT — PAIN SCALES - WONG BAKER
WONGBAKER_NUMERICALRESPONSE: 2
WONGBAKER_NUMERICALRESPONSE: 8
WONGBAKER_NUMERICALRESPONSE: 2
WONGBAKER_NUMERICALRESPONSE: 4
WONGBAKER_NUMERICALRESPONSE: 4

## 2023-06-28 ASSESSMENT — PAIN SCALES - GENERAL: PAINLEVEL_OUTOF10: 10

## 2023-07-01 LAB
T3FREE SERPL-MCNC: 1.9 PG/ML (ref 2–4.4)
THYROPEROXIDASE IGG SERPL-ACNC: <4 IU/ML (ref 0–25)

## 2023-07-08 PROBLEM — I50.9 ACUTE CONGESTIVE HEART FAILURE (HCC): Status: ACTIVE | Noted: 2023-07-08

## 2024-09-06 NOTE — PLAN OF CARE
Patient able to ambulate on own, remains on 5 liters of oxygen
Patient denies any pain.    Waiting for Dr. Edmund Reynolds for thoracentsis this afternoon
Problem: Falls - Risk of:  Goal: Will remain free from falls  Description: Will remain free from falls  Outcome: Met This Shift  Goal: Absence of physical injury  Description: Absence of physical injury  Outcome: Met This Shift
Problem: Pain:  Goal: Pain level will decrease  Description: Pain level will decrease  1/16/2021 1913 by Jesse Peña RN  Outcome: Met This Shift  1/16/2021 1537 by Kaleb Davies RN  Outcome: Met This Shift     Problem: Falls - Risk of:  Goal: Will remain free from falls  Description: Will remain free from falls  1/16/2021 1913 by Jesse Peña RN  Outcome: Met This Shift  1/16/2021 1537 by Kaleb Davies RN  Outcome: Met This Shift
Problem: Pain:  Goal: Pain level will decrease  Description: Pain level will decrease  1/16/2021 2308 by Rosi Vogel RN  Outcome: Met This Shift  1/16/2021 1913 by Rosi Vogel RN  Outcome: Met This Shift  1/16/2021 1537 by Esther Garcia RN  Outcome: Met This Shift     Problem: Falls - Risk of:  Goal: Will remain free from falls  Description: Will remain free from falls  1/16/2021 2308 by Rosi Vogel RN  Outcome: Met This Shift  1/16/2021 1913 by Rosi Vogel RN  Outcome: Met This Shift  1/16/2021 1537 by Esther Garcia RN  Outcome: Met This Shift
Problem: Pain:  Goal: Pain level will decrease  Description: Pain level will decrease  1/17/2021 0710 by Nain Velazquez RN  Outcome: Met This Shift  1/16/2021 2308 by Nain Velazquez RN  Outcome: Met This Shift  1/16/2021 1913 by Nain Velazquez RN  Outcome: Met This Shift     Problem: Falls - Risk of:  Goal: Will remain free from falls  Description: Will remain free from falls  1/17/2021 0710 by Nain Velazquez RN  Outcome: Met This Shift  1/16/2021 2308 by Nain Velazquez RN  Outcome: Met This Shift  1/16/2021 1913 by Nain Velazquez RN  Outcome: Met This Shift
Problem: Pain:  Goal: Pain level will decrease  Description: Pain level will decrease  Outcome: Met This Shift
Problem: Pain:  Goal: Pain level will decrease  Description: Pain level will decrease  Outcome: Met This Shift     Problem: Falls - Risk of:  Goal: Will remain free from falls  Description: Will remain free from falls  Outcome: Met This Shift
Pt. Denies any pain
fall precautions

## (undated) DEVICE — NEEDLE HYPO 25GA L1.5IN BLU POLYPR HUB S STL REG BVL STR

## (undated) DEVICE — TRAP,MUCUS SPECIMEN,40CC: Brand: MEDLINE

## (undated) DEVICE — SOLUTION IV 100ML 0.9% SOD CHL PLAS CONT USP VIAFLX 1 PER

## (undated) DEVICE — CATHETER DIAG 180DEG FIRM TIP EWC EDGE 180 SDK4000FT] SUPERDIMENSION INC]

## (undated) DEVICE — LIQUIBAND RAPID ADHESIVE 36/CS 0.8ML: Brand: MEDLINE

## (undated) DEVICE — CONTAINER SPEC 60ML PH 7NEUTRAL BUFF FRMLN RDY TO USE

## (undated) DEVICE — FORCEPS L110MM DIA1.7MM PREMARKED REINF SHFT

## (undated) DEVICE — SYRINGE MED 10ML TRNSLUC BRL PLUNG BLK MRK POLYPR CTRL

## (undated) DEVICE — SYRINGE,CONTROL,LL,FINGER,GRIP: Brand: MEDLINE INDUSTRIES, INC.

## (undated) DEVICE — DOUBLE BASIN SET: Brand: MEDLINE INDUSTRIES, INC.

## (undated) DEVICE — NEEDLE CYTO 21GA L137MM DIA1.9MM PULM BRAID TAPR SHTH OPT 5PK

## (undated) DEVICE — BRUSH CYTO L120MM DIA1.7MM SHARPENED NDL TIP FWD FACING

## (undated) DEVICE — GLOVE SURG SZ 65 THK91MIL LTX FREE SYN POLYISOPRENE

## (undated) DEVICE — INTENDED FOR TISSUE SEPARATION, AND OTHER PROCEDURES THAT REQUIRE A SHARP SURGICAL BLADE TO PUNCTURE OR CUT.: Brand: BARD-PARKER ® STAINLESS STEEL BLADES

## (undated) DEVICE — ELECTRODE PT RET AD L9FT HI MOIST COND ADH HYDRGEL CORDED

## (undated) DEVICE — STANDARD HYPODERMIC NEEDLE,ALUMINUM HUB: Brand: MONOJECT

## (undated) DEVICE — ADHESIVE SKIN CLSR 0.7ML TOP DERMBND ADV

## (undated) DEVICE — UNIVERSAL DRAPE: Brand: MEDLINE INDUSTRIES, INC.

## (undated) DEVICE — GLOVE ORANGE PI 7   MSG9070

## (undated) DEVICE — APPLICATOR PREP 26ML 0.7% IOD POVACRYLEX 74% ISO ALC ST

## (undated) DEVICE — 4-PORT MANIFOLD: Brand: NEPTUNE 2

## (undated) DEVICE — C-ARM: Brand: UNBRANDED

## (undated) DEVICE — PACK PROCEDURE SURG GEN CUST

## (undated) DEVICE — ENDOBRONCHIAL ULTRASOUND FINE NEEDLE BIOPSY (FNB) DEVICE: Brand: ACQUIRE™ PULMONARY

## (undated) DEVICE — GLOVE ORANGE PI 7 1/2   MSG9075

## (undated) DEVICE — DECANTER: Brand: UNBRANDED

## (undated) DEVICE — SOLUTION IV IRRIG POUR BRL 0.9% SODIUM CHL 2F7124

## (undated) DEVICE — GOWN,SIRUS,FABRNF,XL,20/CS: Brand: MEDLINE

## (undated) DEVICE — PORT INFUS 8FR PWR INJ CT FOR VASC ACCS CATH: Type: IMPLANTABLE DEVICE | Site: CHEST | Status: NON-FUNCTIONAL

## (undated) DEVICE — BLADE,STAINLESS-STEEL,15,STRL,DISPOSABLE: Brand: MEDLINE

## (undated) DEVICE — ADAPTER TBNG DIA15MM SWVL FBROPT BRONCHSCP TERM 2 AXIS PEEP

## (undated) DEVICE — DRAPE,CHEST,FENES,15X10,STERIL: Brand: MEDLINE

## (undated) DEVICE — TOWEL,OR,DSP,ST,BLUE,STD,6/PK,12PK/CS: Brand: MEDLINE

## (undated) DEVICE — COVER HNDL LT DISP